# Patient Record
Sex: FEMALE | Race: WHITE | Employment: UNEMPLOYED | URBAN - METROPOLITAN AREA
[De-identification: names, ages, dates, MRNs, and addresses within clinical notes are randomized per-mention and may not be internally consistent; named-entity substitution may affect disease eponyms.]

---

## 2018-11-01 ENCOUNTER — HOSPITAL ENCOUNTER (EMERGENCY)
Age: 38
Discharge: HOME | End: 2018-11-01
Attending: EMERGENCY MEDICINE
Payer: COMMERCIAL

## 2018-11-01 ENCOUNTER — APPOINTMENT (OUTPATIENT)
Dept: CT IMAGING | Age: 38
End: 2018-11-01
Payer: COMMERCIAL

## 2018-11-01 VITALS
RESPIRATION RATE: 12 BRPM | OXYGEN SATURATION: 97 % | HEART RATE: 83 BPM | BODY MASS INDEX: 27.1 KG/M2 | WEIGHT: 143.52 LBS | TEMPERATURE: 98 F | HEIGHT: 61 IN | SYSTOLIC BLOOD PRESSURE: 105 MMHG | DIASTOLIC BLOOD PRESSURE: 65 MMHG

## 2018-11-01 DIAGNOSIS — R51.9 NONINTRACTABLE HEADACHE, UNSPECIFIED CHRONICITY PATTERN, UNSPECIFIED HEADACHE TYPE: ICD-10-CM

## 2018-11-01 DIAGNOSIS — M54.2 NECK PAIN ON LEFT SIDE: Primary | ICD-10-CM

## 2018-11-01 LAB — HCG(URINE) PREGNANCY TEST: NEGATIVE

## 2018-11-01 PROCEDURE — 96374 THER/PROPH/DIAG INJ IV PUSH: CPT

## 2018-11-01 PROCEDURE — 99284 EMERGENCY DEPT VISIT MOD MDM: CPT

## 2018-11-01 PROCEDURE — 70450 CT HEAD/BRAIN W/O DYE: CPT

## 2018-11-01 PROCEDURE — 2500000003 HC RX 250 WO HCPCS: Performed by: EMERGENCY MEDICINE

## 2018-11-01 PROCEDURE — 84703 CHORIONIC GONADOTROPIN ASSAY: CPT

## 2018-11-01 RX ORDER — KETAMINE HCL IN NACL, ISO-OSM 100MG/10ML
0.1 SYRINGE (ML) INJECTION ONCE
Status: COMPLETED | OUTPATIENT
Start: 2018-11-01 | End: 2018-11-01

## 2018-11-01 RX ORDER — ALPRAZOLAM 1 MG/1
1 TABLET ORAL 3 TIMES DAILY PRN
COMMUNITY

## 2018-11-01 RX ORDER — MIDODRINE HYDROCHLORIDE 5 MG/1
5 TABLET ORAL DAILY
COMMUNITY
End: 2020-03-02 | Stop reason: SDUPTHER

## 2018-11-01 RX ORDER — PREGABALIN 150 MG/1
200 CAPSULE ORAL 2 TIMES DAILY
COMMUNITY
End: 2020-10-05

## 2018-11-01 RX ORDER — RANITIDINE 150 MG/1
TABLET ORAL 2 TIMES DAILY
COMMUNITY
End: 2019-05-11 | Stop reason: ALTCHOICE

## 2018-11-01 RX ORDER — OXYCODONE HYDROCHLORIDE AND ACETAMINOPHEN 5; 325 MG/1; MG/1
1 TABLET ORAL EVERY 4 HOURS PRN
Status: ON HOLD | COMMUNITY
End: 2019-06-04 | Stop reason: HOSPADM

## 2018-11-01 RX ORDER — TEMAZEPAM 30 MG/1
30 CAPSULE ORAL NIGHTLY PRN
Status: ON HOLD | COMMUNITY
End: 2022-06-04

## 2018-11-01 RX ORDER — CETIRIZINE HYDROCHLORIDE 10 MG/1
10 TABLET ORAL DAILY
COMMUNITY

## 2018-11-01 RX ORDER — NAPROXEN 500 MG/1
500 TABLET ORAL 2 TIMES DAILY WITH MEALS
COMMUNITY
End: 2019-05-11 | Stop reason: ALTCHOICE

## 2018-11-01 RX ADMIN — Medication 6.5 MG: at 15:24

## 2018-11-01 ASSESSMENT — PAIN DESCRIPTION - LOCATION: LOCATION: HEAD;NECK

## 2018-11-01 ASSESSMENT — PAIN SCALES - GENERAL
PAINLEVEL_OUTOF10: 8
PAINLEVEL_OUTOF10: 5
PAINLEVEL_OUTOF10: 8
PAINLEVEL_OUTOF10: 8

## 2018-11-01 ASSESSMENT — ENCOUNTER SYMPTOMS
BACK PAIN: 0
NAUSEA: 0
COLOR CHANGE: 0
VOMITING: 0
COUGH: 0
SHORTNESS OF BREATH: 0

## 2018-11-01 ASSESSMENT — PAIN DESCRIPTION - PAIN TYPE: TYPE: ACUTE PAIN

## 2018-11-01 NOTE — ED NOTES
Discharge and education instructions reviewed. Patient verbalized understanding, teach-back successful. Patient denied questions at this time. No acute distress noted. Patient instructed to follow-up as noted - return to emergency department if symptoms worsen. Patient verbalized understanding. Discharged per EDMD with discharged instructions.        Maryan Varela RN  11/01/18 5527

## 2018-11-01 NOTE — ED NOTES
Pt states that Ketamine did help with migraine and neck pain . Reducing pain from 8/10 to a 5/10.       Maryan Varela RN  11/01/18 6741

## 2018-11-01 NOTE — ED TRIAGE NOTES
Pt to ED with c/o headache x 1-2 months and neck stiffness x 1 month. States the  Pain changes, sometimes it is sharp and stabbing and other times it causes nausea and light sensitivity. States the headache is constant while awake. States that the focal point of pain changes but today it is behind her left eye and the whole left side. Pt stats she believes that the migraines are stemming from her back pain or she has an enlarged pituitary. Pt has an appt with \"spine specialist\" next week and and has an upcoming appt with neurologist. States she needs an MRI of her brain. Detail Level: Generalized Include Location In Plan?: No

## 2018-11-01 NOTE — ED PROVIDER NOTES
[facility]  eMERGENCY dEPARTMENT eNCOUnter        Pt Name: Mariana Rowan  MRN: [id number]  [place] 1980  Date of evaluation: 11/1/2018  Provider: MARTÍN Burgess  PCP: Bruce Ch MD  ED Attending: Calos Cross MD    [address] [place] [address]       Chief Complaint   Patient presents with    Migraine     states over 1 month    Neck Pain     and stiff       HISTORY OF PRESENT ILLNESS   (Location/Symptom, Timing/Onset, Context/Setting, Quality, Duration, Modifying Factors, Severity)  Note limiting factors. Mariana Rowan is a 34 y.o. female who presents to the ED today with complaints of migraine, left neck pain. She states the migraine has been present for the last 2 months, and the left neck stiffness has been present for about a month. She denies any falls or trauma. She denies any fever or chills. She follows with pain management, and has appointment with neurology scheduled for this week or next week. She states that she has been given prescription for muscle relaxer, but has not started taking it yet. She states that she does not like taking a lot of medications because it causes her to be sleepy. She states nothing has gotten acutely worse, but she has been told she may have some issues with her pituitary gland and has not had imaging done and is worried she may have a tumor. She has no further complaints at this time. Nursing Notes were all reviewed and agreed with or any disagreements were addressed  in the HPI. REVIEW OF SYSTEMS    (2-9 systems for level 4, 10 or more for level 5)     Review of Systems   Constitutional: Positive for fatigue. Negative for chills and fever. Respiratory: Negative for cough and shortness of breath. Cardiovascular: Negative for chest pain and palpitations. Gastrointestinal: Negative for nausea and vomiting. Musculoskeletal: Positive for neck pain (left side, x 2 months) and neck stiffness (left side). of the mA/kV was utilized to reduce the radiation dose to as low as reasonably achievable. COMPARISON: October 14, 2012 HISTORY: ORDERING SYSTEM PROVIDED HISTORY: migraine x 2 months, pt states she has a pituitary abnormality but hasn't had imaging done TECHNOLOGIST PROVIDED HISTORY: If patient is on cardiac monitor and/or pulse ox, they may be taken off cardiac monitor and pulse ox, left on O2 if currently on. All monitors reattached when patient returns to room. Has a \"code stroke\" or \"stroke alert\" been called? ->No Ordering Physician Provided Reason for Exam: migraine x 2 months, pt states she has a pituitary abnormality but hasn't had imaging done Acuity: Acute Type of Exam: Initial FINDINGS: BRAIN/VENTRICLES: There is no acute intracranial hemorrhage, mass effect or midline shift. No abnormal extra-axial fluid collection. The gray-white differentiation is maintained without evidence of an acute infarct. There is no evidence of hydrocephalus. ORBITS: The visualized portion of the orbits demonstrate no acute abnormality. SINUSES: The visualized paranasal sinuses and mastoid air cells demonstrate no acute abnormality. SOFT TISSUES/SKULL:  No acute abnormality of the visualized skull or soft tissues. No acute intracranial abnormality. PROCEDURES   Unless otherwise noted below, none     Procedures    CRITICAL CARE TIME   N/A    CONSULTS:  None      EMERGENCY DEPARTMENT COURSE and DIFFERENTIALDIAGNOSIS/MDM:   Vitals:    Vitals:    11/01/18 1520 11/01/18 1530 11/01/18 1545 11/01/18 1603   BP: 105/65 101/67 97/65 (!) 98/57   Pulse: 77 76 82 84   Resp: 14 14 15 17   Temp:       TempSrc:       SpO2: 99% 99% 100% 100%   Weight:       Height:           Patient was given thefollowing medications:  Medications   ketamine (KETALAR) injection 6.5 mg (6.5 mg Intravenous Given 11/1/18 1524)       ED COURSE & MEDICAL DECISION MAKING    Pertinent Labs & Imaging studies reviewed.  (See chart for details)   -  Patient for a visit   for a re-check in  2-3  days    [facility] Emergency Department  [address] [facility]  [phone]    If symptoms worsen      DISCHARGE MEDICATIONS:  New Prescriptions    No medications on file       DISCONTINUED MEDICATIONS:  Discontinued Medications    BACLOFEN (ED BACLOFEN) 10 MG TABLET    Take 20 mg by mouth 3 times daily.     DOXEPIN HCL PO    Take by mouth    FLUTICASONE PROPIONATE (FLONASE NA)    by Nasal route 2 times daily    GABAPENTIN (NEURONTIN) 300 MG CAPSULE      800 mg 3 times daily     HYDROXYZINE (VISTARIL) 25 MG CAPS    Take 25-50 mg by mouth 3 times daily as needed    LAMOTRIGINE (LAMICTAL) 25 MG TABLET    Take 2 tablets by mouth 2 times daily for 20 days    MELOXICAM (MOBIC PO)    Take 10 mg by mouth 4 times daily    OXCARBAZEPINE (TRILEPTAL) 150 MG TABLET    Take 1 tablet by mouth every evening for 20 days              (Please note that portions ofthis note were completed with a voice recognition program.  Efforts were made to edit the dictations but occasionally words are mis-transcribed.)    MARTÍN Olivares (electronically signed)            Michael Olivares  11/01/18 7113

## 2018-11-06 ENCOUNTER — HOSPITAL ENCOUNTER (EMERGENCY)
Age: 38
Discharge: HOME | End: 2018-11-06
Payer: COMMERCIAL

## 2018-11-06 VITALS
OXYGEN SATURATION: 100 % | SYSTOLIC BLOOD PRESSURE: 101 MMHG | WEIGHT: 142.86 LBS | BODY MASS INDEX: 26.99 KG/M2 | HEART RATE: 91 BPM | TEMPERATURE: 98.4 F | RESPIRATION RATE: 16 BRPM | DIASTOLIC BLOOD PRESSURE: 61 MMHG

## 2018-11-06 DIAGNOSIS — K04.7 DENTAL ABSCESS: Primary | ICD-10-CM

## 2018-11-06 PROCEDURE — 4500000022 HC ED LEVEL 2 PROCEDURE

## 2018-11-06 PROCEDURE — 99282 EMERGENCY DEPT VISIT SF MDM: CPT

## 2018-11-06 RX ORDER — CLINDAMYCIN HYDROCHLORIDE 150 MG/1
450 CAPSULE ORAL 3 TIMES DAILY
Qty: 90 CAPSULE | Refills: 0 | Status: SHIPPED | OUTPATIENT
Start: 2018-11-06 | End: 2018-11-16

## 2018-11-06 RX ORDER — IBUPROFEN 800 MG/1
800 TABLET ORAL EVERY 6 HOURS PRN
Qty: 25 TABLET | Refills: 1 | Status: ON HOLD | OUTPATIENT
Start: 2018-11-06 | End: 2019-05-13

## 2018-11-06 ASSESSMENT — PAIN SCALES - GENERAL
PAINLEVEL_OUTOF10: 6
PAINLEVEL_OUTOF10: 4
PAINLEVEL_OUTOF10: 4

## 2018-11-06 ASSESSMENT — PAIN DESCRIPTION - FREQUENCY: FREQUENCY: CONTINUOUS

## 2018-11-06 ASSESSMENT — PAIN DESCRIPTION - LOCATION: LOCATION: JAW

## 2018-11-06 ASSESSMENT — PAIN DESCRIPTION - PAIN TYPE: TYPE: ACUTE PAIN

## 2018-11-06 ASSESSMENT — PAIN DESCRIPTION - DESCRIPTORS: DESCRIPTORS: ACHING

## 2018-11-06 ASSESSMENT — PAIN DESCRIPTION - ORIENTATION: ORIENTATION: RIGHT;LOWER

## 2018-11-06 NOTE — ED PROVIDER NOTES
is no brawny edema, uvular deviation, meningismus, stridor, trismus or drooling. I estimate there is LOW risk for a DEEP SPACE INFECTION (e.g., JENELLES ANGINA OR RETROPHARYNGEAL ABSCESS), MENINGITIS, or AIRWAY COMPROMISE. There is also NO HEART MURMUR NOTED ON EXAM thus I consider the discharge disposition reasonable. Patient will be given the dental clinic list and advised to start calling first thing tomorrow morning to schedule a follow-up visit. Patient is advised to return to the emergency department with any concerns. The patient tolerated their visit well. I saw the patient independently with physician available for consultation as needed. The patient and / or the family were informed of the results of any tests, a time was given to answer questions, a plan was proposed and they agreed with plan. FINAL IMPRESSION    1.  Dental abscess        PLAN  Oral analgesics, antibiotics, and follow up with a dentist as an outpatient    (Please note that this note was completed with a voice recognition program.  Every attempt was made to edit the dictations, but inevitably there remain words that are mis-transcribed.)             Carole Manzo, ANNA - CNP  11/06/18 5433

## 2018-11-14 ENCOUNTER — HOSPITAL ENCOUNTER (EMERGENCY)
Age: 38
Discharge: HOME | End: 2018-11-14
Attending: EMERGENCY MEDICINE
Payer: COMMERCIAL

## 2018-11-14 ENCOUNTER — APPOINTMENT (OUTPATIENT)
Dept: MRI IMAGING | Age: 38
End: 2018-11-14
Payer: COMMERCIAL

## 2018-11-14 VITALS
HEART RATE: 77 BPM | SYSTOLIC BLOOD PRESSURE: 98 MMHG | BODY MASS INDEX: 27.49 KG/M2 | OXYGEN SATURATION: 100 % | WEIGHT: 145.5 LBS | RESPIRATION RATE: 16 BRPM | DIASTOLIC BLOOD PRESSURE: 70 MMHG

## 2018-11-14 DIAGNOSIS — R51.9 NONINTRACTABLE HEADACHE, UNSPECIFIED CHRONICITY PATTERN, UNSPECIFIED HEADACHE TYPE: Primary | ICD-10-CM

## 2018-11-14 LAB
A/G RATIO: 2.1 (ref 1.1–2.2)
ALBUMIN SERPL-MCNC: 5.2 G/DL (ref 3.4–5)
ALP BLD-CCNC: 53 U/L (ref 40–129)
ALT SERPL-CCNC: 17 U/L (ref 10–40)
ANION GAP SERPL CALCULATED.3IONS-SCNC: 11 MMOL/L (ref 3–16)
AST SERPL-CCNC: 21 U/L (ref 15–37)
BASOPHILS ABSOLUTE: 0 K/UL (ref 0–0.2)
BASOPHILS RELATIVE PERCENT: 0.7 %
BILIRUB SERPL-MCNC: 0.4 MG/DL (ref 0–1)
BUN BLDV-MCNC: 11 MG/DL (ref 7–20)
CALCIUM SERPL-MCNC: 10.1 MG/DL (ref 8.3–10.6)
CHLORIDE BLD-SCNC: 104 MMOL/L (ref 99–110)
CO2: 24 MMOL/L (ref 21–32)
CREAT SERPL-MCNC: 0.7 MG/DL (ref 0.6–1.1)
EOSINOPHILS ABSOLUTE: 0.1 K/UL (ref 0–0.6)
EOSINOPHILS RELATIVE PERCENT: 2.1 %
GFR AFRICAN AMERICAN: >60
GFR NON-AFRICAN AMERICAN: >60
GLOBULIN: 2.5 G/DL
GLUCOSE BLD-MCNC: 97 MG/DL (ref 70–99)
HCT VFR BLD CALC: 44.5 % (ref 36–48)
HEMOGLOBIN: 15.1 G/DL (ref 12–16)
LYMPHOCYTES ABSOLUTE: 1.3 K/UL (ref 1–5.1)
LYMPHOCYTES RELATIVE PERCENT: 22.8 %
MCH RBC QN AUTO: 31.8 PG (ref 26–34)
MCHC RBC AUTO-ENTMCNC: 33.9 G/DL (ref 31–36)
MCV RBC AUTO: 93.8 FL (ref 80–100)
MONOCYTES ABSOLUTE: 0.5 K/UL (ref 0–1.3)
MONOCYTES RELATIVE PERCENT: 8.6 %
NEUTROPHILS ABSOLUTE: 3.9 K/UL (ref 1.7–7.7)
NEUTROPHILS RELATIVE PERCENT: 65.8 %
PDW BLD-RTO: 12.9 % (ref 12.4–15.4)
PLATELET # BLD: 258 K/UL (ref 135–450)
PMV BLD AUTO: 7.9 FL (ref 5–10.5)
POTASSIUM SERPL-SCNC: 3.8 MMOL/L (ref 3.5–5.1)
RBC # BLD: 4.74 M/UL (ref 4–5.2)
SODIUM BLD-SCNC: 139 MMOL/L (ref 136–145)
TOTAL PROTEIN: 7.7 G/DL (ref 6.4–8.2)
WBC # BLD: 5.9 K/UL (ref 4–11)

## 2018-11-14 PROCEDURE — 6370000000 HC RX 637 (ALT 250 FOR IP): Performed by: EMERGENCY MEDICINE

## 2018-11-14 PROCEDURE — 96374 THER/PROPH/DIAG INJ IV PUSH: CPT

## 2018-11-14 PROCEDURE — 99284 EMERGENCY DEPT VISIT MOD MDM: CPT

## 2018-11-14 PROCEDURE — 85025 COMPLETE CBC W/AUTO DIFF WBC: CPT

## 2018-11-14 PROCEDURE — 70544 MR ANGIOGRAPHY HEAD W/O DYE: CPT

## 2018-11-14 PROCEDURE — 96375 TX/PRO/DX INJ NEW DRUG ADDON: CPT

## 2018-11-14 PROCEDURE — 80053 COMPREHEN METABOLIC PANEL: CPT

## 2018-11-14 PROCEDURE — 6360000002 HC RX W HCPCS: Performed by: EMERGENCY MEDICINE

## 2018-11-14 RX ORDER — BUTALBITAL, ASPIRIN, AND CAFFEINE 50; 325; 40 MG/1; MG/1; MG/1
1 CAPSULE ORAL EVERY 6 HOURS PRN
Qty: 20 CAPSULE | Refills: 0 | Status: SHIPPED | OUTPATIENT
Start: 2018-11-14 | End: 2019-05-11 | Stop reason: ALTCHOICE

## 2018-11-14 RX ORDER — KETOROLAC TROMETHAMINE 30 MG/ML
30 INJECTION, SOLUTION INTRAMUSCULAR; INTRAVENOUS ONCE
Status: COMPLETED | OUTPATIENT
Start: 2018-11-14 | End: 2018-11-14

## 2018-11-14 RX ORDER — OXYCODONE HYDROCHLORIDE AND ACETAMINOPHEN 5; 325 MG/1; MG/1
1 TABLET ORAL ONCE
Status: COMPLETED | OUTPATIENT
Start: 2018-11-14 | End: 2018-11-14

## 2018-11-14 RX ORDER — KETOROLAC TROMETHAMINE 10 MG/1
10 TABLET, FILM COATED ORAL 3 TIMES DAILY
Qty: 12 TABLET | Refills: 0 | Status: SHIPPED | OUTPATIENT
Start: 2018-11-14 | End: 2019-05-11 | Stop reason: ALTCHOICE

## 2018-11-14 RX ORDER — ONDANSETRON 4 MG/1
4 TABLET, ORALLY DISINTEGRATING ORAL EVERY 8 HOURS PRN
Qty: 20 TABLET | Refills: 0 | Status: ON HOLD | OUTPATIENT
Start: 2018-11-14 | End: 2020-08-19

## 2018-11-14 RX ORDER — ONDANSETRON 2 MG/ML
4 INJECTION INTRAMUSCULAR; INTRAVENOUS ONCE
Status: COMPLETED | OUTPATIENT
Start: 2018-11-14 | End: 2018-11-14

## 2018-11-14 RX ADMIN — ONDANSETRON 4 MG: 2 INJECTION INTRAMUSCULAR; INTRAVENOUS at 15:17

## 2018-11-14 RX ADMIN — OXYCODONE AND ACETAMINOPHEN 1 TABLET: 5; 325 TABLET ORAL at 18:34

## 2018-11-14 RX ADMIN — KETOROLAC TROMETHAMINE 30 MG: 30 INJECTION, SOLUTION INTRAMUSCULAR at 15:18

## 2018-11-14 ASSESSMENT — PAIN - FUNCTIONAL ASSESSMENT: PAIN_FUNCTIONAL_ASSESSMENT: 0-10

## 2018-11-14 ASSESSMENT — PAIN SCALES - GENERAL
PAINLEVEL_OUTOF10: 8

## 2018-11-14 ASSESSMENT — PAIN DESCRIPTION - PROGRESSION
CLINICAL_PROGRESSION: NOT CHANGED
CLINICAL_PROGRESSION: NOT CHANGED

## 2018-11-14 NOTE — ED PROVIDER NOTES
Triage Chief Complaint:   Headache (pt states she thinks she has a brain tumor pt states \" ineed MRA of brain\" )    [place]:  Dennard Essex is a 34 y.o. female that presents to the emergency Department with complaints of having a headache. The patient states her headaches have begun over the course of the last 2 and half months. The patient states that she started developing these symptoms after being diagnosed with [place]'s disease. The patient states that he has never had a history of having migraines in the past.  The patient states that she was told that she needed to have an MRA of her brain done, and had just left the neurologist office today. The patient states that her symptoms have become worse today, and they were concerned about this and felt that she needed to have an MRA to rule out mass or aneurysm. Patient states that she has had an MRI in the past, but it was before having any symptoms such as this. The patient states that this is a typical presentation for her migraines, however, it is much more severe than usual.  Patient denies any radiation to her neck. Patient denies any visual disturbances. ROS:  At least  10 systems reviewed and otherwise acutely negative except as in the [address] Past Medical History:   Diagnosis Date    Compression fracture     Fibromyalgia     Herniated disc     Metabolic syndrome     Osteoarthritis     Pyelonephritis     Sciatica      History reviewed. No pertinent surgical history. History reviewed. No pertinent family history. Social History     Social History    Marital status: Single     Spouse name: N/A    Number of children: N/A    Years of education: N/A     Occupational History    Not on file.      Social History Main Topics    Smoking status: Former Smoker     Packs/day: 1.00     Types: Cigarettes     Quit date: 1/8/2018    Smokeless tobacco: Never Used    Alcohol use Yes      Comment: socially    Drug use: No      Comment: Denies    care doctor, Dr. Kahlil Belcher, we discussed her case. Since the patient is  having more severe pain, we decided that he would be beneficial for the patient to have the MRA done at the present time. The MRA was done, and it does not show any signs of abnormalities. This is consistent with the patient's previous MRI of 2012. At this point I feel the patient can be safely discharged home. The patient did have moderate improvement with the Toradol, and I will also give her a dose of Percocet here. I'm also provide the patient with medications for home. Patient will specifically get her set, Toradol, and Zofran to help with her symptoms. Patient was advised to follow with Dr. Kahlil Belcher, or her neurologist.    Clinical Impression:  1.  Nonintractable headache, unspecified chronicity pattern, unspecified headache type      (Please note that portions of this note Yaz Navarretebs been completed with a voice recognition program. Efforts were made to edit the dictations but occasionally words are mis-transcribed.)    MD Sandeep Vega MD  11/14/18 8709

## 2019-05-11 ENCOUNTER — APPOINTMENT (OUTPATIENT)
Dept: CT IMAGING | Age: 39
DRG: 052 | End: 2019-05-11
Payer: COMMERCIAL

## 2019-05-11 ENCOUNTER — APPOINTMENT (OUTPATIENT)
Dept: GENERAL RADIOLOGY | Age: 39
DRG: 052 | End: 2019-05-11
Payer: COMMERCIAL

## 2019-05-11 ENCOUNTER — HOSPITAL ENCOUNTER (INPATIENT)
Age: 39
LOS: 1 days | Discharge: HOME | DRG: 052 | End: 2019-05-15
Attending: EMERGENCY MEDICINE | Admitting: SPECIALIST
Payer: COMMERCIAL

## 2019-05-11 DIAGNOSIS — R41.0 DISORIENTATION: Primary | ICD-10-CM

## 2019-05-11 PROBLEM — R41.82 CHANGE IN MENTAL STATUS: Status: ACTIVE | Noted: 2019-05-11

## 2019-05-11 LAB
A/G RATIO: 1.4 (ref 1.1–2.2)
ACETAMINOPHEN LEVEL: <5 UG/ML (ref 10–30)
ALBUMIN SERPL-MCNC: 5.1 G/DL (ref 3.4–5)
ALP BLD-CCNC: 72 U/L (ref 40–129)
ALT SERPL-CCNC: 30 U/L (ref 10–40)
AMPHETAMINE SCREEN, URINE: NORMAL
ANION GAP SERPL CALCULATED.3IONS-SCNC: 15 MMOL/L (ref 3–16)
AST SERPL-CCNC: 27 U/L (ref 15–37)
BARBITURATE SCREEN URINE: NORMAL
BASE EXCESS VENOUS: -0.6 MMOL/L
BASOPHILS ABSOLUTE: 0.1 K/UL (ref 0–0.2)
BASOPHILS RELATIVE PERCENT: 0.9 %
BENZODIAZEPINE SCREEN, URINE: NORMAL
BILIRUB SERPL-MCNC: 0.5 MG/DL (ref 0–1)
BILIRUBIN URINE: NEGATIVE
BLOOD, URINE: NEGATIVE
BUN BLDV-MCNC: 18 MG/DL (ref 7–20)
CALCIUM SERPL-MCNC: 10.6 MG/DL (ref 8.3–10.6)
CANNABINOID SCREEN URINE: NORMAL
CARBOXYHEMOGLOBIN: 1.5 %
CHLORIDE BLD-SCNC: 106 MMOL/L (ref 99–110)
CHP ED QC CHECK: YES
CLARITY: CLEAR
CO2: 22 MMOL/L (ref 21–32)
COCAINE METABOLITE SCREEN URINE: NORMAL
COLOR: YELLOW
CREAT SERPL-MCNC: 0.8 MG/DL (ref 0.6–1.1)
EOSINOPHILS ABSOLUTE: 0.1 K/UL (ref 0–0.6)
EOSINOPHILS RELATIVE PERCENT: 1.2 %
EPITHELIAL CELLS, UA: 2 /HPF (ref 0–5)
ETHANOL: NORMAL MG/DL (ref 0–0.08)
GFR AFRICAN AMERICAN: >60
GFR NON-AFRICAN AMERICAN: >60
GLOBULIN: 3.7 G/DL
GLUCOSE BLD-MCNC: 102 MG/DL
GLUCOSE BLD-MCNC: 102 MG/DL (ref 70–99)
GLUCOSE BLD-MCNC: 116 MG/DL (ref 70–99)
GLUCOSE URINE: NEGATIVE MG/DL
HCG(URINE) PREGNANCY TEST: NEGATIVE
HCO3 VENOUS: 23 MMOL/L (ref 23–29)
HCT VFR BLD CALC: 46.9 % (ref 36–48)
HEMOGLOBIN: 16 G/DL (ref 12–16)
HYALINE CASTS: 2 /LPF (ref 0–8)
KETONES, URINE: 15 MG/DL
LACTIC ACID, SEPSIS: 1 MMOL/L (ref 0.4–1.9)
LACTIC ACID, SEPSIS: 1.2 MMOL/L (ref 0.4–1.9)
LEUKOCYTE ESTERASE, URINE: ABNORMAL
LYMPHOCYTES ABSOLUTE: 1.6 K/UL (ref 1–5.1)
LYMPHOCYTES RELATIVE PERCENT: 16.5 %
Lab: NORMAL
MCH RBC QN AUTO: 31.1 PG (ref 26–34)
MCHC RBC AUTO-ENTMCNC: 34.1 G/DL (ref 31–36)
MCV RBC AUTO: 91 FL (ref 80–100)
METHADONE SCREEN, URINE: NORMAL
METHEMOGLOBIN VENOUS: 1 %
MICROSCOPIC EXAMINATION: YES
MONOCYTES ABSOLUTE: 0.9 K/UL (ref 0–1.3)
MONOCYTES RELATIVE PERCENT: 9 %
NEUTROPHILS ABSOLUTE: 7.1 K/UL (ref 1.7–7.7)
NEUTROPHILS RELATIVE PERCENT: 72.4 %
NITRITE, URINE: NEGATIVE
O2 CONTENT, VEN: 18 ML/DL
O2 SAT, VEN: 82 %
O2 THERAPY: ABNORMAL
OPIATE SCREEN URINE: NORMAL
OXYCODONE URINE: NORMAL
PCO2, VEN: 36 MMHG (ref 40–50)
PDW BLD-RTO: 13.6 % (ref 12.4–15.4)
PERFORMED ON: ABNORMAL
PH UA: 7.5
PH UA: 7.5 (ref 5–8)
PH VENOUS: 7.42 (ref 7.35–7.45)
PHENCYCLIDINE SCREEN URINE: NORMAL
PLATELET # BLD: 460 K/UL (ref 135–450)
PMV BLD AUTO: 7.9 FL (ref 5–10.5)
PO2, VEN: 46 MMHG
POTASSIUM SERPL-SCNC: 4 MMOL/L (ref 3.5–5.1)
PROPOXYPHENE SCREEN: NORMAL
PROTEIN UA: NEGATIVE MG/DL
RBC # BLD: 5.15 M/UL (ref 4–5.2)
RBC UA: 1 /HPF (ref 0–4)
SALICYLATE, SERUM: <0.3 MG/DL (ref 15–30)
SODIUM BLD-SCNC: 143 MMOL/L (ref 136–145)
SPECIFIC GRAVITY UA: 1.02 (ref 1–1.03)
TCO2 CALC VENOUS: 24 MMOL/L
TOTAL PROTEIN: 8.8 G/DL (ref 6.4–8.2)
URINE REFLEX TO CULTURE: YES
URINE TYPE: ABNORMAL
UROBILINOGEN, URINE: 1 E.U./DL
WBC # BLD: 9.8 K/UL (ref 4–11)
WBC UA: 4 /HPF (ref 0–5)

## 2019-05-11 PROCEDURE — 83605 ASSAY OF LACTIC ACID: CPT

## 2019-05-11 PROCEDURE — 80053 COMPREHEN METABOLIC PANEL: CPT

## 2019-05-11 PROCEDURE — 96361 HYDRATE IV INFUSION ADD-ON: CPT

## 2019-05-11 PROCEDURE — 81001 URINALYSIS AUTO W/SCOPE: CPT

## 2019-05-11 PROCEDURE — G0480 DRUG TEST DEF 1-7 CLASSES: HCPCS

## 2019-05-11 PROCEDURE — 87086 URINE CULTURE/COLONY COUNT: CPT

## 2019-05-11 PROCEDURE — 82803 BLOOD GASES ANY COMBINATION: CPT

## 2019-05-11 PROCEDURE — 36415 COLL VENOUS BLD VENIPUNCTURE: CPT

## 2019-05-11 PROCEDURE — 2580000003 HC RX 258: Performed by: SPECIALIST

## 2019-05-11 PROCEDURE — G0378 HOSPITAL OBSERVATION PER HR: HCPCS

## 2019-05-11 PROCEDURE — 80307 DRUG TEST PRSMV CHEM ANLYZR: CPT

## 2019-05-11 PROCEDURE — 84703 CHORIONIC GONADOTROPIN ASSAY: CPT

## 2019-05-11 PROCEDURE — 84443 ASSAY THYROID STIM HORMONE: CPT

## 2019-05-11 PROCEDURE — 99285 EMERGENCY DEPT VISIT HI MDM: CPT

## 2019-05-11 PROCEDURE — 6370000000 HC RX 637 (ALT 250 FOR IP): Performed by: SPECIALIST

## 2019-05-11 PROCEDURE — 86780 TREPONEMA PALLIDUM: CPT

## 2019-05-11 PROCEDURE — 71045 X-RAY EXAM CHEST 1 VIEW: CPT

## 2019-05-11 PROCEDURE — 93005 ELECTROCARDIOGRAM TRACING: CPT | Performed by: EMERGENCY MEDICINE

## 2019-05-11 PROCEDURE — 70450 CT HEAD/BRAIN W/O DYE: CPT

## 2019-05-11 PROCEDURE — 85025 COMPLETE CBC W/AUTO DIFF WBC: CPT

## 2019-05-11 RX ORDER — LURASIDONE HYDROCHLORIDE 20 MG/1
20 TABLET, FILM COATED ORAL DAILY
Refills: 3 | Status: ON HOLD | COMMUNITY
Start: 2019-05-03 | End: 2022-06-04

## 2019-05-11 RX ORDER — KETOCONAZOLE 20 MG/ML
SHAMPOO TOPICAL
Refills: 4 | COMMUNITY
Start: 2019-04-13

## 2019-05-11 RX ORDER — QUETIAPINE FUMARATE 200 MG/1
200 TABLET, FILM COATED ORAL NIGHTLY
Refills: 2 | Status: ON HOLD | COMMUNITY
Start: 2019-04-20 | End: 2022-06-05 | Stop reason: SDUPTHER

## 2019-05-11 RX ORDER — DEXTROSE AND SODIUM CHLORIDE 5; .45 G/100ML; G/100ML
INJECTION, SOLUTION INTRAVENOUS CONTINUOUS
Status: DISCONTINUED | OUTPATIENT
Start: 2019-05-11 | End: 2019-05-15

## 2019-05-11 RX ORDER — HYDROCORTISONE 10 MG/1
10 TABLET ORAL EVERY MORNING
Refills: 2 | Status: ON HOLD | COMMUNITY
Start: 2019-04-25 | End: 2019-05-15 | Stop reason: HOSPADM

## 2019-05-11 RX ORDER — ACYCLOVIR 400 MG/1
400 TABLET ORAL PRN
Status: ON HOLD | COMMUNITY
End: 2022-06-05 | Stop reason: HOSPADM

## 2019-05-11 RX ORDER — DIVALPROEX SODIUM 250 MG/1
250 TABLET, DELAYED RELEASE ORAL 2 TIMES DAILY
Status: DISCONTINUED | OUTPATIENT
Start: 2019-05-11 | End: 2019-05-15 | Stop reason: HOSPADM

## 2019-05-11 RX ORDER — PLECANATIDE 3 MG/1
3 TABLET ORAL DAILY
Refills: 11 | COMMUNITY
Start: 2019-04-20

## 2019-05-11 RX ORDER — DIVALPROEX SODIUM 250 MG/1
250 TABLET, DELAYED RELEASE ORAL 2 TIMES DAILY
Status: ON HOLD | COMMUNITY
End: 2019-05-15 | Stop reason: HOSPADM

## 2019-05-11 RX ORDER — RANITIDINE 150 MG/1
150 TABLET ORAL 2 TIMES DAILY
Status: ON HOLD | COMMUNITY
End: 2020-08-25 | Stop reason: HOSPADM

## 2019-05-11 RX ORDER — ACETAMINOPHEN 325 MG/1
650 TABLET ORAL EVERY 4 HOURS PRN
Status: DISCONTINUED | OUTPATIENT
Start: 2019-05-11 | End: 2019-05-15 | Stop reason: HOSPADM

## 2019-05-11 RX ORDER — HYDROCORTISONE 10 MG/1
10 TABLET ORAL DAILY
Status: DISCONTINUED | OUTPATIENT
Start: 2019-05-11 | End: 2019-05-15 | Stop reason: HOSPADM

## 2019-05-11 RX ORDER — DOCUSATE SODIUM 100 MG
100 CAPSULE ORAL 2 TIMES DAILY
COMMUNITY
Start: 2019-04-16

## 2019-05-11 RX ADMIN — DIVALPROEX SODIUM 250 MG: 250 TABLET, DELAYED RELEASE ORAL at 14:56

## 2019-05-11 RX ADMIN — DIVALPROEX SODIUM 250 MG: 250 TABLET, DELAYED RELEASE ORAL at 21:00

## 2019-05-11 RX ADMIN — DEXTROSE AND SODIUM CHLORIDE 75 ML/HR: 5; 450 INJECTION, SOLUTION INTRAVENOUS at 13:07

## 2019-05-11 RX ADMIN — HYDROCORTISONE 10 MG: 10 TABLET ORAL at 18:46

## 2019-05-11 ASSESSMENT — PAIN SCALES - GENERAL
PAINLEVEL_OUTOF10: 0
PAINLEVEL_OUTOF10: 0

## 2019-05-11 NOTE — ED PROVIDER NOTES
HISTORY     No past surgical history on file. CURRENT MEDICATIONS       Previous Medications    ACYCLOVIR (ZOVIRAX) 400 MG TABLET    Take 400 mg by mouth 2 times daily    ALPRAZOLAM (XANAX) 1 MG TABLET    Take 1 mg by mouth 3 times daily as needed for Sleep or Anxiety. CETIRIZINE HCL (ZYRTEC ALLERGY) 10 MG CAPS    Take by mouth daily    CHOLECALCIFEROL (VITAMIN D3) 1000 UNITS CAPS    Take by mouth daily    DICLOFENAC (VOLTAREN) 50 MG EC TABLET    Take 50 mg by mouth 2 times daily    DIVALPROEX (DEPAKOTE) 250 MG DR TABLET    Take 250 mg by mouth 2 times daily     HYDROCORTISONE (CORTEF) 10 MG TABLET    Take 10 mg by mouth daily    IBUPROFEN (ADVIL;MOTRIN) 800 MG TABLET    Take 1 tablet by mouth every 6 hours as needed for Pain    KETOCONAZOLE (NIZORAL) 2 % SHAMPOO    Apply topically Twice a Week    LATUDA 20 MG TABS TABLET    Take 20 mg by mouth daily    MIDODRINE (PROAMATINE) 5 MG TABLET    Take 5 mg by mouth daily    MULTIPLE VITAMIN (MULTI-VITAMIN DAILY PO)    Take by mouth daily    ONDANSETRON (ZOFRAN ODT) 4 MG DISINTEGRATING TABLET    Take 1 tablet by mouth every 8 hours as needed for Nausea    OXYCODONE-ACETAMINOPHEN (PERCOCET) 5-325 MG PER TABLET    Take 1 tablet by mouth every 4 hours as needed. PREGABALIN (LYRICA) 200 MG CAPSULE    Take 200 mg by mouth three times daily. PROBIOTIC PRODUCT (PROBIOTIC-10 PO)    Take by mouth daily    PROGESTERONE (PROMETRIUM) 100 MG CAPSULE    Take 100 mg by mouth nightly    QUETIAPINE (SEROQUEL) 200 MG TABLET    Take 200 mg by mouth nightly    RANITIDINE (ZANTAC) 150 MG TABLET    Take 150 mg by mouth 2 times daily    STOOL SOFTENER 100 MG CAPSULE    Take 100 mg by mouth 2 times daily    SUPER B COMPLEX/C PO    Take by mouth daily    TEMAZEPAM (RESTORIL) 30 MG CAPSULE    Take 30 mg by mouth nightly as needed for Sleep. .    TRULANCE 3 MG TABS    Take 3 mg by mouth daily       ALLERGIES     Metoclopramide; Quetiapine; Risperidone; Trazodone; Buprenorphine-naloxone; Morphine; Asenapine; Buprenorphine hcl-naloxone hcl; Codeine; Guanfacine hcl; Morphine and related; Reglan [metoclopramide hcl]; Risperidone and related; Seroquel  [quetiapine fumarate]; Seroquel [quetiapine fumarate]; Trazodone and nefazodone; and Lurasidone    FAMILY HISTORY     No family history on file.        SOCIAL HISTORY       Social History     Socioeconomic History    Marital status: Single     Spouse name: Not on file    Number of children: Not on file    Years of education: Not on file    Highest education level: Not on file   Occupational History    Not on file   Social Needs    Financial resource strain: Not on file    Food insecurity:     Worry: Not on file     Inability: Not on file    Transportation needs:     Medical: Not on file     Non-medical: Not on file   Tobacco Use    Smoking status: Former Smoker     Packs/day: 1.00     Types: Cigarettes     Last attempt to quit: 2018     Years since quittin.3    Smokeless tobacco: Never Used   Substance and Sexual Activity    Alcohol use: Yes     Comment: socially    Drug use: No     Comment: Denies    Sexual activity: Not Currently   Lifestyle    Physical activity:     Days per week: Not on file     Minutes per session: Not on file    Stress: Not on file   Relationships    Social connections:     Talks on phone: Not on file     Gets together: Not on file     Attends Anabaptism service: Not on file     Active member of club or organization: Not on file     Attends meetings of clubs or organizations: Not on file     Relationship status: Not on file    Intimate partner violence:     Fear of current or ex partner: Not on file     Emotionally abused: Not on file     Physically abused: Not on file     Forced sexual activity: Not on file   Other Topics Concern    Not on file   Social History Narrative    Not on file         PHYSICAL EXAM    (up to 7 for level 4, 8 or more for level 5)     ED Triage Vitals [05/11/19 0742]   BP Temp Temp Source Pulse Resp SpO2 Height Weight   121/72 99 °F (37.2 °C) Oral 78 15 98 % -- 149 lb 11.1 oz (67.9 kg)       Gen. : An alert cooperative female in no obvious  Distress. Head: Atraumatic and normocephalic. Eyes: Pupils 5 mm, equal round and reactive. Extraocular movements are intact. No nystagmus. ENT: No facial trauma. TMs normal. Nose is clear. Oropharynx is moist without erythema. Intact gag reflex. Neck: Supple without adenopathy or JVD. No thyromegaly. Heart: Regular rate and rhythm. no murmurs gallops noted. Lungs: Breath sounds equal bilaterally and clear. Abdomen: Soft, non distended, non tender. Skin: Warm and dry, good turgor. No cyanosis or diaphoresis. Neuro: awake, alert, oriented to person, she noticed her first name, she is not oriented to place or time. Her speech is clear, she gives simple one word answers of yes and no. . She is diffusely weak but has symmetrical motor function without drift. Her pupils are symmetrical. Her extraocular movements are intact. There is no facial asymmetry. Her tongue is midline. Mental status: Blunted affect. DIFFERENTIAL DIAGNOSIS   Differential includes but is not limited to stroke, hypoglycemia, drug ingestion, intercerebral hemorrhage, traumatic subdural      DIAGNOSTIC RESULTS     EKG: All EKG's are interpreted by Keyon Christianson MD in the absence of a cardiologist.    Normal sinus rhythm, rate of 89, nonspecific ST-T changes. Rhythm strip shows sinus rhythm with rate of 89, ME interval 128 ms, QRS 76 ms with no other ectopy is interpreted by me. Compared to EKG dated 3/8/18, nonspecific T-wave flattening which is diffuse is more pronounced on today's tracing.     RADIOLOGY:   Non-plain film images such as CT, Ultrasound and MRI are read by the radiologist. Plain radiographic images are visualized and preliminarily interpreted Keyon Christianson MD with the below findings:      Interpretation per the other components within normal limits    Narrative:     Performed at:  [facility]  [address] [facility] [place] [address]   Phone [phone]   POCT GLUCOSE - Abnormal; Notable for the following components:    POC Glucose 102 (*)     All other components within normal limits    Narrative:     Performed at:  [facility]  [address] [facility] [place] [address]   Phone [phone]   POCT GLUCOSE - Normal   URINE CULTURE   ETHANOL    Narrative:     Performed at:  [facility]  [address] [facility] [place] [address]   Phone [phone]   URINE DRUG SCREEN    Narrative:     Performed at:  [facility]  [address] [place] [address] [place] [address]   Phone [phone]   LACTATE, SEPSIS    Narrative:     Performed at:  [facility]  [address] [place] [address] [place] [address]   Phone [phone]   PREGNANCY, URINE    Narrative:     Performed at:  [facility]  [address] [place] [address] [place] [address]   Phone [phone]   MICROSCOPIC URINALYSIS    Narrative:     Performed at:  [facility]  [address] [place] [address] [place] [address]   Phone [phone]   LACTATE, SEPSIS       All other labs were within normal range or not returned as of this dictation. EMERGENCY DEPARTMENT COURSE and DIFFERENTIAL DIAGNOSIS/MDM:   Vitals:    Vitals:    05/11/19 0914 05/11/19 0928 05/11/19 0943 05/11/19 0959   BP: 113/83 129/88 121/72 119/83   Pulse: 102 94 109 87   Resp: 18 16 27 19   Temp:       TempSrc:       SpO2: 100% 99% 99% 100%   Weight:           0800: I contacted poison control to discuss the current medications that we are aware of that the patient could be taking including temazepam  , alprazolam,  Skelaxin, and Lyrica.  Due to the fact that she is minimally sedated , if this is a drug injection they feel it would most likely be a mild to moderate Lyrica overdose. We will continue the workup to rule out other etiologies for her altered mental status. 1030: The patient is slightly more conversive, but is still oriented only to person, not to place or time. Her vital signs remained stable. Her medical workup shows no acute intracranial abnormality, no evidence of subdural, subarachnoid, intracerebral hemorrhage. No evidence of stroke. Her workup shows no evidence of an infectious etiology for her altered mental status. She is not hypo-or hyperglycemic. She is not dehydrated. At this point in time I suspect that this is likely related to excessive medication use/overdose. No family has arrived. No additional history can be obtained. The patient's primary care physician will be consulted for admission. 1037:  Discussed with Dr. Johnson Brochure. He will admit for monitoring, observation, serial neurologic checks. CONSULTS:  SIOBHAN CONSULT TO INTERNAL MEDICINE    PROCEDURES:  None    FINAL IMPRESSION      1. Disorientation          DISPOSITION/PLAN   DISPOSITION        PATIENT REFERRED TO:  No follow-up provider specified.     DISCHARGE MEDICATIONS:  New Prescriptions    No medications on file       (Please note that portions of this note were completed with a voice recognition program.  Efforts were made to edit the dictations but occasionally words are mis-transcribed.)    Vonda Ruiz MD  Attending Emergency Physician       Aminata Alba MD  05/11/19 1038

## 2019-05-11 NOTE — ED TRIAGE NOTES
Pt to room 17 via squad family found pt altered mental status pt awake although not answering questions appropriately. Pt stares straight ahead and will answer who she is but nothing else.

## 2019-05-11 NOTE — H&P
H & P dictated [id number]  Acute mental status changes, psychiatry vs neurological vs drug interaction  Drug side effects,   Drug screen, CXR, CT of head, labs w/u unremarkable,  Hx of Bipolar d/o, ALLEN, chronic insomnia,  Hx of chronic low back pain, herniated disc, f/u by pain mng, and [facility], plan to have back surgery,  Hx of Adr.insuff f/u by .    Hold home med, IVF, neuro, psychiatry consult,  Cont diet,  Dr Cheryl Currie

## 2019-05-11 NOTE — H&P
Medications   Medication Sig Dispense Refill    divalproex (DEPAKOTE) 250 MG DR tablet Take 1 tablet by mouth 2 times daily 90 tablet 3    hydrocortisone (CORTEF) 10 MG tablet Take 1 tablet by mouth daily 30 tablet 1    ranitidine (ZANTAC) 150 MG tablet Take 150 mg by mouth 2 times daily      acyclovir (ZOVIRAX) 400 MG tablet Take 400 mg by mouth 2 times daily      diclofenac (VOLTAREN) 50 MG EC tablet Take 50 mg by mouth 2 times daily      ketoconazole (NIZORAL) 2 % shampoo Apply topically Twice a Week  4    LATUDA 20 MG TABS tablet Take 20 mg by mouth daily  3    progesterone (PROMETRIUM) 100 MG capsule Take 100 mg by mouth nightly  1    QUEtiapine (SEROQUEL) 200 MG tablet Take 200 mg by mouth nightly  2    TRULANCE 3 MG TABS Take 3 mg by mouth daily  11    pregabalin (LYRICA) 200 MG capsule Take 200 mg by mouth three times daily.  ALPRAZolam (XANAX) 1 MG tablet Take 1 mg by mouth 3 times daily as needed for Sleep or Anxiety.  midodrine (PROAMATINE) 5 MG tablet Take 5 mg by mouth daily      oxyCODONE-acetaminophen (PERCOCET) 5-325 MG per tablet Take 1 tablet by mouth every 4 hours as needed.  temazepam (RESTORIL) 30 MG capsule Take 30 mg by mouth nightly as needed for Sleep. Jake Conner STOOL SOFTENER 100 MG capsule Take 100 mg by mouth 2 times daily      ondansetron (ZOFRAN ODT) 4 MG disintegrating tablet Take 1 tablet by mouth every 8 hours as needed for Nausea 20 tablet 0    SUPER B COMPLEX/C PO Take by mouth daily      Multiple Vitamin (MULTI-VITAMIN DAILY PO) Take by mouth daily      Cholecalciferol (VITAMIN D3) 1000 units CAPS Take by mouth daily      Cetirizine HCl (ZYRTEC ALLERGY) 10 MG CAPS Take by mouth daily      Probiotic Product (PROBIOTIC-10 PO) Take by mouth daily         FAMILY HISTORY:  Father has arthritis. Mother has blood pressure  problem. SOCIAL HISTORY:  The patient is single, lives with boyfriend. No  smoking. No drinking.     REVIEW OF SYSTEMS:  Unable to obtain due to the patient not talking. PHYSICAL EXAMINATION:  VITAL SIGNS:  The patient's blood pressure was 124/82, respiratory rate  was 18, pulse 76, temperature 98.2. The patient weighed 149 pounds. Saturation was 97% on room air. GENERAL:  The patient is awake but disoriented, not speaking. No  dysphagia. No facial asymmetry but some kind of stiffness on the hands  noted. SKIN:  Warm and dry. HEENT:  Head:  Normocephalic, atraumatic. Pupils are reactive to light  and accommodation. Ears, Nose, and Throat:  Mucous membranes are moist.  NECK:  Supple. No JVD. No lymphadenopathy. No thyromegaly. LUNGS:  Clear bilaterally. HEART:  S1, S2.  Regular rhythm. No murmur. ABDOMEN:  Soft. Bowel sounds present. No mass. No hepatosplenomegaly. Some low back pain. EXTREMITIES:  No edema. No cyanosis. No clubbing. NEUROLOGIC:  As I mentioned, the patient is not talking at this time. Has some stiffness on the face and on the hands. No sensory deficit. The patient moves the arms and legs well. LABORATORY STUDIES:  Urine drug screen was negative. Oxycodone even was  negative but she is taking oxycodone. WBC count was 9.8, hemoglobin 16,  hematocrit 46.9, platelet count 196. The pH was 7.41, pCO2 was 36, pO2  was 46, bicarb 23, oxygen saturation venous was 82. CT scan of the head was negative. Sinuses were negative. Urine was negative. HCG was negative. The pregnancy test was negative. Sodium was 143, potassium 4.0, chloride 106, CO2 of 22, BUN 18,  creatinine 0.8, glucose 116. Albumin 5.1, LFTs were normal.  Tylenol  level was less than 5, ethanol level was negative. Salicylate level was  0.3. Chest x-ray was negative. ASSESSMENT AND PLAN:  1. Acute mental status change, could most likely have drug interaction  or some allergic reaction to one of the medications. The patient  has  multiple drug allergies.   Urine drug screen was negative but she was  taking some pain medication at home, followed up by Pain Management. So  cause of mental status at this time not clear. Suspect some drug  interaction, so we will get Neuro consult and Psych consult. 2.  Chronic low back pain. The patient has some herniated disk,  foraminal stenosis, meant to have back surgery in the near future. 3.  Bipolar disorder, anxiety disorder, chronic insomnia. Taking  medication. Urine drug screen was negative. Really, I do not know  whether she is taking her medication or not. 4.  History of adrenal insufficiency, was diagnosed at [facility]. The patient  is taking Cortef _____ recommendation. 5.  GERD. She is taking medications at home. So we will watch the  patient carefully. May have a regular diet. Was in observation but I  think she is qualified for regular admission to see whether the mental  status is going to clear in 24 to 48 hours. I spent more than 30 minutes on face-to-face evaluation with the  patient. I discussed the management and findings with the patient and  nursing staff.         Diamond Connolly MD    D: 05/11/2019 13:50:55       T: 05/11/2019 17:37:50     [email or web address]  Job#: [id number]     Doc#: [id number]    CC:

## 2019-05-11 NOTE — ED NOTES
Fall risk screening completed. Fall risk bracelet applied to patient. Non-skid socks provided and placed on patient. The fall risk is indicated using  dome light . Based on score, a bed alarm was indicated and applied. The call light is within the patient's reach, and instructions for use were provided. The bed is in the lowest position with wheels locked. The patient has been advised to notify staff, using the call light, if there is a need to get up or use restroom. The patient verbalized understanding of safety precautions and how to contact staff for assistance.       Carolyne Abarca RN  05/11/19 1016

## 2019-05-11 NOTE — CONSULTS
Neurology Consult Note  Reason for Consult: altered mental status  Chief complaint: confusion  Dr Leanna Kamara MD asked me to see Telma Benitez in consultation for evaluation of altered mental status    History of Present Illness:  Telma Benitez is a 37 y.o. female who presents having been brought in by family with relatively acute mental status change noted this morning. She was reported to improve some while in the ED from an alertness standpoint. At the current time staff reports she does intermittently answer yes/no but not always correctly. Medical History:  Past Medical History:   Diagnosis Date    Compression fracture     Fibromyalgia     Herniated disc     Metabolic syndrome     Osteoarthritis     Pyelonephritis     Sciatica      History reviewed. No pertinent surgical history. Medications Prior to Admission: ranitidine (ZANTAC) 150 MG tablet, Take 150 mg by mouth 2 times daily  acyclovir (ZOVIRAX) 400 MG tablet, Take 400 mg by mouth 2 times daily  diclofenac (VOLTAREN) 50 MG EC tablet, Take 50 mg by mouth 2 times daily  divalproex (DEPAKOTE) 250 MG DR tablet, Take 250 mg by mouth 2 times daily   STOOL SOFTENER 100 MG capsule, Take 100 mg by mouth 2 times daily  hydrocortisone (CORTEF) 10 MG tablet, Take 10 mg by mouth daily  ketoconazole (NIZORAL) 2 % shampoo, Apply topically Twice a Week  LATUDA 20 MG TABS tablet, Take 20 mg by mouth daily  progesterone (PROMETRIUM) 100 MG capsule, Take 100 mg by mouth nightly  QUEtiapine (SEROQUEL) 200 MG tablet, Take 200 mg by mouth nightly  TRULANCE 3 MG TABS, Take 3 mg by mouth daily  ondansetron (ZOFRAN ODT) 4 MG disintegrating tablet, Take 1 tablet by mouth every 8 hours as needed for Nausea  ibuprofen (ADVIL;MOTRIN) 800 MG tablet, Take 1 tablet by mouth every 6 hours as needed for Pain  pregabalin (LYRICA) 200 MG capsule, Take 200 mg by mouth three times daily.    ALPRAZolam (XANAX) 1 MG tablet, Take 1 mg by mouth 3 times daily as needed for Sleep or Anxiety. midodrine (PROAMATINE) 5 MG tablet, Take 5 mg by mouth daily  oxyCODONE-acetaminophen (PERCOCET) 5-325 MG per tablet, Take 1 tablet by mouth every 4 hours as needed. SUPER B COMPLEX/C PO, Take by mouth daily  Multiple Vitamin (MULTI-VITAMIN DAILY PO), Take by mouth daily  Cholecalciferol (VITAMIN D3) 1000 units CAPS, Take by mouth daily  Cetirizine HCl (ZYRTEC ALLERGY) 10 MG CAPS, Take by mouth daily  Probiotic Product (PROBIOTIC-10 PO), Take by mouth daily  temazepam (RESTORIL) 30 MG capsule, Take 30 mg by mouth nightly as needed for Sleep. .  Allergies   Allergen Reactions    Metoclopramide Shortness Of Breath and Swelling    Quetiapine Shortness Of Breath and Swelling    Risperidone Swelling and Shortness Of Breath    Trazodone Swelling and Shortness Of Breath     Swelling throat    Buprenorphine-Naloxone Anxiety and Hives     Patient says she can take Percocet, Vicodin    Morphine Hives and Itching    Asenapine Swelling     tongue    Buprenorphine Hcl-Naloxone Hcl     Codeine Hives and Itching     Patient says she can take Percocet, Vicodin    Guanfacine Hcl Swelling     tongue    Morphine And Related Hives    Reglan [Metoclopramide Hcl] Swelling    Risperidone And Related Swelling    Seroquel  [Quetiapine Fumarate] Other (See Comments)     \"I get really dizzy and I pass out. \"    Seroquel [Quetiapine Fumarate] Swelling     Swelling throat    Trazodone And Nefazodone Swelling     Swelling throat    Lurasidone Anxiety     History reviewed. No pertinent family history.   Social History     Tobacco Use   Smoking Status Former Smoker    Packs/day: 1.00    Types: Cigarettes    Last attempt to quit: 2018    Years since quittin.3   Smokeless Tobacco Never Used     Social History     Substance and Sexual Activity   Drug Use No    Comment: Denies     Social History     Substance and Sexual Activity   Alcohol Use Yes    Comment: socially       ROS:  Unable to assess given mental status and/or cooperation      Exam:  Blood pressure 124/82, pulse 76, temperature 98.2 °F (36.8 °C), temperature source Oral, resp. rate 18, weight 149 lb 11.1 oz (67.9 kg), last menstrual period 05/01/2019, SpO2 97 %, not currently breastfeeding. Constitutional    Vital signs: BP, HR, and RR reviewed   General Alert, no distress, well-nourished  Eyes: fundoscopic exam revealed no hemorrhage   Cardiovascular: pulses symmetric in all 4 extremities. No peripheral edema. Psychiatric: odd affect. Decreased eye contact. Inappropriate giggling given situation. No agitation. cooperative  Neurologic  Mental status:   Orientation unable to assess given non-verbal   General fund of knowledge unable to assess given non-verbal   Memory unable to assess given non-verbal   Attention intact as able to attend well to the exam     Language non-verbal for me. On one occasion made a sound but not clearly a word   Comprehension she did not following even simple commands. Intermittently mimics. Cranial nerves:   CN2: Visual Fields blink well to moving stimuli   CN 3,4,6: extraocular muscles intact with tracking  CN5: facial sensation symmetric   CN7:face symmetric   CN8: hearing grossly intact  CN9: palate elevated symmetrically  CN11: trap full strength on shoulder shrug  CN12: tongue midline with protrusion  Strength: she is antigravity in all 4 llimbs and relatively Good strength at least in 4/5 range in all 4 extremities   Deep tendon reflexes: normal to brisk in all 4 extremities  Sensory: light touch intact in all 4 extremities. Cerebellar/coordination: unable to assess as not following commands  Tone: normal to increased in all 4 extremities as she resists most of my movement  Gait: deferred for safety      Labs  UPT negative  Utox negative  CMP and CBC relatively okay    Studies  Head CT okay    Impression:  Telma Benitez is a 37 y.o. female who presented with altered mental status.   On exam very alert without clear focal neurologic findings. Her inappropriate affect and frequent back and forth rocking suggestive of psych etiology underlying but toxic/metabolic more likely than infectious/inflammatory possibility at this time. Recommendations:  Agree with psych consult given history of bipolar  If possible brain MRI may be useful.   If not improving (and definitely if declining) CSF including infectious PCRs, bands and autoimmune panel would be indicated  Plan for EEG on Monday if still symptomatic    A copy of this note was provided for MD Eve Lew MD  [phone]  Evenings, weekends, and off weeks please discuss neurologist on-call

## 2019-05-12 LAB
TSH SERPL DL<=0.05 MIU/L-ACNC: 0.34 UIU/ML (ref 0.27–4.2)
URINE CULTURE, ROUTINE: NORMAL

## 2019-05-12 PROCEDURE — 51701 INSERT BLADDER CATHETER: CPT

## 2019-05-12 PROCEDURE — 80307 DRUG TEST PRSMV CHEM ANLYZR: CPT

## 2019-05-12 PROCEDURE — 93010 ELECTROCARDIOGRAM REPORT: CPT | Performed by: INTERNAL MEDICINE

## 2019-05-12 PROCEDURE — 6370000000 HC RX 637 (ALT 250 FOR IP): Performed by: SPECIALIST

## 2019-05-12 PROCEDURE — G0480 DRUG TEST DEF 1-7 CLASSES: HCPCS

## 2019-05-12 PROCEDURE — 51798 US URINE CAPACITY MEASURE: CPT

## 2019-05-12 PROCEDURE — 96374 THER/PROPH/DIAG INJ IV PUSH: CPT

## 2019-05-12 PROCEDURE — 2580000003 HC RX 258: Performed by: SPECIALIST

## 2019-05-12 PROCEDURE — 99221 1ST HOSP IP/OBS SF/LOW 40: CPT | Performed by: PSYCHIATRY & NEUROLOGY

## 2019-05-12 PROCEDURE — G0378 HOSPITAL OBSERVATION PER HR: HCPCS

## 2019-05-12 PROCEDURE — 96361 HYDRATE IV INFUSION ADD-ON: CPT

## 2019-05-12 RX ORDER — TAMSULOSIN HYDROCHLORIDE 0.4 MG/1
0.4 CAPSULE ORAL DAILY
Status: DISCONTINUED | OUTPATIENT
Start: 2019-05-12 | End: 2019-05-15 | Stop reason: HOSPADM

## 2019-05-12 RX ADMIN — DEXTROSE AND SODIUM CHLORIDE 75 ML/HR: 5; 450 INJECTION, SOLUTION INTRAVENOUS at 17:09

## 2019-05-12 RX ADMIN — DEXTROSE AND SODIUM CHLORIDE: 5; 450 INJECTION, SOLUTION INTRAVENOUS at 02:12

## 2019-05-12 RX ADMIN — DIVALPROEX SODIUM 250 MG: 250 TABLET, DELAYED RELEASE ORAL at 21:18

## 2019-05-12 ASSESSMENT — PAIN SCALES - GENERAL: PAINLEVEL_OUTOF10: 0

## 2019-05-12 NOTE — PROGRESS NOTES
Several attempts to administer patient's scheduled PO meds were unsuccessful. Patient continues to be unable and/or unwilling to take meds shaking head \"no\" and does not open mouth despite encouragement and education. MD made aware. Continues to be non-verbal. Nods head \"yes\" and \"no\"; however, responses do not always seem to be appropriate. Refuses to eat. Only took in a few small sips of PO fluid this morning. Patient ambulating in room without difficulty; continues with difficulty using hands with weak hand grasps. Chair alarm on, call light and frequently used items in reach.
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
## 2019-05-12 NOTE — PROGRESS NOTES
Patient arrived to the floor at 12:20pm  Patient alert, but not verbalizing. Patient given orientation to room and call light/remote usage. Unable to determined if patient understood orientation provided. Patient appeared to be nodding appropriately at times, but not every time. No improving changes noted by end of shift. However, patient did become tearful when her father called her room phone and nurse spoke with father. Bed alarm on, call light and frequently used items in reach.

## 2019-05-12 NOTE — PROGRESS NOTES
Neurology Consult Note    Updates:  No changes. Possibly a bit more interactive. Exam:  Blood pressure 134/88, pulse 70, temperature 98.4 °F (36.9 °C), temperature source Oral, resp. rate 16, weight 152 lb 1.9 oz (69 kg), last menstrual period 05/01/2019, SpO2 97 %, not currently breastfeeding. Constitutional                          Vital signs: BP, HR, and RR reviewed            General Alert, no distress, well-nourished  Cardiovascular: pulses symmetric in all 4 extremities. No peripheral edema. Psychiatric: odd affect. Decreased eye contact. Inappropriate giggling given situation. No agitation. cooperative  Neurologic  Mental status:   Orientation unable to assess given non-verbal              General fund of knowledge unable to assess given non-verbal              Attention intact as able to attend well to the exam                Language non-verbal for me. Comprehension; she appeared to point to the ceiling on command on a single occasion but didn't stick out tongue, close eyes, make fist, etc.  Cranial nerves:   CN2: Visual Fields blink well to moving stimuli   CN 3,4,6: extraocular muscles intact with tracking. Pupils dilatedc   CN7:face symmetric   CN8: hearing grossly intact  Strength: she is antigravity in all 4 limbs   Deep tendon reflexes: normal to brisk in all 4 extremities      Impression:  Leo Quiñones is a 37 y.o. female who presented with altered mental status. On exam very alert without clear focal neurologic findings. Her inappropriate affect and frequent back and forth rocking suggestive of psych etiology underlying but toxic/metabolic more likely than infectious/inflammatory possibility at this time. Exam stable today.   Recommendations:  Agree with psych consult given history of bipolar and exam findings. If possible brain MRI may be useful.   If not improving (and definitely if declining) CSF including infectious PCRs, bands and autoimmune panel would be indicated  Plan for EEG on Monday if still symptomatic      A copy of this note was provided for MD Baylee Reinoso MD  [phone]  Evenings, weekends, and off weeks please discuss with the covering neurologist.

## 2019-05-13 LAB — TOTAL SYPHILLIS IGG/IGM: NORMAL

## 2019-05-13 PROCEDURE — G0378 HOSPITAL OBSERVATION PER HR: HCPCS

## 2019-05-13 PROCEDURE — 96361 HYDRATE IV INFUSION ADD-ON: CPT

## 2019-05-13 PROCEDURE — 99231 SBSQ HOSP IP/OBS SF/LOW 25: CPT | Performed by: PSYCHIATRY & NEUROLOGY

## 2019-05-13 PROCEDURE — 6360000002 HC RX W HCPCS: Performed by: SPECIALIST

## 2019-05-13 PROCEDURE — 6370000000 HC RX 637 (ALT 250 FOR IP): Performed by: SPECIALIST

## 2019-05-13 PROCEDURE — 51798 US URINE CAPACITY MEASURE: CPT

## 2019-05-13 PROCEDURE — 2580000003 HC RX 258: Performed by: SPECIALIST

## 2019-05-13 PROCEDURE — 96374 THER/PROPH/DIAG INJ IV PUSH: CPT

## 2019-05-13 PROCEDURE — 95816 EEG AWAKE AND DROWSY: CPT

## 2019-05-13 RX ORDER — HYDROCORTISONE 5 MG/1
5 TABLET ORAL NIGHTLY
Status: ON HOLD | COMMUNITY
End: 2019-05-15 | Stop reason: HOSPADM

## 2019-05-13 RX ORDER — LORAZEPAM 2 MG/ML
0.5 INJECTION INTRAMUSCULAR EVERY 4 HOURS PRN
Status: DISCONTINUED | OUTPATIENT
Start: 2019-05-13 | End: 2019-05-15 | Stop reason: HOSPADM

## 2019-05-13 RX ORDER — LORAZEPAM 2 MG/ML
0.5 INJECTION INTRAMUSCULAR EVERY 4 HOURS
Status: DISCONTINUED | OUTPATIENT
Start: 2019-05-13 | End: 2019-05-13

## 2019-05-13 RX ADMIN — DEXTROSE AND SODIUM CHLORIDE: 5; 450 INJECTION, SOLUTION INTRAVENOUS at 05:54

## 2019-05-13 RX ADMIN — LORAZEPAM 0.5 MG: 2 INJECTION INTRAMUSCULAR; INTRAVENOUS at 21:50

## 2019-05-13 RX ADMIN — DEXTROSE AND SODIUM CHLORIDE: 5; 450 INJECTION, SOLUTION INTRAVENOUS at 18:39

## 2019-05-13 RX ADMIN — DIVALPROEX SODIUM 250 MG: 250 TABLET, DELAYED RELEASE ORAL at 21:51

## 2019-05-13 ASSESSMENT — PAIN SCALES - GENERAL
PAINLEVEL_OUTOF10: 0
PAINLEVEL_OUTOF10: 0

## 2019-05-13 NOTE — PROGRESS NOTES
OK? yes    EKG 12 Lead    Collection Time: 05/11/19  8:28 AM   Result Value Ref Range    Ventricular Rate 89 BPM    Atrial Rate 89 BPM    P-R Interval 128 ms    QRS Duration 76 ms    Q-T Interval 368 ms    QTc Calculation (Bazett) 447 ms    P Axis 54 degrees    R Axis 4 degrees    T Axis 79 degrees    Diagnosis       Normal sinus rhythmNonspecific T wave abnormalityConfirmed by JEANIE MCINTOSH, Avni Alanis ([id number]) on 5/12/2019 1:47:34 PM   Drug screen multi urine    Collection Time: 05/11/19  8:42 AM   Result Value Ref Range    Amphetamine Screen, Urine Neg Negative <1000ng/mL    Barbiturate Screen, Ur Neg Negative <200 ng/mL    Benzodiazepine Screen, Urine Neg Negative <200 ng/mL    Cannabinoid Scrn, Ur Neg Negative <50 ng/mL    Cocaine Metabolite Screen, Urine Neg Negative <300 ng/mL    Opiate Scrn, Ur Neg Negative <300 ng/mL    PCP Screen, Urine Neg Negative <25 ng/mL    Methadone Screen, Urine Neg Negative <300 ng/mL    Propoxyphene Scrn, Ur Neg Negative <300 ng/mL    pH, UA 7.5     Drug Screen Comment: see below     Oxycodone Urine Neg Negative <100 ng/ml   Urine, reflex to culture    Collection Time: 05/11/19  8:42 AM   Result Value Ref Range    Color, UA YELLOW Straw/Yellow    Clarity, UA Clear Clear    Glucose, Ur Negative Negative mg/dL    Bilirubin Urine Negative Negative    Ketones, Urine 15 (A) Negative mg/dL    Specific Gravity, UA 1.021 1.005 - 1.030    Blood, Urine Negative Negative    pH, UA 7.5 5.0 - 8.0    Protein, UA Negative Negative mg/dL    Urobilinogen, Urine 1.0 <2.0 E.U./dL    Nitrite, Urine Negative Negative    Leukocyte Esterase, Urine SMALL (A) Negative    Microscopic Examination YES     Urine Reflex to Culture Yes     Urine Type Not Specified    Pregnancy, Urine    Collection Time: 05/11/19  8:42 AM   Result Value Ref Range    HCG(Urine) Pregnancy Test Negative Detects HCG level >20 MIU/mL   Urine Culture    Collection Time: 05/11/19  8:42 AM   Result Value Ref Range    Urine Culture, Routine No growth at 18-36 hours    Microscopic Urinalysis    Collection Time: 05/11/19  8:42 AM   Result Value Ref Range    Hyaline Casts, UA 2 0 - 8 /LPF    WBC, UA 4 0 - 5 /HPF    RBC, UA 1 0 - 4 /HPF    Epi Cells 2 0 - 5 /HPF   Lactate, Sepsis    Collection Time: 05/11/19  8:52 PM   Result Value Ref Range    Lactic Acid, Sepsis 1.0 0.4 - 1.9 mmol/L       Current Facility-Administered Medications   Medication Dose Route Frequency Provider Last Rate Last Dose    tamsulosin (FLOMAX) capsule 0.4 mg  0.4 mg Oral Daily Delores Guerrero MD        dextrose 5 % and 0.45 % sodium chloride infusion   Intravenous Continuous Delores Guerrero MD 75 mL/hr at 05/13/19 0554      divalproex (DEPAKOTE) DR tablet 250 mg  250 mg Oral BID Delores Guerrero MD   250 mg at 05/12/19 2118    hydrocortisone (CORTEF) tablet 10 mg  10 mg Oral Daily Delores Guerrero MD   Stopped at 05/12/19 2650    acetaminophen (TYLENOL) tablet 650 mg  650 mg Oral Q4H PRN Delores Guerrero MD         ROS:  No tremor, nl station    MSE:  A-in gowns, fair EC, more sharply engageable  A-flat  M-didn't answer  S-grossly alert  I/J-poor/poor? T-hard to  TP when pt isn't speaking. No resp to int stim today. Recs:  1. Altered MS/psychosis/delirium, mutism-This is starting to look a bit more psychogenic today. There is just more of a sharpness in her responses and EC today. Tough to say. Especially as pt feels she needs some psych help, still not talking, not eating, we probably ought to admit her to psych when medically clear.   I'll keep following to continue to re-eval.

## 2019-05-13 NOTE — PROGRESS NOTES
I spoke with Dr. Bety Silvestre from urology, continue straight cath and they will see her tomorrow.   Electronically signed by Rajesh Maier RN on 5/13/2019 at 4:58 PM

## 2019-05-13 NOTE — PROGRESS NOTES
Patient is alert and disoriented, nods appropriately but will not verbally respond. Patient is refusing morning medication. IV fluids are infusing. Patient turns self in the bed. Tele cam is in the room. No further needs at this time. Bed is locked in lowest position, call light within reach. Will continue to monitor.    Electronically signed by Gayle Sutton RN on 5/13/2019 at 8:56 AM

## 2019-05-13 NOTE — PLAN OF CARE
Problem: Falls - Risk of:  Goal: Will remain free from falls  Description  Will remain free from falls  5/13/2019 0858 by Francia Babb RN  Note:   Patient educated on fall prevention. Call light is within reach, bed locked in lowest position, personal items within reach, and bed alarm is on. Will round on patient per unit guidelines. 5/13/2019 0513 by Becka Mathur RN  Outcome: Ongoing  Goal: Absence of physical injury  Description  Absence of physical injury  5/13/2019 0858 by Francia Babb RN  Note:   Pt assessed for fall risk and fall precautions put into place. Bed in lowest position and wheels locked, call light within reach. Nonskid footwear in place. Patient educated on appropriate method of transfer and to call for assistance. 5/13/2019 0513 by Becka Mathur RN  Outcome: Ongoing     Problem: Risk for Impaired Skin Integrity  Goal: Tissue integrity - skin and mucous membranes  Description  Structural intactness and normal physiological function of skin and  mucous membranes. 5/13/2019 0858 by Francia Babb RN  Note:   Will monitor skin and mucous members. Will turn patient every 2 hours, monitor for friction and sheering, and change dressings as needed. Will preform skin assessment every shift.      5/13/2019 0513 by Becka Mathur RN  Outcome: Ongoing

## 2019-05-13 NOTE — PROGRESS NOTES
Neurology Progress Note    Updates  Patient is alert, non verbal.    Will follow some commands, shake/nod her head sometimes to questioning. Past Medical History:   Diagnosis Date    Compression fracture     Fibromyalgia     Herniated disc     Metabolic syndrome     Osteoarthritis     Pyelonephritis     Sciatica      Current Facility-Administered Medications:     tamsulosin (FLOMAX) capsule 0.4 mg, 0.4 mg, Oral, Daily, Khanh Tang MD    dextrose 5 % and 0.45 % sodium chloride infusion, , Intravenous, Continuous, Khanh Tang MD, Last Rate: 75 mL/hr at 05/13/19 0554    divalproex (DEPAKOTE) DR tablet 250 mg, 250 mg, Oral, BID, Khanh Tang MD, 250 mg at 05/12/19 2118    hydrocortisone (CORTEF) tablet 10 mg, 10 mg, Oral, Daily, Khanh Tang MD, Stopped at 05/12/19 5188    acetaminophen (TYLENOL) tablet 650 mg, 650 mg, Oral, Q4H PRN, Khanh Tang MD    Exam  Blood pressure 113/78, pulse 69, temperature 97.5 °F (36.4 °C), resp. rate 18, weight 151 lb 0.2 oz (68.5 kg), last menstrual period 05/01/2019, SpO2 95 %, not currently breastfeeding. Constitutional    Vital signs: BP, HR, and RR reviewed   General alert, no distress  Eyes: unable to visualize the fundi  Cardiovascular: pulses symmetric in all 4 extremities. Psychiatric: not cooperative with examination. Neurologic  Mental status:   orientation val at this time. Patient not cooperative. Attention  poor   Language patient not conversing at this time. Comprehension following minimal commands. Cranial nerves:   CN2: blink to threat bilaterally. CN 3,4,6: extraocular muscles intact via observation. Pupils are equal, round, reactive bilaterally. CN7: face symmetric  CN8: hearing grossly intact  CN11: trap full strength on shoulder shrug  CN12: tongue protrusion val given patient cooperation. Strength: independently moving all four limbs. Sensory: unable to adequately assess sensation at this time. Cerebellar/coordination: finger nose finger val  Tone: normal in all 4 extremities  Gait: deferred at this time. ROS - unable to obtain from the patient at this time as she is non verbal.      Labs - most recent  Glucose 116  Na 143  K 4.0  BUN 18  Creatinine 0.8  Lactic acid 1.2  LFTs normal    WBC 9.8K  Hg 16.0  Platelets 452    TSH 0.34  Total Syphillis - non reactive    ETOH none detected  Urine drug screen negative    Urine culture NGTD    Studies  CT head 5/11/19, independently reviewed  No acute intracranial abnormality. Impression  1. Altered mental status. She is alert, though non-verbal without much participation when examined. She reportedly had a similar presentation in 2012 per Psychiatry. The etiology is not very clear. Recommendations  1. Will order EEG. 2.  MRI brain may be useful if able to obtain. 3.  If she is not improving and the etiology remains uncertain, will consider CSF studies tomorrow. 4.  Psychiatry involved. Appreciate input.       Aziza Dugan NP  [address] [place] [address] [phone] Neurology    A copy of this note was provided for Dr Charan Sandy MD

## 2019-05-13 NOTE — PROGRESS NOTES
Department of Internal Medicine  General Internal Medicine  Attending Progress Note      SUBJECTIVE:  Pt is watching [employer] channel, when ask if she comprehend it what is going on, she nodded her head. Not taking, smiling per orders nurse report, she had urine retention last night. Had straight cath. 1575 cc urine was drained, Refusing Flomax. This mormingt 246cc retention noted. Seems comfortable in bed. Appreciate psychiatry consult,.     OBJECTIVE      Medications    Current Facility-Administered Medications: tamsulosin (FLOMAX) capsule 0.4 mg, 0.4 mg, Oral, Daily  dextrose 5 % and 0.45 % sodium chloride infusion, , Intravenous, Continuous  divalproex (DEPAKOTE) DR tablet 250 mg, 250 mg, Oral, BID  hydrocortisone (CORTEF) tablet 10 mg, 10 mg, Oral, Daily  acetaminophen (TYLENOL) tablet 650 mg, 650 mg, Oral, Q4H PRN  Physical    VITALS:  /75   Pulse 66   Temp 97.6 °F (36.4 °C) (Oral)   Resp 20   Wt 152 lb 1.9 oz (69 kg)   LMP 05/01/2019 (Approximate)   SpO2 100%   BMI 28.74 kg/m²   CONSTITUTIONAL:  Pt is not taking, nodding, but alert, cant assess the rest.  LUNGS:  No increased work of breathing, good air exchange, clear to auscultation bilaterally, no crackles or wheezing  CARDIOVASCULAR:  Normal apical impulse, regular rate and rhythm, normal S1 and S2, no S3 or S4, and no murmur noted  ABDOMEN:  No scars, normal bowel sounds, soft, non-distended, non-tender, no masses palpated, no hepatosplenomegally  NEUROLOGIC:  Normal motor function, except speech  Data    CBC:   Lab Results   Component Value Date    WBC 9.8 05/11/2019    RBC 5.15 05/11/2019    HGB 16.0 05/11/2019    HCT 46.9 05/11/2019    MCV 91.0 05/11/2019    MCH 31.1 05/11/2019    MCHC 34.1 05/11/2019    RDW 13.6 05/11/2019     05/11/2019    MPV 7.9 05/11/2019     BMP:    Lab Results   Component Value Date     05/11/2019    K 4.0 05/11/2019     05/11/2019    CO2 22 05/11/2019    BUN 18 05/11/2019    LABALBU 5.1

## 2019-05-13 NOTE — PROGRESS NOTES
Patient did not void during my shift I bladder scanned patient per MD orders. Bladder scan showed >445mL. Straight cath was completed, 550mL urine returned. I spoke with Dr. Mia Acosta regarding patient not voiding and continuing to straight cath. I let him know patient refused to take prescribed flomax this morning. Dr. Mia Acosta wants to continue to straight cath patient and consult urology, he does not want carpenter catheter.    Electronically signed by Jerson Retana RN on 5/13/2019 at 4:32 PM

## 2019-05-13 NOTE — PLAN OF CARE
Problem: Falls - Risk of:  Goal: Will remain free from falls   Bed low, wheels locked, side rails x 2 and call light within reach. Will remain free from falls  Outcome: Ongoing     Problem: Risk for Impaired Skin Integrity  Goal: Tissue integrity - skin and mucous membranes  Description  Structural intactness and normal physiological function of skin and  mucous membranes. Outcome: Ongoing     Problem: Risk for Impaired Skin Integrity  Goal: Tissue integrity - skin and mucous membranes  Description  Structural intactness and normal physiological function of skin and  mucous membranes.   Outcome: Ongoing

## 2019-05-13 NOTE — CARE COORDINATION
Chart reviewed. Patient is not speaking and only nodding with responses. Psych eval today recommends admit to inpatient psych upon medical stability. Will need discharge order and documentation of medical clearance for transfer to inpatient psych if this remains recommended upon discharge.     LEÓN/SEBASTIÁN will follow and assist as needed    Electronically signed by ELMIRA Briones on 5/13/2019 at 3:59 PM

## 2019-05-14 LAB
EKG ATRIAL RATE: 89 BPM
EKG DIAGNOSIS: NORMAL
EKG P AXIS: 54 DEGREES
EKG P-R INTERVAL: 128 MS
EKG Q-T INTERVAL: 368 MS
EKG QRS DURATION: 76 MS
EKG QTC CALCULATION (BAZETT): 447 MS
EKG R AXIS: 4 DEGREES
EKG T AXIS: 79 DEGREES
EKG VENTRICULAR RATE: 89 BPM

## 2019-05-14 PROCEDURE — 99231 SBSQ HOSP IP/OBS SF/LOW 25: CPT | Performed by: PSYCHIATRY & NEUROLOGY

## 2019-05-14 PROCEDURE — 1200000000 HC SEMI PRIVATE

## 2019-05-14 PROCEDURE — 6370000000 HC RX 637 (ALT 250 FOR IP): Performed by: SPECIALIST

## 2019-05-14 PROCEDURE — 51798 US URINE CAPACITY MEASURE: CPT

## 2019-05-14 PROCEDURE — 2580000003 HC RX 258: Performed by: SPECIALIST

## 2019-05-14 PROCEDURE — G0378 HOSPITAL OBSERVATION PER HR: HCPCS

## 2019-05-14 RX ADMIN — DEXTROSE AND SODIUM CHLORIDE: 5; 450 INJECTION, SOLUTION INTRAVENOUS at 06:54

## 2019-05-14 RX ADMIN — DIVALPROEX SODIUM 250 MG: 250 TABLET, DELAYED RELEASE ORAL at 08:08

## 2019-05-14 RX ADMIN — DEXTROSE AND SODIUM CHLORIDE: 5; 450 INJECTION, SOLUTION INTRAVENOUS at 20:49

## 2019-05-14 RX ADMIN — TAMSULOSIN HYDROCHLORIDE 0.4 MG: 0.4 CAPSULE ORAL at 08:08

## 2019-05-14 RX ADMIN — HYDROCORTISONE 10 MG: 10 TABLET ORAL at 08:08

## 2019-05-14 RX ADMIN — DIVALPROEX SODIUM 250 MG: 250 TABLET, DELAYED RELEASE ORAL at 20:46

## 2019-05-14 ASSESSMENT — PAIN SCALES - GENERAL: PAINLEVEL_OUTOF10: 0

## 2019-05-14 NOTE — PROGRESS NOTES
D:  PT. Remains mostly non-verbal but is very anxious this evening and getting out of bed to get up to BR by herself without calling for assist. She is very unsteady  A: Dr. Pearce Older notified and Poseeddie vang restraint ordered and Ativan 0.5mg IV given @ 2150

## 2019-05-14 NOTE — PROGRESS NOTES
Neurology Progress Note    Updates  No significant events to note overnight. Patient resting in bed, without any complaints. More verbal/interactive today, following commands. Past Medical History:   Diagnosis Date    Compression fracture     Fibromyalgia     Herniated disc     Metabolic syndrome     Osteoarthritis     Pyelonephritis     Sciatica      Current Facility-Administered Medications:     LORazepam (ATIVAN) injection 0.5 mg, 0.5 mg, Intravenous, Q4H PRN, Mahin Mendoza MD, 0.5 mg at 05/13/19 2150    tamsulosin (FLOMAX) capsule 0.4 mg, 0.4 mg, Oral, Daily, Mahin Mendoza MD, 0.4 mg at 05/14/19 0808    dextrose 5 % and 0.45 % sodium chloride infusion, , Intravenous, Continuous, Mahin Mendoza MD, Last Rate: 75 mL/hr at 05/14/19 0654    divalproex (DEPAKOTE) DR tablet 250 mg, 250 mg, Oral, BID, Mahin Mendoza MD, 250 mg at 05/14/19 2144    hydrocortisone (CORTEF) tablet 10 mg, 10 mg, Oral, Daily, Mahin Mendoza MD, 10 mg at 05/14/19 0808    acetaminophen (TYLENOL) tablet 650 mg, 650 mg, Oral, Q4H PRN, Mahin Mendoza MD    Exam  Blood pressure 103/69, pulse 101, temperature 98.1 °F (36.7 °C), temperature source Oral, resp. rate 16, weight 156 lb 8.4 oz (71 kg), last menstrual period 05/01/2019, SpO2 100 %, not currently breastfeeding. Constitutional    Vital signs: BP, HR, and RR reviewed   General alert, no distress, well-nourished  Eyes: unable to visualize the fundi  Cardiovascular: pulses symmetric in all 4 extremities. Psychiatric: cooperative with examination  Neurologic  Mental status:   orientation to person. Says she is in the doctor's office. Correctly identifies the current month, though unsure of the current year. Does not know who the current [place] president is and is unable to tell the correct time on the wall clock. Attention intact as able to attend well to the exam     Language communicating better today.      Comprehension follows simple commands  Cranial nerves:   CN2: visual fields grossly full   CN 3,4,6: extraocular muscles intact. Pupils dilated, equal, round, reactive. CN5: facial sensation symmetric   CN7: face symmetric without dysarthria  CN8: hearing grossly intact  CN12: tongue midline with protrusion  Strength: No pronator drift. Good strength in all 4 extremities   Sensory: light touch intact in all 4 extremities. Cerebellar/coordination: finger nose finger no adequately assessed at this time. Tone: normal in all 4 extremities  Gait: deferred at this time. ROS  Constitutional- No weight loss or fevers  Eyes- No diplopia. No photophobia. Ears/nose/throat- No dysphagia. No Dysarthria  Cardiovascular- No palpitations. No chest pain  Respiratory- No dyspnea. No Cough  Gastrointestinal- No Abdominal pain. No Vomiting. Genitourinary- No incontinence. No urinary retention  Musculoskeletal- No myalgia. No arthralgia  Skin- No rash. No easy bruising. Psychiatric- No depression. No anxiety  Endocrine- No diabetes. No thyroid issues. Hematologic- No bleeding difficulty. No fatigue  Neurologic- No weakness. No Headache. Labs - no new labs this morning  TSH 0.34  Total Syphillis - non reactive     ETOH none detected  Urine drug screen negative     Urine culture NGTD  Urine HCG - negative    Studies  CT head 5/11/19  No acute intracranial abnormality. EEG 5/13/19  Mild to moderate background slowing and disorganization. No definite epileptiform activity identified. Impression  1. Altered mental status. Clinically she is improved from yesterday's exam.  She reportedly had a similar presentation in 2012 per Psychiatry. EEG reviewed. No areas of focal neuronal dysfunction or epileptiform discharges. Recommendations  1. Psychiatry beginning to feel that the patient's presentation may be more psychogenic than anything else. She apparently did endorse that she needed psychiatric help yesterday.     2.  Given her improvement today, will hold off on any additional Neurologic workup at this time. 3.  If she were to deteriorate, would recommend MRI brain and CSF studies to look for inflammatory/autoimmune disease.       Nathaniel Chin NP  [address] [place] [address] [id number] Neurology    A copy of this note was provided for Dr Lovely Nath MD

## 2019-05-14 NOTE — PROGRESS NOTES
Anion Gap 15 3 - 16    Glucose 116 (H) 70 - 99 mg/dL    BUN 18 7 - 20 mg/dL    CREATININE 0.8 0.6 - 1.1 mg/dL    GFR Non-African American >60 >60    GFR African American >60 >60    Calcium 10.6 8.3 - 10.6 mg/dL    Total Protein 8.8 (H) 6.4 - 8.2 g/dL    Alb 5.1 (H) 3.4 - 5.0 g/dL    Albumin/Globulin Ratio 1.4 1.1 - 2.2    Total Bilirubin 0.5 0.0 - 1.0 mg/dL    Alkaline Phosphatase 72 40 - 129 U/L    ALT 30 10 - 40 U/L    AST 27 15 - 37 U/L    Globulin 3.7 g/dL   Ethanol    Collection Time: 05/11/19  8:09 AM   Result Value Ref Range    Ethanol Lvl None Detected mg/dL   Acetaminophen level    Collection Time: 05/11/19  8:09 AM   Result Value Ref Range    Acetaminophen Level <5 (L) 10 - 30 ug/mL   Salicylate    Collection Time: 05/11/19  8:09 AM   Result Value Ref Range    Salicylate, Serum <8.0 (L) 15.0 - 30.0 mg/dL   Lactate, Sepsis    Collection Time: 05/11/19  8:09 AM   Result Value Ref Range    Lactic Acid, Sepsis 1.2 0.4 - 1.9 mmol/L   Blood Gas, Venous    Collection Time: 05/11/19  8:09 AM   Result Value Ref Range    pH, Tae 7.419 7.350 - 7.450    pCO2, Tae 36.0 (L) 40.0 - 50.0 mmHg    pO2, Tae 46 Not Established mmHg    HCO3, Venous 23 23 - 29 mmol/L    Base Excess, Tae -0.6 Not Established mmol/L    O2 Sat, Tae 82 Not Established %    Carboxyhemoglobin 1.5 %    MetHgb, Tae 1.0 <1.5 %    TC02 (Calc), Tae 24 Not Established mmol/L    O2 Content, Tae 18 Not Established mL/dL    O2 Therapy Unknown    TSH without Reflex    Collection Time: 05/11/19  8:09 AM   Result Value Ref Range    TSH 0.34 0.27 - 4.20 uIU/mL   Syphilis Antibody Cascading Reflex    Collection Time: 05/11/19  8:09 AM   Result Value Ref Range    Total Syphillis IgG/IgM Non-Reactive Non-reactive   POCT Glucose    Collection Time: 05/11/19  8:14 AM   Result Value Ref Range    Glucose 102 mg/dL    QC OK?  yes    EKG 12 Lead    Collection Time: 05/11/19  8:28 AM   Result Value Ref Range    Ventricular Rate 89 BPM    Atrial Rate 89 BPM    P-R Interval 128 ms    QRS Duration 76 ms    Q-T Interval 368 ms    QTc Calculation (Bazett) 447 ms    P Axis 54 degrees    R Axis 4 degrees    T Axis 79 degrees    Diagnosis       Normal sinus rhythmNonspecific T wave abnormalityConfirmed by JEANIE MCINTOSH, Milagro Mckeon ([id number]) on 5/12/2019 1:47:34 PM   Drug screen multi urine    Collection Time: 05/11/19  8:42 AM   Result Value Ref Range    Amphetamine Screen, Urine Neg Negative <1000ng/mL    Barbiturate Screen, Ur Neg Negative <200 ng/mL    Benzodiazepine Screen, Urine Neg Negative <200 ng/mL    Cannabinoid Scrn, Ur Neg Negative <50 ng/mL    Cocaine Metabolite Screen, Urine Neg Negative <300 ng/mL    Opiate Scrn, Ur Neg Negative <300 ng/mL    PCP Screen, Urine Neg Negative <25 ng/mL    Methadone Screen, Urine Neg Negative <300 ng/mL    Propoxyphene Scrn, Ur Neg Negative <300 ng/mL    pH, UA 7.5     Drug Screen Comment: see below     Oxycodone Urine Neg Negative <100 ng/ml   Urine, reflex to culture    Collection Time: 05/11/19  8:42 AM   Result Value Ref Range    Color, UA YELLOW Straw/Yellow    Clarity, UA Clear Clear    Glucose, Ur Negative Negative mg/dL    Bilirubin Urine Negative Negative    Ketones, Urine 15 (A) Negative mg/dL    Specific Gravity, UA 1.021 1.005 - 1.030    Blood, Urine Negative Negative    pH, UA 7.5 5.0 - 8.0    Protein, UA Negative Negative mg/dL    Urobilinogen, Urine 1.0 <2.0 E.U./dL    Nitrite, Urine Negative Negative    Leukocyte Esterase, Urine SMALL (A) Negative    Microscopic Examination YES     Urine Reflex to Culture Yes     Urine Type Not Specified    Pregnancy, Urine    Collection Time: 05/11/19  8:42 AM   Result Value Ref Range    HCG(Urine) Pregnancy Test Negative Detects HCG level >20 MIU/mL   Urine Culture    Collection Time: 05/11/19  8:42 AM   Result Value Ref Range    Urine Culture, Routine No growth at 18-36 hours    Microscopic Urinalysis    Collection Time: 05/11/19  8:42 AM   Result Value Ref Range    Hyaline Casts, UA 2 0 - 8 /LPF WBC, UA 4 0 - 5 /HPF    RBC, UA 1 0 - 4 /HPF    Epi Cells 2 0 - 5 /HPF   Lactate, Sepsis    Collection Time: 05/11/19  8:52 PM   Result Value Ref Range    Lactic Acid, Sepsis 1.0 0.4 - 1.9 mmol/L       Current Facility-Administered Medications   Medication Dose Route Frequency Provider Last Rate Last Dose    LORazepam (ATIVAN) injection 0.5 mg  0.5 mg Intravenous Q4H PRN Virgilio Melendez MD   0.5 mg at 05/13/19 2150    tamsulosin (FLOMAX) capsule 0.4 mg  0.4 mg Oral Daily Virgilio Melendez MD   0.4 mg at 05/14/19 5220    dextrose 5 % and 0.45 % sodium chloride infusion   Intravenous Continuous Virgilio Melendez MD 75 mL/hr at 05/14/19 0654      divalproex (DEPAKOTE) DR tablet 250 mg  250 mg Oral BID Virgilio Melendez MD   250 mg at 05/14/19 1791    hydrocortisone (CORTEF) tablet 10 mg  10 mg Oral Daily Virgilio Melendez MD   10 mg at 05/14/19 9079    acetaminophen (TYLENOL) tablet 650 mg  650 mg Oral Q4H PRN Virgilio Melendez MD         ROS:  No tremor, nl station    MSE:  A-in gowns, fair EC, more sharply engageable  A-had some sort of strange laugh/smile today. M-non-committal  S-grossly alert  I/J-poor/poor? T-Still seems sort of strange and maybe a bit inappropriate. No resp to int stim. Recs:  1. Altered MS/psychosis/delirium, mutism-I'm frankly still confused. This pt's progress and her strangeness of affect but the fact that she's talking a bit and eating make me think again this is mostly a toxodrome/withdrawal issues, like when she was in psych a few years ago. Given her progress, I won't be surprised if she's better tomorrow, perhaps even ready to d/c.  Let's give her another day, see if we can avoid an unnecessary psych admit.

## 2019-05-14 NOTE — PROGRESS NOTES
>60 05/11/2019    GLUCOSE 102 05/11/2019   EEG DIAGNOSIS:  This EEG is abnormal due to the presence of mild to  moderate background slowing with some mild disorganization.     CLINICAL INTERPRETATION:  The background slowing and disorganization is  nonspecific and consistent with a mild to moderate encephalopathy. This  may be seen in multiple settings including toxic, metabolic, or  degenerative conditions, as well as with medication effect or even in  postictal states. No definite epileptiform activity was present during  this recording. If seizures remain a clinical concern, then a repeat  EEG may be of further benefit. Clinical correlation is advised.           ASSESSMENT AND PLAN      Patient Active Hospital Problem List:    Acute mental status  , toxic  Vs metabolic, vs psychiatric, vs degenerative neuro disorder,smiling, today she said  OK,, no verbal communication, comprehend TV programs,  her UDS  Is neg, , Pt has complicated past psych. hx see dr Alicia Bosworth noted, she is familiar with her. . She unable to talk about the most recent relationship problems. Neuro consult appreciated, no apparent neuro deficit. Urinary retention, seem it is resolved, last night she urinated x 2, bladder scan is negative, we can cancel Urology consult. Hx of chronic on acute low back pain, herniated disc, planning to have surgery soon, unable to make up her mind. Hx of adrenal insuff, diagnosed in UC cont med,   Bipolar, anxiety d/o.   Chronic insomnia  gerd cont med,  Dr Maria Del Carmen Oleary

## 2019-05-14 NOTE — PROCEDURES
DIAGNOSIS:  This EEG is abnormal due to the presence of mild to  moderate background slowing with some mild disorganization. CLINICAL INTERPRETATION:  The background slowing and disorganization is  nonspecific and consistent with a mild to moderate encephalopathy. This  may be seen in multiple settings including toxic, metabolic, or  degenerative conditions, as well as with medication effect or even in  postictal states. No definite epileptiform activity was present during  this recording. If seizures remain a clinical concern, then a repeat  EEG may be of further benefit. Clinical correlation is advised.         Man Reyes DO    D: 05/13/2019 18:13:00       T: 05/13/2019 20:58:22     [id number]  Job#: [id number]     Doc#: [id number]    CC:  <>

## 2019-05-14 NOTE — FLOWSHEET NOTE
Priyank vest removed at this time. Encouraged pt to call for assistance prior to getting out of bed, she VU. Bed alarm engaged for safety. Will monitor.   Electronically signed by Mary Davis RN on 5/14/2019 at 3:33 PM

## 2019-05-14 NOTE — FLOWSHEET NOTE
Spoke with Dr. Maria G Elizalde regarding pt current status. OK to discontinue posey vest due to improvement in patient behaviors. Nursing to monitor.   Electronically signed by Thierry Escalona RN on 5/14/2019 at 3:19 PM

## 2019-05-15 VITALS
HEART RATE: 63 BPM | TEMPERATURE: 98.4 F | WEIGHT: 155.65 LBS | SYSTOLIC BLOOD PRESSURE: 100 MMHG | RESPIRATION RATE: 17 BRPM | OXYGEN SATURATION: 98 % | BODY MASS INDEX: 29.41 KG/M2 | DIASTOLIC BLOOD PRESSURE: 66 MMHG

## 2019-05-15 LAB
AMPHETAMINES SCREEN BLOOD: NEGATIVE NG/ML
BARBITURATES SCREEN BLOOD: NEGATIVE NG/ML
BENZODIAZEPINES SCREEN BLOOD: NEGATIVE NG/ML
BUPRENORPHINE: NEGATIVE NG/ML
CANNABINOID SCREEN BLOOD: NEGATIVE NG/ML
COCAINE SCREEN BLOOD: NEGATIVE NG/ML
Lab: NORMAL
METHADONE SCREEN BLOOD: POSITIVE NG/ML
METHAMPHETAMINES SERUM/ PLASMA: NEGATIVE NG/ML
OPIATES SCREEN BLOOD: NEGATIVE NG/ML
OXYCODONE: NEGATIVE NG/ML
PHENCYCLIDINE SCREEN BLOOD: NEGATIVE NG/ML

## 2019-05-15 PROCEDURE — 99231 SBSQ HOSP IP/OBS SF/LOW 25: CPT | Performed by: PSYCHIATRY & NEUROLOGY

## 2019-05-15 PROCEDURE — 94760 N-INVAS EAR/PLS OXIMETRY 1: CPT

## 2019-05-15 PROCEDURE — 6370000000 HC RX 637 (ALT 250 FOR IP): Performed by: SPECIALIST

## 2019-05-15 PROCEDURE — 51798 US URINE CAPACITY MEASURE: CPT

## 2019-05-15 RX ORDER — DIVALPROEX SODIUM 250 MG/1
250 TABLET, DELAYED RELEASE ORAL 2 TIMES DAILY
Qty: 90 TABLET | Refills: 3 | Status: SHIPPED | OUTPATIENT
Start: 2019-05-15

## 2019-05-15 RX ORDER — HYDROCORTISONE 10 MG/1
10 TABLET ORAL DAILY
Qty: 30 TABLET | Refills: 1 | Status: SHIPPED | OUTPATIENT
Start: 2019-05-16 | End: 2020-06-15

## 2019-05-15 RX ADMIN — HYDROCORTISONE 10 MG: 10 TABLET ORAL at 11:15

## 2019-05-15 RX ADMIN — DIVALPROEX SODIUM 250 MG: 250 TABLET, DELAYED RELEASE ORAL at 11:12

## 2019-05-15 RX ADMIN — TAMSULOSIN HYDROCHLORIDE 0.4 MG: 0.4 CAPSULE ORAL at 11:12

## 2019-05-15 ASSESSMENT — PAIN SCALES - GENERAL: PAINLEVEL_OUTOF10: 0

## 2019-05-15 NOTE — DISCHARGE INSTR - COC
Continuity of Care Form    Patient Name: Rachele Romero   :  1980  MRN:  [id number]    [address] [place] [address] date:  2019  Discharge date:  ***    Code Status Order: Full Code   Advance Directives:   Advance Care Flowsheet Documentation     Date/Time Healthcare Directive Type of Healthcare Directive Copy in [address] Agent's Name Healthcare Agent's Phone Number    19 1327  No, patient does not have an advance directive for healthcare treatment -- -- -- -- --          Admitting Physician:  Williams Hernandez MD  PCP: Williams Hernandez MD    Discharging Nurse: [facility] Unit/Room#: [id number]-6414/2200-39  Discharging Unit Phone Number: ***    Emergency Contact:   Extended Emergency Contact Information  Primary Emergency Contact: [address] [place] [address] Phone: [phone]  Mobile Phone: [phone]  Relation: Parent    Past Surgical History:  History reviewed. No pertinent surgical history.     Immunization History:   Immunization History   Administered Date(s) Administered    Influenza Virus Vaccine 10/16/2013    Pneumococcal Polysaccharide ([id number]) 10/16/2013       Active Problems:  Patient Active Problem List   Diagnosis Code    Substance-induced psychotic disorder with delusions ([facility] [address] [facility] F19.950    [facility] F19.20    Change in mental status R41.82       Isolation/Infection:   Isolation          No Isolation            Nurse Assessment:  Last Vital Signs: BP 99/66   Pulse 76   Temp 98.3 °F (36.8 °C) (Oral)   Resp 18   Wt 155 lb 10.3 oz (70.6 kg)   LMP 2019 (Approximate)   SpO2 99%   BMI 29.41 kg/m²     Last documented pain score (0-10 scale): Pain Level: 0  Last Weight:   Wt Readings from Last 1 Encounters:   05/15/19 155 lb 10.3 oz (70.6 kg)     Mental Status:  {IP PT MENTAL STATUS:}    IV Access:  [facility] IV ACCESS:960038106}    Nursing Mobility/ADLs:  Walking   {[facility] DME AOZC:243298058}  Transfer  {P DME DSVI:688993469}  Bathing  {CHP DME DSPZ:847926852}  Dressing  {CHP DME THHK:050218352}  Toileting  {CHP DME PZMM:242191188}  Feeding  {CHP DME YWWJ:787631745}  Med Admin  {CHP DME OPGE:628566613}  Med Delivery   { DANETTE MED Delivery:365967162}    Wound Care Documentation and Therapy:        Elimination:  Continence:   · Bowel: {YES / FV:81245}  · Bladder: {YES / TE:48602}  Urinary Catheter: {Urinary Catheter:197197698}   Colostomy/Ileostomy/Ileal Conduit: {YES / MU:30550}       Date of Last BM: ***    Intake/Output Summary (Last 24 hours) at 5/15/2019 1746  Last data filed at 5/15/2019 1429  Gross per 24 hour   Intake 2690.09 ml   Output 450 ml   Net 2240.09 ml     I/O last 3 completed shifts:   In: 2930.1 [P.O.:480; I.V.:2450.1]  Out: 450 [Urine:450]    Safety Concerns:     [address] [employer] Safety Concerns:603527239}    Impairments/Disabilities:      [address] [employer] Impairments/Disabilities:545610647}    Nutrition Therapy:  Current Nutrition Therapy:   [address] [employer] Diet List:630506532}    Routes of Feeding: {CHP DME Other Feedings:552612111}  Liquids: {Slp liquid thickness:45823}  Daily Fluid Restriction: {CHP DME Yes amt example:511555756}  Last Modified Barium Swallow with Video (Video Swallowing Test): {Done Not Done WJYK:983839956}    Treatments at the Time of Hospital Discharge:   Respiratory Treatments: ***  Oxygen Therapy:  {Therapy; copd oxygen:77015}  Ventilator:    { CC Vent JEHE:132611579}    Rehab Therapies: {THERAPEUTIC INTERVENTION:7220021090}  Weight Bearing Status/Restrictions: [employer] Weight Bearin}  Other Medical Equipment (for information only, NOT a DME order):  {EQUIPMENT:775756173}  Other Treatments: ***    Patient's personal belongings (please select all that are sent with patient):  {[facility] DME Belongings:151065209}    RN SIGNATURE:  {Esignature:513222109}    CASE MANAGEMENT/SOCIAL WORK SECTION    Inpatient Status Date: ***    Readmission Risk Assessment Score:  Readmission Risk              Risk of Unplanned Readmission:        13           Discharging to Facility/ Agency   · Name:   · Address:  · Phone:  · Fax:    Dialysis Facility (if applicable)   · Name:  · Address:  · Dialysis Schedule:  · Phone:  · Fax:    / signature: {Esignature:783104192}    PHYSICIAN SECTION    Prognosis: Good    Condition at Discharge: Stable    Rehab Potential (if transferring to Rehab): Good    Recommended Labs or Other Treatments After Discharge: f/u by PCP and psychiatry 2 wks post dc    Physician Certification: I certify the above information and transfer of [facility]  is necessary for the continuing treatment of the diagnosis listed and that she requires {Admit to Appropriate Level of Care:79353} for {GREATER/LESS:687404220} 30 days.      Update Admission H&P: No change in H&P    PHYSICIAN SIGNATURE:  Electronically signed by Virgilio Melendez MD on 5/15/19 at 5:46 PM

## 2019-05-15 NOTE — PROGRESS NOTES
Reviewed dc instructions with pt. Emphasized the importance of new medications, s/s to monitor for,and follow up appointments. Pt verbalized understanding. PIV removed by pateint. Dressing clean dry and intact. Pt dc to private residence with father. Ambulatory to main entrance.

## 2019-05-15 NOTE — PROGRESS NOTES
Department of Internal Medicine  General Internal Medicine  Attending Progress Note      SUBJECTIVE:  Pt is doing much better,coherent, comprehensive talk, but per nursing stillhas some impulsive features. Know my name, her name, time,place. Not hungry.  Afebrile, urinating OK,     OBJECTIVE      Medications    Current Facility-Administered Medications: LORazepam (ATIVAN) injection 0.5 mg, 0.5 mg, Intravenous, Q4H PRN  tamsulosin (FLOMAX) capsule 0.4 mg, 0.4 mg, Oral, Daily  dextrose 5 % and 0.45 % sodium chloride infusion, , Intravenous, Continuous  divalproex (DEPAKOTE) DR tablet 250 mg, 250 mg, Oral, BID  hydrocortisone (CORTEF) tablet 10 mg, 10 mg, Oral, Daily  acetaminophen (TYLENOL) tablet 650 mg, 650 mg, Oral, Q4H PRN  Physical    VITALS:  /67   Pulse 66   Temp 98.1 °F (36.7 °C) (Oral)   Resp 16   Wt 155 lb 10.3 oz (70.6 kg)   LMP 05/01/2019 (Approximate)   SpO2 97%   BMI 29.41 kg/m²   CONSTITUTIONAL:  awake, alert, cooperative, no apparent distress, and appears stated age  LUNGS:  No increased work of breathing, good air exchange, clear to auscultation bilaterally, no crackles or wheezing  CARDIOVASCULAR:  Normal apical impulse, regular rate and rhythm, normal S1 and S2, no S3 or S4, and no murmur noted  ABDOMEN:  No scars, normal bowel sounds, soft, non-distended, non-tender, no masses palpated, no hepatosplenomegally  Data    CBC:   Lab Results   Component Value Date    WBC 9.8 05/11/2019    RBC 5.15 05/11/2019    HGB 16.0 05/11/2019    HCT 46.9 05/11/2019    MCV 91.0 05/11/2019    MCH 31.1 05/11/2019    MCHC 34.1 05/11/2019    RDW 13.6 05/11/2019     05/11/2019    MPV 7.9 05/11/2019     BMP:    Lab Results   Component Value Date     05/11/2019    K 4.0 05/11/2019     05/11/2019    CO2 22 05/11/2019    BUN 18 05/11/2019    LABALBU 5.1 05/11/2019    CREATININE 0.8 05/11/2019    CALCIUM 10.6 05/11/2019    GFRAA >60 05/11/2019    GFRAA >60 05/29/2013    LABGLOM >60 05/11/2019

## 2019-05-15 NOTE — PLAN OF CARE
Problem: Falls - Risk of:  Goal: Will remain free from falls  Description  Will remain free from falls  Outcome: Ongoing  Note:   D: PT. Is now oriented to person, place and situation but disoriented to time.  She remains impulsive and inappropriate at intervals (pulling on IV, trying to push buttons on IV pump.) Telecam is in the room and bed alarm is on   A: reminded to use call light and notify nurse before getting up per self  R: without falls this shift

## 2019-05-15 NOTE — PROGRESS NOTES
Psych Consult Progress Note    05/15/19      Ben Gallo  [id number]  Chief Complaint   Patient presents with    Altered Mental Status     pt found by family altered unknown amount of time pt awake although cannot answer questions pt has had problems sleeping and on  xanax         I have reviewed recent documentation. Ben Gallo is a 37 y.o. female  With  has a past medical history of Compression fracture, Fibromyalgia, Herniated disc, Metabolic syndrome, Osteoarthritis, Pyelonephritis, and Sciatica. Subjective/Interval Hx:  Much better! Eating! Talking fluidly. Pt is not sure what caused it. Agrees she never can know if maybe somebody is giving her adulterated MJ. Would very much like to leave. Quality:  mutism  Severity:  resolved  Duration:  days  Context:  As above.   Location:  Brain    Objective:  Vitals:    05/15/19 0848   BP:    Pulse:    Resp: 16   Temp:    SpO2: 98%     Recent Results (from the past 168 hour(s))   POCT Glucose    Collection Time: 05/11/19  7:50 AM   Result Value Ref Range    POC Glucose 102 (H) 70 - 99 mg/dl    Performed on ACCU-CHEK    CBC Auto Differential    Collection Time: 05/11/19  8:09 AM   Result Value Ref Range    WBC 9.8 4.0 - 11.0 K/uL    RBC 5.15 4.00 - 5.20 M/uL    Hemoglobin 16.0 12.0 - 16.0 g/dL    Hematocrit 46.9 36.0 - 48.0 %    MCV 91.0 80.0 - 100.0 fL    MCH 31.1 26.0 - 34.0 pg    MCHC 34.1 31.0 - 36.0 g/dL    RDW 13.6 12.4 - 15.4 %    Platelets 577 (H) 193 - 450 K/uL    MPV 7.9 5.0 - 10.5 fL    Neutrophils % 72.4 %    Lymphocytes % 16.5 %    Monocytes % 9.0 %    Eosinophils % 1.2 %    Basophils % 0.9 %    Neutrophils # 7.1 1.7 - 7.7 K/uL    Lymphocytes # 1.6 1.0 - 5.1 K/uL    Monocytes # 0.9 0.0 - 1.3 K/uL    Eosinophils # 0.1 0.0 - 0.6 K/uL    Basophils # 0.1 0.0 - 0.2 K/uL   Comprehensive Metabolic Panel    Collection Time: 05/11/19  8:09 AM   Result Value Ref Range    Sodium 143 136 - 145 mmol/L    Potassium 4.0 3.5 - 5.1 mmol/L    Chloride 106 BPM    Atrial Rate 89 BPM    P-R Interval 128 ms    QRS Duration 76 ms    Q-T Interval 368 ms    QTc Calculation (Bazett) 447 ms    P Axis 54 degrees    R Axis 4 degrees    T Axis 79 degrees    Diagnosis       Normal sinus rhythmNonspecific T wave abnormalityConfirmed by JEANIE MCINTOSH, Kong Martínez ([id number]) on 5/12/2019 1:47:34 PM   Drug screen multi urine    Collection Time: 05/11/19  8:42 AM   Result Value Ref Range    Amphetamine Screen, Urine Neg Negative <1000ng/mL    Barbiturate Screen, Ur Neg Negative <200 ng/mL    Benzodiazepine Screen, Urine Neg Negative <200 ng/mL    Cannabinoid Scrn, Ur Neg Negative <50 ng/mL    Cocaine Metabolite Screen, Urine Neg Negative <300 ng/mL    Opiate Scrn, Ur Neg Negative <300 ng/mL    PCP Screen, Urine Neg Negative <25 ng/mL    Methadone Screen, Urine Neg Negative <300 ng/mL    Propoxyphene Scrn, Ur Neg Negative <300 ng/mL    pH, UA 7.5     Drug Screen Comment: see below     Oxycodone Urine Neg Negative <100 ng/ml   Urine, reflex to culture    Collection Time: 05/11/19  8:42 AM   Result Value Ref Range    Color, UA YELLOW Straw/Yellow    Clarity, UA Clear Clear    Glucose, Ur Negative Negative mg/dL    Bilirubin Urine Negative Negative    Ketones, Urine 15 (A) Negative mg/dL    Specific Gravity, UA 1.021 1.005 - 1.030    Blood, Urine Negative Negative    pH, UA 7.5 5.0 - 8.0    Protein, UA Negative Negative mg/dL    Urobilinogen, Urine 1.0 <2.0 E.U./dL    Nitrite, Urine Negative Negative    Leukocyte Esterase, Urine SMALL (A) Negative    Microscopic Examination YES     Urine Reflex to Culture Yes     Urine Type Not Specified    Pregnancy, Urine    Collection Time: 05/11/19  8:42 AM   Result Value Ref Range    HCG(Urine) Pregnancy Test Negative Detects HCG level >20 MIU/mL   Urine Culture    Collection Time: 05/11/19  8:42 AM   Result Value Ref Range    Urine Culture, Routine No growth at 18-36 hours    Microscopic Urinalysis    Collection Time: 05/11/19  8:42 AM   Result Value Ref Range Hyaline Casts, UA 2 0 - 8 /LPF    WBC, UA 4 0 - 5 /HPF    RBC, UA 1 0 - 4 /HPF    Epi Cells 2 0 - 5 /HPF   Lactate, Sepsis    Collection Time: 05/11/19  8:52 PM   Result Value Ref Range    Lactic Acid, Sepsis 1.0 0.4 - 1.9 mmol/L       Current Facility-Administered Medications   Medication Dose Route Frequency Provider Last Rate Last Dose    LORazepam (ATIVAN) injection 0.5 mg  0.5 mg Intravenous Q4H PRN Richard Henderson MD   0.5 mg at 05/13/19 2150    tamsulosin (FLOMAX) capsule 0.4 mg  0.4 mg Oral Daily Richard Henderson MD   0.4 mg at 05/14/19 4443    dextrose 5 % and 0.45 % sodium chloride infusion   Intravenous Continuous Richard Henderson MD 75 mL/hr at 05/14/19 2049      divalproex (DEPAKOTE) DR tablet 250 mg  250 mg Oral BID Richard Henderson MD   250 mg at 05/14/19 2046    hydrocortisone (CORTEF) tablet 10 mg  10 mg Oral Daily Richard Henderson MD   10 mg at 05/14/19 4966    acetaminophen (TYLENOL) tablet 650 mg  650 mg Oral Q4H PRN Richard Henderson MD         ROS:  No tremor, nl station    MSE:  A-in gowns, good EC, pleasant and engageable  A-full! M-\"OK\"  S-grossly A + O  I/J-fair/fair except for c respect to drugs. T-Linear, goal-directed. Speech c nl r/t/v/a. No S/H I, no resp to int stim. Recs:  1. Altered MS/psychosis/delirium, mutism-Resolved! Much better. I'm reasonably convinced this was toxodrome/withdrawal.  Pt does not want drug rehab referrals. She just wants to go home. At this point she seems back to a reasonable baseline, and does not require psych admit.

## 2019-05-15 NOTE — PLAN OF CARE
Problem: Falls - Risk of:  Goal: Will remain free from falls  Description  Will remain free from falls  Outcome: Ongoing   Pt free of falls this shift. Fall precautions in place. Bed in lowest position side rails x2. Fall precaution indication light on. Non slip socks on. Fall ID bracelet on. Needed items within reach. Call light within reach. Pt ambulating with steady gait. Problem: Risk for Impaired Skin Integrity  Goal: Tissue integrity - skin and mucous membranes  Description  Structural intactness and normal physiological function of skin and  mucous membranes. Outcome: Ongoing   Skin warm and dry. No new red or open areas noted. Skin intact at this time and free of breakdown.

## 2019-05-16 NOTE — DISCHARGE SUMMARY
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 DISCHARGE SUMMARY:  This 40-year-old female patient found by family  members with acute mental status change. The patient became muted with  no communication, unable to get any history from her. The patient had a  drug screen that was negative. The patient was seen by Neurology as  well as Psychiatry and we find that most likely, the patient has toxic  versus some metabolic encephalopathy. EEG was done and it showed also  confirming that most likely, she has some toxic encephalopathy. Over a  two to three-day period, the patient's mental status gradually improved. Today, the patient became very appropriate and normal.  Per  psychiatrist, the patient can be discharged. During the hospital stay,  also experienced some urinary retention, around 100 mL urine retention. The patient managed that with straight catheterization. Urine was  negative for any infection. The patient refusing to take Flomax. The  urinary retention resolved by itself. The patient medically stable, can  be discharged and will follow up with a psychiatrist as well as primary  care physician two weeks after discharge. DISCHARGE MEDICATIONS:  See the reconciliation sheet. No current facility-administered medications for this encounter.       Current Doc#: [id number]    CC:  <>

## 2019-05-18 LAB
EDDP: <10 NG/ML
METHADONE LC/TMS CONF: <10 NG/ML

## 2019-05-31 NOTE — PROGRESS NOTES
The [facility] [employer] / [facility] ([facility]) [address] [facility], [address]    Acknowledgment of Informed Consent for Surgical or Medical Procedure and Sedation  I agree to allow doctor(s) Celio Lu and his/her associates or assistants, including residents and/or other qualified medical practitioner to perform the following medical treatment or procedure and to administer or direct the administration of sedation as necessary:  Procedure(s): L5, S1 TRANSFORAMINAL LUMBAR INTERBODY FUSION  My doctor has explained the following regarding the proposed procedure:   the explanation of the procedure   the benefits of the procedure   the potential problems that might occur during recuperation   the risks and side effects of the procedure which could include but are not limited to severe blood loss, infection, stroke or death   the benefits, risks and side effect of alternative procedures including the consequences of declining this procedure or any alternative procedures   the likelihood of achieving satisfactory results. I acknowledge no guarantee or assurance has been made to me regarding the results. I understand that during the course of this treatment/procedure, unforeseen conditions can occur which require an additional or different procedure. I agree to allow my physician or assistants to perform such extension of the original procedure as they may find necessary. I understand that sedation will often result in temporary impairment of memory and fine motor skills and that sedation can occasionally progress to a state of deep sedation or general anesthesia. I understand the risks of anesthesia for surgery include, but are not limited to, sore throat, hoarseness, injury to face, mouth, or teeth; nausea; headache; injury to blood vessels or nerves; death, brain damage, or paralysis.     I understand that if I have a Limitation of Treatment order in effect during my hospitalization, the order may or may not be in effect during this procedure. I give my doctor permission to give me blood or blood products. I understand that there are risks with receiving blood such as hepatitis, AIDS, fever, or allergic reaction. I acknowledge that the risks, benefits, and alternatives of this treatment have been explained to me and that no express or implied warranty has been given by the hospital, any blood bank, or any person or entity as to the blood or blood components transfused. At the discretion of my doctor, I agree to allow observers, equipment/product representatives and allow photographing, and/or televising of the procedure, provided my name or identity is maintained confidentially. I agree the hospital may dispose of or use for scientific or educational purposes any tissue, fluid, or body parts which may be removed.     ________________________________Date________Time______ am/pm  ([place])  Patient or Signature of Closest Relative or Legal Guardian    ________________________________Date________Time______am/pm      Page 1 of  1  Witness

## 2019-05-31 NOTE — PROGRESS NOTES
[address] [facility]                          Date of Procedure 6-3 Time of Procedure 0730    PRIOR TO PROCEDURE DATE:  1. Please follow any guidelines/instructions prior to your procedure as advised by your surgeon. 2. Arrange for someone to drive you home and be with you for the first 24 hours after discharge for your safety after your procedure for which you received sedation. Ensure it is someone we can share information with regarding your discharge. 3. You must contact your surgeon for instructions IF:   You are taking any blood thinners, aspirin, anti-inflammatory or vitamin E.   There is a change in your physical condition such as a cold, fever, rash, cuts, sores or any other infection, especially near your surgical site. 4. Do not drink alcohol the day before or day of your procedure. 5. A Pre-op History and Physical for surgery MUST be completed by your Physician or Urgent Care within 30 days of your procedure date. Please bring a copy with you on the day of your procedure and along with any other testing performed. THE DAY OF YOUR PROCEDURE:  1. Follow instructions for ARRIVAL TIME as DIRECTED BY YOUR SURGEON. If your surgeon does not give you a specific arrival time, please arrive at 0530.    2. Enter the MAIN entrance from [address] [place] [address] and follow the signs to the free [employer] or [employer] parking (offered free of charge 6am-5pm). 3. Enter the Main Entrance of the hospital (do NOT enter from the lower level of the parking garage). Upon entrance, check in with the  at the main desk on your left. If no one is available at the desk, proceed into the [facility] Waiting Room and go through the door directly into the [facility]. There is a Check-in desk ACROSS from Room 5 (marked with a sign hanging from the ceiling).  The phone number for the surgery center is [phone].    4. DO NOT EAT ANYTHING eight hours prior to surgery. May have 8 ounces of water 4 hours prior to surgery (exception would be medication instructions below only)     5. MEDICATIONS    Take the following medications with a SMALL sip of water: Xanax, Depakote, cortef, Latuda, Misodrine, Lyrica, Zantac, Trulance   Use your usual dose of inhalers the morning of surgery. BRING your rescue inhaler with you to hospital.    Anesthesia does NOT want you to take insulin the morning of surgery. They will control your blood sugar while you are at the hospital. Please contact your ordering physician for instructions regarding your insulin the night before your procedure. If you have an insulin pump, please keep it set on basal rate. 6. Do not swallow water when brushing teeth. No gum, candy, mints or ice chips. Refrain from smoking or at least decrease the amount. 7. Dress in loose, comfortable clothing appropriate for redressing after your procedure. Do not wear jewelry (including body piercings), make-up (especially NO eye make-up), fingernail polish (NO toenail polish if foot/leg surgery), lotion, powders or metal hairclips. 8. Dentures, glasses, or contacts will need to be removed before your procedure. Bring cases for your glasses, contacts, dentures, or hearing aids to protect them while you are in surgery. 9. If you use a CPAP, please bring it with you on the day of your procedure. 10. We recommend that valuable personal  belongings, such as cash, cell phones, e-tablets or jewelry, be left at home during your stay. The hospital will not be responsible for valuables that are not secured in the hospital safe. However, if your insurance requires a co-pay, you may want to bring a method of payment, i.e. Check or credit card, if you wish to pay your co-pay the day of surgery. 11. If you are to stay overnight, you may bring a bag with personal items.  Please have any large items you may need brought in by your family after your arrival to your hospital room. 12. If you have a Living Will or Durable Power of , please bring a copy on the day of your procedure. 15.  With your permission, one family member may accompany you while you are being prepared for surgery. Once you are ready, additional family members may join you. HOW WE KEEP YOU SAFE and WORK TO PREVENT SURGICAL SITE INFECTIONS:  1. Health care workers should always check your ID bracelet to verify your name and birth date. You will be asked many times to state your name, date of birth, and allergies. 2. Health care workers should always clean their hands with soap or alcohol gel before providing care to you. It is okay to ask anyone if they cleaned their hands before they touch you. 3. You will be actively involved in verifying the type of procedure you are having and ensuring the correct surgical site. This will be confirmed multiple times prior to your procedure. Do NOT mary your surgery site UNLESS instructed to by your surgeon. 4. Do not shave or wax for 72 hours prior to procedure near your operative site. Shaving with a razor can irritate your skin and make it easier to develop an infection. On the day of your procedure, any hair that needs to be removed near the surgical site will be clipped by a healthcare worker using a special clippers designed to avoid skin irritation. 5. When you are in the operating room, your surgical site will be cleansed with a special soap, and in most cases, you will be given an antibiotic before the surgery begins. What to expect AFTER YOUR PROCEDURE:  1. Immediately following your procedure, your will be taken to the PACU for the first phase of your recovery. Your nurse will help you recover from any potential side effects of anesthesia, such as extreme drowsiness, changes in your vital signs or breathing patterns. Nausea, headache, muscle aches, or sore throat may also occur after anesthesia.   Your nurse will help you manage these potential side effects. 2. For comfort and safety, arrange to have someone at home with you for the first 24 hours after discharge. 3. You and your family will be given written instructions about your diet, activity, dressing care, medications, and return visits. 4. Once at home, should issues with nausea, pain, or bleeding occur, or should you notice any signs of infection, you should call your surgeon. 5. Always clean your hands before and after caring for your wound. Do not let your family touch your surgery site without cleaning their hands. 6. Narcotic pain medications can cause significant constipation. You may want to add a stool softener to your postoperative medication schedule or speak to your surgeon on how best to manage this SIDE EFFECT. SPECIAL INSTRUCTIONS     Thank you for allowing us to care for you. We strive to exceed your expectations in the overall delivery of care and service provided to you and your family. If you need to contact us for any reason, please call us at [phone]. Instructions reviewed and copy given to patient during preadmission testing visit. Jacklyn Hdez. 2019 .12:10 PM      ADDITIONAL EDUCATIONAL INFORMATION REVIEWED / PROVIDED TO YOU AND YOUR FAMILY:  Yes Taking Control of Your Pain   Yes FAQs about Surgical Site Infections    No Cardiac Surgery Instructions for AM admission to the hospital  No Bactroban® Nasal Ointment Instructions for Cardiac Surgery  No Learning About Preventing Pressure Sores  No Cardiac Surgery Preoperative Hibiclens® Bathing Instructions  YES / P} Your Care after Heart Surgery Binder    No Yobani® Wipes Bathing Instructions (Obtained from:  [email or web address] pdf )  No Hibiclens® Bathing Instructions  Yes Antibacterial Soap

## 2019-06-01 ENCOUNTER — ANESTHESIA EVENT (OUTPATIENT)
Dept: OPERATING ROOM | Age: 39
DRG: 304 | End: 2019-06-01
Payer: COMMERCIAL

## 2019-06-02 NOTE — ANESTHESIA PRE PROCEDURE
Department of Anesthesiology  Preprocedure Note       Name:  Clare Pedroza   Age:  37 y.o.  :  1980                                          MRN:  [id number]         Date:  2019      Surgeon: Yumiko Rodriguez):  Sanjay Baker MD    Procedure: L5, S1 TRANSFORAMINAL LUMBAR INTERBODY FUSION (N/A )    Medications prior to admission:   Prior to Admission medications    Medication Sig Start Date End Date Taking? Authorizing Provider   divalproex (DEPAKOTE) 250 MG DR tablet Take 1 tablet by mouth 2 times daily 5/15/19  Yes Norma Vasques MD   hydrocortisone (CORTEF) 10 MG tablet Take 1 tablet by mouth daily 19  Yes Norma Vasques MD   ranitidine (ZANTAC) 150 MG tablet Take 150 mg by mouth 2 times daily   Yes Historical Provider, MD   acyclovir (ZOVIRAX) 400 MG tablet Take 400 mg by mouth 2 times daily   Yes Historical Provider, MD   diclofenac (VOLTAREN) 50 MG EC tablet Take 50 mg by mouth 2 times daily   Yes Historical Provider, MD   STOOL SOFTENER 100 MG capsule Take 100 mg by mouth 2 times daily 19  Yes Historical Provider, MD   ketoconazole (NIZORAL) 2 % shampoo Apply topically Twice a Week 19  Yes Historical Provider, MD   LATUDA 20 MG TABS tablet Take 20 mg by mouth daily 5/3/19  Yes Historical Provider, MD   progesterone (PROMETRIUM) 100 MG capsule Take 100 mg by mouth nightly 19  Yes Historical Provider, MD   QUEtiapine (SEROQUEL) 200 MG tablet Take 200 mg by mouth nightly 19  Yes Historical Provider, MD   TRULANCE 3 MG TABS Take 3 mg by mouth daily 19  Yes Historical Provider, MD   ondansetron (ZOFRAN ODT) 4 MG disintegrating tablet Take 1 tablet by mouth every 8 hours as needed for Nausea 18  Yes Carmenza Jain MD   pregabalin (LYRICA) 200 MG capsule Take 200 mg by mouth three times daily. Yes Historical Provider, MD   ALPRAZolam Jose Manner) 1 MG tablet Take 1 mg by mouth 3 times daily as needed for Sleep or Anxiety.     Yes Historical Provider, MD   midodrine (PROAMATINE) 5 MG tablet Take 5 mg by mouth daily   Yes Historical Provider, MD   oxyCODONE-acetaminophen (PERCOCET) 5-325 MG per tablet Take 1 tablet by mouth every 4 hours as needed. Yes Historical Provider, MD   SUPER B COMPLEX/C PO Take by mouth daily   Yes Historical Provider, MD   Multiple Vitamin (MULTI-VITAMIN DAILY PO) Take by mouth daily   Yes Historical Provider, MD   Cholecalciferol (VITAMIN D3) 1000 units CAPS Take by mouth daily   Yes Historical Provider, MD   Cetirizine HCl (ZYRTEC ALLERGY) 10 MG CAPS Take by mouth daily   Yes Historical Provider, MD   Probiotic Product (PROBIOTIC-10 PO) Take by mouth daily   Yes Historical Provider, MD   temazepam (RESTORIL) 30 MG capsule Take 30 mg by mouth nightly as needed for Sleep. Du Abraham Historical Provider, MD       Current medications:    No current facility-administered medications for this encounter.       Current Outpatient Medications   Medication Sig Dispense Refill    divalproex (DEPAKOTE) 250 MG DR tablet Take 1 tablet by mouth 2 times daily 90 tablet 3    hydrocortisone (CORTEF) 10 MG tablet Take 1 tablet by mouth daily 30 tablet 1    ranitidine (ZANTAC) 150 MG tablet Take 150 mg by mouth 2 times daily      acyclovir (ZOVIRAX) 400 MG tablet Take 400 mg by mouth 2 times daily      diclofenac (VOLTAREN) 50 MG EC tablet Take 50 mg by mouth 2 times daily      STOOL SOFTENER 100 MG capsule Take 100 mg by mouth 2 times daily      ketoconazole (NIZORAL) 2 % shampoo Apply topically Twice a Week  4    LATUDA 20 MG TABS tablet Take 20 mg by mouth daily  3    progesterone (PROMETRIUM) 100 MG capsule Take 100 mg by mouth nightly  1    QUEtiapine (SEROQUEL) 200 MG tablet Take 200 mg by mouth nightly  2    TRULANCE 3 MG TABS Take 3 mg by mouth daily  11    ondansetron (ZOFRAN ODT) 4 MG disintegrating tablet Take 1 tablet by mouth every 8 hours as needed for Nausea 20 tablet 0    pregabalin (LYRICA) 200 MG capsule Take 200 mg by mouth three times fracture     COPD (chronic obstructive pulmonary disease) (HCC)     DDD (degenerative disc disease), cervical     Howard-Danlos syndrome     Fibromyalgia     H/O adrenal insufficiency     and pituitary insufficiency    Herniated disc     Metabolic syndrome     Osteoarthritis     Pyelonephritis     Sciatica        Past Surgical History:        Procedure Laterality Date    KNEE SURGERY         Social History:    Social History     Tobacco Use    Smoking status: Former Smoker     Packs/day: 1.00     Types: Cigarettes     Last attempt to quit: 2018     Years since quittin.3    Smokeless tobacco: Never Used   Substance Use Topics    Alcohol use: Not Currently     Comment: socially                                Counseling given: Not Answered      Vital Signs (Current):   Vitals:    19 1203   Weight: 155 lb (70.3 kg)   Height: 5' 4\" (1.626 m)                                              BP Readings from Last 3 Encounters:   05/15/19 100/66   18 98/70   18 101/61       NPO Status:                                                                                 BMI:   Wt Readings from Last 3 Encounters:   05/15/19 155 lb 10.3 oz (70.6 kg)   18 145 lb 8.1 oz (66 kg)   18 142 lb 13.7 oz (64.8 kg)     Body mass index is 26.61 kg/m².     CBC:   Lab Results   Component Value Date    WBC 9.8 2019    RBC 5.15 2019    HGB 16.0 2019    HCT 46.9 2019    MCV 91.0 2019    RDW 13.6 2019     2019       CMP:   Lab Results   Component Value Date     2019    K 4.0 2019     2019    CO2 22 2019    BUN 18 2019    CREATININE 0.8 2019    GFRAA >60 2019    GFRAA >60 2013    AGRATIO 1.4 2019    LABGLOM >60 2019    GLUCOSE 102 2019    PROT 8.8 2019    PROT 8.7 10/14/2012    CALCIUM 10.6 2019    BILITOT 0.5 2019    ALKPHOS 72 2019    AST 27 2019 ALT 30 05/11/2019       POC Tests: No results for input(s): POCGLU, POCNA, POCK, POCCL, POCBUN, POCHEMO, POCHCT in the last 72 hours. Coags: No results found for: PROTIME, INR, APTT    HCG (If Applicable):   Lab Results   Component Value Date    PREGTESTUR Negative 05/11/2019        ABGs: No results found for: PHART, PO2ART, MCH1JOK, JJN9YPC, BEART, F3XBRSGH     Type & Screen (If Applicable):  No results found for: [facility]    Anesthesia Evaluation  Patient summary reviewed and Nursing notes reviewed no history of anesthetic complications:   Airway: Mallampati: II  TM distance: >3 FB   Neck ROM: full  Mouth opening: > = 3 FB Dental: normal exam         Pulmonary:normal exam  breath sounds clear to auscultation  (+) COPD: mild,  asthma: current smoker          Patient did not smoke on day of surgery. ROS comment: Former Smoker   Cardiovascular:Negative CV ROS            Rhythm: regular  Rate: normal                    Neuro/Psych:   (+) depression/anxiety              ROS comment: Lumbar Stenosis  Substance-induced psychotic disorder with delusions  DDD  Fibromyalgia  Bipolar D/O GI/Hepatic/Renal:        (-) hiatal hernia and GERD      ROS comment: H/O adrenal insufficiency and pituitary insufficiency   IBD. Endo/Other:                      ROS comment: Howard-Danlos syndrome  Hx of pyelonephritis Abdominal:           Vascular: negative vascular ROS. Anesthesia Plan      general     ASA 3       Induction: intravenous. MIPS: Postoperative opioids intended and Prophylactic antiemetics administered. Anesthetic plan and risks discussed with patient. Plan discussed with CRNA.     Attending anesthesiologist reviewed and agrees with Marylou Li MD   6/2/2019

## 2019-06-03 ENCOUNTER — HOSPITAL ENCOUNTER (INPATIENT)
Age: 39
LOS: 1 days | Discharge: HOME | DRG: 304 | End: 2019-06-04
Attending: NEUROLOGICAL SURGERY | Admitting: NEUROLOGICAL SURGERY
Payer: COMMERCIAL

## 2019-06-03 ENCOUNTER — APPOINTMENT (OUTPATIENT)
Dept: CT IMAGING | Age: 39
DRG: 304 | End: 2019-06-03
Attending: NEUROLOGICAL SURGERY
Payer: COMMERCIAL

## 2019-06-03 ENCOUNTER — ANESTHESIA (OUTPATIENT)
Dept: OPERATING ROOM | Age: 39
DRG: 304 | End: 2019-06-03
Payer: COMMERCIAL

## 2019-06-03 ENCOUNTER — APPOINTMENT (OUTPATIENT)
Dept: GENERAL RADIOLOGY | Age: 39
DRG: 304 | End: 2019-06-03
Attending: NEUROLOGICAL SURGERY
Payer: COMMERCIAL

## 2019-06-03 VITALS — OXYGEN SATURATION: 99 % | SYSTOLIC BLOOD PRESSURE: 93 MMHG | DIASTOLIC BLOOD PRESSURE: 60 MMHG

## 2019-06-03 DIAGNOSIS — Z98.1 S/P LUMBAR SPINAL FUSION: Primary | ICD-10-CM

## 2019-06-03 PROBLEM — M48.062 LUMBAR STENOSIS WITH NEUROGENIC CLAUDICATION: Status: ACTIVE | Noted: 2019-06-03

## 2019-06-03 LAB
ABO/RH: NORMAL
ANTIBODY SCREEN: NORMAL
PREGNANCY, URINE: NEGATIVE

## 2019-06-03 PROCEDURE — 6360000002 HC RX W HCPCS: Performed by: NURSE PRACTITIONER

## 2019-06-03 PROCEDURE — 0SB40ZZ EXCISION OF LUMBOSACRAL DISC, OPEN APPROACH: ICD-10-PCS | Performed by: NEUROLOGICAL SURGERY

## 2019-06-03 PROCEDURE — 2580000003 HC RX 258: Performed by: ANESTHESIOLOGY

## 2019-06-03 PROCEDURE — 3700000001 HC ADD 15 MINUTES (ANESTHESIA): Performed by: NEUROLOGICAL SURGERY

## 2019-06-03 PROCEDURE — 7100000001 HC PACU RECOVERY - ADDTL 15 MIN: Performed by: NEUROLOGICAL SURGERY

## 2019-06-03 PROCEDURE — 7100000000 HC PACU RECOVERY - FIRST 15 MIN: Performed by: NEUROLOGICAL SURGERY

## 2019-06-03 PROCEDURE — 6360000002 HC RX W HCPCS: Performed by: NURSE ANESTHETIST, CERTIFIED REGISTERED

## 2019-06-03 PROCEDURE — 2580000003 HC RX 258: Performed by: NURSE ANESTHETIST, CERTIFIED REGISTERED

## 2019-06-03 PROCEDURE — 6360000002 HC RX W HCPCS: Performed by: ANESTHESIOLOGY

## 2019-06-03 PROCEDURE — 2500000003 HC RX 250 WO HCPCS: Performed by: NEUROLOGICAL SURGERY

## 2019-06-03 PROCEDURE — 2720000010 HC SURG SUPPLY STERILE: Performed by: NEUROLOGICAL SURGERY

## 2019-06-03 PROCEDURE — 0SG30K1 FUSION OF LUMBOSACRAL JOINT WITH NONAUTOLOGOUS TISSUE SUBSTITUTE, POSTERIOR APPROACH, POSTERIOR COLUMN, OPEN APPROACH: ICD-10-PCS | Performed by: NEUROLOGICAL SURGERY

## 2019-06-03 PROCEDURE — 86900 BLOOD TYPING SEROLOGIC ABO: CPT

## 2019-06-03 PROCEDURE — 6370000000 HC RX 637 (ALT 250 FOR IP): Performed by: FAMILY MEDICINE

## 2019-06-03 PROCEDURE — 2780000010 HC IMPLANT OTHER: Performed by: NEUROLOGICAL SURGERY

## 2019-06-03 PROCEDURE — 1200000000 HC SEMI PRIVATE

## 2019-06-03 PROCEDURE — 77011 CT SCAN FOR LOCALIZATION: CPT

## 2019-06-03 PROCEDURE — 3209999900 FLUORO FOR SURGICAL PROCEDURES

## 2019-06-03 PROCEDURE — 6370000000 HC RX 637 (ALT 250 FOR IP): Performed by: NURSE PRACTITIONER

## 2019-06-03 PROCEDURE — 2580000003 HC RX 258: Performed by: NURSE PRACTITIONER

## 2019-06-03 PROCEDURE — 2500000003 HC RX 250 WO HCPCS: Performed by: ANESTHESIOLOGY

## 2019-06-03 PROCEDURE — 86850 RBC ANTIBODY SCREEN: CPT

## 2019-06-03 PROCEDURE — 3700000000 HC ANESTHESIA ATTENDED CARE: Performed by: NEUROLOGICAL SURGERY

## 2019-06-03 PROCEDURE — 86901 BLOOD TYPING SEROLOGIC RH(D): CPT

## 2019-06-03 PROCEDURE — 0SG30AJ FUSION OF LUMBOSACRAL JOINT WITH INTERBODY FUSION DEVICE, POSTERIOR APPROACH, ANTERIOR COLUMN, OPEN APPROACH: ICD-10-PCS | Performed by: NEUROLOGICAL SURGERY

## 2019-06-03 PROCEDURE — 72020 X-RAY EXAM OF SPINE 1 VIEW: CPT

## 2019-06-03 PROCEDURE — 2709999900 HC NON-CHARGEABLE SUPPLY: Performed by: NEUROLOGICAL SURGERY

## 2019-06-03 PROCEDURE — 6370000000 HC RX 637 (ALT 250 FOR IP): Performed by: ANESTHESIOLOGY

## 2019-06-03 PROCEDURE — 6360000002 HC RX W HCPCS: Performed by: NEUROLOGICAL SURGERY

## 2019-06-03 PROCEDURE — 3600000014 HC SURGERY LEVEL 4 ADDTL 15MIN: Performed by: NEUROLOGICAL SURGERY

## 2019-06-03 PROCEDURE — 6370000000 HC RX 637 (ALT 250 FOR IP)

## 2019-06-03 PROCEDURE — 2580000003 HC RX 258: Performed by: NEUROLOGICAL SURGERY

## 2019-06-03 PROCEDURE — 84703 CHORIONIC GONADOTROPIN ASSAY: CPT

## 2019-06-03 PROCEDURE — 3600000004 HC SURGERY LEVEL 4 BASE: Performed by: NEUROLOGICAL SURGERY

## 2019-06-03 PROCEDURE — C1713 ANCHOR/SCREW BN/BN,TIS/BN: HCPCS | Performed by: NEUROLOGICAL SURGERY

## 2019-06-03 DEVICE — IMPLANTABLE DEVICE: Type: IMPLANTABLE DEVICE | Site: SPINE LUMBAR | Status: FUNCTIONAL

## 2019-06-03 DEVICE — SCREW BONE L130MM DIA4MM SCHNZ DISP: Type: IMPLANTABLE DEVICE | Site: SPINE LUMBAR | Status: FUNCTIONAL

## 2019-06-03 DEVICE — ROD SPNL L35MM POST PEDCL TI LORDOSED VIPER 2: Type: IMPLANTABLE DEVICE | Site: SPINE LUMBAR | Status: FUNCTIONAL

## 2019-06-03 DEVICE — GRAFT BNE SUB M 5ML CANC DBM FRMBL CELLULAR VIVIGEN: Type: IMPLANTABLE DEVICE | Site: SPINE LUMBAR | Status: FUNCTIONAL

## 2019-06-03 DEVICE — ROD SPNL L45MM POST PEDCL TI LORDOSED VIPER 2: Type: IMPLANTABLE DEVICE | Site: SPINE LUMBAR | Status: FUNCTIONAL

## 2019-06-03 DEVICE — SCREW SPNL L45MM DIA6MM TI POLYAX EXT TAB FOR MINIMALLY: Type: IMPLANTABLE DEVICE | Site: SPINE LUMBAR | Status: FUNCTIONAL

## 2019-06-03 DEVICE — SET SCR SPNL TI SGL INNR FOR VIPER 2 MINIMALLY INVASIVE: Type: IMPLANTABLE DEVICE | Site: SPINE LUMBAR | Status: FUNCTIONAL

## 2019-06-03 RX ORDER — DOCUSATE SODIUM 100 MG/1
100 CAPSULE, LIQUID FILLED ORAL 2 TIMES DAILY
Status: DISCONTINUED | OUTPATIENT
Start: 2019-06-03 | End: 2019-06-04 | Stop reason: HOSPADM

## 2019-06-03 RX ORDER — SODIUM CHLORIDE 0.9 % (FLUSH) 0.9 %
10 SYRINGE (ML) INJECTION EVERY 12 HOURS SCHEDULED
Status: DISCONTINUED | OUTPATIENT
Start: 2019-06-03 | End: 2019-06-04 | Stop reason: HOSPADM

## 2019-06-03 RX ORDER — OXYCODONE HYDROCHLORIDE AND ACETAMINOPHEN 5; 325 MG/1; MG/1
2 TABLET ORAL EVERY 4 HOURS PRN
Status: DISCONTINUED | OUTPATIENT
Start: 2019-06-03 | End: 2019-06-04

## 2019-06-03 RX ORDER — FAMOTIDINE 20 MG/1
20 TABLET, FILM COATED ORAL 2 TIMES DAILY
Status: DISCONTINUED | OUTPATIENT
Start: 2019-06-03 | End: 2019-06-04 | Stop reason: HOSPADM

## 2019-06-03 RX ORDER — ONDANSETRON 2 MG/ML
4 INJECTION INTRAMUSCULAR; INTRAVENOUS
Status: DISCONTINUED | OUTPATIENT
Start: 2019-06-03 | End: 2019-06-03 | Stop reason: HOSPADM

## 2019-06-03 RX ORDER — DEXAMETHASONE SODIUM PHOSPHATE 10 MG/ML
INJECTION, SOLUTION INTRAMUSCULAR; INTRAVENOUS PRN
Status: DISCONTINUED | OUTPATIENT
Start: 2019-06-03 | End: 2019-06-03 | Stop reason: SDUPTHER

## 2019-06-03 RX ORDER — SENNA AND DOCUSATE SODIUM 50; 8.6 MG/1; MG/1
1 TABLET, FILM COATED ORAL 2 TIMES DAILY
Status: DISCONTINUED | OUTPATIENT
Start: 2019-06-03 | End: 2019-06-04 | Stop reason: HOSPADM

## 2019-06-03 RX ORDER — MIDODRINE HYDROCHLORIDE 5 MG/1
5 TABLET ORAL DAILY
Status: DISCONTINUED | OUTPATIENT
Start: 2019-06-04 | End: 2019-06-04 | Stop reason: HOSPADM

## 2019-06-03 RX ORDER — ACYCLOVIR 400 MG/1
400 TABLET ORAL 2 TIMES DAILY
Status: DISCONTINUED | OUTPATIENT
Start: 2019-06-03 | End: 2019-06-04 | Stop reason: HOSPADM

## 2019-06-03 RX ORDER — PROMETHAZINE HYDROCHLORIDE 25 MG/ML
6.25 INJECTION, SOLUTION INTRAMUSCULAR; INTRAVENOUS
Status: DISCONTINUED | OUTPATIENT
Start: 2019-06-03 | End: 2019-06-03 | Stop reason: HOSPADM

## 2019-06-03 RX ORDER — OXYCODONE HYDROCHLORIDE AND ACETAMINOPHEN 5; 325 MG/1; MG/1
1 TABLET ORAL EVERY 4 HOURS PRN
Status: DISCONTINUED | OUTPATIENT
Start: 2019-06-03 | End: 2019-06-04

## 2019-06-03 RX ORDER — CETIRIZINE HYDROCHLORIDE 10 MG/1
10 TABLET ORAL DAILY
Status: DISCONTINUED | OUTPATIENT
Start: 2019-06-04 | End: 2019-06-04 | Stop reason: HOSPADM

## 2019-06-03 RX ORDER — DIVALPROEX SODIUM 250 MG/1
250 TABLET, DELAYED RELEASE ORAL 2 TIMES DAILY
Status: DISCONTINUED | OUTPATIENT
Start: 2019-06-03 | End: 2019-06-04 | Stop reason: HOSPADM

## 2019-06-03 RX ORDER — SODIUM CHLORIDE, SODIUM LACTATE, POTASSIUM CHLORIDE, CALCIUM CHLORIDE 600; 310; 30; 20 MG/100ML; MG/100ML; MG/100ML; MG/100ML
INJECTION, SOLUTION INTRAVENOUS CONTINUOUS
Status: DISCONTINUED | OUTPATIENT
Start: 2019-06-03 | End: 2019-06-03

## 2019-06-03 RX ORDER — OXYCODONE AND ACETAMINOPHEN 10; 325 MG/1; MG/1
1 TABLET ORAL ONCE
Status: COMPLETED | OUTPATIENT
Start: 2019-06-03 | End: 2019-06-03

## 2019-06-03 RX ORDER — DEXAMETHASONE SODIUM PHOSPHATE 10 MG/ML
INJECTION, SOLUTION INTRAMUSCULAR; INTRAVENOUS PRN
Status: DISCONTINUED | OUTPATIENT
Start: 2019-06-03 | End: 2019-06-03

## 2019-06-03 RX ORDER — NICOTINE 21 MG/24HR
1 PATCH, TRANSDERMAL 24 HOURS TRANSDERMAL DAILY
Status: DISCONTINUED | OUTPATIENT
Start: 2019-06-03 | End: 2019-06-04 | Stop reason: HOSPADM

## 2019-06-03 RX ORDER — HYDROMORPHONE HCL 110MG/55ML
PATIENT CONTROLLED ANALGESIA SYRINGE INTRAVENOUS PRN
Status: DISCONTINUED | OUTPATIENT
Start: 2019-06-03 | End: 2019-06-03 | Stop reason: SDUPTHER

## 2019-06-03 RX ORDER — LORAZEPAM 2 MG/ML
0.5 INJECTION INTRAMUSCULAR EVERY 10 MIN PRN
Status: COMPLETED | OUTPATIENT
Start: 2019-06-03 | End: 2019-06-03

## 2019-06-03 RX ORDER — MIDAZOLAM HYDROCHLORIDE 1 MG/ML
INJECTION INTRAMUSCULAR; INTRAVENOUS PRN
Status: DISCONTINUED | OUTPATIENT
Start: 2019-06-03 | End: 2019-06-03 | Stop reason: SDUPTHER

## 2019-06-03 RX ORDER — ONDANSETRON 2 MG/ML
4 INJECTION INTRAMUSCULAR; INTRAVENOUS EVERY 6 HOURS PRN
Status: DISCONTINUED | OUTPATIENT
Start: 2019-06-03 | End: 2019-06-04 | Stop reason: HOSPADM

## 2019-06-03 RX ORDER — SODIUM CHLORIDE 0.9 % (FLUSH) 0.9 %
10 SYRINGE (ML) INJECTION PRN
Status: DISCONTINUED | OUTPATIENT
Start: 2019-06-03 | End: 2019-06-04 | Stop reason: HOSPADM

## 2019-06-03 RX ORDER — CEFAZOLIN SODIUM 2 G/50ML
2 SOLUTION INTRAVENOUS ONCE
Status: COMPLETED | OUTPATIENT
Start: 2019-06-03 | End: 2019-06-03

## 2019-06-03 RX ORDER — QUETIAPINE FUMARATE 200 MG/1
200 TABLET, FILM COATED ORAL NIGHTLY
Status: DISCONTINUED | OUTPATIENT
Start: 2019-06-03 | End: 2019-06-04 | Stop reason: HOSPADM

## 2019-06-03 RX ORDER — PROPOFOL 10 MG/ML
INJECTION, EMULSION INTRAVENOUS CONTINUOUS PRN
Status: DISCONTINUED | OUTPATIENT
Start: 2019-06-03 | End: 2019-06-03 | Stop reason: SDUPTHER

## 2019-06-03 RX ORDER — FENTANYL CITRATE 50 UG/ML
50 INJECTION, SOLUTION INTRAMUSCULAR; INTRAVENOUS EVERY 5 MIN PRN
Status: DISCONTINUED | OUTPATIENT
Start: 2019-06-03 | End: 2019-06-03 | Stop reason: HOSPADM

## 2019-06-03 RX ORDER — FENTANYL CITRATE 50 UG/ML
INJECTION, SOLUTION INTRAMUSCULAR; INTRAVENOUS PRN
Status: DISCONTINUED | OUTPATIENT
Start: 2019-06-03 | End: 2019-06-03 | Stop reason: SDUPTHER

## 2019-06-03 RX ORDER — LORAZEPAM 2 MG/ML
INJECTION INTRAMUSCULAR
Status: DISPENSED
Start: 2019-06-03 | End: 2019-06-04

## 2019-06-03 RX ORDER — PROPOFOL 10 MG/ML
INJECTION, EMULSION INTRAVENOUS PRN
Status: DISCONTINUED | OUTPATIENT
Start: 2019-06-03 | End: 2019-06-03 | Stop reason: SDUPTHER

## 2019-06-03 RX ORDER — BUPIVACAINE HYDROCHLORIDE 5 MG/ML
INJECTION, SOLUTION EPIDURAL; INTRACAUDAL PRN
Status: DISCONTINUED | OUTPATIENT
Start: 2019-06-03 | End: 2019-06-03 | Stop reason: HOSPADM

## 2019-06-03 RX ORDER — ALPRAZOLAM 0.5 MG/1
1 TABLET ORAL 3 TIMES DAILY PRN
Status: DISCONTINUED | OUTPATIENT
Start: 2019-06-03 | End: 2019-06-04 | Stop reason: HOSPADM

## 2019-06-03 RX ORDER — SODIUM CHLORIDE, SODIUM LACTATE, POTASSIUM CHLORIDE, CALCIUM CHLORIDE 600; 310; 30; 20 MG/100ML; MG/100ML; MG/100ML; MG/100ML
INJECTION, SOLUTION INTRAVENOUS CONTINUOUS PRN
Status: DISCONTINUED | OUTPATIENT
Start: 2019-06-03 | End: 2019-06-03 | Stop reason: SDUPTHER

## 2019-06-03 RX ORDER — ONDANSETRON 2 MG/ML
INJECTION INTRAMUSCULAR; INTRAVENOUS PRN
Status: DISCONTINUED | OUTPATIENT
Start: 2019-06-03 | End: 2019-06-03 | Stop reason: SDUPTHER

## 2019-06-03 RX ORDER — HYDROCORTISONE 5 MG/1
10 TABLET ORAL DAILY
Status: DISCONTINUED | OUTPATIENT
Start: 2019-06-04 | End: 2019-06-04 | Stop reason: HOSPADM

## 2019-06-03 RX ORDER — FENTANYL CITRATE 50 UG/ML
25 INJECTION, SOLUTION INTRAMUSCULAR; INTRAVENOUS EVERY 5 MIN PRN
Status: DISCONTINUED | OUTPATIENT
Start: 2019-06-03 | End: 2019-06-03 | Stop reason: HOSPADM

## 2019-06-03 RX ORDER — LIDOCAINE HYDROCHLORIDE 20 MG/ML
INJECTION, SOLUTION INTRAVENOUS PRN
Status: DISCONTINUED | OUTPATIENT
Start: 2019-06-03 | End: 2019-06-03 | Stop reason: SDUPTHER

## 2019-06-03 RX ORDER — LORAZEPAM 0.5 MG/1
0.5 TABLET ORAL NIGHTLY PRN
Status: DISCONTINUED | OUTPATIENT
Start: 2019-06-03 | End: 2019-06-04 | Stop reason: HOSPADM

## 2019-06-03 RX ORDER — SODIUM CHLORIDE 9 MG/ML
INJECTION, SOLUTION INTRAVENOUS CONTINUOUS
Status: DISCONTINUED | OUTPATIENT
Start: 2019-06-03 | End: 2019-06-04

## 2019-06-03 RX ORDER — NALOXONE HYDROCHLORIDE 0.4 MG/ML
0.2 INJECTION, SOLUTION INTRAMUSCULAR; INTRAVENOUS; SUBCUTANEOUS PRN
Status: DISCONTINUED | OUTPATIENT
Start: 2019-06-03 | End: 2019-06-04 | Stop reason: HOSPADM

## 2019-06-03 RX ADMIN — METHOCARBAMOL 1000 MG: 100 INJECTION, SOLUTION INTRAMUSCULAR; INTRAVENOUS at 15:01

## 2019-06-03 RX ADMIN — ONDANSETRON 4 MG: 2 INJECTION INTRAMUSCULAR; INTRAVENOUS at 14:25

## 2019-06-03 RX ADMIN — PREGABALIN 200 MG: 150 CAPSULE ORAL at 17:49

## 2019-06-03 RX ADMIN — DOCUSATE SODIUM 100 MG: 100 CAPSULE, LIQUID FILLED ORAL at 21:29

## 2019-06-03 RX ADMIN — METHOCARBAMOL 1000 MG: 100 INJECTION, SOLUTION INTRAMUSCULAR; INTRAVENOUS at 22:19

## 2019-06-03 RX ADMIN — SODIUM CHLORIDE, SODIUM LACTATE, POTASSIUM CHLORIDE, AND CALCIUM CHLORIDE: 600; 310; 30; 20 INJECTION, SOLUTION INTRAVENOUS at 11:12

## 2019-06-03 RX ADMIN — LORAZEPAM 0.5 MG: 2 INJECTION INTRAMUSCULAR; INTRAVENOUS at 15:20

## 2019-06-03 RX ADMIN — PHENYLEPHRINE HYDROCHLORIDE 80 MCG: 10 INJECTION, SOLUTION INTRAMUSCULAR; INTRAVENOUS; SUBCUTANEOUS at 11:58

## 2019-06-03 RX ADMIN — FENTANYL CITRATE 100 MCG: 50 INJECTION INTRAMUSCULAR; INTRAVENOUS at 11:16

## 2019-06-03 RX ADMIN — PROPOFOL 100 MG: 10 INJECTION, EMULSION INTRAVENOUS at 12:36

## 2019-06-03 RX ADMIN — HYDROMORPHONE HYDROCHLORIDE 1 MG: 1 INJECTION, SOLUTION INTRAMUSCULAR; INTRAVENOUS; SUBCUTANEOUS at 23:45

## 2019-06-03 RX ADMIN — OXYCODONE HYDROCHLORIDE AND ACETAMINOPHEN 2 TABLET: 5; 325 TABLET ORAL at 21:28

## 2019-06-03 RX ADMIN — DIVALPROEX SODIUM 250 MG: 250 TABLET, DELAYED RELEASE ORAL at 21:28

## 2019-06-03 RX ADMIN — REMIFENTANIL HYDROCHLORIDE 0.05 MCG/KG/MIN: 1 INJECTION, POWDER, LYOPHILIZED, FOR SOLUTION INTRAVENOUS at 11:53

## 2019-06-03 RX ADMIN — PREGABALIN 200 MG: 150 CAPSULE ORAL at 21:29

## 2019-06-03 RX ADMIN — PHENYLEPHRINE HYDROCHLORIDE 160 MCG: 10 INJECTION, SOLUTION INTRAMUSCULAR; INTRAVENOUS; SUBCUTANEOUS at 11:33

## 2019-06-03 RX ADMIN — Medication 10 ML: at 21:29

## 2019-06-03 RX ADMIN — FENTANYL CITRATE 50 MCG: 50 INJECTION INTRAMUSCULAR; INTRAVENOUS at 16:06

## 2019-06-03 RX ADMIN — PHENYLEPHRINE HYDROCHLORIDE 5 MCG: 10 INJECTION, SOLUTION INTRAMUSCULAR; INTRAVENOUS; SUBCUTANEOUS at 12:17

## 2019-06-03 RX ADMIN — REMIFENTANIL HYDROCHLORIDE 0.1 MCG/KG/MIN: 1 INJECTION, POWDER, LYOPHILIZED, FOR SOLUTION INTRAVENOUS at 12:37

## 2019-06-03 RX ADMIN — DEXAMETHASONE SODIUM PHOSPHATE 10 MG: 10 INJECTION, SOLUTION INTRAMUSCULAR; INTRAVENOUS at 11:29

## 2019-06-03 RX ADMIN — SODIUM CHLORIDE, SODIUM LACTATE, POTASSIUM CHLORIDE, AND CALCIUM CHLORIDE: 600; 310; 30; 20 INJECTION, SOLUTION INTRAVENOUS at 07:31

## 2019-06-03 RX ADMIN — PROPOFOL 200 MG: 10 INJECTION, EMULSION INTRAVENOUS at 11:20

## 2019-06-03 RX ADMIN — LORAZEPAM 0.5 MG: 2 INJECTION INTRAMUSCULAR; INTRAVENOUS at 15:30

## 2019-06-03 RX ADMIN — PROPOFOL 50 MG: 10 INJECTION, EMULSION INTRAVENOUS at 12:53

## 2019-06-03 RX ADMIN — FAMOTIDINE 20 MG: 20 TABLET, FILM COATED ORAL at 21:29

## 2019-06-03 RX ADMIN — PROPOFOL 110 MCG/KG/MIN: 10 INJECTION, EMULSION INTRAVENOUS at 12:38

## 2019-06-03 RX ADMIN — HYDROCORTISONE SODIUM SUCCINATE 100 MG: 100 INJECTION, POWDER, FOR SOLUTION INTRAMUSCULAR; INTRAVENOUS at 07:32

## 2019-06-03 RX ADMIN — SENNOSIDES,DOCUSATE SODIUM 1 TABLET: 8.6; 5 TABLET, FILM COATED ORAL at 21:29

## 2019-06-03 RX ADMIN — PHENYLEPHRINE HYDROCHLORIDE 160 MCG: 10 INJECTION, SOLUTION INTRAMUSCULAR; INTRAVENOUS; SUBCUTANEOUS at 12:05

## 2019-06-03 RX ADMIN — PROPOFOL 50 MG: 10 INJECTION, EMULSION INTRAVENOUS at 12:42

## 2019-06-03 RX ADMIN — PROPOFOL 50 MG: 10 INJECTION, EMULSION INTRAVENOUS at 11:39

## 2019-06-03 RX ADMIN — QUETIAPINE FUMARATE 200 MG: 200 TABLET ORAL at 21:29

## 2019-06-03 RX ADMIN — CEFAZOLIN SODIUM 2 G: 2 SOLUTION INTRAVENOUS at 11:25

## 2019-06-03 RX ADMIN — OXYCODONE HYDROCHLORIDE AND ACETAMINOPHEN 1 TABLET: 10; 325 TABLET ORAL at 07:36

## 2019-06-03 RX ADMIN — PROPOFOL 50 MG: 10 INJECTION, EMULSION INTRAVENOUS at 11:29

## 2019-06-03 RX ADMIN — ALPRAZOLAM 1 MG: 0.5 TABLET ORAL at 17:54

## 2019-06-03 RX ADMIN — FENTANYL CITRATE 50 MCG: 50 INJECTION INTRAMUSCULAR; INTRAVENOUS at 17:00

## 2019-06-03 RX ADMIN — MIDAZOLAM HYDROCHLORIDE 2 MG: 2 INJECTION, SOLUTION INTRAMUSCULAR; INTRAVENOUS at 11:10

## 2019-06-03 RX ADMIN — DEXTROSE MONOHYDRATE 2 G: 50 INJECTION, SOLUTION INTRAVENOUS at 21:28

## 2019-06-03 RX ADMIN — PROGESTERONE 100 MG: 100 CAPSULE ORAL at 21:28

## 2019-06-03 RX ADMIN — SODIUM CHLORIDE, SODIUM LACTATE, POTASSIUM CHLORIDE, AND CALCIUM CHLORIDE: 600; 310; 30; 20 INJECTION, SOLUTION INTRAVENOUS at 11:14

## 2019-06-03 RX ADMIN — HYDROMORPHONE HYDROCHLORIDE 0.5 MG: 1 INJECTION, SOLUTION INTRAMUSCULAR; INTRAVENOUS; SUBCUTANEOUS at 17:16

## 2019-06-03 RX ADMIN — PROPOFOL 100 MCG/KG/MIN: 10 INJECTION, EMULSION INTRAVENOUS at 11:54

## 2019-06-03 RX ADMIN — LIDOCAINE HYDROCHLORIDE 100 MG: 20 INJECTION, SOLUTION INTRAVENOUS at 11:20

## 2019-06-03 RX ADMIN — PHENYLEPHRINE HYDROCHLORIDE 80 MCG: 10 INJECTION, SOLUTION INTRAMUSCULAR; INTRAVENOUS; SUBCUTANEOUS at 11:52

## 2019-06-03 RX ADMIN — PHENYLEPHRINE HYDROCHLORIDE 80 MCG: 10 INJECTION, SOLUTION INTRAMUSCULAR; INTRAVENOUS; SUBCUTANEOUS at 11:46

## 2019-06-03 RX ADMIN — SODIUM CHLORIDE: 9 INJECTION, SOLUTION INTRAVENOUS at 18:27

## 2019-06-03 RX ADMIN — HYDROMORPHONE HYDROCHLORIDE 2 MG: 2 INJECTION, SOLUTION INTRAMUSCULAR; INTRAVENOUS; SUBCUTANEOUS at 14:48

## 2019-06-03 ASSESSMENT — PULMONARY FUNCTION TESTS
PIF_VALUE: 21
PIF_VALUE: 24
PIF_VALUE: 23
PIF_VALUE: 24
PIF_VALUE: 23
PIF_VALUE: 24
PIF_VALUE: 23
PIF_VALUE: 23
PIF_VALUE: 24
PIF_VALUE: 23
PIF_VALUE: 23
PIF_VALUE: 20
PIF_VALUE: 23
PIF_VALUE: 21
PIF_VALUE: 23
PIF_VALUE: 24
PIF_VALUE: 2
PIF_VALUE: 23
PIF_VALUE: 23
PIF_VALUE: 24
PIF_VALUE: 24
PIF_VALUE: 6
PIF_VALUE: 23
PIF_VALUE: 24
PIF_VALUE: 23
PIF_VALUE: 23
PIF_VALUE: 1
PIF_VALUE: 20
PIF_VALUE: 23
PIF_VALUE: 23
PIF_VALUE: 21
PIF_VALUE: 23
PIF_VALUE: 23
PIF_VALUE: 24
PIF_VALUE: 1
PIF_VALUE: 23
PIF_VALUE: 20
PIF_VALUE: 24
PIF_VALUE: 23
PIF_VALUE: 24
PIF_VALUE: 23
PIF_VALUE: 22
PIF_VALUE: 23
PIF_VALUE: 24
PIF_VALUE: 24
PIF_VALUE: 23
PIF_VALUE: 23
PIF_VALUE: 24
PIF_VALUE: 23
PIF_VALUE: 24
PIF_VALUE: 23
PIF_VALUE: 23
PIF_VALUE: 22
PIF_VALUE: 0
PIF_VALUE: 23
PIF_VALUE: 1
PIF_VALUE: 23
PIF_VALUE: 23
PIF_VALUE: 24
PIF_VALUE: 23
PIF_VALUE: 20
PIF_VALUE: 23
PIF_VALUE: 21
PIF_VALUE: 23
PIF_VALUE: 22
PIF_VALUE: 22
PIF_VALUE: 24
PIF_VALUE: 23
PIF_VALUE: 21
PIF_VALUE: 23
PIF_VALUE: 24
PIF_VALUE: 24
PIF_VALUE: 2
PIF_VALUE: 21
PIF_VALUE: 23
PIF_VALUE: 24
PIF_VALUE: 20
PIF_VALUE: 23
PIF_VALUE: 23
PIF_VALUE: 24
PIF_VALUE: 20
PIF_VALUE: 24
PIF_VALUE: 24
PIF_VALUE: 23
PIF_VALUE: 23
PIF_VALUE: 24
PIF_VALUE: 24
PIF_VALUE: 11
PIF_VALUE: 23
PIF_VALUE: 24
PIF_VALUE: 20
PIF_VALUE: 23
PIF_VALUE: 23
PIF_VALUE: 25
PIF_VALUE: 0
PIF_VALUE: 20
PIF_VALUE: 24
PIF_VALUE: 23
PIF_VALUE: 21
PIF_VALUE: 22
PIF_VALUE: 24
PIF_VALUE: 23
PIF_VALUE: 24
PIF_VALUE: 23
PIF_VALUE: 25
PIF_VALUE: 24
PIF_VALUE: 23
PIF_VALUE: 0
PIF_VALUE: 0
PIF_VALUE: 23
PIF_VALUE: 24
PIF_VALUE: 24
PIF_VALUE: 23
PIF_VALUE: 24
PIF_VALUE: 0
PIF_VALUE: 23
PIF_VALUE: 20
PIF_VALUE: 23
PIF_VALUE: 24
PIF_VALUE: 22
PIF_VALUE: 24
PIF_VALUE: 0
PIF_VALUE: 21
PIF_VALUE: 23
PIF_VALUE: 24
PIF_VALUE: 24
PIF_VALUE: 22
PIF_VALUE: 23
PIF_VALUE: 23
PIF_VALUE: 24
PIF_VALUE: 4
PIF_VALUE: 23
PIF_VALUE: 23
PIF_VALUE: 24
PIF_VALUE: 24
PIF_VALUE: 23
PIF_VALUE: 24
PIF_VALUE: 24
PIF_VALUE: 23
PIF_VALUE: 20
PIF_VALUE: 24
PIF_VALUE: 25
PIF_VALUE: 23
PIF_VALUE: 22
PIF_VALUE: 23
PIF_VALUE: 21
PIF_VALUE: 24
PIF_VALUE: 23
PIF_VALUE: 23
PIF_VALUE: 25
PIF_VALUE: 2
PIF_VALUE: 25
PIF_VALUE: 22
PIF_VALUE: 23
PIF_VALUE: 20
PIF_VALUE: 24
PIF_VALUE: 23
PIF_VALUE: 23
PIF_VALUE: 1
PIF_VALUE: 20
PIF_VALUE: 25
PIF_VALUE: 22
PIF_VALUE: 22
PIF_VALUE: 23
PIF_VALUE: 23
PIF_VALUE: 24
PIF_VALUE: 22
PIF_VALUE: 23
PIF_VALUE: 23
PIF_VALUE: 0
PIF_VALUE: 3
PIF_VALUE: 23
PIF_VALUE: 0
PIF_VALUE: 23
PIF_VALUE: 22
PIF_VALUE: 24
PIF_VALUE: 23
PIF_VALUE: 24
PIF_VALUE: 24
PIF_VALUE: 20
PIF_VALUE: 24
PIF_VALUE: 23
PIF_VALUE: 23
PIF_VALUE: 20
PIF_VALUE: 21
PIF_VALUE: 20

## 2019-06-03 ASSESSMENT — PAIN DESCRIPTION - ORIENTATION
ORIENTATION: LOWER

## 2019-06-03 ASSESSMENT — PAIN DESCRIPTION - DESCRIPTORS
DESCRIPTORS: ACHING;BURNING;DISCOMFORT
DESCRIPTORS: ACHING;CONSTANT
DESCRIPTORS: ACHING;CONSTANT
DESCRIPTORS: CONSTANT;ACHING
DESCRIPTORS: ACHING;BURNING;DISCOMFORT

## 2019-06-03 ASSESSMENT — PAIN SCALES - GENERAL
PAINLEVEL_OUTOF10: 8
PAINLEVEL_OUTOF10: 7
PAINLEVEL_OUTOF10: 7
PAINLEVEL_OUTOF10: 8
PAINLEVEL_OUTOF10: 7
PAINLEVEL_OUTOF10: 8
PAINLEVEL_OUTOF10: 7
PAINLEVEL_OUTOF10: 9
PAINLEVEL_OUTOF10: 8

## 2019-06-03 ASSESSMENT — PAIN DESCRIPTION - LOCATION
LOCATION: BACK

## 2019-06-03 ASSESSMENT — PAIN - FUNCTIONAL ASSESSMENT
PAIN_FUNCTIONAL_ASSESSMENT: ACTIVITIES ARE NOT PREVENTED
PAIN_FUNCTIONAL_ASSESSMENT: 0-10
PAIN_FUNCTIONAL_ASSESSMENT: ACTIVITIES ARE NOT PREVENTED

## 2019-06-03 ASSESSMENT — PAIN DESCRIPTION - FREQUENCY
FREQUENCY: CONTINUOUS

## 2019-06-03 ASSESSMENT — PAIN DESCRIPTION - ONSET
ONSET: ON-GOING
ONSET: ON-GOING

## 2019-06-03 ASSESSMENT — PAIN DESCRIPTION - PAIN TYPE
TYPE: ACUTE PAIN;SURGICAL PAIN
TYPE: SURGICAL PAIN
TYPE: ACUTE PAIN;SURGICAL PAIN
TYPE: CHRONIC PAIN

## 2019-06-03 ASSESSMENT — PAIN DESCRIPTION - PROGRESSION
CLINICAL_PROGRESSION: GRADUALLY WORSENING
CLINICAL_PROGRESSION: GRADUALLY WORSENING

## 2019-06-03 ASSESSMENT — PAIN DESCRIPTION - DIRECTION
RADIATING_TOWARDS: LLE
RADIATING_TOWARDS: LLE
RADIATING_TOWARDS: LEFT LEG

## 2019-06-03 ASSESSMENT — COPD QUESTIONNAIRES: CAT_SEVERITY: MILD

## 2019-06-03 ASSESSMENT — LIFESTYLE VARIABLES: SMOKING_STATUS: 1

## 2019-06-03 NOTE — PROGRESS NOTES
NEUROSURGERY HANDOFF    Patient Information  Name:FERNANDO Bowman JTM:[id number]   :1980 Code Status:Prior   Allergies   Allergen Reactions    Metoclopramide Shortness Of Breath and Swelling    Quetiapine Shortness Of Breath and Swelling    Risperidone Swelling and Shortness Of Breath    Trazodone Swelling and Shortness Of Breath     Swelling throat    Buprenorphine-Naloxone Anxiety and Hives     Patient says she can take Percocet, Vicodin    Morphine Hives and Itching    Asenapine Swelling     tongue    Buprenorphine Hcl-Naloxone Hcl     Codeine Hives and Itching     Patient says she can take Percocet, Vicodin    Guanfacine Hcl Swelling     tongue    Morphine And Related Hives    Reglan [Metoclopramide Hcl] Swelling    Risperidone And Related Swelling    Seroquel  [Quetiapine Fumarate] Other (See Comments)     \"I get really dizzy and I pass out. \"    Seroquel [Quetiapine Fumarate] Swelling     Swelling throat    Trazodone And Nefazodone Swelling     Swelling throat    Lurasidone Anxiety    Extended Emergency Contact Information  Primary Emergency Contact: [address] [place] [address] Phone: [phone]  Mobile Phone: [phone]  Relation: Parent     Admission Information  Date of Admission:6/3/2019  5:43 AM Location:OR/NONE   Admission Diagnosis:LUMBAR STENOSIS Attending Lucero Pate MD   Procedure(s) (LRB):  L5, S1 TRANSFORAMINAL LUMBAR INTERBODY FUSION (N/A) Cristela Alvarez MD     Patient Summary  Pau Bolanos is a 37 y.o. female patient with LUMBAR STENOSIS who under went a Procedure(s) (LRB):  L5, S1 TRANSFORAMINAL LUMBAR INTERBODY FUSION (N/A) today by Radha Roman MD.    History of Present Illness:  Patient had pre-op complaints of chronic lumbar pain and left LE pain, numbness and tingling that was unresponsive to conservative treatments.      Vital Signs:  BP 98/61   Pulse 103   Temp 97.5 °F (36.4 °C) (Oral)   Resp 20 Ht 5' 4\" (1.626 m)   Wt 155 lb (70.3 kg)   LMP 05/01/2019 (Approximate)   SpO2 98%   BMI 26.61 kg/m²     Situation Awareness  Contingency Planning  If the patient has:  · LOC  · Sudden change in neuro exam that includes:   · Numbness or tingling in extremities  · Weakness in extremities  · Incision begins to separate  · Copious amount of blood or fluid draining from the incision  · Signs of infection, such as:   · Temperature exceeds 101° F  · Increased pain, swelling, warmth, or redness around incision site  · Red streaks leading from the site  · Pus draining from the site    You MUST contact Jono Giron MD at [phone]    You can also contact the [facility] Neurosurgery NP Monday-Friday 7am-5pm @ [phone]    Electronically signed by ANNA Del Cid CNP on 6/3/2019 at 2:14 PM

## 2019-06-03 NOTE — PROGRESS NOTES
Patient admitted to unit from PACU VSS, scant serosanginous drainage at sx. Site. Patient alert and oriented x 4, neurochecks WDL. Patient oriented to room, educated on use of call light and bed alarm. Will continue to monitor.

## 2019-06-03 NOTE — H&P
Relationships    Social connections:     Talks on phone: None     Gets together: None     Attends Nondenominational service: None     Active member of club or organization: None     Attends meetings of clubs or organizations: None     Relationship status: None    Intimate partner violence:     Fear of current or ex partner: None     Emotionally abused: None     Physically abused: None     Forced sexual activity: None   Other Topics Concern    None   Social History Narrative    None         Medications Prior to Admission:      Prior to Admission medications    Medication Sig Start Date End Date Taking?  Authorizing Provider   divalproex (DEPAKOTE) 250 MG DR tablet Take 1 tablet by mouth 2 times daily 5/15/19  Yes Charan Sandy MD   hydrocortisone (CORTEF) 10 MG tablet Take 1 tablet by mouth daily 5/16/19  Yes Charan Sandy MD   ranitidine (ZANTAC) 150 MG tablet Take 150 mg by mouth 2 times daily   Yes Historical Provider, MD   acyclovir (ZOVIRAX) 400 MG tablet Take 400 mg by mouth 2 times daily   Yes Historical Provider, MD   diclofenac (VOLTAREN) 50 MG EC tablet Take 50 mg by mouth 2 times daily   Yes Historical Provider, MD   STOOL SOFTENER 100 MG capsule Take 100 mg by mouth 2 times daily 4/16/19  Yes Historical Provider, MD   ketoconazole (NIZORAL) 2 % shampoo Apply topically Twice a Week 4/13/19  Yes Historical Provider, MD   LATUDA 20 MG TABS tablet Take 20 mg by mouth daily 5/3/19  Yes Historical Provider, MD   progesterone (PROMETRIUM) 100 MG capsule Take 100 mg by mouth nightly 4/8/19  Yes Historical Provider, MD   QUEtiapine (SEROQUEL) 200 MG tablet Take 200 mg by mouth nightly 4/20/19  Yes Historical Provider, MD   TRULANCE 3 MG TABS Take 3 mg by mouth daily 4/20/19  Yes Historical Provider, MD   ondansetron (ZOFRAN ODT) 4 MG disintegrating tablet Take 1 tablet by mouth every 8 hours as needed for Nausea 11/14/18  Yes Tito Melendez MD   pregabalin (LYRICA) 200 MG capsule Take 200 mg by mouth three the provider.     ANNA Smith - DIVYA     6/3/2019

## 2019-06-03 NOTE — OP NOTE
sterile fashion. A small stab incision was then made over the PSIS, two stay pins were affixed to the bone and the stereotactic array was secured. The wound was covered with sterile draps and the Aero intraoperative CT was brought into the field to acquire imaging. The images were loaded into the [employer] guidance system and used for pedicle screw placement. Using a #15 blade knife, the skin was incised 3.5cm off the midline and centered at the L5-S1 interspace on the right side. I used the navigable Jamshidi needle to cannulate the pedicle of L5 on the right, a K wire was then placed in the pedicle and over the K wires was placed a Viper pedicle screw. This was all performed under real image guidance. It was repeated at the S1 pedicle on the right. Using a #15 blade knife, the skin was incised 3.5cm off the midline and centered at the L5-S1 interspace on the left side. I used the navigable Jamshidi needle to cannulate the pedicle of L5 on the left, a K wire was then placed in the pedicle and over the K wires was placed a Viper pedicle screw. This was all performed under real image guidance. It was repeated at the S1 pedicle on the left. All the screws were stimulated to a threshold of 20 mA. Blunt dissection was then used to develop the plane between the multifidus and longissimus muscles. On the left side of the patient I placed a tubular retractor and docked on the L5-S1 interspace. I then brought the operating room microscope into the field and used it to assist with performing microsurgical decompression. Using the high-speed Midas Mian drill, I removed the inferior articular process of L5 and the superior articular process of S1 and performed a far lateral trans facet approach to the L5-S1 interspace. The hypertrophic ligamentum flavum was removed with a 2 mm punch microsurgically and released from the underlying dura and exiting L5-S1 nerve roots.  The interspace was then incised with a #15 blade knife, and all the disc material grossly removed with pituitary forceps, rasps, and rongeurs. The far lateral portion of the disc was also removed decompressing the exiting L5 root. An appropriately sized Peek interbody cage was then packed with a Vivigen sponge soaked in [facility] and allograft bone. This was carefully impacted into the interspace to establish an interbody arthrodesis at L5-S1. The rest of the interspace was then packed with allograft bone. A posterolateral facet arthrodesis was performed at L5-S1 by packing the facet articulation with allograft bone. Rods were then introduced and a reduction maneuver was performed to reduce the spondylolisthesis at L5-S1 bilaterally. A compression maneuver was then performed to lock the interbody graft in place. The CT was brought back into the field and used to confirm proper position of surgical implants.      The wound was then irrigated with antibiotic solution and was then closed in the usual fashion using interrupted 0 Vicryl sutures in the fascia, followed by running 4-0 Monocryl in the subcuticular layer. A Dermabond dressing was then applied. The patient was extubated in the operating room and transferred to the recovery room in stable condition. There were no complications.  All needle, instrument, and sponge counts were correct.     In accordance with CMS guidelines, I attest that I was present for the entire procedure from the creation of the skin incision to the closure.     ESTIMATED BLOOD LOSS: 510 mL     COMPLICATIONS: No complications apparent.     DISPOSITION: The patient was transferred to the PACU in stable condition, awake, alert, and moving all extremities on command.     Dictated by: Chana Camargo MD

## 2019-06-03 NOTE — PROGRESS NOTES
4 Eyes Admission Assessment     I agree as the admission nurse that 2 RN's have performed a thorough Head to Toe Skin Assessment on the patient. ALL assessment sites listed below have been assessed on admission. Areas assessed by both nurses:   [x]   Head, Face, and Ears   [x]   Shoulders, Back, and Chest  [x]   Arms, Elbows, and Hands   [x]   Coccyx, Sacrum, and Ischum  [x]   Legs, Feet, and Heels        Does the Patient have Skin Breakdown?   No         Serge Prevention initiated:  No   Wound Care Orders initiated:  No      [facility] nurse consulted for Pressure Injury (Stage 3,4, Unstageable, DTI, NWPT, and Complex wounds):  No      Nurse 1 eSignature: Electronically signed by Sean Aiken RN on 6/3/19 at 7:29 PM    **SHARE this note so that the co-signing nurse is able to place an eSignature**    Nurse 2 eSignature: Electronically signed by Nathalia Cannon RN on 6/3/19 at 7:45 PM

## 2019-06-03 NOTE — PROGRESS NOTES
Patient arrived from OR to PACU # 9 s/p L5, S1 TRANSFORAMINAL LUMBAR INTERBODY FUSION per . Attached to PACU monitoring device, report received from CRNA who reported patient hemodynamically stable intraoperatively, VSS. Patient crying, tearful on arrival. Continue to monitor.

## 2019-06-03 NOTE — ANESTHESIA POSTPROCEDURE EVALUATION
Department of Anesthesiology  Postprocedure Note    Patient: Seven Lantigua  MRN: [id number]  YOB: 1980  Date of evaluation: 6/3/2019  Time:  7:03 PM     Procedure Summary     Date:  06/03/19 Room / Location:  [facility] [address] [facility] 05 / Susy Diopley OR    Anesthesia Start:  1112 Anesthesia Stop:  1500    Procedure:  L5, S1 TRANSFORAMINAL LUMBAR INTERBODY FUSION (N/A ) Diagnosis:  (LUMBAR STENOSIS)    Surgeon:  Juliann Quintana MD Responsible Provider:  Ad Pichardo MD    Anesthesia Type:  general ASA Status:  3          Anesthesia Type: general    Brayan Phase I: Brayan Score: 10    Brayan Phase II:      Last vitals: Reviewed and per EMR flowsheets. Anesthesia Post Evaluation    Patient location during evaluation: PACU  Patient participation: complete - patient participated  Level of consciousness: awake and alert  Pain scale: please refer to nursing notes. Airway patency: patent  Nausea & Vomiting: no nausea and no vomiting  Complications: no  Cardiovascular status: hemodynamically stable  Respiratory status: spontaneous ventilation  Hydration status: stable  Comments: No phone calls received from PACU RN regarding patient.

## 2019-06-04 VITALS
BODY MASS INDEX: 26.46 KG/M2 | SYSTOLIC BLOOD PRESSURE: 105 MMHG | DIASTOLIC BLOOD PRESSURE: 71 MMHG | RESPIRATION RATE: 16 BRPM | HEIGHT: 64 IN | HEART RATE: 84 BPM | TEMPERATURE: 98 F | WEIGHT: 155 LBS | OXYGEN SATURATION: 96 %

## 2019-06-04 PROBLEM — Z98.1 S/P LUMBAR SPINAL FUSION: Status: ACTIVE | Noted: 2019-06-04

## 2019-06-04 LAB
ANION GAP SERPL CALCULATED.3IONS-SCNC: 9 MMOL/L (ref 3–16)
APTT: 23.8 SEC (ref 26–36)
BASOPHILS ABSOLUTE: 0.1 K/UL (ref 0–0.2)
BASOPHILS RELATIVE PERCENT: 0.5 %
BUN BLDV-MCNC: 10 MG/DL (ref 7–20)
CALCIUM SERPL-MCNC: 8.8 MG/DL (ref 8.3–10.6)
CHLORIDE BLD-SCNC: 103 MMOL/L (ref 99–110)
CO2: 29 MMOL/L (ref 21–32)
CREAT SERPL-MCNC: 0.6 MG/DL (ref 0.6–1.1)
EOSINOPHILS ABSOLUTE: 0.1 K/UL (ref 0–0.6)
EOSINOPHILS RELATIVE PERCENT: 0.5 %
GFR AFRICAN AMERICAN: >60
GFR NON-AFRICAN AMERICAN: >60
GLUCOSE BLD-MCNC: 126 MG/DL (ref 70–99)
HCT VFR BLD CALC: 30.6 % (ref 36–48)
HEMOGLOBIN: 10.3 G/DL (ref 12–16)
INR BLD: 0.89 (ref 0.86–1.14)
LYMPHOCYTES ABSOLUTE: 1.7 K/UL (ref 1–5.1)
LYMPHOCYTES RELATIVE PERCENT: 13.7 %
MCH RBC QN AUTO: 30.6 PG (ref 26–34)
MCHC RBC AUTO-ENTMCNC: 33.5 G/DL (ref 31–36)
MCV RBC AUTO: 91.4 FL (ref 80–100)
MONOCYTES ABSOLUTE: 0.8 K/UL (ref 0–1.3)
MONOCYTES RELATIVE PERCENT: 6.7 %
NEUTROPHILS ABSOLUTE: 9.7 K/UL (ref 1.7–7.7)
NEUTROPHILS RELATIVE PERCENT: 78.6 %
PDW BLD-RTO: 14.5 % (ref 12.4–15.4)
PLATELET # BLD: 264 K/UL (ref 135–450)
PMV BLD AUTO: 8.2 FL (ref 5–10.5)
POTASSIUM SERPL-SCNC: 4.4 MMOL/L (ref 3.5–5.1)
PROTHROMBIN TIME: 10.1 SEC (ref 9.8–13)
RBC # BLD: 3.35 M/UL (ref 4–5.2)
SODIUM BLD-SCNC: 141 MMOL/L (ref 136–145)
WBC # BLD: 12.3 K/UL (ref 4–11)

## 2019-06-04 PROCEDURE — 2580000003 HC RX 258: Performed by: NURSE PRACTITIONER

## 2019-06-04 PROCEDURE — 97116 GAIT TRAINING THERAPY: CPT

## 2019-06-04 PROCEDURE — 85610 PROTHROMBIN TIME: CPT

## 2019-06-04 PROCEDURE — 6370000000 HC RX 637 (ALT 250 FOR IP): Performed by: NURSE PRACTITIONER

## 2019-06-04 PROCEDURE — 6360000002 HC RX W HCPCS: Performed by: NURSE PRACTITIONER

## 2019-06-04 PROCEDURE — 85025 COMPLETE CBC W/AUTO DIFF WBC: CPT

## 2019-06-04 PROCEDURE — 97161 PT EVAL LOW COMPLEX 20 MIN: CPT

## 2019-06-04 PROCEDURE — 97165 OT EVAL LOW COMPLEX 30 MIN: CPT

## 2019-06-04 PROCEDURE — 85730 THROMBOPLASTIN TIME PARTIAL: CPT

## 2019-06-04 PROCEDURE — 80048 BASIC METABOLIC PNL TOTAL CA: CPT

## 2019-06-04 PROCEDURE — 97530 THERAPEUTIC ACTIVITIES: CPT

## 2019-06-04 PROCEDURE — 36415 COLL VENOUS BLD VENIPUNCTURE: CPT

## 2019-06-04 RX ORDER — OXYCODONE HYDROCHLORIDE 15 MG/1
15 TABLET ORAL EVERY 4 HOURS PRN
Qty: 28 TABLET | Refills: 0 | Status: SHIPPED | OUTPATIENT
Start: 2019-06-04 | End: 2019-06-11

## 2019-06-04 RX ORDER — OXYCODONE HYDROCHLORIDE 15 MG/1
15 TABLET ORAL
Status: DISCONTINUED | OUTPATIENT
Start: 2019-06-04 | End: 2019-06-04 | Stop reason: HOSPADM

## 2019-06-04 RX ORDER — METHOCARBAMOL 750 MG/1
750 TABLET, FILM COATED ORAL 4 TIMES DAILY
Qty: 40 TABLET | Refills: 0 | Status: SHIPPED | OUTPATIENT
Start: 2019-06-04 | End: 2019-06-14

## 2019-06-04 RX ADMIN — ACYCLOVIR 400 MG: 400 TABLET ORAL at 08:57

## 2019-06-04 RX ADMIN — DIVALPROEX SODIUM 250 MG: 250 TABLET, DELAYED RELEASE ORAL at 08:58

## 2019-06-04 RX ADMIN — ALPRAZOLAM 1 MG: 0.5 TABLET ORAL at 09:06

## 2019-06-04 RX ADMIN — MIDODRINE HYDROCHLORIDE 5 MG: 5 TABLET ORAL at 08:57

## 2019-06-04 RX ADMIN — HYDROCORTISONE 10 MG: 5 TABLET ORAL at 08:57

## 2019-06-04 RX ADMIN — DOCUSATE SODIUM 100 MG: 100 CAPSULE, LIQUID FILLED ORAL at 08:57

## 2019-06-04 RX ADMIN — PREGABALIN 200 MG: 150 CAPSULE ORAL at 15:30

## 2019-06-04 RX ADMIN — OXYCODONE HYDROCHLORIDE 15 MG: 15 TABLET ORAL at 15:30

## 2019-06-04 RX ADMIN — Medication 10 ML: at 08:58

## 2019-06-04 RX ADMIN — LURASIDONE HYDROCHLORIDE 20 MG: 40 TABLET, FILM COATED ORAL at 08:57

## 2019-06-04 RX ADMIN — OXYCODONE HYDROCHLORIDE 15 MG: 15 TABLET ORAL at 12:01

## 2019-06-04 RX ADMIN — METHOCARBAMOL 1000 MG: 100 INJECTION, SOLUTION INTRAMUSCULAR; INTRAVENOUS at 05:55

## 2019-06-04 RX ADMIN — FAMOTIDINE 20 MG: 20 TABLET, FILM COATED ORAL at 08:56

## 2019-06-04 RX ADMIN — SENNOSIDES,DOCUSATE SODIUM 1 TABLET: 8.6; 5 TABLET, FILM COATED ORAL at 08:57

## 2019-06-04 RX ADMIN — PREGABALIN 200 MG: 150 CAPSULE ORAL at 08:56

## 2019-06-04 RX ADMIN — ENOXAPARIN SODIUM 40 MG: 40 INJECTION SUBCUTANEOUS at 08:56

## 2019-06-04 RX ADMIN — OXYCODONE HYDROCHLORIDE AND ACETAMINOPHEN 2 TABLET: 5; 325 TABLET ORAL at 06:48

## 2019-06-04 RX ADMIN — HYDROMORPHONE HYDROCHLORIDE 1 MG: 1 INJECTION, SOLUTION INTRAMUSCULAR; INTRAVENOUS; SUBCUTANEOUS at 08:25

## 2019-06-04 RX ADMIN — CETIRIZINE HYDROCHLORIDE 10 MG: 10 TABLET, FILM COATED ORAL at 08:57

## 2019-06-04 RX ADMIN — DEXTROSE MONOHYDRATE 2 G: 50 INJECTION, SOLUTION INTRAVENOUS at 02:57

## 2019-06-04 ASSESSMENT — PAIN DESCRIPTION - DESCRIPTORS
DESCRIPTORS: ACHING

## 2019-06-04 ASSESSMENT — PAIN - FUNCTIONAL ASSESSMENT
PAIN_FUNCTIONAL_ASSESSMENT: ACTIVITIES ARE NOT PREVENTED

## 2019-06-04 ASSESSMENT — PAIN DESCRIPTION - ONSET
ONSET: ON-GOING

## 2019-06-04 ASSESSMENT — PAIN DESCRIPTION - FREQUENCY
FREQUENCY: CONTINUOUS

## 2019-06-04 ASSESSMENT — PAIN DESCRIPTION - PROGRESSION
CLINICAL_PROGRESSION: NOT CHANGED

## 2019-06-04 ASSESSMENT — PAIN DESCRIPTION - LOCATION
LOCATION: BACK

## 2019-06-04 ASSESSMENT — PAIN DESCRIPTION - ORIENTATION
ORIENTATION: LOWER

## 2019-06-04 ASSESSMENT — PAIN DESCRIPTION - DIRECTION
RADIATING_TOWARDS: LLE

## 2019-06-04 ASSESSMENT — PAIN SCALES - GENERAL
PAINLEVEL_OUTOF10: 7
PAINLEVEL_OUTOF10: 5
PAINLEVEL_OUTOF10: 6
PAINLEVEL_OUTOF10: 0
PAINLEVEL_OUTOF10: 5
PAINLEVEL_OUTOF10: 7
PAINLEVEL_OUTOF10: 7
PAINLEVEL_OUTOF10: 6

## 2019-06-04 ASSESSMENT — PAIN DESCRIPTION - PAIN TYPE
TYPE: SURGICAL PAIN
TYPE: ACUTE PAIN;SURGICAL PAIN

## 2019-06-04 NOTE — CONSULTS
Hospitalist Consultation Note    Patient's PCP: Dr. Gama Jones MD     Requesting Physician: Dr. Cary Hawk MD    Reason for Consultation: medical management likely to include ho adrenal insufficiency    HISTORY OF PRESENT ILLNESS:    This is a very pleasant 37 y.o. female with a history of multiple medical problems presenting for 1. Left-sided decompressive trans facet approach to the L5-S1 interspaces for foraminotomy and far lateral discectomy. 2. Microdissection using the operating room microscope. 3. Placement of Peek interbody cage packed with Vivigen into the L5-S1 interspace. 4. Transforaminal lumbar interbody fusion at L5-S1.  5. Placement of percutaneous Viper pedicle screws and rods into the pedicles of L5-S1 bilaterally. 6. Posterolateral facet arthrodesis at L5-S1.  7. Neuro-monitoring for SSEP and EMG, stimulation of pedicle screws      consulted for medical management likely to include hx of adrenal insufficiency. Patient states pain is adequately controlled denies chest pain or shortness of breath. She is eating and drinking without problems and passing gas. She has no other issues at [facility] time. Past Medical / Surgical History:    Past Medical History:   Diagnosis Date    Asthma     [facility] [address]     Compression fracture     COPD (chronic obstructive pulmonary disease) [facility] [address] [facility]     DDD (degenerative disc disease), cervical     Howard-Danlos syndrome     Fibromyalgia     H/O adrenal insufficiency     and pituitary insufficiency    Herniated disc     Metabolic syndrome     Osteoarthritis     Pyelonephritis     Sciatica        Past Surgical History:   Procedure Laterality Date    KNEE SURGERY      LUMBAR FUSION N/A 6/3/2019    L5, S1 TRANSFORAMINAL LUMBAR INTERBODY FUSION performed by Cary Hawk MD at [address]       Medications Prior to Admission:    No current facility-administered medications on file prior to encounter.       Current Outpatient Medications on File Prior to Encounter   Medication Sig Dispense Refill    divalproex (DEPAKOTE) 250 MG DR tablet Take 1 tablet by mouth 2 times daily 90 tablet 3    hydrocortisone (CORTEF) 10 MG tablet Take 1 tablet by mouth daily 30 tablet 1    ranitidine (ZANTAC) 150 MG tablet Take 150 mg by mouth 2 times daily      acyclovir (ZOVIRAX) 400 MG tablet Take 400 mg by mouth 2 times daily      diclofenac (VOLTAREN) 50 MG EC tablet Take 50 mg by mouth 2 times daily      STOOL SOFTENER 100 MG capsule Take 100 mg by mouth 2 times daily      ketoconazole (NIZORAL) 2 % shampoo Apply topically Twice a Week  4    LATUDA 20 MG TABS tablet Take 20 mg by mouth daily  3    progesterone (PROMETRIUM) 100 MG capsule Take 100 mg by mouth nightly  1    QUEtiapine (SEROQUEL) 200 MG tablet Take 200 mg by mouth nightly  2    TRULANCE 3 MG TABS Take 3 mg by mouth daily  11    ondansetron (ZOFRAN ODT) 4 MG disintegrating tablet Take 1 tablet by mouth every 8 hours as needed for Nausea 20 tablet 0    pregabalin (LYRICA) 200 MG capsule Take 200 mg by mouth three times daily.  ALPRAZolam (XANAX) 1 MG tablet Take 1 mg by mouth 3 times daily as needed for Sleep or Anxiety.  midodrine (PROAMATINE) 5 MG tablet Take 5 mg by mouth daily      oxyCODONE-acetaminophen (PERCOCET) 5-325 MG per tablet Take 1 tablet by mouth every 4 hours as needed.  SUPER B COMPLEX/C PO Take by mouth daily      Multiple Vitamin (MULTI-VITAMIN DAILY PO) Take by mouth daily      Cetirizine HCl (ZYRTEC ALLERGY) 10 MG CAPS Take by mouth daily      Probiotic Product (PROBIOTIC-10 PO) Take by mouth daily      temazepam (RESTORIL) 30 MG capsule Take 30 mg by mouth nightly as needed for Sleep. Toribio Younger Cholecalciferol (VITAMIN D3) 1000 units CAPS Take by mouth daily         Allergies:  Metoclopramide; Quetiapine; Risperidone; Trazodone; Buprenorphine-naloxone;  Morphine; Asenapine; Buprenorphine hcl-naloxone hcl; Codeine; Guanfacine hcl; Morphine and related; Reglan [metoclopramide hcl]; Risperidone and related; Seroquel  [quetiapine fumarate]; Seroquel [quetiapine fumarate]; Trazodone and nefazodone; and Lurasidone    Social History:   TOBACCO:   reports that she quit smoking about 16 months ago. Her smoking use included cigarettes. She smoked 1.00 pack per day. She has never used smokeless tobacco.     ETOH:   reports that she drank alcohol. Patient currently lives at home    Family History:   History reviewed. No pertinent family history. ROS: Review of Systems - Negative except pain related to surgery. All other systems reviewed and are negative. PHYSICAL EXAM:  BP 96/63   Pulse 89   Temp 98.5 °F (36.9 °C) (Oral)   Resp 16   Ht 5' 4\" (1.626 m)   Wt 155 lb (70.3 kg)   LMP 05/01/2019 (Approximate)   SpO2 97%   BMI 26.61 kg/m²     No results for input(s): POCGLU in the last 72 hours.     BP 96/63   Pulse 89   Temp 98.5 °F (36.9 °C) (Oral)   Resp 16   Ht 5' 4\" (1.626 m)   Wt 155 lb (70.3 kg)   LMP 05/01/2019 (Approximate)   SpO2 97%   BMI 26.61 kg/m²   General appearance: alert, appears stated age and cooperative  Head: Normocephalic, without obvious abnormality, atraumatic  Neck: no adenopathy, no carotid bruit, no JVD, supple, symmetrical, trachea midline and thyroid not enlarged, symmetric, no tenderness/mass/nodules  Lungs: clear to auscultation bilaterally  Heart: regular rate and rhythm, S1, S2 normal, no murmur, click, rub or gallop  Abdomen: soft, non-tender; bowel sounds normal; no masses,  no organomegaly  Extremities: extremities normal, atraumatic, no cyanosis or edema  Pulses: 2+ and symmetric  Skin: Skin color, texture, turgor normal. No rashes or lesions    LABS:  Recent Labs     06/04/19  0706   WBC 12.3*   HGB 10.3*   HCT 30.6*                                                                     Recent Labs     06/04/19  0706      K 4.4      CO2 29   BUN 10   CREATININE 0.6   GLUCOSE 126* No results for input(s): AST, ALT, ALB, BILITOT, ALKPHOS in the last 72 hours. No results for input(s): TROPONINI in the last 72 hours. No results for input(s): BNP in the last 72 hours. No results for input(s): CHOL, HDL in the last 72 hours. Invalid input(s): LDLCALCU  Recent Labs     06/04/19  0706   INR 0.89     No results for input(s): NITRITE, COLORU, PHUR, LABCAST, WBCUA, RBCUA, MUCUS, TRICHOMONAS, YEAST, BACTERIA, CLARITYU, SPECGRAV, LEUKOCYTESUR, UROBILINOGEN, BILIRUBINUR, BLOODU, GLUCOSEU, AMORPHOUS in the last 72 hours. Invalid input(s): Miranda Montelongo     Assessment & Plan: This is a very pleasant 37 y.o. female with a history of multiple medical problems presenting for   1. Left-sided decompressive trans facet approach to the L5-S1 interspaces for foraminotomy and far lateral discectomy. 2. Microdissection using the operating room microscope. 3. Placement of Peek interbody cage packed with Vivigen into the L5-S1 interspace. 4. Transforaminal lumbar interbody fusion at L5-S1.  5. Placement of percutaneous Viper pedicle screws and rods into the pedicles of L5-S1 bilaterally. 6. Posterolateral facet arthrodesis at L5-S1.  7. Neuro-monitoring for SSEP and EMG, stimulation of pedicle screws consulted for medical management likely to include hypotension and hx of adrenal insufficiency, dvt prophylaxis per neurosurgery. Patient states that her bp normally runs low, she has a hx of adrenal insufficiency can continue with midodrine and with hydrocortisone, she is however not on florinef, currently she states she is ok. If she has any issues with bp will use florinef for adrenal insufficiency. She denies having any other issues, discussed with neurosrugery she uses 80 mg of oxycontin 4 times a day at home. Thank you Dr. Sanna Romano MD for the opportunity to be involved in this patients care. If you have any questions or concerns please feel free to contact me at [phone].

## 2019-06-04 NOTE — DISCHARGE SUMMARY
pain cannot be managed within an average of 30 MED per day. Severe acute postoperative pain is the reason for exceeding the 30 MED average, and the prescription reflects the same dosage patient received while inpatient, which is the lowest dose consistent with the patients medical condition. Non-opioid treatment options have been considered prior to prescribing opioids, and the patient has been advised of the benefits and risks of the opioid (including the potential for addiction). Medication List      START taking these medications    methocarbamol 750 MG tablet  Commonly known as:  ROBAXIN-750  Take 1 tablet by mouth 4 times daily for 10 days     oxyCODONE 15 MG immediate release tablet  Commonly known as:  OXY-IR  Take 1 tablet by mouth every 4 hours as needed (pain) for up to 7 days.         CONTINUE taking these medications    acyclovir 400 MG tablet  Commonly known as:  ZOVIRAX     divalproex 250 MG DR tablet  Commonly known as:  DEPAKOTE  Take 1 tablet by mouth 2 times daily     hydrocortisone 10 MG tablet  Commonly known as:  CORTEF  Take 1 tablet by mouth daily     ketoconazole 2 % shampoo  Commonly known as:  NIZORAL     LATUDA 20 MG Tabs tablet  Generic drug:  lurasidone     LYRICA 200 MG capsule  Generic drug:  pregabalin     midodrine 5 MG tablet  Commonly known as:  PROAMATINE     ondansetron 4 MG disintegrating tablet  Commonly known as:  ZOFRAN ODT  Take 1 tablet by mouth every 8 hours as needed for Nausea     PROBIOTIC-10 PO     progesterone 100 MG capsule  Commonly known as:  PROMETRIUM     QUEtiapine 200 MG tablet  Commonly known as:  SEROQUEL     ranitidine 150 MG tablet  Commonly known as:  ZANTAC     STOOL SOFTENER 100 MG capsule  Generic drug:  docusate sodium     temazepam 30 MG capsule  Commonly known as:  RESTORIL     TRULANCE 3 MG Tabs  Generic drug:  Plecanatide     XANAX 1 MG tablet  Generic drug:  ALPRAZolam     ZYRTEC ALLERGY 10 MG Caps  Generic drug:  Cetirizine HCl        STOP taking these medications    diclofenac 50 MG EC tablet  Commonly known as:  VOLTAREN     MULTI-VITAMIN DAILY PO     oxyCODONE-acetaminophen 5-325 MG per tablet  Commonly known as:  PERCOCET     SUPER B COMPLEX/C PO     Vitamin D3 1000 units Caps           Where to Get Your Medications      You can get these medications from any pharmacy    Bring a paper prescription for each of these medications  · methocarbamol 750 MG tablet  · oxyCODONE 15 MG immediate release tablet         Discharge Destination:  The patient was discharged to Home. Follow-up:  The patient is to follow-up with Janice Phoenix MD in the office in 2 weeks without xrays. Discharge Instructions:   Verbal and written discharge instructions as well as dressing change instructions were given to the patient at the time of consent. No NSAIDS or anticoagulation for 1 week post-operatively. No driving or riding in a motor vehicle. No lifting or bending. Electronically signed by:  OMAR Barfield, 6/4/2019 3:45 PM  [phone]

## 2019-06-04 NOTE — PROGRESS NOTES
Patient a/o x4. Vitals stable. BP soft at baseline. Po and IV pain medication. Tolerating ambulation well. Incisions c/d/i. Will continue to monitor.

## 2019-06-04 NOTE — PROGRESS NOTES
Restriction  Other position/activity restrictions: up with A, progressive ambulation   Vision/Hearing  Vision: Within Functional Limits  Hearing: Within functional limits     Subjective  General  Chart Reviewed: Yes  Additional Pertinent Hx: Pt is a 36 yo female who is s/p L5, S1 TRANSFORAMINAL LUMBAR INTERBODY FUSION on 6/3. PMH: Asthma, bipolar, COPD, DDD, EDS, Fibromyalgia, OA, sciatica. PSH: knee surgery   Referring Practitioner: ANNA Morales  Follows Commands: Within Functional Limits  General Comment  Comments: Pt was supine in bed upon arrival. Pt agreeable to PT evaluation. Subjective  Subjective: Pt states that she has not been out of bed since surgery. Pain Screening  Patient Currently in Pain: Yes(5 at her back. Pt states that she is not due for her pain meds yet)  Vital Signs  Patient Currently in Pain: Yes(5 at her back. Pt states that she is not due for her pain meds yet)       Orientation  Orientation  Overall Orientation Status: Within Functional Limits  Social/Functional History  Social/Functional History  Lives With: (Lives with father who can provide limited physical assistance)  Type of Home: House  Home Layout: Two level(bed and bath in basement; 10 steps to the basement with 1 hand rail)  Home Access: Stairs to enter with rails  Entrance Stairs - Number of Steps: 6-7  Entrance Stairs - Rails: Both  Bathroom Shower/Tub: Walk-in shower, Shower chair with back  Bathroom Toilet: Standard  Bathroom Equipment: Grab bars in [address] [place]: Crutches, Cane  ADL Assistance: Independent  Homemaking Assistance: Independent  Ambulation Assistance: Independent(Intermittently used the [facility] when she was having a bad day)  Transfer Assistance: Independent  Active : No  [address]: Enjoys going swimming and going to [employer]. Additional Comments: Has had ~7 falls in the last 3 months.    Cognition        Objective          AROM RLE (degrees)  RLE AROM: WFL  AROM LLE (degrees)  LLE AROM : WFL  Strength RLE  Strength RLE: WFL  Strength LLE  Strength LLE: WFL     Sensation  Overall Sensation Status: WFL  Bed mobility  Rolling to Left: Supervision  Rolling to Right: Supervision  Supine to Sit: Supervision  Sit to Supine: Supervision  Comment: Multiple reps completed. Pt initially not utilizing log rolling technique, but completed with log rolling twice with cues. Explanation provided for use of log rolling vs prior method. Transfers  Sit to Stand: Supervision(From bed and elevated commode. )  Stand to sit: Supervision  Ambulation  Ambulation?: Yes  Ambulation 1  Surface: level tile  Device: No Device  Assistance: Supervision(Progressing towards independent)  Quality of Gait: Reciprocal pattern with normal stride length and B foot clearance. No LOB noted. Distance: 250'  Stairs/Curb  Stairs?: Yes  Stairs  # Steps : 6  Stairs Height: 6\"  Rails: Bilateral  Assistance: Supervision  Comment: Reciprocal pattern. Plan   Plan  Times per week: Eval only  Safety Devices  Type of devices: Bed alarm in place, Call light within reach, Nurse notified, Left in bed    G-Code       OutComes Score                                                  AM-PAC Score             Goals  Short term goals  Time Frame for Short term goals: Pt functioning at her baseline. No goals identified at this time. Patient Goals   Patient goals : Go home.        Therapy Time   Individual Concurrent Group Co-treatment   Time In [phone]         Time Out 0954         Minutes [address] [place], [place]

## 2019-06-04 NOTE — PROGRESS NOTES
Occupational Therapy   Occupational Therapy Initial Assessment/tx/discharge  Date: 2019   Patient Name: Rachele Romero  MRN: [id number]     : 1980    Date of Service: 2019    Discharge Recommendations:  Rachele Romero scored a 24/ on the AM-PAC ADL Inpatient form. At this time, no further OT is recommended upon discharge due to pt's level of indep        OT Equipment Recommendations  Equipment Needed: No    Assessment   Assessment: Pt is functioning close to baseline level:  indep with ADLs, functional transfers, and functional mobility. No further acute OT needs, d/c OT. Pt reports her father is able to A her at discharge. No ongoing OT needed at discharge. Decision Making: Low Complexity  Patient Education: role of OT, log rolling-verbalized understanding  REQUIRES OT FOLLOW UP: No  Activity Tolerance  Activity Tolerance: Patient Tolerated treatment well  Safety Devices  Safety Devices in place: Not Applicable           Patient Diagnosis(es): There were no encounter diagnoses. has a past medical history of Asthma, Bipolar 1 disorder ([address] Compression fracture, COPD (chronic obstructive pulmonary disease) ([address] DDD (degenerative disc disease), cervical, Howard-Danlos syndrome, Fibromyalgia, H/O adrenal insufficiency, Herniated disc, Metabolic syndrome, Osteoarthritis, Pyelonephritis, and Sciatica. has a past surgical history that includes knee surgery and lumbar fusion (N/A, 6/3/2019). Restrictions  Position Activity Restriction  Other position/activity restrictions: up with A, progressive ambulation     Subjective   General  Chart Reviewed:  Yes  Additional Pertinent Hx: 37 y.o. F admitted for L5, S1 TRANSFORAMINAL LUMBAR INTERBODY [address] course:    PMH: Asthma, compression fracture, bipolar, DDD, Howard-Danlos syndrome, COPD, OA, herniated disc, sciatica, Pyelonephritis  Family / Caregiver Present: No  Referring Practitioner: ANNA Asif CNP  Diagnosis: Lumbar stenosis with neurogenic claudication  Subjective  Subjective: Pt in bed, agreeable to work with OT. Social/Functional History  Social/Functional History  Lives With: (Lives with father who can provide limited physical assistance)  Type of Home: House  Home Layout: Two level(bed and bath in basement; 10 steps to the basement with 1 hand rail)  Home Access: Stairs to enter with rails  Entrance Stairs - Number of Steps: 6-7  Entrance Stairs - Rails: Both  Bathroom Shower/Tub: Walk-in shower, Shower chair with back  Bathroom Toilet: Standard  Bathroom Equipment: Grab bars in [employer] [place] [place]: Crutches, Cane  ADL Assistance: Independent  Homemaking Assistance: Independent  Ambulation Assistance: Independent(Intermittently used the [facility] when she was having a bad day)  Transfer Assistance: Independent  Active : [address]: Enjoys going swimming and going to [employer]. Additional Comments: Has had ~7 falls in the last 3 months. Objective        Orientation  Overall Orientation Status: Within Normal Limits     Functional Mobility  Functional Mobility Comments: without A device, S/I for safety in room, kitchen alcove, family waiting area, caputo  Toilet Transfers  Toilet - Technique: Ambulating  Equipment Used: Standard toilet  Toilet Transfer: Independent  ADL  Feeding: Beverage management  Grooming: Independent  Toileting: Independent   pt reports indep with LE dressing     Bed mobility  Supine to Sit: Independent  Comment: reviewed log rolling and she verbalized understanding  Transfers  Sit to stand: Independent  Stand to sit:  Independent  Transfer Comments: indep to sofa and arm chair in family waiting area     Cognition  Overall Cognitive Status: WNL        Sensation  Overall Sensation Status: WFL        LUE AROM (degrees)  LUE AROM : WNL  Left Hand AROM (degrees)  Left Hand AROM: WNL  RUE AROM (degrees)  RUE AROM : WNL        Hand Dominance  Hand Dominance: Right     Patient

## 2019-06-04 NOTE — PROGRESS NOTES
NEUROSURGERY     Alia Graham   [id number]   1980 6/4/2019    Interval History:  Hospital Day #1  Post-operative Day#1      Subjective: Patient alert and oriented x 4. Reports decreased numbness and increased movement. Pain poorly controlled, is a patient at pain management clinic    Objective:  BP 92/64   Pulse 82   Temp 97.9 °F (36.6 °C) (Oral)   Resp 16   Ht 5' 4\" (1.626 m)   Wt 155 lb (70.3 kg)   LMP 05/01/2019 (Approximate)   SpO2 97%   BMI 26.61 kg/m²     Labs:  Recent Labs     06/04/19  0706         CO2 29   BUN 10   CREATININE 0.6   GLUCOSE 126*     Recent Labs     06/04/19  0706   WBC 12.3*   RBC 3.35*   INR 0.89       Neurologic Exam:  Mental Status: Awake, alert, oriented x 4, speech clear and appropriate  Language: No aphasia or dysarthria noted  Sensation: Intact to all extremities to light touch  Coordination: Intact      Assessment     Patient is a 37year old female who is POD1 L5-S1 transforaminal lumbar interbody fusion, TLIF. Patient reports better movement with poor pain control. Plan:        1. PT/OT evaluation        2. Neurologic exams frequency: Q 4 hrs  3. For change in exam MUST contact neurosurgery team along with critical care or primary team  4. SCDs and lovenox for DVT prophylaxis  5. Pain control: PRN immediate release oxycodone PO and Dilaudid IV, scheduled robaxin IV  6. Incision care: do not scrub or soak, okay to shower  7. GI Prophylaxis: Pepcid  8. Will watch, may discharge home with better pain control      DISPO- Home, depending on pain control. Dispo timing to be determined by primary team once patient is medically stable for discharge.      ANNA Matos-CNP  Neurosurgery Nurse Practitioner  [phone]    Patient was seen and examined with Dr. iDlcia Barraza who agrees with above assessment and plan.      Electronically signed by: Lamar Owen, 6/4/2019 10:29 AM

## 2019-06-04 NOTE — PLAN OF CARE
Problem: Pain:  Goal: Control of acute pain  Description  Control of acute pain  Outcome: Ongoing     Problem: Falls - Risk of:  Goal: Will remain free from falls  Description  Will remain free from falls  Outcome: Ongoing   Calls out appropriately. Non-skid socks on. Bed locked in lowest position. Bed alarm on. Call light/belongings within reach. Will continue to monitor. Problem: Mobility - Impaired:  Goal: Mobility will improve to maximum level  Description  Mobility will improve to maximum level  Outcome: Ongoing   Tolerating ambulation well contact guard assist. Will continue to monitor. Problem: Sensory Perception - Impaired:  Goal: Sensory function intact, lower extremity  Description  Sensory function intact, lower extremity  Outcome: Ongoing   Decreased sensation to LLE. Patient stated that sensation is gradually improving. Will continue to monitor.

## 2019-06-04 NOTE — DISCHARGE SUMMARY
Patient discharged to home via father. IVs removed. All belongings collected and sent with patient. Patient educated on discharge paperwork with no further questions. Patient sent with paperwork and prescriptions. Walked to front by nursing staff.

## 2019-06-04 NOTE — PROGRESS NOTES
Patient A/Ox4. VSS this morning-soft blood pressures trending for patient (asymptomatic). Patient reports continuous aching pain to lower back requiring both oral and IV pain medication. Incisions to low back are clean, dry, and intact-BLANE. Patient up and ambulating this morning with stand by assist-she is steady on feet and does not require aide device at this time. Strength, numbness, and tingling all reported to be much improved in LLE. Patient tolerated 100% of breakfast and is now resting in bed with eyes closed. Necessary all precautions in place, and call light is within reach. Will continue to monitor.

## 2019-10-16 ENCOUNTER — HOSPITAL ENCOUNTER (EMERGENCY)
Age: 39
Discharge: HOME | End: 2019-10-16
Payer: COMMERCIAL

## 2019-10-16 VITALS
HEART RATE: 94 BPM | TEMPERATURE: 97.7 F | BODY MASS INDEX: 29.29 KG/M2 | SYSTOLIC BLOOD PRESSURE: 118 MMHG | DIASTOLIC BLOOD PRESSURE: 72 MMHG | OXYGEN SATURATION: 99 % | HEIGHT: 61 IN | RESPIRATION RATE: 16 BRPM

## 2019-10-16 DIAGNOSIS — M25.512 LEFT SHOULDER PAIN, UNSPECIFIED CHRONICITY: Primary | ICD-10-CM

## 2019-10-16 PROCEDURE — 99283 EMERGENCY DEPT VISIT LOW MDM: CPT

## 2019-10-16 PROCEDURE — 6360000002 HC RX W HCPCS: Performed by: PHYSICIAN ASSISTANT

## 2019-10-16 PROCEDURE — 96372 THER/PROPH/DIAG INJ SC/IM: CPT

## 2019-10-16 RX ADMIN — HYDROMORPHONE HYDROCHLORIDE 1 MG: 1 INJECTION, SOLUTION INTRAMUSCULAR; INTRAVENOUS; SUBCUTANEOUS at 18:56

## 2019-10-16 ASSESSMENT — PAIN - FUNCTIONAL ASSESSMENT: PAIN_FUNCTIONAL_ASSESSMENT: 0-10

## 2019-10-16 ASSESSMENT — PAIN DESCRIPTION - PAIN TYPE
TYPE: ACUTE PAIN
TYPE: ACUTE PAIN

## 2019-10-16 ASSESSMENT — PAIN DESCRIPTION - ORIENTATION
ORIENTATION: LEFT
ORIENTATION: LEFT

## 2019-10-16 ASSESSMENT — PAIN DESCRIPTION - LOCATION
LOCATION: SHOULDER
LOCATION: SHOULDER

## 2019-10-16 ASSESSMENT — PAIN DESCRIPTION - DESCRIPTORS
DESCRIPTORS: ACHING
DESCRIPTORS: SHARP;SHOOTING

## 2019-10-16 ASSESSMENT — PAIN DESCRIPTION - FREQUENCY
FREQUENCY: CONTINUOUS
FREQUENCY: CONTINUOUS

## 2019-10-16 ASSESSMENT — PAIN SCALES - GENERAL
PAINLEVEL_OUTOF10: 4
PAINLEVEL_OUTOF10: 7

## 2020-01-28 LAB
HIV AG/AB: NORMAL
HIV ANTIGEN: NORMAL
HIV-1 ANTIBODY: NORMAL
HIV-2 AB: NORMAL

## 2020-02-21 PROBLEM — M79.622 LEFT UPPER ARM PAIN: Status: ACTIVE | Noted: 2018-11-13

## 2020-02-21 PROBLEM — N30.90 CYSTITIS: Status: ACTIVE | Noted: 2020-02-21

## 2020-02-21 PROBLEM — M35.7 HYPERMOBILITY SYNDROME: Status: ACTIVE | Noted: 2019-04-25

## 2020-02-21 PROBLEM — B36.0 PITYRIASIS VERSICOLOR: Status: ACTIVE | Noted: 2020-02-21

## 2020-02-21 PROBLEM — M25.552 PAIN OF BOTH HIP JOINTS: Status: ACTIVE | Noted: 2019-01-27

## 2020-02-21 PROBLEM — M51.369 DDD (DEGENERATIVE DISC DISEASE), LUMBAR: Status: ACTIVE | Noted: 2018-05-24

## 2020-02-21 PROBLEM — J30.9 ALLERGIC RHINITIS: Status: ACTIVE | Noted: 2020-02-21

## 2020-02-21 PROBLEM — E27.40 ADRENAL INSUFFICIENCY (HCC): Status: ACTIVE | Noted: 2019-05-20

## 2020-02-21 PROBLEM — G89.29 CHRONIC BACK PAIN: Status: ACTIVE | Noted: 2018-11-13

## 2020-02-21 PROBLEM — M62.838 MUSCLE SPASM: Status: ACTIVE | Noted: 2018-04-24

## 2020-02-21 PROBLEM — M25.551 PAIN OF BOTH HIP JOINTS: Status: ACTIVE | Noted: 2019-01-27

## 2020-02-21 PROBLEM — S83.281A ACUTE LATERAL MENISCAL TEAR, RIGHT, INITIAL ENCOUNTER: Status: ACTIVE | Noted: 2018-05-18

## 2020-02-21 PROBLEM — R79.89 LOW SERUM CORTISOL LEVEL: Status: ACTIVE | Noted: 2018-10-30

## 2020-02-21 PROBLEM — S43.432A GLENOID LABRAL TEAR, LEFT, INITIAL ENCOUNTER: Status: ACTIVE | Noted: 2019-09-10

## 2020-02-21 PROBLEM — M51.36 DDD (DEGENERATIVE DISC DISEASE), LUMBAR: Status: ACTIVE | Noted: 2018-05-24

## 2020-02-21 PROBLEM — F41.9 ANXIETY: Status: ACTIVE | Noted: 2020-02-21

## 2020-02-21 PROBLEM — G47.01 INSOMNIA DUE TO MEDICAL CONDITION: Status: ACTIVE | Noted: 2017-01-18

## 2020-02-21 PROBLEM — M54.9 CHRONIC BACK PAIN: Status: ACTIVE | Noted: 2018-11-13

## 2020-02-21 PROBLEM — G43.911 INTRACTABLE MIGRAINE WITH STATUS MIGRAINOSUS: Status: ACTIVE | Noted: 2018-11-13

## 2020-02-21 PROBLEM — F51.03: Status: ACTIVE | Noted: 2017-01-18

## 2020-02-21 PROBLEM — M25.561 CHRONIC PAIN OF BOTH KNEES: Status: ACTIVE | Noted: 2018-04-24

## 2020-02-21 PROBLEM — M25.562 CHRONIC PAIN OF BOTH KNEES: Status: ACTIVE | Noted: 2018-04-24

## 2020-02-21 PROBLEM — G89.29 CHRONIC PAIN OF BOTH KNEES: Status: ACTIVE | Noted: 2018-04-24

## 2020-02-21 PROBLEM — K08.9 TOOTH DISORDER: Status: ACTIVE | Noted: 2020-02-21

## 2020-02-21 PROBLEM — N39.0 URINARY TRACT INFECTIOUS DISEASE: Status: ACTIVE | Noted: 2020-02-21

## 2020-02-21 PROBLEM — Z02.89 PAIN MANAGEMENT CONTRACT SIGNED: Status: ACTIVE | Noted: 2018-04-24

## 2020-02-21 PROBLEM — R89.9 ABNORMAL LABORATORY TEST RESULT: Status: ACTIVE | Noted: 2020-02-21

## 2020-02-21 PROBLEM — M47.816 LUMBAR SPONDYLOSIS: Status: ACTIVE | Noted: 2018-04-24

## 2020-02-21 PROBLEM — M25.552 LEFT HIP PAIN: Status: ACTIVE | Noted: 2018-10-01

## 2020-02-21 PROBLEM — F90.9 ATTENTION DEFICIT HYPERACTIVITY DISORDER: Status: ACTIVE | Noted: 2020-02-21

## 2020-02-21 PROBLEM — M54.12 CERVICAL RADICULOPATHY: Status: ACTIVE | Noted: 2018-11-13

## 2020-03-02 ENCOUNTER — OFFICE VISIT (OUTPATIENT)
Dept: ENDOCRINOLOGY | Age: 40
End: 2020-03-02
Payer: COMMERCIAL

## 2020-03-02 VITALS
HEART RATE: 79 BPM | RESPIRATION RATE: 16 BRPM | WEIGHT: 168 LBS | HEIGHT: 61 IN | OXYGEN SATURATION: 97 % | DIASTOLIC BLOOD PRESSURE: 60 MMHG | BODY MASS INDEX: 31.72 KG/M2 | SYSTOLIC BLOOD PRESSURE: 88 MMHG

## 2020-03-02 PROBLEM — R53.82 CHRONIC FATIGUE: Status: ACTIVE | Noted: 2020-03-02

## 2020-03-02 PROBLEM — F11.90 CHRONIC, CONTINUOUS USE OF OPIOIDS: Status: ACTIVE | Noted: 2020-03-02

## 2020-03-02 PROBLEM — R79.89 LOW T4: Status: ACTIVE | Noted: 2020-03-02

## 2020-03-02 PROCEDURE — G8484 FLU IMMUNIZE NO ADMIN: HCPCS | Performed by: INTERNAL MEDICINE

## 2020-03-02 PROCEDURE — G8427 DOCREV CUR MEDS BY ELIG CLIN: HCPCS | Performed by: INTERNAL MEDICINE

## 2020-03-02 PROCEDURE — G8417 CALC BMI ABV UP PARAM F/U: HCPCS | Performed by: INTERNAL MEDICINE

## 2020-03-02 PROCEDURE — 99244 OFF/OP CNSLTJ NEW/EST MOD 40: CPT | Performed by: INTERNAL MEDICINE

## 2020-03-02 RX ORDER — MIDODRINE HYDROCHLORIDE 2.5 MG/1
2.5 TABLET ORAL 3 TIMES DAILY
Qty: 90 TABLET | Refills: 0 | Status: SHIPPED | OUTPATIENT
Start: 2020-03-02 | End: 2020-03-31

## 2020-03-02 RX ORDER — OXYCODONE AND ACETAMINOPHEN 7.5; 325 MG/1; MG/1
1 TABLET ORAL EVERY 8 HOURS PRN
Status: ON HOLD | COMMUNITY
Start: 2019-03-26 | End: 2020-08-25 | Stop reason: HOSPADM

## 2020-03-02 NOTE — PROGRESS NOTES
Patient ID:   Brenna Knowles is a 37 y.o. female    Chief Complaint:   Brenna Knowles presents for an initial evaluation of Adrenal insufficiency. Referred by Dr. Norm Brunner, MD    Subjective:     She was started on hydrocortisone after low cortisol. Normal ACTH stim test in 2019. She had weight loss, episodes of hyponatremia and hypotension. IGF was high once and then normal in 2019   T4 was low once and then normal in 2019   Seen at [facility]     Currently on Hydrocortisone 10mg daily in am   On midodrine 5 mg daily      Patient has fatigue, normal appetite, weight gain since been on steroids, some nausea, salt craving, occasional dizziness, hyperpigmentation of the face. She has poor sleep. She says she has EDS. She has been on opioids for more than 25 years for back pain, injuries. Last use of steroid shots: 2020, right knee. She is not getting epidurals any more for back pain. Supplements:        The following portions of the patient's history were reviewed and updated as appropriate:       Family History   Problem Relation Age of Onset    Diabetes type 2  Mother     Diabetes type 2  Father     Cancer Sister     Diabetes type 2  Brother          Social History     Socioeconomic History    Marital status: Single     Spouse name: Not on file    Number of children: Not on file    Years of education: Not on file    Highest education level: Not on file   Occupational History    Not on file   Social Needs    Financial resource strain: Not on file    Food insecurity:     Worry: Not on file     Inability: Not on file    Transportation needs:     Medical: Not on file     Non-medical: Not on file   Tobacco Use    Smoking status: Former Smoker     Packs/day: 1.00     Types: Cigarettes     Last attempt to quit: 2018     Years since quittin.1    Smokeless tobacco: Never Used   Substance and Sexual Activity    Alcohol use: Not Currently     Comment: socially Sherren Kehr Vicodin    Guanfacine Hcl Swelling     tongue    Morphine And Related Hives    Reglan [Metoclopramide Hcl] Swelling    Risperidone And Related Swelling    Seroquel  [Quetiapine Fumarate] Other (See Comments)     \"I get really dizzy and I pass out. \"    Seroquel [Quetiapine Fumarate] Swelling     Swelling throat    Trazodone And Nefazodone Swelling     Swelling throat    Lurasidone Anxiety         Current Outpatient Medications:     oxyCODONE-acetaminophen (PERCOCET) 5-325 MG per tablet, Take 1 tablet by mouth., Disp: , Rfl:     midodrine (PROAMATINE) 2.5 MG tablet, Take 1 tablet by mouth 3 times daily, Disp: 90 tablet, Rfl: 0    divalproex (DEPAKOTE) 250 MG DR tablet, Take 1 tablet by mouth 2 times daily, Disp: 90 tablet, Rfl: 3    hydrocortisone (CORTEF) 10 MG tablet, Take 1 tablet by mouth daily, Disp: 30 tablet, Rfl: 1    ranitidine (ZANTAC) 150 MG tablet, Take 150 mg by mouth 2 times daily, Disp: , Rfl:     STOOL SOFTENER 100 MG capsule, Take 100 mg by mouth 2 times daily, Disp: , Rfl:     ketoconazole (NIZORAL) 2 % shampoo, Apply topically Twice a Week, Disp: , Rfl: 4    LATUDA 20 MG TABS tablet, Take 20 mg by mouth daily, Disp: , Rfl: 3    QUEtiapine (SEROQUEL) 200 MG tablet, Take 200 mg by mouth nightly, Disp: , Rfl: 2    TRULANCE 3 MG TABS, Take 3 mg by mouth daily, Disp: , Rfl: 11    ondansetron (ZOFRAN ODT) 4 MG disintegrating tablet, Take 1 tablet by mouth every 8 hours as needed for Nausea, Disp: 20 tablet, Rfl: 0    pregabalin (LYRICA) 200 MG capsule, Take 200 mg by mouth three times daily. , Disp: , Rfl:     ALPRAZolam (XANAX) 1 MG tablet, Take 1 mg by mouth 3 times daily as needed for Sleep or Anxiety. , Disp: , Rfl:     Cetirizine HCl (ZYRTEC ALLERGY) 10 MG CAPS, Take by mouth daily, Disp: , Rfl:     Probiotic Product (PROBIOTIC-10 PO), Take by mouth daily, Disp: , Rfl:     temazepam (RESTORIL) 30 MG capsule, Take 30 mg by mouth nightly as needed for Sleep. ., Disp: , Rfl:    acyclovir (ZOVIRAX) 400 MG tablet, Take 400 mg by mouth 2 times daily, Disp: , Rfl:     progesterone (PROMETRIUM) 100 MG capsule, Take 100 mg by mouth nightly, Disp: , Rfl: 1      Review of Systems:    Constitutional: Negative for fever, chills. HENT: Negative for congestion, ear pain, rhinorrhea,  sore throat and trouble swallowing. Eyes: Negative for photophobia, redness, itching. Respiratory: Negative for cough, shortness of breath and sputum. Cardiovascular: Negative for chest pain, palpitations and leg swelling. Gastrointestinal: Negative for nausea, vomiting, abdominal pain, diarrhea, constipation. Endocrine: Negative for cold intolerance, heat intolerance, polydipsia, polyphagia and polyuria. Genitourinary: Negative for dysuria, urgency, frequency, hematuria and flank pain. Musculoskeletal: Negative for neck pain. Skin/Nail: Negative for rash, itching. Normal nails. Neurological: Negative for seizures, weakness, light-headedness, numbness and headaches. Hematological/ Lymph nodes: Negative for adenopathy. Does not bruise/bleed easily. Psychiatric/Behavioral: Negative for suicidal ideas, depression, anxiety. Objective:   Physical Exam:  BP 88/60 (Site: Left Upper Arm, Position: Sitting, Cuff Size: Medium Adult)   Pulse 79   Resp 16   Ht 5' 1\" (1.549 m)   Wt 168 lb (76.2 kg)   SpO2 97%   BMI 31.74 kg/m²   Constitutional: Patient is oriented to person, place, and time. Patient appears well-developed and well-nourished. HENT:    Head: Normocephalic and atraumatic. Eyes: Conjunctivae and EOM are normal.     Neck: Normal range of motion. Thyroid normal.   Cardiovascular: Normal rate, regular rhythm and normal heart sounds. Pulmonary/Chest: Effort normal and breath sounds normal.   Abdominal: Soft. Bowel sounds are normal.   Musculoskeletal: Normal range of motion. Neurological: Patient is alert and oriented to person, place, and time. Patient has normal reflexes.    Skin: Skin is warm and dry. Psychiatric: Patient has a normal mood and affect.  Patient behavior is normal.     Lab Review:    Orders Only on 01/27/2020   Component Date Value Ref Range Status    HIV Ag/Ab 01/27/2020 Non-Reactive  Non-reactive Final    HIV-1 Antibody 01/27/2020 Non-Reactive  Non-reactive Final    HIV ANTIGEN 01/27/2020 Non-Reactive  Non-reactive Final    HIV-2 Ab 01/27/2020 Non-Reactive  Non-reactive Final   Admission on 06/03/2019, Discharged on 06/04/2019   Component Date Value Ref Range Status    ABO/Rh 06/03/2019 A POS   Final    Antibody Screen 06/03/2019 NEG   Final    Pregnancy, Urine 06/03/2019 Negative  Detects HCG level >25 MIU/mL Final    WBC 06/04/2019 12.3* 4.0 - 11.0 K/uL Final    RBC 06/04/2019 3.35* 4.00 - 5.20 M/uL Final    Hemoglobin 06/04/2019 10.3* 12.0 - 16.0 g/dL Final    Hematocrit 06/04/2019 30.6* 36.0 - 48.0 % Final    MCV 06/04/2019 91.4  80.0 - 100.0 fL Final    MCH 06/04/2019 30.6  26.0 - 34.0 pg Final    MCHC 06/04/2019 33.5  31.0 - 36.0 g/dL Final    RDW 06/04/2019 14.5  12.4 - 15.4 % Final    Platelets 15/05/5617 264  135 - 450 K/uL Final    MPV 06/04/2019 8.2  5.0 - 10.5 fL Final    Neutrophils % 06/04/2019 78.6  % Final    Lymphocytes % 06/04/2019 13.7  % Final    Monocytes % 06/04/2019 6.7  % Final    Eosinophils % 06/04/2019 0.5  % Final    Basophils % 06/04/2019 0.5  % Final    Neutrophils Absolute 06/04/2019 9.7* 1.7 - 7.7 K/uL Final    Lymphocytes Absolute 06/04/2019 1.7  1.0 - 5.1 K/uL Final    Monocytes Absolute 06/04/2019 0.8  0.0 - 1.3 K/uL Final    Eosinophils Absolute 06/04/2019 0.1  0.0 - 0.6 K/uL Final    Basophils Absolute 06/04/2019 0.1  0.0 - 0.2 K/uL Final    Protime 06/04/2019 10.1  9.8 - 13.0 sec Final    INR 06/04/2019 0.89  0.86 - 1.14 Final    aPTT 06/04/2019 23.8* 26.0 - 36.0 sec Final    Sodium 06/04/2019 141  136 - 145 mmol/L Final    Potassium 06/04/2019 4.4  3.5 - 5.1 mmol/L Final    Chloride 06/04/2019 103  99 - 110 mmol/L Final    CO2 06/04/2019 29  21 - 32 mmol/L Final    Anion Gap 06/04/2019 9  3 - 16 Final    Glucose 06/04/2019 126* 70 - 99 mg/dL Final    BUN 06/04/2019 10  7 - 20 mg/dL Final    CREATININE 06/04/2019 0.6  0.6 - 1.1 mg/dL Final    GFR Non- 06/04/2019 >60  >60 Final    GFR  06/04/2019 >60  >60 Final    Calcium 06/04/2019 8.8  8.3 - 10.6 mg/dL Final   Admission on 05/11/2019, Discharged on 05/15/2019   Component Date Value Ref Range Status    WBC 05/11/2019 9.8  4.0 - 11.0 K/uL Final    RBC 05/11/2019 5.15  4.00 - 5.20 M/uL Final    Hemoglobin 05/11/2019 16.0  12.0 - 16.0 g/dL Final    Hematocrit 05/11/2019 46.9  36.0 - 48.0 % Final    MCV 05/11/2019 91.0  80.0 - 100.0 fL Final    MCH 05/11/2019 31.1  26.0 - 34.0 pg Final    MCHC 05/11/2019 34.1  31.0 - 36.0 g/dL Final    RDW 05/11/2019 13.6  12.4 - 15.4 % Final    Platelets 51/92/6060 460* 135 - 450 K/uL Final    MPV 05/11/2019 7.9  5.0 - 10.5 fL Final    Neutrophils % 05/11/2019 72.4  % Final    Lymphocytes % 05/11/2019 16.5  % Final    Monocytes % 05/11/2019 9.0  % Final    Eosinophils % 05/11/2019 1.2  % Final    Basophils % 05/11/2019 0.9  % Final    Neutrophils Absolute 05/11/2019 7.1  1.7 - 7.7 K/uL Final    Lymphocytes Absolute 05/11/2019 1.6  1.0 - 5.1 K/uL Final    Monocytes Absolute 05/11/2019 0.9  0.0 - 1.3 K/uL Final    Eosinophils Absolute 05/11/2019 0.1  0.0 - 0.6 K/uL Final    Basophils Absolute 05/11/2019 0.1  0.0 - 0.2 K/uL Final    Sodium 05/11/2019 143  136 - 145 mmol/L Final    Potassium 05/11/2019 4.0  3.5 - 5.1 mmol/L Final    Chloride 05/11/2019 106  99 - 110 mmol/L Final    CO2 05/11/2019 22  21 - 32 mmol/L Final    Anion Gap 05/11/2019 15  3 - 16 Final    Glucose 05/11/2019 116* 70 - 99 mg/dL Final    BUN 05/11/2019 18  7 - 20 mg/dL Final    CREATININE 05/11/2019 0.8  0.6 - 1.1 mg/dL Final    GFR Non- 05/11/2019 >60  >60 Final    GFR  American 05/11/2019 >60  >60 Final    Calcium 05/11/2019 10.6  8.3 - 10.6 mg/dL Final    Total Protein 05/11/2019 8.8* 6.4 - 8.2 g/dL Final    Alb 05/11/2019 5.1* 3.4 - 5.0 g/dL Final    Albumin/Globulin Ratio 05/11/2019 1.4  1.1 - 2.2 Final    Total Bilirubin 05/11/2019 0.5  0.0 - 1.0 mg/dL Final    Alkaline Phosphatase 05/11/2019 72  40 - 129 U/L Final    ALT 05/11/2019 30  10 - 40 U/L Final    AST 05/11/2019 27  15 - 37 U/L Final    Globulin 05/11/2019 3.7  g/dL Final    Ethanol Lvl 05/11/2019 None Detected  mg/dL Final    Amphetamine Screen, Urine 05/11/2019 Neg  Negative <1000ng/mL Final    Barbiturate Screen, Ur 05/11/2019 Neg  Negative <200 ng/mL Final    Benzodiazepine Screen, Urine 05/11/2019 Neg  Negative <200 ng/mL Final    Cannabinoid Scrn, Ur 05/11/2019 Neg  Negative <50 ng/mL Final    Cocaine Metabolite Screen, Urine 05/11/2019 Neg  Negative <300 ng/mL Final    Opiate Scrn, Ur 05/11/2019 Neg  Negative <300 ng/mL Final    PCP Screen, Urine 05/11/2019 Neg  Negative <25 ng/mL Final    Methadone Screen, Urine 05/11/2019 Neg  Negative <300 ng/mL Final    Propoxyphene Scrn, Ur 05/11/2019 Neg  Negative <300 ng/mL Final    pH, UA 05/11/2019 7.5   Final    Drug Screen Comment: 05/11/2019 see below   Final    Oxycodone Urine 05/11/2019 Neg  Negative <100 ng/ml Final    Acetaminophen Level 05/11/2019 <5* 10 - 30 ug/mL Final    Salicylate, Serum 66/24/4804 <0.3* 15.0 - 30.0 mg/dL Final    Ventricular Rate 05/11/2019 89  BPM Final    Atrial Rate 05/11/2019 89  BPM Final    P-R Interval 05/11/2019 128  ms Final    QRS Duration 05/11/2019 76  ms Final    Q-T Interval 05/11/2019 368  ms Final    QTc Calculation (Bazett) 05/11/2019 447  ms Final    P Axis 05/11/2019 54  degrees Final    R Axis 05/11/2019 4  degrees Final    T Axis 05/11/2019 79  degrees Final    Diagnosis 05/11/2019 Normal sinus rhythmNonspecific T wave abnormalityConfirmed by JEANIE MCINTOSH, [place] ([id number]) on 5/12/2019 insufficiency education. Reviewed adrenal insufficiency precautions. If patient has a fever or significant illness they are to triple their dose of steroids for three days. If illness has resolved, they can resume their previous dose. If not better, they are to call EMS. They should also get a medic Alert bracelet that states 'Adrenal Insufficiency.       Electronically signed by Gaurav Martinez MD on 3/2/2020 at 2:11 PM

## 2020-03-02 NOTE — PATIENT INSTRUCTIONS
Where can you learn more? Go to [email or web address] and sign in to your Chute account. Enter F406 in the [place] box to learn more about \"Secondary Adrenal Insufficiency: Care Instructions. \"     If you do not have an account, please click on the \"Sign Up Now\" link. Current as of: July 28, 2019  Content Version: 12.3  © 5778-8136 [employer]. Care instructions adapted under license by [facility] If you have questions about a medical condition or this instruction, always ask your healthcare professional. [address] any warranty or liability for your use of this information. Patient Education        Secondary Adrenal Insufficiency: Care Instructions  Your Care Instructions  Your adrenal glands sit on top of your kidneys. They make hormones that affect almost every organ in your body. Secondary adrenal insufficiency means that your adrenal glands don't make enough of a hormone called cortisol. Cortisol helps maintain blood pressure. It helps break down sugar and fat for energy. It also helps manage stress. The problem starts with the pituitary gland. It's located at the base of your brain. Normally it sends a signal to the adrenal glands to make more cortisol. The signal is a hormone that the pituitary gland makes, called ACTH. When the pituitary gland doesn't make enough ACTH, the adrenal glands won't make enough cortisol. This can happen if the pituitary gland is damaged by things like a tumor or surgery. Treatment involves replacing the hormones that your body needs. You might get some of these hormones in the hospital. Some people will take hormones at home for the rest of their lives. Hormones may be pills or injections (shots). If possible, your doctor will treat the condition that damaged the pituitary gland. Some people may need urgent care because they have what is called an adrenal crisis.  It can be caused by need emergency care. For example, call if:    · You passed out (lost consciousness).     · You have symptoms of an adrenal crisis. These may include:  ? Severe vomiting and diarrhea. ? Feeling extremely weak or like you're going to faint. ? Sudden pain in your belly, lower back, and legs. ? Strange behavior, such as feeling confused or fearful. ? A high fever. ? A pale face, and blue lips and earlobes.    Call your doctor now or seek immediate medical care if:    · You have trouble taking medicines by mouth.     · You have a fever.    Watch closely for changes in your health, and be sure to contact your doctor if:    · You do not get better as expected. Where can you learn more? Go to [email or web address] and sign in to your Sonopia account. Enter F406 in the Fixya box to learn more about \"Secondary Adrenal Insufficiency: Care Instructions. \"     If you do not have an account, please click on the \"Sign Up Now\" link. Current as of: July 28, 2019  Content Version: 12.3  © 3171-6026 [employer]. Care instructions adapted under license by [facility] ([facility]). If you have questions about a medical condition or this instruction, always ask your healthcare professional. [address] any warranty or liability for your use of this information.

## 2020-03-05 ENCOUNTER — TELEPHONE (OUTPATIENT)
Dept: ENDOCRINOLOGY | Age: 40
End: 2020-03-05

## 2020-03-05 NOTE — TELEPHONE ENCOUNTER
She was told on the visit to hold hydrocortisone for a day before the labs. Also hold hydrocortisone on the day of labs, take it after the labs are drawn.

## 2020-03-05 NOTE — TELEPHONE ENCOUNTER
Norberto Gloriadeep Kumar informed if BP is dropping and ok to  hold the medicine   Explained - Do not stop Hydrocortisone day before   Take Hydrocortisone after doing blood work around [address] .    Mrs. Gloriadeep Kumar expressed understanding and had no further questions.

## 2020-03-13 ENCOUNTER — APPOINTMENT (OUTPATIENT)
Dept: CT IMAGING | Age: 40
End: 2020-03-13
Payer: COMMERCIAL

## 2020-03-13 ENCOUNTER — HOSPITAL ENCOUNTER (EMERGENCY)
Age: 40
Discharge: HOME | End: 2020-03-13
Attending: STUDENT IN AN ORGANIZED HEALTH CARE EDUCATION/TRAINING PROGRAM
Payer: COMMERCIAL

## 2020-03-13 VITALS
HEIGHT: 62 IN | RESPIRATION RATE: 16 BRPM | HEART RATE: 71 BPM | WEIGHT: 167.99 LBS | BODY MASS INDEX: 30.91 KG/M2 | DIASTOLIC BLOOD PRESSURE: 72 MMHG | OXYGEN SATURATION: 100 % | SYSTOLIC BLOOD PRESSURE: 114 MMHG | TEMPERATURE: 98.4 F

## 2020-03-13 DIAGNOSIS — F11.90 CHRONIC, CONTINUOUS USE OF OPIOIDS: ICD-10-CM

## 2020-03-13 DIAGNOSIS — R79.89 LOW T4: ICD-10-CM

## 2020-03-13 DIAGNOSIS — R53.82 CHRONIC FATIGUE: ICD-10-CM

## 2020-03-13 DIAGNOSIS — E27.40 ADRENAL INSUFFICIENCY (HCC): ICD-10-CM

## 2020-03-13 LAB
A/G RATIO: 1.6 (ref 1.1–2.2)
A/G RATIO: 2 (ref 1.1–2.2)
ALBUMIN SERPL-MCNC: 4.8 G/DL (ref 3.4–5)
ALBUMIN SERPL-MCNC: 4.9 G/DL (ref 3.4–5)
ALP BLD-CCNC: 57 U/L (ref 40–129)
ALP BLD-CCNC: 57 U/L (ref 40–129)
ALT SERPL-CCNC: 21 U/L (ref 10–40)
ALT SERPL-CCNC: 21 U/L (ref 10–40)
ANION GAP SERPL CALCULATED.3IONS-SCNC: 15 MMOL/L (ref 3–16)
ANION GAP SERPL CALCULATED.3IONS-SCNC: 15 MMOL/L (ref 3–16)
AST SERPL-CCNC: 17 U/L (ref 15–37)
AST SERPL-CCNC: 20 U/L (ref 15–37)
BASOPHILS ABSOLUTE: 0.1 K/UL (ref 0–0.2)
BASOPHILS RELATIVE PERCENT: 0.5 %
BILIRUB SERPL-MCNC: <0.2 MG/DL (ref 0–1)
BILIRUB SERPL-MCNC: <0.2 MG/DL (ref 0–1)
BILIRUBIN URINE: NEGATIVE
BLOOD, URINE: NEGATIVE
BUN BLDV-MCNC: 13 MG/DL (ref 7–20)
BUN BLDV-MCNC: 13 MG/DL (ref 7–20)
CALCIUM SERPL-MCNC: 10.1 MG/DL (ref 8.3–10.6)
CALCIUM SERPL-MCNC: 10.2 MG/DL (ref 8.3–10.6)
CHLORIDE BLD-SCNC: 100 MMOL/L (ref 99–110)
CHLORIDE BLD-SCNC: 102 MMOL/L (ref 99–110)
CLARITY: ABNORMAL
CO2: 23 MMOL/L (ref 21–32)
CO2: 23 MMOL/L (ref 21–32)
COLOR: YELLOW
CORTISOL - AM: 15.8 UG/DL (ref 4.3–22.4)
CREAT SERPL-MCNC: 0.8 MG/DL (ref 0.6–1.1)
CREAT SERPL-MCNC: 0.9 MG/DL (ref 0.6–1.1)
EOSINOPHILS ABSOLUTE: 0.2 K/UL (ref 0–0.6)
EOSINOPHILS RELATIVE PERCENT: 1.2 %
EPITHELIAL CELLS, UA: 6 /HPF (ref 0–5)
GFR AFRICAN AMERICAN: >60
GFR AFRICAN AMERICAN: >60
GFR NON-AFRICAN AMERICAN: >60
GFR NON-AFRICAN AMERICAN: >60
GLOBULIN: 2.5 G/DL
GLOBULIN: 3 G/DL
GLUCOSE BLD-MCNC: 76 MG/DL (ref 70–99)
GLUCOSE BLD-MCNC: 88 MG/DL (ref 70–99)
GLUCOSE URINE: NEGATIVE MG/DL
HCG QUALITATIVE: NEGATIVE
HCT VFR BLD CALC: 44 % (ref 36–48)
HEMOGLOBIN: 14.8 G/DL (ref 12–16)
HYALINE CASTS: 2 /LPF (ref 0–8)
KETONES, URINE: NEGATIVE MG/DL
LEUKOCYTE ESTERASE, URINE: ABNORMAL
LYMPHOCYTES ABSOLUTE: 2.4 K/UL (ref 1–5.1)
LYMPHOCYTES RELATIVE PERCENT: 16.3 %
MCH RBC QN AUTO: 31 PG (ref 26–34)
MCHC RBC AUTO-ENTMCNC: 33.7 G/DL (ref 31–36)
MCV RBC AUTO: 92 FL (ref 80–100)
MICROSCOPIC EXAMINATION: YES
MONOCYTES ABSOLUTE: 0.8 K/UL (ref 0–1.3)
MONOCYTES RELATIVE PERCENT: 5.3 %
NEUTROPHILS ABSOLUTE: 11.2 K/UL (ref 1.7–7.7)
NEUTROPHILS RELATIVE PERCENT: 76.7 %
NITRITE, URINE: NEGATIVE
PDW BLD-RTO: 14.5 % (ref 12.4–15.4)
PH UA: 6.5 (ref 5–8)
PLATELET # BLD: 262 K/UL (ref 135–450)
PMV BLD AUTO: 8.3 FL (ref 5–10.5)
POTASSIUM REFLEX MAGNESIUM: 4.3 MMOL/L (ref 3.5–5.1)
POTASSIUM SERPL-SCNC: 4 MMOL/L (ref 3.5–5.1)
PROLACTIN: 30.2 NG/ML
PROTEIN UA: NEGATIVE MG/DL
RAPID INFLUENZA  B AGN: NEGATIVE
RAPID INFLUENZA A AGN: NEGATIVE
RBC # BLD: 4.79 M/UL (ref 4–5.2)
RBC UA: 0 /HPF (ref 0–4)
S PYO AG THROAT QL: NEGATIVE
SODIUM BLD-SCNC: 138 MMOL/L (ref 136–145)
SODIUM BLD-SCNC: 140 MMOL/L (ref 136–145)
SPECIFIC GRAVITY UA: 1.01 (ref 1–1.03)
T3 FREE: 2.9 PG/ML (ref 2.3–4.2)
T4 FREE: 0.9 NG/DL (ref 0.9–1.8)
TOTAL PROTEIN: 7.4 G/DL (ref 6.4–8.2)
TOTAL PROTEIN: 7.8 G/DL (ref 6.4–8.2)
TSH SERPL DL<=0.05 MIU/L-ACNC: 4.49 UIU/ML (ref 0.27–4.2)
URINE REFLEX TO CULTURE: YES
URINE TYPE: ABNORMAL
UROBILINOGEN, URINE: 0.2 E.U./DL
WBC # BLD: 14.6 K/UL (ref 4–11)
WBC UA: 4 /HPF (ref 0–5)

## 2020-03-13 PROCEDURE — 84703 CHORIONIC GONADOTROPIN ASSAY: CPT

## 2020-03-13 PROCEDURE — 6360000002 HC RX W HCPCS: Performed by: STUDENT IN AN ORGANIZED HEALTH CARE EDUCATION/TRAINING PROGRAM

## 2020-03-13 PROCEDURE — 80053 COMPREHEN METABOLIC PANEL: CPT

## 2020-03-13 PROCEDURE — 87086 URINE CULTURE/COLONY COUNT: CPT

## 2020-03-13 PROCEDURE — 70450 CT HEAD/BRAIN W/O DYE: CPT

## 2020-03-13 PROCEDURE — 81001 URINALYSIS AUTO W/SCOPE: CPT

## 2020-03-13 PROCEDURE — 96365 THER/PROPH/DIAG IV INF INIT: CPT

## 2020-03-13 PROCEDURE — 2580000003 HC RX 258: Performed by: STUDENT IN AN ORGANIZED HEALTH CARE EDUCATION/TRAINING PROGRAM

## 2020-03-13 PROCEDURE — 99284 EMERGENCY DEPT VISIT MOD MDM: CPT

## 2020-03-13 PROCEDURE — 2500000003 HC RX 250 WO HCPCS: Performed by: STUDENT IN AN ORGANIZED HEALTH CARE EDUCATION/TRAINING PROGRAM

## 2020-03-13 PROCEDURE — 87804 INFLUENZA ASSAY W/OPTIC: CPT

## 2020-03-13 PROCEDURE — 85025 COMPLETE CBC W/AUTO DIFF WBC: CPT

## 2020-03-13 PROCEDURE — 36415 COLL VENOUS BLD VENIPUNCTURE: CPT

## 2020-03-13 PROCEDURE — 87880 STREP A ASSAY W/OPTIC: CPT

## 2020-03-13 PROCEDURE — 87081 CULTURE SCREEN ONLY: CPT

## 2020-03-13 PROCEDURE — 96375 TX/PRO/DX INJ NEW DRUG ADDON: CPT

## 2020-03-13 RX ORDER — 0.9 % SODIUM CHLORIDE 0.9 %
1000 INTRAVENOUS SOLUTION INTRAVENOUS ONCE
Status: COMPLETED | OUTPATIENT
Start: 2020-03-13 | End: 2020-03-13

## 2020-03-13 RX ORDER — DIPHENHYDRAMINE HYDROCHLORIDE 50 MG/ML
25 INJECTION INTRAMUSCULAR; INTRAVENOUS ONCE
Status: COMPLETED | OUTPATIENT
Start: 2020-03-13 | End: 2020-03-13

## 2020-03-13 RX ORDER — PROCHLORPERAZINE EDISYLATE 5 MG/ML
10 INJECTION INTRAMUSCULAR; INTRAVENOUS ONCE
Status: COMPLETED | OUTPATIENT
Start: 2020-03-13 | End: 2020-03-13

## 2020-03-13 RX ORDER — BUTALBITAL, ASPIRIN, AND CAFFEINE 325; 50; 40 MG/1; MG/1; MG/1
1 CAPSULE ORAL EVERY 6 HOURS PRN
Qty: 20 CAPSULE | Refills: 0 | Status: ON HOLD | OUTPATIENT
Start: 2020-03-13 | End: 2020-08-25 | Stop reason: HOSPADM

## 2020-03-13 RX ORDER — KETOROLAC TROMETHAMINE 30 MG/ML
15 INJECTION, SOLUTION INTRAMUSCULAR; INTRAVENOUS ONCE
Status: COMPLETED | OUTPATIENT
Start: 2020-03-13 | End: 2020-03-13

## 2020-03-13 RX ADMIN — KETOROLAC TROMETHAMINE 15 MG: 30 INJECTION, SOLUTION INTRAMUSCULAR at 09:52

## 2020-03-13 RX ADMIN — VALPROATE SODIUM 500 MG: 100 INJECTION, SOLUTION INTRAVENOUS at 12:27

## 2020-03-13 RX ADMIN — SODIUM CHLORIDE 1000 ML: 9 INJECTION, SOLUTION INTRAVENOUS at 13:11

## 2020-03-13 RX ADMIN — SODIUM CHLORIDE 1000 ML: 9 INJECTION, SOLUTION INTRAVENOUS at 09:55

## 2020-03-13 RX ADMIN — DIPHENHYDRAMINE HYDROCHLORIDE 25 MG: 50 INJECTION, SOLUTION INTRAMUSCULAR; INTRAVENOUS at 09:52

## 2020-03-13 RX ADMIN — PROCHLORPERAZINE EDISYLATE 10 MG: 5 INJECTION INTRAMUSCULAR; INTRAVENOUS at 09:52

## 2020-03-13 ASSESSMENT — PAIN SCALES - GENERAL
PAINLEVEL_OUTOF10: 9
PAINLEVEL_OUTOF10: 9
PAINLEVEL_OUTOF10: 0
PAINLEVEL_OUTOF10: 5
PAINLEVEL_OUTOF10: 8
PAINLEVEL_OUTOF10: 5

## 2020-03-13 ASSESSMENT — PAIN DESCRIPTION - FREQUENCY
FREQUENCY: CONTINUOUS

## 2020-03-13 ASSESSMENT — PAIN - FUNCTIONAL ASSESSMENT
PAIN_FUNCTIONAL_ASSESSMENT: ACTIVITIES ARE NOT PREVENTED
PAIN_FUNCTIONAL_ASSESSMENT: ACTIVITIES ARE NOT PREVENTED
PAIN_FUNCTIONAL_ASSESSMENT: 0-10
PAIN_FUNCTIONAL_ASSESSMENT: ACTIVITIES ARE NOT PREVENTED

## 2020-03-13 ASSESSMENT — PAIN DESCRIPTION - LOCATION
LOCATION: HEAD

## 2020-03-13 ASSESSMENT — PAIN DESCRIPTION - DESCRIPTORS
DESCRIPTORS: HEADACHE

## 2020-03-13 ASSESSMENT — PAIN DESCRIPTION - ONSET
ONSET: ON-GOING

## 2020-03-13 ASSESSMENT — PAIN DESCRIPTION - PAIN TYPE
TYPE: ACUTE PAIN

## 2020-03-13 ASSESSMENT — PAIN DESCRIPTION - PROGRESSION
CLINICAL_PROGRESSION: GRADUALLY IMPROVING
CLINICAL_PROGRESSION: GRADUALLY WORSENING
CLINICAL_PROGRESSION: GRADUALLY WORSENING
CLINICAL_PROGRESSION: GRADUALLY IMPROVING

## 2020-03-13 NOTE — ED PROVIDER NOTES
Primary Care Physician: Amarilys Wright MD   Attending Physician: No att. providers found     History   Chief Complaint   Patient presents with    Headache    Pharyngitis     three days of Upper respiratory symptoms. Patient with known migraine history. Afebrile.  Cough        HPI   Jared Meza is a 37 y.o. female w/ h/o bipolar, COPD, fibromyalgia, who presents this morning with c/o URI symptoms as well as headaches. She states that 2 weeks ago she had a Botox shot given by pain management for headaches or migraines. She thinks that the Botox showed made her migraines worse. Now she is having severe headaches associated with nausea or vomiting. She is also noticed a fever subjective 99 as well as sore throat and some dizziness. Complains of numbness lower extremities which is she states it is chronic given her history of herniated disc which she indicates has never had an MRI because of insurance issues. She denies any chest pain, shortness of breath abdominal pain and her last menstrual period was 3 weeks ago.      Past Medical History:   Diagnosis Date    Asthma     Bipolar 1 disorder ([facility] [address]     Compression fracture     COPD (chronic obstructive pulmonary disease) (HCC)     DDD (degenerative disc disease), cervical     Howard-Danlos syndrome     Fibromyalgia     H/O adrenal insufficiency     and pituitary insufficiency    Herniated disc     Metabolic syndrome     Osteoarthritis     Pyelonephritis     Sciatica         Past Surgical History:   Procedure Laterality Date    KNEE SURGERY      LUMBAR FUSION N/A 6/3/2019    L5, S1 TRANSFORAMINAL LUMBAR INTERBODY FUSION performed by Diann Washington MD at [address] Left     repair        Family History   Problem Relation Age of Onset    Diabetes type 2  Mother     Diabetes type 2  Father     Cancer Sister     Diabetes type 2  Brother         Social History     Socioeconomic History    Marital status: Single Spouse name: Not on file    Number of children: Not on file    Years of education: Not on file    Highest education level: Not on file   Occupational History    Not on file   Social Needs    Financial resource strain: Not on file    Food insecurity     Worry: Not on file     Inability: Not on file    Transportation needs     Medical: Not on file     Non-medical: Not on file   Tobacco Use    Smoking status: Former Smoker     Packs/day: 1.00     Types: Cigarettes     Last attempt to quit: 2018     Years since quittin.1    Smokeless tobacco: Never Used   Substance and Sexual Activity    Alcohol use: Not Currently     Comment: socially    Drug use: No     Comment: Denies    Sexual activity: Not Currently   Lifestyle    Physical activity     Days per week: Not on file     Minutes per session: Not on file    Stress: Not on file   Relationships    Social connections     Talks on phone: Not on file     Gets together: Not on file     Attends Jewish service: Not on file     Active member of club or organization: Not on file     Attends meetings of clubs or organizations: Not on file     Relationship status: Not on file    Intimate partner violence     Fear of current or ex partner: Not on file     Emotionally abused: Not on file     Physically abused: Not on file     Forced sexual activity: Not on file   Other Topics Concern    Not on file   Social History Narrative    Not on file        Review of Systems   10 total systems reviewed and found to be negative unless otherwise noted in HPI     Physical Exam   /72   Pulse 71   Temp 98.4 °F (36.9 °C)   Resp 16   Ht 5' 2\" (1.575 m)   Wt 167 lb 15.9 oz (76.2 kg)   SpO2 100%   BMI 30.73 kg/m²      CONSTITUTIONAL: In mild distress but nontoxic  HEAD: atraumatic, normocephalic   EYES: PERRL, No injection, discharge or scleral icterus. ENT: Moist mucous membranes. NECK: Normal ROM, NO LAD   CARDIOVASCULAR: Regular rate and rhythm.  No murmurs or gallop. PULMONARY/CHEST: Airway patent. No retractions. Breath sounds clear with good air entry bilaterally. ABDOMEN: Soft, Non-distended and non-tender, without guarding or rebound. SKIN: Acyanotic, warm, dry   MUSCULOSKELETAL: No swelling, tenderness or deformity   NEUROLOGICAL:  Awake and alert. GCS 15. Cranial nerves 2-12 grossly intact. Strength 5/5 throughout. Light touch sensation intact throughout. Finger to nose WNL. DTR's 2+ in all 4 extremities. Nursing note and vitals reviewed.      ED Course & Medical Decision Making   Medications   diphenhydrAMINE (BENADRYL) injection 25 mg (25 mg Intravenous Given 3/13/20 0952)   prochlorperazine (COMPAZINE) injection 10 mg (10 mg Intravenous Given 3/13/20 0952)   ketorolac (TORADOL) injection 15 mg (15 mg Intravenous Given 3/13/20 0952)   0.9 % sodium chloride bolus (0 mLs Intravenous Stopped 3/13/20 1036)   valproate (DEPACON) 500 mg in dextrose 5 % 100 mL IVPB (0 mg Intravenous Stopped 3/13/20 1256)   0.9 % sodium chloride bolus (0 mLs Intravenous Stopped 3/13/20 1419)      Labs Reviewed   CBC WITH AUTO DIFFERENTIAL - Abnormal; Notable for the following components:       Result Value    WBC 14.6 (*)     Neutrophils Absolute 11.2 (*)     All other components within normal limits    Narrative:     Performed at:  [facility]  [address] [place], [place] [address]   Phone [phone]   URINE RT REFLEX TO CULTURE - Abnormal; Notable for the following components:    Clarity, UA CLOUDY (*)     Leukocyte Esterase, Urine TRACE (*)     All other components within normal limits    Narrative:     Performed at:  [facility]  [address] [facility] [address]   Phone [phone]   MICROSCOPIC URINALYSIS - Abnormal; Notable for the following components:    Epithelial Cells, UA 6 (*)     All other components within normal limits    Narrative:     Performed at:  [facility] and recommendations explained to patient, and her. She expressed understanding and agreed with the plan. Clinical Impression:  1. Migraine without aura and with status migrainosus, not intractable         Jorge A Harvey MD   3/13/2020   _________________________________________________________________________________________  _________________________________________________________________________________________  This record is transcribed utilizing voice recognition technology. There are inherent limitations in this technology. In addition, there may be limitations in editing of this report. If there are any discrepancies, please contact me directly.         Jorge A Holly MD  03/13/20 2032

## 2020-03-14 LAB — URINE CULTURE, ROUTINE: NORMAL

## 2020-03-15 LAB
IGF-1 (INSULIN-LIKE GROWTH I): 310 NG/ML (ref 75–267)
INSULIN-LIKE GROWTH FACTOR-1 Z-SCORE: 2.3

## 2020-03-16 LAB — S PYO THROAT QL CULT: NORMAL

## 2020-03-18 LAB — MISCELLANEOUS LAB TEST ORDER: NORMAL

## 2020-03-31 RX ORDER — MIDODRINE HYDROCHLORIDE 2.5 MG/1
TABLET ORAL
Qty: 90 TABLET | Refills: 0 | Status: SHIPPED | OUTPATIENT
Start: 2020-03-31 | End: 2020-04-28

## 2020-04-08 ENCOUNTER — TELEPHONE (OUTPATIENT)
Dept: ENDOCRINOLOGY | Age: 40
End: 2020-04-08

## 2020-04-08 NOTE — TELEPHONE ENCOUNTER
PT called states that she has had Migraines everyday. Her BP has Increased and he stopped taking her BP med a month ago. She would like to know when she should take her BP med, during day or evening?  [phone]

## 2020-04-28 RX ORDER — MIDODRINE HYDROCHLORIDE 2.5 MG/1
TABLET ORAL
Qty: 90 TABLET | Refills: 0 | Status: SHIPPED | OUTPATIENT
Start: 2020-04-28 | End: 2020-05-29

## 2020-05-15 ENCOUNTER — HOSPITAL ENCOUNTER (EMERGENCY)
Age: 40
Discharge: HOME | End: 2020-05-15
Payer: COMMERCIAL

## 2020-05-15 VITALS
HEART RATE: 90 BPM | BODY MASS INDEX: 28.91 KG/M2 | SYSTOLIC BLOOD PRESSURE: 140 MMHG | OXYGEN SATURATION: 100 % | RESPIRATION RATE: 16 BRPM | TEMPERATURE: 98.5 F | WEIGHT: 169.31 LBS | HEIGHT: 64 IN | DIASTOLIC BLOOD PRESSURE: 90 MMHG

## 2020-05-27 ENCOUNTER — HOSPITAL ENCOUNTER (OUTPATIENT)
Dept: MRI IMAGING | Age: 40
Discharge: HOME | End: 2020-05-27
Payer: COMMERCIAL

## 2020-05-27 PROCEDURE — 6360000004 HC RX CONTRAST MEDICATION: Performed by: INTERNAL MEDICINE

## 2020-05-27 PROCEDURE — A9577 INJ MULTIHANCE: HCPCS | Performed by: INTERNAL MEDICINE

## 2020-05-27 PROCEDURE — 70553 MRI BRAIN STEM W/O & W/DYE: CPT

## 2020-05-27 RX ADMIN — GADOBENATE DIMEGLUMINE 15 ML: 529 INJECTION, SOLUTION INTRAVENOUS at 18:22

## 2020-06-15 ENCOUNTER — VIRTUAL VISIT (OUTPATIENT)
Dept: ENDOCRINOLOGY | Age: 40
End: 2020-06-15
Payer: COMMERCIAL

## 2020-06-15 PROBLEM — R79.89 HIGH INSULIN-LIKE GROWTH FACTOR 1 (IGF-1) LEVEL: Status: ACTIVE | Noted: 2020-06-15

## 2020-06-15 PROBLEM — E22.1 HYPERPROLACTINEMIA (HCC): Status: ACTIVE | Noted: 2020-06-15

## 2020-06-15 PROBLEM — R79.89 HIGH SERUM THYROID STIMULATING HORMONE (TSH): Status: ACTIVE | Noted: 2020-06-15

## 2020-06-15 PROCEDURE — G8427 DOCREV CUR MEDS BY ELIG CLIN: HCPCS | Performed by: INTERNAL MEDICINE

## 2020-06-15 PROCEDURE — 99214 OFFICE O/P EST MOD 30 MIN: CPT | Performed by: INTERNAL MEDICINE

## 2020-06-15 NOTE — PROGRESS NOTES
Patient ID:   Jessica Lauren is a 41 y.o. female    Chief Complaint:   Jessica Lauren presents for an evaluation of Adrenal insufficiency. PCP: Dr. Sabrina Chavis MD    Pursuant to the emergency declaration under the [address] [facility], [facility] authority and the Coronavirus Preparedness and Response Supplemental Appropriations Act this visit was conducted after obtaining verbal consent, with the use of D[employer]. me interactive audio and video telecommunications system that permits real time communication between the patient and provider to substitute for an in-person clinic visit to reduce the patient's risk of exposure to COVID-19 and provide necessary medical care. Because this was a Telehealth visit, evaluation of the following organ systems was limited: Vitals, Constitutional, EENT, Resp, CV, GI, , MS, Neuro, Skin. Heme. Lymph, Imm. Jessica Lauren was at home. Provider was at home. No one else was involved. The patient has also been advised to contact this office for worsening conditions or problems, and seek emergency medical treatment and/or call 911 if deemed necessary. Subjective:     She was started on hydrocortisone after low cortisol. Normal ACTH stim test in 2019. She had weight loss, episodes of hyponatremia and hypotension. IGF was high once and then normal in 2019   T4 was low once and then normal in 2019   Seen at [facility] endocrinology     Currently on Hydrocortisone 10mg daily in am   On midodrine 2.5mg once a day instead of tid       Patient has fatigue, normal appetite, weight gain since been on steroids. No some nausea and salt craving, occasional dizziness, hyperpigmentation of the face. She has poor sleep. She says she has EDS. She has been on opioids for more than 25 years for back pain, injuries. Last use of steroid shots: Jan 2020, right knee. She is not getting epidurals any more for back pain.      Supplements: Vit D, Mvt, B (degenerative disc disease), cervical     Howard-Danlos syndrome     Fibromyalgia     H/O adrenal insufficiency     and pituitary insufficiency    Herniated disc     Metabolic syndrome     Osteoarthritis     Pyelonephritis     Sciatica        Past Surgical History:   Procedure Laterality Date    KNEE SURGERY      LUMBAR FUSION N/A 6/3/2019    L5, S1 TRANSFORAMINAL LUMBAR INTERBODY FUSION performed by Arvind Alarcon MD at [address] Left     repair         Allergies   Allergen Reactions    Metoclopramide Shortness Of Breath and Swelling    Quetiapine Shortness Of Breath and Swelling    Risperidone Swelling and Shortness Of Breath    Trazodone Swelling and Shortness Of Breath     Swelling throat    Buprenorphine-Naloxone Anxiety and Hives     Patient says she can take Percocet, Vicodin    Morphine Hives and Itching    Asenapine Swelling     tongue    Buprenorphine Hcl-Naloxone Hcl     Codeine Hives and Itching     Patient says she can take Percocet, Vicodin    Guanfacine Hcl Swelling     tongue    Morphine And Related Hives    Reglan [Metoclopramide Hcl] Swelling    Risperidone And Related Swelling    Seroquel  [Quetiapine Fumarate] Other (See Comments)     \"I get really dizzy and I pass out. \"    Seroquel [Quetiapine Fumarate] Swelling     Swelling throat    Trazodone And Nefazodone Swelling     Swelling throat    Lurasidone Anxiety         Current Outpatient Medications:     midodrine (PROAMATINE) 2.5 MG tablet, TAKE 1 TABLET BY MOUTH THREE TIMES A DAY, Disp: 90 tablet, Rfl: 0    oxyCODONE-acetaminophen (PERCOCET) 5-325 MG per tablet, Take 1 tablet by mouth., Disp: , Rfl:     divalproex (DEPAKOTE) 250 MG DR tablet, Take 1 tablet by mouth 2 times daily, Disp: 90 tablet, Rfl: 3    ranitidine (ZANTAC) 150 MG tablet, Take 150 mg by mouth 2 times daily, Disp: , Rfl:     acyclovir (ZOVIRAX) 400 MG tablet, Take 400 mg by mouth as needed , Disp: , Rfl:     STOOL light-headedness, numbness and headaches. Hematological/ Lymph nodes: Negative for adenopathy. Does not bruise/bleed easily. Psychiatric/Behavioral: Negative for suicidal ideas, depression, anxiety. Objective:   Physical Exam:  There were no vitals taken for this visit. Constitutional: Patient is oriented to person, place, and time. Patient appears well-developed and well-nourished. Pulmonary/Chest: Effort normal.   Neurological: Patient is alert and oriented to person, place, and time. Psychiatric: Patient has a normal mood and affect.  Patient behavior is normal.     Lab Review:    Admission on 03/13/2020, Discharged on 03/13/2020   Component Date Value Ref Range Status    Rapid Influenza A Ag 03/13/2020 Negative  Negative Final    Rapid Influenza B Ag 03/13/2020 Negative  Negative Final    Rapid Strep A Screen 03/13/2020 Negative  Negative Final    WBC 03/13/2020 14.6* 4.0 - 11.0 K/uL Final    RBC 03/13/2020 4.79  4.00 - 5.20 M/uL Final    Hemoglobin 03/13/2020 14.8  12.0 - 16.0 g/dL Final    Hematocrit 03/13/2020 44.0  36.0 - 48.0 % Final    MCV 03/13/2020 92.0  80.0 - 100.0 fL Final    MCH 03/13/2020 31.0  26.0 - 34.0 pg Final    MCHC 03/13/2020 33.7  31.0 - 36.0 g/dL Final    RDW 03/13/2020 14.5  12.4 - 15.4 % Final    Platelets 12/79/1136 262  135 - 450 K/uL Final    MPV 03/13/2020 8.3  5.0 - 10.5 fL Final    Neutrophils % 03/13/2020 76.7  % Final    Lymphocytes % 03/13/2020 16.3  % Final    Monocytes % 03/13/2020 5.3  % Final    Eosinophils % 03/13/2020 1.2  % Final    Basophils % 03/13/2020 0.5  % Final    Neutrophils Absolute 03/13/2020 11.2* 1.7 - 7.7 K/uL Final    Lymphocytes Absolute 03/13/2020 2.4  1.0 - 5.1 K/uL Final    Monocytes Absolute 03/13/2020 0.8  0.0 - 1.3 K/uL Final    Eosinophils Absolute 03/13/2020 0.2  0.0 - 0.6 K/uL Final    Basophils Absolute 03/13/2020 0.1  0.0 - 0.2 K/uL Final    Sodium 03/13/2020 140  136 - 145 mmol/L Final    Potassium reflex cortisol and adrenal antibodies normal March 2020     Slightly high prolactin (30) , high  (normal )    Cortisol 15.8 in am, March 2020. Adrenal antibodies were negative. Stop Hydrocortisone     For low blood pressure, change Midodrine 2.5 mg tid    MRI pituitary gland normal May 2020     2: Fatigue   Work with pcp and work on depression, anxiety, sleep apnea or other sleep disturbances, and menopause. 3: High TSH     TSH 4.49, FT4 0.9, FT3 2.9 March 2020   Repeat levels with thyroid abs     Will do thyroid labs, Prolactin, macroprolactin, IGF-1     May do more testing after initial labs     Labs to be done after stopping supplements for at least 2 days     RTC in 2 months     EDUCATION:   I hope that your symptoms will resolve soon with healthy lifestyle measures. I am certainly willing to repeat some of your tests in 3-6 months if the symptoms persist.  Recommendations:  Good Sleep Habits  Regular Exercise  Good Nutrition  Stress Reduction  Depression Management  Treatment of Co-existing Illnesses    Adrenal insufficiency education. Reviewed adrenal insufficiency precautions. If patient has a fever or significant illness they are to triple their dose of steroids for three days. If illness has resolved, they can resume their previous dose. If not better, they are to call EMS. They should also get a medic Alert bracelet that states 'Adrenal Insufficiency.       Electronically signed by Amadou Martins MD on 6/15/2020 at 2:29 PM

## 2020-06-22 RX ORDER — MIDODRINE HYDROCHLORIDE 2.5 MG/1
TABLET ORAL
Qty: 90 TABLET | Refills: 0 | Status: SHIPPED | OUTPATIENT
Start: 2020-06-22 | End: 2020-12-11 | Stop reason: SDUPTHER

## 2020-08-15 ENCOUNTER — HOSPITAL ENCOUNTER (EMERGENCY)
Age: 40
Discharge: HOME | DRG: 254 | End: 2020-08-16
Payer: COMMERCIAL

## 2020-08-15 ENCOUNTER — APPOINTMENT (OUTPATIENT)
Dept: CT IMAGING | Age: 40
DRG: 254 | End: 2020-08-15
Payer: COMMERCIAL

## 2020-08-15 LAB
A/G RATIO: 1.6 (ref 1.1–2.2)
ALBUMIN SERPL-MCNC: 4.4 G/DL (ref 3.4–5)
ALP BLD-CCNC: 53 U/L (ref 40–129)
ALT SERPL-CCNC: 16 U/L (ref 10–40)
ANION GAP SERPL CALCULATED.3IONS-SCNC: 10 MMOL/L (ref 3–16)
AST SERPL-CCNC: 35 U/L (ref 15–37)
BASOPHILS ABSOLUTE: 0 K/UL (ref 0–0.2)
BASOPHILS RELATIVE PERCENT: 0.5 %
BILIRUB SERPL-MCNC: <0.2 MG/DL (ref 0–1)
BILIRUBIN URINE: NEGATIVE
BLOOD, URINE: NEGATIVE
BUN BLDV-MCNC: 18 MG/DL (ref 7–20)
CALCIUM SERPL-MCNC: 9 MG/DL (ref 8.3–10.6)
CHLORIDE BLD-SCNC: 102 MMOL/L (ref 99–110)
CLARITY: CLEAR
CO2: 26 MMOL/L (ref 21–32)
COLOR: YELLOW
CREAT SERPL-MCNC: 0.8 MG/DL (ref 0.6–1.1)
EOSINOPHILS ABSOLUTE: 0.2 K/UL (ref 0–0.6)
EOSINOPHILS RELATIVE PERCENT: 2.7 %
EPITHELIAL CELLS, UA: 3 /HPF (ref 0–5)
GFR AFRICAN AMERICAN: >60
GFR NON-AFRICAN AMERICAN: >60
GLOBULIN: 2.8 G/DL
GLUCOSE BLD-MCNC: 76 MG/DL (ref 70–99)
GLUCOSE URINE: NEGATIVE MG/DL
HCG(URINE) PREGNANCY TEST: NEGATIVE
HCT VFR BLD CALC: 40.6 % (ref 36–48)
HEMOGLOBIN: 13.8 G/DL (ref 12–16)
HYALINE CASTS: 1 /LPF (ref 0–8)
KETONES, URINE: NEGATIVE MG/DL
LEUKOCYTE ESTERASE, URINE: ABNORMAL
LYMPHOCYTES ABSOLUTE: 2.5 K/UL (ref 1–5.1)
LYMPHOCYTES RELATIVE PERCENT: 39.1 %
MCH RBC QN AUTO: 30.7 PG (ref 26–34)
MCHC RBC AUTO-ENTMCNC: 34 G/DL (ref 31–36)
MCV RBC AUTO: 90.4 FL (ref 80–100)
MICROSCOPIC EXAMINATION: YES
MONOCYTES ABSOLUTE: 0.4 K/UL (ref 0–1.3)
MONOCYTES RELATIVE PERCENT: 6.4 %
NEUTROPHILS ABSOLUTE: 3.2 K/UL (ref 1.7–7.7)
NEUTROPHILS RELATIVE PERCENT: 51.3 %
NITRITE, URINE: NEGATIVE
PDW BLD-RTO: 14.3 % (ref 12.4–15.4)
PH UA: 6.5 (ref 5–8)
PLATELET # BLD: 277 K/UL (ref 135–450)
PMV BLD AUTO: 8.7 FL (ref 5–10.5)
POTASSIUM REFLEX MAGNESIUM: 4.6 MMOL/L (ref 3.5–5.1)
PRO-BNP: 208 PG/ML (ref 0–124)
PROTEIN UA: NEGATIVE MG/DL
RBC # BLD: 4.49 M/UL (ref 4–5.2)
RBC UA: 0 /HPF (ref 0–4)
SODIUM BLD-SCNC: 138 MMOL/L (ref 136–145)
SPECIFIC GRAVITY UA: 1.01 (ref 1–1.03)
TOTAL PROTEIN: 7.2 G/DL (ref 6.4–8.2)
TROPONIN: <0.01 NG/ML
URINE REFLEX TO CULTURE: ABNORMAL
URINE TYPE: ABNORMAL
UROBILINOGEN, URINE: 0.2 E.U./DL
WBC # BLD: 6.3 K/UL (ref 4–11)
WBC UA: 7 /HPF (ref 0–5)

## 2020-08-15 PROCEDURE — 85025 COMPLETE CBC W/AUTO DIFF WBC: CPT

## 2020-08-15 PROCEDURE — 6360000004 HC RX CONTRAST MEDICATION: Performed by: PHYSICIAN ASSISTANT

## 2020-08-15 PROCEDURE — 2580000003 HC RX 258: Performed by: PHYSICIAN ASSISTANT

## 2020-08-15 PROCEDURE — 99284 EMERGENCY DEPT VISIT MOD MDM: CPT

## 2020-08-15 PROCEDURE — 6360000002 HC RX W HCPCS: Performed by: PHYSICIAN ASSISTANT

## 2020-08-15 PROCEDURE — 80053 COMPREHEN METABOLIC PANEL: CPT

## 2020-08-15 PROCEDURE — 84484 ASSAY OF TROPONIN QUANT: CPT

## 2020-08-15 PROCEDURE — 96374 THER/PROPH/DIAG INJ IV PUSH: CPT

## 2020-08-15 PROCEDURE — 81001 URINALYSIS AUTO W/SCOPE: CPT

## 2020-08-15 PROCEDURE — 84703 CHORIONIC GONADOTROPIN ASSAY: CPT

## 2020-08-15 PROCEDURE — 74177 CT ABD & PELVIS W/CONTRAST: CPT

## 2020-08-15 PROCEDURE — 83880 ASSAY OF NATRIURETIC PEPTIDE: CPT

## 2020-08-15 RX ORDER — 0.9 % SODIUM CHLORIDE 0.9 %
1000 INTRAVENOUS SOLUTION INTRAVENOUS ONCE
Status: COMPLETED | OUTPATIENT
Start: 2020-08-15 | End: 2020-08-16

## 2020-08-15 RX ORDER — ONDANSETRON 2 MG/ML
4 INJECTION INTRAMUSCULAR; INTRAVENOUS ONCE
Status: COMPLETED | OUTPATIENT
Start: 2020-08-15 | End: 2020-08-15

## 2020-08-15 RX ADMIN — SODIUM CHLORIDE 1000 ML: 9 INJECTION, SOLUTION INTRAVENOUS at 22:44

## 2020-08-15 RX ADMIN — IOPAMIDOL 75 ML: 755 INJECTION, SOLUTION INTRAVENOUS at 23:31

## 2020-08-15 RX ADMIN — ONDANSETRON 4 MG: 2 INJECTION INTRAMUSCULAR; INTRAVENOUS at 22:44

## 2020-08-16 VITALS
WEIGHT: 175.04 LBS | TEMPERATURE: 99.3 F | SYSTOLIC BLOOD PRESSURE: 97 MMHG | BODY MASS INDEX: 29.88 KG/M2 | RESPIRATION RATE: 15 BRPM | HEIGHT: 64 IN | DIASTOLIC BLOOD PRESSURE: 49 MMHG | OXYGEN SATURATION: 100 % | HEART RATE: 78 BPM

## 2020-08-16 RX ORDER — POLYETHYLENE GLYCOL 3350 17 G/17G
17 POWDER, FOR SOLUTION ORAL DAILY PRN
Qty: 527 G | Refills: 0 | Status: SHIPPED | OUTPATIENT
Start: 2020-08-16 | End: 2020-09-15

## 2020-08-16 RX ORDER — NITROFURANTOIN 25; 75 MG/1; MG/1
100 CAPSULE ORAL 2 TIMES DAILY
Qty: 10 CAPSULE | Refills: 0 | Status: ON HOLD | OUTPATIENT
Start: 2020-08-16 | End: 2020-08-25 | Stop reason: HOSPADM

## 2020-08-16 NOTE — ED NOTES
Pt called out to nurse for pain medication. When I went into pts room she stated, \"My pain pill from 7 hours ago is wearing off and I need another pill. I take 7.5 Percs. \" I informed pt I would speak with MARTÍN Moreira.       Woodrow Linton RN  08/15/20 [id number]

## 2020-08-16 NOTE — ED PROVIDER NOTES
[address]      Pt Name: Ara Salazar  MRN: [id number]  [place] 1980  Date of evaluation: 8/15/2020  Provider: MARTÍN Eugene    Shared Visit or Autonomous Visit: Evaluation by MERARY. My supervising physician was available for consultation. CHIEF COMPLAINT       Chief Complaint   Patient presents with    Other     states, \"I have alot of cotradicting things happeneing. In the mornings my hands and feet are swollen and painful. Im dizzy all the time. The last BM I had was 6 days ago and it was bloody and now I havent had one in 6 days now and Meggan taken everything I can think of.\" States she is always in pain so the only difference is in her hands and feet. HISTORY OF PRESENT ILLNESS  (Location/Symptom, Timing/Onset, Context/Setting, Quality, Duration, Modifying Factors, Severity.)   Anjelica Kirkland is a 41 y.o. female who presents to the emergency department with complaints of frequent urination with sense of urinary pressure, constipation, swelling in the hands and feet. Patient says she has not had a bowel movement in 6 days, and this is new for her. Denies any history of abdominal surgery. Says she believes she saw some blood in her stool prior to onset of this constipation. She says she occasionally has abdominal pain, coming and going unpredictably, mostly in the left lower quadrant. Denies vomiting but says she is frequently nauseous. She is eating. Denies chest pain, shortness of breath or fever. She also says she has been having a lot of swelling in her fans and feet during the night, less so during the day. Denies numbness. Denies any known heart problems. No other complaints. Nursing Notes were reviewed and I agree. REVIEW OF SYSTEMS    (2-9 systems for level 4, 10 or more for level 5)     Constitutional:  Negative for fever, chills, fatigue and unexpected weight change.    HENT:  Negative for congestion, ear pain, facial swelling, rhinorrhea, sneezing, sore throat and trouble swallowing. Eyes:  Negative for photophobia, pain and visual disturbance. Respiratory:  Negative for cough, shortness of breath, wheezing and stridor. Cardiovascular:  Negative for chest pain, palpitations and leg swelling. Gastrointestinal: Positive for constipation, nausea, blood in stool, left lower quadrant abdominal pain. Negative for vomiting, diarrhea. Genitourinary: Positive for urinary urgency and pressure. Negative for hematuria, flank pain, vaginal bleeding, vaginal discharge and pelvic pain. Musculoskeletal: Positive for intermittent swelling in the hands and feet. Negative for myalgias, arthralgias, neck pain and neck stiffness. Neurological:  Negative for dizziness, seizures, syncope, speech difficulty, focal weakness, numbness and headache. Psychiatric/Behavioral:  Negative for suicidal ideas, hallucinations, confusion. Except as noted above the remainder of the review of systems was reviewed and negative.        PAST MEDICAL HISTORY         Diagnosis Date    Asthma     Bipolar 1 disorder ([facility] [address]     Compression fracture     [facility]     DDD (degenerative disc disease), cervical     Howard-Danlos syndrome     Fibromyalgia     H/O adrenal insufficiency     and pituitary insufficiency    Herniated disc     Metabolic syndrome     Osteoarthritis     Pyelonephritis     Sciatica        SURGICAL HISTORY           Procedure Laterality Date    KNEE SURGERY      LUMBAR FUSION N/A 6/3/2019    L5, S1 TRANSFORAMINAL LUMBAR INTERBODY FUSION performed by Juan Hampton MD at [address] [facility] Left     repair       CURRENT MEDICATIONS       Discharge Medication List as of 8/16/2020 12:32 AM      CONTINUE these medications which have NOT CHANGED    Details   midodrine (PROAMATINE) 2.5 MG tablet TAKE 1 TABLET BY MOUTH THREE TIMES A DAY, Disp-90 tablet,R-0DX Code [quetiapine fumarate]; Trazodone and nefazodone; and Lurasidone    FAMILY HISTORY           Problem Relation Age of Onset    Diabetes type 2  Mother     Diabetes type 2  Father     Cancer Sister     Diabetes type 2  Brother      Family Status   Relation Name Status    Mother  Alive    Father  Alive    Sister  Alive    Brother  Alive        SOCIAL HISTORY      reports that she quit smoking about 2 years ago. Her smoking use included cigarettes. She smoked 1.00 pack per day. She has never used smokeless tobacco. She reports previous alcohol use. She reports that she does not use drugs. PHYSICAL EXAM    (up to 7 for level 4, 8 or more for level 5)     ED Triage Vitals   BP Temp Temp Source Pulse Resp SpO2 Height Weight   08/15/20 2027 08/15/20 2027 08/15/20 2027 08/15/20 2027 08/15/20 2027 08/15/20 2027 08/15/20 2023 08/15/20 2023   121/81 99.3 °F (37.4 °C) Oral 78 15 99 % 5' 4\" (1.626 m) 175 lb 0.7 oz (79.4 kg)       Constitutional:  Appearing well-developed and well-nourished. No distress. HENT:  Normocephalic and atraumatic. Conjunctivae and EOM are normal. Pupils are equal, round, and reactive to light. Neck:  Normal range of motion. Neck supple. No tracheal deviation present. No thyromegaly present. No cervical adenopathy. Cardiovascular:  Normal rate, regular rhythm, normal heart sounds and intact distal pulses. Pulmonary/Chest:  Effort normal and breath sounds normal. No respiratory distress. No wheezes or rales. Abdominal:  Soft. Bowel sounds are normal. No distension, mass, tenderness, rebound or guarding. Musculoskeletal:  Normal range of motion. No edema exhibited. Neurological:  Alert and oriented to person, place, and time. No cranial nerve deficit. Skin:  Skin is warm and dry. Not diaphoretic. Psychiatric:  Normal mood, affect, behavior, judgment and thought content.        DIAGNOSTIC RESULTS     RADIOLOGY:     Interpretation per the Radiologist below, if available at the time of this note:    CT ABDOMEN PELVIS W IV CONTRAST Additional Contrast? None   Final Result   1. Urinary bladder wall thickening likely represents cystitis. 2. Large amount of stool throughout the colon with no acute abnormality   identified.              LABS:  Labs Reviewed   URINE RT REFLEX TO CULTURE - Abnormal; Notable for the following components:       Result Value    Leukocyte Esterase, Urine SMALL (*)     All other components within normal limits    Narrative:     Performed at:  [facility]  [address] [place], [place] [address]   Phone [phone]   BRAIN NATRIURETIC PEPTIDE - Abnormal; Notable for the following components:    Pro- (*)     All other components within normal limits    Narrative:     Performed at:  [facility]  [address], [place] [address]   Phone [phone]   MICROSCOPIC URINALYSIS - Abnormal; Notable for the following components:    WBC, UA 7 (*)     All other components within normal limits    Narrative:     Performed at:  [facility]  [address] [facility] [place] [address]   Phone [phone]   PREGNANCY, URINE    Narrative:     Performed at:  [facility]  [address], [place] [address]   Phone [phone]   CBC WITH AUTO DIFFERENTIAL    Narrative:     Performed at:  [facility]  [address], [place] [address]   Phone [phone]   COMPREHENSIVE METABOLIC PANEL W/ REFLEX TO MG FOR LOW K    Narrative:     Performed at:  [facility]  [address] [place], [place] [address]   Phone [phone]   TROPONIN    Narrative:     Performed at:  [facility]  [address], [place] [address]   Phone [phone]   BLOOD OCCULT STOOL DIAGNOSTIC       All other labs were within normal range or not returned as of this dictation. EMERGENCY DEPARTMENT COURSE and DIFFERENTIAL DIAGNOSIS/MDM:   Vitals:    Vitals:    08/15/20 2232 08/15/20 2301 08/15/20 2341 08/16/20 0003   BP: 97/77 (!) 101/59 116/66 (!) 97/49   Pulse:       Resp:       Temp:       TempSrc:       SpO2:   99% 100%   Weight:       Height:           The patient's condition in the ED was good, the patient was afebrile and nontoxic in appearance, and the patient's physical exam was unremarkable. No abdominal tenderness on my exam.  No extremity edema found on exam.  Laboratory work-up showed no concerning abnormalities, with urinalysis showing only small amount of leukocytes and 7 white blood cells on microscopic. CT scan the abdomen pelvis with IV contrast showed bladder wall thickening, and a large amount of stool in the colon but no evidence of obstruction. There was no indication for hospitalization or further workup. Patient be discharged with prescription for a course of Macrobid and MiraLAX, and she will be advised to follow-up with family practice. The patient verbalized understanding and agreement with this plan of care. The patient was advised to return to the emergency department if symptoms should significantly worsen or if new and concerning symptoms should appear. I estimate there is LOW risk for ACUTE APPENDICITIS, BOWEL OBSTRUCTION, CHOLECYSTITIS, DIVERTICULITIS, INCARCERATED HERNIA, PANCREATITIS, PELVIC INFLAMMATORY DISEASE, OVARIAN TORSION, PERFORATED BOWEL,  BOWEL ISCHEMIA, CARDIAC ISCHEMIA, ECTOPIC PREGNANCY, or TUBO-OVARIAN ABSCESS, thus I consider the discharge disposition reasonable. Also, there is no evidence or peritonitis, sepsis, or toxicity. PROCEDURES:  None    FINAL IMPRESSION      1. Constipation, unspecified constipation type    2. Foot swelling    3.  Swelling of both hands          DISPOSITION/PLAN   DISPOSITION Decision To Discharge 08/16/2020 12:23:00 AM      PATIENT REFERRED TO:  Radha Bee MD [place] [address]  [phone]    Schedule an appointment as soon as possible for a visit   For follow-up care      DISCHARGE MEDICATIONS:  Discharge Medication List as of 8/16/2020 12:32 AM      START taking these medications    Details   nitrofurantoin, macrocrystal-monohydrate, (MACROBID) 100 MG capsule Take 1 capsule by mouth 2 times daily for 5 days, Disp-10 capsule,R-0Print      polyethylene glycol (MIRALAX) 17 g packet Take 17 g by mouth daily as needed for Constipation, Disp-527 g,R-0Print             (Please note that portions of this note were completed with a voice recognition program.  Efforts were made to edit the dictations but occasionally words are mis-transcribed.)    Greg Kirkland, [address] [facility], [address] [place] [address] Maria Teresa  08/16/20 7910

## 2020-08-18 ENCOUNTER — HOSPITAL ENCOUNTER (INPATIENT)
Age: 40
LOS: 6 days | Discharge: HOME | DRG: 254 | End: 2020-08-25
Attending: EMERGENCY MEDICINE | Admitting: SPECIALIST
Payer: COMMERCIAL

## 2020-08-18 PROCEDURE — 2500000003 HC RX 250 WO HCPCS: Performed by: NURSE PRACTITIONER

## 2020-08-18 PROCEDURE — 99284 EMERGENCY DEPT VISIT MOD MDM: CPT

## 2020-08-18 RX ADMIN — Medication 330 ML: at 21:58

## 2020-08-18 ASSESSMENT — PAIN DESCRIPTION - ORIENTATION: ORIENTATION: LOWER

## 2020-08-18 ASSESSMENT — PAIN DESCRIPTION - FREQUENCY: FREQUENCY: CONTINUOUS

## 2020-08-18 ASSESSMENT — PAIN DESCRIPTION - PAIN TYPE: TYPE: ACUTE PAIN

## 2020-08-18 ASSESSMENT — PAIN DESCRIPTION - DESCRIPTORS: DESCRIPTORS: CRAMPING

## 2020-08-18 ASSESSMENT — PAIN DESCRIPTION - LOCATION: LOCATION: ABDOMEN

## 2020-08-18 ASSESSMENT — PAIN SCALES - GENERAL: PAINLEVEL_OUTOF10: 8

## 2020-08-19 ENCOUNTER — APPOINTMENT (OUTPATIENT)
Dept: GENERAL RADIOLOGY | Age: 40
DRG: 254 | End: 2020-08-19
Payer: COMMERCIAL

## 2020-08-19 PROBLEM — K59.00 CONSTIPATION: Status: ACTIVE | Noted: 2020-08-19

## 2020-08-19 LAB
A/G RATIO: 1.5 (ref 1.1–2.2)
ALBUMIN SERPL-MCNC: 4.7 G/DL (ref 3.4–5)
ALP BLD-CCNC: 55 U/L (ref 40–129)
ALT SERPL-CCNC: 19 U/L (ref 10–40)
ANION GAP SERPL CALCULATED.3IONS-SCNC: 17 MMOL/L (ref 3–16)
AST SERPL-CCNC: 41 U/L (ref 15–37)
BACTERIA: ABNORMAL /HPF
BASOPHILS ABSOLUTE: 0.1 K/UL (ref 0–0.2)
BASOPHILS RELATIVE PERCENT: 0.6 %
BILIRUB SERPL-MCNC: 0.3 MG/DL (ref 0–1)
BILIRUBIN URINE: NEGATIVE
BLOOD, URINE: NEGATIVE
BUN BLDV-MCNC: 7 MG/DL (ref 7–20)
CALCIUM SERPL-MCNC: 9.8 MG/DL (ref 8.3–10.6)
CHLORIDE BLD-SCNC: 102 MMOL/L (ref 99–110)
CLARITY: ABNORMAL
CO2: 17 MMOL/L (ref 21–32)
COLOR: YELLOW
CREAT SERPL-MCNC: 0.8 MG/DL (ref 0.6–1.1)
EOSINOPHILS ABSOLUTE: 0.2 K/UL (ref 0–0.6)
EOSINOPHILS RELATIVE PERCENT: 1.4 %
EPITHELIAL CELLS, UA: 9 /HPF (ref 0–5)
GFR AFRICAN AMERICAN: >60
GFR NON-AFRICAN AMERICAN: >60
GLOBULIN: 3.1 G/DL
GLUCOSE BLD-MCNC: 88 MG/DL (ref 70–99)
GLUCOSE URINE: NEGATIVE MG/DL
HCG QUALITATIVE: NEGATIVE
HCT VFR BLD CALC: 45.7 % (ref 36–48)
HEMOGLOBIN: 15.2 G/DL (ref 12–16)
HYALINE CASTS: 3 /LPF (ref 0–8)
KETONES, URINE: ABNORMAL MG/DL
LEUKOCYTE ESTERASE, URINE: ABNORMAL
LIPASE: 16 U/L (ref 13–60)
LYMPHOCYTES ABSOLUTE: 2.2 K/UL (ref 1–5.1)
LYMPHOCYTES RELATIVE PERCENT: 17.7 %
MCH RBC QN AUTO: 30.1 PG (ref 26–34)
MCHC RBC AUTO-ENTMCNC: 33.2 G/DL (ref 31–36)
MCV RBC AUTO: 90.5 FL (ref 80–100)
MICROSCOPIC EXAMINATION: YES
MONOCYTES ABSOLUTE: 0.8 K/UL (ref 0–1.3)
MONOCYTES RELATIVE PERCENT: 6.2 %
NEUTROPHILS ABSOLUTE: 9.1 K/UL (ref 1.7–7.7)
NEUTROPHILS RELATIVE PERCENT: 74.1 %
NITRITE, URINE: NEGATIVE
PDW BLD-RTO: 14.3 % (ref 12.4–15.4)
PH UA: 5.5 (ref 5–8)
PLATELET # BLD: 280 K/UL (ref 135–450)
PMV BLD AUTO: 8.2 FL (ref 5–10.5)
POTASSIUM REFLEX MAGNESIUM: 4.5 MMOL/L (ref 3.5–5.1)
PROTEIN UA: NEGATIVE MG/DL
RBC # BLD: 5.04 M/UL (ref 4–5.2)
RBC UA: 1 /HPF (ref 0–4)
SODIUM BLD-SCNC: 136 MMOL/L (ref 136–145)
SPECIFIC GRAVITY UA: 1.02 (ref 1–1.03)
TOTAL PROTEIN: 7.8 G/DL (ref 6.4–8.2)
TSH SERPL DL<=0.05 MIU/L-ACNC: 3.74 UIU/ML (ref 0.27–4.2)
URINE REFLEX TO CULTURE: ABNORMAL
URINE TYPE: ABNORMAL
UROBILINOGEN, URINE: 0.2 E.U./DL
WBC # BLD: 12.2 K/UL (ref 4–11)
WBC UA: 5 /HPF (ref 0–5)

## 2020-08-19 PROCEDURE — 85025 COMPLETE CBC W/AUTO DIFF WBC: CPT

## 2020-08-19 PROCEDURE — 2580000003 HC RX 258: Performed by: NURSE PRACTITIONER

## 2020-08-19 PROCEDURE — 6370000000 HC RX 637 (ALT 250 FOR IP): Performed by: SPECIALIST

## 2020-08-19 PROCEDURE — 96372 THER/PROPH/DIAG INJ SC/IM: CPT

## 2020-08-19 PROCEDURE — 1200000000 HC SEMI PRIVATE

## 2020-08-19 PROCEDURE — 83690 ASSAY OF LIPASE: CPT

## 2020-08-19 PROCEDURE — APPNB180 APP NON BILLABLE TIME > 60 MINS: Performed by: PHYSICIAN ASSISTANT

## 2020-08-19 PROCEDURE — 6370000000 HC RX 637 (ALT 250 FOR IP): Performed by: PHYSICIAN ASSISTANT

## 2020-08-19 PROCEDURE — 84703 CHORIONIC GONADOTROPIN ASSAY: CPT

## 2020-08-19 PROCEDURE — 2500000003 HC RX 250 WO HCPCS: Performed by: SPECIALIST

## 2020-08-19 PROCEDURE — 81001 URINALYSIS AUTO W/SCOPE: CPT

## 2020-08-19 PROCEDURE — 99254 IP/OBS CNSLTJ NEW/EST MOD 60: CPT | Performed by: SURGERY

## 2020-08-19 PROCEDURE — 74018 RADEX ABDOMEN 1 VIEW: CPT

## 2020-08-19 PROCEDURE — 6370000000 HC RX 637 (ALT 250 FOR IP): Performed by: NURSE PRACTITIONER

## 2020-08-19 PROCEDURE — 80053 COMPREHEN METABOLIC PANEL: CPT

## 2020-08-19 PROCEDURE — 96374 THER/PROPH/DIAG INJ IV PUSH: CPT

## 2020-08-19 PROCEDURE — 6370000000 HC RX 637 (ALT 250 FOR IP): Performed by: SURGERY

## 2020-08-19 PROCEDURE — 6360000002 HC RX W HCPCS: Performed by: SPECIALIST

## 2020-08-19 PROCEDURE — 84443 ASSAY THYROID STIM HORMONE: CPT

## 2020-08-19 PROCEDURE — APPSS180 APP SPLIT SHARED TIME > 60 MINUTES: Performed by: PHYSICIAN ASSISTANT

## 2020-08-19 PROCEDURE — 6360000002 HC RX W HCPCS: Performed by: NURSE PRACTITIONER

## 2020-08-19 PROCEDURE — 74270 X-RAY XM COLON 1CNTRST STD: CPT

## 2020-08-19 RX ORDER — QUETIAPINE FUMARATE 100 MG/1
200 TABLET, FILM COATED ORAL NIGHTLY
Status: DISCONTINUED | OUTPATIENT
Start: 2020-08-19 | End: 2020-08-25 | Stop reason: HOSPADM

## 2020-08-19 RX ORDER — DOCUSATE SODIUM 100 MG/1
100 CAPSULE, LIQUID FILLED ORAL 2 TIMES DAILY
Status: DISCONTINUED | OUTPATIENT
Start: 2020-08-19 | End: 2020-08-25 | Stop reason: HOSPADM

## 2020-08-19 RX ORDER — LACTOBACILLUS RHAMNOSUS GG 10B CELL
1 CAPSULE ORAL
Status: DISCONTINUED | OUTPATIENT
Start: 2020-08-19 | End: 2020-08-25 | Stop reason: HOSPADM

## 2020-08-19 RX ORDER — ONDANSETRON 2 MG/ML
4 INJECTION INTRAMUSCULAR; INTRAVENOUS EVERY 4 HOURS PRN
Status: DISCONTINUED | OUTPATIENT
Start: 2020-08-19 | End: 2020-08-25 | Stop reason: HOSPADM

## 2020-08-19 RX ORDER — POLYETHYLENE GLYCOL 3350 17 G/17G
17 POWDER, FOR SOLUTION ORAL DAILY PRN
Status: DISCONTINUED | OUTPATIENT
Start: 2020-08-19 | End: 2020-08-19

## 2020-08-19 RX ORDER — BUTALBITAL, ASPIRIN, AND CAFFEINE 325; 50; 40 MG/1; MG/1; MG/1
1 CAPSULE ORAL EVERY 6 HOURS PRN
Status: DISCONTINUED | OUTPATIENT
Start: 2020-08-19 | End: 2020-08-19

## 2020-08-19 RX ORDER — DICYCLOMINE HYDROCHLORIDE 10 MG/1
20 CAPSULE ORAL 2 TIMES DAILY PRN
Status: DISCONTINUED | OUTPATIENT
Start: 2020-08-19 | End: 2020-08-25 | Stop reason: HOSPADM

## 2020-08-19 RX ORDER — ACETAMINOPHEN 325 MG/1
650 TABLET ORAL EVERY 6 HOURS PRN
Status: DISCONTINUED | OUTPATIENT
Start: 2020-08-19 | End: 2020-08-25 | Stop reason: HOSPADM

## 2020-08-19 RX ORDER — TEMAZEPAM 15 MG/1
30 CAPSULE ORAL NIGHTLY PRN
Status: DISCONTINUED | OUTPATIENT
Start: 2020-08-19 | End: 2020-08-19 | Stop reason: CLARIF

## 2020-08-19 RX ORDER — PREGABALIN 75 MG/1
150 CAPSULE ORAL 2 TIMES DAILY
Status: DISCONTINUED | OUTPATIENT
Start: 2020-08-19 | End: 2020-08-25 | Stop reason: HOSPADM

## 2020-08-19 RX ORDER — ACYCLOVIR 800 MG/1
400 TABLET ORAL 2 TIMES DAILY
Status: DISCONTINUED | OUTPATIENT
Start: 2020-08-19 | End: 2020-08-19

## 2020-08-19 RX ORDER — DIVALPROEX SODIUM 250 MG/1
250 TABLET, DELAYED RELEASE ORAL 2 TIMES DAILY
Status: DISCONTINUED | OUTPATIENT
Start: 2020-08-19 | End: 2020-08-25 | Stop reason: HOSPADM

## 2020-08-19 RX ORDER — MAGNESIUM CARB/ALUMINUM HYDROX 105-160MG
30 TABLET,CHEWABLE ORAL 2 TIMES DAILY
Status: DISCONTINUED | OUTPATIENT
Start: 2020-08-19 | End: 2020-08-25 | Stop reason: HOSPADM

## 2020-08-19 RX ORDER — ALPRAZOLAM 0.5 MG/1
1 TABLET ORAL 3 TIMES DAILY PRN
Status: DISCONTINUED | OUTPATIENT
Start: 2020-08-19 | End: 2020-08-25 | Stop reason: HOSPADM

## 2020-08-19 RX ORDER — 0.9 % SODIUM CHLORIDE 0.9 %
1000 INTRAVENOUS SOLUTION INTRAVENOUS ONCE
Status: COMPLETED | OUTPATIENT
Start: 2020-08-19 | End: 2020-08-19

## 2020-08-19 RX ORDER — ONDANSETRON 2 MG/ML
4 INJECTION INTRAMUSCULAR; INTRAVENOUS ONCE
Status: COMPLETED | OUTPATIENT
Start: 2020-08-19 | End: 2020-08-19

## 2020-08-19 RX ORDER — POLYETHYLENE GLYCOL 3350 17 G/17G
17 POWDER, FOR SOLUTION ORAL 3 TIMES DAILY
Status: DISCONTINUED | OUTPATIENT
Start: 2020-08-19 | End: 2020-08-19

## 2020-08-19 RX ORDER — PREGABALIN 100 MG/1
200 CAPSULE ORAL 3 TIMES DAILY
Status: DISCONTINUED | OUTPATIENT
Start: 2020-08-19 | End: 2020-08-19 | Stop reason: DRUGHIGH

## 2020-08-19 RX ORDER — LORAZEPAM 2 MG/ML
0.5 INJECTION INTRAMUSCULAR 3 TIMES DAILY PRN
Status: DISCONTINUED | OUTPATIENT
Start: 2020-08-19 | End: 2020-08-25 | Stop reason: HOSPADM

## 2020-08-19 RX ORDER — DEXTROSE, SODIUM CHLORIDE, AND POTASSIUM CHLORIDE 5; .45; .3 G/100ML; G/100ML; G/100ML
INJECTION INTRAVENOUS CONTINUOUS
Status: DISCONTINUED | OUTPATIENT
Start: 2020-08-19 | End: 2020-08-25 | Stop reason: HOSPADM

## 2020-08-19 RX ORDER — LORAZEPAM 0.5 MG/1
0.5 TABLET ORAL NIGHTLY PRN
Status: DISCONTINUED | OUTPATIENT
Start: 2020-08-19 | End: 2020-08-25 | Stop reason: HOSPADM

## 2020-08-19 RX ORDER — ONDANSETRON 4 MG/1
4 TABLET, ORALLY DISINTEGRATING ORAL EVERY 8 HOURS PRN
Status: DISCONTINUED | OUTPATIENT
Start: 2020-08-19 | End: 2020-08-25 | Stop reason: HOSPADM

## 2020-08-19 RX ADMIN — ALPRAZOLAM 1 MG: 0.5 TABLET ORAL at 17:28

## 2020-08-19 RX ADMIN — ONDANSETRON 4 MG: 2 INJECTION INTRAMUSCULAR; INTRAVENOUS at 13:14

## 2020-08-19 RX ADMIN — POLYETHYLENE GLYCOL-3350 AND ELECTROLYTES 4000 ML: 236; 6.74; 5.86; 2.97; 22.74 POWDER, FOR SOLUTION ORAL at 11:24

## 2020-08-19 RX ADMIN — PREGABALIN 150 MG: 75 CAPSULE ORAL at 09:37

## 2020-08-19 RX ADMIN — POTASSIUM CHLORIDE, DEXTROSE MONOHYDRATE AND SODIUM CHLORIDE: 300; 5; 450 INJECTION, SOLUTION INTRAVENOUS at 13:14

## 2020-08-19 RX ADMIN — PHENOL 1 SPRAY: 1.5 LIQUID ORAL at 13:14

## 2020-08-19 RX ADMIN — ONDANSETRON 4 MG: 4 TABLET, ORALLY DISINTEGRATING ORAL at 09:43

## 2020-08-19 RX ADMIN — METHYLNALTREXONE BROMIDE 12 MG: 12 INJECTION, SOLUTION SUBCUTANEOUS at 00:44

## 2020-08-19 RX ADMIN — LORAZEPAM 0.5 MG: 0.5 TABLET ORAL at 23:19

## 2020-08-19 RX ADMIN — ACETAMINOPHEN 650 MG: 325 TABLET ORAL at 11:23

## 2020-08-19 RX ADMIN — DOCUSATE SODIUM 100 MG: 100 CAPSULE, LIQUID FILLED ORAL at 09:37

## 2020-08-19 RX ADMIN — METHYLNALTREXONE BROMIDE 12 MG: 12 INJECTION, SOLUTION SUBCUTANEOUS at 21:34

## 2020-08-19 RX ADMIN — Medication 1 CAPSULE: at 11:23

## 2020-08-19 RX ADMIN — ONDANSETRON 4 MG: 2 INJECTION INTRAMUSCULAR; INTRAVENOUS at 03:18

## 2020-08-19 RX ADMIN — MAGNESIUM CITRATE 296 ML: 1.75 LIQUID ORAL at 00:44

## 2020-08-19 RX ADMIN — DIVALPROEX SODIUM 250 MG: 250 TABLET, DELAYED RELEASE ORAL at 20:53

## 2020-08-19 RX ADMIN — QUETIAPINE FUMARATE 200 MG: 100 TABLET ORAL at 23:19

## 2020-08-19 RX ADMIN — POTASSIUM CHLORIDE, DEXTROSE MONOHYDRATE AND SODIUM CHLORIDE: 300; 5; 450 INJECTION, SOLUTION INTRAVENOUS at 05:49

## 2020-08-19 RX ADMIN — PREGABALIN 150 MG: 75 CAPSULE ORAL at 21:35

## 2020-08-19 RX ADMIN — ONDANSETRON 4 MG: 2 INJECTION INTRAMUSCULAR; INTRAVENOUS at 17:31

## 2020-08-19 RX ADMIN — DIVALPROEX SODIUM 250 MG: 250 TABLET, DELAYED RELEASE ORAL at 09:37

## 2020-08-19 RX ADMIN — LORAZEPAM 0.5 MG: 2 INJECTION INTRAMUSCULAR; INTRAVENOUS at 19:04

## 2020-08-19 RX ADMIN — ALPRAZOLAM 1 MG: 0.5 TABLET ORAL at 09:43

## 2020-08-19 RX ADMIN — LURASIDONE HYDROCHLORIDE 20 MG: 20 TABLET, FILM COATED ORAL at 09:37

## 2020-08-19 RX ADMIN — SODIUM CHLORIDE 1000 ML: 9 INJECTION, SOLUTION INTRAVENOUS at 03:18

## 2020-08-19 ASSESSMENT — ENCOUNTER SYMPTOMS
WHEEZING: 0
VOMITING: 1
EYE DISCHARGE: 0
SHORTNESS OF BREATH: 0
BACK PAIN: 0
EYE REDNESS: 0
EYE PAIN: 0
BLOOD IN STOOL: 0
COLOR CHANGE: 0
ABDOMINAL PAIN: 1
NAUSEA: 1
COUGH: 0
EYE ITCHING: 0
PHOTOPHOBIA: 0
ABDOMINAL DISTENTION: 1
STRIDOR: 0
CHEST TIGHTNESS: 0
CONSTIPATION: 1
APNEA: 0
CHOKING: 0
NAUSEA: 0

## 2020-08-19 ASSESSMENT — PAIN - FUNCTIONAL ASSESSMENT
PAIN_FUNCTIONAL_ASSESSMENT: PREVENTS OR INTERFERES SOME ACTIVE ACTIVITIES AND ADLS
PAIN_FUNCTIONAL_ASSESSMENT: PREVENTS OR INTERFERES SOME ACTIVE ACTIVITIES AND ADLS

## 2020-08-19 ASSESSMENT — PAIN DESCRIPTION - ORIENTATION
ORIENTATION: OTHER (COMMENT)
ORIENTATION: OTHER (COMMENT)

## 2020-08-19 ASSESSMENT — PAIN DESCRIPTION - DESCRIPTORS
DESCRIPTORS: CRAMPING
DESCRIPTORS: CRAMPING

## 2020-08-19 ASSESSMENT — PAIN DESCRIPTION - FREQUENCY
FREQUENCY: CONTINUOUS
FREQUENCY: CONTINUOUS

## 2020-08-19 ASSESSMENT — PAIN DESCRIPTION - ONSET
ONSET: ON-GOING
ONSET: ON-GOING

## 2020-08-19 ASSESSMENT — PAIN DESCRIPTION - LOCATION
LOCATION: ABDOMEN
LOCATION: ABDOMEN

## 2020-08-19 ASSESSMENT — PAIN DESCRIPTION - PAIN TYPE
TYPE: ACUTE PAIN
TYPE: ACUTE PAIN

## 2020-08-19 ASSESSMENT — PAIN SCALES - GENERAL
PAINLEVEL_OUTOF10: 8
PAINLEVEL_OUTOF10: 8
PAINLEVEL_OUTOF10: 4

## 2020-08-19 ASSESSMENT — PAIN DESCRIPTION - PROGRESSION
CLINICAL_PROGRESSION: GRADUALLY WORSENING
CLINICAL_PROGRESSION: GRADUALLY IMPROVING

## 2020-08-19 NOTE — PROGRESS NOTES
NG tube reconnected to continuous low wall suction. Patient continues to experience nausea. This RN spoke on the phone with Dr. Jarod Dennis regarding ongoing nausea and intolerance of GoLytely administration. Verbal order obtained for PRN IV Phenergan for nausea management. See orders. Physician also requesting this RN to check with pharmacy regarding secondary dosing of Relistor tonight. This RN spoke with pharmacist Racheal who states patient is okay to have 12mg of Relistor tonight at 2100. Order placed. Will continue to monitor and assess.

## 2020-08-19 NOTE — PROGRESS NOTES
Comprehensive Nutrition Assessment    Type and Reason for Visit:  Initial, Positive Nutrition Screen    Nutrition Recommendations/Plan:   Will monitor for nutrition needs when po resumes     Nutrition Assessment:  + Screen for malnutrition score of 2. Pt admitted with constipation, did not have a BM for last 8-9 days. Does have some nausea. Currently NPO. Poor intake recently d/t constipation, did not note any wt loss per EMR. Malnutrition Assessment:  Malnutrition Status: At risk for malnutrition (Comment)    Context:  Acute Illness     Findings of the 6 clinical characteristics of malnutrition:  Energy Intake:  1 - 75% or less of estimated energy requirements for 7 or more days  Weight Loss:  No significant weight loss     Body Fat Loss:  No significant body fat loss     Muscle Mass Loss:  No significant muscle mass loss    Fluid Accumulation:  No significant fluid accumulation     Strength:  Not Performed    Estimated Daily Nutrient Needs:  Energy (kcal):  6757-6659 kcal (20-25 kcal/kg ABW); Weight Used for Energy Requirements:  Current     Protein (g):  79-95 gm (1-1.2 gm/kg ABW); Weight Used for Protein Requirements:  Current        Fluid (ml/day):  1 ml/kcal; Weight Used for Fluid Requirements:  Current      Nutrition Related Findings:  Hx on IBS, COPD; reviewed labs      Wounds:  None       Current Nutrition Therapies:    Diet NPO Effective Now    Anthropometric Measures:  · Height: 5' 4\" (162.6 cm)  · Current Body Weight: 173 lb (78.5 kg)   · Admission Body Weight: 175 lb (79.4 kg)    · Usual Body Weight: 175 lb (79.4 kg)     · Ideal Body Weight: 120 lbs; % Ideal Body Weight 144.2 %   · BMI: 29.7  · Adjusted Body Weight:  ; No Adjustment   · BMI Categories: Overweight (BMI 25.0-29. 9)       Nutrition Diagnosis:   · Inadequate oral intake related to altered GI function(constipation) as evidenced by poor intake prior to admission    Nutrition Interventions:   Food and/or Nutrient Delivery: Continue NPO(start nutrition when appropriate)  Nutrition Education/Counseling:  No recommendation at this time   Coordination of Nutrition Care:  Continued Inpatient Monitoring    Goals: Tolerate diet and consume greater than 50% of meals and supplements when po initiated       Nutrition Monitoring and Evaluation:   Behavioral-Environmental Outcomes:  (na)   Food/Nutrient Intake Outcomes:  Food and Nutrient Intake  Physical Signs/Symptoms Outcomes:  Constipation, Biochemical Data, Nutrition Focused Physical Findings, Skin, Weight     Discharge Planning:     Too soon to determine     Electronically signed by Amber Martin RD, LD on 8/19/20 at 11:06 AM EDT    Contact: [phone]

## 2020-08-19 NOTE — ED PROVIDER NOTES
Attending Supervising Physicians Attestation Statement  I was present with the Mid Level Provider during the history and exam. I discussed the findings and plans with the Mid Level Provider and agree as documented in his note     [address]      Pt Name: Nasra Godinez  MRN: [id number]  Rigogfantonio 1980  Date of evaluation: 8/18/2020  Provider: Viola Middleton MD    [address] [place] [address]       Chief Complaint   Patient presents with    Constipation     No bowel movement in 10 days. States she was here three days ago and DC'd with miralax with no bowel movement. HISTORY OF PRESENT ILLNESS    Anjelica Hinton is a 41 y.o. female who presents to the emergency department with constipation. No BM for 10 days. + for N/V. + for abdominal distension and pain. Pain is 2/10 crampy and constant in nature. Has been taking laxaitves with minimal relief. Nothing makes symptoms better but movement makes symptoms worse. This has happened before as Hx IBS-C. No other associated symptoms other than previously mentioned. Denies flatus. Nursing Notes were reviewed. Including nursing noted for FM, Surgical History, Past Medical History, Social History, vitals, and allergies; agree with all. REVIEW OF SYSTEMS       Review of Systems   Constitutional: Negative for activity change, appetite change, chills, diaphoresis, fatigue, fever and unexpected weight change. HENT: Negative for congestion, dental problem, drooling and ear discharge. Eyes: Negative for photophobia, pain, discharge, redness and itching. Respiratory: Negative for apnea, cough, choking, chest tightness, shortness of breath, wheezing and stridor. Cardiovascular: Negative for chest pain and leg swelling. Gastrointestinal: Positive for abdominal distention, abdominal pain, constipation, nausea and vomiting. Endocrine: Negative for polyphagia and polyuria.    Genitourinary: Negative for vaginal bleeding, vaginal discharge and vaginal pain. Musculoskeletal: Negative for arthralgias. Neurological: Negative for dizziness, facial asymmetry and headaches. Hematological: Negative for adenopathy. Does not bruise/bleed easily. Psychiatric/Behavioral: Negative for agitation, behavioral problems, confusion, decreased concentration, dysphoric mood, hallucinations, self-injury, sleep disturbance and suicidal ideas. The patient is not nervous/anxious and is not hyperactive. Except as noted above the remainder of the review of systems was reviewed and negative. PAST MEDICAL HISTORY     Past Medical History:   Diagnosis Date    Asthma     Bipolar 1 disorder ([facility] [address]     Compression fracture     COPD (chronic obstructive pulmonary disease) ([facility]     DDD (degenerative disc disease), cervical     Howard-Danlos syndrome     Fibromyalgia     H/O adrenal insufficiency     and pituitary insufficiency    Herniated disc     Metabolic syndrome     Osteoarthritis     Pyelonephritis     Sciatica        SURGICAL HISTORY       Past Surgical History:   Procedure Laterality Date    KNEE SURGERY      LUMBAR FUSION N/A 6/3/2019    L5, S1 TRANSFORAMINAL LUMBAR INTERBODY FUSION performed by Rosita Lao MD at [facility] Left     repair       CURRENT MEDICATIONS       Previous Medications    ACYCLOVIR (ZOVIRAX) 400 MG TABLET    Take 400 mg by mouth as needed     ALPRAZOLAM (XANAX) 1 MG TABLET    Take 1 mg by mouth 3 times daily as needed for Sleep or Anxiety. BUTALBITAL-ASPIRIN-CAFFEINE (FIORINAL) -40 MG CAPSULE    Take 1 capsule by mouth every 6 hours as needed for Headaches for up to 5 days.     CETIRIZINE HCL (ZYRTEC ALLERGY) 10 MG CAPS    Take by mouth daily    DIVALPROEX (DEPAKOTE) 250 MG DR TABLET    Take 1 tablet by mouth 2 times daily    KETOCONAZOLE (NIZORAL) 2 % SHAMPOO    Apply topically Twice a Week    LATUDA 20 MG TABS TABLET    Take 20 mg by mouth daily MIDODRINE (PROAMATINE) 2.5 MG TABLET    TAKE 1 TABLET BY MOUTH THREE TIMES A DAY    NITROFURANTOIN, MACROCRYSTAL-MONOHYDRATE, (MACROBID) 100 MG CAPSULE    Take 1 capsule by mouth 2 times daily for 5 days    ONDANSETRON (ZOFRAN ODT) 4 MG DISINTEGRATING TABLET    Take 1 tablet by mouth every 8 hours as needed for Nausea    OXYCODONE-ACETAMINOPHEN (PERCOCET) 5-325 MG PER TABLET    Take 1 tablet by mouth. POLYETHYLENE GLYCOL (MIRALAX) 17 G PACKET    Take 17 g by mouth daily as needed for Constipation    PREGABALIN (LYRICA) 200 MG CAPSULE    Take 200 mg by mouth three times daily. PROBIOTIC PRODUCT (PROBIOTIC-10 PO)    Take by mouth daily    QUETIAPINE (SEROQUEL) 200 MG TABLET    Take 200 mg by mouth nightly    RANITIDINE (ZANTAC) 150 MG TABLET    Take 150 mg by mouth 2 times daily    STOOL SOFTENER 100 MG CAPSULE    Take 100 mg by mouth 2 times daily    TEMAZEPAM (RESTORIL) 30 MG CAPSULE    Take 30 mg by mouth nightly as needed for Sleep. .    TRULANCE 3 MG TABS    Take 3 mg by mouth daily       ALLERGIES     Metoclopramide; Quetiapine; Risperidone; Trazodone; Buprenorphine-naloxone; Morphine; Asenapine; Buprenorphine hcl-naloxone hcl; Codeine; Guanfacine hcl; Morphine and related; Reglan [metoclopramide hcl]; Risperidone and related; Seroquel  [quetiapine fumarate]; Seroquel [quetiapine fumarate];  Trazodone and nefazodone; and Lurasidone    FAMILY HISTORY        Family History   Problem Relation Age of Onset    Diabetes type 2  Mother     Diabetes type 2  Father     Cancer Sister     Diabetes type 2  Brother        SOCIAL HISTORY       Social History     Socioeconomic History    Marital status: Single     Spouse name: None    Number of children: None    Years of education: None    Highest education level: None   Occupational History    None   Social Needs    Financial resource strain: None    Food insecurity     Worry: None     Inability: None    Transportation needs     Medical: None     Non-medical: None   Tobacco Use    Smoking status: Former Smoker     Packs/day: 1.00     Types: Cigarettes     Last attempt to quit: 2018     Years since quittin.6    Smokeless tobacco: Never Used   Substance and Sexual Activity    Alcohol use: Not Currently     Comment: socially    Drug use: No     Comment: Denies    Sexual activity: Not Currently   Lifestyle    Physical activity     Days per week: None     Minutes per session: None    Stress: None   Relationships    Social connections     Talks on phone: None     Gets together: None     Attends [employer] service: None     Active member of club or organization: None     Attends meetings of clubs or organizations: None     Relationship status: None    Intimate partner violence     Fear of current or ex partner: None     Emotionally abused: None     Physically abused: None     Forced sexual activity: None   Other Topics Concern    None   Social History Narrative    None       PHYSICAL EXAM       ED Triage Vitals   BP Temp Temp Source Pulse Resp SpO2 Height Weight   20 -- 20 2343   124/81 98.3 °F (36.8 °C) Oral 87 20 100 %  175 lb 0.7 oz (79.4 kg)       Physical Exam  Vitals signs and nursing note reviewed. Constitutional:       General: She is not in acute distress. Appearance: She is well-developed. She is not ill-appearing, toxic-appearing or diaphoretic. HENT:      Head: Normocephalic and atraumatic. Right Ear: External ear normal.      Left Ear: External ear normal.   Eyes:      General:         Right eye: No discharge. Left eye: No discharge. Conjunctiva/sclera: Conjunctivae normal.      Pupils: Pupils are equal, round, and reactive to light. Neck:      Musculoskeletal: Normal range of motion and neck supple. Cardiovascular:      Rate and Rhythm: Normal rate and regular rhythm. Heart sounds: No murmur.    Pulmonary:      Effort: Pulmonary effort is normal. No respiratory distress. Breath sounds: Normal breath sounds. No wheezing or rales. Abdominal:      General: Bowel sounds are normal. There is distension. Palpations: Abdomen is soft. There is no mass. Tenderness: There is no abdominal tenderness. There is no guarding or rebound. Genitourinary:     Comments: Deferred  Musculoskeletal: Normal range of motion. General: No deformity. Skin:     General: Skin is warm. Findings: No erythema or rash. Neurological:      Mental Status: She is alert and oriented to person, place, and time. She is not disoriented. Cranial Nerves: No cranial nerve deficit. Motor: No atrophy or abnormal muscle tone. Coordination: Coordination normal.   Psychiatric:         Behavior: Behavior normal.         Thought Content: Thought content normal.         DIAGNOSTIC RESULTS     EKG: All EKG's are interpreted by the Emergency Department Physician who either signs or Co-signs this chart in the absence of acardiologist.    None    RADIOLOGY:   Non-plain film images such as CT, Ultrasoundand MRI are read by the radiologist. Plain radiographic images are visualized and preliminarily interpreted by the emergency physician with the below findings:    Impression    1. Urinary bladder wall thickening likely represents cystitis. 2. Large amount of stool throughout the colon with no acute abnormality    identified.       Impression    Large colonic stool volume without obstruction.           ED BEDSIDE ULTRASOUND:   Performed by ED Physician - none    LABS:  Labs Reviewed   CBC WITH AUTO DIFFERENTIAL - Abnormal; Notable for the following components:       Result Value    WBC 12.2 (*)     Neutrophils Absolute 9.1 (*)     All other components within normal limits    Narrative:     Performed at:  [address] [address]   Phone [phone]   COMPREHENSIVE METABOLIC PANEL W/ REFLEX TO MG FOR LOW K - Abnormal; Notable for the following components:    CO2 17 (*)     Anion Gap 17 (*)     AST 41 (*)     All other components within normal limits    Narrative:     Performed at:  [address] [facility]   Phone [phone]   URINE RT REFLEX TO CULTURE - Abnormal; Notable for the following components:    Clarity, UA CLOUDY (*)     Ketones, Urine TRACE (*)     Leukocyte Esterase, Urine TRACE (*)     All other components within normal limits    Narrative:     Performed at:  [address] [facility]   Phone [phone]   MICROSCOPIC URINALYSIS - Abnormal; Notable for the following components:    Bacteria, UA RARE (*)     Epithelial Cells, UA 9 (*)     All other components within normal limits    Narrative:     Performed at:  [address] [employer]   Phone [phone]   LIPASE    Narrative:     Performed at:  [address] [place] [address] [facility]   Phone [phone]   HCG, SERUM, QUALITATIVE    Narrative:     Performed at:  [address] [facility]   Phone [phone]       All other labs were withinnormal range or not returned as of this dictation. EMERGENCY DEPARTMENT COURSE and DIFFERENTIAL DIAGNOSIS/MDM:     PMH, Surgical Hx, FH, Social Hx reviewed by myself (ETOH usage, Tobacco usage, Drug usage reviewed by myself, no pertinent Hx)- No Pertinent Hx     Old records were reviewed by me     41 yr old with ileus from IBS-C. Bowel regimen and admission to Dr Francy Fair. CRITICAL CARE TIME   Total Critical Caretime was 23 minutes, excluding separately reportable procedures.   There was a high probability of clinically significant/life threatening deterioration in the patient's condition which required my urgent intervention. PROCEDURES:  Unlessotherwise noted below, none    FINAL IMPRESSION      1. Constipation, unspecified constipation type    2. Non-intractable vomiting with nausea, unspecified vomiting type    3.  Generalized abdominal pain          DISPOSITION/PLAN   DISPOSITION Admitted 08/19/2020 03:15:22 AM    PATIENT REFERRED TO:  Katerin Dubois MD  [facility] [phone]            DISCHARGE MEDICATIONS:  New Prescriptions    No medications on file          (Please note that portions ofthis note were completed with a voice recognition program.  Efforts were made to edit the dictations but occasionally words are mis-transcribed.)    Jacinta Espana MD(electronically signed)  Attending Emergency Physician           Jacinta Espana MD  08/19/20 [id number]

## 2020-08-19 NOTE — PROGRESS NOTES
Patient with 2 episodes of emesis after administration of PO PRN Xanax and PRN IV Zofran. Patient requesting additional dose of PO Xanax as it is unlikely that the medication was able to be dissolved before the emesis occured. This RN sent a Perfect Serve to Dr. Sujata Low regarding this request. RN also requesting medication be changed from PO to IV r/t ongoing nausea/vomiting . Call back received by this RN from MD. Adrian Odor order obtained for 0.5 mg IV Ativan PRN TID. Orders placed. Patient aware. MD advised this RN to administer Ativan in approximately 30 minutes. See eMAR for documentation. Will continue to monitor and assess.

## 2020-08-19 NOTE — PROGRESS NOTES
Dr. Brown Erm into see Pt and notified in person about pt refusing NG tube. Dr. Stephanie Carmona stating just to leave order for now.  Electronically signed by Rashad Clement RN on 8/19/2020 at 6:48 AM

## 2020-08-19 NOTE — PROGRESS NOTES
Pharmacy Medication Reconciliation Note     List of medications patient is currently taking is complete. Source of information:   1. Phone interview with patient  2. EMR      Notes regarding home medications:   1. Patient states she recently got a prescription from her physician to increase her Lyrica to 200 mg twice daily, but she has not filled it yet.       Olya Jain PharmD, BCPS  8/19/2020  11:20 AM

## 2020-08-19 NOTE — PROGRESS NOTES
This RN attempted to administer more GoLytely to patient via NG. Patient refusing for c/o increasing nausea and dry heaves. PRN Zofran has been administered throughout shift for c/o nausea. See eMAR for documentation. Patient reports satisfaction with this intervention and declines further needs. Patient currently sitting on bed side commode and was able to produce a very small BM. Patient requesting NG tube be disconnected from low continuous wall suction temporarily r/t nausea. This RN provided education on the importance of NG tube being connected to LCW suction and the possibiliyt that it may help with nausea, but patient continued to insist that it be disconnected for the time being. This RN disconnected. Will continue to monitor and assess.

## 2020-08-19 NOTE — PROGRESS NOTES
Patient to [facility] with hospital transport. NG tube clamped. Patient only able to tolerate approximately 250 mL of GoLytely administration prior to departure to [facility]. Patient reports she feels that the \"liquid is trying to come back up\" with administration and is requesting this RN to wait longer between administrations. This RN will continue to attempt to administer GoLytely via NG tube once patient returns to unit.

## 2020-08-19 NOTE — H&P
[address] [address]                              HISTORY AND PHYSICAL    PATIENT NAME: Fredi Hernandez                    :        1980  MED REC NO:   [id number]                          ROOM:       3118  ACCOUNT NO:   [de-identified]                           ADMIT DATE: 2020  PROVIDER:     Vazquez Childers MD    ATTENDING PROVIDER:  Vazquez Childers MD    CHIEF COMPLAINT:  Constipation, abdominal pain. HISTORY OF PRESENT ILLNESS:  This 57-year-old female patient came to the  emergency room because of constipation. The patient's last bowel  movement was around eight to nine days ago. The patient received stool  softeners and other kind of medication as outpatient basis but still no  bowel movements. The patient has oxycodone for her chronic pain  problems, had knee surgery, low back pain, shoulder problems. The  patient also has a history of IBSD. The patient has mild nausea. No  vomiting. The patient had x-ray. It showed large stools in the colon  without any obstruction. The patient is refusing the NG tube placement. We asked the GI and Surgery to see the patient. The patient is  afebrile. PAST MEDICAL HISTORY:  Significant for history of chronic pain, history  of substance abuse, lumbar surgery, shoulder surgery, IBSD, borderline  personality disorder, anxiety, insomnia, fibromyalgia, history of UTI. PAST SURGICAL HISTORY:  Significant for lumbar fusion, knee surgery,  shoulder surgery. ALLERGIES:  Has multiple drug allergies including METOCLOPRAMIDE,  QUETIAPINE, RISPERIDONE, TRAZODONE, BUPRENORPHINE, NALOXONE, MORPHINE,  CODEINE, GUANFACINE, REGLAN. MEDICATIONS:  See the reconciliation sheet. No current facility-administered medications for this encounter.       Current Outpatient Medications   Medication Sig Dispense Refill    dicyclomine (BENTYL) 10 MG capsule Take 2 capsules by mouth 2 times daily as needed (cramping) 120 capsule 3    polyethylene glycol (MIRALAX) 17 g packet Take 17 g by mouth daily as needed for Constipation 527 g 0    midodrine (PROAMATINE) 2.5 MG tablet TAKE 1 TABLET BY MOUTH THREE TIMES A DAY 90 tablet 0    divalproex (DEPAKOTE) 250 MG DR tablet Take 1 tablet by mouth 2 times daily 90 tablet 3    STOOL SOFTENER 100 MG capsule Take 100 mg by mouth 2 times daily      LATUDA 20 MG TABS tablet Take 20 mg by mouth daily  3    QUEtiapine (SEROQUEL) 200 MG tablet Take 200 mg by mouth nightly  2    pregabalin (LYRICA) 150 MG capsule Take 150 mg by mouth 2 times daily.  ALPRAZolam (XANAX) 1 MG tablet Take 1 mg by mouth 3 times daily as needed for Sleep or Anxiety.  Cetirizine HCl (ZYRTEC ALLERGY) 10 MG CAPS Take by mouth daily      Probiotic Product (PROBIOTIC-10 PO) Take by mouth daily      temazepam (RESTORIL) 30 MG capsule Take 30 mg by mouth nightly as needed for Sleep. Genetta Dieter acyclovir (ZOVIRAX) 400 MG tablet Take 400 mg by mouth as needed       ketoconazole (NIZORAL) 2 % shampoo Apply topically Twice a Week  4    TRULANCE 3 MG TABS Take 3 mg by mouth daily  11       FAMILY HISTORY:  Has history of degenerative arthritis and hypertension  in father. SOCIAL HISTORY:  The patient lives with the boyfriend, has children. Nonsmoker. Nondrinker. REVIEW OF SYSTEMS:  No headache. No visual symptom. No swallowing  problem. No chest pain. No palpitation. No cough. Has some abdominal  pain, constipation. Chronic pain, back pain, fibromyalgia pain. The  patient has anxiety, sleeping problems. No dysuria. No fever, no  chills. PHYSICAL EXAMINATION:  VITAL SIGNS:  The patient's blood pressure was 124/81, respiratory rate  was 20, pulse 87, temperature 98.3. The patient weighed 173 pounds. Saturation was 100% on room air. GENERAL:  The patient is alert and oriented, well developed, well  nourished, in mild distress due to the pain.   SKIN: Warm and dry. HEENT:  Head:  Normocephalic, atraumatic. Pupils are equal, both sides,  reactive to light and accommodation. Ears, Nose, and Throat: Within  normal limits. Mucous membranes are moist.  NECK:  Supple. No JVD. No lymphadenopathy. No thyromegaly. LUNGS:  Clear bilaterally. HEART:  S1, S2.  Regular rhythm. ABDOMEN:  Mildly distended. Very sluggish bowel movements. Mildly  tender. EXTREMITIES:  No edema. No cyanosis. No clubbing. NEUROLOGIC:  The patient has some chronic low back pain. LABORATORY DATA:  WBC count was 6.3, hemoglobin 13.8, hematocrit 40.6,  platelet count 016. Sodium was 139, potassium 4.6, chloride 102, CO2 of  26, BUN 18, creatinine 0.8, glucose was 76. ProBNP 208. Troponin less  than 0.01. LFTs were normal.  HCG negative. Urine with small leukocyte  esterase, rest is negative. X-ray showed lots of constipation. The patient also had IUD in place. Some bladder thickening. The rest of the organs was negative. ASSESSMENT:  Abdominal pain, opioid-induced constipation, chronic pain  after back surgery, knee surgery, shoulder surgery, fibromyalgia,  bipolar disorder, chronic insomnia, anxiety disorder. PLAN:  The patient is going to be seen by GI as well as Surgery. No NG  tube necessary at this time. Will receive Relistor stool softener, IV  fluid, enema. Resume some home medication. Hold opioid and hopefully  the patient is going to have a bowel movement. I spent more than 30 minutes on face-to-face evaluation with the  patient. I discussed the management and findings with the patient and  nursing staff.         Sam Craig MD    D: 08/19/2020 7:10:10       T: 08/19/2020 8:40:15     [email or web address]  Job#: [id number]     Doc#: [id number]    CC:

## 2020-08-19 NOTE — ED PROVIDER NOTES
[address] [facility]        Pt Name: Maria Victoria Hedrick  MRN: [id number]  [place] 1980  Date of evaluation: 8/18/2020  Provider: ANNA Eller - CNP  PCP: Breezy Whitman MD     I have seen and evaluated this patient with my supervising physician Dread Blandon MD.    [address] [place] [address]       Chief Complaint   Patient presents with    Constipation     No bowel movement in 10 days. States she was here three days ago and DC'd with miralax with no bowel movement. HISTORY OF PRESENT ILLNESS   (Location, Timing/Onset, Context/Setting, Quality, Duration, Modifying Factors, Severity, Associated Signs and Symptoms)  Note limiting factors. Maria Victoria Hedrick is a 41 y.o. female who takes chronic narcotics who presents to the emergency department today complaining of constipation. She advised has not had a bowel movement in 10 days. She is not passing gas either. She was seen in the emergency department 3 days ago and had a CT scan which showed a large amount of colonic stool. She is tried multiple things including enemas, stool softeners, and MiraLAX without success. Her abdomen feels painful and bloated. She started having emesis while in ER. No fevers. Nursing Notes were all reviewed and agreed with or any disagreements were addressed in the HPI. REVIEW OF SYSTEMS    (2-9 systems for level 4, 10 or more for level 5)     Review of Systems   Constitutional: Negative for chills, diaphoresis and fever. Respiratory: Negative for cough and shortness of breath. Cardiovascular: Negative for chest pain. Gastrointestinal: Positive for abdominal distention, abdominal pain, constipation and vomiting. Negative for blood in stool and nausea. Genitourinary: Negative for dysuria, flank pain, frequency, hematuria, pelvic pain, vaginal bleeding and vaginal discharge. Musculoskeletal: Negative for back pain.    Skin: Negative for color 08/18/20 1954 -- 08/18/20 2343   124/81 98.3 °F (36.8 °C) Oral 87 20 100 %  175 lb 0.7 oz (79.4 kg)       Physical Exam  Vitals signs and nursing note reviewed. Constitutional:       General: She is not in acute distress. Appearance: Normal appearance. She is well-developed. She is not toxic-appearing. HENT:      Head: Normocephalic and atraumatic. Eyes:      General: No scleral icterus. Conjunctiva/sclera: Conjunctivae normal.   Neck:      Musculoskeletal: Normal range of motion. Vascular: No JVD. Cardiovascular:      Rate and Rhythm: Normal rate and regular rhythm. Heart sounds: Normal heart sounds. Pulmonary:      Effort: Pulmonary effort is normal. No respiratory distress. Breath sounds: Normal breath sounds. Abdominal:      General: Bowel sounds are normal.      Palpations: Abdomen is soft. Abdomen is not rigid. Tenderness: There is generalized abdominal tenderness. There is no guarding or rebound. Musculoskeletal: Normal range of motion. Skin:     General: Skin is warm and dry. Findings: No rash. Neurological:      General: No focal deficit present. Mental Status: She is alert and oriented to person, place, and time.    Psychiatric:         Mood and Affect: Mood normal.         DIAGNOSTIC RESULTS   LABS:    Labs Reviewed   CBC WITH AUTO DIFFERENTIAL - Abnormal; Notable for the following components:       Result Value    WBC 12.2 (*)     Neutrophils Absolute 9.1 (*)     All other components within normal limits    Narrative:     Performed at:  [facility]  [address] [facility] [address]   Phone [phone]   COMPREHENSIVE METABOLIC PANEL W/ REFLEX TO MG FOR LOW K - Abnormal; Notable for the following components:    CO2 17 (*)     Anion Gap 17 (*)     AST 41 (*)     All other components within normal limits    Narrative:     Performed at:  [address] [place] [address] [address] [facility] [address] [place] [address]   Phone [phone]   URINE RT REFLEX TO CULTURE - Abnormal; Notable for the following components:    Clarity, UA CLOUDY (*)     Ketones, Urine TRACE (*)     Leukocyte Esterase, Urine TRACE (*)     All other components within normal limits    Narrative:     Performed at:  [facility]  [address] [facility] [address] [place] [address]   Phone [phone]   LIPASE    Narrative:     Performed at:  [facility]  [address] [facility] [address] [facility] [address]   Phone [phone]   HCG, SERUM, QUALITATIVE    Narrative:     Performed at:  [facility]  [address] [facility] [address] [facility] [address]   Phone [phone]   MICROSCOPIC URINALYSIS       All other labs were within normal range or not returned as of this dictation. EKG: All EKG's are interpreted by the Emergency Department Physician in the absence of a cardiologist.  Please see their note for interpretation of EKG. RADIOLOGY:   Non-plain film images such as CT, Ultrasound and MRI are read by the radiologist. Plain radiographic images are visualized and preliminarily interpreted by the ED Provider with the below findings:        Interpretation per the Radiologist below, if available at the time of this note:    XR ABDOMEN (KUB) (SINGLE AP VIEW)   Final Result   Large colonic stool volume without obstruction. Xr Abdomen (kub) (single Ap View)    Result Date: 8/19/2020  EXAMINATION: ONE SUPINE XRAY VIEW(S) OF THE ABDOMEN 8/19/2020 1:34 am COMPARISON: CT abdomen pelvis 08/15/2020 HISTORY: ORDERING SYSTEM PROVIDED HISTORY: constipation? TECHNOLOGIST PROVIDED HISTORY: Reason for exam:->constipation? Reason for Exam: constipation? FINDINGS: Large colorectal stool volume. Nonobstructive bowel gas pattern. No free air or portal venous gas appreciated no suspicious calcifications. L5-S1 discectomy and posterior fusion.   IUD projects over the pelvis. Large colonic stool volume without obstruction. Ct Abdomen Pelvis W Iv Contrast Additional Contrast? None    Result Date: 8/16/2020  EXAMINATION: CT OF THE ABDOMEN AND PELVIS WITH CONTRAST 8/15/2020 11:26 pm TECHNIQUE: CT of the abdomen and pelvis was performed with the administration of intravenous contrast. Multiplanar reformatted images are provided for review. Dose modulation, iterative reconstruction, and/or weight based adjustment of the mA/kV was utilized to reduce the radiation dose to as low as reasonably achievable. COMPARISON: None. HISTORY: ORDERING SYSTEM PROVIDED HISTORY: no BM 6 days TECHNOLOGIST PROVIDED HISTORY: Reason for exam:->no BM 6 days Additional Contrast?->None Reason for Exam: no BM 6 days FINDINGS: Lower Chest: There is bibasilar atelectasis. Organs: The liver, spleen, pancreas, gallbladder, adrenal glands and kidneys are unremarkable. GI/Bowel: Small bowel caliber is normal.  The appendix is not identified. The colon is unremarkable though there is a large amount of stool throughout the colon. Pelvis: There is diffuse urinary bladder wall thickening. An IUD is seen within the uterus. Peritoneum/Retroperitoneum: No adenopathy or mesenteric stranding. There is trace fluid adjacent to the distal descending colon. Aortic caliber is normal. Bones/Soft Tissues: No acute findings. Lumbosacral fusion is noted. 1. Urinary bladder wall thickening likely represents cystitis. 2. Large amount of stool throughout the colon with no acute abnormality identified.            PROCEDURES   Unless otherwise noted below, none     Procedures    CRITICAL CARE TIME   N/A    CONSULTS:  IP CONSULT TO PRIMARY CARE PROVIDER  IP CONSULT TO GENERAL SURGERY  IP CONSULT TO GI  IP CONSULT TO GENERAL SURGERY  IP CONSULT TO GI      EMERGENCY DEPARTMENT COURSE and DIFFERENTIAL DIAGNOSIS/MDM:   Vitals:    Vitals:    08/18/20 1954 08/18/20 2343   BP: 124/81    Pulse: 87    Resp: 20    Temp: 98.3 °F (36.8 °C)    TempSrc: Oral    SpO2: 100%    Weight:  175 lb 0.7 oz (79.4 kg)       Patient was given the following medications:  Medications   0.9 % sodium chloride bolus (1,000 mLs Intravenous New Bag 8/19/20 0318)   dextrose 5 % and 0.45 % NaCl with KCl 40 mEq infusion (has no administration in time range)   SMOG Enema (330 mLs Rectal Given 8/18/20 2158)   methylnaltrexone (RELISTOR) injection 12 mg (12 mg Subcutaneous Given 8/19/20 0044)   magnesium citrate solution 296 mL (296 mLs Oral Given 8/19/20 0044)   ondansetron (ZOFRAN) injection 4 mg (4 mg Intravenous Given 8/19/20 0318)           Differential diagnosis: Bowel obstruction, constipation, obstipation, UTI, ileus, abdominal aortic aneurysm, other    Patient presents with abdominal pain and distention and constipation. See HPI for full presentation. Physical exam as above. 41year-old lying in bed in acute distress secondary to pain. Abdomen seems slightly distended but I do not know her baseline. It is soft without rigidity or peritoneal signs. Abdominal x-ray shows persistent large amount of colonic stool without obstruction. CBC and chemistry grossly unremarkable. Lipase normal and pregnancy negative. Urine shows no infection or blood. Patient was in the emergency department for 8 hours. She was given fluids, a smog enema, magnesium citrate, and Relistor. She still did not have a bowel movement, but did start vomiting. At this point I do not feel patient is appropriate for discharge home. She will be admitted for further work-up and GI consult. She was given all test results and given an opportunity to ask and have any questions answered. She was agreeable to admission. Her PCP, Dr. Ric Casper, was consulted and agreed to meet patient and write orders. The patient tolerated their visit well. They were seen and evaluated by the attending physician, Nikki Zelaya MD who agreed with the assessment and plan.   The patient and / or the family were informed of the results of any tests, a time was given to answer questions, a plan was proposed and they agreed with plan. FINAL IMPRESSION      1. Constipation, unspecified constipation type    2. Non-intractable vomiting with nausea, unspecified vomiting type    3.  Generalized abdominal pain          DISPOSITION/PLAN   DISPOSITION Admitted 08/19/2020 03:15:22 AM      PATIENT REFERREDTO:  Roger Marquez MD  [facility] [phone]            DISCHARGE MEDICATIONS:  New Prescriptions    No medications on file       DISCONTINUED MEDICATIONS:  Discontinued Medications    No medications on file              (Please note that portions of this note were completed with a voice recognition program.  Efforts were made to edit the dictations but occasionally words are mis-transcribed.)    Cornelious Goldberg, APRN - CNP (electronically signed)           Cornelious Goldberg, APRN - CNP  08/19/20 7884

## 2020-08-19 NOTE — PROGRESS NOTES
Pt arrived to floor from ER at [address] [place] [address] via stretcher. Pt oriented to room, call light, policies and procedures, the menu and ordering. Call light within reach. Bed in lowest position, bed alarm on, and wheels locked. Pt verbalized understanding. No complaints, questions or concerns at this time. Electronically signed by Veto Eckert RN on 8/19/2020 at 5:29 AM

## 2020-08-19 NOTE — PLAN OF CARE
Problem: Pain:  Goal: Pain level will decrease  Description: Pain level will decrease  8/19/2020 1006 by Anup Valentino RN  Outcome: Ongoing  Note: Pain managed with pharmacologic and non-pharmacologic interventions during this shift. Will continue to monitor and assess for needs and change in patient condition. 8/19/2020 0604 by Thanh Harmon RN  Outcome: Ongoing  Note: Pain /discomfort being managed with rest and emotional support. Dr. Eloina Small notified Patient able to express presence and absence of pain and rate pain appropriately using numerical scale. Goal: Control of acute pain  Description: Control of acute pain  8/19/2020 1006 by Anup Valentino RN  Outcome: Ongoing  Note: Pain managed with pharmacologic and non-pharmacologic interventions during this shift. Will continue to monitor and assess for needs and change in patient condition. 8/19/2020 0604 by Thanh Harmon RN  Outcome: Ongoing  Note: Pain /discomfort being managed with rest and emotional support. Dr. Eloina Small notified Patient able to express presence and absence of pain and rate pain appropriately using numerical scale. Goal: Control of chronic pain  Description: Control of chronic pain  8/19/2020 1006 by Anup Valentino RN  Outcome: Ongoing  Note: Pain managed with pharmacologic and non-pharmacologic interventions during this shift. Will continue to monitor and assess for needs and change in patient condition. 8/19/2020 0604 by Thanh Harmon RN  Outcome: Ongoing  Note: Pain /discomfort being managed with rest and emotional support. Dr. Eloina Small notified Patient able to express presence and absence of pain and rate pain appropriately using numerical scale. Problem: Falls - Risk of:  Goal: Will remain free from falls  Description: Will remain free from falls  Outcome: Ongoing  Note: Patient remains free from falls during this shift. Fall precautions remain in place.  Patient educated on the need to implement call light use prior to ambulation. Will continue to monitor and assess. Goal: Absence of physical injury  Description: Absence of physical injury  Outcome: Ongoing  Note: Patient remains free from physical harm during this shift. Will continue to monitor and assess patient.

## 2020-08-19 NOTE — PROGRESS NOTES
Pt AAo x4 per this shift. Pt refusing perED nurse to have NG tube placed. Pt asked again when admitted on floor to have NG tube place. Pt stating, \"not right now, I;ll think about it later if I feel bad enough. \" Charge nurse [facility] RN notified and  Dr. Maritza Shea notified. Pt also requesting her home medications and pain medicaine for lower ABD cramping pain. Dr. Maritza Shea notified. Waiting reply. Will continue to monitor.  Electronically signed by Cecil Parada RN on 8/19/2020 at 5:43 AM

## 2020-08-19 NOTE — CONSULTS
GASTROENTEROLOGY INPATIENT CONSULTATION      IDENTIFYING DATA/REASON FOR CONSULTATION   PATIENT:  Verenice Melissa  MRN:  [id number]  ADMIT DATE: 8/18/2020  TIME OF EVALUATION: 8/19/2020 9:23 AM  HOSPITAL STAY:   LOS: 0 days     REASON FOR CONSULTATION:  constipation    HISTORY OF PRESENT ILLNESS   Anjelica Dill is a 41 y.o. female with a PMH of chronic pain, fibromyalgia, history of substance abuse, IBS-C, borderline personality disorder, anxiety who presented on 8/18/2020 with complaints of constipation. Patient reports her last bowel movement was 10 days ago. She is passing minimal flatus. She has associated nausea and vomited few times the day prior to admission. At home she tried oral stool softeners, stimulant laxative, prune juice, suppository and an enema without relief. She takes Percocet couple times a day for history of chronic back pain. She has a history of IBS-C and previously was managed on Trulance. She also believes she has been tried on Linzess. Abdominal x-ray performed in the ER showed colorectal stool volume without signs of obstruction. She was given a smog enema and a dose of Relistor last night with no results. Repeat KUB with Hypaque enema ordered to rule out distal obstruction. Prior Endoscopic Evaluations: Patient reports she had a colonoscopy 1 to 2 years ago with normal results. No records in [facility].   Patient believes it was performed at [facility], SURGICAL, FAMILY, and SOCIAL HISTORY     Past Medical History:   Diagnosis Date    Asthma     Bipolar [facility] ([facility]     Compression fracture     COPD (chronic obstructive pulmonary disease) ([facility]     DDD (degenerative disc disease), cervical     Hwoard-Danlos syndrome     Fibromyalgia     H/O adrenal insufficiency     and pituitary insufficiency    Herniated disc     Metabolic syndrome     Osteoarthritis     Pyelonephritis     Sciatica      Past Surgical History:   Procedure Laterality Date  dextrose 5% and 0.45% NaCl with KCl 40 mEq 100 mL/hr at 08/19/20 0549     PRNs: ALPRAZolam, 1 mg, TID PRN  butalbital-aspirin-caffeine, 1 capsule, Q6H PRN  ondansetron, 4 mg, Q8H PRN  LORazepam, 0.5 mg, Nightly PRN      ALLERGIES:  She   Allergies   Allergen Reactions    Metoclopramide Shortness Of Breath and Swelling    Quetiapine Shortness Of Breath and Swelling    Risperidone Swelling and Shortness Of Breath    Trazodone Swelling and Shortness Of Breath     Swelling throat    Buprenorphine-Naloxone Anxiety and Hives     Patient says she can take Percocet, Vicodin    Morphine Hives and Itching    Asenapine Swelling     tongue    Buprenorphine Hcl-Naloxone Hcl     Codeine Hives and Itching     Patient says she can take Percocet, Vicodin    Guanfacine Hcl Swelling     tongue    Morphine And Related Hives    Reglan [Metoclopramide Hcl] Swelling    Risperidone And Related Swelling    Seroquel  [Quetiapine Fumarate] Other (See Comments)     \"I get really dizzy and I pass out. \"    Seroquel [Quetiapine Fumarate] Swelling     Swelling throat    Trazodone And Nefazodone Swelling     Swelling throat    Lurasidone Anxiety       REVIEW OF SYSTEMS   Pertinent ROS noted in HPI    PHYSICAL EXAM     Vitals:    08/19/20 0500 08/19/20 0505 08/19/20 0538 08/19/20 0724   BP: (!) 101/55 118/74  119/77   Pulse: 85 64  65   Resp:  12  18   Temp:  98.6 °F (37 °C)  97.5 °F (36.4 °C)   TempSrc:  Oral  Oral   SpO2: 99% 100%  95%   Weight:   173 lb 11.6 oz (78.8 kg)    Height:   5' 4\" (1.626 m)        I/O last 3 completed shifts:   In: 110 [I.V.:110]  Out: 0       Physical Exam:  General appearance: alert, cooperative, no distress, appears stated age  Eyes: Anicteric  Head: Normocephalic, without obvious abnormality  Lungs: clear to auscultation bilaterally, Normal Effort  Heart: regular rate and rhythm, normal S1 and S2, no murmurs or rubs  Abdomen: soft, distended  Extremities: atraumatic, no cyanosis or edema  Skin: and examined the patient, reviewed the patient's medical record and pertinent labs and clinical imaging. I have personally staffed the case with Alethea RESENDIZ. I agree with her consultation note, exam findings, assessment and plans  as written above. I have made appropriate modifications and edited her assessment and plan where needed to reflect my impression and plans for this patient. Ricki De La O is a 41 y.o. female with a PMH of chronic pain, fibromyalgia, history of substance abuse, IBS-C, borderline personality disorder, anxiety who presented on 8/18/2020 with constipation. Concern for slow transit constipation secondary to EDS and medication side effects. Patient has tried multiple medications at home prior to admission. Agree with barium enema. If no improvement recommend placing NG tube and giving bowel preparation. Rechecking TSH. Colonoscopy was performed 1-2 years ago and reported as normal.     Thank you for allowing me to participate in this patient's care. If there are any questions or concerns regarding this patient, or the plan we have set in place, please feel free to contact me at [phone].      Flex Nixon MD

## 2020-08-19 NOTE — CONSULTS
Surgery Consult Note     Emilee Squires PA-C  Pt Name: Cali Mathias  MRN: [id number]  [place]: 1980  Date of evaluation: 8/19/2020  Primary Care Physician: Robi Frias MD  Chief Complaint: abdominal pain  IMPRESSIONS:   1. Abdominal pain. Opoid induced onstipation without obstruction seen on CT scan Saturday (8/15/20) and Abdominal xray this AM (8/19/20)  2. 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 Hypaque enema  2. GI following. Patient getting bowel prep  3. Monitor and control pain  4. OOB and ambulate  5. Need to watch narcotic use  SUBJECTIVE:   History of Chief Complaint:    Cali Mathias is a 41 y.o. female who presents with abdominal pain. She stated that she has been suffering from abdominal pain for years. She has had some issues with constipation in the past and was on trulance in the past, but was taken off over a year ago. She has seen a GI doctor in the past and believes that her last colonoscopy was 2-3 years ago. Doesn't remember the physicians name. This episode of abdominal pain began to become severe 10 days ago. She came into the ER on Saturday 8/15/20. At that time a CT scan was performed and that showed her to have a large colorectal stool volume, with a nonobstructive bowel gas pattern. She was discharged home on miralax. This did not give her any relief and she presented back to the Hospital last night. She had an abdominal xray to further eval from Saturday and that showed her to have a large colonic stool volume without obstruction. Before this pain began, 10 days ago, she stated that she also had a large bloody BM. She admits to having occasional bloody BM's prior that she thought were from straining from the constipation. Her Hgb on Saturday was 13.8 and this AM it was 15.2. She admits to taking 7.5 mg of percocet daily. She denies any CP, SOB, cough, lightheadedness or dizziness.     Past Medical History  Reviewed  has a past medical history of Asthma, Bipolar 1 disorder ([facility] [address] Compression fracture, COPD (chronic obstructive pulmonary disease) ([facility] [address] DDD (degenerative disc disease), cervical, Howard-Danlos syndrome, Fibromyalgia, H/O adrenal insufficiency, Herniated disc, Metabolic syndrome, Osteoarthritis, Pyelonephritis, and Sciatica. Past Surgical History  Reviewed has a past surgical history that includes knee surgery; lumbar fusion (N/A, 6/3/2019); and shoulder surgery (Left). Medications  Prior to Admission medications    Medication Sig Start Date End Date Taking? Authorizing Provider   nitrofurantoin, macrocrystal-monohydrate, (MACROBID) 100 MG capsule Take 1 capsule by mouth 2 times daily for 5 days 8/16/20 8/21/20 Yes Michael Danielson   polyethylene glycol (MIRALAX) 17 g packet Take 17 g by mouth daily as needed for Constipation 8/16/20 9/15/20 Yes Michael Danielson   midodrine (PROAMATINE) 2.5 MG tablet TAKE 1 TABLET BY MOUTH THREE TIMES A DAY 6/22/20  Yes Ronnell Neff MD   oxyCODONE-acetaminophen (PERCOCET) 7.5-325 MG per tablet Take 1 tablet by mouth every 8 hours as needed. 3/26/19  Yes Historical Provider, MD   divalproex (DEPAKOTE) 250 MG DR tablet Take 1 tablet by mouth 2 times daily 5/15/19  Yes Rain Mcnamara MD   ranitidine (ZANTAC) 150 MG tablet Take 150 mg by mouth 2 times daily   Yes Historical Provider, MD   STOOL SOFTENER 100 MG capsule Take 100 mg by mouth 2 times daily 4/16/19  Yes Historical Provider, MD   LATUDA 20 MG TABS tablet Take 20 mg by mouth daily 5/3/19  Yes Historical Provider, MD   QUEtiapine (SEROQUEL) 200 MG tablet Take 200 mg by mouth nightly 4/20/19  Yes Historical Provider, MD   pregabalin (LYRICA) 150 MG capsule Take 150 mg by mouth 2 times daily. Yes Historical Provider, MD   ALPRAZolam Alric Shiver) 1 MG tablet Take 1 mg by mouth 3 times daily as needed for Sleep or Anxiety.     Yes Historical Provider, MD   Cetirizine HCl (ZYRTEC ALLERGY) 10 MG CAPS Take by mouth daily   Yes Historical Provider, MD   Probiotic Product (PROBIOTIC-10 PO) Take by mouth daily   Yes Historical Provider, MD   temazepam (RESTORIL) 30 MG capsule Take 30 mg by mouth nightly as needed for Sleep. .   Yes Historical Provider, MD   butalbital-aspirin-caffeine [facility]) -40 MG capsule Take 1 capsule by mouth every 6 hours as needed for Headaches for up to 5 days. 3/13/20 8/19/20  Jorge A Harvey MD   acyclovir (ZOVIRAX) 400 MG tablet Take 400 mg by mouth as needed     Historical Provider, MD   ketoconazole (NIZORAL) 2 % shampoo Apply topically Twice a Week 4/13/19   Historical Provider, MD   TRULANCE 3 MG TABS Take 3 mg by mouth daily 4/20/19   Historical Provider, MD    Scheduled Meds:   divalproex  250 mg Oral BID    lurasidone  20 mg Oral Daily    docusate sodium  100 mg Oral BID    pregabalin  150 mg Oral BID    lactobacillus  1 capsule Oral Daily with breakfast    mineral oil  30 mL Oral BID     Continuous Infusions:   dextrose 5% and 0.45% NaCl with KCl 40 mEq 100 mL/hr at 08/19/20 0549     PRN Meds:. ALPRAZolam, ondansetron, LORazepam, acetaminophen, ondansetron, phenol, dicyclomine  Allergies  is allergic to metoclopramide; quetiapine; risperidone; trazodone; buprenorphine-naloxone; morphine; asenapine; buprenorphine hcl-naloxone hcl; codeine; guanfacine hcl; morphine and related; reglan [metoclopramide hcl]; risperidone and related; seroquel  [quetiapine fumarate]; seroquel [quetiapine fumarate]; trazodone and nefazodone; and lurasidone. Family History  Reviewedfamily history includes Cancer in her sister; Diabetes type 2  in her brother, father, and mother. Social History   reports that she quit smoking about 2 years ago. Her smoking use included cigarettes. She smoked 1.00 pack per day. She has never used smokeless tobacco. She reports previous alcohol use. She reports that she does not use drugs.   EDUCATION  Patient educated about their illness/diagnosis, stated above, and all questions answered. We discussed the importance of nutrition, medications they are taking, and healthy lifestyle. Review of Systems:  General Denies any fever or chills  HEENT Denies any diplopia, tinnitus or vertigo  Resp Denies any shortness of breath, cough or wheezing  Cardiac Denies any chest pain, palpitations, claudication or edema  GI Denies any melena, hematochezia, hematemesis or pyrosis   Denies any frequency, urgency, hesitancy or incontinence  Heme Denies bruising or bleeding easily  Neuro Denies any focal motor or sensory deficits  OBJECTIVE:   VITALS:  height is 5' 4\" (1.626 m) and weight is 173 lb 11.6 oz (78.8 kg). Her oral temperature is 98.4 °F (36.9 °C). Her blood pressure is 111/79 and her pulse is 71. Her respiration is 18 and oxygen saturation is 99%. CONSTITUTIONAL: Alert and oriented times 3, no acute distress and cooperative to examination with proper mood and affect. SKIN: Skin color, texture, turgor normal. No rashes or lesions. LYMPH: no cervical nodes, no inguinal nodes  HEENT: Head is normocephalic, atraumatic. EOMI, PERRLA. NECK: Supple, symmetrical, trachea midline, no adenopathy, thyroid symmetric, not enlarged and no tenderness, skin normal.  CHEST/LUNGS: chest symmetric with normal A/P diameter, normal respiratory rate and rhythm  CARDIOVASCULAR: Heart sounds are normal.  Regular rate and rhythm   ABDOMEN: distended Tenderness: diffuse, no peritonitis   RECTAL: deferred, not clinically indicated  NEUROLOGIC: There are no focalizing motor or sensory deficits. CN II-XII are grossly intact. Alejandra Adak EXTREMITIES: no cyanosis, no clubbing and no edema.   LABS:     Recent Labs     08/19/20  0103   WBC 12.2*   HGB 15.2   HCT 45.7         K 4.5      CO2 17*   BUN 7   CREATININE 0.8   CALCIUM 9.8   AST 41*   ALT 19   BILITOT 0.3   NITRU Negative   COLORU YELLOW   BACTERIA RARE*     Recent Labs     08/19/20  0103   ALKPHOS 55   ALT 19   AST 41*   BILITOT 0. 3   LABALBU 4.7   LIPASE 16.0     CBC:   Lab Results   Component Value Date    WBC 12.2 08/19/2020    RBC 5.04 08/19/2020    HGB 15.2 08/19/2020    HCT 45.7 08/19/2020    MCV 90.5 08/19/2020    MCH 30.1 08/19/2020    MCHC 33.2 08/19/2020    RDW 14.3 08/19/2020     08/19/2020    MPV 8.2 08/19/2020     CMP:    Lab Results   Component Value Date     08/19/2020    K 4.5 08/19/2020     08/19/2020    CO2 17 08/19/2020    BUN 7 08/19/2020    CREATININE 0.8 08/19/2020    GFRAA >60 08/19/2020    GFRAA >60 05/29/2013    AGRATIO 1.5 08/19/2020    LABGLOM >60 08/19/2020    GLUCOSE 88 08/19/2020    PROT 7.8 08/19/2020    PROT 8.7 10/14/2012    LABALBU 4.7 08/19/2020    CALCIUM 9.8 08/19/2020    BILITOT 0.3 08/19/2020    ALKPHOS 55 08/19/2020    AST 41 08/19/2020    ALT 19 08/19/2020     Urine Culture:  No components found for: CURINE  Blood Culture:  No components found for: CBLOOD, CFUNGUSBL  RADIOLOGY:     CT scan abd/pelvis: (8/15/20)    Large colorectal stool volume.  Nonobstructive bowel gas pattern.  No free   air or portal venous gas appreciated no suspicious calcifications.  L5-S1   discectomy and posterior fusion.  IUD projects over the pelvis. Abdominal Xray: (8/19/20)    Large colonic stool volume without obstruction. Thank you for the interesting evaluation. Further recommendations to follow. Antonia Johnson [facility]  General and Vascular Surgery [phone]  Electronically signed by MARTÍN Trejo on 8/19/2020 at 1:01 PM    Agree with above note. The patient was personally seen and examined. Cm Paul is a 40 yo female on chronic pain medication for [employer] who presented with increased abdominal bloating and no BM x 8 days. Denies any significant nausea/emesis but having some now with NG in and bowel prep.  + flatus. No prior abdominal surgeries.       NAD, appears stated age  Normal respiratory effort, no accessory muscle use  Abd soft, minimally distended, minimally tender diffusely    WBC 12.2    CT abd/pelvis personally reviewed, showing colon with moderate stool burden; no distinct transition but sigmoid colon appears normal caliber    Hypaque enema personally reviewed, showing no evidence of sigmoid stricture    A/P: 42 yo female with chronic constipation secondary to chronic opioid use    No BM yet after SMOG enema last night, mag citrate, relistor, and hypaque enema. NG placed per GI for bowel prep. No evidence of colonic stricture on imaging studies. No surgical intervention planned.   Will sign off, please call with questions    Antonieta Zhang MD

## 2020-08-19 NOTE — PLAN OF CARE
Problem: Pain:  Goal: Pain level will decrease  Description: Pain level will decrease  Outcome: Ongoing  Note: Pain /discomfort being managed with rest and emotional support. Dr. Carola Pizarro notified Patient able to express presence and absence of pain and rate pain appropriately using numerical scale. Goal: Control of acute pain  Description: Control of acute pain  Outcome: Ongoing  Note: Pain /discomfort being managed with rest and emotional support. Dr. Carola Pizarro notified Patient able to express presence and absence of pain and rate pain appropriately using numerical scale. Goal: Control of chronic pain  Description: Control of chronic pain  Outcome: Ongoing  Note: Pain /discomfort being managed with rest and emotional support. Dr. Carola Pizarro notified Patient able to express presence and absence of pain and rate pain appropriately using numerical scale.

## 2020-08-19 NOTE — PLAN OF CARE
Problem: Nutrition  Goal: Optimal nutrition therapy  Outcome: Ongoing     Nutrition Problem #1: Inadequate oral intake  Intervention: Food and/or Nutrient Delivery: Continue NPO(start nutrition when appropriate)  Nutritional Goals:  Tolerate diet and consume greater than 50% of meals and supplements when po initiated

## 2020-08-19 NOTE — PROGRESS NOTES
Patient returned to unit from [facility] and was placed back into room 3118. This RN attempted to administer more GoLyteley per orders, but patient is tolerating very poorly. This RN able to administer an additional 60 mL before patient requested this RN to stop r/t increased nausea. PRN IV Zofran administered. NG tube reconnected to low continuous wall suction. Patient requesting to wait \"about an hour\" before trying to administer any more GoLytely via the NG tube. Will continue to monitor and assess.

## 2020-08-19 NOTE — H&P
H & P dictated  [phone]  Opioid induced constipation,  Abd pain,  Chronic pain syndr.  Fibromyalgia,  S/p multiple surgeries, including back, knee, shoulder,  ALLEN,   Bipolar d/o,  Insomnia,  Pt received stool softener,IVF, Relistor,enema,  Get GI and surgery consult, resume some home meds.   Dr Flynn Shirley,

## 2020-08-20 ENCOUNTER — APPOINTMENT (OUTPATIENT)
Dept: GENERAL RADIOLOGY | Age: 40
DRG: 254 | End: 2020-08-20
Payer: COMMERCIAL

## 2020-08-20 PROCEDURE — 6360000002 HC RX W HCPCS: Performed by: SPECIALIST

## 2020-08-20 PROCEDURE — 2500000003 HC RX 250 WO HCPCS: Performed by: SPECIALIST

## 2020-08-20 PROCEDURE — 74018 RADEX ABDOMEN 1 VIEW: CPT

## 2020-08-20 PROCEDURE — 2500000003 HC RX 250 WO HCPCS: Performed by: PHYSICIAN ASSISTANT

## 2020-08-20 PROCEDURE — 6370000000 HC RX 637 (ALT 250 FOR IP): Performed by: SPECIALIST

## 2020-08-20 PROCEDURE — 2580000003 HC RX 258: Performed by: SPECIALIST

## 2020-08-20 PROCEDURE — 6370000000 HC RX 637 (ALT 250 FOR IP): Performed by: SURGERY

## 2020-08-20 PROCEDURE — 1200000000 HC SEMI PRIVATE

## 2020-08-20 RX ORDER — KETOROLAC TROMETHAMINE 15 MG/ML
15 INJECTION, SOLUTION INTRAMUSCULAR; INTRAVENOUS ONCE
Status: COMPLETED | OUTPATIENT
Start: 2020-08-20 | End: 2020-08-20

## 2020-08-20 RX ADMIN — Medication 330 ML: at 15:07

## 2020-08-20 RX ADMIN — POTASSIUM CHLORIDE, DEXTROSE MONOHYDRATE AND SODIUM CHLORIDE: 300; 5; 450 INJECTION, SOLUTION INTRAVENOUS at 10:03

## 2020-08-20 RX ADMIN — ONDANSETRON 4 MG: 2 INJECTION INTRAMUSCULAR; INTRAVENOUS at 09:40

## 2020-08-20 RX ADMIN — DOCUSATE SODIUM 100 MG: 100 CAPSULE, LIQUID FILLED ORAL at 20:21

## 2020-08-20 RX ADMIN — DIVALPROEX SODIUM 250 MG: 250 TABLET, DELAYED RELEASE ORAL at 09:41

## 2020-08-20 RX ADMIN — MINERAL OIL 30 ML: 1000 SOLUTION ORAL at 09:40

## 2020-08-20 RX ADMIN — ALPRAZOLAM 1 MG: 0.5 TABLET ORAL at 15:07

## 2020-08-20 RX ADMIN — ONDANSETRON 4 MG: 2 INJECTION INTRAMUSCULAR; INTRAVENOUS at 15:07

## 2020-08-20 RX ADMIN — LORAZEPAM 0.5 MG: 2 INJECTION INTRAMUSCULAR; INTRAVENOUS at 09:41

## 2020-08-20 RX ADMIN — Medication 1 CAPSULE: at 10:03

## 2020-08-20 RX ADMIN — PREGABALIN 150 MG: 75 CAPSULE ORAL at 20:21

## 2020-08-20 RX ADMIN — Medication 12.5 MG: at 20:21

## 2020-08-20 RX ADMIN — ACETAMINOPHEN 650 MG: 325 TABLET ORAL at 15:07

## 2020-08-20 RX ADMIN — KETOROLAC TROMETHAMINE 15 MG: 15 INJECTION, SOLUTION INTRAMUSCULAR; INTRAVENOUS at 20:49

## 2020-08-20 RX ADMIN — MINERAL OIL 30 ML: 1000 SOLUTION ORAL at 20:20

## 2020-08-20 RX ADMIN — DIVALPROEX SODIUM 250 MG: 250 TABLET, DELAYED RELEASE ORAL at 22:44

## 2020-08-20 RX ADMIN — PREGABALIN 150 MG: 75 CAPSULE ORAL at 09:40

## 2020-08-20 RX ADMIN — QUETIAPINE FUMARATE 200 MG: 100 TABLET ORAL at 22:44

## 2020-08-20 RX ADMIN — POTASSIUM CHLORIDE, DEXTROSE MONOHYDRATE AND SODIUM CHLORIDE: 300; 5; 450 INJECTION, SOLUTION INTRAVENOUS at 20:21

## 2020-08-20 RX ADMIN — LURASIDONE HYDROCHLORIDE 20 MG: 20 TABLET, FILM COATED ORAL at 09:41

## 2020-08-20 RX ADMIN — LORAZEPAM 0.5 MG: 0.5 TABLET ORAL at 22:44

## 2020-08-20 RX ADMIN — DOCUSATE SODIUM 100 MG: 100 CAPSULE, LIQUID FILLED ORAL at 09:41

## 2020-08-20 RX ADMIN — LORAZEPAM 0.5 MG: 2 INJECTION INTRAMUSCULAR; INTRAVENOUS at 19:07

## 2020-08-20 ASSESSMENT — PAIN DESCRIPTION - LOCATION
LOCATION: ABDOMEN
LOCATION: ABDOMEN

## 2020-08-20 ASSESSMENT — PAIN DESCRIPTION - PROGRESSION
CLINICAL_PROGRESSION: RAPIDLY WORSENING
CLINICAL_PROGRESSION: GRADUALLY WORSENING

## 2020-08-20 ASSESSMENT — PAIN SCALES - GENERAL
PAINLEVEL_OUTOF10: 8
PAINLEVEL_OUTOF10: 0
PAINLEVEL_OUTOF10: 0
PAINLEVEL_OUTOF10: 8

## 2020-08-20 ASSESSMENT — PAIN DESCRIPTION - PAIN TYPE
TYPE: ACUTE PAIN
TYPE: ACUTE PAIN

## 2020-08-20 ASSESSMENT — PAIN DESCRIPTION - ONSET
ONSET: PROGRESSIVE
ONSET: PROGRESSIVE

## 2020-08-20 ASSESSMENT — PAIN DESCRIPTION - DESCRIPTORS
DESCRIPTORS: CRAMPING
DESCRIPTORS: CRAMPING

## 2020-08-20 ASSESSMENT — PAIN DESCRIPTION - FREQUENCY
FREQUENCY: INTERMITTENT
FREQUENCY: INTERMITTENT

## 2020-08-20 ASSESSMENT — PAIN DESCRIPTION - ORIENTATION: ORIENTATION: ANTERIOR

## 2020-08-20 NOTE — PLAN OF CARE
Problem: Pain:  Goal: Pain level will decrease  Description: Pain level will decrease  8/20/2020 1944 by Radha Russ RN  Outcome: Ongoing  Note: Pt assessed for pain. Pt in pain and assessed with 0-10 pain rating scale. Pt given prescribed analgesic for pain. (See eMar) Pt satisfied with pain relief thus far. Will reassess and continue to monitor. 8/20/2020 0735 by Ev Graham RN  Outcome: Ongoing  Note: Pain managed with pharmacologic and non-pharmacologic interventions during this shift. Will continue to monitor and assess for needs and change in patient condition. Goal: Control of acute pain  Description: Control of acute pain  8/20/2020 1944 by Radha Russ RN  Outcome: Ongoing  8/20/2020 0735 by Ev Graham RN  Outcome: Ongoing  Note: Pain managed with pharmacologic and non-pharmacologic interventions during this shift. Will continue to monitor and assess for needs and change in patient condition. Goal: Control of chronic pain  Description: Control of chronic pain  8/20/2020 1944 by Radha Russ RN  Outcome: Ongoing  8/20/2020 0735 by Ev Graham RN  Outcome: Ongoing  Note: Pain managed with pharmacologic and non-pharmacologic interventions during this shift. Will continue to monitor and assess for needs and change in patient condition. Problem: Falls - Risk of:  Goal: Will remain free from falls  Description: Will remain free from falls  8/20/2020 1944 by Radha Russ RN  Outcome: Ongoing  Note: Fall risk assessment completed. Fall precautions in place. Call light within reach. Pt educated on calling for assistance before getting up. Walkway free of clutter. Will continue to monitor. 8/20/2020 0735 by Ev Graham RN  Outcome: Ongoing  Note: Patient remains free from falls during this shift. Fall precautions remain in place. Patient educated on the need to implement call light use prior to ambulation. Will continue to monitor and assess.     Goal: Absence of physical injury  Description: Absence of physical injury  8/20/2020 1944 by Miguel Godfrey RN  Outcome: Ongoing  8/20/2020 0735 by Asuncion Lugo RN  Outcome: Ongoing  Note: Patient remains free from physical harm during this shift. Will continue to monitor and assess patient. Problem: Nutrition  Goal: Optimal nutrition therapy  8/20/2020 1944 by Miguel Godfrey RN  Outcome: Ongoing  Note: Will encourage nutritional intake.   8/20/2020 0735 by Asuncion Lugo RN  Outcome: Ongoing  Note: Patient encouraged to eat during this shift; cramping increased yesterday

## 2020-08-20 NOTE — PLAN OF CARE
Problem: Pain:  Goal: Pain level will decrease  Description: Pain level will decrease  Outcome: Ongoing  Note: Pt assessed for pain. Pt in pain and assessed with 0-10 pain rating scale. Pt given prescribed analgesic for pain. (See eMar) Pt satisfied with pain relief thus far. Will reassess and continue to monitor. Goal: Control of acute pain  Description: Control of acute pain  Outcome: Ongoing  Goal: Control of chronic pain  Description: Control of chronic pain  Outcome: Ongoing     Problem: Falls - Risk of:  Goal: Will remain free from falls  Description: Will remain free from falls  Outcome: Ongoing  Note: Fall risk assessment completed. Fall precautions in place. Call light within reach. Pt educated on calling for assistance before getting up. Walkway free of clutter. Will continue to monitor.      Goal: Absence of physical injury  Description: Absence of physical injury  Outcome: Ongoing     Problem: Nutrition  Goal: Optimal nutrition therapy  8/20/2020 0025 by Karen Cantor RN  Outcome: Ongoing  8/19/2020 1109 by Nuvia Calixto RD, LD  Outcome: Ongoing

## 2020-08-20 NOTE — PROGRESS NOTES
Patient A&O in the bed. Patient NPO. PM medications given without complications. Patient complains of N&V - PRN IV phenergan given. NG tube in place to continuous wall suction. Patient refusing Go-LYTELY at this time - states she wants to rest and will continue with the medication in the morning. Call light within reach, able to make needs known, fall precautions in place. Will monitor.     Electronically signed by Taiwo Ruggiero RN on 8/20/2020 at 12:25 AM

## 2020-08-20 NOTE — PROGRESS NOTES
Kangaroo pump started at 400 mL/hour (the fastest the pump is able to be programmed) with GoLytely per orders from [place]. Patient tolerating well at this time. Smog enema also administered during this shift. Patient had a small BM after administration of enema. Patient did not report relief with this intervention. Abdomen remains distended but soft. Will continue to monitor and assess.

## 2020-08-20 NOTE — PROGRESS NOTES
Patient resting in bed this morning c/o increased nausea and anxiety. Patient provided PRN Ativan and Zofran per request. Scheduled morning medications administered without complication. NG tube remains intact to the L nare at 60 cm. Continuous low wall suction paused at this time r/t administration of morning medications. Patient apprehensive regarding administration of more GoLytely today as dosing from 8/19 was not completed r/t poor tolerance. This RN will continue to try and administer today via NG tube as patient tolerates per orders from Dr. Sujata Low. Patient declines physical/emotional needs at this time. Family at the bedside. Will continue to monitor and assess.

## 2020-08-20 NOTE — PROGRESS NOTES
Trazodone And Nefazodone Swelling     Swelling throat    Lurasidone Anxiety         PHYSICAL EXAM   VITALS:  /72   Pulse 78   Temp 98.4 °F (36.9 °C)   Resp 18   Ht 5' 4\" (1.626 m)   Wt 174 lb 6.1 oz (79.1 kg)   SpO2 96%   BMI 29.93 kg/m²   TEMPERATURE:  Current - Temp: 98.4 °F (36.9 °C); Max - Temp  Av.4 °F (36.9 °C)  Min: 98.2 °F (36.8 °C)  Max: 98.6 °F (37 °C)    Physical Exam:  General appearance: alert, cooperative, no distress, +NGT  Eyes: Anicteric  Head: Normocephalic, without obvious abnormality  Lungs: clear to auscultation bilaterally, Normal Effort  Heart: regular rate and rhythm, normal S1 and S2, no murmurs or rubs  Abdomen: soft, distended  Extremities: atraumatic, no cyanosis or edema  Skin: warm and dry, no jaundice  Neuro: Grossly intact, A&OX3    LABS AND IMAGING   Laboratory   Recent Labs     20   WBC 12.2*   HGB 15.2   HCT 45.7   MCV 90.5        Recent Labs     20      K 4.5      CO2 17*   BUN 7   CREATININE 0.8     Recent Labs     20   AST 41*   ALT 19   BILITOT 0.3   ALKPHOS 55     Recent Labs     20   LIPASE 16.0     No results for input(s): PROTIME, INR in the last 72 hours. Imaging  XR ABDOMEN (KUB) (SINGLE AP VIEW)   Preliminary Result   The enteric tube has its tip in the gastric fundus. The side port is greater   than 7 cm below the GE junction. Contrast is noted throughout a distended colon. FL WATER SOLUBLE ENEMA W OR WO KUB   Final Result   Large stool load throughout the colon. Sigmoid colon is somewhat small in   caliber, which may simply relate to spasm. XR ABDOMEN (KUB) (SINGLE AP VIEW)   Final Result   Large colonic stool volume without obstruction.              Endoscopy      ASSESSMENT AND RECOMMENDATIONS   Anjelica Zafar is a 41 y.o. female with PMH of EDS, chronic pain, fibromyalgia, history of substance abuse, IBS-C, borderline personality disorder, anxiety who presented on 8/18/2020 with constipation. Abd Xray showed large colonic stool volume. She was given smog enema and Relistor with minimal relief. NG tube placed and she was ordered a GoLYTELY bowel prep.     1. Constipation   -Multifactorial likely related to slow transit secondary to EDS, opioid induced, underlying IBS-C.   -Tried stool softeners, stimulant laxatives, enema at home without relief   -Received a smog enema and a dose of Relistor 8/19 without relief   -Bowel prep ordered last night however patient only able to tolerate third. Still no BM   -Repeat abdominal x-ray today shows NG tube tip within the gastric lumen    RECOMMENDATIONS:    We will order another smog enema  Continue GoLYTELY bowel prep via NGT. Recommend infusing at a rate of 1.5L/h. Avoid bolus administraton due to intolerance  If no improvement could consider re-dose of Relistor tomorrow    If you have any questions or need any further information, please feel free to contact us [phone]. Thank you for allowing us to participate in the care of Yrn Al. The note was completed using Dragon voice recognition transcription. Every effort was made to ensure accuracy; however, inadvertent transcription errors may be present despite my best efforts to edit errors. Salvatore RESENDIZ    Attending physician addendum:    I have personally seen and examined the patient, reviewed the patient's medical record and pertinent labs and clinical imaging. I have personally staffed the case with Salvatore RESENDIZ. I agree with her consultation note, exam findings, assessment and plans  as written above. I have made appropriate modifications and edited her assessment and plan where needed to reflect my impression and plans for this patient.        Ibis Eubanks is a 41 y.o. female with a PMH of chronic pain, fibromyalgia, history of substance abuse, IBS-C, borderline personality disorder, anxiety who presented on 8/18/2020 with constipation. Concern for slow transit constipation secondary to EDS and medication side effects. Patient has tried multiple medications at home prior to admission. Barium enema with no distal obstruction. Sigmoid colon is narrow but no obstruction. TSH normal. NG tube placed and will give Golytely prep through NG. Consider Flex sig tomorrow pend response to Golytely. Thank you for allowing me to participate in this patient's care. If there are any questions or concerns regarding this patient, or the plan we have set in place, please feel free to contact me at [phone].      Leroy Castro MD

## 2020-08-20 NOTE — PROGRESS NOTES
Patient moved from room 3118 to [facility] [phone] r/t plumbing issues in 3118. All personal belongings transported with patient.

## 2020-08-20 NOTE — PLAN OF CARE
Problem: Pain:  Goal: Pain level will decrease  Description: Pain level will decrease  8/20/2020 0735 by Nikky Beckford RN  Outcome: Ongoing  Note: Pain managed with pharmacologic and non-pharmacologic interventions during this shift. Will continue to monitor and assess for needs and change in patient condition. 8/20/2020 0025 by Yulissa Danielson RN  Outcome: Ongoing  Note: Pt assessed for pain. Pt in pain and assessed with 0-10 pain rating scale. Pt given prescribed analgesic for pain. (See eMar) Pt satisfied with pain relief thus far. Will reassess and continue to monitor. Goal: Control of acute pain  Description: Control of acute pain  8/20/2020 0735 by Nikky Beckford RN  Outcome: Ongoing  Note: Pain managed with pharmacologic and non-pharmacologic interventions during this shift. Will continue to monitor and assess for needs and change in patient condition. 8/20/2020 0025 by Yulissa Danielson RN  Outcome: Ongoing  Goal: Control of chronic pain  Description: Control of chronic pain  8/20/2020 0735 by Nikky Beckford RN  Outcome: Ongoing  Note: Pain managed with pharmacologic and non-pharmacologic interventions during this shift. Will continue to monitor and assess for needs and change in patient condition. 8/20/2020 0025 by Yulissa Danielson RN  Outcome: Ongoing     Problem: Falls - Risk of:  Goal: Will remain free from falls  Description: Will remain free from falls  8/20/2020 0735 by Nikky Beckford RN  Outcome: Ongoing  Note: Patient remains free from falls during this shift. Fall precautions remain in place. Patient educated on the need to implement call light use prior to ambulation. Will continue to monitor and assess. 8/20/2020 0025 by Yulissa Danielson RN  Outcome: Ongoing  Note: Fall risk assessment completed. Fall precautions in place. Call light within reach. Pt educated on calling for assistance before getting up. Walkway free of clutter. Will continue to monitor.      Goal: Absence of physical injury  Description: Absence of physical injury  8/20/2020 1715 by Vanessa Paz RN  Outcome: Ongoing  Note: Patient remains free from physical harm during this shift. Will continue to monitor and assess patient. 8/20/2020 0025 by Halle Wong RN  Outcome: Ongoing     Problem: Nutrition  Goal: Optimal nutrition therapy  8/20/2020 0735 by Vanessa Paz RN  Outcome: Ongoing  Note: Patient NPO at this time r/t nausea and vomiting. NG tube in place.    8/20/2020 0025 by Halle Wong RN  Outcome: Ongoing

## 2020-08-20 NOTE — PROGRESS NOTES
Department of Internal Medicine  General Internal Medicine  Attending Progress Note      SUBJECTIVE:  Pt had a better sleep with med, has very little BM yesterday. Has problems with Golytely causing N/ discomfort, but would like to continue. NG was inserted.      OBJECTIVE      Medications    Current Facility-Administered Medications: dextrose 5 % and 0.45 % NaCl with KCl 40 mEq infusion, , Intravenous, Continuous  ALPRAZolam (XANAX) tablet 1 mg, 1 mg, Oral, TID PRN  divalproex (DEPAKOTE) DR tablet 250 mg, 250 mg, Oral, BID  lurasidone (LATUDA) tablet 20 mg, 20 mg, Oral, Daily  ondansetron (ZOFRAN-ODT) disintegrating tablet 4 mg, 4 mg, Oral, Q8H PRN  docusate sodium (COLACE) capsule 100 mg, 100 mg, Oral, BID  pregabalin (LYRICA) capsule 150 mg, 150 mg, Oral, BID  lactobacillus (CULTURELLE) capsule 1 capsule, 1 capsule, Oral, Daily with breakfast  LORazepam (ATIVAN) tablet 0.5 mg, 0.5 mg, Oral, Nightly PRN  mineral oil liquid 30 mL, 30 mL, Oral, BID  acetaminophen (TYLENOL) tablet 650 mg, 650 mg, Oral, Q6H PRN  ondansetron (ZOFRAN) injection 4 mg, 4 mg, Intravenous, Q4H PRN  phenol 1.4 % mouth spray 1 spray, 1 spray, Mouth/Throat, Q2H PRN  dicyclomine (BENTYL) capsule 20 mg, 20 mg, Oral, BID PRN  promethazine (PHENERGAN) 12.5mg in sodium chloride 0.9% 50 mL IVPB 12.5 mg, 12.5 mg, Intravenous, Q6H PRN  LORazepam (ATIVAN) injection 0.5 mg, 0.5 mg, Intravenous, TID PRN  QUEtiapine (SEROQUEL) tablet 200 mg, 200 mg, Oral, Nightly  Physical    VITALS:  /75   Pulse 89   Temp 98.5 °F (36.9 °C) (Oral)   Resp 14   Ht 5' 4\" (1.626 m)   Wt 174 lb 6.1 oz (79.1 kg)   SpO2 96%   BMI 29.93 kg/m²   CONSTITUTIONAL:  awake, alert, cooperative, no apparent distress, and appears stated age  LUNGS:  No increased work of breathing, good air exchange, clear to auscultation bilaterally, no crackles or wheezing  CARDIOVASCULAR:  Normal apical impulse, regular rate and rhythm, normal S1 and S2, no S3 or S4, and no murmur noted  ABDOMEN:  No scars, decreased BS mildly distended,   Data    CBC:   Lab Results   Component Value Date    WBC 12.2 08/19/2020    RBC 5.04 08/19/2020    HGB 15.2 08/19/2020    HCT 45.7 08/19/2020    MCV 90.5 08/19/2020    MCH 30.1 08/19/2020    MCHC 33.2 08/19/2020    RDW 14.3 08/19/2020     08/19/2020    MPV 8.2 08/19/2020     BMP:    Lab Results   Component Value Date     08/19/2020    K 4.5 08/19/2020     08/19/2020    CO2 17 08/19/2020    BUN 7 08/19/2020    LABALBU 4.7 08/19/2020    CREATININE 0.8 08/19/2020    CALCIUM 9.8 08/19/2020    GFRAA >60 08/19/2020    GFRAA >60 05/29/2013    LABGLOM >60 08/19/2020    GLUCOSE 88 08/19/2020       ASSESSMENT AND PLAN      Active Problems:     Opioid induced constipation, appreciate GI and surgery consult, no need any surgical intervention this time, NG was placed, cont Golytely and Relistor,  Abd pain, stable, not a surgical case, dt constipation  Chronic pain syndr.  Fibromyalgia, stable,  S/p multiple surgeries, including back, knee, shoulder,  ALLEN, cont med,   Bipolar d/o, cont med,   Insomnia, cont med,   Pt received stool softener,IVF, Relistor,enema,Blood work unremarkable,    Dr Fallon Benitez,

## 2020-08-21 ENCOUNTER — ANESTHESIA EVENT (OUTPATIENT)
Dept: ENDOSCOPY | Age: 40
DRG: 254 | End: 2020-08-21
Payer: COMMERCIAL

## 2020-08-21 ENCOUNTER — ANESTHESIA (OUTPATIENT)
Dept: ENDOSCOPY | Age: 40
DRG: 254 | End: 2020-08-21
Payer: COMMERCIAL

## 2020-08-21 VITALS — SYSTOLIC BLOOD PRESSURE: 126 MMHG | DIASTOLIC BLOOD PRESSURE: 86 MMHG | OXYGEN SATURATION: 99 %

## 2020-08-21 LAB — SARS-COV-2, NAAT: NOT DETECTED

## 2020-08-21 PROCEDURE — 7100000001 HC PACU RECOVERY - ADDTL 15 MIN: Performed by: INTERNAL MEDICINE

## 2020-08-21 PROCEDURE — 2709999900 HC NON-CHARGEABLE SUPPLY: Performed by: INTERNAL MEDICINE

## 2020-08-21 PROCEDURE — 6370000000 HC RX 637 (ALT 250 FOR IP): Performed by: INTERNAL MEDICINE

## 2020-08-21 PROCEDURE — 1200000000 HC SEMI PRIVATE

## 2020-08-21 PROCEDURE — 6360000002 HC RX W HCPCS: Performed by: NURSE ANESTHETIST, CERTIFIED REGISTERED

## 2020-08-21 PROCEDURE — 6370000000 HC RX 637 (ALT 250 FOR IP): Performed by: SPECIALIST

## 2020-08-21 PROCEDURE — 3700000001 HC ADD 15 MINUTES (ANESTHESIA): Performed by: INTERNAL MEDICINE

## 2020-08-21 PROCEDURE — 2500000003 HC RX 250 WO HCPCS: Performed by: NURSE ANESTHETIST, CERTIFIED REGISTERED

## 2020-08-21 PROCEDURE — 6360000002 HC RX W HCPCS: Performed by: SPECIALIST

## 2020-08-21 PROCEDURE — 6370000000 HC RX 637 (ALT 250 FOR IP): Performed by: SURGERY

## 2020-08-21 PROCEDURE — 7100000000 HC PACU RECOVERY - FIRST 15 MIN: Performed by: INTERNAL MEDICINE

## 2020-08-21 PROCEDURE — 0DJD8ZZ INSPECTION OF LOWER INTESTINAL TRACT, VIA NATURAL OR ARTIFICIAL OPENING ENDOSCOPIC: ICD-10-PCS | Performed by: INTERNAL MEDICINE

## 2020-08-21 PROCEDURE — 2500000003 HC RX 250 WO HCPCS: Performed by: INTERNAL MEDICINE

## 2020-08-21 PROCEDURE — 6360000002 HC RX W HCPCS: Performed by: INTERNAL MEDICINE

## 2020-08-21 PROCEDURE — 3700000000 HC ANESTHESIA ATTENDED CARE: Performed by: INTERNAL MEDICINE

## 2020-08-21 PROCEDURE — 2580000003 HC RX 258: Performed by: NURSE ANESTHETIST, CERTIFIED REGISTERED

## 2020-08-21 PROCEDURE — U0002 COVID-19 LAB TEST NON-CDC: HCPCS

## 2020-08-21 PROCEDURE — 3609008400 HC SIGMOIDOSCOPY DIAGNOSTIC: Performed by: INTERNAL MEDICINE

## 2020-08-21 RX ORDER — PROPOFOL 10 MG/ML
INJECTION, EMULSION INTRAVENOUS PRN
Status: DISCONTINUED | OUTPATIENT
Start: 2020-08-21 | End: 2020-08-21 | Stop reason: SDUPTHER

## 2020-08-21 RX ORDER — SODIUM CHLORIDE 9 MG/ML
INJECTION, SOLUTION INTRAVENOUS CONTINUOUS PRN
Status: DISCONTINUED | OUTPATIENT
Start: 2020-08-21 | End: 2020-08-21 | Stop reason: SDUPTHER

## 2020-08-21 RX ORDER — ONDANSETRON 2 MG/ML
4 INJECTION INTRAMUSCULAR; INTRAVENOUS
Status: DISCONTINUED | OUTPATIENT
Start: 2020-08-21 | End: 2020-08-21

## 2020-08-21 RX ORDER — KETOROLAC TROMETHAMINE 30 MG/ML
30 INJECTION, SOLUTION INTRAMUSCULAR; INTRAVENOUS EVERY 12 HOURS PRN
Status: DISCONTINUED | OUTPATIENT
Start: 2020-08-21 | End: 2020-08-25 | Stop reason: HOSPADM

## 2020-08-21 RX ORDER — LIDOCAINE HYDROCHLORIDE 20 MG/ML
INJECTION, SOLUTION INFILTRATION; PERINEURAL PRN
Status: DISCONTINUED | OUTPATIENT
Start: 2020-08-21 | End: 2020-08-21 | Stop reason: SDUPTHER

## 2020-08-21 RX ADMIN — ONDANSETRON 4 MG: 2 INJECTION INTRAMUSCULAR; INTRAVENOUS at 15:24

## 2020-08-21 RX ADMIN — DOCUSATE SODIUM 100 MG: 100 CAPSULE, LIQUID FILLED ORAL at 07:56

## 2020-08-21 RX ADMIN — DIVALPROEX SODIUM 250 MG: 250 TABLET, DELAYED RELEASE ORAL at 22:39

## 2020-08-21 RX ADMIN — ONDANSETRON 4 MG: 2 INJECTION INTRAMUSCULAR; INTRAVENOUS at 08:07

## 2020-08-21 RX ADMIN — METHYLNALTREXONE BROMIDE 12 MG: 12 INJECTION, SOLUTION SUBCUTANEOUS at 02:15

## 2020-08-21 RX ADMIN — PROPOFOL 50 MG: 10 INJECTION, EMULSION INTRAVENOUS at 13:42

## 2020-08-21 RX ADMIN — ACETAMINOPHEN 650 MG: 325 TABLET ORAL at 07:55

## 2020-08-21 RX ADMIN — ALPRAZOLAM 1 MG: 0.5 TABLET ORAL at 07:56

## 2020-08-21 RX ADMIN — DOCUSATE SODIUM 100 MG: 100 CAPSULE, LIQUID FILLED ORAL at 22:39

## 2020-08-21 RX ADMIN — LORAZEPAM 0.5 MG: 2 INJECTION INTRAMUSCULAR; INTRAVENOUS at 17:58

## 2020-08-21 RX ADMIN — LURASIDONE HYDROCHLORIDE 20 MG: 20 TABLET, FILM COATED ORAL at 07:55

## 2020-08-21 RX ADMIN — SODIUM CHLORIDE: 9 INJECTION, SOLUTION INTRAVENOUS at 13:29

## 2020-08-21 RX ADMIN — MINERAL OIL 30 ML: 1000 SOLUTION ORAL at 22:39

## 2020-08-21 RX ADMIN — PROPOFOL 50 MG: 10 INJECTION, EMULSION INTRAVENOUS at 13:46

## 2020-08-21 RX ADMIN — Medication 1 CAPSULE: at 07:55

## 2020-08-21 RX ADMIN — PREGABALIN 150 MG: 75 CAPSULE ORAL at 07:55

## 2020-08-21 RX ADMIN — POTASSIUM CHLORIDE, DEXTROSE MONOHYDRATE AND SODIUM CHLORIDE: 300; 5; 450 INJECTION, SOLUTION INTRAVENOUS at 17:58

## 2020-08-21 RX ADMIN — DIVALPROEX SODIUM 250 MG: 250 TABLET, DELAYED RELEASE ORAL at 07:56

## 2020-08-21 RX ADMIN — MINERAL OIL 30 ML: 1000 SOLUTION ORAL at 07:55

## 2020-08-21 RX ADMIN — ALPRAZOLAM 1 MG: 0.5 TABLET ORAL at 17:58

## 2020-08-21 RX ADMIN — PROPOFOL 100 MG: 10 INJECTION, EMULSION INTRAVENOUS at 13:40

## 2020-08-21 RX ADMIN — KETOROLAC TROMETHAMINE 30 MG: 30 INJECTION, SOLUTION INTRAMUSCULAR at 15:24

## 2020-08-21 RX ADMIN — LIDOCAINE HYDROCHLORIDE 100 MG: 20 INJECTION, SOLUTION INFILTRATION; PERINEURAL at 13:39

## 2020-08-21 RX ADMIN — ACETAMINOPHEN 650 MG: 325 TABLET ORAL at 22:39

## 2020-08-21 RX ADMIN — DICYCLOMINE HYDROCHLORIDE 20 MG: 10 CAPSULE ORAL at 17:58

## 2020-08-21 RX ADMIN — LORAZEPAM 0.5 MG: 2 INJECTION INTRAMUSCULAR; INTRAVENOUS at 07:57

## 2020-08-21 RX ADMIN — LORAZEPAM 0.5 MG: 2 INJECTION INTRAMUSCULAR; INTRAVENOUS at 22:40

## 2020-08-21 RX ADMIN — PREGABALIN 150 MG: 75 CAPSULE ORAL at 22:39

## 2020-08-21 RX ADMIN — ONDANSETRON 4 MG: 2 INJECTION INTRAMUSCULAR; INTRAVENOUS at 22:40

## 2020-08-21 ASSESSMENT — PAIN DESCRIPTION - FREQUENCY
FREQUENCY: CONTINUOUS

## 2020-08-21 ASSESSMENT — PAIN DESCRIPTION - ONSET
ONSET: ON-GOING
ONSET: PROGRESSIVE
ONSET: ON-GOING
ONSET: PROGRESSIVE
ONSET: ON-GOING

## 2020-08-21 ASSESSMENT — PAIN SCALES - GENERAL
PAINLEVEL_OUTOF10: 10
PAINLEVEL_OUTOF10: 7
PAINLEVEL_OUTOF10: 7
PAINLEVEL_OUTOF10: 10
PAINLEVEL_OUTOF10: 10
PAINLEVEL_OUTOF10: 9
PAINLEVEL_OUTOF10: 10
PAINLEVEL_OUTOF10: 5
PAINLEVEL_OUTOF10: 0

## 2020-08-21 ASSESSMENT — PULMONARY FUNCTION TESTS
PIF_VALUE: 0
PIF_VALUE: 1

## 2020-08-21 ASSESSMENT — PAIN DESCRIPTION - PAIN TYPE
TYPE: ACUTE PAIN

## 2020-08-21 ASSESSMENT — PAIN DESCRIPTION - DESCRIPTORS
DESCRIPTORS: CRAMPING
DESCRIPTORS: CRAMPING
DESCRIPTORS: ACHING;CONSTANT
DESCRIPTORS: CRAMPING;DISCOMFORT;PRESSURE
DESCRIPTORS: ACHING;CONSTANT
DESCRIPTORS: ACHING;CONSTANT
DESCRIPTORS: ACHING;CRAMPING;DISCOMFORT;SORE
DESCRIPTORS: ACHING;CONSTANT

## 2020-08-21 ASSESSMENT — PAIN DESCRIPTION - LOCATION
LOCATION: ABDOMEN

## 2020-08-21 ASSESSMENT — PAIN DESCRIPTION - PROGRESSION
CLINICAL_PROGRESSION: GRADUALLY WORSENING
CLINICAL_PROGRESSION: NOT CHANGED
CLINICAL_PROGRESSION: GRADUALLY WORSENING
CLINICAL_PROGRESSION: NOT CHANGED
CLINICAL_PROGRESSION: GRADUALLY IMPROVING
CLINICAL_PROGRESSION: NOT CHANGED
CLINICAL_PROGRESSION: GRADUALLY WORSENING

## 2020-08-21 ASSESSMENT — PAIN - FUNCTIONAL ASSESSMENT
PAIN_FUNCTIONAL_ASSESSMENT: PREVENTS OR INTERFERES SOME ACTIVE ACTIVITIES AND ADLS
PAIN_FUNCTIONAL_ASSESSMENT: PREVENTS OR INTERFERES SOME ACTIVE ACTIVITIES AND ADLS
PAIN_FUNCTIONAL_ASSESSMENT: 0-10
PAIN_FUNCTIONAL_ASSESSMENT: PREVENTS OR INTERFERES SOME ACTIVE ACTIVITIES AND ADLS

## 2020-08-21 ASSESSMENT — PAIN DESCRIPTION - ORIENTATION
ORIENTATION: ANTERIOR
ORIENTATION: UPPER
ORIENTATION: ANTERIOR

## 2020-08-21 ASSESSMENT — LIFESTYLE VARIABLES: SMOKING_STATUS: 1

## 2020-08-21 ASSESSMENT — ENCOUNTER SYMPTOMS: SHORTNESS OF BREATH: 0

## 2020-08-21 NOTE — PROGRESS NOTES
Patient A&O in the bed. Patient tolerating PO intake well. PM medications given without complications. Patient denies nausea or vomiting. Patient complains of pain, IVP Toradol given. Call light within reach, able to make needs known, fall precautions in place. Will monitor.     Electronically signed by Rae Riedel, RN on 8/21/2020 at 2:03 AM

## 2020-08-21 NOTE — PROGRESS NOTES
Pt rounded on this morning Q2h, whiteboard updated, and needs assessed. Pt NPO for flex/sig with Dr. May Duty at [phone]. Pt aware, consent signed and in chart, rapid COVID swab collected and sent to lab, pt has no further needs or concerns at this time. Call light within reach, pt verbalized understanding to call prior to ambulating. Will continue to monitor and reassess.    Electronically signed by China Reed RN on 8/21/2020 at 10:49 AM

## 2020-08-21 NOTE — PROGRESS NOTES
Patient's abdomen distended - pt requested to have Go-Lytely infusion turned off. Patient's NG tubed reconnected to continuous low wall suction. Will continue to monitor and reassess.

## 2020-08-21 NOTE — PROGRESS NOTES
Patient refuses GoLytely infusion - states her stomach still feels full. NG tube continues to run on low continuous suction.     Electronically signed by Noble Singer RN on 8/21/2020 at 6:14 AM

## 2020-08-21 NOTE — PLAN OF CARE
Problem: Pain:  Goal: Pain level will decrease  Description: Pain level will decrease  8/21/2020 0737 by Yumiko Walters RN  Outcome: Ongoing  8/20/2020 1944 by Maria Teresa Cortes RN  Outcome: Ongoing  Note: Pt assessed for pain. Pt in pain and assessed with 0-10 pain rating scale. Pt given prescribed analgesic for pain. (See eMar) Pt satisfied with pain relief thus far. Will reassess and continue to monitor. Goal: Control of acute pain  Description: Control of acute pain  8/21/2020 0737 by Yumiko Walters RN  Outcome: Ongoing  8/20/2020 1944 by Maria Teresa Cortes RN  Outcome: Ongoing  Goal: Control of chronic pain  Description: Control of chronic pain  8/21/2020 0737 by Yumiko Walters RN  Outcome: Ongoing  8/20/2020 1944 by Maria Teresa Cortes RN  Outcome: Ongoing     Problem: Falls - Risk of:  Goal: Will remain free from falls  Description: Will remain free from falls  8/21/2020 0737 by Yumiko Walters RN  Outcome: Ongoing  8/20/2020 1944 by Maria Teresa Cortes RN  Outcome: Ongoing  Note: Fall risk assessment completed. Fall precautions in place. Call light within reach. Pt educated on calling for assistance before getting up. Walkway free of clutter. Will continue to monitor. Goal: Absence of physical injury  Description: Absence of physical injury  8/21/2020 0737 by Yumiko Walters RN  Outcome: Ongoing  8/20/2020 1944 by Maria Teresa Cortes RN  Outcome: Ongoing     Problem: Nutrition  Goal: Optimal nutrition therapy  8/21/2020 0737 by Yumiko Walters RN  Outcome: Ongoing  8/20/2020 1944 by Maria Teresa Cortes RN  Outcome: Ongoing  Note: Will encourage nutritional intake.

## 2020-08-21 NOTE — PROGRESS NOTES
Alert and oriented. Agreeable to procedure. Urine preg test refused for today per pt Dr Nelly Arceo aware and agreeable. Neg serum preg on epic 8/19/20.   Electronically signed by Leni Carlin RN on 8/21/2020 at 1:36 PM

## 2020-08-21 NOTE — PROGRESS NOTES
Pt resting in bed this evening, after returning from her Flex Sig, pt having multiple watery bowel movements. Pt still c/o abdominal and back pain, MD aware, orders placed for Q12 toradol PRN, pt tolerating well. Pt also advanced to clear liquids, and able to tolerate 50ml boluses of GoLYTELY through NGT per verbal orders of Dr. Sandip Jimenez. Pt having no nausea or vomiting. Pt calls appropriately, will continue to monitor and reassess.  Electronically signed by Reji Britton RN on 8/21/2020 at 6:42 PM

## 2020-08-21 NOTE — ANESTHESIA PRE PROCEDURE
Department of Anesthesiology  Preprocedure Note       Name:  Hugo Segura   Age:  41 y.o.  :  1980                                          MRN:  [id number]         Date:  2020      Surgeon: Samm Luis):  Leroy Castro MD    Procedure: Procedure(s): FLEXIBLE SIGMOIDOSCOPY POSSIBLE COLONOSCOPY    Medications prior to admission:   Prior to Admission medications    Medication Sig Start Date End Date Taking? Authorizing Provider   nitrofurantoin, macrocrystal-monohydrate, (MACROBID) 100 MG capsule Take 1 capsule by mouth 2 times daily for 5 days 20 Yes Thedora Galeazzi, [place]   polyethylene glycol (MIRALAX) 17 g packet Take 17 g by mouth daily as needed for Constipation 8/16/20 9/15/20 Yes Thedora Galeazzi, [place]   midodrine (PROAMATINE) 2.5 MG tablet TAKE 1 TABLET BY MOUTH THREE TIMES A DAY 20  Yes Wendy Kay MD   oxyCODONE-acetaminophen (PERCOCET) 7.5-325 MG per tablet Take 1 tablet by mouth every 8 hours as needed. 3/26/19  Yes Historical Provider, MD   divalproex (DEPAKOTE) 250 MG DR tablet Take 1 tablet by mouth 2 times daily 5/15/19  Yes Jian Rashid MD   ranitidine (ZANTAC) 150 MG tablet Take 150 mg by mouth 2 times daily   Yes Historical Provider, MD   STOOL SOFTENER 100 MG capsule Take 100 mg by mouth 2 times daily 19  Yes Historical Provider, MD   LATUDA 20 MG TABS tablet Take 20 mg by mouth daily 5/3/19  Yes Historical Provider, MD   QUEtiapine (SEROQUEL) 200 MG tablet Take 200 mg by mouth nightly 19  Yes Historical Provider, MD   pregabalin (LYRICA) 150 MG capsule Take 150 mg by mouth 2 times daily. Yes Historical Provider, MD   ALPRAZolam Gaye January) 1 MG tablet Take 1 mg by mouth 3 times daily as needed for Sleep or Anxiety.     Yes Historical Provider, MD   Cetirizine HCl (ZYRTEC ALLERGY) 10 MG CAPS Take by mouth daily   Yes Historical Provider, MD   Probiotic Product (PROBIOTIC-10 PO) Take by mouth daily   Yes Historical Provider, MD   temazepam Intravenous Q4H PRN Tom Keenan MD   4 mg at 08/21/20 8315    phenol 1.4 % mouth spray 1 spray  1 spray Mouth/Throat Q2H PRN Tom Keenan MD   1 spray at 08/19/20 1314    dicyclomine (BENTYL) capsule 20 mg  20 mg Oral BID PRN Tom Keenan MD        promethazine [facility]) 12.5mg in sodium chloride 0.9% 50 mL IVPB 12.5 mg  12.5 mg Intravenous Q6H PRN Tom Keenan MD   Stopped at 08/21/20 2646    LORazepam (ATIVAN) injection 0.5 mg  0.5 mg Intravenous TID PRN Tom Keenan MD   0.5 mg at 08/21/20 0757    QUEtiapine (SEROQUEL) tablet 200 mg  200 mg Oral Nightly Tom Keenan MD   200 mg at 08/20/20 2244       Allergies: Allergies   Allergen Reactions    Metoclopramide Shortness Of Breath and Swelling    Quetiapine Shortness Of Breath and Swelling    Risperidone Swelling and Shortness Of Breath    Trazodone Swelling and Shortness Of Breath     Swelling throat    Buprenorphine-Naloxone Anxiety and Hives     Patient says she can take Percocet, Vicodin    Morphine Hives and Itching    Asenapine Swelling     tongue    Buprenorphine Hcl-Naloxone Hcl     Codeine Hives and Itching     Patient says she can take Percocet, Vicodin    Guanfacine Hcl Swelling     tongue    Morphine And Related Hives    Reglan [Metoclopramide Hcl] Swelling    Risperidone And Related Swelling    Seroquel  [Quetiapine Fumarate] Other (See Comments)     \"I get really dizzy and I pass out. \"    Seroquel [Quetiapine Fumarate] Swelling     Swelling throat    Trazodone And Nefazodone Swelling     Swelling throat    Lurasidone Anxiety       Problem List:    Patient Active Problem List   Diagnosis Code    Substance-induced psychotic disorder with delusions ([address] F19.950    Polysubstance dependence ([address] F19.20    Change in mental status R41.82    Lumbar stenosis with neurogenic claudication M48.062    S/P lumbar spinal fusion Z98.1    Abnormal laboratory test result R89.9    Acute lateral meniscal tear, right, initial encounter S83.281A    Adrenal insufficiency ([facility]) E27.40    Allergic rhinitis J30.9    Anxiety F41.9    Asymptomatic bacteriuria R82.71    Attention deficit hyperactivity disorder F90.9    Blurring of visual image H53.8    Borderline personality disorder ([facility]) F60.3    Cervical radiculopathy M54.12    Chronic diarrhea K52.9    Chronic back pain M54.9, G89.29    Chronic pain of both knees M25.561, M25.562, G89.29    Cocaine abuse in remission ([facility]) F14.11    Cystitis N30.90    DDD (degenerative disc disease), lumbar M51.36    Diabetes mellitus ([facility]) E11.9    Drug overdose T50.901A    Fibromyalgia M79.7    Generalized abdominal pain R10.84    Glenoid labral tear, left, initial encounter S43.432A    History of ulcerative colitis Z87.19    Hypermobility syndrome M35.7    Hyponatremia E87.1    Hyposmolality and/or hyponatremia E87.1    Hyposomnia, insomnia, or sleeplessness associated with conditioned arousal F51.03    Insomnia due to medical condition G47.01    Intractable migraine with status migrainosus G43.911    Left hip pain M25.552    Left upper arm pain M79.622    Low serum cortisol level ([facility]) E27.40    Lumbar spondylosis M47.816    Bipolar disorder ([facility]) E07.2    Metabolic syndrome P54.95    Mood and affect disturbance R45.86    Muscle spasm M62.838    Myalgia and myositis [id number]    Neuropathic pain M79.2    Opioid overdose ([facility]) T40.2X1A    Pain management contract signed Z02.89    Pain of both hip joints M25.551, M25.552    Pityriasis versicolor B36.0    Polysubstance dependence including opioid type drug, episodic abuse ([address] F11.20, F19.20    Postnasal drip R09.82    Pregnancy examination or test, positive result Z32.01    Suicidal ideations R45.851    Suicide and self-inflicted poisoning by barbiturates ([address] C95.9M6N    TCA (tricyclic antidepressant) overdose of undetermined intent T43.014A    Tobacco abuse Z72.0    Tooth disorder K08.9    Urinary tract infectious disease N39.0    Vertigo R42    Viral hepatitis C B19.20    Low T4 R79.89    Chronic, continuous use of opioids F11.90    Chronic fatigue R53.82    High serum thyroid stimulating hormone (TSH) R79.89    Hyperprolactinemia (HCC) E22.1    High insulin-like growth factor 1 (IGF-1) level R79.89    Constipation K59.00       Past Medical History:        Diagnosis Date    Asthma     Bipolar 1 disorder (HCC)     Compression fracture     COPD (chronic obstructive pulmonary disease) (HCC)     DDD (degenerative disc disease), cervical     Howard-Danlos syndrome     Fibromyalgia     H/O adrenal insufficiency     and pituitary insufficiency    Herniated disc     Metabolic syndrome     Osteoarthritis     Pyelonephritis     Sciatica        Past Surgical History:        Procedure Laterality Date    KNEE SURGERY      LUMBAR FUSION N/A 6/3/2019    L5, S1 TRANSFORAMINAL LUMBAR INTERBODY FUSION performed by Jenn Sanchez MD at [address] [facility] [place] Left     repair       Social History:    Social History     Tobacco Use    Smoking status: Former Smoker     Packs/day: 1.00     Types: Cigarettes     Last attempt to quit: 2018     Years since quittin.6    Smokeless tobacco: Never Used   Substance Use Topics    Alcohol use: Not Currently     Comment: socially                                Counseling given: Not Answered      Vital Signs (Current):   Vitals:    20 0739 20 0820 20 1211 20 1258   BP: 138/81  127/83 133/89   Pulse: 78 78 75 75   Resp: 17  15    Temp: 98 °F (36.7 °C)  97.6 °F (36.4 °C) 98.9 °F (37.2 °C)   TempSrc: Oral  Oral Oral   SpO2: 95%  95% 96%   Weight:       Height:                                                  BP Readings from Last 3 Encounters:   20 133/89   20 (!) 97/49   05/15/20 (!) 140/90       NPO Status:                            >8hrs                                                       BMI:   Wt Readings from Last 3 Encounters:   08/21/20 174 lb 13.2 oz (79.3 kg)   08/15/20 175 lb 0.7 oz (79.4 kg)   05/15/20 169 lb 5 oz (76.8 kg)     Body mass index is 30.01 kg/m². CBC:   Lab Results   Component Value Date    WBC 12.2 08/19/2020    RBC 5.04 08/19/2020    HGB 15.2 08/19/2020    HCT 45.7 08/19/2020    MCV 90.5 08/19/2020    RDW 14.3 08/19/2020     08/19/2020       CMP:   Lab Results   Component Value Date     08/19/2020    K 4.5 08/19/2020     08/19/2020    CO2 17 08/19/2020    BUN 7 08/19/2020    CREATININE 0.8 08/19/2020    GFRAA >60 08/19/2020    GFRAA >60 05/29/2013    AGRATIO 1.5 08/19/2020    LABGLOM >60 08/19/2020    GLUCOSE 88 08/19/2020    PROT 7.8 08/19/2020    PROT 8.7 10/14/2012    CALCIUM 9.8 08/19/2020    BILITOT 0.3 08/19/2020    ALKPHOS 55 08/19/2020    AST 41 08/19/2020    ALT 19 08/19/2020       POC Tests: No results for input(s): POCGLU, POCNA, POCK, POCCL, POCBUN, POCHEMO, POCHCT in the last 72 hours. Coags:   Lab Results   Component Value Date    PROTIME 10.1 06/04/2019    INR 0.89 06/04/2019    APTT 23.8 06/04/2019       HCG (If Applicable):   Lab Results   Component Value Date    PREGTESTUR Negative 08/15/2020        ABGs: No results found for: PHART, PO2ART, YVU8GEX, GFK1XGY, BEART, W5CRSUPQ     Type & Screen (If Applicable):  No results found for: LABABO, LABRH    Drug/Infectious Status (If Applicable):  No results found for: HIV, HEPCAB    COVID-19 Screening (If Applicable):   Lab Results   Component Value Date    COVID19 Not Detected 08/21/2020         Anesthesia Evaluation  Patient summary reviewed no history of anesthetic complications:   Airway: Mallampati: II  TM distance: >3 FB   Neck ROM: full  Mouth opening: > = 3 FB Dental:      Comment: No loose teeth    Pulmonary: breath sounds clear to auscultation  (+) COPD:  asthma: current smoker    (-) shortness of breath          Patient did not smoke on day of surgery.                  Cardiovascular:        (-) hypertension, valvular problems/murmurs, past MI, CAD, CABG/stent, dysrhythmias,  angina and  CHF      Rhythm: regular  Rate: normal                    Neuro/Psych:   (+) neuromuscular disease:, headaches:, psychiatric history:            GI/Hepatic/Renal:   (+) hepatitis: C, liver disease:, bowel prep,           Endo/Other:    (+) Diabetes, : arthritis:., .    (-) hypothyroidism, hyperthyroidism                ROS comment: EDS    Adrenal insufficiency, pituitary insufficiency  Abdominal:           Vascular:                                        Anesthesia Plan      MAC     ASA 3       Induction: intravenous. Anesthetic plan and risks discussed with patient. Plan discussed with CRNA. This pre-anesthesia assessment may be used as a history and physical.    DOS STAFF ADDENDUM:    Pt seen and examined, chart reviewed (including anesthesia, drug and allergy history). No interval changes to history and physical examination. Anesthetic plan, risks, benefits, alternatives, and personnel involved discussed with patient. Patient verbalized an understanding and agrees to proceed.       Charleen Contreras MD  August 21, 2020  1:28 PM        Charleen Contreras MD   8/21/2020

## 2020-08-21 NOTE — ANESTHESIA POSTPROCEDURE EVALUATION
Department of Anesthesiology  Postprocedure Note    Patient: Neptali Croft  MRN: [id number]  YOB: 1980  Date of evaluation: 8/21/2020  Time:  2:13 PM     Procedure Summary     Date:  08/21/20 Room / Location:  [address] [place] [address]    Anesthesia Start:  9457 Anesthesia Stop:  6511    Procedure:  FLEXIBLE SIGMOIDOSCOPY POSSIBLE COLONOSCOPY (N/A ) Diagnosis:  (Constipation)    Surgeon:  Krupa Forde MD Responsible Provider:  Cheyanne Goodman MD    Anesthesia Type:  MAC ASA Status:  3          Anesthesia Type: MAC    Brayan Phase I: Brayan Score: 8    Brayan Phase II:      Last vitals: Reviewed and per EMR flowsheets.        Anesthesia Post Evaluation    Patient location during evaluation: PACU  Patient participation: complete - patient participated  Level of consciousness: awake and alert  Airway patency: patent  Nausea & Vomiting: no nausea and no vomiting  Complications: no  Cardiovascular status: hemodynamically stable  Respiratory status: acceptable  Hydration status: stable

## 2020-08-21 NOTE — PROGRESS NOTES
Pre-operative History and Physical    Patient: Jasvir Dalal  : 1980  Acct#:     Intended Procedure:  Flexible Sigmoidoscopy     HISTORY OF PRESENT ILLNESS:  The patient is a 41 y.o. female  who presents for/due to Constipation/Abnormal Barium Enema    Past Medical History:        Diagnosis Date    Asthma     Bipolar 1 disorder ([facility]     Compression fracture     COPD (chronic obstructive pulmonary disease) ([facility]     DDD (degenerative disc disease), cervical     Howard-Danlos syndrome     Fibromyalgia     H/O adrenal insufficiency     and pituitary insufficiency    Herniated disc     Metabolic syndrome     Osteoarthritis     Pyelonephritis     Sciatica      Past Surgical History:        Procedure Laterality Date    KNEE SURGERY      LUMBAR FUSION N/A 6/3/2019    L5, S1 TRANSFORAMINAL LUMBAR INTERBODY FUSION performed by Yola Flores MD at [address] [facility] Left     repair     Medications Prior to Admission:   Prior to Admission medications    Medication Sig Start Date End Date Taking? Authorizing Provider   nitrofurantoin, macrocrystal-monohydrate, (MACROBID) 100 MG capsule Take 1 capsule by mouth 2 times daily for 5 days 20 Yes Michael Knutson   polyethylene glycol (MIRALAX) 17 g packet Take 17 g by mouth daily as needed for Constipation 8/16/20 9/15/20 Yes Michael Knutson   midodrine (PROAMATINE) 2.5 MG tablet TAKE 1 TABLET BY MOUTH THREE TIMES A DAY 20  Yes Baldo Armstrong MD   oxyCODONE-acetaminophen (PERCOCET) 7.5-325 MG per tablet Take 1 tablet by mouth every 8 hours as needed.   3/26/19  Yes Historical Provider, MD   divalproex (DEPAKOTE) 250 MG DR tablet Take 1 tablet by mouth 2 times daily 5/15/19  Yes Melani Keita MD   ranitidine (ZANTAC) 150 MG tablet Take 150 mg by mouth 2 times daily   Yes Historical Provider, MD   STOOL SOFTENER 100 MG capsule Take 100 mg by mouth 2 times daily 19  Yes Historical Provider, MD   LATUDA 20 MG TABS tablet Take 20 mg by mouth daily 5/3/19  Yes Historical Provider, MD   QUEtiapine (SEROQUEL) 200 MG tablet Take 200 mg by mouth nightly 4/20/19  Yes Historical Provider, MD   pregabalin (LYRICA) 150 MG capsule Take 150 mg by mouth 2 times daily. Yes Historical Provider, MD   ALPRAZolam Bailee Hymen) 1 MG tablet Take 1 mg by mouth 3 times daily as needed for Sleep or Anxiety. Yes Historical Provider, MD   Cetirizine HCl (ZYRTEC ALLERGY) 10 MG CAPS Take by mouth daily   Yes Historical Provider, MD   Probiotic Product (PROBIOTIC-10 PO) Take by mouth daily   Yes Historical Provider, MD   temazepam (RESTORIL) 30 MG capsule Take 30 mg by mouth nightly as needed for Sleep. .   Yes Historical Provider, MD   butalbital-aspirin-caffeine [facility]) -40 MG capsule Take 1 capsule by mouth every 6 hours as needed for Headaches for up to 5 days. 3/13/20 8/19/20  Jorge A Harvey MD   acyclovir (ZOVIRAX) 400 MG tablet Take 400 mg by mouth as needed     Historical Provider, MD   ketoconazole (NIZORAL) 2 % shampoo Apply topically Twice a Week 4/13/19   Historical Provider, MD   TRULANCE 3 MG TABS Take 3 mg by mouth daily 4/20/19   Historical Provider, MD       Allergies:  Metoclopramide; Quetiapine; Risperidone; Trazodone; Buprenorphine-naloxone; Morphine; Asenapine; Buprenorphine hcl-naloxone hcl; Codeine; Guanfacine hcl; Morphine and related; Reglan [metoclopramide hcl]; Risperidone and related; Seroquel  [quetiapine fumarate]; Seroquel [quetiapine fumarate]; Trazodone and nefazodone; and Lurasidone    Social History:   TOBACCO:   reports that she quit smoking about 2 years ago. Her smoking use included cigarettes. She smoked 1.00 pack per day. She has never used smokeless tobacco.  ETOH:   reports previous alcohol use. DRUGS:   reports no history of drug use.     PHYSICAL EXAM:      Vital Signs: /83   Pulse 75   Temp 97.6 °F (36.4 °C) (Oral)   Resp 15   Ht 5' 4\" (1.626 m)   Wt 174 lb 13.2 oz (79.3 kg) SpO2 95%   BMI 30.01 kg/m²    Airway: No stridor or wheezing noted. Good air movement  Pulmonary: without wheezes. Clear to auscultation  Cardiac:regular rate and rhythm without loud murmurs  Abdomen:soft, nontender,  Bowel sounds present    Pre-Procedure Assessment / Plan:  1) Flexible sigmoidoscopy     ASA Grade:  ASA 3 - Patient with moderate systemic disease with functional limitations  Mallampati Classification:  Class II    Level of Sedation Plan:Deep sedation    Post Procedure plan: Return to same level of care    I assessed the patient and find that the patient is in satisfactory condition to proceed with the planned procedure and sedation plan. I have explained the risk, benefits, and alternatives to the procedure; the patient understands and agrees to proceed.        Jany Kruger MD  8/21/2020

## 2020-08-21 NOTE — OP NOTE
Flexible Sigmoidoscopy Note      Patient: Shantal Frazier  : 1980  Acct#:     Procedure: Flexible Sigmoidoscopy to 60 cm    Date:  2020    Surgeon:  Samara Hamilton MD    Referring Physician: Cristal Magaña MD     Preoperative Diagnosis:  1. Constipation/Abnormal Barium enema     Postoperative Diagnosis:  1. Successful endoscopic decompression     Anesthesia: The patient was administered IV propofol per anesthesiology team.  Please see their operative records for full details. Consent:  The patient or their legal guardian has signed a consent, and is aware of the potential risks, benefits, alternatives, and potential complications of this procedure. These include, but are not limited to hemorrhage, bleeding, post procedural pain, perforation, phlebitis, aspiration, hypotension, hypoxia, cardiovascular events such as arryhthmia, and possibly death. Additionally, the possibility of missed colonic polyps and interval colon cancer was discussed in the consent. Procedure: An informed consent was obtained from the patient after explanation of indications, benefits, possible risks and complications of the procedure. The patient was then taken to the endoscopy suite, placed in the left lateral decubitus position, and the above IV anesthesia was administered. A digital rectal examination was performed and revealed negative without mass, lesions or tenderness. The Olympus video colonoscope was placed in the patient's rectum under digital direction and advanced 60 cm from the anal verge. The prep was poor. The scope was then withdrawn back through the descending and sigmoid colons. Carefull circumferential examination of the mucosa in these areas demonstrated:    1. The distal rectosigmoid colon up to 30 cm from the anal verge had no stool present.  A transition point concerning for possible sigmoid volvulus was present at approximately 30 cm with a significant amount of stool present above this area. There was was mild ulceration present suspected to be secondary to localized ischemia. No instrinsic stricture or mass present. The colonoscope was advanced to approximately 60 cm until solid stool was present. A large amount of air was suctioned to decompress the proximal colon and suction was preformed on withdrawal of the colonoscope. The scope was then withdrawn into the rectum and retroflexed. The retroflexed view of the anal verge and rectum demonstrates no abnormalities. The scope was straightened, the colon was decompressed and the scope was withdrawn from the patient. The patient tolerated the procedure well and was taken to the PACU in good condition. EBL: Non    Recommendations:  1. Recommend clamping NG tube for now and will start on a clear liquid diet. Monitor response to colonoscopic decompression.      Lizette Oliveros MD  [address]  8/21/2020

## 2020-08-21 NOTE — PROGRESS NOTES
INPATIENT CONSULTATION:    IDENTIFYING DATA/REASON FOR CONSULTATION   PATIENT:  Nasra Godinez  MRN:  [id number]  ADMIT DATE: 8/18/2020  TIME OF EVALUATION: 8/21/2020 8:59 AM  HOSPITAL STAY:   LOS: 2 days   CONSULTING PHYSICIAN: Salvatore Kaufman MD   REASON FOR CONSULTATION: Constipation    Subjective:    Patient did not tolerate bowel prep yesterday. Complains of increasing abdominal distention and nausea. NG tube hooked up to suction. She has not had bowel movements. MEDICATIONS   SCHEDULED:  divalproex, 250 mg, BID  lurasidone, 20 mg, Daily  docusate sodium, 100 mg, BID  pregabalin, 150 mg, BID  lactobacillus, 1 capsule, Daily with breakfast  mineral oil, 30 mL, BID  QUEtiapine, 200 mg, Nightly      FLUIDS/DRIPS:     dextrose 5% and 0.45% NaCl with KCl 40 mEq 100 mL/hr at 08/20/20 2021     PRNs: ALPRAZolam, 1 mg, TID PRN  ondansetron, 4 mg, Q8H PRN  LORazepam, 0.5 mg, Nightly PRN  acetaminophen, 650 mg, Q6H PRN  ondansetron, 4 mg, Q4H PRN  phenol, 1 spray, Q2H PRN  dicyclomine, 20 mg, BID PRN  promethazine, 12.5 mg, Q6H PRN  LORazepam, 0.5 mg, TID PRN      ALLERGIES:    Allergies   Allergen Reactions    Metoclopramide Shortness Of Breath and Swelling    Quetiapine Shortness Of Breath and Swelling    Risperidone Swelling and Shortness Of Breath    Trazodone Swelling and Shortness Of Breath     Swelling throat    Buprenorphine-Naloxone Anxiety and Hives     Patient says she can take Percocet, Vicodin    Morphine Hives and Itching    Asenapine Swelling     tongue    Buprenorphine Hcl-Naloxone Hcl     Codeine Hives and Itching     Patient says she can take Percocet, Vicodin    Guanfacine Hcl Swelling     tongue    Morphine And Related Hives    Reglan [Metoclopramide Hcl] Swelling    Risperidone And Related Swelling    Seroquel  [Quetiapine Fumarate] Other (See Comments)     \"I get really dizzy and I pass out. \"    Seroquel [Quetiapine Fumarate] Swelling     Swelling throat    Trazodone And Nefazodone Swelling     Swelling throat    Lurasidone Anxiety         PHYSICAL EXAM   VITALS:  /81   Pulse 78   Temp 98 °F (36.7 °C) (Oral)   Resp 17   Ht 5' 4\" (1.626 m)   Wt 174 lb 13.2 oz (79.3 kg)   SpO2 95%   BMI 30.01 kg/m²   TEMPERATURE:  Current - Temp: 98 °F (36.7 °C); Max - Temp  Av.6 °F (37 °C)  Min: 98 °F (36.7 °C)  Max: 99.9 °F (37.7 °C)    Physical Exam:  General appearance: alert, cooperative, no distress, +NGT  Eyes: Anicteric  Head: Normocephalic, without obvious abnormality  Lungs: clear to auscultation bilaterally, Normal Effort  Heart: regular rate and rhythm, normal S1 and S2, no murmurs or rubs  Abdomen: soft, distended  Extremities: atraumatic, no cyanosis or edema  Skin: warm and dry, no jaundice  Neuro: Grossly intact, A&OX3    LABS AND IMAGING   Laboratory   Recent Labs     20   WBC 12.2*   HGB 15.2   HCT 45.7   MCV 90.5        Recent Labs     20      K 4.5      CO2 17*   BUN 7   CREATININE 0.8     Recent Labs     20   AST 41*   ALT 19   BILITOT 0.3   ALKPHOS 55     Recent Labs     20   LIPASE 16.0     No results for input(s): PROTIME, INR in the last 72 hours. Imaging  XR ABDOMEN (KUB) (SINGLE AP VIEW)   Final Result   The enteric tube has its tip in the gastric fundus. The side port is greater   than 7 cm below the GE junction. Contrast is noted throughout a distended colon. FL WATER SOLUBLE ENEMA W OR WO KUB   Final Result   Large stool load throughout the colon. Sigmoid colon is somewhat small in   caliber, which may simply relate to spasm. XR ABDOMEN (KUB) (SINGLE AP VIEW)   Final Result   Large colonic stool volume without obstruction.              Endoscopy      ASSESSMENT AND RECOMMENDATIONS   Anjelica Rojas is a 41 y.o. female with PMH of EDS, chronic pain, fibromyalgia, history of substance abuse, IBS-C, borderline personality disorder, anxiety who presented on 8/18/2020 with constipation. Abd Xray showed large colonic stool volume. She was given smog enema and Relistor with minimal relief. NG tube placed and she was ordered a GoLYTELY bowel prep which she tolerated poorly. Repeat KUB with water-soluble enema showed again large stool load throughout the colon as well as somewhat small caliber of the sigmoid colon      1. Constipation   -Multifactorial likely related to slow transit secondary to EDS, opioid induced, underlying IBS-C.   -Tried stool softeners, stimulant laxatives, enema at home without relief   -Received a smog enema and a dose of Relistor 8/19 without relief   -NG tube placed with orders for bowel prep however patient did not tolerate. - KUB with water-soluble enema showed large stool load throughout the colon as well as somewhat small caliber of the sigmoid colon          RECOMMENDATIONS:    We will proceed with flexible sigmoidoscopy today with Dr. Devonte Walton. Keep n.p.o. If you have any questions or need any further information, please feel free to contact us [phone]. Thank you for allowing us to participate in the care of Yrn Al. The note was completed using Dragon voice recognition transcription. Every effort was made to ensure accuracy; however, inadvertent transcription errors may be present despite my best efforts to edit errors.     Inessa RESENDIZ

## 2020-08-22 ENCOUNTER — APPOINTMENT (OUTPATIENT)
Dept: GENERAL RADIOLOGY | Age: 40
DRG: 254 | End: 2020-08-22
Payer: COMMERCIAL

## 2020-08-22 PROCEDURE — 6370000000 HC RX 637 (ALT 250 FOR IP): Performed by: INTERNAL MEDICINE

## 2020-08-22 PROCEDURE — 2500000003 HC RX 250 WO HCPCS: Performed by: INTERNAL MEDICINE

## 2020-08-22 PROCEDURE — 6360000002 HC RX W HCPCS: Performed by: INTERNAL MEDICINE

## 2020-08-22 PROCEDURE — 2580000003 HC RX 258: Performed by: INTERNAL MEDICINE

## 2020-08-22 PROCEDURE — 6360000002 HC RX W HCPCS: Performed by: SPECIALIST

## 2020-08-22 PROCEDURE — 74018 RADEX ABDOMEN 1 VIEW: CPT

## 2020-08-22 PROCEDURE — 1200000000 HC SEMI PRIVATE

## 2020-08-22 RX ORDER — POLYETHYLENE GLYCOL 3350 17 G/17G
34 POWDER, FOR SOLUTION ORAL 3 TIMES DAILY
Status: DISCONTINUED | OUTPATIENT
Start: 2020-08-22 | End: 2020-08-25 | Stop reason: HOSPADM

## 2020-08-22 RX ADMIN — DICYCLOMINE HYDROCHLORIDE 20 MG: 10 CAPSULE ORAL at 22:29

## 2020-08-22 RX ADMIN — QUETIAPINE FUMARATE 200 MG: 100 TABLET ORAL at 23:10

## 2020-08-22 RX ADMIN — PREGABALIN 150 MG: 75 CAPSULE ORAL at 20:52

## 2020-08-22 RX ADMIN — ACETAMINOPHEN 650 MG: 325 TABLET ORAL at 14:25

## 2020-08-22 RX ADMIN — POTASSIUM CHLORIDE, DEXTROSE MONOHYDRATE AND SODIUM CHLORIDE: 300; 5; 450 INJECTION, SOLUTION INTRAVENOUS at 17:16

## 2020-08-22 RX ADMIN — ONDANSETRON 4 MG: 4 TABLET, ORALLY DISINTEGRATING ORAL at 09:43

## 2020-08-22 RX ADMIN — ALPRAZOLAM 1 MG: 0.5 TABLET ORAL at 04:03

## 2020-08-22 RX ADMIN — POTASSIUM CHLORIDE, DEXTROSE MONOHYDRATE AND SODIUM CHLORIDE: 300; 5; 450 INJECTION, SOLUTION INTRAVENOUS at 04:58

## 2020-08-22 RX ADMIN — DIVALPROEX SODIUM 250 MG: 250 TABLET, DELAYED RELEASE ORAL at 09:32

## 2020-08-22 RX ADMIN — POLYETHYLENE GLYCOL 3350 34 G: 17 POWDER, FOR SOLUTION ORAL at 14:20

## 2020-08-22 RX ADMIN — DOCUSATE SODIUM 100 MG: 100 CAPSULE, LIQUID FILLED ORAL at 09:32

## 2020-08-22 RX ADMIN — METHYLNALTREXONE BROMIDE 12 MG: 12 INJECTION, SOLUTION SUBCUTANEOUS at 20:52

## 2020-08-22 RX ADMIN — DICYCLOMINE HYDROCHLORIDE 20 MG: 10 CAPSULE ORAL at 14:27

## 2020-08-22 RX ADMIN — MINERAL OIL 30 ML: 1000 SOLUTION ORAL at 20:52

## 2020-08-22 RX ADMIN — ALPRAZOLAM 1 MG: 0.5 TABLET ORAL at 14:25

## 2020-08-22 RX ADMIN — LURASIDONE HYDROCHLORIDE 20 MG: 20 TABLET, FILM COATED ORAL at 09:32

## 2020-08-22 RX ADMIN — KETOROLAC TROMETHAMINE 30 MG: 30 INJECTION, SOLUTION INTRAMUSCULAR at 04:03

## 2020-08-22 RX ADMIN — LORAZEPAM 0.5 MG: 2 INJECTION INTRAMUSCULAR; INTRAVENOUS at 09:43

## 2020-08-22 RX ADMIN — KETOROLAC TROMETHAMINE 30 MG: 30 INJECTION, SOLUTION INTRAMUSCULAR at 16:22

## 2020-08-22 RX ADMIN — Medication 1 CAPSULE: at 09:32

## 2020-08-22 RX ADMIN — ACETAMINOPHEN 650 MG: 325 TABLET ORAL at 21:00

## 2020-08-22 RX ADMIN — PREGABALIN 150 MG: 75 CAPSULE ORAL at 09:32

## 2020-08-22 RX ADMIN — POLYETHYLENE GLYCOL 3350 34 G: 17 POWDER, FOR SOLUTION ORAL at 11:34

## 2020-08-22 RX ADMIN — DIVALPROEX SODIUM 250 MG: 250 TABLET, DELAYED RELEASE ORAL at 23:10

## 2020-08-22 RX ADMIN — ALPRAZOLAM 1 MG: 0.5 TABLET ORAL at 23:10

## 2020-08-22 RX ADMIN — LORAZEPAM 0.5 MG: 2 INJECTION INTRAMUSCULAR; INTRAVENOUS at 18:21

## 2020-08-22 RX ADMIN — MINERAL OIL 30 ML: 1000 SOLUTION ORAL at 09:32

## 2020-08-22 RX ADMIN — DOCUSATE SODIUM 100 MG: 100 CAPSULE, LIQUID FILLED ORAL at 20:52

## 2020-08-22 RX ADMIN — Medication 12.5 MG: at 22:21

## 2020-08-22 RX ADMIN — POLYETHYLENE GLYCOL 3350 34 G: 17 POWDER, FOR SOLUTION ORAL at 20:52

## 2020-08-22 ASSESSMENT — PAIN DESCRIPTION - ONSET
ONSET: ON-GOING

## 2020-08-22 ASSESSMENT — PAIN DESCRIPTION - FREQUENCY
FREQUENCY: CONTINUOUS

## 2020-08-22 ASSESSMENT — PAIN DESCRIPTION - ORIENTATION
ORIENTATION: UPPER;LOWER;RIGHT;LEFT
ORIENTATION: RIGHT;LEFT;UPPER;LOWER
ORIENTATION: UPPER;LOWER;RIGHT;LEFT
ORIENTATION: UPPER;LOWER;RIGHT;LEFT
ORIENTATION: UPPER
ORIENTATION: RIGHT;LEFT;UPPER;LOWER

## 2020-08-22 ASSESSMENT — PAIN DESCRIPTION - DESCRIPTORS
DESCRIPTORS: ACHING;CONSTANT
DESCRIPTORS: PRESSURE
DESCRIPTORS: PRESSURE
DESCRIPTORS: CRAMPING;PRESSURE
DESCRIPTORS: PRESSURE
DESCRIPTORS: PRESSURE

## 2020-08-22 ASSESSMENT — PAIN DESCRIPTION - LOCATION
LOCATION: ABDOMEN

## 2020-08-22 ASSESSMENT — PAIN DESCRIPTION - PAIN TYPE
TYPE: ACUTE PAIN

## 2020-08-22 ASSESSMENT — PAIN SCALES - GENERAL
PAINLEVEL_OUTOF10: 10
PAINLEVEL_OUTOF10: 7
PAINLEVEL_OUTOF10: 8
PAINLEVEL_OUTOF10: 5
PAINLEVEL_OUTOF10: 7
PAINLEVEL_OUTOF10: 8
PAINLEVEL_OUTOF10: 0
PAINLEVEL_OUTOF10: 5

## 2020-08-22 ASSESSMENT — PAIN DESCRIPTION - PROGRESSION
CLINICAL_PROGRESSION: NOT CHANGED
CLINICAL_PROGRESSION: GRADUALLY WORSENING
CLINICAL_PROGRESSION: GRADUALLY WORSENING

## 2020-08-22 NOTE — PLAN OF CARE
Problem: Pain:  Goal: Pain level will decrease  Description: Pain level will decrease  8/22/2020 1351 by Noble Rawls RN  Outcome: Ongoing  Note: Pt assessed for pain. Pt in pain and assessed with 0-10 pain rating scale. Pt given prescribed analgesic for pain. (See eMar) Pt satisfied with pain relief thus far. Will reassess and continue to monitor. 8/22/2020 0137 by Min Xavier RN  Outcome: Ongoing  Note: Pt assessed for pain. Pt in pain and assessed with 0-10 pain rating scale. Pt given prescribed analgesic for pain. (See eMar) Pt satisfied with pain relief thus far. Will reassess and continue to monitor. Goal: Control of acute pain  Description: Control of acute pain  8/22/2020 1351 by Noble Rawls RN  Outcome: Ongoing  8/22/2020 0137 by Min Xavier RN  Outcome: Ongoing  Note: Pt assessed for pain. Pt in pain and assessed with 0-10 pain rating scale. Pt given prescribed analgesic for pain. (See eMar) Pt satisfied with pain relief thus far. Will reassess and continue to monitor. Goal: Control of chronic pain  Description: Control of chronic pain  8/22/2020 1351 by Noble Rawls RN  Outcome: Ongoing  8/22/2020 0137 by Min Xavier RN  Outcome: Ongoing  Note: Pt assessed for pain. Pt in pain and assessed with 0-10 pain rating scale. Pt given prescribed analgesic for pain. (See eMar) Pt satisfied with pain relief thus far. Will reassess and continue to monitor. Problem: Falls - Risk of:  Goal: Will remain free from falls  Description: Will remain free from falls  8/22/2020 1351 by Noble Rawls RN  Outcome: Ongoing  Note: Fall risk assessment completed. Fall precautions in place. Bed in lowest position, wheels locked, bed/chair exit alarm in place, call light within reach, and non skid footwear on. Walkway free of clutter. Pt alert and oriented and able to make needs known. Pt educated to use call light when needing to get up, and pt utilizes call light to make needs known. Will continue to monitor. 8/22/2020 0137 by Naya Barraza RN  Outcome: Ongoing  Note: Fall risk assessment completed. Fall precautions in place. Call light within reach. Pt educated on calling for assistance before getting up. Walkway free of clutter. Will continue to monitor. Goal: Absence of physical injury  Description: Absence of physical injury  8/22/2020 1351 by Zheng Burgess RN  Outcome: Ongoing  8/22/2020 0137 by Naya Barraza RN  Outcome: Ongoing  Note: Pt is free of injury. No injury noted. Fall precautions in place. Call light within reach. Will monitor. Problem: Nutrition  Goal: Optimal nutrition therapy  8/22/2020 1351 by Zheng Burgess RN  Outcome: Ongoing  Note: Patient's nutrition is improving, patient had % of breakfast. Will cont to monitor and reassess. 8/22/2020 0137 by Naya Barraza RN  Outcome: Ongoing     Problem: Skin Integrity:  Goal: Will show no infection signs and symptoms  Description: Will show no infection signs and symptoms  Outcome: Ongoing  Note: Will monitor skin and mucous members. Will turn patient every 2 hours, monitor for friction and sheering, and change dressings as needed. Will preform skin assessment every shift.       Goal: Absence of new skin breakdown  Description: Absence of new skin breakdown  Outcome: Ongoing   Electronically signed by Zheng Burgess RN on 8/22/2020 at 1:52 PM

## 2020-08-22 NOTE — CARE COORDINATION
Chart reviewed; met with patient. She will return to home where she lives with her parents. She has a PCP,  insurance and access to medications. She denies any needs or concerns at this time.     Electronically signed by Zbigniew Dumont on 8/22/2020 at 5:18 PM

## 2020-08-22 NOTE — PROGRESS NOTES
INPATIENT CONSULTATION:    IDENTIFYING DATA/REASON FOR CONSULTATION   PATIENT:  Cm Paul  MRN:  [id number]  ADMIT DATE: 8/18/2020  TIME OF EVALUATION: 8/22/2020 8:49 AM  HOSPITAL STAY:   LOS: 3 days   CONSULTING PHYSICIAN:  REASON FOR CONSULTATION:    Subjective:    Patient reports some improvement in abdominal distension/discomfort. She tolerated liquids overnight. She had some stool output post procedure. MEDICATIONS   SCHEDULED:  divalproex, 250 mg, BID  lurasidone, 20 mg, Daily  docusate sodium, 100 mg, BID  pregabalin, 150 mg, BID  lactobacillus, 1 capsule, Daily with breakfast  mineral oil, 30 mL, BID  QUEtiapine, 200 mg, Nightly      FLUIDS/DRIPS:     dextrose 5% and 0.45% NaCl with KCl 40 mEq 100 mL/hr at 08/22/20 0458     PRNs: ketorolac, 30 mg, Q12H PRN  ALPRAZolam, 1 mg, TID PRN  ondansetron, 4 mg, Q8H PRN  LORazepam, 0.5 mg, Nightly PRN  acetaminophen, 650 mg, Q6H PRN  ondansetron, 4 mg, Q4H PRN  phenol, 1 spray, Q2H PRN  dicyclomine, 20 mg, BID PRN  promethazine, 12.5 mg, Q6H PRN  LORazepam, 0.5 mg, TID PRN      ALLERGIES:  She [unfilled]      PHYSICAL EXAM   [unfilled]   I/O last 3 completed shifts: In: 1400 [P.O.:1200;  I.V.:200]  Out: -   Oxygen Therapy:  @[id number])@  @[id number])@  @[id number]([id number])@    Physical Exam:  Gen: Resting in bed, NAD   HEENT: NG in place and clamped   CV: RRR no MRG   Pul: CTAB   Abd: Good bowel sounds throughout, no scars, improved abdominal distension but remains moderately distended, no masses, no HSM   Ext: No edema   Neuro: No asterixis   Skin: No jaundice, spider angiomas, barone erythema     LABS AND IMAGING     Recent Results (from the past 24 hour(s))   COVID-19    Collection Time: 08/21/20  9:30 AM   Result Value Ref Range    SARS-CoV-2, NAAT Not Detected Not Detected     Other Labs    Imaging    ASSESSMENT AND RECOMMENDATIONS   Anjelica R Brendia Angelucci is a 41 y.o. female with PMH of EDS, chronic pain, fibromyalgia, history of substance abuse, IBS-C, borderline personality disorder, anxiety who presented on 8/18/2020 with constipation.  Abd Xray showed large colonic stool volume.  She was given smog enema and Relistor with minimal relief. NG tube placed and she was ordered a GoLYTELY bowel prep.     1. Constipation              -Multifactorial likely related to slow transit secondary to EDS, opioid induced, underlying IBS-C.              -Tried stool softeners, stimulant laxatives, enema at home without relief              -Received a smog enema and a dose of Relistor 8/19 without relief              -Bowel prep ordered last night however patient only able to tolerate third. Still no BM              -Repeat abdominal x-ray today shows NG tube tip within the gastric lumen     RECOMMENDATIONS:   -Patient s/p colonoscopic decompression with concerning for possible sigmoid volvulus. She has had some stool output since this time. Keep NG tube clamped and continue clear liquid diet. If does well this morning I am ok with pulling NG tube and advancing to a full liquid diet this afternoon. S/P Relistor on 8/21 and can repeat Q48 hours. Will keep on Miralax bowel regimen. If you have any questions or need any further information, please feel free to contact anyone on our consult team.  Thank you for allowing us to participate in the care of Yrn Al.     Mitzi Vidal MD  [address] [facility]

## 2020-08-22 NOTE — PLAN OF CARE
Problem: Pain:  Goal: Pain level will decrease  Description: Pain level will decrease  Outcome: Ongoing  Note: Pt assessed for pain. Pt in pain and assessed with 0-10 pain rating scale. Pt given prescribed analgesic for pain. (See eMar) Pt satisfied with pain relief thus far. Will reassess and continue to monitor. Goal: Control of acute pain  Description: Control of acute pain  Outcome: Ongoing  Note: Pt assessed for pain. Pt in pain and assessed with 0-10 pain rating scale. Pt given prescribed analgesic for pain. (See eMar) Pt satisfied with pain relief thus far. Will reassess and continue to monitor. Goal: Control of chronic pain  Description: Control of chronic pain  Outcome: Ongoing  Note: Pt assessed for pain. Pt in pain and assessed with 0-10 pain rating scale. Pt given prescribed analgesic for pain. (See eMar) Pt satisfied with pain relief thus far. Will reassess and continue to monitor. Problem: Falls - Risk of:  Goal: Will remain free from falls  Description: Will remain free from falls  Outcome: Ongoing  Note: Fall risk assessment completed. Fall precautions in place. Call light within reach. Pt educated on calling for assistance before getting up. Walkway free of clutter. Will continue to monitor. Goal: Absence of physical injury  Description: Absence of physical injury  Outcome: Ongoing  Note: Pt is free of injury. No injury noted. Fall precautions in place. Call light within reach. Will monitor.        Problem: Nutrition  Goal: Optimal nutrition therapy  Outcome: Ongoing

## 2020-08-22 NOTE — PROGRESS NOTES
Pt resting in bed. A&Ox4. Pt c/o abdominal pain and nausea overnight, pain meds and zofran given per orders. No other complaints at this time. IVF running at 100 ml/hr. Call light and bedside table within reach.

## 2020-08-22 NOTE — PLAN OF CARE
Problem: Pain:  Goal: Pain level will decrease  Description: Pain level will decrease  8/22/2020 1939 by Maninder Phillips RN  Outcome: Ongoing  Note: Pt assessed for pain. Pt in pain and assessed with 0-10 pain rating scale. Pt given prescribed analgesic for pain. (See eMar) Pt satisfied with pain relief thus far. Will reassess and continue to monitor. 8/22/2020 1351 by Adrienne Pace RN  Outcome: Ongoing  Note: Pt assessed for pain. Pt in pain and assessed with 0-10 pain rating scale. Pt given prescribed analgesic for pain. (See eMar) Pt satisfied with pain relief thus far. Will reassess and continue to monitor. Goal: Control of acute pain  Description: Control of acute pain  8/22/2020 1939 by Maninder Phillips RN  Outcome: Ongoing  8/22/2020 1351 by Adrienne Pace RN  Outcome: Ongoing  Goal: Control of chronic pain  Description: Control of chronic pain  8/22/2020 1939 by Manidner Phillips RN  Outcome: Ongoing  8/22/2020 1351 by Adrienne Pace RN  Outcome: Ongoing     Problem: Falls - Risk of:  Goal: Will remain free from falls  Description: Will remain free from falls  8/22/2020 1939 by Maninder Phillips RN  Outcome: Ongoing  Note: Fall risk assessment completed. Fall precautions in place. Call light within reach. Pt educated on calling for assistance before getting up. Walkway free of clutter. Will continue to monitor. 8/22/2020 1351 by Adrienne Pace RN  Outcome: Ongoing  Note: Fall risk assessment completed. Fall precautions in place. Bed in lowest position, wheels locked, bed/chair exit alarm in place, call light within reach, and non skid footwear on. Walkway free of clutter. Pt alert and oriented and able to make needs known. Pt educated to use call light when needing to get up, and pt utilizes call light to make needs known. Will continue to monitor.      Goal: Absence of physical injury  Description: Absence of physical injury  8/22/2020 1939 by Maninder Phillips RN  Outcome: Ongoing  8/22/2020 1351 by Carlie Pelayo RN  Outcome: Ongoing     Problem: Nutrition  Goal: Optimal nutrition therapy  8/22/2020 1939 by Adriana Knight RN  Outcome: Ongoing  Note: Will encourage intake. 8/22/2020 1351 by Carlie Pelayo RN  Outcome: Ongoing  Note: Patient's nutrition is improving, patient had % of breakfast. Will cont to monitor and reassess. Problem: Skin Integrity:  Goal: Will show no infection signs and symptoms  Description: Will show no infection signs and symptoms  8/22/2020 1939 by Adriana Knight RN  Outcome: Ongoing  Note: Will monitor for infection. 8/22/2020 1351 by Carlie Pelayo RN  Outcome: Ongoing  Note: Will monitor skin and mucous members. Will turn patient every 2 hours, monitor for friction and sheering, and change dressings as needed. Will preform skin assessment every shift.       Goal: Absence of new skin breakdown  Description: Absence of new skin breakdown  8/22/2020 1939 by Adriana Kngiht RN  Outcome: Ongoing  8/22/2020 1351 by Carlie Pelayo RN  Outcome: Ongoing

## 2020-08-22 NOTE — PROGRESS NOTES
Pt ambulating around unit with stand by assist tolerating well. No increase in abdominal pain but no relief.   Electronically signed by Charles Michelle RN on 8/22/2020 at 5:23 PM

## 2020-08-22 NOTE — PROGRESS NOTES
In to assess pt. Pt sleeping, respirations easy and even. Wakes easily to verbal stimuli. Oriented x4. VSS. Rates pain all over abd 7/10 as pressure, educated on next available prn medication, voiced understanding. Abdomen taut, distended with active bowel sounds. Pt states she has not passed any flatus but did have a liquid b/m yesterday. C/O slight nausea, treated with prn Zofran ODT, see MAR. NG remains in place, clamped. Pt took all am meds without issue. IV site WDL. Voiced no other needs at this time. Call light within reach, will continue to monitor.   Electronically signed by Chadd Natarajan RN on 8/22/2020 at 12:50 PM

## 2020-08-22 NOTE — PROGRESS NOTES
Department of Internal Medicine  General Internal Medicine  Attending Progress Note      SUBJECTIVE:  Pt has flexsig yesterday, had some liquidy stool . Still has abd distension,pain. nG in place. She is walking daytime.     OBJECTIVE      Medications    Current Facility-Administered Medications: ketorolac (TORADOL) injection 30 mg, 30 mg, Intravenous, Q12H PRN  dextrose 5 % and 0.45 % NaCl with KCl 40 mEq infusion, , Intravenous, Continuous  ALPRAZolam (XANAX) tablet 1 mg, 1 mg, Oral, TID PRN  divalproex (DEPAKOTE) DR tablet 250 mg, 250 mg, Oral, BID  lurasidone (LATUDA) tablet 20 mg, 20 mg, Oral, Daily  ondansetron (ZOFRAN-ODT) disintegrating tablet 4 mg, 4 mg, Oral, Q8H PRN  docusate sodium (COLACE) capsule 100 mg, 100 mg, Oral, BID  pregabalin (LYRICA) capsule 150 mg, 150 mg, Oral, BID  lactobacillus (CULTURELLE) capsule 1 capsule, 1 capsule, Oral, Daily with breakfast  LORazepam (ATIVAN) tablet 0.5 mg, 0.5 mg, Oral, Nightly PRN  mineral oil liquid 30 mL, 30 mL, Oral, BID  acetaminophen (TYLENOL) tablet 650 mg, 650 mg, Oral, Q6H PRN  ondansetron (ZOFRAN) injection 4 mg, 4 mg, Intravenous, Q4H PRN  phenol 1.4 % mouth spray 1 spray, 1 spray, Mouth/Throat, Q2H PRN  dicyclomine (BENTYL) capsule 20 mg, 20 mg, Oral, BID PRN  promethazine (PHENERGAN) 12.5mg in sodium chloride 0.9% 50 mL IVPB 12.5 mg, 12.5 mg, Intravenous, Q6H PRN  LORazepam (ATIVAN) injection 0.5 mg, 0.5 mg, Intravenous, TID PRN  QUEtiapine (SEROQUEL) tablet 200 mg, 200 mg, Oral, Nightly  Physical    VITALS:  /83   Pulse 75   Temp 98 °F (36.7 °C) (Axillary)   Resp 16   Ht 5' 4\" (1.626 m)   Wt 177 lb 14.6 oz (80.7 kg)   SpO2 95%   BMI 30.54 kg/m²   CONSTITUTIONAL:  awake, alert, cooperative, no apparent distress, and appears stated age  LUNGS:  No increased work of breathing, good air exchange, clear to auscultation bilaterally, no crackles or wheezing  CARDIOVASCULAR:  Normal apical impulse, regular rate and rhythm, normal S1 and S2, no S3

## 2020-08-23 ENCOUNTER — APPOINTMENT (OUTPATIENT)
Dept: GENERAL RADIOLOGY | Age: 40
DRG: 254 | End: 2020-08-23
Payer: COMMERCIAL

## 2020-08-23 LAB
ANION GAP SERPL CALCULATED.3IONS-SCNC: 10 MMOL/L (ref 3–16)
BUN BLDV-MCNC: 3 MG/DL (ref 7–20)
CALCIUM SERPL-MCNC: 8.7 MG/DL (ref 8.3–10.6)
CHLORIDE BLD-SCNC: 106 MMOL/L (ref 99–110)
CO2: 24 MMOL/L (ref 21–32)
CREAT SERPL-MCNC: 0.7 MG/DL (ref 0.6–1.1)
GFR AFRICAN AMERICAN: >60
GFR NON-AFRICAN AMERICAN: >60
GLUCOSE BLD-MCNC: 103 MG/DL (ref 70–99)
POTASSIUM REFLEX MAGNESIUM: 3.9 MMOL/L (ref 3.5–5.1)
SODIUM BLD-SCNC: 140 MMOL/L (ref 136–145)

## 2020-08-23 PROCEDURE — 2500000003 HC RX 250 WO HCPCS: Performed by: INTERNAL MEDICINE

## 2020-08-23 PROCEDURE — 74018 RADEX ABDOMEN 1 VIEW: CPT

## 2020-08-23 PROCEDURE — 6370000000 HC RX 637 (ALT 250 FOR IP): Performed by: INTERNAL MEDICINE

## 2020-08-23 PROCEDURE — 6360000002 HC RX W HCPCS: Performed by: SPECIALIST

## 2020-08-23 PROCEDURE — 6360000002 HC RX W HCPCS: Performed by: INTERNAL MEDICINE

## 2020-08-23 PROCEDURE — 1200000000 HC SEMI PRIVATE

## 2020-08-23 PROCEDURE — 80048 BASIC METABOLIC PNL TOTAL CA: CPT

## 2020-08-23 RX ORDER — PANTOPRAZOLE SODIUM 40 MG/1
40 TABLET, DELAYED RELEASE ORAL
Status: DISCONTINUED | OUTPATIENT
Start: 2020-08-24 | End: 2020-08-25 | Stop reason: HOSPADM

## 2020-08-23 RX ADMIN — MINERAL OIL 30 ML: 1000 SOLUTION ORAL at 08:27

## 2020-08-23 RX ADMIN — MINERAL OIL 30 ML: 1000 SOLUTION ORAL at 21:05

## 2020-08-23 RX ADMIN — Medication 1 CAPSULE: at 08:26

## 2020-08-23 RX ADMIN — DOCUSATE SODIUM 100 MG: 100 CAPSULE, LIQUID FILLED ORAL at 21:03

## 2020-08-23 RX ADMIN — DOCUSATE SODIUM 100 MG: 100 CAPSULE, LIQUID FILLED ORAL at 08:27

## 2020-08-23 RX ADMIN — ALPRAZOLAM 1 MG: 0.5 TABLET ORAL at 08:27

## 2020-08-23 RX ADMIN — LORAZEPAM 0.5 MG: 2 INJECTION INTRAMUSCULAR; INTRAVENOUS at 23:36

## 2020-08-23 RX ADMIN — ONDANSETRON 4 MG: 4 TABLET, ORALLY DISINTEGRATING ORAL at 14:42

## 2020-08-23 RX ADMIN — PREGABALIN 150 MG: 75 CAPSULE ORAL at 21:03

## 2020-08-23 RX ADMIN — POLYETHYLENE GLYCOL 3350 34 G: 17 POWDER, FOR SOLUTION ORAL at 21:03

## 2020-08-23 RX ADMIN — POTASSIUM CHLORIDE, DEXTROSE MONOHYDRATE AND SODIUM CHLORIDE: 300; 5; 450 INJECTION, SOLUTION INTRAVENOUS at 04:28

## 2020-08-23 RX ADMIN — Medication 12.5 MG: at 17:41

## 2020-08-23 RX ADMIN — LORAZEPAM 0.5 MG: 0.5 TABLET ORAL at 00:36

## 2020-08-23 RX ADMIN — ACETAMINOPHEN 650 MG: 325 TABLET ORAL at 08:26

## 2020-08-23 RX ADMIN — ACETAMINOPHEN 650 MG: 325 TABLET ORAL at 14:42

## 2020-08-23 RX ADMIN — POTASSIUM CHLORIDE, DEXTROSE MONOHYDRATE AND SODIUM CHLORIDE: 300; 5; 450 INJECTION, SOLUTION INTRAVENOUS at 16:16

## 2020-08-23 RX ADMIN — ALPRAZOLAM 1 MG: 0.5 TABLET ORAL at 19:03

## 2020-08-23 RX ADMIN — LORAZEPAM 0.5 MG: 2 INJECTION INTRAMUSCULAR; INTRAVENOUS at 03:46

## 2020-08-23 RX ADMIN — LORAZEPAM 0.5 MG: 2 INJECTION INTRAMUSCULAR; INTRAVENOUS at 14:42

## 2020-08-23 RX ADMIN — QUETIAPINE FUMARATE 200 MG: 100 TABLET ORAL at 23:35

## 2020-08-23 RX ADMIN — KETOROLAC TROMETHAMINE 30 MG: 30 INJECTION, SOLUTION INTRAMUSCULAR at 16:46

## 2020-08-23 RX ADMIN — POLYETHYLENE GLYCOL 3350 34 G: 17 POWDER, FOR SOLUTION ORAL at 14:42

## 2020-08-23 RX ADMIN — PREGABALIN 150 MG: 75 CAPSULE ORAL at 08:26

## 2020-08-23 RX ADMIN — DIVALPROEX SODIUM 250 MG: 250 TABLET, DELAYED RELEASE ORAL at 23:36

## 2020-08-23 RX ADMIN — DICYCLOMINE HYDROCHLORIDE 20 MG: 10 CAPSULE ORAL at 16:16

## 2020-08-23 RX ADMIN — LURASIDONE HYDROCHLORIDE 20 MG: 20 TABLET, FILM COATED ORAL at 08:27

## 2020-08-23 RX ADMIN — POLYETHYLENE GLYCOL 3350 34 G: 17 POWDER, FOR SOLUTION ORAL at 08:26

## 2020-08-23 RX ADMIN — KETOROLAC TROMETHAMINE 30 MG: 30 INJECTION, SOLUTION INTRAMUSCULAR at 04:28

## 2020-08-23 RX ADMIN — DIVALPROEX SODIUM 250 MG: 250 TABLET, DELAYED RELEASE ORAL at 08:27

## 2020-08-23 ASSESSMENT — PAIN DESCRIPTION - ORIENTATION
ORIENTATION: RIGHT;LEFT;LOWER;UPPER

## 2020-08-23 ASSESSMENT — PAIN DESCRIPTION - PROGRESSION
CLINICAL_PROGRESSION: NOT CHANGED

## 2020-08-23 ASSESSMENT — PAIN DESCRIPTION - FREQUENCY
FREQUENCY: CONTINUOUS

## 2020-08-23 ASSESSMENT — PAIN - FUNCTIONAL ASSESSMENT
PAIN_FUNCTIONAL_ASSESSMENT: PREVENTS OR INTERFERES SOME ACTIVE ACTIVITIES AND ADLS
PAIN_FUNCTIONAL_ASSESSMENT: ACTIVITIES ARE NOT PREVENTED
PAIN_FUNCTIONAL_ASSESSMENT: PREVENTS OR INTERFERES SOME ACTIVE ACTIVITIES AND ADLS

## 2020-08-23 ASSESSMENT — PAIN SCALES - GENERAL
PAINLEVEL_OUTOF10: 0
PAINLEVEL_OUTOF10: 8
PAINLEVEL_OUTOF10: 5
PAINLEVEL_OUTOF10: 8
PAINLEVEL_OUTOF10: 9
PAINLEVEL_OUTOF10: 9
PAINLEVEL_OUTOF10: 7

## 2020-08-23 ASSESSMENT — PAIN SCALES - WONG BAKER: WONGBAKER_NUMERICALRESPONSE: 2

## 2020-08-23 ASSESSMENT — PAIN DESCRIPTION - PAIN TYPE
TYPE: ACUTE PAIN

## 2020-08-23 ASSESSMENT — PAIN DESCRIPTION - ONSET
ONSET: ON-GOING

## 2020-08-23 ASSESSMENT — PAIN DESCRIPTION - DESCRIPTORS
DESCRIPTORS: ACHING;CRAMPING;PRESSURE
DESCRIPTORS: CRAMPING;PRESSURE
DESCRIPTORS: CRAMPING;PRESSURE;SHARP
DESCRIPTORS: ACHING;PRESSURE
DESCRIPTORS: ACHING;CRAMPING;PRESSURE

## 2020-08-23 ASSESSMENT — PAIN DESCRIPTION - LOCATION
LOCATION: ABDOMEN

## 2020-08-23 NOTE — PROGRESS NOTES
INPATIENT CONSULTATION:    IDENTIFYING DATA/REASON FOR CONSULTATION   PATIENT:  Milla Howe  MRN:  [id number]  ADMIT DATE: 8/18/2020  TIME OF EVALUATION: 8/23/2020 10:39 AM  HOSPITAL STAY:   LOS: 4 days   CONSULTING PHYSICIAN:  REASON FOR CONSULTATION:    Subjective:    Patient reports she feels abdominal distension is getting worse again. No BM/Flatus. NG has been to suction. MEDICATIONS   SCHEDULED:  polyethylene glycol, 34 g, TID  divalproex, 250 mg, BID  lurasidone, 20 mg, Daily  docusate sodium, 100 mg, BID  pregabalin, 150 mg, BID  lactobacillus, 1 capsule, Daily with breakfast  mineral oil, 30 mL, BID  QUEtiapine, 200 mg, Nightly      FLUIDS/DRIPS:     dextrose 5% and 0.45% NaCl with KCl 40 mEq 100 mL/hr at 08/23/20 5353     PRNs: ketorolac, 30 mg, Q12H PRN  ALPRAZolam, 1 mg, TID PRN  ondansetron, 4 mg, Q8H PRN  LORazepam, 0.5 mg, Nightly PRN  acetaminophen, 650 mg, Q6H PRN  ondansetron, 4 mg, Q4H PRN  phenol, 1 spray, Q2H PRN  dicyclomine, 20 mg, BID PRN  promethazine, 12.5 mg, Q6H PRN  LORazepam, 0.5 mg, TID PRN      ALLERGIES:  She [unfilled]      PHYSICAL EXAM   [unfilled]   No intake/output data recorded.   Oxygen Therapy:  @[id number])@  @[id number])@  @[id number])@    Physical Exam:  Gen: Resting in bed, NAD   HEENT: NG in place/   CV: RRR no MRG   Pul: CTAB   Abd: Good bowel sounds throughout, no scars, abdominal distension with generalized tenderness, no masses, no HSM   Ext: No edema   Neuro: No asterixis   Skin: No jaundice, spider angiomas, barone erythema     LABS AND IMAGING     Recent Results (from the past 24 hour(s))   Basic Metabolic Panel w/ Reflex to MG    Collection Time: 08/23/20  5:26 AM   Result Value Ref Range    Sodium 140 136 - 145 mmol/L    Potassium reflex Magnesium 3.9 3.5 - 5.1 mmol/L    Chloride 106 99 - 110 mmol/L    CO2 24 21 - 32 mmol/L    Anion Gap 10 3 - 16    Glucose 103 (H) 70 - 99 mg/dL    BUN 3 (L) 7 - 20 mg/dL    CREATININE 0.7 0.6 - 1.1 mg/dL    GFR Non-African American >60 >60    GFR African American >60 >60    Calcium 8.7 8.3 - 10.6 mg/dL     Other Labs    Imaging    ASSESSMENT AND RECOMMENDATIONS   Anjelica Roy is a 41 y.o. female with PMH of EDS, chronic pain, fibromyalgia, history of substance abuse, IBS-C, borderline personality disorder, anxiety who presented on 8/18/2020 with constipation.  Abd Xray showed large colonic stool volume.  She was given smog enema and Relistor with minimal relief. NG tube placed and she was ordered a GoLYTELY bowel prep.     1. Constipation              -Multifactorial likely related to slow transit secondary to EDS, opioid induced, underlying IBS-C.              -Tried stool softeners, stimulant laxatives, enema at home without relief              -Received a smog enema and a dose of Relistor 8/19 without relief              -Bowel prep ordered last night however patient only able to tolerate third. Still no BM              -Repeat abdominal x-ray today shows NG tube tip within the gastric lumen     RECOMMENDATIONS:   -Patient reports worsening distension again and no stool output. Patient s/p colonoscopic decompression on 8/21 with concern for possible sigmoid volvulus. She has been on Miralax/Relistor. Will repeat KUB. Further recommendations pending results. If you have any questions or need any further information, please feel free to contact anyone on our consult team.  Thank you for allowing us to participate in the care of Yrn Al.     Anders Tucker MD  [address] [facility]

## 2020-08-23 NOTE — PLAN OF CARE
Problem: Pain:  Goal: Pain level will decrease  Description: Pain level will decrease  Outcome: Ongoing  Note: Pt assessed for pain. Pt in pain and assessed with 0-10 pain rating scale. Pt given prescribed analgesic for pain. (See eMar) Pt satisfied with pain relief thus far. Will reassess and continue to monitor. Goal: Control of acute pain  Description: Control of acute pain  Outcome: Ongoing  Goal: Control of chronic pain  Description: Control of chronic pain  Outcome: Ongoing     Problem: Falls - Risk of:  Goal: Will remain free from falls  Description: Will remain free from falls  Outcome: Ongoing  Note: Fall risk assessment completed. Fall precautions in place. Bed in lowest position, wheels locked, bed/chair exit alarm in place, call light within reach, and non skid footwear on. Walkway free of clutter. Pt alert and oriented and able to make needs known. Pt educated to use call light when needing to get up, and pt utilizes call light to make needs known. Will continue to monitor. Goal: Absence of physical injury  Description: Absence of physical injury  Outcome: Ongoing     Problem: Nutrition  Goal: Optimal nutrition therapy  Outcome: Ongoing  Note: Patient's nutrition is improving, patient had % of breakfast and lunch. Will cont to monitor and reassess. Problem: Skin Integrity:  Goal: Will show no infection signs and symptoms  Description: Will show no infection signs and symptoms  Outcome: Ongoing  Note: Will monitor skin and mucous members. Will turn patient every 2 hours, monitor for friction and sheering, and change dressings as needed. Will preform skin assessment every shift.       Goal: Absence of new skin breakdown  Description: Absence of new skin breakdown  Outcome: Ongoing   Electronically signed by Kalpana Moser RN on 8/23/2020 at 5:36 PM

## 2020-08-23 NOTE — PROGRESS NOTES
Department of Internal Medicine  General Internal Medicine  Attending Progress Note      SUBJECTIVE:  Pt is still no BM, NG causing her discomfort, abd still bloated. Afebrile. Abd pain is the  same.     OBJECTIVE      Medications    Current Facility-Administered Medications: polyethylene glycol (GLYCOLAX) packet 34 g, 34 g, Oral, TID  ketorolac (TORADOL) injection 30 mg, 30 mg, Intravenous, Q12H PRN  dextrose 5 % and 0.45 % NaCl with KCl 40 mEq infusion, , Intravenous, Continuous  ALPRAZolam (XANAX) tablet 1 mg, 1 mg, Oral, TID PRN  divalproex (DEPAKOTE) DR tablet 250 mg, 250 mg, Oral, BID  lurasidone (LATUDA) tablet 20 mg, 20 mg, Oral, Daily  ondansetron (ZOFRAN-ODT) disintegrating tablet 4 mg, 4 mg, Oral, Q8H PRN  docusate sodium (COLACE) capsule 100 mg, 100 mg, Oral, BID  pregabalin (LYRICA) capsule 150 mg, 150 mg, Oral, BID  lactobacillus (CULTURELLE) capsule 1 capsule, 1 capsule, Oral, Daily with breakfast  LORazepam (ATIVAN) tablet 0.5 mg, 0.5 mg, Oral, Nightly PRN  mineral oil liquid 30 mL, 30 mL, Oral, BID  acetaminophen (TYLENOL) tablet 650 mg, 650 mg, Oral, Q6H PRN  ondansetron (ZOFRAN) injection 4 mg, 4 mg, Intravenous, Q4H PRN  phenol 1.4 % mouth spray 1 spray, 1 spray, Mouth/Throat, Q2H PRN  dicyclomine (BENTYL) capsule 20 mg, 20 mg, Oral, BID PRN  promethazine (PHENERGAN) 12.5mg in sodium chloride 0.9% 50 mL IVPB 12.5 mg, 12.5 mg, Intravenous, Q6H PRN  LORazepam (ATIVAN) injection 0.5 mg, 0.5 mg, Intravenous, TID PRN  QUEtiapine (SEROQUEL) tablet 200 mg, 200 mg, Oral, Nightly  Physical    VITALS:  /83   Pulse 91   Temp 98.2 °F (36.8 °C) (Oral)   Resp 16   Ht 5' 4\" (1.626 m)   Wt 177 lb 14.6 oz (80.7 kg)   SpO2 92%   BMI 30.54 kg/m²   CONSTITUTIONAL:  awake, alert, cooperative, no apparent distress, and appears stated age  LUNGS:  No increased work of breathing, good air exchange, clear to auscultation bilaterally, no crackles or wheezing  CARDIOVASCULAR:  Normal apical impulse, regular rate and rhythm, normal S1 and S2, no S3 or S4, and no murmur noted  ABDOMEN:  Distended, tender, sluggish BS,   Data    CBC:   Lab Results   Component Value Date    WBC 12.2 08/19/2020    RBC 5.04 08/19/2020    HGB 15.2 08/19/2020    HCT 45.7 08/19/2020    MCV 90.5 08/19/2020    MCH 30.1 08/19/2020    MCHC 33.2 08/19/2020    RDW 14.3 08/19/2020     08/19/2020    MPV 8.2 08/19/2020     BMP:    Lab Results   Component Value Date     08/23/2020    K 3.9 08/23/2020     08/23/2020    CO2 24 08/23/2020    BUN 3 08/23/2020    LABALBU 4.7 08/19/2020    CREATININE 0.7 08/23/2020    CALCIUM 8.7 08/23/2020    GFRAA >60 08/23/2020    GFRAA >60 05/29/2013    LABGLOM >60 08/23/2020    GLUCOSE 103 08/23/2020       ASSESSMENT AND PLAN      Active Problems:  Opioid induced constipation, IBS-D, NG in  placed, cont Golytely and Relistor,  S/p Flex sig, had liquidy type stool, See GI report. Colon was decompressed, nO  BM still,  Cont same therapy, Pt is walking during day.   Chronic pain syndr.  Fibromyalgia, stable,  S/p multiple surgeries, including back, knee, shoulder,  ALLEN, cont med,   Bipolar d/o, cont med,   Insomnia, cont med,   Pt received stool softener,IVF, Relistor,enema,Blood work unremarkable,

## 2020-08-23 NOTE — PROGRESS NOTES
Patient A&O in the bed. Patient tolerating PO intake well. PM medications given without complications. Patient complains of pain and nausea - PRN meds given - see MAR. Call light within reach, able to make needs known, fall precautions in place. Will monitor.     Electronically signed by Ingrid Vang RN on 8/23/2020 at 2:29 AM

## 2020-08-23 NOTE — PLAN OF CARE
Problem: Pain:  Goal: Pain level will decrease  Description: Pain level will decrease  8/23/2020 1945 by Philip Carrera RN  Outcome: Ongoing  8/23/2020 1736 by Maria De Jesus Randall RN  Outcome: Ongoing  Note: Pt assessed for pain. Pt in pain and assessed with 0-10 pain rating scale. Pt given prescribed analgesic for pain. (See eMar) Pt satisfied with pain relief thus far. Will reassess and continue to monitor. Goal: Control of acute pain  Description: Control of acute pain  8/23/2020 1945 by Philip Carrera RN  Outcome: Ongoing  Note: Pt assessed for pain. Pt in pain and assessed with 0-10 pain rating scale. Pt given prescribed analgesic for pain. (See eMar) Pt satisfied with pain relief thus far. Will reassess and continue to monitor. 8/23/2020 1736 by Maria De Jesus Randall RN  Outcome: Ongoing  Goal: Control of chronic pain  Description: Control of chronic pain  8/23/2020 1945 by Philip Carrera RN  Outcome: Ongoing  Note: Pt assessed for pain. Pt in pain and assessed with 0-10 pain rating scale. Pt given prescribed analgesic for pain. (See eMar) Pt satisfied with pain relief thus far. Will reassess and continue to monitor. 8/23/2020 1736 by Maria De Jesus Randall RN  Outcome: Ongoing     Problem: Falls - Risk of:  Goal: Will remain free from falls  Description: Will remain free from falls  8/23/2020 1945 by Philip Carrera RN  Outcome: Ongoing  Note: Fall risk assessment completed. Fall precautions in place. Call light within reach. Pt educated on calling for assistance before getting up. Walkway free of clutter. Will continue to monitor. 8/23/2020 1736 by Maria De Jesus Randall RN  Outcome: Ongoing  Note: Fall risk assessment completed. Fall precautions in place. Bed in lowest position, wheels locked, bed/chair exit alarm in place, call light within reach, and non skid footwear on. Walkway free of clutter. Pt alert and oriented and able to make needs known.  Pt educated to use call light when needing to get up,

## 2020-08-23 NOTE — PROGRESS NOTES
In to assess pt. A&Ox4. VSS. Rates pain across abdomen 8/10, treated with prn meds, see mar. Denies nausea at this time. Took AM meds without issue. Abdomen taut, distended with active bowel sounds. NG remains in place, clamped at this time. IV site WDL. Voiced no other needs at this time. Call light within reach, will continue to monitor.   Electronically signed by Zheng Burgess RN on 8/23/2020 at 12:30 PM

## 2020-08-24 PROCEDURE — 6370000000 HC RX 637 (ALT 250 FOR IP): Performed by: INTERNAL MEDICINE

## 2020-08-24 PROCEDURE — 6370000000 HC RX 637 (ALT 250 FOR IP): Performed by: SPECIALIST

## 2020-08-24 PROCEDURE — 2500000003 HC RX 250 WO HCPCS: Performed by: INTERNAL MEDICINE

## 2020-08-24 PROCEDURE — 6360000002 HC RX W HCPCS: Performed by: INTERNAL MEDICINE

## 2020-08-24 PROCEDURE — 1200000000 HC SEMI PRIVATE

## 2020-08-24 PROCEDURE — 6360000002 HC RX W HCPCS: Performed by: SPECIALIST

## 2020-08-24 RX ADMIN — PANTOPRAZOLE SODIUM 40 MG: 40 TABLET, DELAYED RELEASE ORAL at 05:05

## 2020-08-24 RX ADMIN — LORAZEPAM 0.5 MG: 2 INJECTION INTRAMUSCULAR; INTRAVENOUS at 21:14

## 2020-08-24 RX ADMIN — POLYETHYLENE GLYCOL 3350 34 G: 17 POWDER, FOR SOLUTION ORAL at 20:05

## 2020-08-24 RX ADMIN — MINERAL OIL 30 ML: 1000 SOLUTION ORAL at 08:30

## 2020-08-24 RX ADMIN — PREGABALIN 150 MG: 75 CAPSULE ORAL at 20:05

## 2020-08-24 RX ADMIN — POLYETHYLENE GLYCOL 3350 34 G: 17 POWDER, FOR SOLUTION ORAL at 08:29

## 2020-08-24 RX ADMIN — PREGABALIN 150 MG: 75 CAPSULE ORAL at 08:30

## 2020-08-24 RX ADMIN — POTASSIUM CHLORIDE, DEXTROSE MONOHYDRATE AND SODIUM CHLORIDE: 300; 5; 450 INJECTION, SOLUTION INTRAVENOUS at 03:06

## 2020-08-24 RX ADMIN — LURASIDONE HYDROCHLORIDE 20 MG: 20 TABLET, FILM COATED ORAL at 08:30

## 2020-08-24 RX ADMIN — POTASSIUM CHLORIDE, DEXTROSE MONOHYDRATE AND SODIUM CHLORIDE: 300; 5; 450 INJECTION, SOLUTION INTRAVENOUS at 15:19

## 2020-08-24 RX ADMIN — MINERAL OIL 30 ML: 1000 SOLUTION ORAL at 20:05

## 2020-08-24 RX ADMIN — Medication 1 CAPSULE: at 08:30

## 2020-08-24 RX ADMIN — DIVALPROEX SODIUM 250 MG: 250 TABLET, DELAYED RELEASE ORAL at 08:30

## 2020-08-24 RX ADMIN — ALPRAZOLAM 1 MG: 0.5 TABLET ORAL at 17:20

## 2020-08-24 RX ADMIN — DOCUSATE SODIUM 100 MG: 100 CAPSULE, LIQUID FILLED ORAL at 20:05

## 2020-08-24 RX ADMIN — LORAZEPAM 0.5 MG: 2 INJECTION INTRAMUSCULAR; INTRAVENOUS at 08:38

## 2020-08-24 RX ADMIN — ALPRAZOLAM 1 MG: 0.5 TABLET ORAL at 03:06

## 2020-08-24 RX ADMIN — KETOROLAC TROMETHAMINE 30 MG: 30 INJECTION, SOLUTION INTRAMUSCULAR at 17:20

## 2020-08-24 RX ADMIN — DIVALPROEX SODIUM 250 MG: 250 TABLET, DELAYED RELEASE ORAL at 23:57

## 2020-08-24 RX ADMIN — QUETIAPINE FUMARATE 200 MG: 100 TABLET ORAL at 23:57

## 2020-08-24 RX ADMIN — POLYETHYLENE GLYCOL 3350 34 G: 17 POWDER, FOR SOLUTION ORAL at 15:19

## 2020-08-24 RX ADMIN — DOCUSATE SODIUM 100 MG: 100 CAPSULE, LIQUID FILLED ORAL at 08:30

## 2020-08-24 ASSESSMENT — PAIN SCALES - GENERAL
PAINLEVEL_OUTOF10: 0
PAINLEVEL_OUTOF10: 6

## 2020-08-24 NOTE — PROGRESS NOTES
Department of Internal Medicine  General Internal Medicine  Attending Progress Note      SUBJECTIVE:  Pt finally had BM, abd is soft, feels better. Afebrile, NG is still in place.     OBJECTIVE      Medications    Current Facility-Administered Medications: pantoprazole (PROTONIX) tablet 40 mg, 40 mg, Oral, QAM AC  polyethylene glycol (GLYCOLAX) packet 34 g, 34 g, Oral, TID  ketorolac (TORADOL) injection 30 mg, 30 mg, Intravenous, Q12H PRN  dextrose 5 % and 0.45 % NaCl with KCl 40 mEq infusion, , Intravenous, Continuous  ALPRAZolam (XANAX) tablet 1 mg, 1 mg, Oral, TID PRN  divalproex (DEPAKOTE) DR tablet 250 mg, 250 mg, Oral, BID  lurasidone (LATUDA) tablet 20 mg, 20 mg, Oral, Daily  ondansetron (ZOFRAN-ODT) disintegrating tablet 4 mg, 4 mg, Oral, Q8H PRN  docusate sodium (COLACE) capsule 100 mg, 100 mg, Oral, BID  pregabalin (LYRICA) capsule 150 mg, 150 mg, Oral, BID  lactobacillus (CULTURELLE) capsule 1 capsule, 1 capsule, Oral, Daily with breakfast  LORazepam (ATIVAN) tablet 0.5 mg, 0.5 mg, Oral, Nightly PRN  mineral oil liquid 30 mL, 30 mL, Oral, BID  acetaminophen (TYLENOL) tablet 650 mg, 650 mg, Oral, Q6H PRN  ondansetron (ZOFRAN) injection 4 mg, 4 mg, Intravenous, Q4H PRN  phenol 1.4 % mouth spray 1 spray, 1 spray, Mouth/Throat, Q2H PRN  dicyclomine (BENTYL) capsule 20 mg, 20 mg, Oral, BID PRN  promethazine (PHENERGAN) 12.5mg in sodium chloride 0.9% 50 mL IVPB 12.5 mg, 12.5 mg, Intravenous, Q6H PRN  LORazepam (ATIVAN) injection 0.5 mg, 0.5 mg, Intravenous, TID PRN  QUEtiapine (SEROQUEL) tablet 200 mg, 200 mg, Oral, Nightly  Physical    VITALS:  /72   Pulse 93   Temp 98.4 °F (36.9 °C) (Axillary)   Resp 18   Ht 5' 4\" (1.626 m)   Wt 177 lb 14.6 oz (80.7 kg)   SpO2 92%   BMI 30.54 kg/m²   CONSTITUTIONAL:  awake, alert, cooperative, no apparent distress, and appears stated age  LUNGS:  No increased work of breathing, good air exchange, clear to auscultation bilaterally, no crackles or wheezing  CARDIOVASCULAR:  Normal apical impulse, regular rate and rhythm, normal S1 and S2, no S3 or S4, and no murmur noted  ABDOMEN:  No scars, normal bowel sounds, soft, non-distended, non-tender, no masses palpated, no hepatosplenomegally  Data    CBC:   Lab Results   Component Value Date    WBC 12.2 08/19/2020    RBC 5.04 08/19/2020    HGB 15.2 08/19/2020    HCT 45.7 08/19/2020    MCV 90.5 08/19/2020    MCH 30.1 08/19/2020    MCHC 33.2 08/19/2020    RDW 14.3 08/19/2020     08/19/2020    MPV 8.2 08/19/2020     BMP:    Lab Results   Component Value Date     08/23/2020    K 3.9 08/23/2020     08/23/2020    CO2 24 08/23/2020    BUN 3 08/23/2020    LABALBU 4.7 08/19/2020    CREATININE 0.7 08/23/2020    CALCIUM 8.7 08/23/2020    GFRAA >60 08/23/2020    GFRAA >60 05/29/2013    LABGLOM >60 08/23/2020    GLUCOSE 103 08/23/2020       ASSESSMENT AND PLAN      Active Problems:    Opioid induced constipation, IBS-D, NG in  placed, cont Golytely and Relistor,Pt has x 3 BM last night, feeling better. S/p Flex sig, had liquidy type stool, See GI report. Colon was decompressed, had BM last night, consider remove NG>  Cont same therapy, Pt is walking during day.   Chronic pain syndr.  Fibromyalgia, stable,  S/p multiple surgeries, including back, knee, shoulder,  ALLEN, cont med,   Bipolar d/o, cont med,   Insomnia, cont med,   Pt received stool softener,IVF, Relistor,enema,Blood work unremarkable,

## 2020-08-24 NOTE — PLAN OF CARE
Problem: Pain:  Goal: Pain level will decrease  Description: Pain level will decrease  8/24/2020 0729 by Melva Aly RN  Outcome: Ongoing  Note: Pain level will decrease. Electronically signed by Melva Aly RN on 8/24/2020 at 7:30 AM    8/23/2020 1945 by Pili Thomas RN  Outcome: Ongoing  8/23/2020 1736 by Ernesto Guerrero RN  Outcome: Ongoing  Note: Pt assessed for pain. Pt in pain and assessed with 0-10 pain rating scale. Pt given prescribed analgesic for pain. (See eMar) Pt satisfied with pain relief thus far. Will reassess and continue to monitor. Goal: Control of acute pain  Description: Control of acute pain  8/24/2020 0729 by Melva Aly RN  Outcome: Ongoing  Note: Control of acute pain. Electronically signed by Melva Aly RN on 8/24/2020 at 7:30 AM    8/23/2020 1945 by Pili Thomas RN  Outcome: Ongoing  Note: Pt assessed for pain. Pt in pain and assessed with 0-10 pain rating scale. Pt given prescribed analgesic for pain. (See eMar) Pt satisfied with pain relief thus far. Will reassess and continue to monitor. 8/23/2020 1736 by Ernesto Guerrero RN  Outcome: Ongoing  Goal: Control of chronic pain  Description: Control of chronic pain  8/24/2020 0729 by Melva Aly RN  Outcome: Ongoing  Note: Control of chronic pain. Electronically signed by Melva Aly RN on 8/24/2020 at 7:30 AM    8/23/2020 1945 by Pili Thomas RN  Outcome: Ongoing  Note: Pt assessed for pain. Pt in pain and assessed with 0-10 pain rating scale. Pt given prescribed analgesic for pain. (See eMar) Pt satisfied with pain relief thus far. Will reassess and continue to monitor. 8/23/2020 1736 by Ernesto Guerrero RN  Outcome: Ongoing     Problem: Falls - Risk of:  Goal: Will remain free from falls  Description: Will remain free from falls  8/24/2020 0729 by Melva Aly RN  Outcome: Ongoing  Note: Will remain free from falls.   Electronically signed by Melva Aly RN on 8/24/2020 at 7:30 show no signs/symptoms infection. Electronically signed by Roe Cooper RN on 8/24/2020 at 7:31 AM    8/23/2020 1945 by Charles Christopher RN  Outcome: Ongoing  Note: Pt is free of signs and symptoms of infection. Vital signs stable. Will monitor. 8/23/2020 1736 by Gareth Gamble RN  Outcome: Ongoing  Note: Will monitor skin and mucous members. Will turn patient every 2 hours, monitor for friction and sheering, and change dressings as needed. Will preform skin assessment every shift. Goal: Absence of new skin breakdown  Description: Absence of new skin breakdown  8/24/2020 0729 by Roe Cooper RN  Outcome: Ongoing  Note: Absence of new skin breakdown.   Electronically signed by Roe Cooper RN on 8/24/2020 at 7:31 AM    8/23/2020 1945 by Charles Christopher RN  Outcome: Ongoing  Note: Patient will be absent of new skin breakdown    8/23/2020 1736 by Gareth Gamble RN  Outcome: Ongoing

## 2020-08-24 NOTE — PROGRESS NOTES
Patient is resting in bed. Alert and oriented X4. Complaining of overall discomfort, but denies a need for medication at this time. NG remains in place and clamped. IV remains infusing. Medications were given, assessment complete. All patient care needs are met at this time. Fall precautions are in place. Call light is in reach. Will continue to monitor.    Electronically signed by Johny Bermudez RN on 8/24/2020 at 12:45 AM

## 2020-08-24 NOTE — PROGRESS NOTES
INPATIENT CONSULTATION:    IDENTIFYING DATA/REASON FOR CONSULTATION   PATIENT:  Shantal Frazier  MRN:  [id number]  ADMIT DATE: 8/18/2020  TIME OF EVALUATION: 8/24/2020 9:09 AM  HOSPITAL STAY:   LOS: 5 days   CONSULTING PHYSICIAN: Katerin Dubois MD   REASON FOR CONSULTATION: constipation    Subjective:    Patient reports patient reports she is having less abdominal pain and distention this morning. She had a few liquid bowel movements last night. MEDICATIONS   SCHEDULED:  pantoprazole, 40 mg, QAM AC  polyethylene glycol, 34 g, TID  divalproex, 250 mg, BID  lurasidone, 20 mg, Daily  docusate sodium, 100 mg, BID  pregabalin, 150 mg, BID  lactobacillus, 1 capsule, Daily with breakfast  mineral oil, 30 mL, BID  QUEtiapine, 200 mg, Nightly      FLUIDS/DRIPS:     dextrose 5% and 0.45% NaCl with KCl 40 mEq 100 mL/hr at 08/24/20 0306     PRNs: ketorolac, 30 mg, Q12H PRN  ALPRAZolam, 1 mg, TID PRN  ondansetron, 4 mg, Q8H PRN  LORazepam, 0.5 mg, Nightly PRN  acetaminophen, 650 mg, Q6H PRN  ondansetron, 4 mg, Q4H PRN  phenol, 1 spray, Q2H PRN  dicyclomine, 20 mg, BID PRN  promethazine, 12.5 mg, Q6H PRN  LORazepam, 0.5 mg, TID PRN      ALLERGIES:    Allergies   Allergen Reactions    Metoclopramide Shortness Of Breath and Swelling    Quetiapine Shortness Of Breath and Swelling    Risperidone Swelling and Shortness Of Breath    Trazodone Swelling and Shortness Of Breath     Swelling throat    Buprenorphine-Naloxone Anxiety and Hives     Patient says she can take Percocet, Vicodin    Morphine Hives and Itching    Asenapine Swelling     tongue    Buprenorphine Hcl-Naloxone Hcl     Codeine Hives and Itching     Patient says she can take Percocet, Vicodin    Guanfacine Hcl Swelling     tongue    Morphine And Related Hives    Reglan [Metoclopramide Hcl] Swelling    Risperidone And Related Swelling    Seroquel  [Quetiapine Fumarate] Other (See Comments)     \"I get really dizzy and I pass out. \"    Seroquel [Quetiapine Fumarate] Swelling     Swelling throat    Trazodone And Nefazodone Swelling     Swelling throat    Lurasidone Anxiety         PHYSICAL EXAM   VITALS:  /68   Pulse 76   Temp 98.3 °F (36.8 °C) (Oral)   Resp 16   Ht 5' 4\" (1.626 m)   Wt 177 lb 14.6 oz (80.7 kg)   SpO2 94%   BMI 30.54 kg/m²   TEMPERATURE:  Current - Temp: 98.3 °F (36.8 °C); Max - Temp  Av.5 °F (36.9 °C)  Min: 98.1 °F (36.7 °C)  Max: 99.4 °F (37.4 °C)    Physical Exam:  General appearance: alert, cooperative, no distress  Eyes: Anicteric  Head: Normocephalic, without obvious abnormality  Lungs: clear to auscultation bilaterally, Normal Effort  Heart: regular rate and rhythm  Abdomen: soft, less distended, nontender  Extremities: atraumatic, no cyanosis or edema  Skin: warm and dry, no jaundice  Neuro: Grossly intact, A&OX3    LABS AND IMAGING   Laboratory   No results for input(s): WBC, HGB, HCT, MCV, PLT in the last 72 hours. Recent Labs     20  0526      K 3.9      CO2 24   BUN 3*   CREATININE 0.7     No results for input(s): AST, ALT, ALB, BILIDIR, BILITOT, ALKPHOS in the last 72 hours. No results for input(s): LIPASE, AMYLASE in the last 72 hours. No results for input(s): PROTIME, INR in the last 72 hours. Imaging  XR ABDOMEN (KUB) (SINGLE AP VIEW)   Final Result   Dilated bowel with retained oral contrast.         XR ABDOMEN (KUB) (SINGLE AP VIEW)   Final Result   Persisting contrast distention of the colon         XR ABDOMEN (KUB) (SINGLE AP VIEW)   Final Result   The enteric tube has its tip in the gastric fundus. The side port is greater   than 7 cm below the GE junction. Contrast is noted throughout a distended colon. FL WATER SOLUBLE ENEMA W OR WO KUB   Final Result   Large stool load throughout the colon. Sigmoid colon is somewhat small in   caliber, which may simply relate to spasm.          XR ABDOMEN (KUB) (SINGLE AP VIEW)   Final Result   Large colonic stool volume without obstruction. Endoscopy  Flexible sigmoidoscopy 8/21/2020 with Dr. Mary Galaviz   1. The distal rectosigmoid colon up to 30 cm from the anal verge had no stool present. A transition point concerning for possible sigmoid volvulus was present at approximately 30 cm with a significant amount of stool present above this area. There was was mild ulceration present suspected to be secondary to localized ischemia. No instrinsic stricture or mass present. The colonoscope was advanced to approximately 60 cm until solid stool was present. A large amount of air was suctioned to decompress the proximal colon and suction was preformed on withdrawal of the colonoscope. ASSESSMENT AND RECOMMENDATIONS   Anjelica Roy is a 41 y.o. female with PMH of EDS, chronic pain, fibromyalgia, history of substance abuse, IBS-C, borderline personality disorder, anxiety who presented on 8/18/2020 with constipation    1. Constipation              -Flexible sigmoidoscopy 8/22 concerning for sigmoid volvulus. Also concern for opioid induced constipation and poor gut motility/slow transit secondary to EDS              -Tried stool softeners, stimulant laxatives, enema at home without relief. Also managed in the past on [place] and [facility] admission she has received a smog enema, Relistor 8/19, and bowel prep without success              -Underwent Flex sig with decompression 8/22 showed a transition point at ~30 cm concerning for possible sigmoid volvulus. No instrinsic stricture or mass present. There was was mild ulceration present suspected to be secondary to localized ischemia. -Appears to be clinically improving. Having bowel movements. Less distended. RECOMMENDATIONS:   Will trial clear liquid diet this morning. Ok to advance as tolerated. Monitor stool output. Continue miralax TID      If you have any questions or need any further information, please feel free to contact us [phone]. Thank you for allowing us to participate in the care of Yrn Al. The note was completed using Dragon voice recognition transcription. Every effort was made to ensure accuracy; however, inadvertent transcription errors may be present despite my best efforts to edit errors.     Olimpia RESENDIZ

## 2020-08-25 VITALS
HEIGHT: 64 IN | HEART RATE: 79 BPM | WEIGHT: 175.3 LBS | RESPIRATION RATE: 16 BRPM | DIASTOLIC BLOOD PRESSURE: 59 MMHG | TEMPERATURE: 97.5 F | BODY MASS INDEX: 29.93 KG/M2 | OXYGEN SATURATION: 98 % | SYSTOLIC BLOOD PRESSURE: 90 MMHG

## 2020-08-25 PROCEDURE — 6370000000 HC RX 637 (ALT 250 FOR IP): Performed by: INTERNAL MEDICINE

## 2020-08-25 PROCEDURE — 6370000000 HC RX 637 (ALT 250 FOR IP): Performed by: SPECIALIST

## 2020-08-25 RX ORDER — DICYCLOMINE HYDROCHLORIDE 10 MG/1
20 CAPSULE ORAL 2 TIMES DAILY PRN
Qty: 120 CAPSULE | Refills: 3 | Status: SHIPPED | OUTPATIENT
Start: 2020-08-25

## 2020-08-25 RX ADMIN — DIVALPROEX SODIUM 250 MG: 250 TABLET, DELAYED RELEASE ORAL at 08:09

## 2020-08-25 RX ADMIN — Medication 1 CAPSULE: at 08:09

## 2020-08-25 RX ADMIN — PREGABALIN 150 MG: 75 CAPSULE ORAL at 08:09

## 2020-08-25 RX ADMIN — PANTOPRAZOLE SODIUM 40 MG: 40 TABLET, DELAYED RELEASE ORAL at 06:08

## 2020-08-25 RX ADMIN — DOCUSATE SODIUM 100 MG: 100 CAPSULE, LIQUID FILLED ORAL at 08:09

## 2020-08-25 RX ADMIN — LURASIDONE HYDROCHLORIDE 20 MG: 20 TABLET, FILM COATED ORAL at 08:09

## 2020-08-25 RX ADMIN — POLYETHYLENE GLYCOL 3350 34 G: 17 POWDER, FOR SOLUTION ORAL at 08:08

## 2020-08-25 RX ADMIN — MINERAL OIL 30 ML: 1000 SOLUTION ORAL at 08:08

## 2020-08-25 RX ADMIN — ALPRAZOLAM 1 MG: 0.5 TABLET ORAL at 01:21

## 2020-08-25 ASSESSMENT — PAIN DESCRIPTION - DESCRIPTORS
DESCRIPTORS: ACHING
DESCRIPTORS: ACHING

## 2020-08-25 ASSESSMENT — PAIN - FUNCTIONAL ASSESSMENT
PAIN_FUNCTIONAL_ASSESSMENT: ACTIVITIES ARE NOT PREVENTED
PAIN_FUNCTIONAL_ASSESSMENT: ACTIVITIES ARE NOT PREVENTED

## 2020-08-25 ASSESSMENT — PAIN SCALES - GENERAL
PAINLEVEL_OUTOF10: 2
PAINLEVEL_OUTOF10: 2

## 2020-08-25 ASSESSMENT — PAIN DESCRIPTION - PROGRESSION
CLINICAL_PROGRESSION: NOT CHANGED

## 2020-08-25 ASSESSMENT — PAIN DESCRIPTION - ONSET
ONSET: ON-GOING
ONSET: ON-GOING

## 2020-08-25 ASSESSMENT — PAIN DESCRIPTION - LOCATION
LOCATION: ABDOMEN
LOCATION: ABDOMEN

## 2020-08-25 ASSESSMENT — PAIN DESCRIPTION - FREQUENCY
FREQUENCY: CONTINUOUS
FREQUENCY: CONTINUOUS

## 2020-08-25 ASSESSMENT — PAIN DESCRIPTION - ORIENTATION
ORIENTATION: MID
ORIENTATION: MID

## 2020-08-25 ASSESSMENT — PAIN DESCRIPTION - PAIN TYPE
TYPE: ACUTE PAIN
TYPE: ACUTE PAIN

## 2020-08-25 ASSESSMENT — PAIN SCALES - WONG BAKER: WONGBAKER_NUMERICALRESPONSE: 2

## 2020-08-25 NOTE — PROGRESS NOTES
Patient is alert and oriented X4. Patient got back to bed after walking some laps in the hallway. Patient is not complaining of pain at this time, and denies a need for medication. Fall precautions are in place. Call light is in reach. Will continue to monitor.    Electronically signed by Benjamin Pitts RN on 8/25/2020 at 1:19 AM

## 2020-08-25 NOTE — PROGRESS NOTES
Pt being dc'd home, instructions read and understood, iv came out last night per night shift nurse.   Electronically signed by Santiago Martinez RN on 8/25/2020 at 10:53 AM

## 2020-08-25 NOTE — DISCHARGE INSTR - COC
J30.9    Anxiety F41.9    Asymptomatic bacteriuria R82.71    Attention deficit hyperactivity disorder F90.9    Blurring of visual image H53.8    Borderline personality disorder ([facility]) F60.3    Cervical radiculopathy M54.12    Chronic diarrhea K52.9    Chronic back pain M54.9, G89.29    Chronic pain of both knees M25.561, M25.562, G89.29    Cocaine abuse in remission ([facility]) F14.11    Cystitis N30.90    DDD (degenerative disc disease), lumbar M51.36    Diabetes mellitus ([facility]) E11.9    Drug overdose T50.901A    Fibromyalgia M79.7    Generalized abdominal pain R10.84    Glenoid labral tear, left, initial encounter S43.432A    History of ulcerative colitis Z87.19    Hypermobility syndrome M35.7    Hyponatremia E87.1    Hyposmolality and/or hyponatremia E87.1    Hyposomnia, insomnia, or sleeplessness associated with conditioned arousal F51.03    Insomnia due to medical condition G47.01    Intractable migraine with status migrainosus G43.911    Left hip pain M25.552    Left upper arm pain M79.622    Low serum cortisol level ([facility]) E27.40    Lumbar spondylosis M47.816    Bipolar disorder ([facility]) J38.2    Metabolic syndrome V52.80    Mood and affect disturbance R45.86    Muscle spasm M62.838    Myalgia and myositis IHU9146    Neuropathic pain M79.2    Opioid overdose ([facility]) T40.2X1A    Pain management contract signed Z02.89    Pain of both hip joints M25.551, M25.552    Pityriasis versicolor B36.0    Polysubstance dependence including opioid type drug, episodic abuse ([address] F11.20, F19.20    Postnasal drip R09.82    Pregnancy examination or test, positive result Z32.01    Suicidal ideations R45.851    Suicide and self-inflicted poisoning by barbiturates ([facility] [address] A15.1E8V    TCA (tricyclic antidepressant) overdose of undetermined intent T43.014A    Tobacco abuse Z72.0    Tooth disorder K08.9    Urinary tract infectious disease N39.0    Vertigo R42    Viral hepatitis C B19.20    Low T4 R79.89    Chronic, continuous use of opioids F11.90    Chronic fatigue R53.82    High serum thyroid stimulating hormone (TSH) R79.89    Hyperprolactinemia ([facility]) E22.1    High insulin-like growth factor 1 (IGF-1) level R79.89    Constipation K59.00       Isolation/Infection:   Isolation          No Isolation        Patient Infection Status     Infection Onset Added Last Indicated Last Indicated By Review Planned Expiration Resolved Resolved By    None active    Resolved    COVID-19 Rule Out 08/21/20 08/21/20 08/21/20 COVID-19 (Ordered)   08/21/20 Rule-Out Test Resulted          Nurse Assessment:  Last Vital Signs: /68   Pulse 78   Temp 98.2 °F (36.8 °C) (Oral)   Resp 14   Ht 5' 4\" (1.626 m)   Wt 175 lb 4.8 oz (79.5 kg)   SpO2 96%   BMI 30.09 kg/m²     Last documented pain score (0-10 scale): Pain Level: 2  Last Weight:   Wt Readings from Last 1 Encounters:   08/25/20 175 lb 4.8 oz (79.5 kg)     Mental Status:  {IP PT MENTAL STATUS:20030}    IV Access:  { DANETTE IV ACCESS:743092556}    Nursing Mobility/ADLs:  Walking   {CHP DME ZCXY:377619965}  Transfer  {CHP DME LONNY:306652914}  Bathing  {CHP DME JBFV:916123702}  Dressing  {CHP DME BQMP:483615830}  Toileting  {CHP DME PRLK:315327347}  Feeding  {P DME PGES:719055808}  Med Admin  {P DME LQCB:810634138}  Med Delivery   { DANETTE MED Delivery:856021279}    Wound Care Documentation and Therapy:        Elimination:  Continence:   · Bowel: {YES / TK:35030}  · Bladder: {YES / FC:44872}  Urinary Catheter: {Urinary Catheter:241600387}   Colostomy/Ileostomy/Ileal Conduit: {YES / TB:97661}       Date of Last BM: ***    Intake/Output Summary (Last 24 hours) at 8/25/2020 0659  Last data filed at 8/25/2020 0000  Gross per 24 hour   Intake 600 ml   Output --   Net 600 ml     I/O last 3 completed shifts:   In: 600 [P.O.:600]  Out: -     Safety Concerns:     [address] Concerns:910628435}    Impairments/Disabilities:      [address] Impairments/Disabilities:297955258}    Nutrition Therapy:  Current Nutrition Therapy:   [address] DANETTE Diet List:234465708}    Routes of Feeding: {CHP DME Other Feedings:814469338}  Liquids: {Slp liquid thickness:94983}  Daily Fluid Restriction: {CHP DME Yes amt example:059254719}  Last Modified Barium Swallow with Video (Video Swallowing Test): {Done Not Done RVRE:522676509}    Treatments at the Time of Hospital Discharge:   Respiratory Treatments: ***  Oxygen Therapy:  {Therapy; copd oxygen:10482}  Ventilator:    { CC Vent TSDJ:929663650}    Rehab Therapies: {THERAPEUTIC INTERVENTION:9200773572}  Weight Bearing Status/Restrictions: { CC Weight Bearin}  Other Medical Equipment (for information only, NOT a DME order):  {EQUIPMENT:176318796}  Other Treatments: ***    Patient's personal belongings (please select all that are sent with patient):  {[facility] DME Belongings:661817448}    RN SIGNATURE:  {Esignature:485917427}    CASE MANAGEMENT/SOCIAL WORK SECTION    Inpatient Status Date: ***    Readmission Risk Assessment Score:  Readmission Risk              Risk of Unplanned Readmission:        15           Discharging to Facility/ Agency   · Name:   · Address:  · Phone:  · Fax:    Dialysis Facility (if applicable)   · Name:  · Address:  · Dialysis Schedule:  · Phone:  · Fax:    / signature: {Esignature:166797492}    PHYSICIAN SECTION    Prognosis: Good    Condition at Discharge: Stable    Rehab Potential (if transferring to Rehab): Good    Recommended Labs or Other Treatments After Discharge: f/y by PCP and GI as needed    Physician Certification: I certify the above information and transfer of Verenice Melissa  is necessary for the continuing treatment of the diagnosis listed and that she requires {Admit to Appropriate Level of Care:06322} for {GREATER/LESS:035263049} 30 days.      Update Admission H&P: No change in H&P    PHYSICIAN SIGNATURE:  Electronically signed by Charbel Ha MD on 8/25/20 at 7:00 AM EDT

## 2020-08-25 NOTE — PLAN OF CARE
Problem: Pain:  Goal: Pain level will decrease  Description: Pain level will decrease  Outcome: Ongoing  Goal: Control of acute pain  Description: Control of acute pain  Outcome: Ongoing  Note: Pt assessed for pain. Pt in pain and assessed with 0-10 pain rating scale. Pt given prescribed analgesic for pain. (See eMar) Pt satisfied with pain relief thus far. Will reassess and continue to monitor. Goal: Control of chronic pain  Description: Control of chronic pain  Outcome: Ongoing     Problem: Falls - Risk of:  Goal: Will remain free from falls  Description: Will remain free from falls  Outcome: Ongoing  Note: Fall risk assessment completed. Fall precautions in place. Call light within reach. Pt educated on calling for assistance before getting up. Walkway free of clutter. Will continue to monitor. Goal: Absence of physical injury  Description: Absence of physical injury  Outcome: Ongoing  Note: Pt is free of injury. No injury noted. Fall precautions in place. Call light within reach. Will monitor. Problem: Nutrition  Goal: Optimal nutrition therapy  Outcome: Ongoing  Note: Patient receiving adequate nutrition therapy       Problem: Skin Integrity:  Goal: Will show no infection signs and symptoms  Description: Will show no infection signs and symptoms  Outcome: Ongoing  Note: Pt is free of signs and symptoms of infection. Incision and dressing are clean, dry and intact. Vital signs stable. Will monitor.      Goal: Absence of new skin breakdown  Description: Absence of new skin breakdown  Outcome: Ongoing  Note: Patient will be absent of new skin breakdown     Electronically signed by Trey Reyes RN on 8/25/2020 at 1:18 AM

## 2020-08-25 NOTE — PLAN OF CARE
of physical injury. Electronically signed by Isha George RN on 8/25/2020 at 7:06 AM    8/25/2020 0117 by Savanah Underwood RN  Outcome: Ongoing  Note: Pt is free of injury. No injury noted. Fall precautions in place. Call light within reach. Will monitor. Problem: Nutrition  Goal: Optimal nutrition therapy  8/25/2020 1051 by Isha George RN  Outcome: Completed  8/25/2020 0702 by Isha George RN  Note: Optimal nutrition therapy. Electronically signed by Isha George RN on 8/25/2020 at 7:07 AM    8/25/2020 0117 by Savanah Underwood RN  Outcome: Ongoing  Note: Patient receiving adequate nutrition therapy       Problem: Skin Integrity:  Goal: Will show no infection signs and symptoms  Description: Will show no infection signs and symptoms  8/25/2020 1051 by Isha George RN  Outcome: Completed  8/25/2020 0702 by Isha George RN  Note: Will show no signs/symptoms infection. Electronically signed by Isha George RN on 8/25/2020 at 7:07 AM    8/25/2020 0117 by Savanah Underwood RN  Outcome: Ongoing  Note: Pt is free of signs and symptoms of infection. Incision and dressing are clean, dry and intact. Vital signs stable. Will monitor. Goal: Absence of new skin breakdown  Description: Absence of new skin breakdown  8/25/2020 1051 by Isha George RN  Outcome: Completed  8/25/2020 0702 by Isha George RN  Note: Absence of new skin breakdown.   Electronically signed by Isha George RN on 8/25/2020 at 7:07 AM    8/25/2020 0117 by Savanah Underwood RN  Outcome: Ongoing  Note: Patient will be absent of new skin breakdown

## 2020-08-25 NOTE — PROGRESS NOTES
Patient lost IV access. Patient is a very hard stick, and does not think she needs an IV at this time for the medication. Patient is on continuous fluids, but is tolerating a general diet and PO fluids with no difficulty. NP, Shaylee Rawls was notified. OK to keep IV out.     Electronically signed by Sherri Mayer RN on 8/25/2020 at 12:38 AM

## 2020-08-25 NOTE — DISCHARGE SUMMARY
[address] [place] [address] Yaa Carey                                DISCHARGE SUMMARY    PATIENT NAME: Stef Main                    :        1980  MED REC NO:   [id number]                          ROOM:       [id number]  ACCOUNT NO:   [de-identified]                           ADMIT DATE: 2020  PROVIDER:     Roger Marquez MD                  DISCHARGE DATE:  2020    DISCHARGE DIAGNOSES:  Acute abdominal pain, opioid-induced constipation,  chronic pain syndrome, status post multiple surgery, fibromyalgia, IBSD. DISCHARGE SUMMARY:  This 78-year-old female patient came to emergency  room because of constipation. The patient did not have a bowel movement  for nine days prior to the admission. The patient's KUB showed large  amount of stool in the bowels. The patient was evaluated by the GI and  we tried to give more aggressive stool softener including Relistor. The  patient had no response. We put NG-tube placement and administered  GoLYTELY. The patient's abdominal pain just got worse, distended. The  GI did a flex sigmoidoscopy just in case to rule out any _____ and had a  decompression. After that, the following day, the patient started  having some liquidly stool and after five days of hospitalization, _____  the patient's bowel movement had large amount of stool emptied. The  patient felt much better. NG was removed. The patient is afebrile, no  nausea, vomiting, tolerating diet well and can be discharged and going  to follow up with the primary care physician and GI if necessary. We  educated the patient about the opioid-induced constipation. She is  going to change her mind regarding chronic pain management. PATIENT'S CONDITION ON DISCHARGE:  Stable. COMPLICATIONS:  None. DISCHARGE MEDICATIONS:  See the reconciliation sheet. PROCEDURE:  The patient had a flex sigmoidoscopy.     I spent more than 30 minutes with the discharge instruction and  paperwork.         Bernice Marcial MD    D: 08/25/2020 7:05:04       T: 08/25/2020 16:08:25     [email or web address]  Job#: [id number]     Doc#: [id number]    CC:

## 2020-08-25 NOTE — PLAN OF CARE
Problem: Pain:  Goal: Pain level will decrease  Description: Pain level will decrease  8/25/2020 0702 by Wayne Cesar RN  Note: Pain level will decrease. Electronically signed by Wayne Cesar RN on 8/25/2020 at 7:06 AM    8/25/2020 0117 by Gee Houston RN  Outcome: Ongoing  Goal: Control of acute pain  Description: Control of acute pain  8/25/2020 0702 by Wayne Cesar RN  Note: Control of acute pain. Electronically signed by Wayne Cesar RN on 8/25/2020 at 7:06 AM    8/25/2020 0117 by Gee Houston RN  Outcome: Ongoing  Note: Pt assessed for pain. Pt in pain and assessed with 0-10 pain rating scale. Pt given prescribed analgesic for pain. (See eMar) Pt satisfied with pain relief thus far. Will reassess and continue to monitor. Goal: Control of chronic pain  Description: Control of chronic pain  8/25/2020 0702 by Wayne Cesar RN  Note: Control of chronic pain. Electronically signed by Wayne Cesar RN on 8/25/2020 at 7:06 AM    8/25/2020 0117 by Gee Houston RN  Outcome: Ongoing     Problem: Falls - Risk of:  Goal: Will remain free from falls  Description: Will remain free from falls  8/25/2020 0702 by Wayne Cesar RN  Note: Will remain free from falls. Electronically signed by Wayne Cesar RN on 8/25/2020 at 7:06 AM    8/25/2020 0117 by Gee Houston RN  Outcome: Ongoing  Note: Fall risk assessment completed. Fall precautions in place. Call light within reach. Pt educated on calling for assistance before getting up. Walkway free of clutter. Will continue to monitor. Goal: Absence of physical injury  Description: Absence of physical injury  8/25/2020 0702 by Wayne Cesar RN  Note: Absence of physical injury. Electronically signed by Wayne Cesar RN on 8/25/2020 at 7:06 AM    8/25/2020 0117 by Gee Houston RN  Outcome: Ongoing  Note: Pt is free of injury. No injury noted. Fall precautions in place. Call light within reach. Will monitor.         Problem: Nutrition  Goal: Optimal nutrition therapy  8/25/2020 0702 by Antonietta Hodgson RN  Note: Optimal nutrition therapy. Electronically signed by Antonietta Hodgson RN on 8/25/2020 at 7:07 AM    8/25/2020 0117 by Regino Park RN  Outcome: Ongoing  Note: Patient receiving adequate nutrition therapy       Problem: Skin Integrity:  Goal: Will show no infection signs and symptoms  Description: Will show no infection signs and symptoms  8/25/2020 0702 by Antonietta Hodgson RN  Note: Will show no signs/symptoms infection. Electronically signed by Antonietta Hodgson RN on 8/25/2020 at 7:07 AM    8/25/2020 0117 by Regino Park RN  Outcome: Ongoing  Note: Pt is free of signs and symptoms of infection. Incision and dressing are clean, dry and intact. Vital signs stable. Will monitor. Goal: Absence of new skin breakdown  Description: Absence of new skin breakdown  8/25/2020 0702 by Antonietta Hodgson RN  Note: Absence of new skin breakdown.   Electronically signed by Antonietta Hodgson RN on 8/25/2020 at 7:07 AM    8/25/2020 0117 by Regino Park RN  Outcome: Ongoing  Note: Patient will be absent of new skin breakdown

## 2020-08-25 NOTE — PROGRESS NOTES
INPATIENT CONSULTATION:    IDENTIFYING DATA/REASON FOR CONSULTATION   PATIENT:  Clark Hightower  MRN:  [id number]  ADMIT DATE: 8/18/2020  TIME OF EVALUATION: 8/25/2020 7:59 AM  HOSPITAL STAY:   LOS: 6 days   CONSULTING PHYSICIAN: Gela Horton MD   REASON FOR CONSULTATION: constipation    Subjective:    Patient reports no acute events overnight. She denies any abdominal pain. She states she has been having multiple bowel movements overnight. Denies any complaints. MEDICATIONS   SCHEDULED:  pantoprazole, 40 mg, QAM AC  polyethylene glycol, 34 g, TID  divalproex, 250 mg, BID  lurasidone, 20 mg, Daily  docusate sodium, 100 mg, BID  pregabalin, 150 mg, BID  lactobacillus, 1 capsule, Daily with breakfast  mineral oil, 30 mL, BID  QUEtiapine, 200 mg, Nightly      FLUIDS/DRIPS:     dextrose 5% and 0.45% NaCl with KCl 40 mEq 100 mL/hr at 08/24/20 1519     PRNs: ketorolac, 30 mg, Q12H PRN  ALPRAZolam, 1 mg, TID PRN  ondansetron, 4 mg, Q8H PRN  LORazepam, 0.5 mg, Nightly PRN  acetaminophen, 650 mg, Q6H PRN  ondansetron, 4 mg, Q4H PRN  phenol, 1 spray, Q2H PRN  dicyclomine, 20 mg, BID PRN  promethazine, 12.5 mg, Q6H PRN  LORazepam, 0.5 mg, TID PRN      ALLERGIES:    Allergies   Allergen Reactions    Metoclopramide Shortness Of Breath and Swelling    Quetiapine Shortness Of Breath and Swelling    Risperidone Swelling and Shortness Of Breath    Trazodone Swelling and Shortness Of Breath     Swelling throat    Buprenorphine-Naloxone Anxiety and Hives     Patient says she can take Percocet, Vicodin    Morphine Hives and Itching    Asenapine Swelling     tongue    Buprenorphine Hcl-Naloxone Hcl     Codeine Hives and Itching     Patient says she can take Percocet, Vicodin    Guanfacine Hcl Swelling     tongue    Morphine And Related Hives    Reglan [Metoclopramide Hcl] Swelling    Risperidone And Related Swelling    Seroquel  [Quetiapine Fumarate] Other (See Comments)     \"I get really dizzy and I pass out. \"  Seroquel [Quetiapine Fumarate] Swelling     Swelling throat    Trazodone And Nefazodone Swelling     Swelling throat    Lurasidone Anxiety         PHYSICAL EXAM   VITALS:  /68   Pulse 78   Temp 98.2 °F (36.8 °C) (Oral)   Resp 14   Ht 5' 4\" (1.626 m)   Wt 175 lb 4.8 oz (79.5 kg)   SpO2 96%   BMI 30.09 kg/m²   TEMPERATURE:  Current - Temp: 98.2 °F (36.8 °C); Max - Temp  Av.1 °F (36.7 °C)  Min: 97.7 °F (36.5 °C)  Max: 98.3 °F (36.8 °C)    Physical Exam:  General appearance: alert, cooperative, no distress, appears stated age  Eyes: Anicteric  Head: Normocephalic, without obvious abnormality  Lungs: clear to auscultation bilaterally, Normal Effort  Heart: regular rate and rhythm  Abdomen: soft, non-distended, non-tender. Extremities: atraumatic, no cyanosis or edema  Skin: warm and dry, no jaundice  Neuro: Grossly intact, A&OX3    LABS AND IMAGING   Laboratory   No results for input(s): WBC, HGB, HCT, MCV, PLT in the last 72 hours. Recent Labs     20  0526      K 3.9      CO2 24   BUN 3*   CREATININE 0.7     No results for input(s): AST, ALT, ALB, BILIDIR, BILITOT, ALKPHOS in the last 72 hours. No results for input(s): LIPASE, AMYLASE in the last 72 hours. No results for input(s): PROTIME, INR in the last 72 hours. Imaging  XR ABDOMEN (KUB) (SINGLE AP VIEW)   Final Result   Dilated bowel with retained oral contrast.         XR ABDOMEN (KUB) (SINGLE AP VIEW)   Final Result   Persisting contrast distention of the colon         XR ABDOMEN (KUB) (SINGLE AP VIEW)   Final Result   The enteric tube has its tip in the gastric fundus. The side port is greater   than 7 cm below the GE junction. Contrast is noted throughout a distended colon. FL WATER SOLUBLE ENEMA W OR WO KUB   Final Result   Large stool load throughout the colon. Sigmoid colon is somewhat small in   caliber, which may simply relate to spasm.          XR ABDOMEN (KUB) (SINGLE AP VIEW)   Final Result   Large colonic stool volume without obstruction. Endoscopy  Flexible sigmoidoscopy 8/21/2020 with Dr. Chari Dancer   1. The distal rectosigmoid colon up to 30 cm from the anal verge had no stool present. A transition point concerning for possible sigmoid volvulus was present at approximately 30 cm with a significant amount of stool present above this area. There was was mild ulceration present suspected to be secondary to localized ischemia. No instrinsic stricture or mass present. The colonoscope was advanced to approximately 60 cm until solid stool was present. A large amount of air was suctioned to decompress the proximal colon and suction was preformed on withdrawal of the colonoscope.        ASSESSMENT AND RECOMMENDATIONS   Anjelica Soto is a 41 y.o. female with PMH of EDS, chronic pain, fibromyalgia, history of substance abuse, IBS-C, borderline personality disorder, anxiety who presented on 8/18/2020 with constipation    1. Constipation  -Flexible sigmoidoscopy 8/21 concerning for sigmoid  volvulus. Also concern for opioid induced constipation and poor gut motility/slow transit secondary to EDS  -Tried stool softeners, stimulant laxatives, enema at home without relief. Also managed in the past on Turlance and Linzess  -Since admission she has received a smog enema, Relistor 8/19, and bowel prep without success  -Underwent Flex sig with decompression 8/22 showed a transition point at ~30 cm concerning for possible sigmoid volvulus. No instrinsic stricture or mass present. There was was mild ulceration present suspected to be secondary to localized ischemia. -Appears to be clinically improving. Continuing to have bowel movements with a lot less distension. RECOMMENDATIONS:      1) Okay to discharge from a GI standpoint. Continue Miralax at home.  Encourage minimizing narcotics as tolerated    If you have any questions or need any further information, please feel free to contact us [phone]. Thank you for allowing us to participate in the care of Yrn Al. The note was completed using Dragon voice recognition transcription. Every effort was made to ensure accuracy; however, inadvertent transcription errors may be present despite my best efforts to edit errors.     Esme RESENDIZ

## 2020-08-25 NOTE — ADT AUTH CERT
with constipation         1.  Constipation                -Flexible sigmoidoscopy 8/22 concerning for sigmoid volvulus.  Also concern for opioid induced constipation and poor gut motility/slow transit secondary to EDS                -Tried stool softeners, stimulant laxatives, enema at home without relief.  Also managed in the past on Turlance and [address] admission she has received a smog enema, Relistor 8/19, and bowel prep without success                -Underwent Flex sig with decompression 8/22 showed a transition point at ~30 cm concerning for possible sigmoid volvulus.   No instrinsic stricture or mass present. [place] Plane was was mild ulceration present suspected to be secondary to localized ischemia.                -Appears to be clinically improving.  Having bowel movements.  Less distended.           G[facility]enter[facility] - Care Day 5 (8/23/2020) by Kain Elkins RN         Review Status  Review Entered    Completed  8/25/2020 10:31        Criteria Review       Care Day: 5 Care Date: 8/23/2020 Level of Care: Inpatient Floor    Guideline Day 3    Level Of Care    ( ) * Activity level acceptable    ( ) * Complete discharge planning    Clinical Status    (X) * Hemodynamic stability    8/25/2020 10:31 AM EDT by Dev Matthew      hr 76-93  bp: 123/84    ( ) * Abdominal status acceptable    ( ) * Pain and nausea absent or adequately managed    (X) * Temperature status acceptable    8/25/2020 10:31 AM EDT by Dev Matthew      36.8    ( ) * Intestinal status acceptable    ( ) * Hepatic and biliary abnormalities absent or acceptable    ( ) * General Discharge Criteria met    Interventions    ( ) * No inpatient interventions needed    ( ) * Intake acceptable    * Milestone    Additional Notes    8/23  Day 5       Pt remains on medsurg unit       Vitals: t: 37.4 p: 80 rr: 18 bp: 130/88 spo2: 93% ra       Orders:    IVF @ 100ml/hr    Ativan 0.5mg po tid       Per IM PN:    ASSESSMENT AND PLAN           Active Problems:    Opioid induced constipation, IBS-D, NG in  placed, cont Golytely and Relistor,    S/p Flex sig, had liquidy type stool, See GI report. Colon was decompressed, nO  BM still,    Cont same therapy, Pt is walking during day. Chronic pain syndr.    Fibromyalgia, stable,    S/p multiple surgeries, including back, knee, shoulder,    ALLEN, cont med,     Bipolar d/o, cont med,     Insomnia, cont med,     Pt received stool softener,IVF, Relistor,enema,Blood work unremarkable       Per GI PN:    ASSESSMENT AND RECOMMENDATIONS    Anjelica Blanchard is a 41 y. o. female with PMH of EDS, chronic pain, fibromyalgia, history of substance abuse, IBS-C, borderline personality disorder, anxiety who presented on 8/18/2020 with constipation.  Abd Xray showed large colonic stool volume.  She was given smog enema and Relistor with minimal relief.  NG tube placed and she was ordered a GoLYTELY bowel prep.         1. Constipation                -Multifactorial likely related to slow transit secondary to EDS, opioid induced, underlying IBS-C.                -Tried stool softeners, stimulant laxatives, enema at home without relief                -Received a smog enema and a dose of Relistor 8/19 without relief                -Bowel prep ordered last night however patient only able to tolerate third.  Still no BM                -Repeat abdominal x-ray today shows NG tube tip within the gastric lumen         RECOMMENDATIONS:     -Patient reports worsening distension again and no stool output. Patient s/p colonoscopic decompression on 8/21 with concern for possible sigmoid volvulus. She has been on Miralax/Relistor. Will repeat KUB. Further recommendations pending results.

## 2020-09-24 ENCOUNTER — TELEPHONE (OUTPATIENT)
Dept: ENDOCRINOLOGY | Age: 40
End: 2020-09-24

## 2020-09-25 DIAGNOSIS — R79.89 HIGH SERUM THYROID STIMULATING HORMONE (TSH): ICD-10-CM

## 2020-09-25 DIAGNOSIS — R79.89 LOW T4: ICD-10-CM

## 2020-09-25 DIAGNOSIS — R53.82 CHRONIC FATIGUE: ICD-10-CM

## 2020-09-25 DIAGNOSIS — E27.40 ADRENAL INSUFFICIENCY (HCC): ICD-10-CM

## 2020-09-25 DIAGNOSIS — E22.1 HYPERPROLACTINEMIA (HCC): ICD-10-CM

## 2020-09-25 DIAGNOSIS — R79.89 HIGH INSULIN-LIKE GROWTH FACTOR 1 (IGF-1) LEVEL: ICD-10-CM

## 2020-09-25 LAB
ANTI-THYROGLOB ABS: 13 IU/ML
PROLACTIN: 267.2 NG/ML
T3 TOTAL: 1.17 NG/ML (ref 0.8–2)
T4 FREE: 0.8 NG/DL (ref 0.9–1.8)
THYROID PEROXIDASE (TPO) ABS: 6 IU/ML
TSH SERPL DL<=0.05 MIU/L-ACNC: 6.17 UIU/ML (ref 0.27–4.2)

## 2020-09-25 NOTE — TELEPHONE ENCOUNTER
PT had labs completed and would like low sodium order added to check on why she is so dizzy, will this be done or not.  Please advise

## 2020-09-29 LAB
IGF-1 (INSULIN-LIKE GROWTH I): 301 NG/ML (ref 73–263)
INSULIN-LIKE GROWTH FACTOR-1 Z-SCORE: 2.3
MISCELLANEOUS LAB TEST ORDER: ABNORMAL

## 2020-09-29 NOTE — TELEPHONE ENCOUNTER
Left another message for Mrs. Tatiana Fernandez to contact the office. HIPAA not clear regarding leaving detailed messages on VM. Per Dr. Jimmie Washington on [facility] . She cancelled last visit and has no scheduled follow ups

## 2020-09-30 PROBLEM — M43.10 SPONDYLOLISTHESIS: Status: ACTIVE | Noted: 2019-06-03

## 2020-09-30 PROBLEM — Z98.1 ARTHRODESIS STATUS: Status: ACTIVE | Noted: 2019-06-21

## 2020-09-30 PROBLEM — Z48.810 POSTOPERATIVE CARE FOR CATARACT: Status: ACTIVE | Noted: 2019-06-21

## 2020-09-30 PROBLEM — E66.3 OVERWEIGHT: Status: ACTIVE | Noted: 2019-03-26

## 2020-09-30 PROBLEM — M12.561 TRAUMATIC ARTHRITIS OF RIGHT KNEE: Status: ACTIVE | Noted: 2020-06-22

## 2020-09-30 PROBLEM — Z98.49 POSTOPERATIVE CARE FOR CATARACT: Status: ACTIVE | Noted: 2019-06-21

## 2020-10-05 ENCOUNTER — VIRTUAL VISIT (OUTPATIENT)
Dept: ENDOCRINOLOGY | Age: 40
End: 2020-10-05
Payer: COMMERCIAL

## 2020-10-05 PROCEDURE — 99214 OFFICE O/P EST MOD 30 MIN: CPT | Performed by: INTERNAL MEDICINE

## 2020-10-05 PROCEDURE — G8427 DOCREV CUR MEDS BY ELIG CLIN: HCPCS | Performed by: INTERNAL MEDICINE

## 2020-10-05 RX ORDER — LEVOTHYROXINE SODIUM 0.03 MG/1
25 TABLET ORAL DAILY
Qty: 90 TABLET | Refills: 1 | Status: SHIPPED | OUTPATIENT
Start: 2020-10-05 | End: 2021-04-06

## 2020-10-05 RX ORDER — PREGABALIN 200 MG/1
200 CAPSULE ORAL 2 TIMES DAILY
COMMUNITY
End: 2020-11-24

## 2020-10-05 RX ORDER — OXYCODONE AND ACETAMINOPHEN 7.5; 325 MG/1; MG/1
1 TABLET ORAL EVERY 8 HOURS PRN
COMMUNITY
End: 2020-11-24

## 2020-10-05 NOTE — PROGRESS NOTES
Patient ID:   Climmie Kehr is a 41 y.o. female    Chief Complaint:   Climmie Kehr presents for an evaluation of Adrenal insufficiency. PCP: Dr. Megan Haddad MD    Pursuant to the emergency declaration under the [address] [facility] [place], [address] [place] [employer] authority and the Coronavirus Preparedness and Response Supplemental Appropriations Act this visit was conducted after obtaining verbal consent, with the use of [employer]. me interactive audio and video telecommunications system that permits real time communication between the patient and provider to substitute for an in-person clinic visit to reduce the patient's risk of exposure to COVID-19 and provide necessary medical care. Because this was a Telehealth visit, evaluation of the following organ systems was limited: Vitals, Constitutional, EENT, Resp, CV, GI, , MS, Neuro, Skin. Heme. Lymph, Imm. Climmie Kehr was at home. Provider was at [facility]. No one else was involved. The patient has also been advised to contact this office for worsening conditions or problems, and seek emergency medical treatment and/or call 911 if deemed necessary. Subjective:     She was started on hydrocortisone after low cortisol. Normal ACTH stim test in 2019. She had weight loss, episodes of hyponatremia and hypotension. IGF was high once and then normal in 2019   T4 was low once and then normal in 2019   Seen at [facility] endocrinology     Cortisol 15.8 in am, March 2020. Adrenal antibodies were negative. Stopped Hydrocortisone in June 2020. On midodrine 2.5mg tid. Checking BP at home. Runs /50-80. Patient has fatigue, normal appetite, lost 15 lbs- down to 165. No some nausea and salt craving, occasional dizziness, hyperpigmentation of the face. She is sleeping well. .   She says she has EDS. She has been on opioids for more than 25 years for back pain, injuries.      Last use of steroid shots: Jan 2020, right knee. She is not getting epidurals any more for back pain.      Supplements: Vit D, Mvt, B complex    No changes in shoe size   Swelling of fingers, waller snot wear rings     The following portions of the patient's history were reviewed and updated as appropriate:       Family History   Problem Relation Age of Onset    Diabetes type 2  Mother     Diabetes type 2  Father     Cancer Sister     Diabetes type 2  Brother          Social History     Socioeconomic History    Marital status: Single     Spouse name: Not on file    Number of children: Not on file    Years of education: Not on file    Highest education level: Not on file   Occupational History    Not on file   Social Needs    Financial resource strain: Not on file    Food insecurity     Worry: Not on file     Inability: Not on file    Transportation needs     Medical: Not on file     Non-medical: Not on file   Tobacco Use    Smoking status: Former Smoker     Packs/day: 1.00     Types: Cigarettes     Last attempt to quit: 2018     Years since quittin.7    Smokeless tobacco: Never Used   Substance and Sexual Activity    Alcohol use: Not Currently     Comment: socially    Drug use: No     Comment: Denies    Sexual activity: Not Currently   Lifestyle    Physical activity     Days per week: Not on file     Minutes per session: Not on file    Stress: Not on file   Relationships    Social connections     Talks on phone: Not on file     Gets together: Not on file     Attends Restorationism service: Not on file     Active member of club or organization: Not on file     Attends meetings of clubs or organizations: Not on file     Relationship status: Not on file    Intimate partner violence     Fear of current or ex partner: Not on file     Emotionally abused: Not on file     Physically abused: Not on file     Forced sexual activity: Not on file   Other Topics Concern    Not on file   Social History Narrative    Not on file       Past Medical History:   Diagnosis Date    Asthma     Bipolar 1 disorder ([facility] [address]     Compression fracture     [facility]ch[facility] obstructive [facility] disease[facility]     DDD (degenerative disc disease), cervical     Howard-Danlos syndrome     Fibromyalgia     H/O adrenal insufficiency     and pituitary insufficiency    Herniated disc     Metabolic syndrome     Osteoarthritis     Pyelonephritis     Sciatica        Past Surgical History:   Procedure Laterality Date    KNEE SURGERY      LUMBAR FUSION N/A 6/3/2019    L5, S1 TRANSFORAMINAL LUMBAR INTERBODY FUSION performed by Lupe Molina MD at [address] [facility] Left     repair    SIGMOIDOSCOPY N/A 8/21/2020    FLEXIBLE SIGMOIDOSCOPY POSSIBLE COLONOSCOPY performed by Diaz Olmos MD at [address] [facility]   Allergen Reactions    Metoclopramide Shortness Of Breath and Swelling    Quetiapine Shortness Of Breath and Swelling    Risperidone Swelling and Shortness Of Breath    Trazodone Swelling and Shortness Of Breath     Swelling throat    Buprenorphine-Naloxone Anxiety and Hives     Patient says she can take Percocet, Vicodin    Morphine Hives and Itching    Asenapine Swelling     tongue    Buprenorphine Hcl-Naloxone Hcl     Codeine Hives and Itching     Patient says she can take Percocet, Vicodin    Guanfacine Hcl Swelling     tongue    Morphine And Related Hives    Reglan [Metoclopramide Hcl] Swelling    Risperidone And Related Swelling    Seroquel  [Quetiapine Fumarate] Other (See Comments)     \"I get really dizzy and I pass out. \"    Seroquel [Quetiapine Fumarate] Swelling     Swelling throat    Trazodone And Nefazodone Swelling     Swelling throat    Lurasidone Anxiety         Current Outpatient Medications:     oxyCODONE-acetaminophen (PERCOCET) 7.5-325 MG per tablet, Take 1 tablet by mouth every 8 hours as needed for Pain., Disp: , Rfl:     pregabalin (LYRICA) 200 MG capsule, Take 200 mg by mouth 2 times daily. , Disp: , Rfl:     levothyroxine (SYNTHROID) 25 MCG tablet, Take 1 tablet by mouth daily, Disp: 90 tablet, Rfl: 1    dicyclomine (BENTYL) 10 MG capsule, Take 2 capsules by mouth 2 times daily as needed (cramping), Disp: 120 capsule, Rfl: 3    midodrine (PROAMATINE) 2.5 MG tablet, TAKE 1 TABLET BY MOUTH THREE TIMES A DAY, Disp: 90 tablet, Rfl: 0    divalproex (DEPAKOTE) 250 MG DR tablet, Take 1 tablet by mouth 2 times daily, Disp: 90 tablet, Rfl: 3    acyclovir (ZOVIRAX) 400 MG tablet, Take 400 mg by mouth as needed , Disp: , Rfl:     STOOL SOFTENER 100 MG capsule, Take 100 mg by mouth 2 times daily, Disp: , Rfl:     ketoconazole (NIZORAL) 2 % shampoo, Apply topically Twice a Week, Disp: , Rfl: 4    LATUDA 20 MG TABS tablet, Take 20 mg by mouth daily, Disp: , Rfl: 3    QUEtiapine (SEROQUEL) 200 MG tablet, Take 200 mg by mouth nightly, Disp: , Rfl: 2    TRULANCE 3 MG TABS, Take 3 mg by mouth daily, Disp: , Rfl: 11    ALPRAZolam (XANAX) 1 MG tablet, Take 1 mg by mouth 3 times daily as needed for Sleep or Anxiety. , Disp: , Rfl:     Cetirizine HCl (ZYRTEC ALLERGY) 10 MG CAPS, Take by mouth daily, Disp: , Rfl:     temazepam (RESTORIL) 30 MG capsule, Take 30 mg by mouth nightly as needed for Sleep. ., Disp: , Rfl:       Review of Systems:    Constitutional: Negative for fever, chills. HENT: Negative for congestion, ear pain, rhinorrhea,  sore throat and trouble swallowing. Eyes: Negative for photophobia, redness, itching. Respiratory: Negative for cough, shortness of breath and sputum. Cardiovascular: Negative for chest pain, palpitations and leg swelling. Gastrointestinal: Negative for nausea, vomiting, abdominal pain, diarrhea, constipation. Endocrine: Negative for cold intolerance, heat intolerance, polydipsia, polyphagia and polyuria. Genitourinary: Negative for dysuria, urgency, frequency, hematuria and flank pain. Musculoskeletal: Negative for neck pain.    Skin/Nail: Negative for rash, itching. Normal nails. Neurological: Negative for seizures, weakness, light-headedness, numbness and headaches. Hematological/ Lymph nodes: Negative for adenopathy. Does not bruise/bleed easily. Psychiatric/Behavioral: Negative for suicidal ideas, depression, anxiety. Objective:   Physical Exam:  There were no vitals taken for this visit. Constitutional: Patient is oriented to person, place, and time. Patient appears well-developed and well-nourished. Pulmonary/Chest: Effort normal.   Neurological: Patient is alert and oriented to person, place, and time. Psychiatric: Patient has a normal mood and affect.  Patient behavior is normal.     Lab Review:    Orders Only on 09/25/2020   Component Date Value Ref Range Status    IGF-1 (INSULIN-LIKE GROWTH I) 09/25/2020 301* 73 - 263 ng/mL Final    Insulin-Like GF-1 Z-Score 09/25/2020 2.3   Final    Prolactin 09/25/2020 267.2  ng/mL Final    Anti-Thyroglob Abs 09/25/2020 13  <115 IU/mL Final    THYROID PEROXIDASE (TPO) ABS 09/25/2020 6  <34 IU/mL Final    T3, Total 09/25/2020 1.17  0.80 - 2.00 ng/mL Final    T4 Free 09/25/2020 0.8* 0.9 - 1.8 ng/dL Final    TSH 09/25/2020 6.17* 0.27 - 4.20 uIU/mL Final    Misc Test Order 09/25/2020 SEE NOTE*  Final   Admission on 08/18/2020, Discharged on 08/25/2020   Component Date Value Ref Range Status    WBC 08/19/2020 12.2* 4.0 - 11.0 K/uL Final    RBC 08/19/2020 5.04  4.00 - 5.20 M/uL Final    Hemoglobin 08/19/2020 15.2  12.0 - 16.0 g/dL Final    Hematocrit 08/19/2020 45.7  36.0 - 48.0 % Final    MCV 08/19/2020 90.5  80.0 - 100.0 fL Final    MCH 08/19/2020 30.1  26.0 - 34.0 pg Final    MCHC 08/19/2020 33.2  31.0 - 36.0 g/dL Final    RDW 08/19/2020 14.3  12.4 - 15.4 % Final    Platelets 19/82/2012 280  135 - 450 K/uL Final    MPV 08/19/2020 8.2  5.0 - 10.5 fL Final    Neutrophils % 08/19/2020 74.1  % Final    Lymphocytes % 08/19/2020 17.7  % Final    Monocytes % 08/19/2020 6.2  % Final    Eosinophils % 08/19/2020 1.4  % Final    Basophils % 08/19/2020 0.6  % Final    Neutrophils Absolute 08/19/2020 9.1* 1.7 - 7.7 K/uL Final    Lymphocytes Absolute 08/19/2020 2.2  1.0 - 5.1 K/uL Final    Monocytes Absolute 08/19/2020 0.8  0.0 - 1.3 K/uL Final    Eosinophils Absolute 08/19/2020 0.2  0.0 - 0.6 K/uL Final    Basophils Absolute 08/19/2020 0.1  0.0 - 0.2 K/uL Final    Sodium 08/19/2020 136  136 - 145 mmol/L Final    Potassium reflex Magnesium 08/19/2020 4.5  3.5 - 5.1 mmol/L Final    Chloride 08/19/2020 102  99 - 110 mmol/L Final    CO2 08/19/2020 17* 21 - 32 mmol/L Final    Anion Gap 08/19/2020 17* 3 - 16 Final    Glucose 08/19/2020 88  70 - 99 mg/dL Final    BUN 08/19/2020 7  7 - 20 mg/dL Final    CREATININE 08/19/2020 0.8  0.6 - 1.1 mg/dL Final    GFR Non- 08/19/2020 >60  >60 Final    GFR  08/19/2020 >60  >60 Final    Calcium 08/19/2020 9.8  8.3 - 10.6 mg/dL Final    Total Protein 08/19/2020 7.8  6.4 - 8.2 g/dL Final    Alb 08/19/2020 4.7  3.4 - 5.0 g/dL Final    Albumin/Globulin Ratio 08/19/2020 1.5  1.1 - 2.2 Final    Total Bilirubin 08/19/2020 0.3  0.0 - 1.0 mg/dL Final    Alkaline Phosphatase 08/19/2020 55  40 - 129 U/L Final    ALT 08/19/2020 19  10 - 40 U/L Final    AST 08/19/2020 41* 15 - 37 U/L Final    Globulin 08/19/2020 3.1  g/dL Final    Lipase 08/19/2020 16.0  13.0 - 60.0 U/L Final    hCG Qual 08/19/2020 Negative  Detects HCG level >10 MIU/mL Final    Color, UA 08/19/2020 YELLOW  Straw/Yellow Final    Clarity, UA 08/19/2020 CLOUDY* Clear Final    Glucose, Ur 08/19/2020 Negative  Negative mg/dL Final    Bilirubin Urine 08/19/2020 Negative  Negative Final    Ketones, Urine 08/19/2020 TRACE* Negative mg/dL Final    Specific [place], UA 08/19/2020 1.018  1.005 - 1.030 Final    Blood, Urine 08/19/2020 Negative  Negative Final    pH, UA 08/19/2020 5.5  5.0 - 8.0 Final    Protein, UA 08/19/2020 Negative  Negative mg/dL Final    Urobilinogen, Urine 08/19/2020 0.2  <2.0 E.U./dL Final    Nitrite, Urine 08/19/2020 Negative  Negative Final    Leukocyte Esterase, Urine 08/19/2020 TRACE* Negative Final    Microscopic Examination 08/19/2020 YES   Final    Urine Type 08/19/2020 NotGiven   Final    Urine Reflex to Culture 08/19/2020 Not Indicated   Final    Bacteria, UA 08/19/2020 RARE* None Seen /HPF Final    Hyaline Casts, UA 08/19/2020 3  0 - 8 /LPF Final    WBC, UA 08/19/2020 5  0 - 5 /HPF Final    RBC, UA 08/19/2020 1  0 - 4 /HPF Final    Epithelial Cells, UA 08/19/2020 9* 0 - 5 /HPF Final    TSH 08/19/2020 3.74  0.27 - 4.20 uIU/mL Final    SARS-CoV-2, NAAT 08/21/2020 Not Detected  Not Detected Final    Sodium 08/23/2020 140  136 - 145 mmol/L Final    Potassium reflex Magnesium 08/23/2020 3.9  3.5 - 5.1 mmol/L Final    Chloride 08/23/2020 106  99 - 110 mmol/L Final    CO2 08/23/2020 24  21 - 32 mmol/L Final    Anion Gap 08/23/2020 10  3 - 16 Final    Glucose 08/23/2020 103* 70 - 99 mg/dL Final    BUN 08/23/2020 3* 7 - 20 mg/dL Final    CREATININE 08/23/2020 0.7  0.6 - 1.1 mg/dL Final    GFR Non- 08/23/2020 >60  >60 Final    GFR  08/23/2020 >60  >60 Final    Calcium 08/23/2020 8.7  8.3 - 10.6 mg/dL Final   Admission on 08/15/2020, Discharged on 08/16/2020   Component Date Value Ref Range Status    Color, UA 08/15/2020 YELLOW  Straw/Yellow Final    Clarity, UA 08/15/2020 Clear  Clear Final    Glucose, Ur 08/15/2020 Negative  Negative mg/dL Final    Bilirubin Urine 08/15/2020 Negative  Negative Final    Ketones, Urine 08/15/2020 Negative  Negative mg/dL Final    Specific Gravity, UA 08/15/2020 1.008  1.005 - 1.030 Final    Blood, Urine 08/15/2020 Negative  Negative Final    pH, UA 08/15/2020 6.5  5.0 - 8.0 Final    Protein, UA 08/15/2020 Negative  Negative mg/dL Final    Urobilinogen, Urine 08/15/2020 0.2  <2.0 E.U./dL Final    Nitrite, Urine 08/15/2020 Negative  Negative Final    Leukocyte Esterase, Urine 08/15/2020 SMALL* Negative Final    Microscopic Examination 08/15/2020 YES   Final    Urine Type 08/15/2020 NotGiven   Final    Urine Reflex to Culture 08/15/2020 Not Indicated   Final    HCG(Urine) Pregnancy Test 08/15/2020 Negative  Detects HCG level >20 MIU/mL Final    WBC 08/15/2020 6.3  4.0 - 11.0 K/uL Final    RBC 08/15/2020 4.49  4.00 - 5.20 M/uL Final    Hemoglobin 08/15/2020 13.8  12.0 - 16.0 g/dL Final    Hematocrit 08/15/2020 40.6  36.0 - 48.0 % Final    MCV 08/15/2020 90.4  80.0 - 100.0 fL Final    MCH 08/15/2020 30.7  26.0 - 34.0 pg Final    MCHC 08/15/2020 34.0  31.0 - 36.0 g/dL Final    RDW 08/15/2020 14.3  12.4 - 15.4 % Final    Platelets 22/50/9358 277  135 - 450 K/uL Final    MPV 08/15/2020 8.7  5.0 - 10.5 fL Final    Neutrophils % 08/15/2020 51.3  % Final    Lymphocytes % 08/15/2020 39.1  % Final    Monocytes % 08/15/2020 6.4  % Final    Eosinophils % 08/15/2020 2.7  % Final    Basophils % 08/15/2020 0.5  % Final    Neutrophils Absolute 08/15/2020 3.2  1.7 - 7.7 K/uL Final    Lymphocytes Absolute 08/15/2020 2.5  1.0 - 5.1 K/uL Final    Monocytes Absolute 08/15/2020 0.4  0.0 - 1.3 K/uL Final    Eosinophils Absolute 08/15/2020 0.2  0.0 - 0.6 K/uL Final    Basophils Absolute 08/15/2020 0.0  0.0 - 0.2 K/uL Final    Sodium 08/15/2020 138  136 - 145 mmol/L Final    Potassium reflex Magnesium 08/15/2020 4.6  3.5 - 5.1 mmol/L Final    Chloride 08/15/2020 102  99 - 110 mmol/L Final    CO2 08/15/2020 26  21 - 32 mmol/L Final    Anion Gap 08/15/2020 10  3 - 16 Final    Glucose 08/15/2020 76  70 - 99 mg/dL Final    BUN 08/15/2020 18  7 - 20 mg/dL Final    CREATININE 08/15/2020 0.8  0.6 - 1.1 mg/dL Final    GFR Non- 08/15/2020 >60  >60 Final    GFR  08/15/2020 >60  >60 Final    Calcium 08/15/2020 9.0  8.3 - 10.6 mg/dL Final    Total Protein 08/15/2020 7.2  6.4 - 8.2 g/dL Final    Alb 08/15/2020 4.4  3.4 - 5.0 g/dL Final    Albumin/Globulin Ratio 08/15/2020 1.6  1.1 - 2.2 Final    Total Bilirubin 08/15/2020 <0.2  0.0 - 1.0 mg/dL Final    Alkaline Phosphatase 08/15/2020 53  40 - 129 U/L Final    ALT 08/15/2020 16  10 - 40 U/L Final    AST 08/15/2020 35  15 - 37 U/L Final    Globulin 08/15/2020 2.8  g/dL Final    Pro-BNP 08/15/2020 208* 0 - 124 pg/mL Final    Troponin 08/15/2020 <0.01  <0.01 ng/mL Final    Hyaline Casts, UA 08/15/2020 1  0 - 8 /LPF Final    WBC, UA 08/15/2020 7* 0 - 5 /HPF Final    RBC, UA 08/15/2020 0  0 - 4 /HPF Final    Epithelial Cells, UA 08/15/2020 3  0 - 5 /HPF Final   Admission on 03/13/2020, Discharged on 03/13/2020   Component Date Value Ref Range Status    Rapid Influenza A Ag 03/13/2020 Negative  Negative Final    Rapid Influenza B Ag 03/13/2020 Negative  Negative Final    Rapid Strep A Screen 03/13/2020 Negative  Negative Final    WBC 03/13/2020 14.6* 4.0 - 11.0 K/uL Final    RBC 03/13/2020 4.79  4.00 - 5.20 M/uL Final    Hemoglobin 03/13/2020 14.8  12.0 - 16.0 g/dL Final    Hematocrit 03/13/2020 44.0  36.0 - 48.0 % Final    MCV 03/13/2020 92.0  80.0 - 100.0 fL Final    MCH 03/13/2020 31.0  26.0 - 34.0 pg Final    MCHC 03/13/2020 33.7  31.0 - 36.0 g/dL Final    RDW 03/13/2020 14.5  12.4 - 15.4 % Final    Platelets 24/24/2598 262  135 - 450 K/uL Final    MPV 03/13/2020 8.3  5.0 - 10.5 fL Final    Neutrophils % 03/13/2020 76.7  % Final    Lymphocytes % 03/13/2020 16.3  % Final    Monocytes % 03/13/2020 5.3  % Final    Eosinophils % 03/13/2020 1.2  % Final    Basophils % 03/13/2020 0.5  % Final    Neutrophils Absolute 03/13/2020 11.2* 1.7 - 7.7 K/uL Final    Lymphocytes Absolute 03/13/2020 2.4  1.0 - 5.1 K/uL Final    Monocytes Absolute 03/13/2020 0.8  0.0 - 1.3 K/uL Final    Eosinophils Absolute 03/13/2020 0.2  0.0 - 0.6 K/uL Final    Basophils Absolute 03/13/2020 0.1  0.0 - 0.2 K/uL Final    Sodium 03/13/2020 140  136 - 145 mmol/L Final    Potassium reflex Magnesium 03/13/2020 4.3  3.5 - 5.1 mmol/L Final    Chloride 03/13/2020 102  99 - 110 mmol/L Final    CO2 03/13/2020 23  21 - 32 mmol/L Final    Anion Gap 03/13/2020 15  3 - 16 Final    Glucose 03/13/2020 88  70 - 99 mg/dL Final    BUN 03/13/2020 13  7 - 20 mg/dL Final    CREATININE 03/13/2020 0.8  0.6 - 1.1 mg/dL Final    GFR Non- 03/13/2020 >60  >60 Final    GFR  03/13/2020 >60  >60 Final    Calcium 03/13/2020 10.1  8.3 - 10.6 mg/dL Final    Total Protein 03/13/2020 7.8  6.4 - 8.2 g/dL Final    Alb 03/13/2020 4.8  3.4 - 5.0 g/dL Final    Albumin/Globulin Ratio 03/13/2020 1.6  1.1 - 2.2 Final    Total Bilirubin 03/13/2020 <0.2  0.0 - 1.0 mg/dL Final    Alkaline Phosphatase 03/13/2020 57  40 - 129 U/L Final    ALT 03/13/2020 21  10 - 40 U/L Final    AST 03/13/2020 20  15 - 37 U/L Final    Globulin 03/13/2020 3.0  g/dL Final    Color, UA 03/13/2020 YELLOW  Straw/Yellow Final    Clarity, UA 03/13/2020 CLOUDY* Clear Final    Glucose, Ur 03/13/2020 Negative  Negative mg/dL Final    Bilirubin Urine 03/13/2020 Negative  Negative Final    Ketones, Urine 03/13/2020 Negative  Negative mg/dL Final    Specific [place], UA 03/13/2020 1.006  1.005 - 1.030 Final    Blood, Urine 03/13/2020 Negative  Negative Final    pH, UA 03/13/2020 6.5  5.0 - 8.0 Final    Protein, UA 03/13/2020 Negative  Negative mg/dL Final    Urobilinogen, Urine 03/13/2020 0.2  <2.0 E.U./dL Final    Nitrite, Urine 03/13/2020 Negative  Negative Final    Leukocyte Esterase, Urine 03/13/2020 TRACE* Negative Final    Microscopic Examination 03/13/2020 YES   Final    Urine Type 03/13/2020 NotGiven   Final    Urine Reflex to Culture 03/13/2020 Yes   Final    hCG Qual 03/13/2020 Negative  Detects HCG level >10 MIU/mL Final    Urine Culture, Routine 03/13/2020 <50,000 CFU/ml mixed skin/urogenital amanda.  No further workup   Final    Hyaline Casts, UA 03/13/2020 2  0 - 8 /LPF Final    WBC, UA 03/13/2020 4  0 - 5 /HPF Final    RBC, UA 03/13/2020 0  0 - 4 /HPF Final    Epithelial Cells, UA 03/13/2020 6* 0 - 5 /HPF Final    Strep A Culture 03/13/2020 Normal oral amanda, No Beta Strep isolated   Final   Orders Only on 03/13/2020   Component Date Value Ref Range Status    TSH 03/13/2020 4.49* 0.27 - 4.20 uIU/mL Final    T4 Free 03/13/2020 0.9  0.9 - 1.8 ng/dL Final    T3, Free 03/13/2020 2.9  2.3 - 4.2 pg/mL Final    Prolactin 03/13/2020 30.2  ng/mL Final    Cortisol - AM 03/13/2020 15.8  4.3 - 22.4 ug/dL Final    IGF-1 (INSULIN-LIKE GROWTH I) 03/13/2020 310* 75 - 267 ng/mL Final    Insulin-Like GF-1 Z-Score 03/13/2020 2.3   Final    Sodium 03/13/2020 138  136 - 145 mmol/L Final    Potassium 03/13/2020 4.0  3.5 - 5.1 mmol/L Final    Chloride 03/13/2020 100  99 - 110 mmol/L Final    CO2 03/13/2020 23  21 - 32 mmol/L Final    Anion Gap 03/13/2020 15  3 - 16 Final    Glucose 03/13/2020 76  70 - 99 mg/dL Final    BUN 03/13/2020 13  7 - 20 mg/dL Final    CREATININE 03/13/2020 0.9  0.6 - 1.1 mg/dL Final    GFR Non- 03/13/2020 >60  >60 Final    GFR  03/13/2020 >60  >60 Final    Calcium 03/13/2020 10.2  8.3 - 10.6 mg/dL Final    Total Protein 03/13/2020 7.4  6.4 - 8.2 g/dL Final    Alb 03/13/2020 4.9  3.4 - 5.0 g/dL Final    Albumin/Globulin Ratio 03/13/2020 2.0  1.1 - 2.2 Final    Total Bilirubin 03/13/2020 <0.2  0.0 - 1.0 mg/dL Final    Alkaline Phosphatase 03/13/2020 57  40 - 129 U/L Final    ALT 03/13/2020 21  10 - 40 U/L Final    AST 03/13/2020 17  15 - 37 U/L Final    Globulin 03/13/2020 2.5  g/dL Final    Misc Test Order 03/13/2020 SEE NOTE   Final   Orders Only on 01/27/2020   Component Date Value Ref Range Status    HIV Ag/Ab 01/27/2020 Non-Reactive  Non-reactive Final    HIV-1 Antibody 01/27/2020 Non-Reactive  Non-reactive Final    HIV ANTIGEN 01/27/2020 Non-Reactive  Non-reactive Final    HIV-2 Ab 01/27/2020 Non-Reactive  Non-reactive Final        Assessment and Tierra Dejesus was seen today for follow-up. Diagnoses and all orders for this visit:    Elevated insulin-like growth factor 1 (IGF-1) level  -     MRI PITUITARY W WO CONTRAST; Future  -     Comprehensive Metabolic Panel; Future  -     TSH without Reflex; Future  -     T4, Free; Future  -     T3, Free; Future  -     Prolactin; Future    High serum thyroid stimulating hormone (TSH)  -     levothyroxine (SYNTHROID) 25 MCG tablet; Take 1 tablet by mouth daily  -     MRI PITUITARY W WO CONTRAST; Future  -     Comprehensive Metabolic Panel; Future  -     TSH without Reflex; Future  -     T4, Free; Future  -     T3, Free; Future  -     Prolactin; Future    Pathologic high serum prolactin  -     Comprehensive Metabolic Panel; Future  -     TSH without Reflex; Future  -     T4, Free; Future  -     T3, Free; Future  -     Prolactin; Future    Low T4    Adrenal insufficiency (HCC)    Chronic fatigue    Hyperprolactinemia (HCC)    High insulin-like growth factor 1 (IGF-1) level          1: Adrenal insufficiency     AM cortisol and adrenal antibodies normal March 2020     Slightly high prolactin (30) , high  (normal )    Cortisol 15.8 in am, March 2020. Adrenal antibodies were negative. Off Hydrocortisone     She is on Midodrine 2.5mg tid     MRI pituitary gland normal May 2020     2: Fatigue   Work with pcp and work on depression, anxiety, sleep apnea or other sleep disturbances, and menopause. 3: High TSH     TSH 4.49, FT4 0.9, FT3 2.9 March 2020   TSH 6.17, Ft4 0.8 (L), TT3 1.17, thyroid antibodies normal - suggest primary vs central hypothyroidism     Start Levothyroxine 25 mcg daily in am and wait for 45 minutes before breakfast/coffe/meds       IGF-1 310, prolactin 30 March 2020     IGF-1 301 (N: ), Prolactin 267 (local) , 189 ([facility]) Sep 2020     She is on Seroquel. It can elevate Prolactin. I will not treat for high prolactin unless there is a pituitary tumor.      MRI now     Labs to be done after stopping supplements for at least 2 days         RTC in 2 months with TFts, prolactin, CMP       Education: Reviewed how to properly take thyroid replacement. Instructed to take daily with water on an empty stomach without concomitant vitamins or calcium or other medicine. Wait for 30-45 minutes before eating. If patient is confident they missed a day of pills, they can take the missed dose with their next tablet (only for levothyroxine). EDUCATION:   I hope that your symptoms will resolve soon with healthy lifestyle measures. I am certainly willing to repeat some of your tests in 3-6 months if the symptoms persist.  Recommendations:  Good Sleep Habits  Regular Exercise  Good Nutrition  Stress Reduction  Depression Management  Treatment of Co-existing Illnesses    Adrenal insufficiency education. Reviewed adrenal insufficiency precautions. If patient has a fever or significant illness they are to triple their dose of steroids for three days. If illness has resolved, they can resume their previous dose. If not better, they are to call EMS. They should also get a medic Alert bracelet that states 'Adrenal Insufficiency.       Electronically signed by Leyla Obrien MD on 10/5/2020 at 4:35 PM

## 2020-10-22 ENCOUNTER — TELEPHONE (OUTPATIENT)
Dept: ENDOCRINOLOGY | Age: 40
End: 2020-10-22

## 2020-10-22 NOTE — TELEPHONE ENCOUNTER
PT lvm stating that she misplaced the paperwork that Dr. Roddy Goldberg gave her and needs a new copy and a call back English

## 2020-11-20 ENCOUNTER — TELEPHONE (OUTPATIENT)
Dept: ENDOCRINOLOGY | Age: 40
End: 2020-11-20

## 2020-11-20 NOTE — TELEPHONE ENCOUNTER
Pt lvm stating she is having side effects from the thyroid medication and would like a call back to discuss them  # [phone]

## 2020-11-23 NOTE — TELEPHONE ENCOUNTER
Called Mrs. Vinod Weber to find out what symptoms she is experiencing with thyroid medication. Left a message to call me back.

## 2020-11-24 ENCOUNTER — APPOINTMENT (OUTPATIENT)
Dept: GENERAL RADIOLOGY | Age: 40
End: 2020-11-24
Payer: COMMERCIAL

## 2020-11-24 ENCOUNTER — HOSPITAL ENCOUNTER (EMERGENCY)
Age: 40
Discharge: HOME | End: 2020-11-24
Attending: STUDENT IN AN ORGANIZED HEALTH CARE EDUCATION/TRAINING PROGRAM
Payer: COMMERCIAL

## 2020-11-24 VITALS
BODY MASS INDEX: 29.81 KG/M2 | DIASTOLIC BLOOD PRESSURE: 82 MMHG | OXYGEN SATURATION: 100 % | HEART RATE: 87 BPM | WEIGHT: 174.6 LBS | RESPIRATION RATE: 12 BRPM | TEMPERATURE: 99.1 F | SYSTOLIC BLOOD PRESSURE: 134 MMHG | HEIGHT: 64 IN

## 2020-11-24 LAB
A/G RATIO: 1.5 (ref 1.1–2.2)
ALBUMIN SERPL-MCNC: 4.6 G/DL (ref 3.4–5)
ALP BLD-CCNC: 57 U/L (ref 40–129)
ALT SERPL-CCNC: 9 U/L (ref 10–40)
ANION GAP SERPL CALCULATED.3IONS-SCNC: 11 MMOL/L (ref 3–16)
AST SERPL-CCNC: 14 U/L (ref 15–37)
BASOPHILS ABSOLUTE: 0.1 K/UL (ref 0–0.2)
BASOPHILS RELATIVE PERCENT: 0.8 %
BILIRUB SERPL-MCNC: 0.4 MG/DL (ref 0–1)
BUN BLDV-MCNC: 12 MG/DL (ref 7–20)
CALCIUM SERPL-MCNC: 9.4 MG/DL (ref 8.3–10.6)
CHLORIDE BLD-SCNC: 102 MMOL/L (ref 99–110)
CO2: 24 MMOL/L (ref 21–32)
CREAT SERPL-MCNC: 0.8 MG/DL (ref 0.6–1.1)
EOSINOPHILS ABSOLUTE: 0.1 K/UL (ref 0–0.6)
EOSINOPHILS RELATIVE PERCENT: 0.9 %
GFR AFRICAN AMERICAN: >60
GFR NON-AFRICAN AMERICAN: >60
GLOBULIN: 3 G/DL
GLUCOSE BLD-MCNC: 86 MG/DL (ref 70–99)
HCG QUALITATIVE: NEGATIVE
HCT VFR BLD CALC: 41.5 % (ref 36–48)
HEMOGLOBIN: 14.2 G/DL (ref 12–16)
LYMPHOCYTES ABSOLUTE: 2 K/UL (ref 1–5.1)
LYMPHOCYTES RELATIVE PERCENT: 29.2 %
MCH RBC QN AUTO: 30.5 PG (ref 26–34)
MCHC RBC AUTO-ENTMCNC: 34.1 G/DL (ref 31–36)
MCV RBC AUTO: 89.6 FL (ref 80–100)
MONO TEST: NEGATIVE
MONOCYTES ABSOLUTE: 0.4 K/UL (ref 0–1.3)
MONOCYTES RELATIVE PERCENT: 5.9 %
NEUTROPHILS ABSOLUTE: 4.4 K/UL (ref 1.7–7.7)
NEUTROPHILS RELATIVE PERCENT: 63.2 %
PDW BLD-RTO: 13.6 % (ref 12.4–15.4)
PLATELET # BLD: 234 K/UL (ref 135–450)
PMV BLD AUTO: 8.3 FL (ref 5–10.5)
POTASSIUM REFLEX MAGNESIUM: 3.8 MMOL/L (ref 3.5–5.1)
RBC # BLD: 4.64 M/UL (ref 4–5.2)
S PYO AG THROAT QL: NEGATIVE
SODIUM BLD-SCNC: 137 MMOL/L (ref 136–145)
T3 TOTAL: 0.85 NG/ML (ref 0.8–2)
T4 FREE: 1.1 NG/DL (ref 0.9–1.8)
TOTAL PROTEIN: 7.6 G/DL (ref 6.4–8.2)
TSH REFLEX: 0.7 UIU/ML (ref 0.27–4.2)
WBC # BLD: 6.9 K/UL (ref 4–11)

## 2020-11-24 PROCEDURE — 86308 HETEROPHILE ANTIBODY SCREEN: CPT

## 2020-11-24 PROCEDURE — 85025 COMPLETE CBC W/AUTO DIFF WBC: CPT

## 2020-11-24 PROCEDURE — U0002 COVID-19 LAB TEST NON-CDC: HCPCS

## 2020-11-24 PROCEDURE — 70360 X-RAY EXAM OF NECK: CPT

## 2020-11-24 PROCEDURE — 87081 CULTURE SCREEN ONLY: CPT

## 2020-11-24 PROCEDURE — 36415 COLL VENOUS BLD VENIPUNCTURE: CPT

## 2020-11-24 PROCEDURE — 84480 ASSAY TRIIODOTHYRONINE (T3): CPT

## 2020-11-24 PROCEDURE — 99284 EMERGENCY DEPT VISIT MOD MDM: CPT

## 2020-11-24 PROCEDURE — 80053 COMPREHEN METABOLIC PANEL: CPT

## 2020-11-24 PROCEDURE — 87880 STREP A ASSAY W/OPTIC: CPT

## 2020-11-24 PROCEDURE — 84443 ASSAY THYROID STIM HORMONE: CPT

## 2020-11-24 PROCEDURE — 84703 CHORIONIC GONADOTROPIN ASSAY: CPT

## 2020-11-24 PROCEDURE — 84439 ASSAY OF FREE THYROXINE: CPT

## 2020-11-24 PROCEDURE — U0003 INFECTIOUS AGENT DETECTION BY NUCLEIC ACID (DNA OR RNA); SEVERE ACUTE RESPIRATORY SYNDROME CORONAVIRUS 2 (SARS-COV-2) (CORONAVIRUS DISEASE [COVID-19]), AMPLIFIED PROBE TECHNIQUE, MAKING USE OF HIGH THROUGHPUT TECHNOLOGIES AS DESCRIBED BY CMS-2020-01-R: HCPCS

## 2020-11-24 ASSESSMENT — PAIN DESCRIPTION - ORIENTATION: ORIENTATION: RIGHT;LEFT

## 2020-11-24 ASSESSMENT — PAIN SCALES - GENERAL
PAINLEVEL_OUTOF10: 6
PAINLEVEL_OUTOF10: 5
PAINLEVEL_OUTOF10: 6
PAINLEVEL_OUTOF10: 6

## 2020-11-24 ASSESSMENT — PAIN DESCRIPTION - LOCATION
LOCATION: KNEE
LOCATION: THROAT

## 2020-11-24 ASSESSMENT — PAIN DESCRIPTION - DESCRIPTORS
DESCRIPTORS: SORE;ACHING
DESCRIPTORS: ACHING;SORE

## 2020-11-24 ASSESSMENT — PAIN DESCRIPTION - PAIN TYPE: TYPE: CHRONIC PAIN

## 2020-11-24 ASSESSMENT — PAIN DESCRIPTION - PROGRESSION: CLINICAL_PROGRESSION: GRADUALLY WORSENING

## 2020-11-24 ASSESSMENT — PAIN DESCRIPTION - ONSET: ONSET: ON-GOING

## 2020-11-24 ASSESSMENT — PAIN DESCRIPTION - FREQUENCY: FREQUENCY: CONTINUOUS

## 2020-11-24 NOTE — TELEPHONE ENCOUNTER
PT called back, stated she has a cough that causes her to vomit, thyroid is swollen and has an itchy rash on chin and arms, would like a call back

## 2020-11-24 NOTE — ED PROVIDER NOTES
Primary Care Physician: Jodee Ramirez MD   Attending Physician: No att. providers found     History   Chief Complaint   Patient presents with    Other     swollen thyroid, rash on her face, vomiting, hard to swallow x1 month; hx adrenal/pituitary insuff         HPI   Lamin Madden is a 41 y.o. female w/ h/o COPD, hypothyroidism, adrenal and pituitary insuffcenicy, who presents this afternoon with c/o swollen thyroid and difficulty swallowing. Patient stated that her symptoms started about a month ago along with a rash on her chin as well as a cough, and vomiting. Patient called her endocrinologist and per patient her symptoms have nothing to do with her hypothyroidism. Patient stated that even though she is having difficulty swallowing she is able to eat and keep food down but states that she feels nauseous due to low nutrition intake. She describes her cough as dry and so forceful at time, it causes her to vomit. She also contacted her PCP and they told her to go to the hospital. Per patient she stated that she was recently tested for COVID-19, strep, and influenza all which she stated were negative. Patient stated that she felt febrile about a week ago.      Past Medical History:   Diagnosis Date    Asthma     Bipolar 1 disorder ([facility] [address]     Compression fracture     COPD (chronic obstructive pulmonary disease) (HCC)     DDD (degenerative disc disease), cervical     Howard-Danlos syndrome     Fibromyalgia     H/O adrenal insufficiency     and pituitary insufficiency    Herniated disc     Metabolic syndrome     Osteoarthritis     Pyelonephritis     Sciatica         Past Surgical History:   Procedure Laterality Date    KNEE SURGERY      LUMBAR FUSION N/A 6/3/2019    L5, S1 TRANSFORAMINAL LUMBAR INTERBODY FUSION performed by Nando Bañuelos MD at [address] Left     repair    SIGMOIDOSCOPY N/A 8/21/2020    FLEXIBLE SIGMOIDOSCOPY POSSIBLE COLONOSCOPY performed by Jaxon Marin Booker Lee MD at [address] [facility] History   Problem Relation Age of Onset    Diabetes type 2  Mother     Diabetes type 2  Father     Cancer Sister     Diabetes type 2  Brother         Social History     Socioeconomic History    Marital status: Single     Spouse name: None    Number of children: None    Years of education: None    Highest education level: None   Occupational History    None   Social Needs    Financial resource strain: None    Food insecurity     Worry: None     Inability: None    Transportation needs     Medical: None     Non-medical: None   Tobacco Use    Smoking status: Former Smoker     Packs/day: 1.00     Types: Cigarettes     Last attempt to quit: 2018     Years since quittin.8    Smokeless tobacco: Never Used   Substance and Sexual Activity    Alcohol use: Not Currently     Comment: socially    Drug use: No     Comment: Denies    Sexual activity: Not Currently   Lifestyle    Physical activity     Days per week: None     Minutes per session: None    Stress: None   Relationships    Social connections     Talks on phone: None     Gets together: None     Attends Hinduism service: None     Active member of club or organization: None     Attends meetings of clubs or organizations: None     Relationship status: None    Intimate partner violence     Fear of current or ex partner: None     Emotionally abused: None     Physically abused: None     Forced sexual activity: None   Other Topics Concern    None   Social History Narrative    None        Review of Systems   10 total systems reviewed and found to be negative unless otherwise noted in HPI     Physical Exam   /82   Pulse 87   Temp 99.1 °F (37.3 °C)   Resp 12   Ht 5' 4\" (1.626 m)   Wt 174 lb 9.7 oz (79.2 kg)   SpO2 100%   BMI 29.97 kg/m²      CONSTITUTIONAL: Well appearing, in no acute distress   HEAD: atraumatic, normocephalic   EYES: PERRL, No injection, discharge or scleral icterus.    ENT: Moist mucous membranes. Swelling in bilateral submandibular lymph nodes. Tenderness with palpation in supraclavicular nodes but no swelling. NECK: Normal ROM, NO LAD   CARDIOVASCULAR: Regular rate and rhythm. No murmurs or gallop. PULMONARY/CHEST: Airway patent. No retractions. Breath sounds clear with good air entry bilaterally. ABDOMEN: Soft, Non-distended and non-tender, without guarding or rebound. SKIN: Acyanotic, warm, dry   MUSCULOSKELETAL: No swelling, tenderness or deformity   NEUROLOGICAL: Awake and oriented x 4. Pulses intact. Grossly nonfocal   Nursing note and vitals reviewed.      ED Course & Medical Decision Making   Medications - No data to display   Labs Reviewed   COMPREHENSIVE METABOLIC PANEL W/ REFLEX TO MG FOR LOW K - Abnormal; Notable for the following components:       Result Value    ALT 9 (*)     AST 14 (*)     All other components within normal limits    Narrative:     Performed at:  [facility]  [address] [place] [address]   Phone [phone]   STREP SCREEN GROUP A THROAT    Narrative:     Performed at:  [facility]  [address] [place] [address]   Phone [phone]   RAPID INFLUENZA A/B ANTIGENS   CULTURE, BETA STREP CONFIRM PLATES   CBC WITH AUTO DIFFERENTIAL    Narrative:     Performed at:  [facility]  [address] [place] [address]   Phone [phone]   MONONUCLEOSIS SCREEN    Narrative:     Performed at:  [facility]  [address] [place] [address]   Phone [phone]   HCG, SERUM, QUALITATIVE    Narrative:     Performed at:  [facility]  [address] [place] [address]   Phone [phone]   TSH WITH REFLEX    Narrative:     Performed at:  [facility]  [address] [place] [address]   Phone [phone]   T4, FREE    Narrative:     Performed at:  [facility]  [address] [address] [facility] [address]   Phone [phone]   T3    Narrative:     Performed at:  [facility]  [address] [address]   Phone [phone]   COVID-19      XR NECK SOFT TISSUE   Final Result   Negative AP and lateral soft tissue neck. PROCEDURES:   Procedures    ASSESSMENT AND PLAN:  Climmie Kehr is a 41 y.o. female senting with complaint of neck pain concerning for her thyroid disease. Exam was unremarkable no signs of respiratory distress or shortness of breath. Lab work was obtained and showed no abnormal findings including normal TSH and T4 as well as T3. Labs with no leukocytosis. X-ray showed no abnormal findings. Strep, mononucleosis and flu were all negative. Covid was obtained and pending. At this time I do not believe that her symptoms are secondary to an abscess or infection. . Stable and no indication for admission. Recommend she follows up with her PCP for an ultrasound and possible scope. Patient discharged home in stable condition. ClINICAL IMPRESSION:  1.  Neck pain          PATIENT REFERRED TO:  Megan Haddad MD  [address] [address]  [phone]    Schedule an appointment as soon as possible for a visit in 2 days        DISCHARGE MEDICATIONS:  Discharge Medication List as of 11/24/2020  7:07 PM        DISCONTINUED MEDICATIONS:  Discharge Medication List as of 11/24/2020  7:07 PM      STOP taking these medications       oxyCODONE-acetaminophen (PERCOCET) 7.5-325 MG per tablet Comments:   Reason for Stopping:         pregabalin (LYRICA) 200 MG capsule Comments:   Reason for Stopping:             DISPOSITION Decision To Discharge 11/24/2020 06:57:01 PM  -We have instructed the patient, (Climmie Kehr) to return to the ED or call her PCP if her pain/symptoms worsen. -Findings and recommendations explained to patient. She expressed understanding and agreed with the plan. Mir Koehler MD (electronically signed)  11/24/2020  _________________________________________________________________________________________  _________________________________________________________________________________________  This record is transcribed utilizing voice recognition technology. There are inherent limitations in this technology. In addition, there may be limitations in editing of this report. If there are any discrepancies, please contact me directly.       Mir Koehler MD  11/24/20 [id number]

## 2020-11-25 ENCOUNTER — HOSPITAL ENCOUNTER (OUTPATIENT)
Dept: MRI IMAGING | Age: 40
Discharge: HOME | End: 2020-11-25
Payer: COMMERCIAL

## 2020-11-25 LAB — SARS-COV-2, PCR: NOT DETECTED

## 2020-11-25 NOTE — ED NOTES
Dc'd to home  Aware to continue to monitor fever  Skin warm and dry  Walks with ease  To call pmd in am for follow up     James Sandy RN  11/24/20 1939

## 2020-11-26 LAB — S PYO THROAT QL CULT: NORMAL

## 2020-12-01 ENCOUNTER — HOSPITAL ENCOUNTER (OUTPATIENT)
Dept: WOMENS IMAGING | Age: 40
Discharge: HOME | End: 2020-12-01
Payer: COMMERCIAL

## 2020-12-01 PROCEDURE — 77063 BREAST TOMOSYNTHESIS BI: CPT

## 2020-12-02 ENCOUNTER — HOSPITAL ENCOUNTER (OUTPATIENT)
Dept: MRI IMAGING | Age: 40
Discharge: HOME | End: 2020-12-02
Payer: COMMERCIAL

## 2020-12-02 DIAGNOSIS — R79.89 HIGH SERUM THYROID STIMULATING HORMONE (TSH): ICD-10-CM

## 2020-12-02 DIAGNOSIS — R79.89 PATHOLOGIC HIGH SERUM PROLACTIN: ICD-10-CM

## 2020-12-02 DIAGNOSIS — R79.89 ELEVATED INSULIN-LIKE GROWTH FACTOR 1 (IGF-1) LEVEL: ICD-10-CM

## 2020-12-02 LAB
A/G RATIO: 2 (ref 1.1–2.2)
ALBUMIN SERPL-MCNC: 4.9 G/DL (ref 3.4–5)
ALP BLD-CCNC: 70 U/L (ref 40–129)
ALT SERPL-CCNC: 11 U/L (ref 10–40)
ANION GAP SERPL CALCULATED.3IONS-SCNC: 12 MMOL/L (ref 3–16)
AST SERPL-CCNC: 17 U/L (ref 15–37)
BILIRUB SERPL-MCNC: <0.2 MG/DL (ref 0–1)
BUN BLDV-MCNC: 20 MG/DL (ref 7–20)
CALCIUM SERPL-MCNC: 9.7 MG/DL (ref 8.3–10.6)
CHLORIDE BLD-SCNC: 98 MMOL/L (ref 99–110)
CO2: 25 MMOL/L (ref 21–32)
CREAT SERPL-MCNC: 1 MG/DL (ref 0.6–1.1)
GFR AFRICAN AMERICAN: >60
GFR NON-AFRICAN AMERICAN: >60
GLOBULIN: 2.4 G/DL
GLUCOSE BLD-MCNC: 94 MG/DL (ref 70–99)
POTASSIUM SERPL-SCNC: 4 MMOL/L (ref 3.5–5.1)
PROLACTIN: 7.9 NG/ML
SODIUM BLD-SCNC: 135 MMOL/L (ref 136–145)
T3 FREE: 2.6 PG/ML (ref 2.3–4.2)
T4 FREE: 1 NG/DL (ref 0.9–1.8)
TOTAL PROTEIN: 7.3 G/DL (ref 6.4–8.2)
TSH SERPL DL<=0.05 MIU/L-ACNC: 2.16 UIU/ML (ref 0.27–4.2)

## 2020-12-02 PROCEDURE — 6360000004 HC RX CONTRAST MEDICATION: Performed by: INTERNAL MEDICINE

## 2020-12-02 PROCEDURE — A9577 INJ MULTIHANCE: HCPCS | Performed by: INTERNAL MEDICINE

## 2020-12-02 PROCEDURE — 70553 MRI BRAIN STEM W/O & W/DYE: CPT

## 2020-12-02 RX ADMIN — GADOBENATE DIMEGLUMINE 15 ML: 529 INJECTION, SOLUTION INTRAVENOUS at 08:29

## 2020-12-03 ENCOUNTER — CASE MANAGEMENT (OUTPATIENT)
Dept: WOMENS IMAGING | Age: 40
End: 2020-12-03

## 2020-12-09 PROBLEM — L24.9 IRRITANT CONTACT DERMATITIS: Status: ACTIVE | Noted: 2020-12-01

## 2020-12-11 ENCOUNTER — VIRTUAL VISIT (OUTPATIENT)
Dept: ENDOCRINOLOGY | Age: 40
End: 2020-12-11
Payer: COMMERCIAL

## 2020-12-11 PROCEDURE — 99214 OFFICE O/P EST MOD 30 MIN: CPT | Performed by: INTERNAL MEDICINE

## 2020-12-11 PROCEDURE — G8427 DOCREV CUR MEDS BY ELIG CLIN: HCPCS | Performed by: INTERNAL MEDICINE

## 2020-12-11 RX ORDER — POLYHEXAM BIGUAN/GAUZE BANDAGE 0.2%-2"X2"
1 BANDAGE TOPICAL DAILY
COMMUNITY
Start: 2020-12-01

## 2020-12-11 RX ORDER — MULTIVITAMIN WITH FOLIC ACID 400 MCG
TABLET ORAL
COMMUNITY
Start: 2020-11-06

## 2020-12-11 RX ORDER — VITAMIN B COMPLEX
1000 TABLET ORAL DAILY
COMMUNITY
Start: 2020-11-17

## 2020-12-11 RX ORDER — MIDODRINE HYDROCHLORIDE 2.5 MG/1
TABLET ORAL
Qty: 90 TABLET | Refills: 0 | Status: SHIPPED | OUTPATIENT
Start: 2020-12-11 | End: 2021-01-04

## 2020-12-11 NOTE — PROGRESS NOTES
Patient ID:   Em Means is a 41 y.o. female    Chief Complaint:   Em Means presents for an evaluation of Adrenal insufficiency. PCP: Dr. Herman Casiano MD    Pursuant to the emergency declaration under the [address] [facility] [place], [facility] authority and the Coronavirus Preparedness and Response Supplemental Appropriations Act this visit was conducted after obtaining verbal consent, with the use of D[employer]. me interactive audio and video telecommunications system that permits real time communication between the patient and provider to substitute for an in-person clinic visit to reduce the patient's risk of exposure to COVID-19 and provide necessary medical care. Because this was a Telehealth visit, evaluation of the following organ systems was limited: Vitals, Constitutional, EENT, Resp, CV, GI, , MS, Neuro, Skin. Heme. Lymph, Imm. Em Means was at home. Provider was at [facility] office. No one else was involved. The patient has also been advised to contact this office for worsening conditions or problems, and seek emergency medical treatment and/or call 911 if deemed necessary. Subjective:     She was started on hydrocortisone after low cortisol. Normal ACTH stim test in 2019. She had weight loss, episodes of hyponatremia and hypotension. IGF was high once and then normal in 2019   T4 was low once and then normal in 2019   Seen at [facility] endocrinology     Cortisol 15.8 in am, March 2020. Adrenal antibodies were negative. Stopped Hydrocortisone in June 2020. On midodrine 2.5mg tid. She takes it sometimes bid. Checking BP at home. Runs normal.        Levothyroxine 25 mcg daily around 6-8am. Usually waller snot eat for hours after taking levothyroxine. Patient has fatigue, but was sick last month. Will see PCP for Strep, flu, COVID. Normal appetite has some nausea. Weight is going up. occasional dizziness. hyperpigmentation of the face.  She is sleeping well. .   She says she has EDS. She has been on opioids for more than 25 years for back pain, injuries. Last use of steroid shots: 2020, right knee. She is not getting epidurals any more for back pain.      Supplements: Vit D, Mvt, B complex  No changes in shoe size   Swelling of fingers, waller snot wear rings     The following portions of the patient's history were reviewed and updated as appropriate:       Family History   Problem Relation Age of Onset    Diabetes type 2  Mother     Diabetes type 2  Father     Cancer Sister     Diabetes type 2  Brother          Social History     Socioeconomic History    Marital status: Single     Spouse name: Not on file    Number of children: Not on file    Years of education: Not on file    Highest education level: Not on file   Occupational History    Not on file   Social Needs    Financial resource strain: Not on file    Food insecurity     Worry: Not on file     Inability: Not on file    Transportation needs     Medical: Not on file     Non-medical: Not on file   Tobacco Use    Smoking status: Former Smoker     Packs/day: 1.00     Types: Cigarettes     Last attempt to quit: 2018     Years since quittin.9    Smokeless tobacco: Never Used   Substance and Sexual Activity    Alcohol use: Not Currently     Comment: socially    Drug use: No     Comment: Denies    Sexual activity: Not Currently   Lifestyle    Physical activity     Days per week: Not on file     Minutes per session: Not on file    Stress: Not on file   Relationships    Social connections     Talks on phone: Not on file     Gets together: Not on file     Attends Alevism service: Not on file     Active member of club or organization: Not on file     Attends meetings of clubs or organizations: Not on file     Relationship status: Not on file    Intimate partner violence     Fear of current or ex partner: Not on file     Emotionally abused: Not on file     Physically abused: Not on file     Forced sexual activity: Not on file   Other Topics Concern    Not on file   Social History Narrative    Not on file       Past Medical History:   Diagnosis Date    Asthma     Bipolar 1 disorder [facility] [address]     Compression fracture     COPD (chronic obstructive pulmonary disease) ([facility] [address]     DDD (degenerative disc disease), cervical     Howard-Danlos syndrome     Fibromyalgia     H/O adrenal insufficiency     and pituitary insufficiency    Herniated disc     Metabolic syndrome     Osteoarthritis     Pyelonephritis     Sciatica        Past Surgical History:   Procedure Laterality Date    KNEE SURGERY      LUMBAR FUSION N/A 6/3/2019    L5, S1 TRANSFORAMINAL LUMBAR INTERBODY FUSION performed by Ovi Beltran MD at [address] Left     repair    SIGMOIDOSCOPY N/A 8/21/2020    FLEXIBLE SIGMOIDOSCOPY POSSIBLE COLONOSCOPY performed by Lucio Carson MD at [facility]   Allergen Reactions    Metoclopramide Shortness Of Breath and Swelling    Quetiapine Shortness Of Breath and Swelling    Risperidone Swelling and Shortness Of Breath    Trazodone Swelling and Shortness Of Breath     Swelling throat    Buprenorphine-Naloxone Anxiety and Hives     Patient says she can take Percocet, Vicodin    Morphine Hives and Itching    Asenapine Swelling     tongue    Buprenorphine Hcl-Naloxone Hcl     Codeine Hives and Itching     Patient says she can take Percocet, Vicodin    Guanfacine Hcl Swelling     tongue    Morphine And Related Hives    Reglan [Metoclopramide Hcl] Swelling    Risperidone And Related Swelling    Seroquel  [Quetiapine Fumarate] Other (See Comments)     \"I get really dizzy and I pass out. \"    Seroquel [Quetiapine Fumarate] Swelling     Swelling throat    Trazodone And Nefazodone Swelling     Swelling throat    Lurasidone Anxiety         Current Outpatient Medications:     SUPER B COMPLEX & C TABS, TAKE 1 TABLET BY MOUTH EVERY DAY, Disp: , Rfl:     Vitamin D (CHOLECALCIFEROL) 25 MCG (1000 UT) TABS tablet, TAKE 1 TABLET BY MOUTH EVERY DAY, Disp: , Rfl:     Multiple Vitamin (DAILY-RUTHANN) TABS, TAKE 1 TABLET BY MOUTH EVERY DAY, Disp: , Rfl:     midodrine (PROAMATINE) 2.5 MG tablet, TAKE 1 TABLET BY MOUTH THREE TIMES A DAY, Disp: 90 tablet, Rfl: 0    levothyroxine (SYNTHROID) 25 MCG tablet, Take 1 tablet by mouth daily, Disp: 90 tablet, Rfl: 1    dicyclomine (BENTYL) 10 MG capsule, Take 2 capsules by mouth 2 times daily as needed (cramping), Disp: 120 capsule, Rfl: 3    divalproex (DEPAKOTE) 250 MG DR tablet, Take 1 tablet by mouth 2 times daily, Disp: 90 tablet, Rfl: 3    acyclovir (ZOVIRAX) 400 MG tablet, Take 400 mg by mouth as needed , Disp: , Rfl:     STOOL SOFTENER 100 MG capsule, Take 100 mg by mouth 2 times daily, Disp: , Rfl:     ketoconazole (NIZORAL) 2 % shampoo, Apply topically Twice a Week, Disp: , Rfl: 4    LATUDA 20 MG TABS tablet, Take 20 mg by mouth daily, Disp: , Rfl: 3    QUEtiapine (SEROQUEL) 200 MG tablet, Take 200 mg by mouth nightly, Disp: , Rfl: 2    TRULANCE 3 MG TABS, Take 3 mg by mouth daily, Disp: , Rfl: 11    ALPRAZolam (XANAX) 1 MG tablet, Take 1 mg by mouth 3 times daily as needed for Sleep or Anxiety. , Disp: , Rfl:     Cetirizine HCl (ZYRTEC ALLERGY) 10 MG CAPS, Take by mouth daily, Disp: , Rfl:     temazepam (RESTORIL) 30 MG capsule, Take 30 mg by mouth nightly as needed for Sleep. ., Disp: , Rfl:       Review of Systems:    Constitutional: Negative for fever, chills. HENT: Negative for congestion, ear pain, rhinorrhea,  sore throat and trouble swallowing. Eyes: Negative for photophobia, redness, itching. Respiratory: Negative for cough, shortness of breath and sputum. Cardiovascular: Negative for chest pain, palpitations and leg swelling. Gastrointestinal: Negative for nausea, vomiting, abdominal pain, diarrhea, constipation.    Endocrine: Negative for cold intolerance, heat intolerance, polydipsia, polyphagia and polyuria. Genitourinary: Negative for dysuria, urgency, frequency, hematuria and flank pain. Musculoskeletal: Negative for neck pain. Skin/Nail: Negative for rash, itching. Normal nails. Neurological: Negative for seizures, weakness, light-headedness, numbness and headaches. Hematological/ Lymph nodes: Negative for adenopathy. Does not bruise/bleed easily. Psychiatric/Behavioral: Negative for suicidal ideas, depression, anxiety. Objective:   Physical Exam:  There were no vitals taken for this visit. Constitutional: Patient is oriented to person, place, and time. Patient appears well-developed and well-nourished. Pulmonary/Chest: Effort normal.   Neurological: Patient is alert and oriented to person, place, and time. Psychiatric: Patient has a normal mood and affect.  Patient behavior is normal.     Lab Review:    Orders Only on 12/02/2020   Component Date Value Ref Range Status    Prolactin 12/02/2020 7.9  ng/mL Final    T3, Free 12/02/2020 2.6  2.3 - 4.2 pg/mL Final    T4 Free 12/02/2020 1.0  0.9 - 1.8 ng/dL Final    TSH 12/02/2020 2.16  0.27 - 4.20 uIU/mL Final    Sodium 12/02/2020 135* 136 - 145 mmol/L Final    Potassium 12/02/2020 4.0  3.5 - 5.1 mmol/L Final    Chloride 12/02/2020 98* 99 - 110 mmol/L Final    CO2 12/02/2020 25  21 - 32 mmol/L Final    Anion Gap 12/02/2020 12  3 - 16 Final    Glucose 12/02/2020 94  70 - 99 mg/dL Final    BUN 12/02/2020 20  7 - 20 mg/dL Final    CREATININE 12/02/2020 1.0  0.6 - 1.1 mg/dL Final    GFR Non- 12/02/2020 >60  >60 Final    GFR  12/02/2020 >60  >60 Final    Calcium 12/02/2020 9.7  8.3 - 10.6 mg/dL Final    Total Protein 12/02/2020 7.3  6.4 - 8.2 g/dL Final    Alb 12/02/2020 4.9  3.4 - 5.0 g/dL Final    Albumin/Globulin Ratio 12/02/2020 2.0  1.1 - 2.2 Final    Total Bilirubin 12/02/2020 <0.2  0.0 - 1.0 mg/dL Final    Alkaline Phosphatase 12/02/2020 70  40 - 129 U/L Final    ALT 12/02/2020 11  10 - 40 U/L Final    AST 12/02/2020 17  15 - 37 U/L Final    Globulin 12/02/2020 2.4  g/dL Final   Admission on 11/24/2020, Discharged on 11/24/2020   Component Date Value Ref Range Status    WBC 11/24/2020 6.9  4.0 - 11.0 K/uL Final    RBC 11/24/2020 4.64  4.00 - 5.20 M/uL Final    Hemoglobin 11/24/2020 14.2  12.0 - 16.0 g/dL Final    Hematocrit 11/24/2020 41.5  36.0 - 48.0 % Final    MCV 11/24/2020 89.6  80.0 - 100.0 fL Final    MCH 11/24/2020 30.5  26.0 - 34.0 pg Final    MCHC 11/24/2020 34.1  31.0 - 36.0 g/dL Final    RDW 11/24/2020 13.6  12.4 - 15.4 % Final    Platelets 18/23/7457 234  135 - 450 K/uL Final    MPV 11/24/2020 8.3  5.0 - 10.5 fL Final    Neutrophils % 11/24/2020 63.2  % Final    Lymphocytes % 11/24/2020 29.2  % Final    Monocytes % 11/24/2020 5.9  % Final    Eosinophils % 11/24/2020 0.9  % Final    Basophils % 11/24/2020 0.8  % Final    Neutrophils Absolute 11/24/2020 4.4  1.7 - 7.7 K/uL Final    Lymphocytes Absolute 11/24/2020 2.0  1.0 - 5.1 K/uL Final    Monocytes Absolute 11/24/2020 0.4  0.0 - 1.3 K/uL Final    Eosinophils Absolute 11/24/2020 0.1  0.0 - 0.6 K/uL Final    Basophils Absolute 11/24/2020 0.1  0.0 - 0.2 K/uL Final    Sodium 11/24/2020 137  136 - 145 mmol/L Final    Potassium reflex Magnesium 11/24/2020 3.8  3.5 - 5.1 mmol/L Final    Chloride 11/24/2020 102  99 - 110 mmol/L Final    CO2 11/24/2020 24  21 - 32 mmol/L Final    Anion Gap 11/24/2020 11  3 - 16 Final    Glucose 11/24/2020 86  70 - 99 mg/dL Final    BUN 11/24/2020 12  7 - 20 mg/dL Final    CREATININE 11/24/2020 0.8  0.6 - 1.1 mg/dL Final    GFR Non- 11/24/2020 >60  >60 Final    GFR  11/24/2020 >60  >60 Final    Calcium 11/24/2020 9.4  8.3 - 10.6 mg/dL Final    Total Protein 11/24/2020 7.6  6.4 - 8.2 g/dL Final    Alb 11/24/2020 4.6  3.4 - 5.0 g/dL Final    Albumin/Globulin Ratio 11/24/2020 1.5  1.1 - 2.2 Final    Total Bilirubin 11/24/2020 0.4  0.0 - 1.0 mg/dL Final    Alkaline Phosphatase 11/24/2020 57  40 - 129 U/L Final    ALT 11/24/2020 9* 10 - 40 U/L Final    AST 11/24/2020 14* 15 - 37 U/L Final    Globulin 11/24/2020 3.0  g/dL Final    Rapid Strep A Screen 11/24/2020 Negative  Negative Final    Mono Test 11/24/2020 Negative  Negative Final    hCG Qual 11/24/2020 Negative  Detects HCG level >10 MIU/mL Final    SARS-CoV-2, PCR 11/24/2020 Not Detected  Not Detected Final    TSH 11/24/2020 0.70  0.27 - 4.20 uIU/mL Final    T4 Free 11/24/2020 1.1  0.9 - 1.8 ng/dL Final    T3, Total 11/24/2020 0.85  0.80 - 2.00 ng/mL Final    Strep A Culture 11/24/2020 Normal oral amanda, No Beta Strep isolated   Final   Orders Only on 09/25/2020   Component Date Value Ref Range Status    IGF-1 (INSULIN-LIKE GROWTH I) 09/25/2020 301* 73 - 263 ng/mL Final    Insulin-Like GF-1 Z-Score 09/25/2020 2.3   Final    Prolactin 09/25/2020 267.2  ng/mL Final    Anti-Thyroglob Abs 09/25/2020 13  <115 IU/mL Final    THYROID PEROXIDASE (TPO) ABS 09/25/2020 6  <34 IU/mL Final    T3, Total 09/25/2020 1.17  0.80 - 2.00 ng/mL Final    T4 Free 09/25/2020 0.8* 0.9 - 1.8 ng/dL Final    TSH 09/25/2020 6.17* 0.27 - 4.20 uIU/mL Final    Misc Test Order 09/25/2020 SEE NOTE*  Final   Admission on 08/18/2020, Discharged on 08/25/2020   Component Date Value Ref Range Status    WBC 08/19/2020 12.2* 4.0 - 11.0 K/uL Final    RBC 08/19/2020 5.04  4.00 - 5.20 M/uL Final    Hemoglobin 08/19/2020 15.2  12.0 - 16.0 g/dL Final    Hematocrit 08/19/2020 45.7  36.0 - 48.0 % Final    MCV 08/19/2020 90.5  80.0 - 100.0 fL Final    MCH 08/19/2020 30.1  26.0 - 34.0 pg Final    MCHC 08/19/2020 33.2  31.0 - 36.0 g/dL Final    RDW 08/19/2020 14.3  12.4 - 15.4 % Final    Platelets 89/42/2517 280  135 - 450 K/uL Final    MPV 08/19/2020 8.2  5.0 - 10.5 fL Final    Neutrophils % 08/19/2020 74.1  % Final    Lymphocytes % 08/19/2020 17.7  % Final    Monocytes % 08/19/2020 6.2  % Final    Eosinophils % 08/19/2020 1.4  % Final    Basophils % 08/19/2020 0.6  % Final    Neutrophils Absolute 08/19/2020 9.1* 1.7 - 7.7 K/uL Final    Lymphocytes Absolute 08/19/2020 2.2  1.0 - 5.1 K/uL Final    Monocytes Absolute 08/19/2020 0.8  0.0 - 1.3 K/uL Final    Eosinophils Absolute 08/19/2020 0.2  0.0 - 0.6 K/uL Final    Basophils Absolute 08/19/2020 0.1  0.0 - 0.2 K/uL Final    Sodium 08/19/2020 136  136 - 145 mmol/L Final    Potassium reflex Magnesium 08/19/2020 4.5  3.5 - 5.1 mmol/L Final    Chloride 08/19/2020 102  99 - 110 mmol/L Final    CO2 08/19/2020 17* 21 - 32 mmol/L Final    Anion Gap 08/19/2020 17* 3 - 16 Final    Glucose 08/19/2020 88  70 - 99 mg/dL Final    BUN 08/19/2020 7  7 - 20 mg/dL Final    CREATININE 08/19/2020 0.8  0.6 - 1.1 mg/dL Final    GFR Non- 08/19/2020 >60  >60 Final    GFR  08/19/2020 >60  >60 Final    Calcium 08/19/2020 9.8  8.3 - 10.6 mg/dL Final    Total Protein 08/19/2020 7.8  6.4 - 8.2 g/dL Final    Alb 08/19/2020 4.7  3.4 - 5.0 g/dL Final    Albumin/Globulin Ratio 08/19/2020 1.5  1.1 - 2.2 Final    Total Bilirubin 08/19/2020 0.3  0.0 - 1.0 mg/dL Final    Alkaline Phosphatase 08/19/2020 55  40 - 129 U/L Final    ALT 08/19/2020 19  10 - 40 U/L Final    AST 08/19/2020 41* 15 - 37 U/L Final    Globulin 08/19/2020 3.1  g/dL Final    Lipase 08/19/2020 16.0  13.0 - 60.0 U/L Final    hCG Qual 08/19/2020 Negative  Detects HCG level >10 MIU/mL Final    Color, UA 08/19/2020 YELLOW  Straw/Yellow Final    Clarity, UA 08/19/2020 CLOUDY* Clear Final    Glucose, Ur 08/19/2020 Negative  Negative mg/dL Final    Bilirubin Urine 08/19/2020 Negative  Negative Final    Ketones, Urine 08/19/2020 TRACE* Negative mg/dL Final    Specific [place], UA 08/19/2020 1.018  1.005 - 1.030 Final    Blood, Urine 08/19/2020 Negative  Negative Final    pH, UA 08/19/2020 5.5  5.0 - 8.0 Final    Protein, UA 08/19/2020 Negative Negative mg/dL Final    Urobilinogen, Urine 08/19/2020 0.2  <2.0 E.U./dL Final    Nitrite, Urine 08/19/2020 Negative  Negative Final    Leukocyte Esterase, Urine 08/19/2020 TRACE* Negative Final    Microscopic Examination 08/19/2020 YES   Final    Urine Type 08/19/2020 NotGiven   Final    Urine Reflex to Culture 08/19/2020 Not Indicated   Final    Bacteria, UA 08/19/2020 RARE* None Seen /HPF Final    Hyaline Casts, UA 08/19/2020 3  0 - 8 /LPF Final    WBC, UA 08/19/2020 5  0 - 5 /HPF Final    RBC, UA 08/19/2020 1  0 - 4 /HPF Final    Epithelial Cells, UA 08/19/2020 9* 0 - 5 /HPF Final    TSH 08/19/2020 3.74  0.27 - 4.20 uIU/mL Final    SARS-CoV-2, NAAT 08/21/2020 Not Detected  Not Detected Final    Sodium 08/23/2020 140  136 - 145 mmol/L Final    Potassium reflex Magnesium 08/23/2020 3.9  3.5 - 5.1 mmol/L Final    Chloride 08/23/2020 106  99 - 110 mmol/L Final    CO2 08/23/2020 24  21 - 32 mmol/L Final    Anion Gap 08/23/2020 10  3 - 16 Final    Glucose 08/23/2020 103* 70 - 99 mg/dL Final    BUN 08/23/2020 3* 7 - 20 mg/dL Final    CREATININE 08/23/2020 0.7  0.6 - 1.1 mg/dL Final    GFR Non- 08/23/2020 >60  >60 Final    GFR  08/23/2020 >60  >60 Final    Calcium 08/23/2020 8.7  8.3 - 10.6 mg/dL Final   Admission on 08/15/2020, Discharged on 08/16/2020   Component Date Value Ref Range Status    Color, UA 08/15/2020 YELLOW  Straw/Yellow Final    Clarity, UA 08/15/2020 Clear  Clear Final    Glucose, Ur 08/15/2020 Negative  Negative mg/dL Final    Bilirubin Urine 08/15/2020 Negative  Negative Final    Ketones, Urine 08/15/2020 Negative  Negative mg/dL Final    Specific Gravity, UA 08/15/2020 1.008  1.005 - 1.030 Final    Blood, Urine 08/15/2020 Negative  Negative Final    pH, UA 08/15/2020 6.5  5.0 - 8.0 Final    Protein, UA 08/15/2020 Negative  Negative mg/dL Final    Urobilinogen, Urine 08/15/2020 0.2  <2.0 E.U./dL Final    Nitrite, Urine 08/15/2020 Negative  Negative Final    Leukocyte Esterase, Urine 08/15/2020 SMALL* Negative Final    Microscopic Examination 08/15/2020 YES   Final    Urine Type 08/15/2020 NotGiven   Final    Urine Reflex to Culture 08/15/2020 Not Indicated   Final    HCG(Urine) Pregnancy Test 08/15/2020 Negative  Detects HCG level >20 MIU/mL Final    WBC 08/15/2020 6.3  4.0 - 11.0 K/uL Final    RBC 08/15/2020 4.49  4.00 - 5.20 M/uL Final    Hemoglobin 08/15/2020 13.8  12.0 - 16.0 g/dL Final    Hematocrit 08/15/2020 40.6  36.0 - 48.0 % Final    MCV 08/15/2020 90.4  80.0 - 100.0 fL Final    MCH 08/15/2020 30.7  26.0 - 34.0 pg Final    MCHC 08/15/2020 34.0  31.0 - 36.0 g/dL Final    RDW 08/15/2020 14.3  12.4 - 15.4 % Final    Platelets 51/99/6923 277  135 - 450 K/uL Final    MPV 08/15/2020 8.7  5.0 - 10.5 fL Final    Neutrophils % 08/15/2020 51.3  % Final    Lymphocytes % 08/15/2020 39.1  % Final    Monocytes % 08/15/2020 6.4  % Final    Eosinophils % 08/15/2020 2.7  % Final    Basophils % 08/15/2020 0.5  % Final    Neutrophils Absolute 08/15/2020 3.2  1.7 - 7.7 K/uL Final    Lymphocytes Absolute 08/15/2020 2.5  1.0 - 5.1 K/uL Final    Monocytes Absolute 08/15/2020 0.4  0.0 - 1.3 K/uL Final    Eosinophils Absolute 08/15/2020 0.2  0.0 - 0.6 K/uL Final    Basophils Absolute 08/15/2020 0.0  0.0 - 0.2 K/uL Final    Sodium 08/15/2020 138  136 - 145 mmol/L Final    Potassium reflex Magnesium 08/15/2020 4.6  3.5 - 5.1 mmol/L Final    Chloride 08/15/2020 102  99 - 110 mmol/L Final    CO2 08/15/2020 26  21 - 32 mmol/L Final    Anion Gap 08/15/2020 10  3 - 16 Final    Glucose 08/15/2020 76  70 - 99 mg/dL Final    BUN 08/15/2020 18  7 - 20 mg/dL Final    CREATININE 08/15/2020 0.8  0.6 - 1.1 mg/dL Final    GFR Non- 08/15/2020 >60  >60 Final    GFR  08/15/2020 >60  >60 Final    Calcium 08/15/2020 9.0  8.3 - 10.6 mg/dL Final    Total Protein 08/15/2020 7.2  6.4 - 8.2 g/dL Final    Alb 08/15/2020 4.4  3.4 - 5.0 g/dL Final    Albumin/Globulin Ratio 08/15/2020 1.6  1.1 - 2.2 Final    Total Bilirubin 08/15/2020 <0.2  0.0 - 1.0 mg/dL Final    Alkaline Phosphatase 08/15/2020 53  40 - 129 U/L Final    ALT 08/15/2020 16  10 - 40 U/L Final    AST 08/15/2020 35  15 - 37 U/L Final    Globulin 08/15/2020 2.8  g/dL Final    Pro-BNP 08/15/2020 208* 0 - 124 pg/mL Final    Troponin 08/15/2020 <0.01  <0.01 ng/mL Final    Hyaline Casts, UA 08/15/2020 1  0 - 8 /LPF Final    WBC, UA 08/15/2020 7* 0 - 5 /HPF Final    RBC, UA 08/15/2020 0  0 - 4 /HPF Final    Epithelial Cells, UA 08/15/2020 3  0 - 5 /HPF Final   Admission on 03/13/2020, Discharged on 03/13/2020   Component Date Value Ref Range Status    Rapid Influenza A Ag 03/13/2020 Negative  Negative Final    Rapid Influenza B Ag 03/13/2020 Negative  Negative Final    Rapid Strep A Screen 03/13/2020 Negative  Negative Final    WBC 03/13/2020 14.6* 4.0 - 11.0 K/uL Final    RBC 03/13/2020 4.79  4.00 - 5.20 M/uL Final    Hemoglobin 03/13/2020 14.8  12.0 - 16.0 g/dL Final    Hematocrit 03/13/2020 44.0  36.0 - 48.0 % Final    MCV 03/13/2020 92.0  80.0 - 100.0 fL Final    MCH 03/13/2020 31.0  26.0 - 34.0 pg Final    MCHC 03/13/2020 33.7  31.0 - 36.0 g/dL Final    RDW 03/13/2020 14.5  12.4 - 15.4 % Final    Platelets 41/84/1631 262  135 - 450 K/uL Final    MPV 03/13/2020 8.3  5.0 - 10.5 fL Final    Neutrophils % 03/13/2020 76.7  % Final    Lymphocytes % 03/13/2020 16.3  % Final    Monocytes % 03/13/2020 5.3  % Final    Eosinophils % 03/13/2020 1.2  % Final    Basophils % 03/13/2020 0.5  % Final    Neutrophils Absolute 03/13/2020 11.2* 1.7 - 7.7 K/uL Final    Lymphocytes Absolute 03/13/2020 2.4  1.0 - 5.1 K/uL Final    Monocytes Absolute 03/13/2020 0.8  0.0 - 1.3 K/uL Final    Eosinophils Absolute 03/13/2020 0.2  0.0 - 0.6 K/uL Final    Basophils Absolute 03/13/2020 0.1  0.0 - 0.2 K/uL Final    Sodium 03/13/2020 140  136 - 145 mmol/L Final    WBC, UA 03/13/2020 4  0 - 5 /HPF Final    RBC, UA 03/13/2020 0  0 - 4 /HPF Final    Epithelial Cells, UA 03/13/2020 6* 0 - 5 /HPF Final    Strep A Culture 03/13/2020 Normal oral amanda, No Beta Strep isolated   Final   Orders Only on 03/13/2020   Component Date Value Ref Range Status    TSH 03/13/2020 4.49* 0.27 - 4.20 uIU/mL Final    T4 Free 03/13/2020 0.9  0.9 - 1.8 ng/dL Final    T3, Free 03/13/2020 2.9  2.3 - 4.2 pg/mL Final    Prolactin 03/13/2020 30.2  ng/mL Final    Cortisol - AM 03/13/2020 15.8  4.3 - 22.4 ug/dL Final    IGF-1 (INSULIN-LIKE GROWTH I) 03/13/2020 310* 75 - 267 ng/mL Final    Insulin-Like GF-1 Z-Score 03/13/2020 2.3   Final    Sodium 03/13/2020 138  136 - 145 mmol/L Final    Potassium 03/13/2020 4.0  3.5 - 5.1 mmol/L Final    Chloride 03/13/2020 100  99 - 110 mmol/L Final    CO2 03/13/2020 23  21 - 32 mmol/L Final    Anion Gap 03/13/2020 15  3 - 16 Final    Glucose 03/13/2020 76  70 - 99 mg/dL Final    BUN 03/13/2020 13  7 - 20 mg/dL Final    CREATININE 03/13/2020 0.9  0.6 - 1.1 mg/dL Final    GFR Non- 03/13/2020 >60  >60 Final    GFR  03/13/2020 >60  >60 Final    Calcium 03/13/2020 10.2  8.3 - 10.6 mg/dL Final    Total Protein 03/13/2020 7.4  6.4 - 8.2 g/dL Final    Alb 03/13/2020 4.9  3.4 - 5.0 g/dL Final    Albumin/Globulin Ratio 03/13/2020 2.0  1.1 - 2.2 Final    Total Bilirubin 03/13/2020 <0.2  0.0 - 1.0 mg/dL Final    Alkaline Phosphatase 03/13/2020 57  40 - 129 U/L Final    ALT 03/13/2020 21  10 - 40 U/L Final    AST 03/13/2020 17  15 - 37 U/L Final    Globulin 03/13/2020 2.5  g/dL Final    Misc Test Order 03/13/2020 SEE NOTE   Final   Orders Only on 01/27/2020   Component Date Value Ref Range Status    HIV Ag/Ab 01/27/2020 Non-Reactive  Non-reactive Final    HIV-1 Antibody 01/27/2020 Non-Reactive  Non-reactive Final    HIV ANTIGEN 01/27/2020 Non-Reactive  Non-reactive Final    HIV-2 Ab 01/27/2020 Non-Reactive Ft4 0.8 (L), TT3 1.17, thyroid antibodies normal - suggest primary vs central hypothyroidism. LT4 25 mcg started     TSH: 2.16, FT4 1.0, FT3 2.6 Dec 2020     Levothyroxine 25 mcg daily in am and wait for 45 minutes before breakfast/coffe/meds     Labs to be done after stopping supplements for at least 2 days     RTC in 6 months with TFts, prolactin, CMP       Education: Reviewed how to properly take thyroid replacement. Instructed to take daily with water on an empty stomach without concomitant vitamins or calcium or other medicine. Wait for 30-45 minutes before eating. If patient is confident they missed a day of pills, they can take the missed dose with their next tablet (only for levothyroxine). EDUCATION:   I hope that your symptoms will resolve soon with healthy lifestyle measures.   I am certainly willing to repeat some of your tests in 3-6 months if the symptoms persist.  Recommendations:  Good Sleep Habits  Regular Exercise  Good Nutrition  Stress Reduction  Depression Management  Treatment of Co-existing Illnesses           Electronically signed by Ame Roberts MD on 12/11/2020 at 12:56 PM

## 2020-12-15 ENCOUNTER — CASE MANAGEMENT (OUTPATIENT)
Dept: WOMENS IMAGING | Age: 40
End: 2020-12-15

## 2020-12-23 ENCOUNTER — HOSPITAL ENCOUNTER (EMERGENCY)
Age: 40
Discharge: HOME | End: 2020-12-23
Attending: EMERGENCY MEDICINE
Payer: COMMERCIAL

## 2020-12-23 ENCOUNTER — APPOINTMENT (OUTPATIENT)
Dept: GENERAL RADIOLOGY | Age: 40
End: 2020-12-23
Payer: COMMERCIAL

## 2020-12-23 VITALS
TEMPERATURE: 98.8 F | BODY MASS INDEX: 30.23 KG/M2 | HEART RATE: 71 BPM | RESPIRATION RATE: 14 BRPM | HEIGHT: 63 IN | WEIGHT: 170.64 LBS | DIASTOLIC BLOOD PRESSURE: 70 MMHG | OXYGEN SATURATION: 100 % | SYSTOLIC BLOOD PRESSURE: 103 MMHG

## 2020-12-23 LAB
A/G RATIO: 1.6 (ref 1.1–2.2)
ALBUMIN SERPL-MCNC: 4.3 G/DL (ref 3.4–5)
ALP BLD-CCNC: 62 U/L (ref 40–129)
ALT SERPL-CCNC: 10 U/L (ref 10–40)
ANION GAP SERPL CALCULATED.3IONS-SCNC: 12 MMOL/L (ref 3–16)
AST SERPL-CCNC: 17 U/L (ref 15–37)
BACTERIA: ABNORMAL /HPF
BASOPHILS ABSOLUTE: 0 K/UL (ref 0–0.2)
BASOPHILS RELATIVE PERCENT: 0.6 %
BILIRUB SERPL-MCNC: 0.3 MG/DL (ref 0–1)
BILIRUBIN URINE: NEGATIVE
BLOOD, URINE: ABNORMAL
BUN BLDV-MCNC: 15 MG/DL (ref 7–20)
CALCIUM SERPL-MCNC: 9.7 MG/DL (ref 8.3–10.6)
CHLORIDE BLD-SCNC: 101 MMOL/L (ref 99–110)
CLARITY: ABNORMAL
CO2: 21 MMOL/L (ref 21–32)
COLOR: YELLOW
CREAT SERPL-MCNC: 0.8 MG/DL (ref 0.6–1.1)
EKG ATRIAL RATE: 89 BPM
EKG DIAGNOSIS: NORMAL
EKG P AXIS: 56 DEGREES
EKG P-R INTERVAL: 130 MS
EKG Q-T INTERVAL: 376 MS
EKG QRS DURATION: 80 MS
EKG QTC CALCULATION (BAZETT): 457 MS
EKG R AXIS: 2 DEGREES
EKG T AXIS: 39 DEGREES
EKG VENTRICULAR RATE: 89 BPM
EOSINOPHILS ABSOLUTE: 0 K/UL (ref 0–0.6)
EOSINOPHILS RELATIVE PERCENT: 0.3 %
EPITHELIAL CELLS, UA: 5 /HPF (ref 0–5)
GFR AFRICAN AMERICAN: >60
GFR NON-AFRICAN AMERICAN: >60
GLOBULIN: 2.7 G/DL
GLUCOSE BLD-MCNC: 105 MG/DL (ref 70–99)
GLUCOSE URINE: NEGATIVE MG/DL
HCG QUALITATIVE: NEGATIVE
HCT VFR BLD CALC: 43.3 % (ref 36–48)
HEMOGLOBIN: 14.3 G/DL (ref 12–16)
HYALINE CASTS: 2 /LPF (ref 0–8)
KETONES, URINE: NEGATIVE MG/DL
LEUKOCYTE ESTERASE, URINE: ABNORMAL
LIPASE: 19 U/L (ref 13–60)
LYMPHOCYTES ABSOLUTE: 1.5 K/UL (ref 1–5.1)
LYMPHOCYTES RELATIVE PERCENT: 18 %
MCH RBC QN AUTO: 29.6 PG (ref 26–34)
MCHC RBC AUTO-ENTMCNC: 33 G/DL (ref 31–36)
MCV RBC AUTO: 89.8 FL (ref 80–100)
MICROSCOPIC EXAMINATION: YES
MONOCYTES ABSOLUTE: 0.5 K/UL (ref 0–1.3)
MONOCYTES RELATIVE PERCENT: 6.2 %
NEUTROPHILS ABSOLUTE: 6.1 K/UL (ref 1.7–7.7)
NEUTROPHILS RELATIVE PERCENT: 74.9 %
NITRITE, URINE: NEGATIVE
PDW BLD-RTO: 14.3 % (ref 12.4–15.4)
PH UA: 6.5 (ref 5–8)
PLATELET # BLD: 264 K/UL (ref 135–450)
PMV BLD AUTO: 7.8 FL (ref 5–10.5)
POTASSIUM REFLEX MAGNESIUM: 3.9 MMOL/L (ref 3.5–5.1)
PROTEIN UA: NEGATIVE MG/DL
RBC # BLD: 4.82 M/UL (ref 4–5.2)
RBC UA: 5 /HPF (ref 0–4)
SARS-COV-2, PCR: NOT DETECTED
SODIUM BLD-SCNC: 134 MMOL/L (ref 136–145)
SPECIFIC GRAVITY UA: 1 (ref 1–1.03)
TOTAL PROTEIN: 7 G/DL (ref 6.4–8.2)
TROPONIN: <0.01 NG/ML
URINE REFLEX TO CULTURE: YES
URINE TYPE: ABNORMAL
UROBILINOGEN, URINE: 0.2 E.U./DL
WBC # BLD: 8.2 K/UL (ref 4–11)
WBC UA: 11 /HPF (ref 0–5)

## 2020-12-23 PROCEDURE — 83690 ASSAY OF LIPASE: CPT

## 2020-12-23 PROCEDURE — 85025 COMPLETE CBC W/AUTO DIFF WBC: CPT

## 2020-12-23 PROCEDURE — 71045 X-RAY EXAM CHEST 1 VIEW: CPT

## 2020-12-23 PROCEDURE — 93005 ELECTROCARDIOGRAM TRACING: CPT | Performed by: EMERGENCY MEDICINE

## 2020-12-23 PROCEDURE — 87086 URINE CULTURE/COLONY COUNT: CPT

## 2020-12-23 PROCEDURE — 93010 ELECTROCARDIOGRAM REPORT: CPT | Performed by: INTERNAL MEDICINE

## 2020-12-23 PROCEDURE — 81001 URINALYSIS AUTO W/SCOPE: CPT

## 2020-12-23 PROCEDURE — 80053 COMPREHEN METABOLIC PANEL: CPT

## 2020-12-23 PROCEDURE — 6360000002 HC RX W HCPCS: Performed by: EMERGENCY MEDICINE

## 2020-12-23 PROCEDURE — U0003 INFECTIOUS AGENT DETECTION BY NUCLEIC ACID (DNA OR RNA); SEVERE ACUTE RESPIRATORY SYNDROME CORONAVIRUS 2 (SARS-COV-2) (CORONAVIRUS DISEASE [COVID-19]), AMPLIFIED PROBE TECHNIQUE, MAKING USE OF HIGH THROUGHPUT TECHNOLOGIES AS DESCRIBED BY CMS-2020-01-R: HCPCS

## 2020-12-23 PROCEDURE — 84484 ASSAY OF TROPONIN QUANT: CPT

## 2020-12-23 PROCEDURE — 6370000000 HC RX 637 (ALT 250 FOR IP): Performed by: EMERGENCY MEDICINE

## 2020-12-23 PROCEDURE — 99285 EMERGENCY DEPT VISIT HI MDM: CPT

## 2020-12-23 PROCEDURE — 84703 CHORIONIC GONADOTROPIN ASSAY: CPT

## 2020-12-23 RX ORDER — NAPROXEN 250 MG/1
500 TABLET ORAL ONCE
Status: COMPLETED | OUTPATIENT
Start: 2020-12-23 | End: 2020-12-23

## 2020-12-23 RX ORDER — CEFUROXIME AXETIL 250 MG/1
250 TABLET ORAL 2 TIMES DAILY
Qty: 14 TABLET | Refills: 0 | Status: SHIPPED | OUTPATIENT
Start: 2020-12-23 | End: 2020-12-30

## 2020-12-23 RX ORDER — ONDANSETRON 4 MG/1
4 TABLET, ORALLY DISINTEGRATING ORAL EVERY 8 HOURS PRN
Qty: 20 TABLET | Refills: 0 | Status: SHIPPED | OUTPATIENT
Start: 2020-12-23

## 2020-12-23 RX ORDER — DEXAMETHASONE 4 MG/1
8 TABLET ORAL ONCE
Status: COMPLETED | OUTPATIENT
Start: 2020-12-23 | End: 2020-12-23

## 2020-12-23 RX ORDER — NAPROXEN 375 MG/1
375 TABLET ORAL 2 TIMES DAILY WITH MEALS
Qty: 30 TABLET | Refills: 0 | Status: SHIPPED | OUTPATIENT
Start: 2020-12-23 | End: 2022-05-30

## 2020-12-23 RX ORDER — ONDANSETRON 4 MG/1
4 TABLET, ORALLY DISINTEGRATING ORAL ONCE
Status: COMPLETED | OUTPATIENT
Start: 2020-12-23 | End: 2020-12-23

## 2020-12-23 RX ADMIN — ONDANSETRON 4 MG: 4 TABLET, ORALLY DISINTEGRATING ORAL at 16:01

## 2020-12-23 RX ADMIN — NAPROXEN 500 MG: 250 TABLET ORAL at 16:00

## 2020-12-23 RX ADMIN — DEXAMETHASONE 8 MG: 4 TABLET ORAL at 17:28

## 2020-12-23 ASSESSMENT — PAIN DESCRIPTION - FREQUENCY: FREQUENCY: CONTINUOUS

## 2020-12-23 ASSESSMENT — PAIN DESCRIPTION - DESCRIPTORS: DESCRIPTORS: CONSTANT;PRESSURE

## 2020-12-23 ASSESSMENT — PAIN DESCRIPTION - ORIENTATION: ORIENTATION: MID

## 2020-12-23 ASSESSMENT — PAIN DESCRIPTION - PAIN TYPE
TYPE: ACUTE PAIN
TYPE: ACUTE PAIN

## 2020-12-23 ASSESSMENT — PAIN SCALES - GENERAL
PAINLEVEL_OUTOF10: 8

## 2020-12-23 ASSESSMENT — PAIN DESCRIPTION - LOCATION
LOCATION: CHEST
LOCATION: CHEST

## 2020-12-23 ASSESSMENT — PAIN SCALES - WONG BAKER: WONGBAKER_NUMERICALRESPONSE: 8

## 2020-12-23 NOTE — ED PROVIDER NOTES
CHIEF COMPLAINT  Cough, Fever, and Emesis      HISTORY OF PRESENT ILLNESS  Anjelica Worley is a 41 y.o. female who presents to the ED complaining of chest pain, dry cough, intermittent fevers and nausea with vomiting over the past 3-4 days. Patient states the chest pain is a pressure sensation. States she does have history of asthma and states she feels like she may need a steroid. Patient denies any previous cardiac history. States she occasionally has some clear sputum production with her cough. Denies any associated neck pain or stiffness. States she does have intermittent headaches. No thunderclap onset. Denies any headache currently. Denies any associate abdominal pain. States she occasionally has nausea with coughing fits. States he is having normal urinary and bowel habits. Denies any recent travel. Denies any known sick contacts. States she was tested for Covid 2 months ago which was negative. No recent Covid test.  No other complaints, modifying factors or associated symptoms. Nursing notes reviewed.    Past Medical History:   Diagnosis Date    Asthma     [facility] disorder ([facility]     Compression fracture     COPD [facility]ch[facility] obstructive pulmonary disease) [facility])     DDD (degenerative disc disease), cervical     Howard-Danlos syndrome     Fibromyalgia     H/O adrenal insufficiency     and pituitary insufficiency    Herniated disc     Metabolic syndrome     Osteoarthritis     Pyelonephritis     Sciatica      Past Surgical History:   Procedure Laterality Date    KNEE SURGERY      LUMBAR FUSION N/A 6/3/2019    L5, S1 TRANSFORAMINAL LUMBAR INTERBODY FUSION performed by Sea Reynoso MD at [address] [facility] [address] Left     repair    SIGMOIDOSCOPY N/A 8/21/2020    FLEXIBLE SIGMOIDOSCOPY POSSIBLE COLONOSCOPY performed by Yogesh Nieto MD at [address] History   Problem Relation Age of Onset    Diabetes type 2  Mother     Diabetes type 2  Father  Cancer Sister     Diabetes type 2  Brother      Social History     Socioeconomic History    Marital status: Single     Spouse name: Not on file    Number of children: Not on file    Years of education: Not on file    Highest education level: Not on file   Occupational History    Not on file   Social Needs    Financial resource strain: Not on file    Food insecurity     Worry: Not on file     Inability: Not on file    Transportation needs     Medical: Not on file     Non-medical: Not on file   Tobacco Use    Smoking status: Former Smoker     Packs/day: 1.00     Types: Cigarettes     Quit date: 2018     Years since quittin.9    Smokeless tobacco: Never Used   Substance and Sexual Activity    Alcohol use: Not Currently     Comment: socially    Drug use: No     Comment: Denies    Sexual activity: Not Currently   Lifestyle    Physical activity     Days per week: Not on file     Minutes per session: Not on file    Stress: Not on file   Relationships    Social connections     Talks on phone: Not on file     Gets together: Not on file     Attends Congregational service: Not on file     Active member of club or organization: Not on file     Attends meetings of clubs or organizations: Not on file     Relationship status: Not on file    Intimate partner violence     Fear of current or ex partner: Not on file     Emotionally abused: Not on file     Physically abused: Not on file     Forced sexual activity: Not on file   Other Topics Concern    Not on file   Social History Narrative    Not on file     Current Facility-Administered Medications   Medication Dose Route Frequency Provider Last Rate Last Admin    dexamethasone (DECADRON) tablet 8 mg  8 mg Oral Once Marlene Jones MD         Current Outpatient Medications   Medication Sig Dispense Refill    cefUROXime (CEFTIN) 250 MG tablet Take 1 tablet by mouth 2 times daily for 7 days 14 tablet 0    ondansetron (ZOFRAN ODT) 4 MG disintegrating tablet take Percocet, Vicodin    Guanfacine Hcl Swelling     tongue    Morphine And Related Hives    Reglan [Metoclopramide Hcl] Swelling    Risperidone And Related Swelling    Seroquel  [Quetiapine Fumarate] Other (See Comments)     \"I get really dizzy and I pass out. \"    Seroquel [Quetiapine Fumarate] Swelling     Swelling throat    Trazodone And Nefazodone Swelling     Swelling throat    Lurasidone Anxiety         REVIEW OF SYSTEMS  10 systems reviewed, pertinent positives per HPI otherwise noted to be negative    PHYSICAL EXAM  /70   Pulse 71   Temp 98.8 °F (37.1 °C) (Oral)   Resp 14   Ht 5' 3\" (1.6 m)   Wt 170 lb 10.2 oz (77.4 kg)   SpO2 100%   BMI 30.23 kg/m²      CONSTITUTIONAL: AOx4, cooperative with exam, afebrile   HEAD: normocephalic, atraumatic   EYES: PERRL, EOMI, anicteric sclera   ENT: Moist mucous membranes, uvula midline   NECK: Supple, symmetric, trachea midline, no meningismus   LUNGS: Bilateral breath sounds, CTAB, no rales/ronchi/wheezes, no increased work of breathing, speaking in full sentences   CARDIOVASCULAR: RRR, normal S1/S2, no m/r/g, 2+ pulses throughout   ABDOMEN: Soft, non-tender, non-distended, +BS   NEUROLOGIC:  MAEx4, GCS 15, cranial nerves II through XII intact;   MUSCULOSKELETAL: No clubbing, cyanosis or edema   SKIN: No rash, pallor or wounds on exposed surfaces         RADIOLOGY  X-RAYS:  I have reviewed radiologic plain film image(s). ALL OTHER NON-PLAIN FILM IMAGES SUCH AS CT, ULTRASOUND AND MRI HAVE BEEN READ BY THE RADIOLOGIST. XR CHEST PORTABLE   Final Result   No acute process. EKG INTERPRETATION  Normal sinus rhythm with rate 89, normal axis, , QRS 80, QTc 457, no ST elevations, no acute change compared EKG from 5/11/2019    PROCEDURES    ED COURSE/MDM  URI, pneumonia, ACS/MI, COVID-19, asthma, costochondritis, pleuritic chest pain  Patient seen and evaluated. History and physical as above. Nontoxic, afebrile.   Patient with complaints of chest pain, cough, nausea and fevers. Patient afebrile here. No increased work of breathing. O2 saturation 98% on room air. ED Course as of Dec 23 1725   Wed Dec 23, 2020   1724 Patient troponin negative. Urinalysis positive for infection with hematuria although patient is on her menstrual cycle. Lipase normal.  Metabolic panel unremarkable. CBC within normal limits. Chest x-ray negative. Covid test sent and pending. Patient updated on results. Discussed isolating till she finds out the results of her Covid testing. Patient agreeable. Plan for discharge with naproxen, Ceftin and Zofran. Patient was treated with a dose of dexamethasone in the ED prior to discharge secondary to history of asthma and concern for possible Covid infection. Return instructions provided. All questions answered prior to discharge. [DS]      ED Course User Index  [DS] Antonette Mar MD       I estimate there is LOW risk for PULMONARY EMBOLISM, ACUTE CORONARY SYNDROME, OR THORACIC AORTIC DISSECTION, thus I consider the discharge disposition reasonable. Anjelica Clinton and I have discussed the diagnosis and risks, and we agree with discharging home to follow-up with their primary doctor. We also discussed returning to the Emergency Department immediately if new or worsening symptoms occur. We have discussed the symptoms which are most concerning (e.g., bloody sputum, fever, worsening pain or shortness of breath, vomiting) that necessitate immediate return. Patient was given scripts for the following medications. I counseled patient how to take these medications.    New Prescriptions    CEFUROXIME (CEFTIN) 250 MG TABLET    Take 1 tablet by mouth 2 times daily for 7 days    NAPROXEN (NAPROSYN) 375 MG TABLET    Take 1 tablet by mouth 2 times daily (with meals)    ONDANSETRON (ZOFRAN ODT) 4 MG DISINTEGRATING TABLET    Take 1 tablet by mouth every 8 hours as needed for Nausea           CLINICAL IMPRESSION  1. Chest pain, unspecified type    2. Cough    3. Fever, unspecified fever cause    4. Acute cystitis with hematuria        Blood pressure 103/70, pulse 71, temperature 98.8 °F (37.1 °C), temperature source Oral, resp. rate 14, height 5' 3\" (1.6 m), weight 170 lb 10.2 oz (77.4 kg), SpO2 100 %, not currently breastfeeding. DISPOSITION  Patient was discharged to home in good condition. Lisa Clinton MD  [address]  [phone]    Call in 1 day  For a follow up appointment. Disclaimer: All medical record entries made by [employer] dictation.       (Please note that this note was completed with a voice recognition program. Every attempt was made to edit the dictations, but inevitably there remain words that are mis-transcribed.)            Zack Han MD  12/23/20 6660

## 2020-12-23 NOTE — ED NOTES
.Pt discharged at this time. Discharge instructions and medications reviewed,  Questions were answered. PT verbalized understanding. .  Follow up appointments were discussed.          [facility]  12/23/20 6178

## 2020-12-24 ENCOUNTER — CARE COORDINATION (OUTPATIENT)
Dept: CARE COORDINATION | Age: 40
End: 2020-12-24

## 2020-12-24 LAB — URINE CULTURE, ROUTINE: NORMAL

## 2020-12-27 ENCOUNTER — CARE COORDINATION (OUTPATIENT)
Dept: CARE COORDINATION | Age: 40
End: 2020-12-27

## 2021-01-12 ENCOUNTER — CARE COORDINATION (OUTPATIENT)
Dept: CARE COORDINATION | Age: 41
End: 2021-01-12

## 2021-01-12 NOTE — CARE COORDINATION
You Patient resolved from the Care Transitions episode on 12/23/2020  Discussed COVID-19 related testing which was available at this time. Test results were negative. Patient informed of results, if available? Yes    Patient/family has been provided the following resources and education related to COVID-19:                         Signs, symptoms and red flags related to COVID-19            CDC exposure and quarantine guidelines            [facility] exposure contact - [phone]            Contact for their local Department of Health                 Patient currently reports that the following symptoms have improved:  Chest pain, emesis, nausea, fever and cough     No further outreach scheduled with this CTN/ACM. Episode of Care resolved. Patient has this CTN/ACM contact information if future needs arise.

## 2021-01-19 ENCOUNTER — HOSPITAL ENCOUNTER (OUTPATIENT)
Dept: ULTRASOUND IMAGING | Age: 41
Discharge: HOME | End: 2021-01-19
Payer: COMMERCIAL

## 2021-01-19 DIAGNOSIS — E04.1 NONTOXIC SINGLE THYROID NODULE: ICD-10-CM

## 2021-01-19 PROCEDURE — 76536 US EXAM OF HEAD AND NECK: CPT

## 2021-02-16 ENCOUNTER — HOSPITAL ENCOUNTER (EMERGENCY)
Age: 41
Discharge: HOME | End: 2021-02-16
Payer: COMMERCIAL

## 2021-02-16 ENCOUNTER — APPOINTMENT (OUTPATIENT)
Dept: GENERAL RADIOLOGY | Age: 41
End: 2021-02-16
Payer: COMMERCIAL

## 2021-02-16 ENCOUNTER — APPOINTMENT (OUTPATIENT)
Dept: CT IMAGING | Age: 41
End: 2021-02-16
Payer: COMMERCIAL

## 2021-02-16 VITALS
BODY MASS INDEX: 31.68 KG/M2 | TEMPERATURE: 98 F | WEIGHT: 172.18 LBS | HEIGHT: 62 IN | OXYGEN SATURATION: 100 % | DIASTOLIC BLOOD PRESSURE: 62 MMHG | HEART RATE: 91 BPM | RESPIRATION RATE: 21 BRPM | SYSTOLIC BLOOD PRESSURE: 102 MMHG

## 2021-02-16 DIAGNOSIS — R07.9 INTERMITTENT CHEST PAIN: Primary | ICD-10-CM

## 2021-02-16 DIAGNOSIS — R42 EPISODE OF DIZZINESS: ICD-10-CM

## 2021-02-16 LAB
A/G RATIO: 1.4 (ref 1.1–2.2)
ALBUMIN SERPL-MCNC: 4.3 G/DL (ref 3.4–5)
ALP BLD-CCNC: 53 U/L (ref 40–129)
ALT SERPL-CCNC: 13 U/L (ref 10–40)
ANION GAP SERPL CALCULATED.3IONS-SCNC: 10 MMOL/L (ref 3–16)
AST SERPL-CCNC: 19 U/L (ref 15–37)
BASOPHILS ABSOLUTE: 0 K/UL (ref 0–0.2)
BASOPHILS RELATIVE PERCENT: 0.4 %
BILIRUB SERPL-MCNC: 0.4 MG/DL (ref 0–1)
BUN BLDV-MCNC: 13 MG/DL (ref 7–20)
CALCIUM SERPL-MCNC: 9.3 MG/DL (ref 8.3–10.6)
CHLORIDE BLD-SCNC: 102 MMOL/L (ref 99–110)
CO2: 24 MMOL/L (ref 21–32)
CREAT SERPL-MCNC: 0.8 MG/DL (ref 0.6–1.1)
D DIMER: <200 NG/ML DDU (ref 0–229)
EOSINOPHILS ABSOLUTE: 0 K/UL (ref 0–0.6)
EOSINOPHILS RELATIVE PERCENT: 0.4 %
GFR AFRICAN AMERICAN: >60
GFR NON-AFRICAN AMERICAN: >60
GLOBULIN: 3 G/DL
GLUCOSE BLD-MCNC: 96 MG/DL (ref 70–99)
HCG QUALITATIVE: NEGATIVE
HCT VFR BLD CALC: 41.4 % (ref 36–48)
HEMOGLOBIN: 14.1 G/DL (ref 12–16)
LYMPHOCYTES ABSOLUTE: 1.2 K/UL (ref 1–5.1)
LYMPHOCYTES RELATIVE PERCENT: 17.6 %
MCH RBC QN AUTO: 30.8 PG (ref 26–34)
MCHC RBC AUTO-ENTMCNC: 34 G/DL (ref 31–36)
MCV RBC AUTO: 90.8 FL (ref 80–100)
MONOCYTES ABSOLUTE: 0.4 K/UL (ref 0–1.3)
MONOCYTES RELATIVE PERCENT: 5.6 %
NEUTROPHILS ABSOLUTE: 5.3 K/UL (ref 1.7–7.7)
NEUTROPHILS RELATIVE PERCENT: 76 %
PDW BLD-RTO: 13.9 % (ref 12.4–15.4)
PLATELET # BLD: 234 K/UL (ref 135–450)
PMV BLD AUTO: 8 FL (ref 5–10.5)
POTASSIUM REFLEX MAGNESIUM: 3.8 MMOL/L (ref 3.5–5.1)
PRO-BNP: 67 PG/ML (ref 0–124)
RBC # BLD: 4.56 M/UL (ref 4–5.2)
SODIUM BLD-SCNC: 136 MMOL/L (ref 136–145)
TOTAL PROTEIN: 7.3 G/DL (ref 6.4–8.2)
TROPONIN: <0.01 NG/ML
WBC # BLD: 7 K/UL (ref 4–11)

## 2021-02-16 PROCEDURE — 85379 FIBRIN DEGRADATION QUANT: CPT

## 2021-02-16 PROCEDURE — 84484 ASSAY OF TROPONIN QUANT: CPT

## 2021-02-16 PROCEDURE — 71046 X-RAY EXAM CHEST 2 VIEWS: CPT

## 2021-02-16 PROCEDURE — 83880 ASSAY OF NATRIURETIC PEPTIDE: CPT

## 2021-02-16 PROCEDURE — 70450 CT HEAD/BRAIN W/O DYE: CPT

## 2021-02-16 PROCEDURE — 85025 COMPLETE CBC W/AUTO DIFF WBC: CPT

## 2021-02-16 PROCEDURE — 93005 ELECTROCARDIOGRAM TRACING: CPT | Performed by: EMERGENCY MEDICINE

## 2021-02-16 PROCEDURE — 99285 EMERGENCY DEPT VISIT HI MDM: CPT

## 2021-02-16 PROCEDURE — 84703 CHORIONIC GONADOTROPIN ASSAY: CPT

## 2021-02-16 PROCEDURE — 80053 COMPREHEN METABOLIC PANEL: CPT

## 2021-02-16 PROCEDURE — 6370000000 HC RX 637 (ALT 250 FOR IP): Performed by: NURSE PRACTITIONER

## 2021-02-16 RX ORDER — ACETAMINOPHEN 500 MG
1000 TABLET ORAL ONCE
Status: COMPLETED | OUTPATIENT
Start: 2021-02-16 | End: 2021-02-16

## 2021-02-16 RX ADMIN — ACETAMINOPHEN 1000 MG: 500 TABLET ORAL at 18:48

## 2021-02-16 ASSESSMENT — ENCOUNTER SYMPTOMS
COLOR CHANGE: 0
NAUSEA: 0
EYE DISCHARGE: 0
ABDOMINAL PAIN: 0
COUGH: 0
SHORTNESS OF BREATH: 0
VOMITING: 0
EYE REDNESS: 0
DIARRHEA: 0
BACK PAIN: 0
WHEEZING: 0

## 2021-02-16 ASSESSMENT — PAIN DESCRIPTION - PAIN TYPE: TYPE: ACUTE PAIN

## 2021-02-16 ASSESSMENT — PAIN SCALES - GENERAL: PAINLEVEL_OUTOF10: 5

## 2021-02-16 NOTE — ED PROVIDER NOTES
Per my interpretation:    Electrocardiogram (ECG) 2/16/2021 1642  RATE: 102 bpm  RHYTHM: normal sinus  AXIS: normal  INTERVALS: normal  ST-T WAVE CHANGES: No evidence of ST segment elevation or T-wave inversion  Prior for comparison 12/23/2020     Arleen Ybarra MD  02/16/21 173

## 2021-02-16 NOTE — ED PROVIDER NOTES
**ADVANCED PRACTICE PROVIDER, I HAVE EVALUATED THIS PATIENT**        [address]      Pt Name: Andrew Mccloud  VTZ:[id number]  Rigogfantonio 1980  Date of evaluation: 2/16/2021  Provider: ANNA Ortega CNP      Chief Complaint:    Chief Complaint   Patient presents with    Shortness of Breath     and CP, pt states that it has been going on for a few months    Blurred Vision     x3 weeks, states that it only happens when she exerts herself       Nursing Notes, Past Medical Hx, Past Surgical Hx, Social Hx, Allergies, and Family Hx were all reviewed and agreed with or any disagreements were addressed in the HPI.    HPI:  (Location, Duration, Timing, Severity, Quality, Assoc Sx, Context, Modifying factors)  This is a  41 y.o. female presents emergency department with several complaints, patient is complaining of chest pain or shortness of breath it has been going on for the past 3 months. She denies any cough, congestion, pleuritic chest pain. She is also reports in the past 3 to 4 weeks she is developed blurred vision when she exerts herself. She states that both of her symptoms developed today after shoveling the driveway. She does report having a slight headache in the top of her head, denies worst headache of life, rates the discomfort a 5 out of 10. She also states that her chest discomfort is intermittent, on exam it comes and goes and she rates that pain a 5 out of 10. She denies lightheadedness or dizziness on exam.  She denies seeing any medicine for symptoms. She denies any cough, congestion, fever or chills, no additional complaints, no additional aggravating relieving factors. Patient presents awake, alert and in no acute distress or toxic appearance.     PastMedical/Surgical History:      Diagnosis Date    Asthma     [facility] ([facility]     Compression fracture     [facility]     DDD (degenerative disc disease), cervical     Howard-Danlos syndrome     Fibromyalgia     H/O adrenal insufficiency     and pituitary insufficiency    Herniated disc     Metabolic syndrome     Osteoarthritis     Pyelonephritis     Sciatica          Procedure Laterality Date    KNEE SURGERY      LUMBAR FUSION N/A 6/3/2019    L5, S1 TRANSFORAMINAL LUMBAR INTERBODY FUSION performed by Maida Alfonso MD at [address] Left     repair    SIGMOIDOSCOPY N/A 8/21/2020    FLEXIBLE SIGMOIDOSCOPY POSSIBLE COLONOSCOPY performed by Honorio Chairez MD at [facility] ENDOSCOPY       Medications:  Previous Medications    ACYCLOVIR (ZOVIRAX) 400 MG TABLET    Take 400 mg by mouth as needed     ALPRAZOLAM (XANAX) 1 MG TABLET    Take 1 mg by mouth 3 times daily as needed for Sleep or Anxiety.      CETIRIZINE HCL (ZYRTEC ALLERGY) 10 MG CAPS    Take by mouth daily    DICYCLOMINE (BENTYL) 10 MG CAPSULE    Take 2 capsules by mouth 2 times daily as needed (cramping)    DIVALPROEX (DEPAKOTE) 250 MG DR TABLET    Take 1 tablet by mouth 2 times daily    KETOCONAZOLE (NIZORAL) 2 % SHAMPOO    Apply topically Twice a Week    LATUDA 20 MG TABS TABLET    Take 20 mg by mouth daily    LEVOTHYROXINE (SYNTHROID) 25 MCG TABLET    Take 1 tablet by mouth daily    MIDODRINE (PROAMATINE) 2.5 MG TABLET    TAKE 1 TABLET BY MOUTH THREE TIMES A DAY    MULTIPLE VITAMIN (DAILY-RUTHANN) TABS    TAKE 1 TABLET BY MOUTH EVERY DAY    NAPROXEN (NAPROSYN) 375 MG TABLET    Take 1 tablet by mouth 2 times daily (with meals)    ONDANSETRON (ZOFRAN ODT) 4 MG DISINTEGRATING TABLET    Take 1 tablet by mouth every 8 hours as needed for Nausea    QUETIAPINE (SEROQUEL) 200 MG TABLET    Take 200 mg by mouth nightly    STOOL SOFTENER 100 MG CAPSULE    Take 100 mg by mouth 2 times daily    SUPER B COMPLEX & C TABS    TAKE 1 TABLET BY MOUTH EVERY DAY    TEMAZEPAM (RESTORIL) 30 MG CAPSULE    Take 30 mg by mouth nightly as needed for Sleep. .    TRULANCE 3 MG TABS    Take 3 mg by mouth daily    VITAMIN D (CHOLECALCIFEROL) 25 MCG (1000 UT) TABS TABLET    TAKE 1 TABLET BY MOUTH EVERY DAY         Review of Systems:  Review of Systems   Constitutional: Negative for chills and fever. HENT: Negative for congestion. Eyes: Positive for visual disturbance. Negative for discharge and redness. She does report that when the dizziness episodes occur she has occasionally blurred vision. However, on exam she denies any blurred or lost vision. Respiratory: Negative for cough, shortness of breath and wheezing. Cardiovascular: Positive for chest pain. Patient complains of chest pain intermittently for the past 3 months. States that she developed it today along with blurred vision when she was several in her driveway, she states that she has had this before. Gastrointestinal: Negative for abdominal pain, diarrhea, nausea and vomiting. Genitourinary: Negative for dysuria, frequency, hematuria and urgency. Musculoskeletal: Negative for back pain. Skin: Negative for color change. Neurological: Positive for dizziness. Negative for weakness, numbness and headaches. Patient states that she gets dizzy when she exerts herself at times. However she complains of a slight headache today but denies worst headache of life. She denies any lightheadedness or dizziness on exam.  She denies any falls or injuries. No numbness tingling or paresthesias. Positives and Pertinent negatives as per HPI. Except as noted above in the ROS, problem specific ROS was completed and is negative. Physical Exam:  Physical Exam  Vitals signs and nursing note reviewed. Constitutional:       Appearance: She is well-developed. She is not diaphoretic. HENT:      Head: Normocephalic. Right Ear: External ear normal.      Left Ear: External ear normal.   Eyes:      General: No scleral icterus. Right eye: No discharge.          Left eye: No discharge. Extraocular Movements: Extraocular movements intact. Pupils: Pupils are equal, round, and reactive to light. Comments: Pupils are 3 mm round and reactive, normal extraocular neck, no visible nystagmus. Neck:      Musculoskeletal: Normal range of motion and neck supple. Cardiovascular:      Rate and Rhythm: Normal rate and regular rhythm. Comments: Normal S1 and 2, peripheral pulses are 2+, no edema observed. Pulmonary:      Effort: Pulmonary effort is normal. No respiratory distress. Breath sounds: Normal breath sounds. Comments: Lungs are clear anteriorly and posteriorly, patient is not tachypneic or dyspneic. Saturations are 9 9% on room air. Abdominal:      General: Bowel sounds are normal.      Palpations: Abdomen is soft. Tenderness: There is no abdominal tenderness. Musculoskeletal: Normal range of motion. Skin:     General: Skin is warm. Coloration: Skin is not pale. Neurological:      General: No focal deficit present. Mental Status: She is alert and oriented to person, place, and time. GCS: GCS eye subscore is 4. GCS verbal subscore is 5. GCS motor subscore is 6. Cranial Nerves: Cranial nerves are intact. Sensory: Sensation is intact. Motor: Motor function is intact. Comments: Patient is awake, alert following commands correctly, neurologically intact no focal deficits. No numbness tingling or paresthesias. No saddle anesthesia, no loss of bowel bladder control or urinary tension. Patient is able to ambulate to the bathroom without difficulty.   She is unremarkable neurological exam.   Psychiatric:         Mood and Affect: Mood normal.         Behavior: Behavior normal.         MEDICAL DECISION MAKING    Vitals:    Vitals:    02/16/21 1637 02/16/21 1745   BP: 128/80 109/82   Pulse: 101 91   Resp: 18 21   Temp: 98 °F (36.7 °C)    TempSrc: Temporal    SpO2: 99% 100%   Weight: 172 lb 2.9 oz (78.1 kg)    Height: 5' 2\" (1.575 m)        LABS:  Labs Reviewed   HCG, SERUM, QUALITATIVE    Narrative:     Performed at:  [facility]  [address] [place], [place] [address]   Phone [phone]   CBC WITH AUTO DIFFERENTIAL    Narrative:     Performed at:  [facility]  [address] [place], [place] [address]   Phone [phone]   BRAIN NATRIURETIC PEPTIDE    Narrative:     Performed at:  [facility]  [address] [place] [address]   Phone [phone]   TROPONIN    Narrative:     Performed at:  [address] [address]   Phone [phone]   COMPREHENSIVE METABOLIC PANEL W/ REFLEX TO MG FOR LOW K    Narrative:     Performed at:  [facility]  [address] [place], [place] [address]   Phone [phone]   D-DIMER, QUANTITATIVE    Narrative:     Performed at:  [address] [address]   Phone [phone] of labs reviewed and werenegative at this time or not returned at the time of this note. RADIOLOGY:   Non-plain film images such as CT, Ultrasound and MRI are read by the radiologist. Niki AVILA, APRN - CNP have directly visualized the radiologic plain film image(s) with the below findings:        Interpretation per the Radiologist below, if available at the time of this note:    CT HEAD WO CONTRAST   Final Result   No acute intracranial abnormality. Stable compared to prior study         XR CHEST (2 VW)   Final Result   No airspace disease by radiograph.               MEDICAL DECISION MAKING / ED COURSE:    Because of high probability of sudden clinical deterioration of the patient's condition and risk of further deterioration, critical care time required my full attention to the patient's condition; which included chart data review, documentation, medication ordering, reviewing the patient's old records, reevaluation patient's cardiac, pulmonary and neurological status. Reevaluation of vital signs. Consultations with ED attending and admitting physician. Ordering, interpreting reviewing diagnostic testing. Therefore a critical care time was 18 minutes of direct attention to the patient's condition did not include time spent on procedures. PROCEDURES:   Procedures    None    Patient was given:  Medications   acetaminophen (TYLENOL) tablet 1,000 mg (has no administration in time range)       Patient presents several complaints, patient is complaining of chest pain or shortness of breath it has been going on for the past 3 months. She denies any cough, congestion, pleuritic chest pain. She is also reports in the past 3 to 4 weeks she is developed blurred vision when she exerts herself. She states that both of her symptoms developed today after shoveling the driveway. She does report having a slight headache in the top of her head, denies worst headache of life, rates the discomfort a 5 out of 10. Evaluation and examination the patient she is unremarkable neurological exam with no acute focal deficits. Her family doctor sent her in to be evaluated. Her EKG shows sinus tachycardia rate of 102 bpm, no acute ST elevation however on bedside exam her heart rate is 90, please see Dr. Nakita Alanis EKG interpretation note. I did do blood work, urinalysis, chest x-ray and a CT scan however again she has unremarkable neurological exam, she is not orthostatic. Urine shows no acute infection. Pregnancy is negative. CBC shows no sepsis or anemia. Metabolic panel shows no electrolyte disturbances or renal insufficiency. Liver functions are normal.  Troponin is negative. D-dimer is negative. Chest x-ray shows no acute cardiopulmonary findings. CT the head shows no acute intracranial abnormality.   She was she was given Tylenol for headache. However, at this time I cannot find any acute emergent findings for her symptoms today. I do believe this needs to be further worked up by her PCP. At this time I am no concerns for acute intracranial hemorrhage, TIA, CVA, acute coronary syndrome, pneumonia, pneumothorax or pulmonary emboli. I believe she be managed on outpatient basis with outpatient follow-up. Therefore, shared medical decision was made between the patient and myself and we agreed that the patient could be discharged home with outpatient follow-up. Patient was discharged home with referral back to PCP. Educated to continue any home medicines as prescribed. Return to the ER for any worsening symptoms. The patient tolerated their visit well. I evaluated the patient. The physician was available for consultation as needed. The patient and / or the family were informed of the results of any tests, a time was given to answer questions, a plan was proposed and they agreed with plan. Patient verbalized understanding of discharge instructions and was discharged from the department in stable condition. CLINICAL IMPRESSION:  1.  Intermittent chest pain    2. Episode of dizziness        DISPOSITION Discharge - Pending Orders Complete 02/16/2021 06:24:59 PM      PATIENT REFERRED TO:  mEely Vasquez MD  [address] [place] [address]  [phone]    Schedule an appointment as soon as possible for a visit in 1 day  Please call your doctor first thing tomorrow morning and make an appointment to be seen in the next 2 to 3 days    [address] [facility] Emergency Department  [address] [address]  [phone]  Go to   If symptoms worsen      DISCHARGE MEDICATIONS:  New Prescriptions    No medications on file       DISCONTINUED MEDICATIONS:  Discontinued Medications    No medications on file              (Please note the MDM and HPI sections of this note were completed with a voice recognition program.  Efforts were made to edit the dictations but occasionally words are mis-transcribed.)    Electronically signed, ANNA Nixon CNP,          ANNA Nixon CNP  02/16/21 9427

## 2021-02-18 LAB
EKG ATRIAL RATE: 102 BPM
EKG DIAGNOSIS: NORMAL
EKG P AXIS: 64 DEGREES
EKG P-R INTERVAL: 134 MS
EKG Q-T INTERVAL: 344 MS
EKG QRS DURATION: 80 MS
EKG QTC CALCULATION (BAZETT): 448 MS
EKG R AXIS: 1 DEGREES
EKG T AXIS: 43 DEGREES
EKG VENTRICULAR RATE: 102 BPM

## 2021-02-18 PROCEDURE — 93010 ELECTROCARDIOGRAM REPORT: CPT | Performed by: INTERNAL MEDICINE

## 2021-03-15 ENCOUNTER — HOSPITAL ENCOUNTER (OUTPATIENT)
Dept: ULTRASOUND IMAGING | Age: 41
Discharge: HOME | End: 2021-03-15
Payer: COMMERCIAL

## 2021-03-15 ENCOUNTER — HOSPITAL ENCOUNTER (OUTPATIENT)
Dept: WOMENS IMAGING | Age: 41
Discharge: HOME | End: 2021-03-15
Payer: COMMERCIAL

## 2021-03-15 DIAGNOSIS — R92.8 ABNORMAL MAMMOGRAM: ICD-10-CM

## 2021-03-15 PROCEDURE — G0279 TOMOSYNTHESIS, MAMMO: HCPCS

## 2021-03-15 PROCEDURE — 76642 ULTRASOUND BREAST LIMITED: CPT

## 2021-03-23 DIAGNOSIS — R79.89 HIGH SERUM THYROID STIMULATING HORMONE (TSH): ICD-10-CM

## 2021-03-24 RX ORDER — LEVOTHYROXINE SODIUM 0.03 MG/1
TABLET ORAL
Qty: 90 TABLET | Refills: 1 | OUTPATIENT
Start: 2021-03-24

## 2021-05-05 PROBLEM — L70.9 ADULT ACNE: Status: ACTIVE | Noted: 2020-12-29

## 2021-05-06 PROBLEM — L21.9 SEBORRHEIC DERMATITIS: Status: ACTIVE | Noted: 2021-03-23

## 2021-05-06 PROBLEM — F60.9 PERSONALITY DISORDER (HCC): Status: ACTIVE | Noted: 2021-04-06

## 2021-05-06 PROBLEM — R07.89 CHEST DISCOMFORT: Status: ACTIVE | Noted: 2021-03-12

## 2021-07-09 DIAGNOSIS — R53.82 CHRONIC FATIGUE: ICD-10-CM

## 2021-07-09 DIAGNOSIS — E27.40 ADRENAL INSUFFICIENCY (HCC): ICD-10-CM

## 2021-07-12 ENCOUNTER — VIRTUAL VISIT (OUTPATIENT)
Dept: ENDOCRINOLOGY | Age: 41
End: 2021-07-12
Payer: COMMERCIAL

## 2021-07-12 DIAGNOSIS — E03.9 ACQUIRED HYPOTHYROIDISM: Primary | ICD-10-CM

## 2021-07-12 DIAGNOSIS — E27.40 ADRENAL INSUFFICIENCY (HCC): ICD-10-CM

## 2021-07-12 DIAGNOSIS — R53.82 CHRONIC FATIGUE: ICD-10-CM

## 2021-07-12 PROCEDURE — G8427 DOCREV CUR MEDS BY ELIG CLIN: HCPCS | Performed by: INTERNAL MEDICINE

## 2021-07-12 PROCEDURE — 99214 OFFICE O/P EST MOD 30 MIN: CPT | Performed by: INTERNAL MEDICINE

## 2021-07-12 RX ORDER — MONTELUKAST SODIUM 10 MG/1
10 TABLET ORAL NIGHTLY
COMMUNITY

## 2021-07-12 RX ORDER — LORATADINE 10 MG/1
TABLET ORAL
Status: ON HOLD | COMMUNITY
Start: 2021-06-18 | End: 2022-06-04

## 2021-07-12 RX ORDER — OXYCODONE HYDROCHLORIDE AND ACETAMINOPHEN 5; 325 MG/1; MG/1
TABLET ORAL
COMMUNITY
Start: 2021-04-22 | End: 2021-12-03

## 2021-07-12 RX ORDER — TRIAMCINOLONE ACETONIDE 1 MG/G
CREAM TOPICAL
COMMUNITY
Start: 2020-12-16

## 2021-07-12 RX ORDER — OMEPRAZOLE 20 MG/1
20 CAPSULE, DELAYED RELEASE ORAL DAILY
COMMUNITY
Start: 2021-06-24

## 2021-07-12 RX ORDER — ZOLPIDEM TARTRATE 10 MG/1
10 TABLET ORAL NIGHTLY PRN
Status: ON HOLD | COMMUNITY
Start: 2021-04-13 | End: 2022-06-04

## 2021-07-12 RX ORDER — SULFAMETHOXAZOLE AND TRIMETHOPRIM 800; 160 MG/1; MG/1
1 TABLET ORAL 2 TIMES DAILY
Status: ON HOLD | COMMUNITY
Start: 2021-05-26 | End: 2022-06-04

## 2021-07-12 RX ORDER — MIDODRINE HYDROCHLORIDE 2.5 MG/1
TABLET ORAL
Qty: 270 TABLET | Refills: 1 | Status: SHIPPED | OUTPATIENT
Start: 2021-07-12 | End: 2022-01-06

## 2021-07-12 RX ORDER — MIDODRINE HYDROCHLORIDE 2.5 MG/1
TABLET ORAL
Qty: 90 TABLET | Refills: 1 | OUTPATIENT
Start: 2021-07-12

## 2021-07-12 RX ORDER — FREMANEZUMAB-VFRM 225 MG/1.5ML
1.5 INJECTION SUBCUTANEOUS
COMMUNITY
Start: 2021-07-07

## 2021-07-12 RX ORDER — FLUOCINOLONE ACETONIDE 0.11 MG/ML
1 OIL TOPICAL 4 TIMES DAILY PRN
COMMUNITY

## 2021-07-12 RX ORDER — SPIRONOLACTONE 50 MG/1
50 TABLET, FILM COATED ORAL 2 TIMES DAILY
Status: ON HOLD | COMMUNITY
Start: 2021-06-21 | End: 2022-06-04

## 2021-07-12 NOTE — PROGRESS NOTES
Patient ID:   Cecilio Neri is a 35 y.o. female    Chief Complaint:   Cecilio Neri presents for an evaluation of Adrenal insufficiency. PCP: Dr. Meri Anand MD    Pursuant to the emergency declaration under the [address] [facility] authority and the Coronavirus Preparedness and Response Supplemental Appropriations Act this visit was conducted after obtaining verbal consent, with the use of D[employer]. me interactive audio and video telecommunications system that permits real time communication between the patient and provider to substitute for an in-person clinic visit to reduce the patient's risk of exposure to COVID-19 and provide necessary medical care. Because this was a Telehealth visit, evaluation of the following organ systems was limited: Vitals, Constitutional, EENT, Resp, CV, GI, , MS, Neuro, Skin. Heme. Lymph, Imm. Cceilio Neri was at home. Provider was at [facility] office. No one else was involved. The patient has also been advised to contact this office for worsening conditions or problems, and seek emergency medical treatment and/or call 911 if deemed necessary. Subjective:     She was started on hydrocortisone after low cortisol. Normal ACTH stim test in 2019. She had weight loss, episodes of hyponatremia and hypotension. IGF was high once and then normal in 2019   T4 was low once and then normal in 2019   Seen at [facility] endocrinology     Cortisol 15.8 in am, March 2020. Adrenal antibodies were negative. Stopped Hydrocortisone in June 2020. On midodrine 2.5mg tid. She takes it sometimes bid. Checking BP at home. Runs normal.        Levothyroxine 25 mcg daily around 10:30 am. Missed three in last two weeks. Usually does not eat for hours after taking levothyroxine. Patient has fatigue, No changes. Normal appetite has some nausea. Weight is down a little, she thinks. occasional dizziness. hyperpigmentation of the face. Poor sleep. She says she has EDS. She has been on opioids for more than 25 years for back pain, injuries. Last use of steroid shots: Jan 2020, right knee. She is not getting epidurals any more for back pain. Supplements: Vit D, Mvt, B complex  No changes in shoe size   Swelling of fingers, waller snot wear rings     The following portions of the patient's history were reviewed and updated as appropriate:       Family History   Problem Relation Age of Onset    Diabetes type 2  Mother     Diabetes type 2  Father     Cancer Sister     Diabetes type 2  Brother          Social History     Socioeconomic History    Marital status: Single     Spouse name: Not on file    Number of children: Not on file    Years of education: Not on file    Highest education level: Not on file   Occupational History    Not on file   Tobacco Use    Smoking status: Former Smoker     Packs/day: 1.00     Types: Cigarettes     Quit date: 1/8/2018     Years since quitting: 3.5    Smokeless tobacco: Never Used   Vaping Use    Vaping Use: Never used   Substance and Sexual Activity    Alcohol use: Not Currently     Comment: socially    Drug use: No     Comment: Denies    Sexual activity: Not Currently   Other Topics Concern    Not on file   Social History Narrative    Not on file     Social Determinants of Health     Financial Resource Strain:     Difficulty of Paying Living Expenses:    Food Insecurity:     Worried About Running Out of Food in the Last Year:     Ran Out of Food in the Last Year:    Transportation Needs:     Lack of Transportation (Medical):      Lack of Transportation (Non-Medical):    Physical Activity:     Days of Exercise per Week:     Minutes of Exercise per Session:    Stress:     Feeling of Stress :    Social Connections:     Frequency of Communication with Friends and Family:     Frequency of Social Gatherings with Friends and Family:     Attends Gnosticism Services:     Active Member of Clubs or Organizations:     Attends Club or Organization Meetings:     Marital Status:    Intimate Partner Violence:     Fear of Current or Ex-Partner:     Emotionally Abused:     Physically Abused:     Sexually Abused:        Past Medical History:   Diagnosis Date    Asthma     Bipolar 1 disorder ([facility] [address]     Compression fracture     COPD (chronic obstructive pulmonary disease) ([facility] [address]     DDD (degenerative disc disease), cervical     Howard-Danlos syndrome     Fibromyalgia     H/O adrenal insufficiency     and pituitary insufficiency    Herniated disc     Metabolic syndrome     Osteoarthritis     Pyelonephritis     Sciatica        Past Surgical History:   Procedure Laterality Date    KNEE SURGERY      LUMBAR FUSION N/A 6/3/2019    L5, S1 TRANSFORAMINAL LUMBAR INTERBODY FUSION performed by Lupe Molina MD at [address] Left     repair    SIGMOIDOSCOPY N/A 8/21/2020    FLEXIBLE SIGMOIDOSCOPY POSSIBLE COLONOSCOPY performed by Diaz Olmos MD at [facility]   Allergen Reactions    Metoclopramide Shortness Of Breath and Swelling    Quetiapine Shortness Of Breath and Swelling    Risperidone Swelling and Shortness Of Breath    Trazodone Swelling and Shortness Of Breath     Swelling throat    Buprenorphine-Naloxone Anxiety and Hives     Patient says she can take Percocet, Vicodin    Morphine Hives and Itching    Asenapine Swelling     tongue    Buprenorphine Hcl-Naloxone Hcl     Codeine Hives and Itching     Patient says she can take Percocet, Vicodin    Guanfacine Hcl Swelling     tongue    Morphine And Related Hives    Reglan [Metoclopramide Hcl] Swelling    Risperidone And Related Swelling    Seroquel  [Quetiapine Fumarate] Other (See Comments)     \"I get really dizzy and I pass out. \"    Seroquel [Quetiapine Fumarate] Swelling     Swelling throat    Trazodone And Nefazodone Swelling     Swelling throat    Lurasidone Anxiety Current Outpatient Medications:     omeprazole (PRILOSEC) 20 MG delayed release capsule, TAKE 1 CAPSULE BY MOUTH EVERY DAY, Disp: , Rfl:     spironolactone (ALDACTONE) 50 MG tablet, TAKE 1 TABLET BY MOUTH TWICE A DAY, Disp: , Rfl:     loratadine (CLARITIN) 10 MG tablet, TAKE 1 TABLET BY MOUTH EVERY MORNING, Disp: , Rfl:     montelukast (SINGULAIR) 10 MG tablet, Take 10 mg by mouth nightly, Disp: , Rfl:     sulfamethoxazole-trimethoprim (BACTRIM DS;SEPTRA DS) 800-160 MG per tablet, Take 1 tablet by mouth 2 times daily, Disp: , Rfl:     oxyCODONE-acetaminophen (PERCOCET) 5-325 MG per tablet, TAKE 1 TABLET TWICE A DAY, Disp: , Rfl:     AJOVY 225 MG/1.5ML SOSY, INJECT 1.5ML ONCE A MONTH, Disp: , Rfl:     zolpidem (AMBIEN) 10 MG tablet, Take 10 mg by mouth nightly as needed. , Disp: , Rfl:     triamcinolone (KENALOG) 0.1 % cream, Apply twice daily to affected skin on trunk.  Avoid face or skin folds, Disp: , Rfl:     fluocinolone (DERMA-SMOOTHE) 0.01 % OIL external oil, Apply 1 drop topically 4 times daily as needed, Disp: , Rfl:     midodrine (PROAMATINE) 2.5 MG tablet, TAKE 1 TABLET BY MOUTH THREE TIMES A DAY, Disp: 270 tablet, Rfl: 1    levothyroxine (SYNTHROID) 25 MCG tablet, TAKE 1 TABLET BY MOUTH EVERY DAY, Disp: 30 tablet, Rfl: 0    SUPER B COMPLEX & C TABS, TAKE 1 TABLET BY MOUTH EVERY DAY, Disp: , Rfl:     Vitamin D (CHOLECALCIFEROL) 25 MCG (1000 UT) TABS tablet, TAKE 1 TABLET BY MOUTH EVERY DAY, Disp: , Rfl:     Multiple Vitamin (DAILY-RUTHANN) TABS, TAKE 1 TABLET BY MOUTH EVERY DAY, Disp: , Rfl:     divalproex (DEPAKOTE) 250 MG DR tablet, Take 1 tablet by mouth 2 times daily, Disp: 90 tablet, Rfl: 3    acyclovir (ZOVIRAX) 400 MG tablet, Take 400 mg by mouth as needed , Disp: , Rfl:     STOOL SOFTENER 100 MG capsule, Take 100 mg by mouth 2 times daily, Disp: , Rfl:     ketoconazole (NIZORAL) 2 % shampoo, Apply topically Twice a Week, Disp: , Rfl: 4    QUEtiapine (SEROQUEL) 200 MG tablet, Take 200 mg by mouth nightly, Disp: , Rfl: 2    TRULANCE 3 MG TABS, Take 3 mg by mouth daily, Disp: , Rfl: 11    ALPRAZolam (XANAX) 1 MG tablet, Take 1 mg by mouth 3 times daily as needed for Sleep or Anxiety. , Disp: , Rfl:     ondansetron (ZOFRAN ODT) 4 MG disintegrating tablet, Take 1 tablet by mouth every 8 hours as needed for Nausea (Patient not taking: Reported on 7/12/2021), Disp: 20 tablet, Rfl: 0    naproxen (NAPROSYN) 375 MG tablet, Take 1 tablet by mouth 2 times daily (with meals) (Patient not taking: Reported on 7/12/2021), Disp: 30 tablet, Rfl: 0    dicyclomine (BENTYL) 10 MG capsule, Take 2 capsules by mouth 2 times daily as needed (cramping) (Patient not taking: Reported on 7/12/2021), Disp: 120 capsule, Rfl: 3    LATUDA 20 MG TABS tablet, Take 20 mg by mouth daily (Patient not taking: Reported on 7/12/2021), Disp: , Rfl: 3    Cetirizine HCl (ZYRTEC ALLERGY) 10 MG CAPS, Take by mouth daily (Patient not taking: Reported on 7/12/2021), Disp: , Rfl:     temazepam (RESTORIL) 30 MG capsule, Take 30 mg by mouth nightly as needed for Sleep. . (Patient not taking: Reported on 7/12/2021), Disp: , Rfl:       Review of Systems:    Constitutional: Negative for fever, chills. HENT: Negative for congestion, ear pain, rhinorrhea,  sore throat and trouble swallowing. Eyes: Negative for photophobia, redness, itching. Respiratory: Negative for cough, shortness of breath and sputum. Cardiovascular: Negative for chest pain, palpitations and leg swelling. Gastrointestinal: Negative for nausea, vomiting, abdominal pain, diarrhea, constipation. Endocrine: Negative for cold intolerance, heat intolerance, polydipsia, polyphagia and polyuria. Genitourinary: Negative for dysuria, urgency, frequency, hematuria and flank pain. Musculoskeletal: Negative for neck pain. Skin/Nail: Negative for rash, itching. Normal nails.    Neurological: Negative for seizures, weakness, light-headedness, numbness and headaches. Hematological/ Lymph nodes: Negative for adenopathy. Does not bruise/bleed easily. Psychiatric/Behavioral: Negative for suicidal ideas, depression, anxiety. Objective:   Physical Exam:  There were no vitals taken for this visit. Constitutional: Patient is oriented to person, place, and time. Patient appears well-developed and well-nourished. Pulmonary/Chest: Effort normal.   Neurological: Patient is alert and oriented to person, place, and time. Psychiatric: Patient has a normal mood and affect.  Patient behavior is normal.     Lab Review:    Admission on 02/16/2021, Discharged on 02/16/2021   Component Date Value Ref Range Status    Ventricular Rate 02/16/2021 102  BPM Final    Atrial Rate 02/16/2021 102  BPM Final    P-R Interval 02/16/2021 134  ms Final    QRS Duration 02/16/2021 80  ms Final    Q-T Interval 02/16/2021 344  ms Final    QTc Calculation (Bazett) 02/16/2021 448  ms Final    P Axis 02/16/2021 64  degrees Final    R Axis 02/16/2021 1  degrees Final    T Axis 02/16/2021 43  degrees Final    Diagnosis 02/16/2021 Sinus tachycardia with Fusion complexesConfirmed by Zakia WARE MD ([id number]) on 2/18/2021 10:42:51 AM   Final    hCG Qual 02/16/2021 Negative  Detects HCG level >10 MIU/mL Final    WBC 02/16/2021 7.0  4.0 - 11.0 K/uL Final    RBC 02/16/2021 4.56  4.00 - 5.20 M/uL Final    Hemoglobin 02/16/2021 14.1  12.0 - 16.0 g/dL Final    Hematocrit 02/16/2021 41.4  36.0 - 48.0 % Final    MCV 02/16/2021 90.8  80.0 - 100.0 fL Final    MCH 02/16/2021 30.8  26.0 - 34.0 pg Final    MCHC 02/16/2021 34.0  31.0 - 36.0 g/dL Final    RDW 02/16/2021 13.9  12.4 - 15.4 % Final    Platelets 60/87/3122 234  135 - 450 K/uL Final    MPV 02/16/2021 8.0  5.0 - 10.5 fL Final    Neutrophils % 02/16/2021 76.0  % Final    Lymphocytes % 02/16/2021 17.6  % Final    Monocytes % 02/16/2021 5.6  % Final    Eosinophils % 02/16/2021 0.4  % Final    Basophils % 02/16/2021 0.4  % Final    Neutrophils Absolute 02/16/2021 5.3  1.7 - 7.7 K/uL Final    Lymphocytes Absolute 02/16/2021 1.2  1.0 - 5.1 K/uL Final    Monocytes Absolute 02/16/2021 0.4  0.0 - 1.3 K/uL Final    Eosinophils Absolute 02/16/2021 0.0  0.0 - 0.6 K/uL Final    Basophils Absolute 02/16/2021 0.0  0.0 - 0.2 K/uL Final    Pro-BNP 02/16/2021 67  0 - 124 pg/mL Final    Troponin 02/16/2021 <0.01  <0.01 ng/mL Final    Sodium 02/16/2021 136  136 - 145 mmol/L Final    Potassium reflex Magnesium 02/16/2021 3.8  3.5 - 5.1 mmol/L Final    Chloride 02/16/2021 102  99 - 110 mmol/L Final    CO2 02/16/2021 24  21 - 32 mmol/L Final    Anion Gap 02/16/2021 10  3 - 16 Final    Glucose 02/16/2021 96  70 - 99 mg/dL Final    BUN 02/16/2021 13  7 - 20 mg/dL Final    CREATININE 02/16/2021 0.8  0.6 - 1.1 mg/dL Final    GFR Non- 02/16/2021 >60  >60 Final    GFR  02/16/2021 >60  >60 Final    Calcium 02/16/2021 9.3  8.3 - 10.6 mg/dL Final    Total Protein 02/16/2021 7.3  6.4 - 8.2 g/dL Final    Albumin 02/16/2021 4.3  3.4 - 5.0 g/dL Final    Albumin/Globulin Ratio 02/16/2021 1.4  1.1 - 2.2 Final    Total Bilirubin 02/16/2021 0.4  0.0 - 1.0 mg/dL Final    Alkaline Phosphatase 02/16/2021 53  40 - 129 U/L Final    ALT 02/16/2021 13  10 - 40 U/L Final    AST 02/16/2021 19  15 - 37 U/L Final    Globulin 02/16/2021 3.0  g/dL Final    D-Dimer, Quant 02/16/2021 <200  0 - 229 ng/mL DDU Final   Admission on 12/23/2020, Discharged on 12/23/2020   Component Date Value Ref Range Status    Ventricular Rate 12/23/2020 89  BPM Final    Atrial Rate 12/23/2020 89  BPM Final    P-R Interval 12/23/2020 130  ms Final    QRS Duration 12/23/2020 80  ms Final    Q-T Interval 12/23/2020 376  ms Final    QTc Calculation (Bazett) 12/23/2020 457  ms Final    P Axis 12/23/2020 56  degrees Final    R Axis 12/23/2020 2  degrees Final    T Axis 12/23/2020 39  degrees Final    Diagnosis 12/23/2020 Normal sinus rhythmPoor R wave progressionAbnormal ECGWhen compared with ECG of 11-MAY-2019 08:28,Nonspecific T wave abnormality no longer evident in Inferior leadsNonspecific T wave abnormality, improved in Lateral leadsConfirmed by Izzy Jewell MD, [facility] ([id number]) on 12/23/2020 5:09:43 PM   Final    hCG Qual 12/23/2020 Negative  Detects HCG level >10 MIU/mL Final    WBC 12/23/2020 8.2  4.0 - 11.0 K/uL Final    RBC 12/23/2020 4.82  4.00 - 5.20 M/uL Final    Hemoglobin 12/23/2020 14.3  12.0 - 16.0 g/dL Final    Hematocrit 12/23/2020 43.3  36.0 - 48.0 % Final    MCV 12/23/2020 89.8  80.0 - 100.0 fL Final    MCH 12/23/2020 29.6  26.0 - 34.0 pg Final    MCHC 12/23/2020 33.0  31.0 - 36.0 g/dL Final    RDW 12/23/2020 14.3  12.4 - 15.4 % Final    Platelets 31/73/6490 264  135 - 450 K/uL Final    MPV 12/23/2020 7.8  5.0 - 10.5 fL Final    Neutrophils % 12/23/2020 74.9  % Final    Lymphocytes % 12/23/2020 18.0  % Final    Monocytes % 12/23/2020 6.2  % Final    Eosinophils % 12/23/2020 0.3  % Final    Basophils % 12/23/2020 0.6  % Final    Neutrophils Absolute 12/23/2020 6.1  1.7 - 7.7 K/uL Final    Lymphocytes Absolute 12/23/2020 1.5  1.0 - 5.1 K/uL Final    Monocytes Absolute 12/23/2020 0.5  0.0 - 1.3 K/uL Final    Eosinophils Absolute 12/23/2020 0.0  0.0 - 0.6 K/uL Final    Basophils Absolute 12/23/2020 0.0  0.0 - 0.2 K/uL Final    Sodium 12/23/2020 134* 136 - 145 mmol/L Final    Potassium reflex Magnesium 12/23/2020 3.9  3.5 - 5.1 mmol/L Final    Chloride 12/23/2020 101  99 - 110 mmol/L Final    CO2 12/23/2020 21  21 - 32 mmol/L Final    Anion Gap 12/23/2020 12  3 - 16 Final    Glucose 12/23/2020 105* 70 - 99 mg/dL Final    BUN 12/23/2020 15  7 - 20 mg/dL Final    CREATININE 12/23/2020 0.8  0.6 - 1.1 mg/dL Final    GFR Non- 12/23/2020 >60  >60 Final    GFR  12/23/2020 >60  >60 Final    Calcium 12/23/2020 9.7  8.3 - 10.6 mg/dL Final    Total Protein 12/23/2020 7.0  6.4 - 8.2 g/dL Final    Final    Potassium 12/02/2020 4.0  3.5 - 5.1 mmol/L Final    Chloride 12/02/2020 98* 99 - 110 mmol/L Final    CO2 12/02/2020 25  21 - 32 mmol/L Final    Anion Gap 12/02/2020 12  3 - 16 Final    Glucose 12/02/2020 94  70 - 99 mg/dL Final    BUN 12/02/2020 20  7 - 20 mg/dL Final    CREATININE 12/02/2020 1.0  0.6 - 1.1 mg/dL Final    GFR Non- 12/02/2020 >60  >60 Final    GFR  12/02/2020 >60  >60 Final    Calcium 12/02/2020 9.7  8.3 - 10.6 mg/dL Final    Total Protein 12/02/2020 7.3  6.4 - 8.2 g/dL Final    Albumin 12/02/2020 4.9  3.4 - 5.0 g/dL Final    Albumin/Globulin Ratio 12/02/2020 2.0  1.1 - 2.2 Final    Total Bilirubin 12/02/2020 <0.2  0.0 - 1.0 mg/dL Final    Alkaline Phosphatase 12/02/2020 70  40 - 129 U/L Final    ALT 12/02/2020 11  10 - 40 U/L Final    AST 12/02/2020 17  15 - 37 U/L Final    Globulin 12/02/2020 2.4  g/dL Final   Admission on 11/24/2020, Discharged on 11/24/2020   Component Date Value Ref Range Status    WBC 11/24/2020 6.9  4.0 - 11.0 K/uL Final    RBC 11/24/2020 4.64  4.00 - 5.20 M/uL Final    Hemoglobin 11/24/2020 14.2  12.0 - 16.0 g/dL Final    Hematocrit 11/24/2020 41.5  36.0 - 48.0 % Final    MCV 11/24/2020 89.6  80.0 - 100.0 fL Final    MCH 11/24/2020 30.5  26.0 - 34.0 pg Final    MCHC 11/24/2020 34.1  31.0 - 36.0 g/dL Final    RDW 11/24/2020 13.6  12.4 - 15.4 % Final    Platelets 30/26/7447 234  135 - 450 K/uL Final    MPV 11/24/2020 8.3  5.0 - 10.5 fL Final    Neutrophils % 11/24/2020 63.2  % Final    Lymphocytes % 11/24/2020 29.2  % Final    Monocytes % 11/24/2020 5.9  % Final    Eosinophils % 11/24/2020 0.9  % Final    Basophils % 11/24/2020 0.8  % Final    Neutrophils Absolute 11/24/2020 4.4  1.7 - 7.7 K/uL Final    Lymphocytes Absolute 11/24/2020 2.0  1.0 - 5.1 K/uL Final    Monocytes Absolute 11/24/2020 0.4  0.0 - 1.3 K/uL Final    Eosinophils Absolute 11/24/2020 0.1  0.0 - 0.6 K/uL Final    Basophils Absolute 11/24/2020 0.1  0.0 - 0.2 K/uL Final    Sodium 11/24/2020 137  136 - 145 mmol/L Final    Potassium reflex Magnesium 11/24/2020 3.8  3.5 - 5.1 mmol/L Final    Chloride 11/24/2020 102  99 - 110 mmol/L Final    CO2 11/24/2020 24  21 - 32 mmol/L Final    Anion Gap 11/24/2020 11  3 - 16 Final    Glucose 11/24/2020 86  70 - 99 mg/dL Final    BUN 11/24/2020 12  7 - 20 mg/dL Final    CREATININE 11/24/2020 0.8  0.6 - 1.1 mg/dL Final    GFR Non- 11/24/2020 >60  >60 Final    GFR  11/24/2020 >60  >60 Final    Calcium 11/24/2020 9.4  8.3 - 10.6 mg/dL Final    Total Protein 11/24/2020 7.6  6.4 - 8.2 g/dL Final    Albumin 11/24/2020 4.6  3.4 - 5.0 g/dL Final    Albumin/Globulin Ratio 11/24/2020 1.5  1.1 - 2.2 Final    Total Bilirubin 11/24/2020 0.4  0.0 - 1.0 mg/dL Final    Alkaline Phosphatase 11/24/2020 57  40 - 129 U/L Final    ALT 11/24/2020 9* 10 - 40 U/L Final    AST 11/24/2020 14* 15 - 37 U/L Final    Globulin 11/24/2020 3.0  g/dL Final    Rapid Strep A Screen 11/24/2020 Negative  Negative Final    Mono Test 11/24/2020 Negative  Negative Final    hCG Qual 11/24/2020 Negative  Detects HCG level >10 MIU/mL Final    SARS-CoV-2, PCR 11/24/2020 Not Detected  Not Detected Final    TSH 11/24/2020 0.70  0.27 - 4.20 uIU/mL Final    T4 Free 11/24/2020 1.1  0.9 - 1.8 ng/dL Final    T3, Total 11/24/2020 0.85  0.80 - 2.00 ng/mL Final    Strep A Culture 11/24/2020 Normal oral amanda, No Beta Strep isolated   Final   Orders Only on 09/25/2020   Component Date Value Ref Range Status    IGF-1 (INSULIN-LIKE GROWTH I) 09/25/2020 301* 73 - 263 ng/mL Final    Insulin-Like GF-1 Z-Score 09/25/2020 2.3   Final    Prolactin 09/25/2020 267.2  ng/mL Final    Anti-Thyroglob Abs 09/25/2020 13  <115 IU/mL Final    THYROID PEROXIDASE (TPO) ABS 09/25/2020 6  <34 IU/mL Final    T3, Total 09/25/2020 1.17  0.80 - 2.00 ng/mL Final    T4 Free 09/25/2020 0.8* 0.9 - 1.8 ng/dL Final    TSH 09/25/2020 6.17* 0.27 - 4.20 uIU/mL Final    Misc Test Order 09/25/2020 SEE NOTE*  Final   Admission on 08/18/2020, Discharged on 08/25/2020   Component Date Value Ref Range Status    WBC 08/19/2020 12.2* 4.0 - 11.0 K/uL Final    RBC 08/19/2020 5.04  4.00 - 5.20 M/uL Final    Hemoglobin 08/19/2020 15.2  12.0 - 16.0 g/dL Final    Hematocrit 08/19/2020 45.7  36.0 - 48.0 % Final    MCV 08/19/2020 90.5  80.0 - 100.0 fL Final    MCH 08/19/2020 30.1  26.0 - 34.0 pg Final    MCHC 08/19/2020 33.2  31.0 - 36.0 g/dL Final    RDW 08/19/2020 14.3  12.4 - 15.4 % Final    Platelets 55/68/8123 280  135 - 450 K/uL Final    MPV 08/19/2020 8.2  5.0 - 10.5 fL Final    Neutrophils % 08/19/2020 74.1  % Final    Lymphocytes % 08/19/2020 17.7  % Final    Monocytes % 08/19/2020 6.2  % Final    Eosinophils % 08/19/2020 1.4  % Final    Basophils % 08/19/2020 0.6  % Final    Neutrophils Absolute 08/19/2020 9.1* 1.7 - 7.7 K/uL Final    Lymphocytes Absolute 08/19/2020 2.2  1.0 - 5.1 K/uL Final    Monocytes Absolute 08/19/2020 0.8  0.0 - 1.3 K/uL Final    Eosinophils Absolute 08/19/2020 0.2  0.0 - 0.6 K/uL Final    Basophils Absolute 08/19/2020 0.1  0.0 - 0.2 K/uL Final    Sodium 08/19/2020 136  136 - 145 mmol/L Final    Potassium reflex Magnesium 08/19/2020 4.5  3.5 - 5.1 mmol/L Final    Chloride 08/19/2020 102  99 - 110 mmol/L Final    CO2 08/19/2020 17* 21 - 32 mmol/L Final    Anion Gap 08/19/2020 17* 3 - 16 Final    Glucose 08/19/2020 88  70 - 99 mg/dL Final    BUN 08/19/2020 7  7 - 20 mg/dL Final    CREATININE 08/19/2020 0.8  0.6 - 1.1 mg/dL Final    GFR Non- 08/19/2020 >60  >60 Final    GFR  08/19/2020 >60  >60 Final    Calcium 08/19/2020 9.8  8.3 - 10.6 mg/dL Final    Total Protein 08/19/2020 7.8  6.4 - 8.2 g/dL Final    Albumin 08/19/2020 4.7  3.4 - 5.0 g/dL Final    Albumin/Globulin Ratio 08/19/2020 1.5  1.1 - 2.2 Final    Total Bilirubin 08/19/2020 0.3  0.0 - 1.0 mg/dL Final    Alkaline Phosphatase 08/19/2020 55  40 - 129 U/L Final    ALT 08/19/2020 19  10 - 40 U/L Final    AST 08/19/2020 41* 15 - 37 U/L Final    Globulin 08/19/2020 3.1  g/dL Final    Lipase 08/19/2020 16.0  13.0 - 60.0 U/L Final    hCG Qual 08/19/2020 Negative  Detects HCG level >10 MIU/mL Final    Color, UA 08/19/2020 YELLOW  Straw/Yellow Final    Clarity, UA 08/19/2020 CLOUDY* Clear Final    Glucose, Ur 08/19/2020 Negative  Negative mg/dL Final    Bilirubin Urine 08/19/2020 Negative  Negative Final    Ketones, Urine 08/19/2020 TRACE* Negative mg/dL Final    Specific [place], UA 08/19/2020 1.018  1.005 - 1.030 Final    Blood, Urine 08/19/2020 Negative  Negative Final    pH, UA 08/19/2020 5.5  5.0 - 8.0 Final    Protein, UA 08/19/2020 Negative  Negative mg/dL Final    Urobilinogen, Urine 08/19/2020 0.2  <2.0 E.U./dL Final    Nitrite, Urine 08/19/2020 Negative  Negative Final    Leukocyte Esterase, Urine 08/19/2020 TRACE* Negative Final    Microscopic Examination 08/19/2020 YES   Final    Urine Type 08/19/2020 NotGiven   Final    Urine Reflex to Culture 08/19/2020 Not Indicated   Final    Bacteria, UA 08/19/2020 RARE* None Seen /HPF Final    Hyaline Casts, UA 08/19/2020 3  0 - 8 /LPF Final    WBC, UA 08/19/2020 5  0 - 5 /HPF Final    RBC, UA 08/19/2020 1  0 - 4 /HPF Final    Epithelial Cells, UA 08/19/2020 9* 0 - 5 /HPF Final    TSH 08/19/2020 3.74  0.27 - 4.20 uIU/mL Final    SARS-CoV-2, NAAT 08/21/2020 Not Detected  Not Detected Final    Sodium 08/23/2020 140  136 - 145 mmol/L Final    Potassium reflex Magnesium 08/23/2020 3.9  3.5 - 5.1 mmol/L Final    Chloride 08/23/2020 106  99 - 110 mmol/L Final    CO2 08/23/2020 24  21 - 32 mmol/L Final    Anion Gap 08/23/2020 10  3 - 16 Final    Glucose 08/23/2020 103* 70 - 99 mg/dL Final    BUN 08/23/2020 3* 7 - 20 mg/dL Final    CREATININE 08/23/2020 0.7  0.6 - 1.1 mg/dL Final    GFR Non- 08/23/2020 >60  >60 Final    GFR  08/23/2020 >60  >60 Final    Calcium 08/23/2020 8.7  8.3 - 10.6 mg/dL Final   Admission on 08/15/2020, Discharged on 08/16/2020   Component Date Value Ref Range Status    Color, UA 08/15/2020 YELLOW  Straw/Yellow Final    Clarity, UA 08/15/2020 Clear  Clear Final    Glucose, Ur 08/15/2020 Negative  Negative mg/dL Final    Bilirubin Urine 08/15/2020 Negative  Negative Final    Ketones, Urine 08/15/2020 Negative  Negative mg/dL Final    Specific Gravity, UA 08/15/2020 1.008  1.005 - 1.030 Final    Blood, Urine 08/15/2020 Negative  Negative Final    pH, UA 08/15/2020 6.5  5.0 - 8.0 Final    Protein, UA 08/15/2020 Negative  Negative mg/dL Final    Urobilinogen, Urine 08/15/2020 0.2  <2.0 E.U./dL Final    Nitrite, Urine 08/15/2020 Negative  Negative Final    Leukocyte Esterase, Urine 08/15/2020 SMALL* Negative Final    Microscopic Examination 08/15/2020 YES   Final    Urine Type 08/15/2020 NotGiven   Final    Urine Reflex to Culture 08/15/2020 Not Indicated   Final    HCG(Urine) Pregnancy Test 08/15/2020 Negative  Detects HCG level >20 MIU/mL Final    WBC 08/15/2020 6.3  4.0 - 11.0 K/uL Final    RBC 08/15/2020 4.49  4.00 - 5.20 M/uL Final    Hemoglobin 08/15/2020 13.8  12.0 - 16.0 g/dL Final    Hematocrit 08/15/2020 40.6  36.0 - 48.0 % Final    MCV 08/15/2020 90.4  80.0 - 100.0 fL Final    MCH 08/15/2020 30.7  26.0 - 34.0 pg Final    MCHC 08/15/2020 34.0  31.0 - 36.0 g/dL Final    RDW 08/15/2020 14.3  12.4 - 15.4 % Final    Platelets 92/53/9694 277  135 - 450 K/uL Final    MPV 08/15/2020 8.7  5.0 - 10.5 fL Final    Neutrophils % 08/15/2020 51.3  % Final    Lymphocytes % 08/15/2020 39.1  % Final    Monocytes % 08/15/2020 6.4  % Final    Eosinophils % 08/15/2020 2.7  % Final    Basophils % 08/15/2020 0.5  % Final    Neutrophils Absolute 08/15/2020 3.2  1.7 - 7.7 K/uL Final    Lymphocytes Absolute 08/15/2020 2.5  1.0 - 5.1 K/uL Final    Monocytes Absolute 08/15/2020 0.4  0.0 - 1.3 K/uL Final    Eosinophils Absolute 08/15/2020 0.2  0.0 - 0.6 K/uL Final    Basophils Absolute 08/15/2020 0.0  0.0 - 0.2 K/uL Final    Sodium 08/15/2020 138  136 - 145 mmol/L Final    Potassium reflex Magnesium 08/15/2020 4.6  3.5 - 5.1 mmol/L Final    Chloride 08/15/2020 102  99 - 110 mmol/L Final    CO2 08/15/2020 26  21 - 32 mmol/L Final    Anion Gap 08/15/2020 10  3 - 16 Final    Glucose 08/15/2020 76  70 - 99 mg/dL Final    BUN 08/15/2020 18  7 - 20 mg/dL Final    CREATININE 08/15/2020 0.8  0.6 - 1.1 mg/dL Final    GFR Non- 08/15/2020 >60  >60 Final    GFR  08/15/2020 >60  >60 Final    Calcium 08/15/2020 9.0  8.3 - 10.6 mg/dL Final    Total Protein 08/15/2020 7.2  6.4 - 8.2 g/dL Final    Albumin 08/15/2020 4.4  3.4 - 5.0 g/dL Final    Albumin/Globulin Ratio 08/15/2020 1.6  1.1 - 2.2 Final    Total Bilirubin 08/15/2020 <0.2  0.0 - 1.0 mg/dL Final    Alkaline Phosphatase 08/15/2020 53  40 - 129 U/L Final    ALT 08/15/2020 16  10 - 40 U/L Final    AST 08/15/2020 35  15 - 37 U/L Final    Globulin 08/15/2020 2.8  g/dL Final    Pro-BNP 08/15/2020 208* 0 - 124 pg/mL Final    Troponin 08/15/2020 <0.01  <0.01 ng/mL Final    Hyaline Casts, UA 08/15/2020 1  0 - 8 /LPF Final    WBC, UA 08/15/2020 7* 0 - 5 /HPF Final    RBC, UA 08/15/2020 0  0 - 4 /HPF Final    Epithelial Cells, UA 08/15/2020 3  0 - 5 /HPF Final        Assessment and Plan     Anjelica was seen today for thyroid problem. Diagnoses and all orders for this visit:    Acquired hypothyroidism  -     TSH without Reflex;  Future  -     T4, Free; Future  -     T3, Free; Future    Adrenal insufficiency (HCC)  -     midodrine (PROAMATINE) 2.5 MG tablet; TAKE 1 TABLET BY MOUTH THREE TIMES A DAY    Chronic fatigue  -     midodrine (PROAMATINE) 2.5 MG tablet; TAKE 1 TABLET BY MOUTH THREE TIMES A DAY          1: Adrenal insufficiency     AM cortisol and adrenal antibodies normal March 2020

## 2021-07-14 ENCOUNTER — HOSPITAL ENCOUNTER (EMERGENCY)
Age: 41
Discharge: HOME | End: 2021-07-14
Payer: COMMERCIAL

## 2021-07-14 VITALS
WEIGHT: 161.16 LBS | DIASTOLIC BLOOD PRESSURE: 83 MMHG | SYSTOLIC BLOOD PRESSURE: 121 MMHG | HEIGHT: 63 IN | TEMPERATURE: 99.5 F | HEART RATE: 90 BPM | OXYGEN SATURATION: 99 % | RESPIRATION RATE: 17 BRPM | BODY MASS INDEX: 28.55 KG/M2

## 2021-07-14 DIAGNOSIS — R21 RASH AND OTHER NONSPECIFIC SKIN ERUPTION: Primary | ICD-10-CM

## 2021-07-14 PROCEDURE — 6370000000 HC RX 637 (ALT 250 FOR IP): Performed by: PHYSICIAN ASSISTANT

## 2021-07-14 PROCEDURE — 99284 EMERGENCY DEPT VISIT MOD MDM: CPT

## 2021-07-14 RX ORDER — DOXYCYCLINE HYCLATE 100 MG
100 TABLET ORAL ONCE
Status: COMPLETED | OUTPATIENT
Start: 2021-07-14 | End: 2021-07-14

## 2021-07-14 RX ORDER — DOXYCYCLINE HYCLATE 100 MG
100 TABLET ORAL 2 TIMES DAILY
Qty: 42 TABLET | Refills: 0 | Status: SHIPPED | OUTPATIENT
Start: 2021-07-14 | End: 2021-08-04

## 2021-07-14 RX ADMIN — DOXYCYCLINE HYCLATE 100 MG: 100 TABLET, COATED ORAL at 17:21

## 2021-07-14 ASSESSMENT — PAIN SCALES - GENERAL: PAINLEVEL_OUTOF10: 0

## 2021-07-14 ASSESSMENT — ENCOUNTER SYMPTOMS
ABDOMINAL PAIN: 0
VOMITING: 0
SHORTNESS OF BREATH: 0

## 2021-07-14 NOTE — LETTER
[facility] Emergency Department  [address] [facility] [address]  Phone: [phone]               July 14, 2021    Patient: Lamin Madden   YOB: 1980   Date of Visit: 7/14/2021       To Whom It May Concern:    Kateryna Telles was seen and treated in our emergency department on 7/14/2021. She may return to work on 07/19/21.       Sincerely,       MARTÍN Vega         Signature:__________________________________

## 2021-07-14 NOTE — ED PROVIDER NOTES
[address] [facility]      Pt Name: Em Means  MRN: [id number]  [place] 1980  Date of evaluation: 7/14/2021  Provider: MARTÍN Schuler    This patient was not seen and evaluated by the attending physician No att. providers found. CHIEF COMPLAINT       Chief Complaint   Patient presents with    Rash     found a tick and had a bullseye rash and then the next day a rash to the same leg, she had a low grade temp at home and is concerned for lyme disease        CRITICAL CARE TIME   I performed a total Critical Care time of 15 minutes, excluding separately reportable procedures. There was a high probability of clinically significant/life threatening deterioration in the patient's condition which required my urgent intervention. Not limited to multiple reexaminations, discussions with attending physician and consultants. HISTORY OF PRESENT ILLNESS  (Location/Symptom, Timing/Onset, Context/Setting, Quality, Duration, Modifying Factors, Severity.)   Anjelica Clinton is a 35 y.o. female who presents to the emergency department with concern for Lyme's disease. She noticed a tick on her shorts on Thursday and shortly after noticed a bull's-eye-like rash to her lateral left knee. That since improved. She is had some increasing fatigue since Friday as well as low-grade temperatures on her 100 °F.  She felt lightheaded today. No vomiting no diarrhea. Reports history of adrenal insufficiency, COPD. She has appointment next week with her primary care doctor. Nursing Notes were reviewed and I agree. REVIEW OF SYSTEMS    (2-9 systems for level 4, 10 or more for level 5)     Review of Systems   Constitutional: Positive for fatigue and fever. Respiratory: Negative for shortness of breath. Cardiovascular: Negative for chest pain. Gastrointestinal: Negative for abdominal pain and vomiting.    Musculoskeletal: Negative for arthralgias and neck stiffness. Skin: Positive for rash. Neurological: Positive for weakness. Negative for seizures, numbness and headaches. Psychiatric/Behavioral: Negative for agitation, behavioral problems and confusion. Except as noted above the remainder of the review of systems was reviewed and negative.        PAST MEDICAL HISTORY         Diagnosis Date    Asthma     Bipolar 1 disorder ([facility] [address]     Compression fracture     COPD (chronic obstructive pulmonary disease) ([facility] [address] [facility]     DDD (degenerative disc disease), cervical     Howard-Danlos syndrome     Fibromyalgia     H/O adrenal insufficiency     and pituitary insufficiency    Herniated disc     Metabolic syndrome     Osteoarthritis     Pyelonephritis     Sciatica        SURGICAL HISTORY           Procedure Laterality Date    KNEE SURGERY      LUMBAR FUSION N/A 6/3/2019    L5, S1 TRANSFORAMINAL LUMBAR INTERBODY FUSION performed by Tucker Sotelo MD at [address] Left     repair    SIGMOIDOSCOPY N/A 8/21/2020    FLEXIBLE SIGMOIDOSCOPY POSSIBLE COLONOSCOPY performed by Hien Lino MD at [address] [facility]       Discharge Medication List as of 7/14/2021  5:21 PM      CONTINUE these medications which have NOT CHANGED    Details   omeprazole (PRILOSEC) 20 MG delayed release capsule TAKE 1 CAPSULE BY MOUTH EVERY DAYHistorical Med      spironolactone (ALDACTONE) 50 MG tablet TAKE 1 TABLET BY MOUTH TWICE A DAYHistorical Med      loratadine (CLARITIN) 10 MG tablet TAKE 1 TABLET BY MOUTH EVERY MORNINGHistorical Med      montelukast (SINGULAIR) 10 MG tablet Take 10 mg by mouth nightlyHistorical Med      sulfamethoxazole-trimethoprim (BACTRIM DS;SEPTRA DS) 800-160 MG per tablet Take 1 tablet by mouth 2 times dailyHistorical Med      oxyCODONE-acetaminophen (PERCOCET) 5-325 MG per tablet TAKE 1 TABLET TWICE A DAYHistorical Med      AJOVY 225 MG/1.5ML SOSY INJECT 1.5ML ONCE A MONTH, DAWHistorical Med zolpidem (AMBIEN) 10 MG tablet Take 10 mg by mouth nightly as needed. Historical Med      triamcinolone (KENALOG) 0.1 % cream Apply twice daily to affected skin on trunk. Avoid face or skin folds, Historical Med      fluocinolone (DERMA-SMOOTHE) 0.01 % OIL external oil Apply 1 drop topically 4 times daily as neededHistorical Med      midodrine (PROAMATINE) 2.5 MG tablet TAKE 1 TABLET BY MOUTH THREE TIMES A DAY, Disp-270 tablet, R-1Normal      levothyroxine (SYNTHROID) 25 MCG tablet TAKE 1 TABLET BY MOUTH EVERY DAY, Disp-30 tablet, R-0Refills on the visitNormal      ondansetron (ZOFRAN ODT) 4 MG disintegrating tablet Take 1 tablet by mouth every 8 hours as needed for Nausea, Disp-20 tablet, R-0Print      naproxen (NAPROSYN) 375 MG tablet Take 1 tablet by mouth 2 times daily (with meals), Disp-30 tablet, R-0Print      SUPER B COMPLEX & C TABS TAKE 1 TABLET BY MOUTH EVERY DAYHistorical Med      Vitamin D (CHOLECALCIFEROL) 25 MCG (1000 UT) TABS tablet TAKE 1 TABLET BY MOUTH EVERY DAYLabeling may look different. 25 mcg=1000 Units. Please double check dosages. Historical Med      Multiple Vitamin (DAILY-RUTHANN) TABS TAKE 1 TABLET BY MOUTH EVERY DAYHistorical Med      dicyclomine (BENTYL) 10 MG capsule Take 2 capsules by mouth 2 times daily as needed (cramping), Disp-120 capsule,R-3Normal      divalproex (DEPAKOTE) 250 MG DR tablet Take 1 tablet by mouth 2 times daily, Disp-90 tablet, R-3Normal      acyclovir (ZOVIRAX) 400 MG tablet Take 400 mg by mouth as needed Historical Med      STOOL SOFTENER 100 MG capsule Take 100 mg by mouth 2 times daily, DAWHistorical Med      ketoconazole (NIZORAL) 2 % shampoo Apply topically Twice a Week, Topical, TWICE WEEKLY Starting Sat 4/13/2019, R-4, Historical Med      LATUDA 20 MG TABS tablet Take 20 mg by mouth daily, R-3, DAWHistorical Med      QUEtiapine (SEROQUEL) 200 MG tablet Take 200 mg by mouth nightly, R-2Historical Med      TRULANCE 3 MG TABS Take 3 mg by mouth daily, R-11, DAWHistorical Med      ALPRAZolam (XANAX) 1 MG tablet Take 1 mg by mouth 3 times daily as needed for Sleep or Anxiety. Historical Med      Cetirizine HCl (ZYRTEC ALLERGY) 10 MG CAPS Take by mouth dailyHistorical Med      temazepam (RESTORIL) 30 MG capsule Take 30 mg by mouth nightly as needed for Sleep. Abi Layton Historical Med             ALLERGIES     Metoclopramide, Quetiapine, Risperidone, Trazodone, Buprenorphine-naloxone, Morphine, Asenapine, Buprenorphine hcl-naloxone hcl, Codeine, Guanfacine hcl, Morphine and related, Reglan [metoclopramide hcl], Risperidone and related, Seroquel  [quetiapine fumarate], Seroquel [quetiapine fumarate], Trazodone and nefazodone, and Lurasidone    FAMILY HISTORY           Problem Relation Age of Onset    Diabetes type 2  Mother     Diabetes type 2  Father     Cancer Sister     Diabetes type 2  Brother      Family Status   Relation Name Status    Mother  Alive    Father  Alive    Sister  Alive    Brother  [address]      reports that she quit smoking about 3 years ago. Her smoking use included cigarettes. She smoked 1.00 pack per day. She has never used smokeless tobacco. She reports previous alcohol use. She reports that she does not use drugs. PHYSICAL EXAM    (up to 7 for level 4, 8 or more for level 5)     ED Triage Vitals [07/14/21 1541]   BP Temp Temp Source Pulse Resp SpO2 Height Weight   121/83 99.5 °F (37.5 °C) Oral 90 17 99 % 5' 3\" (1.6 m) 161 lb 2.5 oz (73.1 kg)       Physical Exam  Vitals and nursing note reviewed. Constitutional:       Appearance: Normal appearance. HENT:      Head: Normocephalic and atraumatic. Eyes:      Pupils: Pupils are equal, round, and reactive to light. Cardiovascular:      Rate and Rhythm: Normal rate. Pulses: Normal pulses. Pulmonary:      Effort: Pulmonary effort is normal. No respiratory distress. Musculoskeletal:         General: Normal range of motion. Cervical back: Normal range of motion. Skin:     General: Skin is warm. Neurological:      General: No focal deficit present. Mental Status: She is alert and oriented to person, place, and time. Psychiatric:         Mood and Affect: Mood normal.         Behavior: Behavior normal.         DIAGNOSTIC RESULTS     NONE    LABS:  Labs Reviewed - No data to display    All other labs were within normal range or not returned as of this dictation. EMERGENCY DEPARTMENT COURSE and DIFFERENTIAL DIAGNOSIS/MDM:   Vitals:    Vitals:    07/14/21 1541   BP: 121/83   Pulse: 90   Resp: 17   Temp: 99.5 °F (37.5 °C)   TempSrc: Oral   SpO2: 99%   Weight: 161 lb 2.5 oz (73.1 kg)   Height: 5' 3\" (1.6 m)     I discussed with Anjelica Strickland and/or family the exam results, diagnosis, care, prognosis, reasons to return and the importance of follow up. Patient and/or family is in full agreement with plan and all questions have been answered. Specific discharge instructions explained, including reasons to return to the emergency department. Willa Salinas is well appearing, non-toxic, and afebrile at the time of discharge. Patient is not hypoxic or tachycardic. Low-grade temp of 99.5. Normal blood pressure. She had some fatigue and lightheadedness she is alert and oriented x3. No vomiting no diarrhea. She has a picture of a bull's-eye appearing rash to the lateral knee after spotting a tick. In general the rash has improved there is a small area of redness above the knee. Will cover for possibility of Lyme exposure with doxycycline and she has an appointment next week with her primary care and instructed to call tomorrow. DDX: Lyme rash, Vasculitis, bacterial skin infection, viral rash, systemic infectious rash, Anaphylaxis, Urticaria. CONSULTS:  None    PROCEDURES:  Procedures      FINAL IMPRESSION      1.  Rash and other nonspecific skin eruption          DISPOSITION/PLAN   DISPOSITION Decision To Discharge 07/14/2021 05:15:23 PM      PATIENT REFERRED TO:  Robb Garcia MD  [address]  [phone]    Call in 1 day  For follow up      DISCHARGE MEDICATIONS:  Discharge Medication List as of 7/14/2021  5:21 PM      START taking these medications    Details   doxycycline hyclate (VIBRA-TABS) 100 MG tablet Take 1 tablet by mouth 2 times daily for 21 days, Disp-42 tablet, R-0Print             (Please note that portions of this note were completed with a voice recognition program.  Efforts were made to edit the dictations but occasionally words are mis-transcribed.)    Leigh Cuevas, [address] [place], [place]  07/14/21 1209

## 2021-07-14 NOTE — ED NOTES
Discharge and education instructions reviewed. Patient verbalized understanding, teach-back successful. Patient denied questions at this time. No acute distress noted. Patient instructed to follow-up as noted - return to emergency department if symptoms worsen. Patient verbalized understanding. Discharged per EDMD with discharge instructions.         Rancho Qiu RN  07/14/21 [id number]

## 2021-07-28 DIAGNOSIS — R79.89 HIGH SERUM THYROID STIMULATING HORMONE (TSH): ICD-10-CM

## 2021-07-28 RX ORDER — LEVOTHYROXINE SODIUM 0.03 MG/1
TABLET ORAL
Qty: 30 TABLET | Refills: 5 | Status: SHIPPED | OUTPATIENT
Start: 2021-07-28 | End: 2021-10-29

## 2021-07-28 NOTE — TELEPHONE ENCOUNTER
Pt lvm stating she needs refill on her Levothryoxine 25mcg sent to [facility] on [address]   7-12-21  FOV    none

## 2021-12-02 ENCOUNTER — APPOINTMENT (OUTPATIENT)
Dept: GENERAL RADIOLOGY | Age: 41
End: 2021-12-02
Payer: COMMERCIAL

## 2021-12-02 LAB
BILIRUBIN URINE: NEGATIVE
BLOOD, URINE: NEGATIVE
CLARITY: CLEAR
COLOR: YELLOW
EPITHELIAL CELLS, UA: 7 /HPF (ref 0–5)
GLUCOSE URINE: NEGATIVE MG/DL
HYALINE CASTS: 1 /LPF (ref 0–8)
KETONES, URINE: NEGATIVE MG/DL
LEUKOCYTE ESTERASE, URINE: ABNORMAL
MICROSCOPIC EXAMINATION: YES
NITRITE, URINE: NEGATIVE
PH UA: 6.5 (ref 5–8)
PROTEIN UA: NEGATIVE MG/DL
RBC UA: 1 /HPF (ref 0–4)
SPECIFIC GRAVITY UA: 1.01 (ref 1–1.03)
URINE REFLEX TO CULTURE: ABNORMAL
URINE TYPE: ABNORMAL
UROBILINOGEN, URINE: 0.2 E.U./DL
WBC UA: 3 /HPF (ref 0–5)

## 2021-12-02 PROCEDURE — 81001 URINALYSIS AUTO W/SCOPE: CPT

## 2021-12-02 PROCEDURE — 73030 X-RAY EXAM OF SHOULDER: CPT

## 2021-12-02 PROCEDURE — 99282 EMERGENCY DEPT VISIT SF MDM: CPT

## 2021-12-02 ASSESSMENT — PAIN SCALES - WONG BAKER: WONGBAKER_NUMERICALRESPONSE: 8

## 2021-12-02 ASSESSMENT — PAIN DESCRIPTION - FREQUENCY: FREQUENCY: CONTINUOUS

## 2021-12-02 ASSESSMENT — PAIN DESCRIPTION - LOCATION: LOCATION: ABDOMEN

## 2021-12-02 ASSESSMENT — PAIN SCALES - GENERAL: PAINLEVEL_OUTOF10: 7

## 2021-12-02 ASSESSMENT — PAIN DESCRIPTION - PAIN TYPE: TYPE: CHRONIC PAIN

## 2021-12-03 ENCOUNTER — APPOINTMENT (OUTPATIENT)
Dept: CT IMAGING | Age: 41
End: 2021-12-03
Payer: COMMERCIAL

## 2021-12-03 ENCOUNTER — HOSPITAL ENCOUNTER (EMERGENCY)
Age: 41
Discharge: HOME | End: 2021-12-03
Payer: COMMERCIAL

## 2021-12-03 VITALS
WEIGHT: 155.87 LBS | SYSTOLIC BLOOD PRESSURE: 113 MMHG | TEMPERATURE: 97.9 F | HEIGHT: 63 IN | RESPIRATION RATE: 18 BRPM | BODY MASS INDEX: 27.62 KG/M2 | HEART RATE: 88 BPM | DIASTOLIC BLOOD PRESSURE: 67 MMHG | OXYGEN SATURATION: 100 %

## 2021-12-03 DIAGNOSIS — R10.32 ABDOMINAL PAIN, LEFT LOWER QUADRANT: Primary | ICD-10-CM

## 2021-12-03 DIAGNOSIS — S49.91XA INJURY OF RIGHT SHOULDER, INITIAL ENCOUNTER: ICD-10-CM

## 2021-12-03 DIAGNOSIS — N83.201 RIGHT OVARIAN CYST: ICD-10-CM

## 2021-12-03 LAB
A/G RATIO: 1.7 (ref 1.1–2.2)
ALBUMIN SERPL-MCNC: 4.9 G/DL (ref 3.4–5)
ALP BLD-CCNC: 73 U/L (ref 40–129)
ALT SERPL-CCNC: 22 U/L (ref 10–40)
ANION GAP SERPL CALCULATED.3IONS-SCNC: 19 MMOL/L (ref 3–16)
AST SERPL-CCNC: 34 U/L (ref 15–37)
BASOPHILS ABSOLUTE: 0 K/UL (ref 0–0.2)
BASOPHILS RELATIVE PERCENT: 0.7 %
BILIRUB SERPL-MCNC: 0.3 MG/DL (ref 0–1)
BUN BLDV-MCNC: 11 MG/DL (ref 7–20)
CALCIUM SERPL-MCNC: 9.8 MG/DL (ref 8.3–10.6)
CHLORIDE BLD-SCNC: 99 MMOL/L (ref 99–110)
CO2: 23 MMOL/L (ref 21–32)
CREAT SERPL-MCNC: 0.8 MG/DL (ref 0.6–1.1)
EOSINOPHILS ABSOLUTE: 0.1 K/UL (ref 0–0.6)
EOSINOPHILS RELATIVE PERCENT: 2.2 %
GFR AFRICAN AMERICAN: >60
GFR NON-AFRICAN AMERICAN: >60
GLUCOSE BLD-MCNC: 77 MG/DL (ref 70–99)
HCG QUALITATIVE: NEGATIVE
HCT VFR BLD CALC: 43.8 % (ref 36–48)
HEMOGLOBIN: 14.4 G/DL (ref 12–16)
LIPASE: 45 U/L (ref 13–60)
LYMPHOCYTES ABSOLUTE: 2.4 K/UL (ref 1–5.1)
LYMPHOCYTES RELATIVE PERCENT: 38.6 %
MCH RBC QN AUTO: 30.6 PG (ref 26–34)
MCHC RBC AUTO-ENTMCNC: 32.7 G/DL (ref 31–36)
MCV RBC AUTO: 93.4 FL (ref 80–100)
MONOCYTES ABSOLUTE: 0.4 K/UL (ref 0–1.3)
MONOCYTES RELATIVE PERCENT: 6.1 %
NEUTROPHILS ABSOLUTE: 3.3 K/UL (ref 1.7–7.7)
NEUTROPHILS RELATIVE PERCENT: 52.4 %
PDW BLD-RTO: 13.7 % (ref 12.4–15.4)
PLATELET # BLD: 121 K/UL (ref 135–450)
PLATELET SLIDE REVIEW: ABNORMAL
PMV BLD AUTO: 8.5 FL (ref 5–10.5)
POTASSIUM REFLEX MAGNESIUM: 4 MMOL/L (ref 3.5–5.1)
RBC # BLD: 4.69 M/UL (ref 4–5.2)
SLIDE REVIEW: ABNORMAL
SODIUM BLD-SCNC: 141 MMOL/L (ref 136–145)
TOTAL PROTEIN: 7.8 G/DL (ref 6.4–8.2)
WBC # BLD: 6.2 K/UL (ref 4–11)

## 2021-12-03 PROCEDURE — 85025 COMPLETE CBC W/AUTO DIFF WBC: CPT

## 2021-12-03 PROCEDURE — 6370000000 HC RX 637 (ALT 250 FOR IP): Performed by: PHYSICIAN ASSISTANT

## 2021-12-03 PROCEDURE — 6360000004 HC RX CONTRAST MEDICATION: Performed by: PHYSICIAN ASSISTANT

## 2021-12-03 PROCEDURE — 74177 CT ABD & PELVIS W/CONTRAST: CPT

## 2021-12-03 PROCEDURE — 36415 COLL VENOUS BLD VENIPUNCTURE: CPT

## 2021-12-03 PROCEDURE — 83690 ASSAY OF LIPASE: CPT

## 2021-12-03 PROCEDURE — 80053 COMPREHEN METABOLIC PANEL: CPT

## 2021-12-03 PROCEDURE — 84703 CHORIONIC GONADOTROPIN ASSAY: CPT

## 2021-12-03 RX ORDER — OXYCODONE HYDROCHLORIDE AND ACETAMINOPHEN 5; 325 MG/1; MG/1
1 TABLET ORAL EVERY 6 HOURS PRN
Qty: 6 TABLET | Refills: 0 | Status: SHIPPED | OUTPATIENT
Start: 2021-12-03 | End: 2021-12-06

## 2021-12-03 RX ORDER — OXYCODONE HYDROCHLORIDE AND ACETAMINOPHEN 5; 325 MG/1; MG/1
1 TABLET ORAL ONCE
Status: COMPLETED | OUTPATIENT
Start: 2021-12-03 | End: 2021-12-03

## 2021-12-03 RX ORDER — DIPHENHYDRAMINE HYDROCHLORIDE 50 MG/ML
12.5 INJECTION INTRAMUSCULAR; INTRAVENOUS ONCE
Status: DISCONTINUED | OUTPATIENT
Start: 2021-12-03 | End: 2021-12-03

## 2021-12-03 RX ORDER — PROCHLORPERAZINE EDISYLATE 5 MG/ML
5 INJECTION INTRAMUSCULAR; INTRAVENOUS ONCE
Status: DISCONTINUED | OUTPATIENT
Start: 2021-12-03 | End: 2021-12-03

## 2021-12-03 RX ADMIN — OXYCODONE HYDROCHLORIDE AND ACETAMINOPHEN 1 TABLET: 5; 325 TABLET ORAL at 01:30

## 2021-12-03 RX ADMIN — IOPAMIDOL 75 ML: 755 INJECTION, SOLUTION INTRAVENOUS at 02:20

## 2021-12-03 ASSESSMENT — PAIN SCALES - GENERAL: PAINLEVEL_OUTOF10: 7

## 2021-12-03 ASSESSMENT — ENCOUNTER SYMPTOMS
COLOR CHANGE: 0
ABDOMINAL PAIN: 1
VOMITING: 0

## 2021-12-03 NOTE — ED PROVIDER NOTES
[facility]      Pt Name: Ricardo Bhatia  MRN: [id number]  [place] 1980  Date of evaluation: 12/2/2021  Provider: MARTÍN White    This patient was not seen and evaluated by the attending physician No att. providers found. CHIEF COMPLAINT       Chief Complaint   Patient presents with    Abdominal Pain     LLQ x \"a while\"    Shoulder Pain     right s/p injury       CRITICAL CARE TIME   I performed a total Critical Care time of 15 minutes, excluding separately reportable procedures. There was a high probability of clinically significant/life threatening deterioration in the patient's condition which required my urgent intervention. Not limited to multiple reexaminations, discussions with attending physician and consultants. HISTORY OF PRESENT ILLNESS  (Location/Symptom, Timing/Onset, Context/Setting, Quality, Duration, Modifying Factors, Severity.)   Anjelica Jones is a 35 y.o. female who presents to the emergency department with 2 separate complaints. Yesterday she was at work lifting and unloading boxes and she realized she had injured her shoulder. She states she has trouble lifting the shoulder. She is right-hand dominant. 7 out of 10 pain. For the past 6 months she has had left lower quadrant pain. She states she had ovarian cyst in the past.  Now she has nausea and the pain has progressed she has not followed up with her family doctor. She has past medical history of fibromyalgia, chronic pain, history of recurrent urinary tract infections. She denies vaginal discharge or concern for STDs. No prior abdominal surgeries. Nursing Notes were reviewed and I agree. REVIEW OF SYSTEMS    (2-9 systems for level 4, 10 or more for level 5)     Review of Systems   Constitutional: Negative for fever. Cardiovascular: Negative for chest pain. Gastrointestinal: Positive for abdominal pain. Negative for vomiting. Genitourinary: Positive for frequency. Negative for vaginal discharge. Musculoskeletal: Positive for arthralgias. Negative for joint swelling. Skin: Negative for color change, rash and wound. Psychiatric/Behavioral: Negative for agitation, behavioral problems and confusion. Except as noted above the remainder of the review of systems was reviewed and negative.        PAST MEDICAL HISTORY         Diagnosis Date    Asthma     Bipolar 1 disorder ([facility] [address]     Compression fracture     COPD (chronic obstructive pulmonary disease) (HCC)     DDD (degenerative disc disease), cervical     Howard-Danlos syndrome     Fibromyalgia     H/O adrenal insufficiency     and pituitary insufficiency    Herniated disc     Metabolic syndrome     Osteoarthritis     Pyelonephritis     Sciatica        SURGICAL HISTORY           Procedure Laterality Date    KNEE SURGERY      LUMBAR FUSION N/A 6/3/2019    L5, S1 TRANSFORAMINAL LUMBAR INTERBODY FUSION performed by Bartolome Johnson MD at [address] Left     repair    SIGMOIDOSCOPY N/A 8/21/2020    FLEXIBLE SIGMOIDOSCOPY POSSIBLE COLONOSCOPY performed by Jeannette Epperson MD at [address] [facility] [facility]       Discharge Medication List as of 12/3/2021  4:18 AM      CONTINUE these medications which have NOT CHANGED    Details   levothyroxine (SYNTHROID) 25 MCG tablet TAKE 1 TABLET BY MOUTH EVERY DAY, Disp-90 tablet, R-1Normal      omeprazole (PRILOSEC) 20 MG delayed release capsule TAKE 1 CAPSULE BY MOUTH EVERY DAYHistorical Med      spironolactone (ALDACTONE) 50 MG tablet TAKE 1 TABLET BY MOUTH TWICE A DAYHistorical Med      loratadine (CLARITIN) 10 MG tablet TAKE 1 TABLET BY MOUTH EVERY MORNINGHistorical Med      montelukast (SINGULAIR) 10 MG tablet Take 10 mg by mouth nightlyHistorical Med      sulfamethoxazole-trimethoprim (BACTRIM DS;SEPTRA DS) 800-160 MG per tablet Take 1 tablet by mouth 2 times dailyHistorical Med      AJOVY 225 MG/1.5ML SOSY INJECT 1.5ML ONCE A MONTH, DAWHistorical Med      zolpidem (AMBIEN) 10 MG tablet Take 10 mg by mouth nightly as needed. Historical Med      triamcinolone (KENALOG) 0.1 % cream Apply twice daily to affected skin on trunk. Avoid face or skin folds, Historical Med      fluocinolone (DERMA-SMOOTHE) 0.01 % OIL external oil Apply 1 drop topically 4 times daily as neededHistorical Med      midodrine (PROAMATINE) 2.5 MG tablet TAKE 1 TABLET BY MOUTH THREE TIMES A DAY, Disp-270 tablet, R-1Normal      ondansetron (ZOFRAN ODT) 4 MG disintegrating tablet Take 1 tablet by mouth every 8 hours as needed for Nausea, Disp-20 tablet, R-0Print      naproxen (NAPROSYN) 375 MG tablet Take 1 tablet by mouth 2 times daily (with meals), Disp-30 tablet, R-0Print      SUPER B COMPLEX & C TABS TAKE 1 TABLET BY MOUTH EVERY DAYHistorical Med      Vitamin D (CHOLECALCIFEROL) 25 MCG (1000 UT) TABS tablet TAKE 1 TABLET BY MOUTH EVERY DAYLabeling may look different. 25 mcg=1000 Units. Please double check dosages. Historical Med      Multiple Vitamin (DAILY-RUTHANN) TABS TAKE 1 TABLET BY MOUTH EVERY DAYHistorical Med      dicyclomine (BENTYL) 10 MG capsule Take 2 capsules by mouth 2 times daily as needed (cramping), Disp-120 capsule,R-3Normal      divalproex (DEPAKOTE) 250 MG DR tablet Take 1 tablet by mouth 2 times daily, Disp-90 tablet, R-3Normal      acyclovir (ZOVIRAX) 400 MG tablet Take 400 mg by mouth as needed Historical Med      STOOL SOFTENER 100 MG capsule Take 100 mg by mouth 2 times daily, DAWHistorical Med      ketoconazole (NIZORAL) 2 % shampoo Apply topically Twice a Week, Topical, TWICE WEEKLY Starting Sat 4/13/2019, R-4, Historical Med      LATUDA 20 MG TABS tablet Take 20 mg by mouth daily, R-3, DAWHistorical Med      QUEtiapine (SEROQUEL) 200 MG tablet Take 200 mg by mouth nightly, R-2Historical Med      TRULANCE 3 MG TABS Take 3 mg by mouth daily, R-11, DAWHistorical Med      ALPRAZolam (XANAX) 1 MG tablet Take 1 mg by mouth 3 times daily as needed for Sleep or Anxiety. Historical Med      Cetirizine HCl (ZYRTEC ALLERGY) 10 MG CAPS Take by mouth dailyHistorical Med      temazepam (RESTORIL) 30 MG capsule Take 30 mg by mouth nightly as needed for Sleep. Ashish Zuleta Historical Med             ALLERGIES     Metoclopramide, Quetiapine, Risperidone, Trazodone, Buprenorphine-naloxone, Morphine, Asenapine, Buprenorphine hcl-naloxone hcl, Codeine, Guanfacine hcl, Morphine and related, Reglan [metoclopramide hcl], Risperidone and related, Seroquel  [quetiapine fumarate], Seroquel [quetiapine fumarate], Trazodone and nefazodone, and Lurasidone    FAMILY HISTORY           Problem Relation Age of Onset    Diabetes type 2  Mother     Diabetes type 2  Father     Cancer Sister     Diabetes type 2  Brother      Family Status   Relation Name Status    Mother  Alive    Father  Alive    Sister  Alive    Brother  [address]      reports that she quit smoking about 3 years ago. Her smoking use included cigarettes. She smoked 1.00 pack per day. She has never used smokeless tobacco. She reports previous alcohol use. She reports that she does not use drugs. PHYSICAL EXAM    (up to 7 for level 4, 8 or more for level 5)     ED Triage Vitals [12/02/21 2220]   BP Temp Temp Source Pulse Resp SpO2 Height Weight   113/67 97.9 °F (36.6 °C) Temporal 88 18 100 % 5' 3\" (1.6 m) 155 lb 13.8 oz (70.7 kg)       Physical Exam  Vitals and nursing note reviewed. Constitutional:       Appearance: She is well-developed. HENT:      Head: Normocephalic. Cardiovascular:      Rate and Rhythm: Normal rate. Pulmonary:      Effort: Pulmonary effort is normal. No respiratory distress. Abdominal:      Tenderness: There is abdominal tenderness in the left lower quadrant. There is no guarding or rebound. Musculoskeletal:      Right shoulder: Tenderness present. No swelling or laceration. Decreased range of motion. Normal pulse.    Skin:     General: Skin is warm. Neurological:      General: No focal deficit present. Mental Status: She is alert and oriented to person, place, and time. Psychiatric:         Mood and Affect: Mood normal.         Behavior: Behavior normal.         DIAGNOSTIC RESULTS     RADIOLOGY:   Non-plain film images such as CT, Ultrasound and MRI are read by the radiologist. Plain radiographic images are visualized and preliminarily interpreted by MARTÍN Jeffers with the below findings:    Reviewed radiologist's interpretation. Interpretation per the Radiologist below, if available at the time of this note:    CT ABDOMEN PELVIS W IV CONTRAST Additional Contrast? None   Final Result   1. 3.7 cm right adnexal cystic lesion. This is likely benign and no   follow-up is indicated. 2. Trace fluid is seen within the pelvis, likely physiologic. RECOMMENDATIONS:   Managing Incidental Adnexal Cystic Mass by CT or MR      Benign cyst:      Premenopausal (< or equal to 50 years if LMP unknown)      < or equal to 5 cm: no follow up      >5 cm: US in 6-12 weeks      > or equal to 10 cm: US promptly      Reference:      Shyann Mead et al. Managing Incidental Findings on Abdominal CT: White Paper of   the ACR Incidental Findings Committee. J Am Ila Radiol 2010;7:754-773         XR SHOULDER RIGHT (MIN 2 VIEWS)   Final Result   Avulsion fracture versus calcification superior to the acromion.                LABS:  Labs Reviewed   CBC WITH AUTO DIFFERENTIAL - Abnormal; Notable for the following components:       Result Value    Platelets 713 (*)     All other components within normal limits    Narrative:     Performed at:  [address] [address]   Phone [phone]   COMPREHENSIVE METABOLIC PANEL W/ REFLEX TO MG FOR LOW K - Abnormal; Notable for the following components:    Anion Gap 19 (*)     All other components within normal limits    Narrative:     Performed at:  [facility]) [facility]  [address] [facility] [address]   Phone [phone]   URINE RT REFLEX TO CULTURE - Abnormal; Notable for the following components:    Leukocyte Esterase, Urine TRACE (*)     All other components within normal limits    Narrative:     Performed at:  [facility]  [address] [facility] [place] [address]   Phone [phone]   MICROSCOPIC URINALYSIS - Abnormal; Notable for the following components:    Epithelial Cells, UA 7 (*)     All other components within normal limits    Narrative:     Performed at:  [facility]  [address] [facility] [address]   Phone [phone]   HCG, SERUM, QUALITATIVE    Narrative:     Performed at:  [facility]  [address] [facility] [address]   Phone [phone]   LIPASE    Narrative:     Performed at:  [facility]  [address] [facility] [address]   Phone [phone]       All other labs were within normal range or not returned as of this dictation. EMERGENCY DEPARTMENT COURSE and DIFFERENTIAL DIAGNOSIS/MDM:   Vitals:    Vitals:    12/02/21 2220 12/03/21 0327   BP: 113/67 113/67   Pulse: 88 88   Resp: 18 18   Temp: 97.9 °F (36.6 °C) 97.9 °F (36.6 °C)   TempSrc: Temporal Oral   SpO2: 100% 100%   Weight: 155 lb 13.8 oz (70.7 kg)    Height: 5' 3\" (1.6 m)      I discussed with Anjelica Hernández and/or family the exam results, diagnosis, care, prognosis, reasons to return and the importance of follow up. Patient and/or family is in full agreement with plan and all questions have been answered. Specific discharge instructions explained, including reasons to return to the emergency department. Ana Lilia Mckenzie is well appearing, non-toxic, and afebrile at the time of discharge. Patient is afebrile not tachycardic not hypoxic. She has left lower quadrant pain.   No pain in the right lower quadrant. She has a cyst on the right lower quadrant follow-up with gynecology. Symptoms are abdominal pain and been going on for 6 months. She instructed to follow-up with GI doctor and return for new, worsening or other concerns. Lab work reassuring including a normal white count. She has a right shoulder injury and appears to have a fracture. Placed in a sling for protection and comfort instructed to follow-up with orthopedics. She states she has an orthopedic doctor to follow-up with. Her course pain medication otherwise take over-the-counter medications for pain as tolerated. I estimate there is LOW risk for ACUTE APPENDICITIS, BOWEL OBSTRUCTION, CHOLECYSTITIS, DIVERTICULITIS, INCARCERATED HERNIA, PANCREATITIS, PELVIC INFLAMMATORY DISEASE, OVARIAN TORSION, PERFORATED BOWEL,  BOWEL ISCHEMIA, CARDIAC ISCHEMIA, ECTOPIC PREGNANCY, or TUBO-OVARIAN ABSCESS, thus I consider the discharge disposition reasonable. Also, there is no evidence or peritonitis, sepsis, or toxicity. I estimate there is LOW risk for COMPARTMENT SYNDROME, DEEP VENOUS THROMBOSIS, SEPTIC ARTHRITIS, TENDON OR NEUROVASCULAR INJURY, thus I consider the discharge disposition reasonable. CONSULTS:  None    PROCEDURES:  Procedures      FINAL IMPRESSION      1. Abdominal pain, left lower quadrant    2. Right ovarian cyst    3.  Injury of right shoulder, initial encounter          DISPOSITION/PLAN   DISPOSITION Decision To Discharge 12/03/2021 03:17:19 AM      PATIENT REFERRED TO:  Thomas Molina MD  [address] [phone]    Call in 1 day  For follow up      DISCHARGE MEDICATIONS:  Discharge Medication List as of 12/3/2021  4:18 AM          (Please note that portions of this note were completed with a voice recognition program.  Efforts were made to edit the dictations but occasionally words are mis-transcribed.)    Elaine Slater [place]  12/03/21 1939

## 2022-01-06 DIAGNOSIS — E27.40 ADRENAL INSUFFICIENCY (HCC): ICD-10-CM

## 2022-01-06 DIAGNOSIS — R53.82 CHRONIC FATIGUE: ICD-10-CM

## 2022-01-06 RX ORDER — MIDODRINE HYDROCHLORIDE 2.5 MG/1
TABLET ORAL
Qty: 90 TABLET | Refills: 5 | Status: SHIPPED | OUTPATIENT
Start: 2022-01-06
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
## 2022-01-17 ENCOUNTER — OFFICE VISIT (OUTPATIENT)
Dept: ORTHOPEDIC SURGERY | Age: 42
End: 2022-01-17
Payer: COMMERCIAL

## 2022-01-17 VITALS — BODY MASS INDEX: 27.46 KG/M2 | WEIGHT: 155 LBS | HEIGHT: 63 IN

## 2022-01-17 DIAGNOSIS — G89.29 CHRONIC RIGHT SHOULDER PAIN: Primary | ICD-10-CM

## 2022-01-17 DIAGNOSIS — E03.9 ACQUIRED HYPOTHYROIDISM: ICD-10-CM

## 2022-01-17 DIAGNOSIS — M25.511 CHRONIC RIGHT SHOULDER PAIN: Primary | ICD-10-CM

## 2022-01-17 LAB
ESTRADIOL LEVEL: 440 PG/ML
FOLLICLE STIMULATING HORMONE: 7.6 MIU/ML
PROGESTERONE LEVEL: 0.11 NG/ML
T3 FREE: 3.1 PG/ML (ref 2.3–4.2)
T4 FREE: 0.7 NG/DL (ref 0.9–1.8)
TSH SERPL DL<=0.05 MIU/L-ACNC: 0.28 UIU/ML (ref 0.27–4.2)

## 2022-01-17 PROCEDURE — G8427 DOCREV CUR MEDS BY ELIG CLIN: HCPCS | Performed by: ORTHOPAEDIC SURGERY

## 2022-01-17 PROCEDURE — G8417 CALC BMI ABV UP PARAM F/U: HCPCS | Performed by: ORTHOPAEDIC SURGERY

## 2022-01-17 PROCEDURE — 99203 OFFICE O/P NEW LOW 30 MIN: CPT | Performed by: ORTHOPAEDIC SURGERY

## 2022-01-17 PROCEDURE — G8484 FLU IMMUNIZE NO ADMIN: HCPCS | Performed by: ORTHOPAEDIC SURGERY

## 2022-01-17 NOTE — PROGRESS NOTES
CHIEF COMPLAINT: Right shoulder pain    History:    Ginger R Merry Brunner is a 35 y.o. right handed female referred by Kam Bean MD for Sports Medicine consultation for evaluation and treatment of Right shoulder pain. She is here for second opinion about her shoulder. Her primary orthopedic surgeon is Dr. El Pelayo. This is evaluated as a personal injury. The pain began 6 weeks ago. Pain is rated as a 7/10. There was not an injury. 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 Pain is located globally about her shoulder. It is worse with all motion. She does state that she has had some numbness and tingling throughout her entire hand for the last month. She states this usually occurs when she is asleep and wakes her up. She has tried ice with some relief. Aleve helps a little bit. She has not tried heat. The patient has not had PT. The patient has not had an injection. Dr. Elise Chao did order a MRI of her right shoulder. This had not yet been performed. Outside reports reviewed: Dr. Melissa Cornell office note.       Past Medical History:   Diagnosis Date    Asthma     Bipolar 1 disorder ([facility] [address]     Compression fracture     COPD (chronic obstructive pulmonary disease) (HCC)     DDD (degenerative disc disease), cervical     Howard-Danlos syndrome     Fibromyalgia     H/O adrenal insufficiency     and pituitary insufficiency    Herniated disc     Metabolic syndrome     Osteoarthritis     Pyelonephritis     Sciatica        Past Surgical History:   Procedure Laterality Date    KNEE SURGERY      LUMBAR FUSION N/A 6/3/2019    L5, S1 TRANSFORAMINAL LUMBAR INTERBODY FUSION performed by Jason Patel MD at [facility] Left     repair    SIGMOIDOSCOPY N/A 8/21/2020    FLEXIBLE SIGMOIDOSCOPY POSSIBLE COLONOSCOPY performed by Cullen Weiss MD at [facility]       Current Outpatient Medications on File times daily      ketoconazole (NIZORAL) 2 % shampoo Apply topically Twice a Week  4    LATUDA 20 MG TABS tablet Take 20 mg by mouth daily (Patient not taking: Reported on 7/12/2021)  3    QUEtiapine (SEROQUEL) 200 MG tablet Take 200 mg by mouth nightly  2    TRULANCE 3 MG TABS Take 3 mg by mouth daily  11    ALPRAZolam (XANAX) 1 MG tablet Take 1 mg by mouth 3 times daily as needed for Sleep or Anxiety.  Cetirizine HCl (ZYRTEC ALLERGY) 10 MG CAPS Take by mouth daily (Patient not taking: Reported on 7/12/2021)      temazepam (RESTORIL) 30 MG capsule Take 30 mg by mouth nightly as needed for Sleep. . (Patient not taking: Reported on 7/12/2021)       No current facility-administered medications on file prior to visit. Allergies   Allergen Reactions    Metoclopramide Shortness Of Breath and Swelling    Quetiapine Shortness Of Breath and Swelling    Risperidone Swelling and Shortness Of Breath    Trazodone Swelling and Shortness Of Breath     Swelling throat    Buprenorphine-Naloxone Anxiety and Hives     Patient says she can take Percocet, Vicodin    Morphine Hives and Itching    Asenapine Swelling     tongue    Buprenorphine Hcl-Naloxone Hcl     Codeine Hives and Itching     Patient says she can take Percocet, Vicodin    Guanfacine Hcl Swelling     tongue    Morphine And Related Hives    Reglan [Metoclopramide Hcl] Swelling    Risperidone And Related Swelling    Seroquel  [Quetiapine Fumarate] Other (See Comments)     \"I get really dizzy and I pass out. \"    Seroquel [Quetiapine Fumarate] Swelling     Swelling throat    Trazodone And Nefazodone Swelling     Swelling throat    Lurasidone Anxiety       Social History     Socioeconomic History    Marital status: Single     Spouse name: Not on file    Number of children: Not on file    Years of education: Not on file    Highest education level: Not on file   Occupational History    Not on file   Tobacco Use    Smoking status: Former Smoker     Packs/day: 1.00     Types: Cigarettes     Quit date: 2018     Years since quittin.0    Smokeless tobacco: Never Used   Vaping Use    Vaping Use: Never used   Substance and Sexual Activity    Alcohol use: Not Currently     Comment: socially    Drug use: No     Comment: Denies    Sexual activity: Not Currently   Other Topics Concern    Not on file   Social History Narrative    Not on file     Social Determinants of Health     Financial Resource Strain:     Difficulty of Paying Living Expenses: Not on file   Food Insecurity:     Worried About Running Out of Food in the Last Year: Not on file    Sameera of Food in the Last Year: Not on file   Transportation Needs:     Lack of Transportation (Medical): Not on file    Lack of Transportation (Non-Medical):  Not on file   Physical Activity:     Days of Exercise per Week: Not on file    Minutes of Exercise per Session: Not on file   Stress:     Feeling of Stress : Not on file   Social Connections:     Frequency of Communication with Friends and Family: Not on file    Frequency of Social Gatherings with Friends and Family: Not on file    Attends Bahai Services: Not on file    Active Member of [address] [place] [address] or Organizations: Not on file    Attends Club or Organization Meetings: Not on file    Marital Status: Not on file   Intimate Partner Violence:     Fear of Current or Ex-Partner: Not on file    Emotionally Abused: Not on file    Physically Abused: Not on file    Sexually Abused: Not on file   Housing Stability:     Unable to Pay for Housing in the Last Year: Not on file    Number of [place] in the Last Year: Not on file    Unstable Housing in the Last Year: Not on file       Family History   Problem Relation Age of Onset    Diabetes type 2  Mother     Diabetes type 2  Father     Cancer Sister     Diabetes type 2  Brother        Review of Systems:   I have reviewed the clinically relevant past medical history, medications, allergies, family history, social history, and 13 point Review of Systems from the patient's recent history form & documented any details relevant to today's presenting complaints in the history above. The patient's self-reported past medical history, medications, allergies, family history, social history, and Review of Systems form from 1/17/22 have been scanned into the chart under the \"Media\" tab. Physical Examination:      Vital signs:  Ht 5' 3\" (1.6 m)   Wt 155 lb (70.3 kg)   BMI 27.46 kg/m²     General:   alert, appears stated age, cooperative and no distress   Right Shoulder   Active ROM:   forward flexion 90, external rotation 80, internal rotation back pocket. Left shoulder: forward flexion 180, external rotation 80, internal rotation T10. Passive ROM:  forward flexion 180   Joint Tenderness:   globally   Neer:   positive   Maurer:   positive   Strength:   5/5 Supraspinatus, External rotation    Bilateral shoulders   Drop-arm test:   negative   Belly-press test:   negative   Bear-hug test:   negative   Speed's test:   not tested   Bicipital groove tenderness:  positive   Carmona's test:   not tested   Cross-body adduction test:   positive    AC joint tenderness:   positive     There are no skin lesions, cellulitis, or extreme edema in the upper extremities. Sensation is grossly intact to light touch bilaterally upper extremity. The patient has warm and well-perfused Bilateral upper extremities with brisk capillary refill. Imaging   Right Shoulder X-Ray: True AP and axillary view obtained and reviewed. Scapular Y view from outside 12/2/21 independently reviewed. AC Joint: Mild narrowing of the AC joint.   There is slight calcification superior to the acromion, read by the radiologist as avulsion fracture versus calcification  Glenohumeral joint: no abnormalities noted  Elevation humeral head: absent    Chest x-ray 2/16/2021: The calcification superior to the acromion was seen at this time also. Right Shoulder MRI: ordered, but not yet obtained      Assessment:      Right shoulder pain  Howard-Danlos syndrome eat  Bipolar to  COPD  Fibromyalgia  History of adrenal and pituitary insufficiency        Plan:      Natural history and expected course discussed. Questions answered. I discussed with the patient that I do not think she has acute avulsion fracture of acromion. The calcification was seen on chest x-ray almost 11 months ago. I do agree with Dr. Chance Hobbs in obtaining the MRI of her right shoulder. She may have some rotator cuff tendinitis versus tear. Her neurologic symptoms may also be coming from her shoulder. There may be some degree of contribution from her hyperlaxity from Howard-Danlos. She can follow-up with me as needed after the MRI or with her primary surgeon Dr. Carolee Castillo. Manolo Sheridan MD  Orthopaedic Surgery and Sports Medicine     Disclaimer: This note was generated with use of a verbal recognition program and an attempt was made to check for errors. It is possible that there are still dictated errors within this office note. If so, please bring any significant errors to my attention for an addendum. All efforts were made to ensure that this office note is accurate.

## 2022-01-19 LAB
C. TRACHOMATIS DNA ,URINE: NEGATIVE
N. GONORRHOEAE DNA, URINE: NEGATIVE

## 2022-01-20 LAB
SEX HORMONE BINDING GLOBULIN: 97 NMOL/L (ref 30–135)
TESTOSTERONE FREE-NONMALE: <1 PG/ML (ref 1.1–5.8)
TESTOSTERONE TOTAL: 6 NG/DL (ref 20–70)

## 2022-01-28 ENCOUNTER — TELEPHONE (OUTPATIENT)
Dept: ENDOCRINOLOGY | Age: 42
End: 2022-01-28

## 2022-01-28 NOTE — TELEPHONE ENCOUNTER
Pt delfinam stating she missed her appt yesterday that she's been working 6 days a week and she's been sick  # [phone]

## 2022-01-31 NOTE — TELEPHONE ENCOUNTER
Norberto Guerita Mckeon will call back to schedule a follow up. She is not sure which location she plans to go to . She is aware she has two no show.

## 2022-02-10 ENCOUNTER — TELEPHONE (OUTPATIENT)
Dept: ENDOCRINOLOGY | Age: 42
End: 2022-02-10

## 2022-02-10 NOTE — TELEPHONE ENCOUNTER
Pt LVM with concerns of having low B/P readings for the last few days, which has left her feeling very tired.      Due to her pituitary gland issue she is worried that it might be the issue with her fatigue     The pt is requesting to have some labs ordered to make sure she is fine

## 2022-02-10 NOTE — TELEPHONE ENCOUNTER
The pt was called to be informed that she will need to have a follow up appt     The pt stated that the day and time was offered does not work her schedule due to her needing clearance     The pt might be able to make the 11:40 appt on 2/11/2022    The pt was scheduled for a appt in march

## 2022-02-18 ENCOUNTER — HOSPITAL ENCOUNTER (EMERGENCY)
Age: 42
Discharge: HOME | End: 2022-02-18
Payer: COMMERCIAL

## 2022-02-18 ENCOUNTER — APPOINTMENT (OUTPATIENT)
Dept: GENERAL RADIOLOGY | Age: 42
End: 2022-02-18
Payer: COMMERCIAL

## 2022-02-18 VITALS
SYSTOLIC BLOOD PRESSURE: 116 MMHG | RESPIRATION RATE: 15 BRPM | DIASTOLIC BLOOD PRESSURE: 77 MMHG | TEMPERATURE: 97.7 F | OXYGEN SATURATION: 97 % | HEART RATE: 76 BPM
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
## 2022-02-18 DIAGNOSIS — S16.1XXA REPETITIVE STRAIN INJURY OF CERVICAL SPINE, INITIAL ENCOUNTER: ICD-10-CM

## 2022-02-18 DIAGNOSIS — M25.561 RIGHT ANTERIOR KNEE PAIN: Primary | ICD-10-CM
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
## 2022-02-18 PROCEDURE — 99283 EMERGENCY DEPT VISIT LOW MDM: CPT

## 2022-02-18 PROCEDURE — 73562 X-RAY EXAM OF KNEE 3: CPT

## 2022-02-18 RX ORDER — NAPROXEN 500 MG/1
500 TABLET ORAL 2 TIMES DAILY WITH MEALS
Qty: 30 TABLET | Refills: 0 | Status: SHIPPED | OUTPATIENT
Start: 2022-02-18 | End: 2022-05-30

## 2022-02-18 RX ORDER — METHOCARBAMOL 500 MG/1
500 TABLET, FILM COATED ORAL 3 TIMES DAILY PRN
Qty: 40 TABLET | Refills: 0 | Status: SHIPPED | OUTPATIENT
Start: 2022-02-18 | End: 2022-02-28

## 2022-02-18 ASSESSMENT — ENCOUNTER SYMPTOMS
WHEEZING: 0
COLOR CHANGE: 0
VOMITING: 0
SHORTNESS OF BREATH: 0
BACK PAIN: 0
DIARRHEA: 0
COUGH: 0
SORE THROAT: 0
NAUSEA: 0
ABDOMINAL PAIN: 0

## 2022-02-18 ASSESSMENT — PAIN SCALES - GENERAL: PAINLEVEL_OUTOF10: 7

## 2022-02-18 ASSESSMENT — PAIN - FUNCTIONAL ASSESSMENT: PAIN_FUNCTIONAL_ASSESSMENT: 0-10

## 2022-02-18 ASSESSMENT — PAIN DESCRIPTION - ORIENTATION: ORIENTATION: RIGHT

## 2022-02-18 ASSESSMENT — PAIN DESCRIPTION - LOCATION: LOCATION: KNEE

## 2022-02-18 NOTE — ED PROVIDER NOTES
**ADVANCED PRACTICE PROVIDER, I HAVE EVALUATED THIS PATIENT**        [address]      Pt Name: Refugio Ray  [id number]  [place] 1980  Date of evaluation: 2/18/2022  Provider: ANNA Tavares - CNP      Chief Complaint:    Chief Complaint   Patient presents with    Fall     three falls in the past week, two nights ago was last fell, fell right onto her knee, states has had previous knee surgeries on that knee taking percocet ibuprofen alieve, not working         Nursing Notes, Past Medical Hx, Past Surgical Hx, Social Hx, Allergies, and Family Hx were all reviewed and agreed with or any disagreements were addressed in the HPI.    HPI: (Location, Duration, Timing, Severity, Quality, Assoc Sx, Context, Modifying factors)    Chief Complaint of right knee pain status post fall    This is a  35 y.o. female who presents  today with right knee pain, she states last night that she had fell while doing laundry landing directly on her right knee, she also reports that she fell 2 nights ago in the ice. Patient states that she has fallen 3 times in the past week, she does have chronic knee pain she had multiple surgeries on it. She denies hitting her head or having loss of consciousness. She rates the pain a 7 out of 10, any movement worsens the pain. She also complains of some chronic left lateral neck discomfort, states she has some chronic neck pain because she knows she has degenerative disc disease. She denies any additional complaints. No lightheadedness or dizziness. No numbness tingling or paresthesias. No saddle anesthesia. No loss of bowel bladder control urinary tension. No additional aggravating relieving factors.   The patient presents awake, alert in no acute respiratory distress or toxic appearance    PastMedical/Surgical History:      Diagnosis Date    Asthma     Bipolar 1 disorder (HCC)     Compression fracture     COPD (chronic obstructive pulmonary disease) (HCC)     DDD (degenerative disc disease), cervical     Howard-Danlos syndrome     Fibromyalgia     H/O adrenal insufficiency     and pituitary insufficiency    Herniated disc     Metabolic syndrome     Osteoarthritis     Pyelonephritis     Sciatica          Procedure Laterality Date    KNEE SURGERY      LUMBAR FUSION N/A 6/3/2019    L5, S1 TRANSFORAMINAL LUMBAR INTERBODY FUSION performed by Janel Parsons MD at [address] [facility] Left     repair    SIGMOIDOSCOPY N/A 8/21/2020    FLEXIBLE SIGMOIDOSCOPY POSSIBLE COLONOSCOPY performed by Orlando Lee MD at [facility] ENDOSCOPY       Medications:  Previous Medications    ACYCLOVIR (ZOVIRAX) 400 MG TABLET    Take 400 mg by mouth as needed     AJOVY 225 MG/1.5ML SOSY    INJECT 1.5ML ONCE A MONTH    ALPRAZOLAM (XANAX) 1 MG TABLET    Take 1 mg by mouth 3 times daily as needed for Sleep or Anxiety.      CETIRIZINE HCL (ZYRTEC ALLERGY) 10 MG CAPS    Take by mouth daily     DICYCLOMINE (BENTYL) 10 MG CAPSULE    Take 2 capsules by mouth 2 times daily as needed (cramping)    DIVALPROEX (DEPAKOTE) 250 MG DR TABLET    Take 1 tablet by mouth 2 times daily    FLUOCINOLONE (DERMA-SMOOTHE) 0.01 % OIL EXTERNAL OIL    Apply 1 drop topically 4 times daily as needed    KETOCONAZOLE (NIZORAL) 2 % SHAMPOO    Apply topically Twice a Week    LATUDA 20 MG TABS TABLET    Take 20 mg by mouth daily     LEVOTHYROXINE (SYNTHROID) 25 MCG TABLET    TAKE 1 TABLET BY MOUTH EVERY DAY    LORATADINE (CLARITIN) 10 MG TABLET    TAKE 1 TABLET BY MOUTH EVERY MORNING    MIDODRINE (PROAMATINE) 2.5 MG TABLET    TAKE 1 TABLET BY MOUTH THREE TIMES A DAY    MONTELUKAST (SINGULAIR) 10 MG TABLET    Take 10 mg by mouth nightly    MULTIPLE VITAMIN (DAILY-RUTHANN) TABS    TAKE 1 TABLET BY MOUTH EVERY DAY    NAPROXEN (NAPROSYN) 375 MG TABLET    Take 1 tablet by mouth 2 times daily (with meals)    OMEPRAZOLE (PRILOSEC) 20 MG DELAYED RELEASE CAPSULE    TAKE 1 CAPSULE BY MOUTH EVERY DAY    ONDANSETRON (ZOFRAN ODT) 4 MG DISINTEGRATING TABLET    Take 1 tablet by mouth every 8 hours as needed for Nausea    QUETIAPINE (SEROQUEL) 200 MG TABLET    Take 200 mg by mouth nightly    SPIRONOLACTONE (ALDACTONE) 50 MG TABLET    TAKE 1 TABLET BY MOUTH TWICE A DAY    STOOL SOFTENER 100 MG CAPSULE    Take 100 mg by mouth 2 times daily    SULFAMETHOXAZOLE-TRIMETHOPRIM (BACTRIM DS;SEPTRA DS) 800-160 MG PER TABLET    Take 1 tablet by mouth 2 times daily    SUPER B COMPLEX & C TABS    TAKE 1 TABLET BY MOUTH EVERY DAY    TEMAZEPAM (RESTORIL) 30 MG CAPSULE    Take 30 mg by mouth nightly as needed for Sleep. TRIAMCINOLONE (KENALOG) 0.1 % CREAM    Apply twice daily to affected skin on trunk. Avoid face or skin folds    TRULANCE 3 MG TABS    Take 3 mg by mouth daily    VITAMIN D (CHOLECALCIFEROL) 25 MCG (1000 UT) TABS TABLET    TAKE 1 TABLET BY MOUTH EVERY DAY    ZOLPIDEM (AMBIEN) 10 MG TABLET    Take 10 mg by mouth nightly as needed. Review of Systems:  (2-9 systems needed)  Review of Systems   Constitutional: Negative for chills and fever. HENT: Negative for congestion and sore throat. Respiratory: Negative for cough, shortness of breath and wheezing. Cardiovascular: Negative for chest pain. Gastrointestinal: Negative for abdominal pain, diarrhea, nausea and vomiting. Genitourinary: Negative for difficulty urinating, dysuria, frequency and hematuria. Musculoskeletal: Positive for arthralgias, myalgias and neck pain. Negative for back pain. Patient complains of right knee pain status post fall that occurred 3 times in the past couple weeks, states she fell again on her right knee last night she started had knee surgery several times on it, she also reports having some chronic left lateral neck discomfort. Skin: Negative for color change. Neurological: Negative for weakness, numbness and headaches.        \"Positives and Pertinent negatives as per HPI\"    Physical Exam:  Physical Exam  Vitals and nursing note reviewed. Constitutional:       Appearance: She is well-developed. She is not diaphoretic. HENT:      Head: Normocephalic. Right Ear: External ear normal.      Left Ear: External ear normal.   Eyes:      General: No scleral icterus. Right eye: No discharge. Left eye: No discharge. Cardiovascular:      Rate and Rhythm: Normal rate. Pulmonary:      Effort: Pulmonary effort is normal. No respiratory distress. Breath sounds: Normal breath sounds. Abdominal:      Palpations: Abdomen is soft. Musculoskeletal:         General: Tenderness present. Normal range of motion. Cervical back: Normal range of motion and neck supple. Comments: Patient has reproducible tenderness to subpatellar region of the right knee however there is no open wounds, lacerations or abrasion, she does have old scars that are intact from previous surgeries. Stable to extend flexion elicits worsening pain. However compartments are soft and peripheral pulses are 2+ with cap refill less than 3 seconds. She is neurovascular intact without numbness tingling or paresthesias    In addition, patient is complaining of reproducible tenderness to the left lateral side of the neck including the trapezius muscle however no central cervical thoracic or lumbar spine tenderness or step-off. She is unremarkable neurological exam with no acute focal deficits   Skin:     General: Skin is warm. Capillary Refill: Capillary refill takes less than 2 seconds. Coloration: Skin is not pale. Neurological:      General: No focal deficit present. Mental Status: She is alert and oriented to person, place, and time. GCS: GCS eye subscore is 4. GCS verbal subscore is 5. GCS motor subscore is 6. Cranial Nerves: Cranial nerves are intact. Sensory: Sensation is intact. Motor: Motor function is intact.       Comments: Patient is awake and alert following all commands correctly, neurologically and neurovascular intact with no deficit   Psychiatric:         Behavior: Behavior normal.         MEDICAL DECISION MAKING    Vitals:    Vitals:    02/18/22 1632   BP: 116/77   Pulse: 76   Resp: 15   Temp: 97.7 °F (36.5 °C)   SpO2: 97%       LABS:Labs Reviewed - No data to display     Remainder of labs reviewed and were negative at this time or not returned at the time of this note. RADIOLOGY:   Non-plain film images such as CT, Ultrasound and MRI are read by the radiologist. Niki AVILA, ANNA - CNP have directly visualized the radiologic plain film image(s) with the below findings:      Interpretation per the Radiologist below, if available at the time of this note:    XR KNEE RIGHT (3 VIEWS)   Final Result   Advanced degenerative change as well as postoperative change of the right   knee, without gross fracture. MEDICAL DECISION MAKING / ED COURSE:      PROCEDURES:   Procedures    None    Patient was given:  Medications - No data to display    Patient complains of right knee pain status post fall that occurred 3 times in the past couple weeks, states she fell again on her right knee last night she started had knee surgery several times on it, she also reports having some chronic left lateral neck discomfort. After evaluation and examination patient I do believe her neck pain is chronic as she does have degenerative disc disease, I stated to her about doing massage secondary to the reproducible trigger tenderness at the musculoskeletal point of the trapezius muscle on the left side.   I do not feel necessary to do diagnostic imaging as the patient has no central cervical thoracic or lumbar spine tenderness or step-off and unremarkable neurological exam.  However because of her right knee pain with recent surgeries in the past, I did do an x-ray which shows advanced degenerative changes as well as postoperative changes of the right knee without acute gross fracture. I do believe the patient would benefit from following with orthopedic surgery, she already has her own orthopedic specialist that she sees, she stating she does not need referral however, she wants something for pain. The history and physical exam suggests a soft tissue source for pain such as muscles, tendons, nerve irritation, etc. Anjelica Blanchard's cervical spine was clinically cleared using the NEXUS cervical spine criteria and [place] C-Spine rule. Patient is competent without evidence of intoxication or altered LOC. Patient has no focal neurologic deficits or distracting injury. No midline cervical spine tenderness on palpation. No pain with range of motion. No pain with axial loading of neck. Patient is less than 72years of age and I do not believe C-Spine imaging is clinically warranted at this time. I estimate there is LOW risk for CAUDA EQUINA or CENTRAL CORD SYNDROME, EPIDURAL MASS LESION OR ABSCESS, ARTERIAL DISSECTION, MENINGITIS, FRACTURE, CORD COMPRESSION, OR SEVERE SPINAL STENOSIS,  thus I consider the discharge disposition reasonable. Patient is advised to follow-up with their family doctor within the next 24-48 hours and return to the emergency department with any concerns. Therefore, shared medical decision was made between the patient and myself we agreed the patient could be discharged home with outpatient follow-up. Patient was instructed to follow-up with her orthopedic surgeon first thing Monday morning. Educated about RICE, was placed in Ace wrap to the right knee. Discharged home with Robaxin Naprosyn with education take medicine as prescribed. The patient tolerated their visit well. I evaluated the patient. The physician was available for consultation as needed. The patient and / or the family were informed of the results of any tests, a time was given to answer questions, a plan was proposed and they agreed with plan. Patient verbalized understanding of discharge instructions and was discharged in the department in stable condition    CLINICAL IMPRESSION:  1. Right anterior knee pain    2. Fall, initial encounter    3.  Repetitive strain injury of cervical spine, initial encounter        DISPOSITION Decision To Discharge 02/18/2022 06:24:01 PM      PATIENT REFERRED TO:  Pedro Reyes MD  [address]  [phone]    Schedule an appointment as soon as possible for a visit in 2 days  Please call your family doctor and orthopedic surgeon on Monday make an appointment to be seen    [facility] Emergency Department  [address] [address]  [phone]  Go to   If symptoms worsen      DISCHARGE MEDICATIONS:  New Prescriptions    METHOCARBAMOL (ROBAXIN) 500 MG TABLET    Take 1 tablet by mouth 3 times daily as needed (Musculoskeletal pain)    NAPROXEN (NAPROSYN) 500 MG TABLET    Take 1 tablet by mouth 2 times daily (with meals)       DISCONTINUED MEDICATIONS:  Discontinued Medications    No medications on file              (Please note the MDM and HPI sections of this note were completed with a voice recognition program.  Efforts were made to edit the dictations but occasionally words are mis-transcribed.)    Electronically signed, ANNA Rodriguez CNP,   '     ANNA Rodriguez CNP  02/18/22 7636

## 2022-02-19 DIAGNOSIS — R79.89 HIGH SERUM THYROID STIMULATING HORMONE (TSH): ICD-10-CM

## 2022-02-21 RX ORDER — LEVOTHYROXINE SODIUM 0.03 MG/1
TABLET ORAL
Qty: 90 TABLET | Refills: 1 | Status: SHIPPED | OUTPATIENT
Start: 2022-02-21

## 2022-03-05 ENCOUNTER — HOSPITAL ENCOUNTER (EMERGENCY)
Age: 42
Discharge: HOME | End: 2022-03-05
Payer: COMMERCIAL

## 2022-03-05 VITALS
SYSTOLIC BLOOD PRESSURE: 112 MMHG | DIASTOLIC BLOOD PRESSURE: 70 MMHG | OXYGEN SATURATION: 100 % | TEMPERATURE: 97.3 F | RESPIRATION RATE: 19 BRPM | BODY MASS INDEX: 27.58 KG/M2 | WEIGHT: 155.65 LBS | HEIGHT: 63 IN | HEART RATE: 69 BPM

## 2022-03-05 DIAGNOSIS — K59.00 CONSTIPATION, UNSPECIFIED CONSTIPATION TYPE: Primary | ICD-10-CM

## 2022-03-05 PROCEDURE — 99283 EMERGENCY DEPT VISIT LOW MDM: CPT

## 2022-03-05 ASSESSMENT — PAIN - FUNCTIONAL ASSESSMENT: PAIN_FUNCTIONAL_ASSESSMENT: 0-10

## 2022-03-05 ASSESSMENT — PAIN DESCRIPTION - PAIN TYPE: TYPE: ACUTE PAIN

## 2022-03-05 ASSESSMENT — PAIN SCALES - GENERAL: PAINLEVEL_OUTOF10: 8

## 2022-03-05 ASSESSMENT — PAIN DESCRIPTION - LOCATION: LOCATION: ABDOMEN

## 2022-03-05 NOTE — ED PROVIDER NOTES
[address] [facility]        Pt Name: Parisa Lipscomb  MRN: [id number]  [place] 1980  Date of evaluation: 3/5/2022  Provider: MARTÍN Lazar      ADVANCED PRACTICE PROVIDER, I HAVE EVALUATED THIS PATIENT    CHIEF COMPLAINT     Constipation      HISTORY OF PRESENT ILLNESS  (Location/Symptom, Timing/Onset, Context/Setting, Quality, Duration,Modifying Factors, Severity.)   Anjelica Garner is a 35 y.o. female who presents to the emergencydepartment for constipation. Patient reports is been 10 days since she had a bowel movement. She drank some bowel prep and took stool softeners today. About 5 hours passed and she had not had a bowel movement so became concerned and came to the emergency department. While here she has had multiple bowel movements. Reports she feels fine now and thinks she can go home. She has a history of constipation and reports this is typical for her. She has had some nausea vomiting which again is typical for these episodes of constipation. Also reports a little abdominal pain, which again is typical for these episodes. Denies fevers or chills. Denies history of abdominal surgery. She is having a colonoscopy on Monday to evaluate her chronic constipation. Nursing Notes were reviewed and I agree. REVIEW OF SYSTEMS    (2-9 systems for level 4, 10 or more for level 5)   Review of Systems     Pertinent positives and negatives as per HPI.    All other systems reviewed and are negative except as noted    PAST MEDICAL HISTORY         Diagnosis Date    Asthma     Bipolar 1 disorder ([facility] [address]     Compression fracture     COPD (chronic obstructive pulmonary disease) (HCC)     DDD (degenerative disc disease), cervical     Howard-Danlos syndrome     Fibromyalgia     H/O adrenal insufficiency     and pituitary insufficiency    Herniated disc     Metabolic syndrome     Osteoarthritis     Pyelonephritis     Sciatica SURGICAL HISTORY         Procedure Laterality Date    KNEE SURGERY      LUMBAR FUSION N/A 6/3/2019    L5, S1 TRANSFORAMINAL LUMBAR INTERBODY FUSION performed by Coni Gross MD at [address] [facility] Left     repair    SIGMOIDOSCOPY N/A 8/21/2020    FLEXIBLE SIGMOIDOSCOPY POSSIBLE COLONOSCOPY performed by Marielena Alcaraz MD at [place] [address]       Discharge Medication List as of 3/5/2022  6:34 PM      CONTINUE these medications which have NOT CHANGED    Details   levothyroxine (SYNTHROID) 25 MCG tablet TAKE 1 TABLET BY MOUTH EVERY DAY, Disp-90 tablet, R-1Normal      !! naproxen (NAPROSYN) 500 MG tablet Take 1 tablet by mouth 2 times daily (with meals), Disp-30 tablet, R-0Print      midodrine (PROAMATINE) 2.5 MG tablet TAKE 1 TABLET BY MOUTH THREE TIMES A DAY, Disp-90 tablet, R-5Normal      omeprazole (PRILOSEC) 20 MG delayed release capsule TAKE 1 CAPSULE BY MOUTH EVERY DAYHistorical Med      spironolactone (ALDACTONE) 50 MG tablet TAKE 1 TABLET BY MOUTH TWICE A DAYHistorical Med      loratadine (CLARITIN) 10 MG tablet TAKE 1 TABLET BY MOUTH EVERY MORNINGHistorical Med      montelukast (SINGULAIR) 10 MG tablet Take 10 mg by mouth nightlyHistorical Med      sulfamethoxazole-trimethoprim (BACTRIM DS;SEPTRA DS) 800-160 MG per tablet Take 1 tablet by mouth 2 times dailyHistorical Med      AJOVY 225 MG/1.5ML SOSY INJECT 1.5ML ONCE A MONTH, DAWHistorical Med      zolpidem (AMBIEN) 10 MG tablet Take 10 mg by mouth nightly as needed. Historical Med      triamcinolone (KENALOG) 0.1 % cream Apply twice daily to affected skin on trunk.  Avoid face or skin folds, Historical Med      fluocinolone (DERMA-SMOOTHE) 0.01 % OIL external oil Apply 1 drop topically 4 times daily as neededHistorical Med      ondansetron (ZOFRAN ODT) 4 MG disintegrating tablet Take 1 tablet by mouth every 8 hours as needed for Nausea, Disp-20 tablet, R-0Print      !! naproxen (NAPROSYN) 375 MG tablet Take 1 tablet by mouth 2 times daily (with meals), Disp-30 tablet, R-0Print      SUPER B COMPLEX & C TABS TAKE 1 TABLET BY MOUTH EVERY DAYHistorical Med      Vitamin D (CHOLECALCIFEROL) 25 MCG (1000 UT) TABS tablet TAKE 1 TABLET BY MOUTH EVERY DAYLabeling may look different. 25 mcg=1000 Units. Please double check dosages. Historical Med      Multiple Vitamin (DAILY-RUTHANN) TABS TAKE 1 TABLET BY MOUTH EVERY DAYHistorical Med      dicyclomine (BENTYL) 10 MG capsule Take 2 capsules by mouth 2 times daily as needed (cramping), Disp-120 capsule,R-3Normal      divalproex (DEPAKOTE) 250 MG DR tablet Take 1 tablet by mouth 2 times daily, Disp-90 tablet, R-3Normal      acyclovir (ZOVIRAX) 400 MG tablet Take 400 mg by mouth as needed Historical Med      STOOL SOFTENER 100 MG capsule Take 100 mg by mouth 2 times daily, DAWHistorical Med      ketoconazole (NIZORAL) 2 % shampoo Apply topically Twice a Week, Topical, TWICE WEEKLY Starting Sat 4/13/2019, R-4, Historical Med      LATUDA 20 MG TABS tablet Take 20 mg by mouth daily , R-3, DAWHistorical Med      QUEtiapine (SEROQUEL) 200 MG tablet Take 200 mg by mouth nightly, R-2Historical Med      TRULANCE 3 MG TABS Take 3 mg by mouth daily, R-11, DAWHistorical Med      ALPRAZolam (XANAX) 1 MG tablet Take 1 mg by mouth 3 times daily as needed for Sleep or Anxiety. Historical Med      Cetirizine HCl (ZYRTEC ALLERGY) 10 MG CAPS Take by mouth daily Historical Med      temazepam (RESTORIL) 30 MG capsule Take 30 mg by mouth nightly as needed for Sleep. Historical Med       !! - Potential duplicate medications found. Please discuss with provider.           ALLERGIES     Metoclopramide, Quetiapine, Risperidone, Trazodone, Buprenorphine-naloxone, Morphine, Asenapine, Buprenorphine hcl-naloxone hcl, Codeine, Guanfacine hcl, Morphine and related, Reglan [metoclopramide hcl], Risperidone and related, Seroquel  [quetiapine fumarate], Seroquel [quetiapine fumarate], Trazodone and nefazodone, and Lurasidone    FAMILY HISTORY           Problem Relation Age of Onset    Diabetes type 2  Mother     Diabetes type 2  Father     Cancer Sister     Diabetes type 2  Brother      Family Status   Relation Name Status    Mother  Alive    Father  Alive    Sister  Alive    Brother  Alive        SOCIAL HISTORY      reports that she quit smoking about 4 years ago. Her smoking use included cigarettes. She smoked 1.00 pack per day. She has never used smokeless tobacco. She reports previous alcohol use. She reports that she does not use drugs. PHYSICAL EXAM    (up to 7 for level 4, 8 or more for level 5)     ED Triage Vitals   BP Temp Temp src Pulse Resp SpO2 Height Weight   03/05/22 1709 03/05/22 1706 -- 03/05/22 1706 03/05/22 1706 03/05/22 1706 03/05/22 1706 03/05/22 1706   112/70 97.3 °F (36.3 °C)  69 19 100 % 5' 3\" (1.6 m) 155 lb 10.3 oz (70.6 kg)       Physical Exam  Constitutional:       General: She is not in acute distress. Appearance: Normal appearance. She is well-developed. She is not ill-appearing, toxic-appearing or diaphoretic. HENT:      Head: Normocephalic and atraumatic. Pulmonary:      Effort: Pulmonary effort is normal. No respiratory distress. Abdominal:      General: Bowel sounds are normal. There is no distension. Palpations: Abdomen is soft. There is no mass. Tenderness: There is no abdominal tenderness. There is no guarding or rebound. Hernia: No hernia is present. Musculoskeletal:         General: Normal range of motion. Cervical back: Normal range of motion and neck supple. Skin:     General: Skin is warm. Neurological:      Mental Status: She is alert. Psychiatric:         Mood and Affect: Mood normal.         Behavior: Behavior normal.         Thought Content:  Thought content normal.         Judgment: Judgment normal.         DIFFERENTIAL DIAGNOSIS   Appendicitis, Small Bowel Obstruction, Pancreatitis, Cholesystitis, Hepatitis, Aortic Dissection, DKA, Pylonephritis, Kidney Stone  Ectopic Pregnancy, PID, Ovarian Torsion, Ovarian cyst  Bowel obstruction, other    DIAGNOSTICRESULTS         RADIOLOGY:   Non-plain film images such as CT, Ultrasound and MRI are read by the radiologist. Plain radiographic images are visualized and preliminarily interpreted by MARTÍN Weiss with the below findings:      Interpretation per the Radiologist below, if available at the time of this note:    No orders to display         LABS:  Labs Reviewed - No data to display    All other labs were within normal range or not returned as of this dictation. EMERGENCY DEPARTMENT COURSE and DIFFERENTIALDIAGNOSIS/MDM:   Vitals:    Vitals:    03/05/22 1706 03/05/22 1709   BP:  112/70   Pulse: 69    Resp: 19    Temp: 97.3 °F (36.3 °C)    SpO2: 100%    Weight: 155 lb 10.3 oz (70.6 kg)    Height: 5' 3\" (1.6 m)        Patient wasnontoxic, well appearing, afebrile with normal vital signs. She has had multiple bowel movements here. Denies blood or black in her stool. She reports she is feeling fine and wants to go home. Abdomen is soft nontender. This is a chronic issue. I believe she is appropriate for outpatient management as I have a low suspicion for acute intra-abdominal abnormality. I do not believe any further work-up is indicated. She did ask magnesium citrate to have in case that is needed. Will give. Instructed to follow-up for her colonoscopy on Monday. Return for worsening. PROCEDURES:  None    FINAL IMPRESSION      1.  Constipation, unspecified constipation type        DISPOSITION/PLAN   DISPOSITION Decision To Discharge 03/05/2022 06:31:15 PM      PATIENT REFERRED TO:  Follow up for your colonoscopy on Monday          [facility]  [address] [facility]  [phone]    As needed, If symptoms worsen      DISCHARGE MEDICATIONS:  Discharge Medication List as of 3/5/2022  6:34 PM      START taking these medications    Details   magnesium citrate solution Take 296 mLs by mouth once for 1 dose, Disp-296 mL, R-0Normal             (Please note that portions ofthis note were completed with a voice recognition program.  Efforts were made to edit the dictations but occasionally words are mis-transcribed.)    Corrine Redd, [address] [place] [address]  03/05/22 1950

## 2022-03-05 NOTE — ED NOTES
Pt to and from latrell Henry stopped this RN and states that she is now finally moving her bowels and would like to be discharged will notify MARTÍN Jacobs RN  03/05/22 9495

## 2022-03-05 NOTE — ED TRIAGE NOTES
States she started methadone about the time frame of the constipation, for pain control per the [facility]

## 2022-04-12 NOTE — PROGRESS NOTES
Department of Internal Medicine  General Internal Medicine  Attending Progress Note      SUBJECTIVE:  Pt still has no BM. Uncomfortable,Received Toradol inj last night for her pain, NG inplace.  Afebrile,     OBJECTIVE      Medications    Current Facility-Administered Medications: dextrose 5 % and 0.45 % NaCl with KCl 40 mEq infusion, , Intravenous, Continuous  ALPRAZolam (XANAX) tablet 1 mg, 1 mg, Oral, TID PRN  divalproex (DEPAKOTE) DR tablet 250 mg, 250 mg, Oral, BID  lurasidone (LATUDA) tablet 20 mg, 20 mg, Oral, Daily  ondansetron (ZOFRAN-ODT) disintegrating tablet 4 mg, 4 mg, Oral, Q8H PRN  docusate sodium (COLACE) capsule 100 mg, 100 mg, Oral, BID  pregabalin (LYRICA) capsule 150 mg, 150 mg, Oral, BID  lactobacillus (CULTURELLE) capsule 1 capsule, 1 capsule, Oral, Daily with breakfast  LORazepam (ATIVAN) tablet 0.5 mg, 0.5 mg, Oral, Nightly PRN  mineral oil liquid 30 mL, 30 mL, Oral, BID  acetaminophen (TYLENOL) tablet 650 mg, 650 mg, Oral, Q6H PRN  ondansetron (ZOFRAN) injection 4 mg, 4 mg, Intravenous, Q4H PRN  phenol 1.4 % mouth spray 1 spray, 1 spray, Mouth/Throat, Q2H PRN  dicyclomine (BENTYL) capsule 20 mg, 20 mg, Oral, BID PRN  promethazine (PHENERGAN) 12.5mg in sodium chloride 0.9% 50 mL IVPB 12.5 mg, 12.5 mg, Intravenous, Q6H PRN  LORazepam (ATIVAN) injection 0.5 mg, 0.5 mg, Intravenous, TID PRN  QUEtiapine (SEROQUEL) tablet 200 mg, 200 mg, Oral, Nightly  Physical    VITALS:  /70   Pulse 77   Temp 98.8 °F (37.1 °C) (Oral)   Resp 14   Ht 5' 4\" (1.626 m)   Wt 174 lb 13.2 oz (79.3 kg)   SpO2 94%   BMI 30.01 kg/m²   CONSTITUTIONAL:  awake, alert, cooperative, no apparent distress, and appears stated age  LUNGS:  No increased work of breathing, good air exchange, clear to auscultation bilaterally, no crackles or wheezing  CARDIOVASCULAR:  Normal apical impulse, regular rate and rhythm, normal S1 and S2, no S3 or S4, and no murmur noted  ABDOMEN:  Mildly distended, sluggish BS, but had more Problem: MOBILITY - ADULT  Goal: Maintain or return to baseline ADL function  Description: INTERVENTIONS:  -  Assess patient's ability to carry out ADLs; assess patient's baseline for ADL function and identify physical deficits which impact ability to perform ADLs (bathing, care of mouth/teeth, toileting, grooming, dressing, etc )  - Assess/evaluate cause of self-care deficits   - Assess range of motion  - Assess patient's mobility; develop plan if impaired  - Assess patient's need for assistive devices and provide as appropriate  - Encourage maximum independence but intervene and supervise when necessary  - Involve family in performance of ADLs  - Assess for home care needs following discharge   - Consider OT consult to assist with ADL evaluation and planning for discharge  - Provide patient education as appropriate  Outcome: Progressing  Goal: Maintains/Returns to pre admission functional level  Description: INTERVENTIONS:  - Perform BMAT or MOVE assessment daily    - Set and communicate daily mobility goal to care team and patient/family/caregiver  - Collaborate with rehabilitation services on mobility goals if consulted  - Perform Range of Motion times a day  - Reposition patient every  hours    - Dangle patient  times a day  - Stand patient  times a day  - Ambulate patient  times a day  - Out of bed to chair  times a day   - Out of bed for meals  times a day  - Out of bed for toileting  - Record patient progress and toleration of activity level   Outcome: Progressing     Problem: PAIN - ADULT  Goal: Verbalizes/displays adequate comfort level or baseline comfort level  Description: Interventions:  - Encourage patient to monitor pain and request assistance  - Assess pain using appropriate pain scale  - Administer analgesics based on type and severity of pain and evaluate response  - Implement non-pharmacological measures as appropriate and evaluate response  - Consider cultural and social influences on pain and pain management  - Notify physician/advanced practitioner if interventions unsuccessful or patient reports new pain  Outcome: Progressing     Problem: SAFETY ADULT  Goal: Maintain or return to baseline ADL function  Description: INTERVENTIONS:  -  Assess patient's ability to carry out ADLs; assess patient's baseline for ADL function and identify physical deficits which impact ability to perform ADLs (bathing, care of mouth/teeth, toileting, grooming, dressing, etc )  - Assess/evaluate cause of self-care deficits   - Assess range of motion  - Assess patient's mobility; develop plan if impaired  - Assess patient's need for assistive devices and provide as appropriate  - Encourage maximum independence but intervene and supervise when necessary  - Involve family in performance of ADLs  - Assess for home care needs following discharge   - Consider OT consult to assist with ADL evaluation and planning for discharge  - Provide patient education as appropriate  Outcome: Progressing  Goal: Maintains/Returns to pre admission functional level  Description: INTERVENTIONS:  - Perform BMAT or MOVE assessment daily    - Set and communicate daily mobility goal to care team and patient/family/caregiver  - Collaborate with rehabilitation services on mobility goals if consulted  - Perform Range of Motion  times a day  - Reposition patient every  hours    - Dangle patient  times a day  - Stand patient  times a day  - Ambulate patient  times a day  - Out of bed to chair  times a day   - Out of bed for meals  times a day  - Out of bed for toileting  - Record patient progress and toleration of activity level   Outcome: Progressing  Goal: Patient will remain free of falls  Description: INTERVENTIONS:  - Educate patient/family on patient safety including physical limitations  - Instruct patient to call for assistance with activity   - Consult OT/PT to assist with strengthening/mobility   - Keep Call bell within reach  - Keep bed low distension of abdomen when we giving her Goletely. Data    CBC:   Lab Results   Component Value Date    WBC 12.2 08/19/2020    RBC 5.04 08/19/2020    HGB 15.2 08/19/2020    HCT 45.7 08/19/2020    MCV 90.5 08/19/2020    MCH 30.1 08/19/2020    MCHC 33.2 08/19/2020    RDW 14.3 08/19/2020     08/19/2020    MPV 8.2 08/19/2020     BMP:    Lab Results   Component Value Date     08/19/2020    K 4.5 08/19/2020     08/19/2020    CO2 17 08/19/2020    BUN 7 08/19/2020    LABALBU 4.7 08/19/2020    CREATININE 0.8 08/19/2020    CALCIUM 9.8 08/19/2020    GFRAA >60 08/19/2020    GFRAA >60 05/29/2013    LABGLOM >60 08/19/2020    GLUCOSE 88 08/19/2020       ASSESSMENT AND PLAN      Active Problems:     Opioid induced constipation, appreciate GI and surgery consult, no need any surgical intervention this time, NG was placed, cont Golytely and Relistor,  Abd pain, stable, not a surgical case, dt constipation, received Toradol inj.  GI may have other recommendention if no BM today.   Chronic pain syndr.  Fibromyalgia, stable,  S/p multiple surgeries, including back, knee, shoulder,  ALLEN, cont med,   Bipolar d/o, cont med,   Insomnia, cont med,   Pt received stool softener,IVF, Relistor,enema,Blood work unremarkable,     Dr Flynn Shirley,              and locked with side rails adjusted as appropriate  - Keep care items and personal belongings within reach  - Initiate and maintain comfort rounds  - Make Fall Risk Sign visible to staff  - Offer Toileting every  Hours, in advance of need  - Initiate/Maintain alarm  - Obtain necessary fall risk management equipment:   - Apply yellow socks and bracelet for high fall risk patients  - Consider moving patient to room near nurses station  Outcome: Progressing     Problem: DISCHARGE PLANNING  Goal: Discharge to home or other facility with appropriate resources  Description: INTERVENTIONS:  - Identify barriers to discharge w/patient and caregiver  - Arrange for needed discharge resources and transportation as appropriate  - Identify discharge learning needs (meds, wound care, etc )  - Arrange for interpretive services to assist at discharge as needed  - Refer to Case Management Department for coordinating discharge planning if the patient needs post-hospital services based on physician/advanced practitioner order or complex needs related to functional status, cognitive ability, or social support system  Outcome: Progressing     Problem: Potential for Falls  Goal: Patient will remain free of falls  Description: INTERVENTIONS:  - Educate patient/family on patient safety including physical limitations  - Instruct patient to call for assistance with activity   - Consult OT/PT to assist with strengthening/mobility   - Keep Call bell within reach  - Keep bed low and locked with side rails adjusted as appropriate  - Keep care items and personal belongings within reach  - Initiate and maintain comfort rounds  - Make Fall Risk Sign visible to staff  - Offer Toileting every  Hours, in advance of need  - Initiate/Maintain alarm  - Obtain necessary fall risk management equipment:   - Apply yellow socks and bracelet for high fall risk patients  - Consider moving patient to room near nurses station  Outcome: Progressing     Problem: Prexisting or High Potential for Compromised Skin Integrity  Goal: Skin integrity is maintained or improved  Description: INTERVENTIONS:  - Identify patients at risk for skin breakdown  - Assess and monitor skin integrity  - Assess and monitor nutrition and hydration status  - Monitor labs   - Assess for incontinence   - Turn and reposition patient  - Assist with mobility/ambulation  - Relieve pressure over bony prominences  - Avoid friction and shearing  - Provide appropriate hygiene as needed including keeping skin clean and dry  - Evaluate need for skin moisturizer/barrier cream  - Collaborate with interdisciplinary team   - Patient/family teaching  - Consider wound care consult   Outcome: Progressing

## 2022-05-19 LAB
ESTRADIOL LEVEL: 69 PG/ML
FOLLICLE STIMULATING HORMONE: 14.2 MIU/ML
PROGESTERONE LEVEL: 0.09 NG/ML

## 2022-05-21 LAB
SEX HORMONE BINDING GLOBULIN: 82 NMOL/L (ref 30–135)
TESTOSTERONE FREE-NONMALE: ABNORMAL PG/ML (ref 1.1–5.8)
TESTOSTERONE TOTAL: <3 NG/DL (ref 20–70)

## 2022-05-30 ENCOUNTER — APPOINTMENT (OUTPATIENT)
Dept: GENERAL RADIOLOGY | Age: 42
End: 2022-05-30
Payer: COMMERCIAL

## 2022-05-30 ENCOUNTER — HOSPITAL ENCOUNTER (EMERGENCY)
Age: 42
Discharge: HOME | End: 2022-05-30
Attending: EMERGENCY MEDICINE
Payer: COMMERCIAL

## 2022-05-30 VITALS
DIASTOLIC BLOOD PRESSURE: 66 MMHG | HEART RATE: 62 BPM | OXYGEN SATURATION: 100 % | RESPIRATION RATE: 18 BRPM | SYSTOLIC BLOOD PRESSURE: 99 MMHG | TEMPERATURE: 98.7 F

## 2022-05-30 DIAGNOSIS — M79.604 LEG PAIN, ANTERIOR, RIGHT: ICD-10-CM

## 2022-05-30 DIAGNOSIS — L03.115 CELLULITIS OF RIGHT ANTERIOR LOWER LEG: Primary | ICD-10-CM

## 2022-05-30 LAB
ANION GAP SERPL CALCULATED.3IONS-SCNC: 12 MMOL/L (ref 3–16)
BASOPHILS ABSOLUTE: 0 K/UL (ref 0–0.2)
BASOPHILS RELATIVE PERCENT: 0.5 %
BUN BLDV-MCNC: 16 MG/DL (ref 7–20)
CALCIUM SERPL-MCNC: 9.2 MG/DL (ref 8.3–10.6)
CHLORIDE BLD-SCNC: 101 MMOL/L (ref 99–110)
CO2: 25 MMOL/L (ref 21–32)
CREAT SERPL-MCNC: 0.9 MG/DL (ref 0.6–1.1)
D DIMER: <0.27 UG/ML FEU (ref 0–0.6)
EOSINOPHILS ABSOLUTE: 0.2 K/UL (ref 0–0.6)
EOSINOPHILS RELATIVE PERCENT: 3.8 %
GFR AFRICAN AMERICAN: >60
GFR NON-AFRICAN AMERICAN: >60
GLUCOSE BLD-MCNC: 69 MG/DL (ref 70–99)
HCG QUALITATIVE: NEGATIVE
HCT VFR BLD CALC: 34.8 % (ref 36–48)
HEMOGLOBIN: 11.8 G/DL (ref 12–16)
LYMPHOCYTES ABSOLUTE: 1.1 K/UL (ref 1–5.1)
LYMPHOCYTES RELATIVE PERCENT: 26.2 %
MAGNESIUM: 2.1 MG/DL (ref 1.8–2.4)
MCH RBC QN AUTO: 30.2 PG (ref 26–34)
MCHC RBC AUTO-ENTMCNC: 33.8 G/DL (ref 31–36)
MCV RBC AUTO: 89.2 FL (ref 80–100)
MONOCYTES ABSOLUTE: 0.3 K/UL (ref 0–1.3)
MONOCYTES RELATIVE PERCENT: 6.8 %
NEUTROPHILS ABSOLUTE: 2.5 K/UL (ref 1.7–7.7)
NEUTROPHILS RELATIVE PERCENT: 62.7 %
PDW BLD-RTO: 14.5 % (ref 12.4–15.4)
PLATELET # BLD: 201 K/UL (ref 135–450)
PMV BLD AUTO: 7.7 FL (ref 5–10.5)
POTASSIUM REFLEX MAGNESIUM: 3.5 MMOL/L (ref 3.5–5.1)
RBC # BLD: 3.91 M/UL (ref 4–5.2)
SODIUM BLD-SCNC: 138 MMOL/L (ref 136–145)
WBC # BLD: 4 K/UL (ref 4–11)

## 2022-05-30 PROCEDURE — 73590 X-RAY EXAM OF LOWER LEG: CPT

## 2022-05-30 PROCEDURE — 83735 ASSAY OF MAGNESIUM: CPT

## 2022-05-30 PROCEDURE — 84703 CHORIONIC GONADOTROPIN ASSAY: CPT

## 2022-05-30 PROCEDURE — 6370000000 HC RX 637 (ALT 250 FOR IP): Performed by: EMERGENCY MEDICINE

## 2022-05-30 PROCEDURE — 85025 COMPLETE CBC W/AUTO DIFF WBC: CPT

## 2022-05-30 PROCEDURE — 85379 FIBRIN DEGRADATION QUANT: CPT

## 2022-05-30 PROCEDURE — 36415 COLL VENOUS BLD VENIPUNCTURE: CPT

## 2022-05-30 PROCEDURE — 80048 BASIC METABOLIC PNL TOTAL CA: CPT

## 2022-05-30 PROCEDURE — 99284 EMERGENCY DEPT VISIT MOD MDM: CPT

## 2022-05-30 RX ORDER — HYDROCODONE BITARTRATE AND ACETAMINOPHEN 5; 325 MG/1; MG/1
1 TABLET ORAL ONCE
Status: COMPLETED | OUTPATIENT
Start: 2022-05-30 | End: 2022-05-30

## 2022-05-30 RX ORDER — CEPHALEXIN 500 MG/1
500 CAPSULE ORAL 4 TIMES DAILY
Qty: 40 CAPSULE | Refills: 0 | Status: ON HOLD | OUTPATIENT
Start: 2022-05-30 | End: 2022-06-05 | Stop reason: HOSPADM

## 2022-05-30 RX ORDER — ACETAMINOPHEN 500 MG
1000 TABLET ORAL ONCE
Status: COMPLETED | OUTPATIENT
Start: 2022-05-30 | End: 2022-05-30

## 2022-05-30 RX ORDER — CEPHALEXIN 500 MG/1
500 CAPSULE ORAL ONCE
Status: COMPLETED | OUTPATIENT
Start: 2022-05-30 | End: 2022-05-30

## 2022-05-30 RX ORDER — NAPROXEN 500 MG/1
500 TABLET ORAL 2 TIMES DAILY
Qty: 15 TABLET | Refills: 0 | Status: SHIPPED | OUTPATIENT
Start: 2022-05-30

## 2022-05-30 RX ORDER — CLINDAMYCIN HYDROCHLORIDE 150 MG/1
150 CAPSULE ORAL 4 TIMES DAILY
Qty: 40 CAPSULE | Refills: 0 | Status: ON HOLD | OUTPATIENT
Start: 2022-05-30 | End: 2022-06-05 | Stop reason: HOSPADM

## 2022-05-30 RX ORDER — CLINDAMYCIN HYDROCHLORIDE 150 MG/1
150 CAPSULE ORAL ONCE
Status: COMPLETED | OUTPATIENT
Start: 2022-05-30 | End: 2022-05-30

## 2022-05-30 RX ORDER — IBUPROFEN 400 MG/1
400 TABLET ORAL ONCE
Status: COMPLETED | OUTPATIENT
Start: 2022-05-30 | End: 2022-05-30

## 2022-05-30 RX ADMIN — HYDROCODONE BITARTRATE AND ACETAMINOPHEN 1 TABLET: 5; 325 TABLET ORAL at 17:55

## 2022-05-30 RX ADMIN — IBUPROFEN 400 MG: 400 TABLET, FILM COATED ORAL at 16:42

## 2022-05-30 RX ADMIN — CEPHALEXIN 500 MG: 500 CAPSULE ORAL at 17:55

## 2022-05-30 RX ADMIN — ACETAMINOPHEN 1000 MG: 500 TABLET ORAL at 16:41

## 2022-05-30 RX ADMIN — CLINDAMYCIN HYDROCHLORIDE 150 MG: 150 CAPSULE ORAL at 17:55

## 2022-05-30 ASSESSMENT — PAIN SCALES - GENERAL
PAINLEVEL_OUTOF10: 6
PAINLEVEL_OUTOF10: 9
PAINLEVEL_OUTOF10: 9
PAINLEVEL_OUTOF10: 8

## 2022-05-30 ASSESSMENT — PAIN DESCRIPTION - LOCATION
LOCATION: LEG
LOCATION: LEG

## 2022-05-30 ASSESSMENT — PAIN DESCRIPTION - ORIENTATION
ORIENTATION: RIGHT
ORIENTATION: RIGHT;LOWER

## 2022-05-30 ASSESSMENT — PAIN DESCRIPTION - PAIN TYPE
TYPE: ACUTE PAIN
TYPE: ACUTE PAIN

## 2022-05-30 ASSESSMENT — PAIN DESCRIPTION - DESCRIPTORS
DESCRIPTORS: THROBBING
DESCRIPTORS: PATIENT UNABLE TO DESCRIBE

## 2022-05-30 ASSESSMENT — PAIN - FUNCTIONAL ASSESSMENT
PAIN_FUNCTIONAL_ASSESSMENT: PREVENTS OR INTERFERES SOME ACTIVE ACTIVITIES AND ADLS
PAIN_FUNCTIONAL_ASSESSMENT: 0-10

## 2022-05-30 NOTE — Clinical Note
Faye Terrazas was seen and treated in our emergency department on 5/30/2022. She may return to work on 06/02/2022. If you have any questions or concerns, please don't hesitate to call.       Kristy Moser, DO

## 2022-05-30 NOTE — ED TRIAGE NOTES
pt with right lower leg swelling that started 3 days ago.  Pt MD sent her in to be evaluated for DVT

## 2022-05-30 NOTE — ED NOTES
Discharge and education instructions reviewed. Patient verbalized understanding, teach-back successful. Patient denied questions at this time. No acute distress noted. Patient instructed to follow-up as noted - return to emergency department if symptoms worsen. Patient verbalized understanding. Discharged per EDMD with discharge instructions.           Reilly Ordaz RN  05/30/22 5225

## 2022-05-30 NOTE — LETTER
[facility] Emergency Department  [address] [facility] [address]  Phone: [phone]               June 1, 2022    Patient: Em Comer   YOB: 1980   Date of Visit: 5/30/2022       To Whom It May Concern:    Vickie Fam was seen and treated in our emergency department on 5/30/2022. She may return to work on 6/3/2022.       Sincerely,       Devyn Rock RN         Signature:__________________________________

## 2022-05-30 NOTE — ED PROVIDER NOTES
[facility]      Pt Name: Isaías Hightower  MRN: [id number]  [place] 1980  Date of evaluation: 5/30/2022  Provider: Adalid Mckenzie, [address] [place] [address]       Chief Complaint   Patient presents with    Leg Swelling     pt with right lower leg swelling that started 3 days ago. Pt MD sent her in to be evaluated for DVT. pt also complaining that she feels like she can't breath, oxygen saturation is 98%          HISTORY OF PRESENT ILLNESS   (Location/Symptom, Timing/Onset, Context/Setting, Quality, Duration, Modifying Factors, Severity)  Note limiting factors. Isaías Hightower is a 38 y.o. female who presents to the emergency department with a complaint of pain and swelling and redness in the lower right leg. She first noticed the symptoms on Saturday, 3 days ago and it has gradually worsened. She denies any trauma or injury. She contacted her primary care physician and was advised to come here to be checked for DVT. She denies any chest pain or shortness of breath. She denies any diaphoresis. She denies any prior history of DVT, PE, or SVT. She denies any fever, chills nausea vomiting or diarrhea. She does not take any anticoagulants. She is not diabetic. She denies any history of MRSA. She denies any calf pain thigh pain or groin pain. She denies any recent travel. She does have an IUD. She denies any use of hormone medications. She quit smoking 1 year ago. Denies any family history of thromboembolic disease. She does report that approximately 10 days ago she developed a small lesion just above the medial aspect of her right eyelid. She went to an urgent care center and was given a prescription for Bactrim which she has been taking twice daily for the past 9 days. She has 1 day remaining. She states that it has almost resolved and is just now some dry skin. No abscess. No pain or discomfort in that area.   No fever or chills. She was advised to follow-up with a dermatologist and has an upcoming appointment. She denies a prior history of cellulitis. Nursing Notes were reviewed. HPI        REVIEW OF SYSTEMS    (2-9 systems for level 4, 10 or more for level 5)       Constitutional: Negative for fever or chills. Respiratory: Negative for shortness of breath or dyspnea on exertion. Cardiovascular: Negative for chest pain. Gastrointestinal: Negative for abdominal pain. Negative for vomiting or diarrhea. Genitourinary: Negative for flank pain. Negative for dysuria. Negative for hematuria. Neurological: Negative for headache. Musculoskeletal:  Negative edema. Hematological: Negative for adenopathy. All systems are reviewed and are negative except for those listed above in the history of present illness and ROS.         PAST MEDICAL HISTORY     Past Medical History:   Diagnosis Date    Asthma     Bipolar 1 disorder ([facility] [address]     Compression fracture     COPD (chronic obstructive pulmonary disease) ([facility] [address]     DDD (degenerative disc disease), cervical     Howard-Danlos syndrome     Fibromyalgia     H/O adrenal insufficiency     and pituitary insufficiency    Herniated disc     Metabolic syndrome     Osteoarthritis     Pyelonephritis     Sciatica          SURGICAL HISTORY       Past Surgical History:   Procedure Laterality Date    KNEE SURGERY      LUMBAR FUSION N/A 6/3/2019    L5, S1 TRANSFORAMINAL LUMBAR INTERBODY FUSION performed by Linda Mcfarland MD at [facility] Left     repair    SIGMOIDOSCOPY N/A 8/21/2020    FLEXIBLE SIGMOIDOSCOPY POSSIBLE COLONOSCOPY performed by Myles Hastings MD at [facility]       Previous Medications    ACYCLOVIR (ZOVIRAX) 400 MG TABLET    Take 400 mg by mouth as needed     AJOVY 225 MG/1.5ML SOSY    INJECT 1.5ML ONCE A MONTH    ALPRAZOLAM (XANAX) 1 MG TABLET    Take 1 mg by mouth 3 times daily as needed for Sleep or Anxiety. CETIRIZINE HCL (ZYRTEC ALLERGY) 10 MG CAPS    Take by mouth daily     DICYCLOMINE (BENTYL) 10 MG CAPSULE    Take 2 capsules by mouth 2 times daily as needed (cramping)    DIVALPROEX (DEPAKOTE) 250 MG DR TABLET    Take 1 tablet by mouth 2 times daily    FLUOCINOLONE (DERMA-SMOOTHE) 0.01 % OIL EXTERNAL OIL    Apply 1 drop topically 4 times daily as needed    KETOCONAZOLE (NIZORAL) 2 % SHAMPOO    Apply topically Twice a Week    LATUDA 20 MG TABS TABLET    Take 20 mg by mouth daily     LEVOTHYROXINE (SYNTHROID) 25 MCG TABLET    TAKE 1 TABLET BY MOUTH EVERY DAY    LORATADINE (CLARITIN) 10 MG TABLET    TAKE 1 TABLET BY MOUTH EVERY MORNING    MAGNESIUM CITRATE SOLUTION    Take 296 mLs by mouth once for 1 dose    MIDODRINE (PROAMATINE) 2.5 MG TABLET    TAKE 1 TABLET BY MOUTH THREE TIMES A DAY    MONTELUKAST (SINGULAIR) 10 MG TABLET    Take 10 mg by mouth nightly    MULTIPLE VITAMIN (DAILY-RUTHANN) TABS    TAKE 1 TABLET BY MOUTH EVERY DAY    OMEPRAZOLE (PRILOSEC) 20 MG DELAYED RELEASE CAPSULE    TAKE 1 CAPSULE BY MOUTH EVERY DAY    ONDANSETRON (ZOFRAN ODT) 4 MG DISINTEGRATING TABLET    Take 1 tablet by mouth every 8 hours as needed for Nausea    QUETIAPINE (SEROQUEL) 200 MG TABLET    Take 200 mg by mouth nightly    SPIRONOLACTONE (ALDACTONE) 50 MG TABLET    TAKE 1 TABLET BY MOUTH TWICE A DAY    STOOL SOFTENER 100 MG CAPSULE    Take 100 mg by mouth 2 times daily    SULFAMETHOXAZOLE-TRIMETHOPRIM (BACTRIM DS;SEPTRA DS) 800-160 MG PER TABLET    Take 1 tablet by mouth 2 times daily    SUPER B COMPLEX & C TABS    TAKE 1 TABLET BY MOUTH EVERY DAY    TEMAZEPAM (RESTORIL) 30 MG CAPSULE    Take 30 mg by mouth nightly as needed for Sleep. TRIAMCINOLONE (KENALOG) 0.1 % CREAM    Apply twice daily to affected skin on trunk.  Avoid face or skin folds    TRULANCE 3 MG TABS    Take 3 mg by mouth daily    VITAMIN D (CHOLECALCIFEROL) 25 MCG (1000 UT) TABS TABLET    TAKE 1 TABLET BY MOUTH EVERY DAY    ZOLPIDEM (AMBIEN) 10 MG TABLET    Take 10 mg by mouth nightly as needed. ALLERGIES     Metoclopramide, Quetiapine, Risperidone, Trazodone, Buprenorphine-naloxone, Morphine, Asenapine, Buprenorphine hcl-naloxone hcl, Codeine, Guanfacine hcl, Morphine and related, Reglan [metoclopramide hcl], Risperidone and related, Seroquel  [quetiapine fumarate], Seroquel [quetiapine fumarate], Trazodone and nefazodone, and Lurasidone    FAMILY HISTORY       Family History   Problem Relation Age of Onset    Diabetes type 2  Mother     Diabetes type 2  Father     Cancer Sister     Diabetes type 2  Brother           SOCIAL HISTORY       Social History     Socioeconomic History    Marital status: Single     Spouse name: None    Number of children: None    Years of education: None    Highest education level: None   Occupational History    None   Tobacco Use    Smoking status: Former Smoker     Packs/day: 1.00     Types: Cigarettes     Quit date: 2018     Years since quittin.3    Smokeless tobacco: Never Used   Vaping Use    Vaping Use: Never used   Substance and Sexual Activity    Alcohol use: Not Currently     Comment: socially    Drug use: No     Comment: Denies    Sexual activity: Not Currently   Other Topics Concern    None   Social History Narrative    None     Social Determinants of Health     Financial Resource Strain:     Difficulty of Paying Living Expenses: Not on file   Food Insecurity:     Worried About Running Out of Food in the Last Year: Not on file    Sameera of Food in the Last Year: Not on file   Transportation Needs:     Lack of Transportation (Medical): Not on file    Lack of Transportation (Non-Medical):  Not on file   Physical Activity:     Days of Exercise per Week: Not on file    Minutes of Exercise per Session: Not on file   Stress:     Feeling of Stress : Not on file   Social Connections:     Frequency of Communication with Friends and Family: Not on file    Frequency of Social Gatherings with Friends and Family: Not on file    Attends Hindu Services: Not on file    Active Member of Clubs or Organizations: Not on file    Attends Club or Organization Meetings: Not on file    Marital Status: Not on file   Intimate Partner Violence:     Fear of Current or Ex-Partner: Not on file    Emotionally Abused: Not on file    Physically Abused: Not on file    Sexually Abused: Not on file   Housing Stability:     Unable to Pay for Housing in the Last Year: Not on file    Number of [place] in the Last Year: Not on file    Unstable Housing in the Last Year: Not on file       SCREENINGS    [place] Coma Scale  Eye Opening: Spontaneous  Best Verbal Response: Oriented  Best Motor Response: Obeys commands  [place] Coma Scale Score: 15        PHYSICAL EXAM    (up to 7 for level 4, 8 or more for level 5)     ED Triage Vitals [05/30/22 1617]   BP Temp Temp Source Heart Rate Resp SpO2 Height Weight   (!) 91/56 98.7 °F (37.1 °C) Oral 75 18 99 % -- --         Physical Exam   Constitutional: Awake and alert. Very pleasant. Appears comfortable. Head: No visible evidence of trauma. Normocephalic. Eyes: Pupils equal and reactive. No photophobia. Conjunctiva normal.    HENT: Oral mucosa moist.  Airway patent. Pharynx without erythema. Nares were clear. Neck:  Soft and supple. Nontender. Heart:  Regular rate and rhythm. No murmur. Lungs:  Clear to auscultation. No wheezes, rales, or ronchi. No conversational dyspnea or accessory muscle use. Chest: Chest wall non-tender. No evidence of trauma. Abdomen:  Soft, nondistended, bowel sounds present. Nontender. No guarding rigidity or rebound. No masses. Musculoskeletal: On the distal pretibial aspect of the right lower leg there was some faint ill-defined pink erythema, warmth, and superficial tenderness to palpation. No bony tenderness. No open wound. No abscess. No palpable or visible venous cords. No calf, thigh or groin tenderness. Patient is able to plantarflex and dorsiflex without difficulty. Capillary refill less than 2 seconds in all digits. Otherwise, all extremities non-tender with full range of motion. Radial and dorsalis pedis pulses were intact. No calf tenderness erythema or edema. Neurological: Alert and oriented x 3. Speech clear. Cranial nerves II-XII intact. No facial droop. No acute focal motor or sensory deficits. Skin: Skin is warm and dry. No rash. Lymphatic:  No lympadenopathy. Psychiatric: Normal mood and affect. Behavior is normal.         DIAGNOSTIC RESULTS     EKG: All EKG's are interpreted by the Emergency Department Physician who either signs or Co-signs this chart in the absence of a cardiologist.        RADIOLOGY:   Non-plain film images such as CT, Ultrasound and MRI are read by the radiologist. Plain radiographic images are visualized and preliminarily interpreted by the emergency physician with the below findings:        Interpretation per the Radiologist below, if available at the time of this note:    XR TIBIA FIBULA RIGHT (2 VIEWS)   Final Result   No radiographic evidence of cellulitis or osteomyelitis. VL Extremity Venous Right    (Results Pending)         ED BEDSIDE ULTRASOUND:   Performed by ED Physician - none    LABS:  Labs Reviewed   CBC WITH AUTO DIFFERENTIAL - Abnormal; Notable for the following components:       Result Value    RBC 3.91 (*)     Hemoglobin 11.8 (*)     Hematocrit 34.8 (*)     All other components within normal limits   BASIC METABOLIC PANEL W/ REFLEX TO MG FOR LOW K - Abnormal; Notable for the following components:    Glucose 69 (*)     All other components within normal limits   D-DIMER, QUANTITATIVE   HCG, SERUM, QUALITATIVE   MAGNESIUM       All other labs were within normal range or not returned as of this dictation.     EMERGENCY DEPARTMENT COURSE and DIFFERENTIAL DIAGNOSIS/MDM:   Vitals:    Vitals:    05/30/22 1630 05/30/22 1715 05/30/22 1739 05/30/22 1740 BP: 129/64 100/63 (!) 91/59    Pulse:  67 66    Resp:       Temp:       TempSrc:       SpO2: 100% 100%  100%         MDM      The patient presents to the emergency department with a complaint of pain redness and swelling in the right lower leg. Clinical appearance is most consistent with mild cellulitis. No palpable or visible venous cords. D-dimer was obtained to help rule out DVT. Venous Doppler is not available today which is a holiday. X-ray of the right lower leg was obtained. The patient is hemodynamically stable. There is no evidence of hypoxia. She is not tachycardic. She is afebrile. X-ray reveals no evidence of any bony abnormality. No foreign body. REASSESSMENT          5:45 PM: The patient has a history of low blood pressure. There was a transient blood pressure of 91/59 but no associated tachycardia here in the emergency department. Repeat blood pressure is now normal in the 364T systolic. She is hemodynamically stable and afebrile. White blood cell count is normal at 4.0. Neutrophil count is normal.  D-dimer is less than 0.27 within normal limits. Creatinine is 0.9. Glucose is 69 but she has not eaten in several hours. She is not diabetic. The patient is stable for discharge and outpatient management. She was given a dose of Keflex and clindamycin in the emergency department. I advised her to follow-up with her primary care physician in 1 to 2 days for reexamination. She was advised to watch for any increased redness pain swelling or fever. Although her D-dimer is normal we will go ahead and obtain outpatient venous Doppler of the right lower leg. She was advised to call tomorrow morning to schedule this to be done as soon as possible preferably tomorrow. Results should be sent to her primary care physician. If her condition worsens or new symptoms develop, she was advised to return immediately to the emergency department.     CRITICAL CARE TIME   Total Critical Care time was 0 minutes, excluding separately reportable procedures. There was a high probability of clinically significant/life threatening deterioration in the patient's condition which required my urgent intervention. CONSULTS:  None    PROCEDURES:  Unless otherwise noted below, none     Procedures        FINAL IMPRESSION      1. Cellulitis of right anterior lower leg    2. Leg pain, anterior, right          DISPOSITION/PLAN   DISPOSITION        PATIENT REFERRED TO:  MD Cory [address]  [phone]    Call today        DISCHARGE MEDICATIONS:  New Prescriptions    CEPHALEXIN (KEFLEX) 500 MG CAPSULE    Take 1 capsule by mouth 4 times daily for 10 days    CLINDAMYCIN (CLEOCIN) 150 MG CAPSULE    Take 1 capsule by mouth 4 times daily for 10 days    NAPROXEN (NAPROSYN) 500 MG TABLET    Take 1 tablet by mouth 2 times daily     Controlled Substances Monitoring:     No flowsheet data found. (Please note that portions of this note were completed with a voice recognition program.  Efforts were made to edit the dictations but occasionally words are mis-transcribed. )    Gail Hamm DO (electronically signed)  Attending Emergency Physician          Erika Denis DO  05/30/22 0790

## 2022-06-03 PROCEDURE — 99285 EMERGENCY DEPT VISIT HI MDM: CPT

## 2022-06-04 ENCOUNTER — HOSPITAL ENCOUNTER (OUTPATIENT)
Age: 42
Setting detail: OBSERVATION
Discharge: HOME | End: 2022-06-05
Attending: SPECIALIST | Admitting: SPECIALIST
Payer: COMMERCIAL

## 2022-06-04 DIAGNOSIS — L03.115 CELLULITIS OF RIGHT LOWER EXTREMITY: Primary | ICD-10-CM

## 2022-06-04 DIAGNOSIS — L03.119 CELLULITIS OF LOWER EXTREMITY, UNSPECIFIED LATERALITY: ICD-10-CM

## 2022-06-04 PROBLEM — L03.90 CELLULITIS: Status: ACTIVE | Noted: 2022-06-04

## 2022-06-04 LAB
ANION GAP SERPL CALCULATED.3IONS-SCNC: 10 MMOL/L (ref 3–16)
BASOPHILS ABSOLUTE: 0 K/UL (ref 0–0.2)
BASOPHILS RELATIVE PERCENT: 0.6 %
BUN BLDV-MCNC: 18 MG/DL (ref 7–20)
CALCIUM SERPL-MCNC: 9.5 MG/DL (ref 8.3–10.6)
CHLORIDE BLD-SCNC: 99 MMOL/L (ref 99–110)
CO2: 28 MMOL/L (ref 21–32)
CREAT SERPL-MCNC: 0.8 MG/DL (ref 0.6–1.1)
EOSINOPHILS ABSOLUTE: 0.3 K/UL (ref 0–0.6)
EOSINOPHILS RELATIVE PERCENT: 3.5 %
GFR AFRICAN AMERICAN: >60
GFR NON-AFRICAN AMERICAN: >60
GLUCOSE BLD-MCNC: 83 MG/DL (ref 70–99)
HCT VFR BLD CALC: 37 % (ref 36–48)
HEMOGLOBIN: 12.7 G/DL (ref 12–16)
LACTIC ACID: 0.9 MMOL/L (ref 0.4–2)
LYMPHOCYTES ABSOLUTE: 1.9 K/UL (ref 1–5.1)
LYMPHOCYTES RELATIVE PERCENT: 25.1 %
MCH RBC QN AUTO: 30.3 PG (ref 26–34)
MCHC RBC AUTO-ENTMCNC: 34.4 G/DL (ref 31–36)
MCV RBC AUTO: 88 FL (ref 80–100)
MONOCYTES ABSOLUTE: 0.6 K/UL (ref 0–1.3)
MONOCYTES RELATIVE PERCENT: 7.3 %
NEUTROPHILS ABSOLUTE: 4.9 K/UL (ref 1.7–7.7)
NEUTROPHILS RELATIVE PERCENT: 63.5 %
PDW BLD-RTO: 14.9 % (ref 12.4–15.4)
PLATELET # BLD: 229 K/UL (ref 135–450)
PMV BLD AUTO: 7.5 FL (ref 5–10.5)
POTASSIUM REFLEX MAGNESIUM: 3.9 MMOL/L (ref 3.5–5.1)
RBC # BLD: 4.2 M/UL (ref 4–5.2)
SODIUM BLD-SCNC: 137 MMOL/L (ref 136–145)
WBC # BLD: 7.7 K/UL (ref 4–11)

## 2022-06-04 PROCEDURE — 96368 THER/DIAG CONCURRENT INF: CPT

## 2022-06-04 PROCEDURE — 6360000002 HC RX W HCPCS: Performed by: SPECIALIST

## 2022-06-04 PROCEDURE — 87040 BLOOD CULTURE FOR BACTERIA: CPT

## 2022-06-04 PROCEDURE — 2580000003 HC RX 258: Performed by: PHYSICIAN ASSISTANT

## 2022-06-04 PROCEDURE — 6370000000 HC RX 637 (ALT 250 FOR IP): Performed by: SPECIALIST

## 2022-06-04 PROCEDURE — 80048 BASIC METABOLIC PNL TOTAL CA: CPT

## 2022-06-04 PROCEDURE — 96375 TX/PRO/DX INJ NEW DRUG ADDON: CPT

## 2022-06-04 PROCEDURE — 96365 THER/PROPH/DIAG IV INF INIT: CPT

## 2022-06-04 PROCEDURE — 6360000002 HC RX W HCPCS: Performed by: PHYSICIAN ASSISTANT

## 2022-06-04 PROCEDURE — G0378 HOSPITAL OBSERVATION PER HR: HCPCS

## 2022-06-04 PROCEDURE — 85025 COMPLETE CBC W/AUTO DIFF WBC: CPT

## 2022-06-04 PROCEDURE — 83605 ASSAY OF LACTIC ACID: CPT

## 2022-06-04 PROCEDURE — 36415 COLL VENOUS BLD VENIPUNCTURE: CPT

## 2022-06-04 PROCEDURE — 2580000003 HC RX 258: Performed by: SPECIALIST

## 2022-06-04 RX ORDER — VITAMIN B COMPLEX
1000 TABLET ORAL DAILY
Status: DISCONTINUED | OUTPATIENT
Start: 2022-06-04 | End: 2022-06-05 | Stop reason: HOSPADM

## 2022-06-04 RX ORDER — DICYCLOMINE HYDROCHLORIDE 10 MG/1
20 CAPSULE ORAL 2 TIMES DAILY PRN
Status: DISCONTINUED | OUTPATIENT
Start: 2022-06-04 | End: 2022-06-05 | Stop reason: HOSPADM

## 2022-06-04 RX ORDER — DIVALPROEX SODIUM 250 MG/1
250 TABLET, DELAYED RELEASE ORAL 2 TIMES DAILY
Status: DISCONTINUED | OUTPATIENT
Start: 2022-06-04 | End: 2022-06-05 | Stop reason: HOSPADM

## 2022-06-04 RX ORDER — SODIUM CHLORIDE 0.9 % (FLUSH) 0.9 %
5-40 SYRINGE (ML) INJECTION EVERY 12 HOURS SCHEDULED
Status: DISCONTINUED | OUTPATIENT
Start: 2022-06-04 | End: 2022-06-05 | Stop reason: HOSPADM

## 2022-06-04 RX ORDER — CETIRIZINE HYDROCHLORIDE 10 MG/1
10 TABLET ORAL DAILY
Status: DISCONTINUED | OUTPATIENT
Start: 2022-06-04 | End: 2022-06-05 | Stop reason: HOSPADM

## 2022-06-04 RX ORDER — 0.9 % SODIUM CHLORIDE 0.9 %
1000 INTRAVENOUS SOLUTION INTRAVENOUS ONCE
Status: COMPLETED | OUTPATIENT
Start: 2022-06-04 | End: 2022-06-04

## 2022-06-04 RX ORDER — ONDANSETRON 4 MG/1
4 TABLET, ORALLY DISINTEGRATING ORAL EVERY 8 HOURS PRN
Status: DISCONTINUED | OUTPATIENT
Start: 2022-06-04 | End: 2022-06-04 | Stop reason: SDUPTHER

## 2022-06-04 RX ORDER — LEVOTHYROXINE SODIUM 0.03 MG/1
25 TABLET ORAL DAILY
Status: DISCONTINUED | OUTPATIENT
Start: 2022-06-04 | End: 2022-06-05 | Stop reason: HOSPADM

## 2022-06-04 RX ORDER — METHADONE HYDROCHLORIDE 10 MG/1
60 TABLET ORAL DAILY
Status: DISCONTINUED | OUTPATIENT
Start: 2022-06-04 | End: 2022-06-05 | Stop reason: HOSPADM

## 2022-06-04 RX ORDER — POLYETHYLENE GLYCOL 3350 17 G/17G
17 POWDER, FOR SOLUTION ORAL DAILY
Status: DISCONTINUED | OUTPATIENT
Start: 2022-06-04 | End: 2022-06-05 | Stop reason: HOSPADM

## 2022-06-04 RX ORDER — QUETIAPINE FUMARATE 100 MG/1
100 TABLET, FILM COATED ORAL NIGHTLY
Status: DISCONTINUED | OUTPATIENT
Start: 2022-06-04 | End: 2022-06-05 | Stop reason: HOSPADM

## 2022-06-04 RX ORDER — DEXTROSE AND SODIUM CHLORIDE 5; .45 G/100ML; G/100ML
INJECTION, SOLUTION INTRAVENOUS CONTINUOUS
Status: DISCONTINUED | OUTPATIENT
Start: 2022-06-04 | End: 2022-06-05 | Stop reason: HOSPADM

## 2022-06-04 RX ORDER — DIPHENHYDRAMINE HCL 25 MG
25 TABLET ORAL EVERY 6 HOURS PRN
Status: DISCONTINUED | OUTPATIENT
Start: 2022-06-04 | End: 2022-06-05 | Stop reason: HOSPADM

## 2022-06-04 RX ORDER — POLYETHYLENE GLYCOL 3350 17 G
2 POWDER IN PACKET (EA) ORAL
Status: DISCONTINUED | OUTPATIENT
Start: 2022-06-04 | End: 2022-06-05 | Stop reason: HOSPADM

## 2022-06-04 RX ORDER — KETOROLAC TROMETHAMINE 30 MG/ML
15 INJECTION, SOLUTION INTRAMUSCULAR; INTRAVENOUS ONCE
Status: COMPLETED | OUTPATIENT
Start: 2022-06-04 | End: 2022-06-04

## 2022-06-04 RX ORDER — ZOLPIDEM TARTRATE 12.5 MG/1
12.5 TABLET, FILM COATED, EXTENDED RELEASE ORAL NIGHTLY PRN
COMMUNITY

## 2022-06-04 RX ORDER — SODIUM CHLORIDE 0.9 % (FLUSH) 0.9 %
5-40 SYRINGE (ML) INJECTION PRN
Status: DISCONTINUED | OUTPATIENT
Start: 2022-06-04 | End: 2022-06-05 | Stop reason: HOSPADM

## 2022-06-04 RX ORDER — MIDODRINE HYDROCHLORIDE 5 MG/1
5 TABLET ORAL NIGHTLY
Status: DISCONTINUED | OUTPATIENT
Start: 2022-06-04 | End: 2022-06-05 | Stop reason: HOSPADM

## 2022-06-04 RX ORDER — ZOLPIDEM TARTRATE 5 MG/1
5 TABLET ORAL NIGHTLY PRN
Status: DISCONTINUED | OUTPATIENT
Start: 2022-06-04 | End: 2022-06-05 | Stop reason: HOSPADM

## 2022-06-04 RX ORDER — SODIUM CHLORIDE 9 MG/ML
INJECTION, SOLUTION INTRAVENOUS PRN
Status: DISCONTINUED | OUTPATIENT
Start: 2022-06-04 | End: 2022-06-05 | Stop reason: HOSPADM

## 2022-06-04 RX ORDER — ACETAMINOPHEN 325 MG/1
650 TABLET ORAL EVERY 4 HOURS PRN
Status: DISCONTINUED | OUTPATIENT
Start: 2022-06-04 | End: 2022-06-05 | Stop reason: HOSPADM

## 2022-06-04 RX ORDER — MULTIVITAMIN WITH IRON
1 TABLET ORAL DAILY
Status: DISCONTINUED | OUTPATIENT
Start: 2022-06-04 | End: 2022-06-05 | Stop reason: HOSPADM

## 2022-06-04 RX ORDER — PREGABALIN 100 MG/1
100 CAPSULE ORAL 2 TIMES DAILY
COMMUNITY

## 2022-06-04 RX ORDER — AZELASTINE HCL 205.5 UG/1
1 SPRAY NASAL 2 TIMES DAILY
COMMUNITY
Start: 2022-05-30

## 2022-06-04 RX ORDER — HYDROCODONE BITARTRATE AND ACETAMINOPHEN 5; 325 MG/1; MG/1
1 TABLET ORAL EVERY 6 HOURS PRN
Status: DISCONTINUED | OUTPATIENT
Start: 2022-06-04 | End: 2022-06-05 | Stop reason: HOSPADM

## 2022-06-04 RX ORDER — ONDANSETRON 4 MG/1
4 TABLET, ORALLY DISINTEGRATING ORAL EVERY 8 HOURS PRN
Status: DISCONTINUED | OUTPATIENT
Start: 2022-06-04 | End: 2022-06-05 | Stop reason: HOSPADM

## 2022-06-04 RX ORDER — MONTELUKAST SODIUM 10 MG/1
10 TABLET ORAL NIGHTLY
Status: DISCONTINUED | OUTPATIENT
Start: 2022-06-04 | End: 2022-06-05 | Stop reason: HOSPADM

## 2022-06-04 RX ORDER — ONDANSETRON 2 MG/ML
4 INJECTION INTRAMUSCULAR; INTRAVENOUS EVERY 6 HOURS PRN
Status: DISCONTINUED | OUTPATIENT
Start: 2022-06-04 | End: 2022-06-05 | Stop reason: HOSPADM

## 2022-06-04 RX ORDER — METHADONE HYDROCHLORIDE 10 MG/1
60 TABLET ORAL DAILY
COMMUNITY

## 2022-06-04 RX ORDER — ALPRAZOLAM 0.5 MG/1
1 TABLET ORAL 3 TIMES DAILY PRN
Status: DISCONTINUED | OUTPATIENT
Start: 2022-06-04 | End: 2022-06-05 | Stop reason: HOSPADM

## 2022-06-04 RX ORDER — HYDROCODONE BITARTRATE AND ACETAMINOPHEN 5; 325 MG/1; MG/1
1 TABLET ORAL EVERY 6 HOURS PRN
Qty: 10 TABLET | Refills: 0 | Status: SHIPPED | OUTPATIENT
Start: 2022-06-04 | End: 2022-06-05 | Stop reason: HOSPADM

## 2022-06-04 RX ORDER — PANTOPRAZOLE SODIUM 40 MG/1
40 TABLET, DELAYED RELEASE ORAL
Status: DISCONTINUED | OUTPATIENT
Start: 2022-06-04 | End: 2022-06-05 | Stop reason: HOSPADM

## 2022-06-04 RX ORDER — PREGABALIN 100 MG/1
100 CAPSULE ORAL 2 TIMES DAILY
Status: DISCONTINUED | OUTPATIENT
Start: 2022-06-04 | End: 2022-06-05 | Stop reason: HOSPADM

## 2022-06-04 RX ORDER — ENOXAPARIN SODIUM 100 MG/ML
30 INJECTION SUBCUTANEOUS DAILY
Status: DISCONTINUED | OUTPATIENT
Start: 2022-06-04 | End: 2022-06-05 | Stop reason: HOSPADM

## 2022-06-04 RX ORDER — SPIRONOLACTONE 50 MG/1
1 TABLET, FILM COATED ORAL 2 TIMES DAILY
Status: DISCONTINUED | OUTPATIENT
Start: 2022-06-04 | End: 2022-06-04

## 2022-06-04 RX ORDER — DOCUSATE SODIUM 100 MG/1
100 CAPSULE, LIQUID FILLED ORAL 2 TIMES DAILY
Status: DISCONTINUED | OUTPATIENT
Start: 2022-06-04 | End: 2022-06-05 | Stop reason: HOSPADM

## 2022-06-04 RX ADMIN — ALPRAZOLAM 1 MG: 0.5 TABLET ORAL at 14:11

## 2022-06-04 RX ADMIN — SODIUM CHLORIDE, PRESERVATIVE FREE 10 ML: 5 INJECTION INTRAVENOUS at 23:55

## 2022-06-04 RX ADMIN — HYDROMORPHONE HYDROCHLORIDE 0.5 MG: 1 INJECTION, SOLUTION INTRAMUSCULAR; INTRAVENOUS; SUBCUTANEOUS at 05:20

## 2022-06-04 RX ADMIN — POLYETHYLENE GLYCOL 3350 17 G: 17 POWDER, FOR SOLUTION ORAL at 16:45

## 2022-06-04 RX ADMIN — DOCUSATE SODIUM 100 MG: 100 CAPSULE, LIQUID FILLED ORAL at 23:54

## 2022-06-04 RX ADMIN — PREGABALIN 100 MG: 100 CAPSULE ORAL at 13:12

## 2022-06-04 RX ADMIN — THERA TABS 1 TABLET: TAB at 10:06

## 2022-06-04 RX ADMIN — CETIRIZINE HYDROCHLORIDE 10 MG: 10 TABLET, FILM COATED ORAL at 10:06

## 2022-06-04 RX ADMIN — PANTOPRAZOLE SODIUM 40 MG: 40 TABLET, DELAYED RELEASE ORAL at 13:12

## 2022-06-04 RX ADMIN — PREGABALIN 100 MG: 100 CAPSULE ORAL at 23:54

## 2022-06-04 RX ADMIN — KETOROLAC TROMETHAMINE 15 MG: 30 INJECTION, SOLUTION INTRAMUSCULAR at 04:04

## 2022-06-04 RX ADMIN — VANCOMYCIN HYDROCHLORIDE 1000 MG: 1 INJECTION, POWDER, LYOPHILIZED, FOR SOLUTION INTRAVENOUS at 05:19

## 2022-06-04 RX ADMIN — Medication 1000 UNITS: at 10:06

## 2022-06-04 RX ADMIN — ACETAMINOPHEN 650 MG: 325 TABLET ORAL at 04:49

## 2022-06-04 RX ADMIN — MIDODRINE HYDROCHLORIDE 5 MG: 5 TABLET ORAL at 23:54

## 2022-06-04 RX ADMIN — HYDROCODONE BITARTRATE AND ACETAMINOPHEN 1 TABLET: 5; 325 TABLET ORAL at 18:30

## 2022-06-04 RX ADMIN — LEVOTHYROXINE SODIUM 25 MCG: 0.03 TABLET ORAL at 13:12

## 2022-06-04 RX ADMIN — CEFEPIME HYDROCHLORIDE 2000 MG: 2 INJECTION, POWDER, FOR SOLUTION INTRAVENOUS at 04:03

## 2022-06-04 RX ADMIN — DIVALPROEX SODIUM 250 MG: 250 TABLET, DELAYED RELEASE ORAL at 23:55

## 2022-06-04 RX ADMIN — DOCUSATE SODIUM 100 MG: 100 CAPSULE, LIQUID FILLED ORAL at 10:06

## 2022-06-04 RX ADMIN — SODIUM CHLORIDE 1000 ML: 9 INJECTION, SOLUTION INTRAVENOUS at 04:01

## 2022-06-04 RX ADMIN — HYDROCODONE BITARTRATE AND ACETAMINOPHEN 1 TABLET: 5; 325 TABLET ORAL at 10:06

## 2022-06-04 RX ADMIN — ZOLPIDEM TARTRATE 5 MG: 5 TABLET ORAL at 23:55

## 2022-06-04 RX ADMIN — DIVALPROEX SODIUM 250 MG: 250 TABLET, DELAYED RELEASE ORAL at 10:06

## 2022-06-04 RX ADMIN — METHADONE HYDROCHLORIDE 60 MG: 10 TABLET ORAL at 13:12

## 2022-06-04 RX ADMIN — QUETIAPINE FUMARATE 100 MG: 100 TABLET ORAL at 23:55

## 2022-06-04 RX ADMIN — MONTELUKAST 10 MG: 10 TABLET, FILM COATED ORAL at 23:54

## 2022-06-04 RX ADMIN — DEXTROSE AND SODIUM CHLORIDE: 5; 450 INJECTION, SOLUTION INTRAVENOUS at 06:38

## 2022-06-04 RX ADMIN — ALPRAZOLAM 1 MG: 0.5 TABLET ORAL at 18:30

## 2022-06-04 RX ADMIN — DEXTROSE AND SODIUM CHLORIDE: 5; 450 INJECTION, SOLUTION INTRAVENOUS at 18:30

## 2022-06-04 ASSESSMENT — PAIN DESCRIPTION - LOCATION
LOCATION: LEG

## 2022-06-04 ASSESSMENT — PAIN SCALES - WONG BAKER
WONGBAKER_NUMERICALRESPONSE: 2
WONGBAKER_NUMERICALRESPONSE: 0
WONGBAKER_NUMERICALRESPONSE: 0
WONGBAKER_NUMERICALRESPONSE: 2
WONGBAKER_NUMERICALRESPONSE: 0
WONGBAKER_NUMERICALRESPONSE: 0
WONGBAKER_NUMERICALRESPONSE: 2
WONGBAKER_NUMERICALRESPONSE: 0

## 2022-06-04 ASSESSMENT — PAIN SCALES - GENERAL
PAINLEVEL_OUTOF10: 4
PAINLEVEL_OUTOF10: 8
PAINLEVEL_OUTOF10: 8
PAINLEVEL_OUTOF10: 6
PAINLEVEL_OUTOF10: 2
PAINLEVEL_OUTOF10: 9
PAINLEVEL_OUTOF10: 4
PAINLEVEL_OUTOF10: 9
PAINLEVEL_OUTOF10: 3
PAINLEVEL_OUTOF10: 5
PAINLEVEL_OUTOF10: 9
PAINLEVEL_OUTOF10: 5
PAINLEVEL_OUTOF10: 4

## 2022-06-04 ASSESSMENT — PAIN DESCRIPTION - FREQUENCY
FREQUENCY: CONTINUOUS

## 2022-06-04 ASSESSMENT — PAIN DESCRIPTION - ORIENTATION
ORIENTATION: RIGHT

## 2022-06-04 ASSESSMENT — PAIN DESCRIPTION - PAIN TYPE
TYPE: ACUTE PAIN

## 2022-06-04 ASSESSMENT — PAIN DESCRIPTION - DESCRIPTORS
DESCRIPTORS: BURNING

## 2022-06-04 ASSESSMENT — PAIN - FUNCTIONAL ASSESSMENT

## 2022-06-04 ASSESSMENT — ENCOUNTER SYMPTOMS
SHORTNESS OF BREATH: 0
COLOR CHANGE: 1

## 2022-06-04 ASSESSMENT — PAIN DESCRIPTION - ONSET
ONSET: ON-GOING

## 2022-06-04 NOTE — PLAN OF CARE
Problem: Discharge Planning  Goal: Discharge to home or other facility with appropriate resources  Outcome: Progressing  Flowsheets (Taken 6/4/2022 3556)  Discharge to home or other facility with appropriate resources: Identify barriers to discharge with patient and caregiver     Problem: Pain  Goal: Verbalizes/displays adequate comfort level or baseline comfort level  Outcome: Progressing

## 2022-06-04 NOTE — H&P
[address] [place]                               HISTORY AND PHYSICAL    PATIENT NAME: Lamin Nichols                    :        1980  MED REC NO:   [id number]                          ROOM:       4116  ACCOUNT NO:   [de-identified]                           ADMIT DATE: 2022  PROVIDER:     Chance Joyce MD    ATTENDING PROVIDER:  Chance Joyce MD    CHIEF COMPLAINT:  Pain in the right leg and increased swelling and  cellulitis. HISTORY OF PRESENT ILLNESS:  This 38-year-old female patient came to the  emergency room after calling me that she is having more pain and more  swelling on the right lower extremity, was treated with cephalexin and  clindamycin. The patient said that swelling got worse and pain got  worse and she decided to come to the emergency room for further  evaluation. The ER physician noticed that the patient's swelling was  getting worse. We admitted for observation and get IV antibiotic  therapy. The patient was very sleepy after the pain medication and  denied any fever or chills. No nausea or vomiting. No abdominal pain. PAST MEDICAL HISTORY:  Significant for anxiety, Howard-Danlos syndrome,  status post multiple surgeries of the knee, back, shoulder; history of  substance abuse, status post lumbar spinal fusion, chronic constipation,  allergic rhinitis, bipolar disorder, hypothyroidism, chronic insomnia,  some asthma.     ALLERGIES:  The patient has multiple drug allergies, see the list.    Medications: see medical reconciliation sheet:  Current Facility-Administered Medications   Medication Dose Route Frequency Provider Last Rate Last Admin    cefepime (MAXIPIME) 1000 mg IVPB minibag  1,000 mg IntraVENous Q24H Chance Joyce MD        sodium chloride flush 0.9 % injection 5-40 mL  5-40 mL IntraVENous 2 times per day Chance Joyce MD   10 mL at 22 6563    sodium tablet at 06/04/22 1830    diphenhydrAMINE (BENADRYL) tablet 25 mg  25 mg Oral Q6H PRN Sergio Head MD        QUEtiapine (SEROQUEL) tablet 100 mg  100 mg Oral Nightly Sergio Head MD   100 mg at 06/04/22 2355    pregabalin (LYRICA) capsule 100 mg  100 mg Oral BID Sergio Head MD   100 mg at 06/05/22 9963    methadone (DOLOPHINE) tablet 60 mg  60 mg Oral Daily Sergio Head MD   60 mg at 06/05/22 7400    nicotine polacrilex (COMMIT) lozenge 2 mg  2 mg Oral Q2H PRN Sergio Head MD        polyethylene glycol [facility]) packet 17 g  17 g Oral Daily Sergio Head MD   17 g at 06/05/22 7802    midodrine (PROAMATINE) tablet 5 mg  5 mg Oral Nightly Sergio Head MD   5 mg at 06/04/22 9937       PAST SURGICAL HISTORY:  The patient had surgery on the shoulder and the  back and had flexible endoscopy. FAMILY HISTORY:  Significant for hypertension and osteoarthritis. Father has it. SOCIAL HISTORY:  The patient is , has children. History of  smoking. No drinking. REVIEW OF SYSTEMS:  The patient has no headache. Has some sinus  allergies. No swallowing problem. No chest pain. No pleuritic pain. No cough. Has some heartburn, constipation. No vomiting. No flank  pain. No urinary symptoms. Has the right leg pain and swelling. PHYSICAL EXAMINATION:  VITAL SIGNS:  The patient's blood pressure was 90/60, respiratory rate  was 16, pulse 72, temperature 97.5, the patient weighed 152 pounds,  saturation was 99% on room air. GENERAL:  The patient is sleepy but easily arousable, not in apparent  distress. SKIN:  On the right leg, some mild redness noted and some scratching  sign. Otherwise, skin warm and dry. HEENT:  Head:  Normocephalic, atraumatic. Pupils are equal, both sides,  reactive to light and accommodation. Ears, Nose, and Throat: Within  normal limits. Mucous membranes are moist.  NECK:  Supple. No JVD. No lymphadenopathy. No thyromegaly.   LUNGS:  Clear bilaterally. No wheezing. HEART:  S1, S2.  Regular rhythm. ABDOMEN:  Soft. Bowel sounds present. No mass. No hepatosplenomegaly. EXTREMITIES:  The right lower extremity was edematous, is much better  now. Some tenderness noted in the ankles and some cellulitis noted. The left within normal limits. LABORATORY STUDIES:  WBC count 7.7, hemoglobin 12.7, hematocrit 37,  platelet count 491. Sodium 137, potassium 3.9, chloride 99, CO2 of 28,  BUN 18, creatinine 0.8, glucose 83. Lactic acid 0.9. ASSESSMENT:  Right lower extremity cellulitis, right leg pain, status  post back surgery, shoulder surgery, hypotension, chronic insomnia,  bipolar disorder, GERD. PLAN:  The patient will be on observation. They are planning to  discharge tomorrow. Continue the antibiotic therapy. Resume some home  medication. The patient has multiple drug allergies which is very  difficult to confirm. Able to change the pain medication to the oral  one. Watch closely any kind of drug reaction. I spent more than 30 minutes on face-to-face evaluation with the  patient. I discussed the management and findings with the patient and  nursing staff.         Jodee Mayer MD    D: 06/04/2022 6:50:13       T: 06/04/2022 10:30:32     [email or web address]  Job#: [id number]     Doc#: [id number]    CC:

## 2022-06-04 NOTE — PROGRESS NOTES
Order placed for Norco per Dr. Aguilar Monday request. I notified him of possible allergy and was instructed to order benadryl if needed. Will continue to monitor.

## 2022-06-04 NOTE — ED PROVIDER NOTES
[facility]      Pt Name: Em Means  MRN: [id number]  Armstrongfantonio 1980  Date of evaluation: 6/3/2022  Provider: MARTÍN Schuler    This patient was not seen and evaluated by the attending physician No att. providers found. CHIEF COMPLAINT       Chief Complaint   Patient presents with    Cellulitis     patient was seen 3 days ago and given antibotics for cellulitis and states her leg is worse 9/10       CRITICAL CARE TIME   I performed a total Critical Care time of 15 minutes, excluding separately reportable procedures. There was a high probability of clinically significant/life threatening deterioration in the patient's condition which required my urgent intervention. Not limited to multiple reexaminations, discussions with attending physician and consultants. HISTORY OF PRESENT ILLNESS  (Location/Symptom, Timing/Onset, Context/Setting, Quality, Duration, Modifying Factors, Severity.)   Anjelica Clintno is a 38 y.o. female who presents to the emergency department complaining of right lower extremity cellulitis. She states that it started about 4-5 days ago. She seen in the emergency department at the end of May and started on Bactrim and Keflex which she states she been taking with worsening. She feels like today the symptoms are spread to the left leg. She feels that the entire right lower leg is red with some swelling and there is a rash that a brighter red around the ankle that is wrapping and worse. No known prior history of blood clot. Denies chest pain shortness of breath or fevers. No vomiting. Reports remote history of IV drug use but states she is been clean for about 8 years. Unsure of tetanus status. Nursing Notes were reviewed and I agree. REVIEW OF SYSTEMS    (2-9 systems for level 4, 10 or more for level 5)     Review of Systems   Constitutional: Negative for chills and fever.    Respiratory: Negative for shortness of breath. Cardiovascular: Positive for leg swelling. Negative for chest pain. Musculoskeletal: Positive for arthralgias. Skin: Positive for color change and rash. Negative for wound. Neurological: Negative for weakness. Psychiatric/Behavioral: Negative for agitation and behavioral problems. Except as noted above the remainder of the review of systems was reviewed and negative. PAST MEDICAL HISTORY         Diagnosis Date    Asthma     Bipolar 1 disorder ([address]     Compression fracture     COPD (chronic obstructive pulmonary disease) (HCC)     DDD (degenerative disc disease), cervical     Howard-Danlos syndrome     Fibromyalgia     H/O adrenal insufficiency     and pituitary insufficiency    Herniated disc     Metabolic syndrome     Osteoarthritis     Pyelonephritis     Sciatica        SURGICAL HISTORY           Procedure Laterality Date    KNEE SURGERY      LUMBAR FUSION N/A 6/3/2019    L5, S1 TRANSFORAMINAL LUMBAR INTERBODY FUSION performed by Terrie Dillon MD at [facility] Left     repair    SIGMOIDOSCOPY N/A 8/21/2020    FLEXIBLE SIGMOIDOSCOPY POSSIBLE COLONOSCOPY performed by Marianna Becker MD at [address]       Previous Medications    ACYCLOVIR (ZOVIRAX) 400 MG TABLET    Take 400 mg by mouth as needed     AJOVY 225 MG/1.5ML SOSY    INJECT 1.5ML ONCE A MONTH    ALPRAZOLAM (XANAX) 1 MG TABLET    Take 1 mg by mouth 3 times daily as needed for Sleep or Anxiety.      CEPHALEXIN (KEFLEX) 500 MG CAPSULE    Take 1 capsule by mouth 4 times daily for 10 days    CETIRIZINE HCL (ZYRTEC ALLERGY) 10 MG CAPS    Take by mouth daily     CLINDAMYCIN (CLEOCIN) 150 MG CAPSULE    Take 1 capsule by mouth 4 times daily for 10 days    DICYCLOMINE (BENTYL) 10 MG CAPSULE    Take 2 capsules by mouth 2 times daily as needed (cramping)    DIVALPROEX (DEPAKOTE) 250 MG DR TABLET    Take 1 tablet by mouth 2 times daily FLUOCINOLONE (DERMA-SMOOTHE) 0.01 % OIL EXTERNAL OIL    Apply 1 drop topically 4 times daily as needed    KETOCONAZOLE (NIZORAL) 2 % SHAMPOO    Apply topically Twice a Week    LATUDA 20 MG TABS TABLET    Take 20 mg by mouth daily     LEVOTHYROXINE (SYNTHROID) 25 MCG TABLET    TAKE 1 TABLET BY MOUTH EVERY DAY    LORATADINE (CLARITIN) 10 MG TABLET    TAKE 1 TABLET BY MOUTH EVERY MORNING    MAGNESIUM CITRATE SOLUTION    Take 296 mLs by mouth once for 1 dose    MIDODRINE (PROAMATINE) 2.5 MG TABLET    TAKE 1 TABLET BY MOUTH THREE TIMES A DAY    MONTELUKAST (SINGULAIR) 10 MG TABLET    Take 10 mg by mouth nightly    MULTIPLE VITAMIN (DAILY-RUTHANN) TABS    TAKE 1 TABLET BY MOUTH EVERY DAY    NAPROXEN (NAPROSYN) 500 MG TABLET    Take 1 tablet by mouth 2 times daily    OMEPRAZOLE (PRILOSEC) 20 MG DELAYED RELEASE CAPSULE    TAKE 1 CAPSULE BY MOUTH EVERY DAY    ONDANSETRON (ZOFRAN ODT) 4 MG DISINTEGRATING TABLET    Take 1 tablet by mouth every 8 hours as needed for Nausea    QUETIAPINE (SEROQUEL) 200 MG TABLET    Take 200 mg by mouth nightly    SPIRONOLACTONE (ALDACTONE) 50 MG TABLET    TAKE 1 TABLET BY MOUTH TWICE A DAY    STOOL SOFTENER 100 MG CAPSULE    Take 100 mg by mouth 2 times daily    SULFAMETHOXAZOLE-TRIMETHOPRIM (BACTRIM DS;SEPTRA DS) 800-160 MG PER TABLET    Take 1 tablet by mouth 2 times daily    SUPER B COMPLEX & C TABS    TAKE 1 TABLET BY MOUTH EVERY DAY    TEMAZEPAM (RESTORIL) 30 MG CAPSULE    Take 30 mg by mouth nightly as needed for Sleep. TRIAMCINOLONE (KENALOG) 0.1 % CREAM    Apply twice daily to affected skin on trunk. Avoid face or skin folds    TRULANCE 3 MG TABS    Take 3 mg by mouth daily    VITAMIN D (CHOLECALCIFEROL) 25 MCG (1000 UT) TABS TABLET    TAKE 1 TABLET BY MOUTH EVERY DAY    ZOLPIDEM (AMBIEN) 10 MG TABLET    Take 10 mg by mouth nightly as needed.        ALLERGIES     Metoclopramide, Quetiapine, Risperidone, Trazodone, Buprenorphine-naloxone, Morphine, Asenapine, Buprenorphine hcl-naloxone hcl, Codeine, Guanfacine hcl, Morphine and related, Reglan [metoclopramide hcl], Risperidone and related, Seroquel  [quetiapine fumarate], Seroquel [quetiapine fumarate], Trazodone and nefazodone, and Lurasidone    FAMILY HISTORY           Problem Relation Age of Onset    Diabetes type 2  Mother     Diabetes type 2  Father     Cancer Sister     Diabetes type 2  Brother      Family Status   Relation Name Status    Mother  Alive    Father  Alive    Sister  Alive    Brother  [address]      reports that she quit smoking about 4 years ago. Her smoking use included cigarettes. She smoked 1.00 pack per day. She has never used smokeless tobacco. She reports previous alcohol use. She reports that she does not use drugs. PHYSICAL EXAM    (up to 7 for level 4, 8 or more for level 5)     ED Triage Vitals [06/04/22 0016]   BP Temp Temp Source Heart Rate Resp SpO2 Height Weight   98/61 97.4 °F (36.3 °C) Oral 73 17 100 % -- 152 lb 5.4 oz (69.1 kg)       Physical Exam  Vitals and nursing note reviewed. Constitutional:       Appearance: Normal appearance. HENT:      Head: Normocephalic and atraumatic. Mouth/Throat:      Mouth: Mucous membranes are moist.   Eyes:      Pupils: Pupils are equal, round, and reactive to light. Cardiovascular:      Rate and Rhythm: Normal rate. Pulses: Normal pulses. Pulmonary:      Effort: Pulmonary effort is normal. No respiratory distress. Musculoskeletal:         General: Swelling present. Cervical back: Normal range of motion. Legs:    Skin:     General: Skin is warm. Findings: Erythema and rash present. Neurological:      General: No focal deficit present. Mental Status: She is alert and oriented to person, place, and time.    Psychiatric:         Mood and Affect: Mood normal.         Behavior: Behavior normal.         DIAGNOSTIC RESULTS     NONE    LABS:  Labs Reviewed   CULTURE, BLOOD 2   CULTURE, BLOOD 1   CBC WITH AUTO DIFFERENTIAL   BASIC METABOLIC PANEL W/ REFLEX TO MG FOR LOW K   LACTIC ACID       All other labs were within normal range or not returned as of this dictation. EMERGENCY DEPARTMENT COURSE and DIFFERENTIAL DIAGNOSIS/MDM:   Vitals:    Vitals:    06/04/22 0016   BP: 98/61   Pulse: 73   Resp: 17   Temp: 97.4 °F (36.3 °C)   TempSrc: Oral   SpO2: 100%   Weight: 152 lb 5.4 oz (69.1 kg)     Afebrile nontachycardic blood pressure around 98 systolic. She has pulses intact she has a brighter red rash along the right ankle however there are some swelling and redness throughout the calf and shin. She is been on Bactrim and Keflex with worsening. Will consult hospitalist regarding admission IV antibiotics ordered. She did have a D-dimer here in the emergency department for the same symptoms 4 days ago which was not elevated. CONSULTS:  IP CONSULT TO PRIMARY CARE PROVIDER    PROCEDURES:  Procedures      FINAL IMPRESSION      1. Cellulitis of lower extremity, unspecified laterality          DISPOSITION/PLAN   DISPOSITION Decision To Admit 06/04/2022 02:20:30 AM      PATIENT REFERRED TO:  No follow-up provider specified.     DISCHARGE MEDICATIONS:  New Prescriptions    No medications on file       (Please note that portions of this note were completed with a voice recognition program.  Efforts were made to edit the dictations but occasionally words are mis-transcribed.)    Elaine Baeza [place]  06/04/22 0551

## 2022-06-04 NOTE — PLAN OF CARE
Problem: Discharge Planning  Goal: Discharge to home or other facility with appropriate resources  6/4/2022 0729 by Paul Han RN  Outcome: Progressing  6/4/2022 0546 by Paul Han RN  Outcome: Progressing  Flowsheets (Taken 6/4/2022 0544)  Discharge to home or other facility with appropriate resources: Identify barriers to discharge with patient and caregiver     Problem: Pain  Goal: Verbalizes/displays adequate comfort level or baseline comfort level  6/4/2022 0729 by Paul Han RN  Outcome: Progressing  6/4/2022 0546 by Paul Han RN  Outcome: Progressing     Problem: Safety - Adult  Goal: Free from fall injury  Outcome: Progressing

## 2022-06-04 NOTE — PLAN OF CARE
Problem: Discharge Planning  Goal: Discharge to home or other facility with appropriate resources  6/4/2022 1033 by Lisa Parikh RN  Outcome: Progressing  6/4/2022 0729 by Yogesh Villareal RN  Outcome: Progressing  6/4/2022 0546 by Yogesh Villareal, RN  Outcome: Progressing  Flowsheets (Taken 6/4/2022 0544)  Discharge to home or other facility with appropriate resources: Identify barriers to discharge with patient and caregiver     Problem: Pain  Goal: Verbalizes/displays adequate comfort level or baseline comfort level  6/4/2022 1033 by Lisa Parikh RN  Outcome: Progressing  6/4/2022 0729 by Yogesh Villareal, RN  Outcome: Progressing  6/4/2022 0546 by Yogesh Villareal RN  Outcome: Progressing     Problem: Safety - Adult  Goal: Free from fall injury  6/4/2022 1033 by Lisa Parikh RN  Outcome: Progressing  6/4/2022 0729 by Yogesh Villareal RN  Outcome: Progressing

## 2022-06-04 NOTE — PROGRESS NOTES
Pharmacy Medication Reconciliation Note     List of medications patient is currently taking is complete. Source of information:   1. Conversation with patient   2. EMR    Notes regarding home medications:   1. Patient states she is getting methadone 60 mg daily from the [facility]. Clinic is already closed for the weekend - unable to confirm dose at this time  2. Removed Latuda, spironolactone, Bactrim, and loratadine as patient is no longer taking at home.   3. Added methadone, pregabalin, nicotine gum, and azelastine to the patient's list and adjusted dose of zolpidem      Shraddha Senior PharmD  6/4/2022 10:45 AM

## 2022-06-04 NOTE — FLOWSHEET NOTE
Patient arrived via stretcher to room 4116 from ED. Patient drove self to ER for Right leg cellulitis that has been ongoing for five days. Sent home with po antibiotics but ineffective and has \"gotten worse\". Patient is now admitted for observation under Dr. China Zamarripa who is seeing her right now. Patient alert and oriented. Up ad vida. Pain under control with prn dilaudid. It was 9/10 and ineffective after one time Toradol and prn tylenol. But only got one dose and will be d/c'd. Replacing it with norco instead. Oriented to room including call light.  Ate snacks and reminded to ask for asst.

## 2022-06-04 NOTE — LETTER
[address]  Phone: [phone]    No name on file. June 5, 2022     Patient: Osbaldo Haas   YOB: 1980   Date of Visit: 6/3/2022       To Whom It May Concern: It is my medical opinion that Carli Orozco may return to full duty immediately with no restrictions. Patient was hospitalized from [address] [place] [address], 2022 to June 5, 2022. If you have any questions or concerns, please don't hesitate to call.     Sincerely,        [facility]

## 2022-06-04 NOTE — ED TRIAGE NOTES
.Hudson Whitehead is a 38 y.o. female brought herself to the ER for eval of right lower leg cellulitis. The patient was seen 3-4 days ago and states that it has gotten worse and she was told to return to be admitted. The patient is alert and oriented with an open and patent airway.

## 2022-06-04 NOTE — H&P
H & P dictated  231 [id number]  R leg cellulitis, pain,  Bipolar d/o,  Chronic pain,  S/p back and shoulder surgery  Chronic anxiety,and insomnia,  Multiple drug allergies  Pt will be on observation status, cont IV  Antibiotic therapy,  Plan to d/c tomorrow.   Dr Natan Chahal

## 2022-06-05 VITALS
TEMPERATURE: 98.3 F | SYSTOLIC BLOOD PRESSURE: 90 MMHG | HEIGHT: 63 IN | DIASTOLIC BLOOD PRESSURE: 56 MMHG | HEART RATE: 65 BPM | BODY MASS INDEX: 27.3 KG/M2 | WEIGHT: 154.1 LBS | RESPIRATION RATE: 18 BRPM | OXYGEN SATURATION: 99 %

## 2022-06-05 LAB
BILIRUBIN URINE: NEGATIVE
BLOOD, URINE: NEGATIVE
CLARITY: CLEAR
COLOR: YELLOW
GLUCOSE URINE: NEGATIVE MG/DL
KETONES, URINE: NEGATIVE MG/DL
LEUKOCYTE ESTERASE, URINE: NEGATIVE
MICROSCOPIC EXAMINATION: NORMAL
NITRITE, URINE: NEGATIVE
PH UA: 7 (ref 5–8)
PROTEIN UA: NEGATIVE MG/DL
SPECIFIC GRAVITY UA: 1.01 (ref 1–1.03)
URINE TYPE: NORMAL
UROBILINOGEN, URINE: 0.2 E.U./DL

## 2022-06-05 PROCEDURE — 96366 THER/PROPH/DIAG IV INF ADDON: CPT

## 2022-06-05 PROCEDURE — 6360000002 HC RX W HCPCS: Performed by: SPECIALIST

## 2022-06-05 PROCEDURE — 6370000000 HC RX 637 (ALT 250 FOR IP): Performed by: SPECIALIST

## 2022-06-05 PROCEDURE — 96367 TX/PROPH/DG ADDL SEQ IV INF: CPT

## 2022-06-05 PROCEDURE — 2580000003 HC RX 258: Performed by: SPECIALIST

## 2022-06-05 PROCEDURE — 81003 URINALYSIS AUTO W/O SCOPE: CPT

## 2022-06-05 PROCEDURE — G0378 HOSPITAL OBSERVATION PER HR: HCPCS

## 2022-06-05 RX ORDER — QUETIAPINE FUMARATE 200 MG/1
100 TABLET, FILM COATED ORAL NIGHTLY
Qty: 60 TABLET | Refills: 2 | Status: SHIPPED | OUTPATIENT
Start: 2022-06-05

## 2022-06-05 RX ORDER — POLYETHYLENE GLYCOL 3350 17 G/17G
17 POWDER, FOR SOLUTION ORAL DAILY
Qty: 527 G | Refills: 1 | Status: SHIPPED | OUTPATIENT
Start: 2022-06-06 | End: 2022-07-06

## 2022-06-05 RX ORDER — QUETIAPINE FUMARATE 100 MG/1
100 TABLET, FILM COATED ORAL NIGHTLY
Qty: 60 TABLET | Refills: 3 | Status: SHIPPED | OUTPATIENT
Start: 2022-06-05

## 2022-06-05 RX ADMIN — ALPRAZOLAM 1 MG: 0.5 TABLET ORAL at 08:44

## 2022-06-05 RX ADMIN — HYDROCODONE BITARTRATE AND ACETAMINOPHEN 1 TABLET: 5; 325 TABLET ORAL at 08:44

## 2022-06-05 RX ADMIN — Medication 1000 UNITS: at 08:08

## 2022-06-05 RX ADMIN — VANCOMYCIN HYDROCHLORIDE 1000 MG: 1 INJECTION, POWDER, LYOPHILIZED, FOR SOLUTION INTRAVENOUS at 00:03

## 2022-06-05 RX ADMIN — CETIRIZINE HYDROCHLORIDE 10 MG: 10 TABLET, FILM COATED ORAL at 08:08

## 2022-06-05 RX ADMIN — THERA TABS 1 TABLET: TAB at 08:08

## 2022-06-05 RX ADMIN — PANTOPRAZOLE SODIUM 40 MG: 40 TABLET, DELAYED RELEASE ORAL at 06:39

## 2022-06-05 RX ADMIN — CEFEPIME HYDROCHLORIDE 1000 MG: 1 INJECTION, POWDER, FOR SOLUTION INTRAMUSCULAR; INTRAVENOUS at 02:22

## 2022-06-05 RX ADMIN — DIVALPROEX SODIUM 250 MG: 250 TABLET, DELAYED RELEASE ORAL at 08:09

## 2022-06-05 RX ADMIN — PREGABALIN 100 MG: 100 CAPSULE ORAL at 08:08

## 2022-06-05 RX ADMIN — DOCUSATE SODIUM 100 MG: 100 CAPSULE, LIQUID FILLED ORAL at 08:08

## 2022-06-05 RX ADMIN — LEVOTHYROXINE SODIUM 25 MCG: 0.03 TABLET ORAL at 06:39

## 2022-06-05 RX ADMIN — CEFEPIME HYDROCHLORIDE 1000 MG: 1 INJECTION, POWDER, FOR SOLUTION INTRAMUSCULAR; INTRAVENOUS at 08:44

## 2022-06-05 RX ADMIN — METHADONE HYDROCHLORIDE 60 MG: 10 TABLET ORAL at 08:08

## 2022-06-05 RX ADMIN — SODIUM CHLORIDE, PRESERVATIVE FREE 10 ML: 5 INJECTION INTRAVENOUS at 10:40

## 2022-06-05 RX ADMIN — POLYETHYLENE GLYCOL 3350 17 G: 17 POWDER, FOR SOLUTION ORAL at 08:08

## 2022-06-05 ASSESSMENT — PAIN SCALES - GENERAL: PAINLEVEL_OUTOF10: 7

## 2022-06-05 NOTE — DISCHARGE INSTR - COC
Continuity of Care Form    Patient Name: Shelly Wade   :  1980  MRN:  [id number]    [address] [place] [address] date:  2022  Discharge date:  ***    Code Status Order: Full Code   Advance Directives:      Admitting Physician:  Gómez Simon MD  PCP: Gómez Simon MD    Discharging Nurse: [facility] Unit/Room#: [id number]-7207/9184-23  Discharging Unit Phone Number: ***    Emergency Contact:   Extended Emergency Contact Information  Primary Emergency Contact: Sandip Hager [address] Phone: [phone]  Mobile Phone: [phone]  Relation: Parent    Past Surgical History:  Past Surgical History:   Procedure Laterality Date    KNEE SURGERY      LUMBAR FUSION N/A 6/3/2019    L5, S1 TRANSFORAMINAL LUMBAR INTERBODY FUSION performed by Sea Reynoso MD at [address] [place] [address] Left     repair    SIGMOIDOSCOPY N/A 2020    FLEXIBLE SIGMOIDOSCOPY POSSIBLE COLONOSCOPY performed by Yogesh Nieto MD at [facility] ENDOSCOPY       Immunization History:   Immunization History   Administered Date(s) Administered    Influenza Virus Vaccine 10/16/2013    Pneumococcal Polysaccharide ([id number]) 10/16/2013       Active Problems:  Patient Active Problem List   Diagnosis Code    Substance-induced psychotic disorder with delusions ([facility] F19.950    Polysubstance dependence ([facility] F19.20    Change in mental status R41.82    Lumbar stenosis with neurogenic claudication M48.062    S/P lumbar spinal fusion Z98.1    Abnormal laboratory test result R89.9    Acute lateral meniscal tear, right, initial encounter D52.569P    Adrenal insufficiency (HCC) E27.40    Allergic rhinitis J30.9    Anxiety F41.9    Asymptomatic bacteriuria R82.71    Attention deficit hyperactivity disorder F90.9    Blurring of visual image H53.8    Borderline personality disorder (HCC) F60.3    Cervical radiculopathy M54.12    Chronic diarrhea K52.9    Chronic back pain M54.9, G89.29    Chronic pain of both knees M25.561, M25.562, G89.29    Cocaine abuse in remission ([facility]) F14.11    Cystitis N30.90    DDD (degenerative disc disease), lumbar M51.36    Diabetes mellitus ([facility] E11.9    Drug overdose T50.901A    Fibromyalgia M79.7    Generalized abdominal pain R10.84    Glenoid labral tear, left, initial encounter S43.432A    History of ulcerative colitis Z87.19    Hypermobility syndrome M35.7    Hyponatremia E87.1    Hyposmolality and/or hyponatremia E87.1    Hyposomnia, insomnia, or sleeplessness associated with conditioned arousal F51.03    Insomnia due to medical condition G47.01    Intractable migraine with status migrainosus G43.911    Left hip pain M25.552    Left upper arm pain M79.622    Low serum cortisol level ([facility]) E27.40    Lumbar spondylosis M47.816    Bipolar disorder ([facility]) C89.0    Metabolic syndrome N86.49    Mood and affect disturbance R45.86    Muscle spasm M62.838    Myalgia and myositis [id number]    Neuropathic pain M79.2    Opioid overdose ([facility]) T40.2X1A    Pain management contract signed Z02.89    Pain of both hip joints M25.551, M25.552    Pityriasis versicolor B36.0    Polysubstance dependence including opioid type drug, episodic abuse ([facility]) F11.20, F19.20    Postnasal drip R09.82    Pregnancy examination or test, positive result Z32.01    Suicidal ideations R45.851    Suicide and self-inflicted poisoning by barbiturates ([facility] T55.5K6D    TCA (tricyclic antidepressant) overdose of undetermined intent T43.014A    Tobacco abuse Z72.0    Tooth disorder K08.9    Urinary tract infectious disease N39.0    Vertigo R42    Viral hepatitis C B19.20    Low T4 R79.89    Chronic, continuous use of opioids F11.90    Chronic fatigue R53.82    High serum thyroid stimulating hormone (TSH) R79.89    Hyperprolactinemia (HCC) E22.1    High insulin-like growth factor 1 (IGF-1) level R79.89    Constipation K59.00    Arthrodesis status Z98.1    Overweight E66.3    Postoperative care for cataract Z48.810, Z98.49    Spondylolisthesis M43.10    Traumatic arthritis of right knee M12.561    Irritant contact dermatitis L24.9    Acute maxillary sinusitis J01.00    Adult acne L70.9    Suicidal behavior R45.89    Chest discomfort R07.89    Personality disorder (HCC) F60.9    Seborrheic dermatitis L21.9    Asthma J45.909    Fracture of coccyx ([address] S32. 2XXA    Generalized osteoarthritis M15.9    Herniation of intervertebral disc [id number]    Herpes simplex B00.9    Sciatica M54.30    Cellulitis L03.90       Isolation/Infection:   Isolation            No Isolation          Patient Infection Status       Infection Onset Added Last Indicated Last Indicated By Review Planned Expiration Resolved Resolved By    None active    Resolved    COVID-19 (Rule Out) 20 COVID-19 (Ordered)   20 Rule-Out Test Resulted    COVID-19 (Rule Out) 20 COVID-19 (Ordered)   20 Rule-Out Test Resulted    COVID-19 (Rule Out) 20 COVID-19 (Ordered)   20 Rule-Out Test Resulted            Nurse Assessment:  Last Vital Signs: BP (!) 90/56   Pulse 65   Temp 98.3 °F (36.8 °C) (Oral)   Resp 18   Ht 5' 3\" (1.6 m)   Wt 154 lb 1.6 oz (69.9 kg)   SpO2 99%   BMI 27.30 kg/m²     Last documented pain score (0-10 scale): Pain Level: 2  Last Weight:   Wt Readings from Last 1 Encounters:   22 154 lb 1.6 oz (69.9 kg)     Mental Status:  {IP PT MENTAL STATUS:}    IV Access:  { DANETTE IV ACCESS:360022969}    Nursing Mobility/ADLs:  Walking   {CHP DME PKGZ:009698348}  Transfer  {CHP DME NVZQ:151180187}  Bathing  {CHP DME KBK}  Dressing  {CHP DME AEIW:887538524}  Toileting  {CHP DME IRXM:921936125}  Feeding  {CHP DME APFK:232669581}  Med Admin  {CHP DME TJUE:463175343}  Med Delivery   { DANETTE MED Delivery:148645655}    Wound Care Documentation and Therapy:  Incision 19 Back (Active)   Number of days: 1097        Elimination:  Continence:    Bowel: {YES / TQ:87265}  Bladder: {YES / F}  Urinary Catheter: {Urinary Catheter:729467890}   Colostomy/Ileostomy/Ileal Conduit: {YES / DU:78765}       Date of Last BM: ***    Intake/Output Summary (Last 24 hours) at 2022 0814  Last data filed at 2022 0641  Gross per 24 hour   Intake 2571.13 ml   Output --   Net 2571.13 ml     I/O last 3 completed shifts:   In: 2811.1 [P.O.:640; I.V.:2171.1]  Out: -     Safety Concerns:     [address] [email or web address] Safety Concerns:353972614}    Impairments/Disabilities:      [address] [email or web address] Impairments/Disabilities:735966395}    Nutrition Therapy:  Current Nutrition Therapy:   [address] [email or web address] Diet List:759137664}    Routes of Feeding: {CHP DME Other Feedings:463314706}  Liquids: {Slp liquid thickness:40625}  Daily Fluid Restriction: {CHP DME Yes amt example:407733605}  Last Modified Barium Swallow with Video (Video Swallowing Test): {Done Not Done LVJB:079968694}    Treatments at the Time of Hospital Discharge:   Respiratory Treatments: ***  Oxygen Therapy:  {Therapy; copd oxygen:67182}  Ventilator:    { CC Vent MMHI:659913933}    Rehab Therapies: {THERAPEUTIC INTERVENTION:9688980958}  Weight Bearing Status/Restrictions: [address] Weight Bearin}  Other Medical Equipment (for information only, NOT a DME order):  {EQUIPMENT:129545577}  Other Treatments: ***    Patient's personal belongings (please select all that are sent with patient):  {CHP DME Belongings:101981265}    RN SIGNATURE:  {Esignature:997229245}    CASE MANAGEMENT/SOCIAL WORK SECTION    Inpatient Status Date: ***    Readmission Risk Assessment Score:  Readmission Risk              Risk of Unplanned Readmission:  0           Discharging to Facility/ Agency   Name:   Address:  Phone:  Fax:    Dialysis Facility (if applicable)   Name:  Address:  Dialysis Schedule:  Phone:  Fax:    / signature: {Esignature:148012470}    PHYSICIAN SECTION    Prognosis: Good    Condition at Discharge: Stable    Rehab Potential (if transferring to Rehab): Good    Recommended Labs or Other Treatments After Discharge: f/u by PCP and rehab center for pain    Physician Certification: I certify the above information and transfer of Fadi Gallo  is necessary for the continuing treatment of the diagnosis listed and that she requires {Admit to Appropriate Level of Care:58376} for {GREATER/LESS:647173583} 30 days.      Update Admission H&P: No change in H&P    PHYSICIAN SIGNATURE:  Electronically signed by Zia Wheat MD on 6/5/22 at 8:14 AM EDT

## 2022-06-05 NOTE — DISCHARGE SUMMARY
[address] [place] [address]                               DISCHARGE SUMMARY    PATIENT NAME: Simone Carrera                    :        1980  MED REC NO:   [id number]                          ROOM:       [id number]  ACCOUNT NO:   [de-identified]                           ADMIT DATE: 2022  PROVIDER:     Tone Acuna MD                  DISCHARGE DATE:  2022        DISCHARGE DIAGNOSES:  Right lower extremity cellulitis, edema, pain has  improved, chronic pain, the patient is on methadone; chronic  constipation, history of substance abuse, chronic back pain and shoulder  pain, bipolar disorder, allergic rhinitis, hypothyroidism, chronic  _____. DISCHARGE SUMMARY:  This 26-year-old female patient came to the  emergency room because of the right leg swelling, has been treated with  antibiotic therapy, clindamycin and cephalexin. The patient called that  she had more swelling and more pain. The patient was admitted for  48-hour observation. The patient received IV antibiotic, responded to  the therapy fine. There was no major cellulitis, had some kind of  capillary dilatation on the right leg but no major calf pain and edema. So, the patient can be discharged. Afebrile, complaining of some  urinary problem. We checked the urine before they go home and the  patient will be followed up with the primary care physician. The  patient received enough antibiotic therapy. This time, she can stop  taking any oral agent. CONDITION ON DISCHARGE:  Stable. COMPLICATION:  None. DISCHARGE MEDICATIONS:  See the reconciliation sheet. No current facility-administered medications for this encounter.      Current Outpatient Medications   Medication Sig Dispense Refill    QUEtiapine (SEROQUEL) 200 MG tablet Take 0.5 tablets by mouth nightly 60 tablet 2    QUEtiapine (SEROQUEL) 100 MG tablet Take 1 tablet by mouth nightly 60 tablet 3    polyethylene glycol (GLYCOLAX) 17 g packet Take 17 g by mouth daily 527 g 1    methadone (DOLOPHINE) 10 MG tablet Take 60 mg by mouth daily.  zolpidem (AMBIEN CR) 12.5 MG extended release tablet Take 12.5 mg by mouth nightly as needed for Sleep.  pregabalin (LYRICA) 100 MG capsule Take 100 mg by mouth 2 times daily.  nicotine polacrilex (NICORETTE) 4 MG gum Take 4 mg by mouth as needed for Smoking cessation      azelastine HCl 0.15 % SOLN 1 spray by Nasal route 2 times daily       naproxen (NAPROSYN) 500 MG tablet Take 1 tablet by mouth 2 times daily (Patient taking differently: Take 500 mg by mouth 2 times daily as needed ) 15 tablet 0    levothyroxine (SYNTHROID) 25 MCG tablet TAKE 1 TABLET BY MOUTH EVERY DAY 90 tablet 1    midodrine (PROAMATINE) 2.5 MG tablet TAKE 1 TABLET BY MOUTH THREE TIMES A DAY 90 tablet 5    omeprazole (PRILOSEC) 20 MG delayed release capsule Take 20 mg by mouth Daily       montelukast (SINGULAIR) 10 MG tablet Take 10 mg by mouth nightly      AJOVY 225 MG/1.5ML SOSY Inject 1.5 mLs into the skin every 30 days       triamcinolone (KENALOG) 0.1 % cream Apply twice daily to affected skin on trunk.  Avoid face or skin folds      fluocinolone (DERMA-SMOOTHE) 0.01 % OIL external oil Apply 1 drop topically 4 times daily as needed      ondansetron (ZOFRAN ODT) 4 MG disintegrating tablet Take 1 tablet by mouth every 8 hours as needed for Nausea 20 tablet 0    SUPER B COMPLEX & C TABS Take 1 tablet by mouth daily       Vitamin D (CHOLECALCIFEROL) 25 MCG (1000 UT) TABS tablet Take 1,000 Units by mouth daily       Multiple Vitamin (DAILY-RUTHANN) TABS TAKE 1 TABLET BY MOUTH EVERY DAY      dicyclomine (BENTYL) 10 MG capsule Take 2 capsules by mouth 2 times daily as needed (cramping) 120 capsule 3    divalproex (DEPAKOTE) 250 MG DR tablet Take 1 tablet by mouth 2 times daily 90 tablet 3    STOOL SOFTENER 100 MG capsule Take 100 mg by mouth 2 times daily

## 2022-06-05 NOTE — PROGRESS NOTES
Voicemail left for Caesar Session MD for call back due to patients blood pressure. Will wait for call back.  Electronically signed by Rosy Mendosa RN on 6/4/2022 at 11:02 PM

## 2022-06-08 LAB
BLOOD CULTURE, ROUTINE: NORMAL
CULTURE, BLOOD 2: NORMAL

## 2022-07-01 DIAGNOSIS — E27.40 ADRENAL INSUFFICIENCY (HCC): ICD-10-CM

## 2022-07-01 DIAGNOSIS — R53.82 CHRONIC FATIGUE: ICD-10-CM

## 2022-07-01 RX ORDER — MIDODRINE HYDROCHLORIDE 2.5 MG/1
TABLET ORAL
Qty: 270 TABLET | Refills: 1 | OUTPATIENT
Start: 2022-07-01

## 2022-07-09 DIAGNOSIS — R53.82 CHRONIC FATIGUE: ICD-10-CM

## 2022-07-09 DIAGNOSIS — E27.40 ADRENAL INSUFFICIENCY (HCC): ICD-10-CM

## 2022-07-11 RX ORDER — MIDODRINE HYDROCHLORIDE 2.5 MG/1
TABLET ORAL
Qty: 270 TABLET | Refills: 1 | OUTPATIENT
Start: 2022-07-11

## 2022-08-02 DIAGNOSIS — R79.89 HIGH SERUM THYROID STIMULATING HORMONE (TSH): ICD-10-CM

## 2022-08-02 NOTE — TELEPHONE ENCOUNTER
Requested Refill:   Requested Prescriptions     Pending Prescriptions Disp Refills    levothyroxine (SYNTHROID) 25 MCG tablet [Pharmacy Med Name: LEVOTHYROXINE 25 MCG TABLET] 90 tablet 1     Sig: TAKE 1 TABLET BY MOUTH EVERY DAY       LAST REFILLED: 2/21/2022    Last Appt: VV 7/12/2021-Levothyroxine 25 mcg daily in am and wait for 45 minutes before breakfast/coffe/meds     Next Appt: To be scheduled- over due !

## 2022-08-03 RX ORDER — LEVOTHYROXINE SODIUM 0.03 MG/1
TABLET ORAL
Qty: 90 TABLET | Refills: 1 | OUTPATIENT
Start: 2022-08-03

## 2022-08-05 DIAGNOSIS — R79.89 HIGH SERUM THYROID STIMULATING HORMONE (TSH): ICD-10-CM

## 2022-08-05 RX ORDER — LEVOTHYROXINE SODIUM 0.03 MG/1
TABLET ORAL
Qty: 90 TABLET | Refills: 1 | OUTPATIENT
Start: 2022-08-05

## 2022-08-05 NOTE — TELEPHONE ENCOUNTER
Requested Refill:   Requested Prescriptions     Pending Prescriptions Disp Refills    levothyroxine (SYNTHROID) 25 MCG tablet [Pharmacy Med Name: LEVOTHYROXINE 25 MCG TABLET] 90 tablet 1     Sig: TAKE 1 TABLET BY MOUTH EVERY DAY     Pt has dismissed from practice

## 2022-08-12 DIAGNOSIS — E27.40 ADRENAL INSUFFICIENCY (HCC): ICD-10-CM

## 2022-08-12 DIAGNOSIS — R53.82 CHRONIC FATIGUE: ICD-10-CM

## 2022-08-12 RX ORDER — MIDODRINE HYDROCHLORIDE 2.5 MG/1
TABLET ORAL
Qty: 270 TABLET | Refills: 1 | OUTPATIENT
Start: 2022-08-12

## 2022-09-11 DIAGNOSIS — R53.82 CHRONIC FATIGUE: ICD-10-CM

## 2022-09-11 DIAGNOSIS — E27.40 ADRENAL INSUFFICIENCY (HCC): ICD-10-CM

## 2022-09-12 RX ORDER — MIDODRINE HYDROCHLORIDE 2.5 MG/1
TABLET ORAL
Qty: 270 TABLET | Refills: 1 | OUTPATIENT
Start: 2022-09-12

## 2022-09-12 NOTE — TELEPHONE ENCOUNTER
Requested Refill:   Requested Prescriptions     Pending Prescriptions Disp Refills    midodrine (PROAMATINE) 2.5 MG tablet [Pharmacy Med Name: MIDODRINE HCL 2.5 MG TABLET] 270 tablet 1     Sig: TAKE 1 TABLET BY MOUTH THREE TIMES A DAY       Last refilled : 1/6/2022 # 90 with 5 refills     Refill requested too soon!     Last Appt: VV 7/12/2021  Next Appt: NEEDED

## 2022-10-05 DIAGNOSIS — R79.89 HIGH SERUM THYROID STIMULATING HORMONE (TSH): ICD-10-CM

## 2022-10-05 RX ORDER — LEVOTHYROXINE SODIUM 0.03 MG/1
TABLET ORAL
Qty: 90 TABLET | Refills: 1 | OUTPATIENT
Start: 2022-10-05

## 2022-10-24 DIAGNOSIS — R79.89 HIGH SERUM THYROID STIMULATING HORMONE (TSH): ICD-10-CM

## 2022-10-24 RX ORDER — LEVOTHYROXINE SODIUM 0.03 MG/1
TABLET ORAL
Qty: 90 TABLET | Refills: 1 | OUTPATIENT
Start: 2022-10-24

## 2022-10-24 NOTE — TELEPHONE ENCOUNTER
Requested Refill:   Requested Prescriptions     Pending Prescriptions Disp Refills    levothyroxine (SYNTHROID) 25 MCG tablet [Pharmacy Med Name: LEVOTHYROXINE 25 MCG TABLET] 90 tablet 1     Sig: TAKE 1 TABLET BY MOUTH EVERY DAY       Last refilled: 2/21/2022 # 90 with 1 refill     Last Appt: VV 7/12//2021  Next Appt: FOLLOW UP NEEDED FOR REFILL

## 2022-10-29 ENCOUNTER — APPOINTMENT (OUTPATIENT)
Dept: MRI IMAGING | Age: 42
End: 2022-10-29
Payer: COMMERCIAL

## 2022-10-29 ENCOUNTER — HOSPITAL ENCOUNTER (EMERGENCY)
Age: 42
Discharge: HOME | End: 2022-10-29
Attending: EMERGENCY MEDICINE
Payer: COMMERCIAL

## 2022-10-29 ENCOUNTER — APPOINTMENT (OUTPATIENT)
Dept: GENERAL RADIOLOGY | Age: 42
End: 2022-10-29
Payer: COMMERCIAL

## 2022-10-29 ENCOUNTER — APPOINTMENT (OUTPATIENT)
Dept: CT IMAGING | Age: 42
End: 2022-10-29
Payer: COMMERCIAL

## 2022-10-29 VITALS
WEIGHT: 149.47 LBS | DIASTOLIC BLOOD PRESSURE: 62 MMHG | HEART RATE: 77 BPM | OXYGEN SATURATION: 99 % | TEMPERATURE: 98.2 F | HEIGHT: 63 IN | SYSTOLIC BLOOD PRESSURE: 100 MMHG | BODY MASS INDEX: 26.48 KG/M2 | RESPIRATION RATE: 16 BRPM

## 2022-10-29 DIAGNOSIS — R42 LIGHTHEADEDNESS: ICD-10-CM

## 2022-10-29 DIAGNOSIS — R42 DIZZINESS: Primary | ICD-10-CM

## 2022-10-29 LAB
A/G RATIO: 2 (ref 1.1–2.2)
ALBUMIN SERPL-MCNC: 4.5 G/DL (ref 3.4–5)
ALP BLD-CCNC: 72 U/L (ref 40–129)
ALT SERPL-CCNC: 11 U/L (ref 10–40)
ANION GAP SERPL CALCULATED.3IONS-SCNC: 12 MMOL/L (ref 3–16)
AST SERPL-CCNC: 18 U/L (ref 15–37)
BASOPHILS ABSOLUTE: 0 K/UL (ref 0–0.2)
BASOPHILS RELATIVE PERCENT: 0.6 %
BILIRUB SERPL-MCNC: <0.2 MG/DL (ref 0–1)
BILIRUBIN URINE: NEGATIVE
BLOOD, URINE: NEGATIVE
BUN BLDV-MCNC: 16 MG/DL (ref 7–20)
CALCIUM SERPL-MCNC: 9.2 MG/DL (ref 8.3–10.6)
CHLORIDE BLD-SCNC: 100 MMOL/L (ref 99–110)
CLARITY: CLEAR
CO2: 22 MMOL/L (ref 21–32)
COLOR: YELLOW
CREAT SERPL-MCNC: 0.8 MG/DL (ref 0.6–1.1)
EOSINOPHILS ABSOLUTE: 0.2 K/UL (ref 0–0.6)
EOSINOPHILS RELATIVE PERCENT: 3.4 %
GFR SERPL CREATININE-BSD FRML MDRD: >60 ML/MIN/{1.73_M2}
GLUCOSE BLD-MCNC: 99 MG/DL (ref 70–99)
GLUCOSE URINE: NEGATIVE MG/DL
HCG QUALITATIVE: NEGATIVE
HCT VFR BLD CALC: 40 % (ref 36–48)
HEMOGLOBIN: 13.2 G/DL (ref 12–16)
KETONES, URINE: NEGATIVE MG/DL
LEUKOCYTE ESTERASE, URINE: NEGATIVE
LIPASE: 21 U/L (ref 13–60)
LYMPHOCYTES ABSOLUTE: 2.3 K/UL (ref 1–5.1)
LYMPHOCYTES RELATIVE PERCENT: 34.3 %
MCH RBC QN AUTO: 29.9 PG (ref 26–34)
MCHC RBC AUTO-ENTMCNC: 33.1 G/DL (ref 31–36)
MCV RBC AUTO: 90.2 FL (ref 80–100)
MICROSCOPIC EXAMINATION: NORMAL
MONOCYTES ABSOLUTE: 0.5 K/UL (ref 0–1.3)
MONOCYTES RELATIVE PERCENT: 8.2 %
NEUTROPHILS ABSOLUTE: 3.5 K/UL (ref 1.7–7.7)
NEUTROPHILS RELATIVE PERCENT: 53.5 %
NITRITE, URINE: NEGATIVE
PDW BLD-RTO: 13.9 % (ref 12.4–15.4)
PH UA: 6 (ref 5–8)
PLATELET # BLD: 256 K/UL (ref 135–450)
PMV BLD AUTO: 7.4 FL (ref 5–10.5)
POTASSIUM SERPL-SCNC: 3.9 MMOL/L (ref 3.5–5.1)
PROTEIN UA: NEGATIVE MG/DL
RBC # BLD: 4.43 M/UL (ref 4–5.2)
SODIUM BLD-SCNC: 134 MMOL/L (ref 136–145)
SPECIFIC GRAVITY UA: 1.01 (ref 1–1.03)
TOTAL PROTEIN: 6.8 G/DL (ref 6.4–8.2)
TROPONIN: <0.01 NG/ML
URINE REFLEX TO CULTURE: NORMAL
URINE TYPE: NORMAL
UROBILINOGEN, URINE: 0.2 E.U./DL
WBC # BLD: 6.6 K/UL (ref 4–11)

## 2022-10-29 PROCEDURE — 93005 ELECTROCARDIOGRAM TRACING: CPT | Performed by: PHYSICIAN ASSISTANT

## 2022-10-29 PROCEDURE — 70551 MRI BRAIN STEM W/O DYE: CPT

## 2022-10-29 PROCEDURE — 2580000003 HC RX 258: Performed by: PHYSICIAN ASSISTANT

## 2022-10-29 PROCEDURE — 6370000000 HC RX 637 (ALT 250 FOR IP): Performed by: PHYSICIAN ASSISTANT

## 2022-10-29 PROCEDURE — 84484 ASSAY OF TROPONIN QUANT: CPT

## 2022-10-29 PROCEDURE — 85025 COMPLETE CBC W/AUTO DIFF WBC: CPT

## 2022-10-29 PROCEDURE — 36415 COLL VENOUS BLD VENIPUNCTURE: CPT

## 2022-10-29 PROCEDURE — 71046 X-RAY EXAM CHEST 2 VIEWS: CPT

## 2022-10-29 PROCEDURE — 83690 ASSAY OF LIPASE: CPT

## 2022-10-29 PROCEDURE — 6360000002 HC RX W HCPCS: Performed by: PHYSICIAN ASSISTANT

## 2022-10-29 PROCEDURE — 80053 COMPREHEN METABOLIC PANEL: CPT

## 2022-10-29 PROCEDURE — 70450 CT HEAD/BRAIN W/O DYE: CPT

## 2022-10-29 PROCEDURE — 81003 URINALYSIS AUTO W/O SCOPE: CPT

## 2022-10-29 PROCEDURE — 84703 CHORIONIC GONADOTROPIN ASSAY: CPT

## 2022-10-29 PROCEDURE — 96374 THER/PROPH/DIAG INJ IV PUSH: CPT

## 2022-10-29 PROCEDURE — 99285 EMERGENCY DEPT VISIT HI MDM: CPT

## 2022-10-29 RX ORDER — ONDANSETRON 2 MG/ML
4 INJECTION INTRAMUSCULAR; INTRAVENOUS ONCE
Status: COMPLETED | OUTPATIENT
Start: 2022-10-29 | End: 2022-10-29

## 2022-10-29 RX ORDER — 0.9 % SODIUM CHLORIDE 0.9 %
1000 INTRAVENOUS SOLUTION INTRAVENOUS ONCE
Status: COMPLETED | OUTPATIENT
Start: 2022-10-29 | End: 2022-10-29

## 2022-10-29 RX ORDER — LORAZEPAM 1 MG/1
1 TABLET ORAL ONCE
Status: COMPLETED | OUTPATIENT
Start: 2022-10-29 | End: 2022-10-29

## 2022-10-29 RX ORDER — MIDODRINE HYDROCHLORIDE 5 MG/1
5 TABLET ORAL ONCE
Status: COMPLETED | OUTPATIENT
Start: 2022-10-29 | End: 2022-10-29

## 2022-10-29 RX ADMIN — ONDANSETRON 4 MG: 2 INJECTION INTRAMUSCULAR; INTRAVENOUS at 12:25

## 2022-10-29 RX ADMIN — SODIUM CHLORIDE 1000 ML: 9 INJECTION, SOLUTION INTRAVENOUS at 12:25

## 2022-10-29 RX ADMIN — MIDODRINE HYDROCHLORIDE 5 MG: 5 TABLET ORAL at 17:16

## 2022-10-29 RX ADMIN — LORAZEPAM 1 MG: 1 TABLET ORAL at 16:08

## 2022-10-29 ASSESSMENT — PAIN SCALES - GENERAL
PAINLEVEL_OUTOF10: 7

## 2022-10-29 ASSESSMENT — PAIN DESCRIPTION - LOCATION
LOCATION: BACK

## 2022-10-29 ASSESSMENT — PAIN - FUNCTIONAL ASSESSMENT
PAIN_FUNCTIONAL_ASSESSMENT: 0-10
PAIN_FUNCTIONAL_ASSESSMENT: ACTIVITIES ARE NOT PREVENTED
PAIN_FUNCTIONAL_ASSESSMENT: 0-10
PAIN_FUNCTIONAL_ASSESSMENT: 0-10
PAIN_FUNCTIONAL_ASSESSMENT: ACTIVITIES ARE NOT PREVENTED
PAIN_FUNCTIONAL_ASSESSMENT: ACTIVITIES ARE NOT PREVENTED

## 2022-10-29 ASSESSMENT — PAIN DESCRIPTION - FREQUENCY
FREQUENCY: CONTINUOUS
FREQUENCY: CONTINUOUS

## 2022-10-29 ASSESSMENT — PAIN DESCRIPTION - PAIN TYPE
TYPE: CHRONIC PAIN

## 2022-10-29 ASSESSMENT — PAIN DESCRIPTION - DESCRIPTORS
DESCRIPTORS: ACHING

## 2022-10-29 NOTE — ED NOTES
Discharge and education instructions reviewed. Patient verbalized understanding, teach-back successful. Patient denied questions at this time. No acute distress noted. Patient instructed to follow-up as noted - return to emergency department if symptoms worsen. Patient verbalized understanding. Discharged per EDMD with discharged instructions.        Raynell Crigler, RN  10/29/22 7013

## 2022-10-29 NOTE — ED PROVIDER NOTES
I independently examined and evaluated Anjelica Quiñones. In brief, patient is a 41yoF who presents to the ED for evaluation of dizziness. Focused exam revealed no leg drift, no pronator drift, no facial symmetry. She reports having left arm altered sensation that is new of possibly few days. PCP consulted for admission. Per their request, MRI obtained in the ED. MRI negative for acute stroke. Patient ambulated in the ER with no difficulty. She remained stable during her stay in the ED. Patient agreeable with plan to follow-up with PCP as outpatient and given strict return precautions. Discharge. All diagnostic, treatment, and disposition decisions were made by myself in conjunction with the advanced practice provider. I personally saw the patient and performed a substantive portion of the visit including aspects of the medical decision making. Comment: Please note this report has been produced using speech recognition software and may contain errors related to that system including errors in grammar, punctuation, and spelling, as well as words and phrases that may be inappropriate. If there are any questions or concerns please feel free to contact the dictating provider for clarification. For all further details of the patient's emergency department visit, please see the advanced practice provider's documentation.        Paco Mathias MD  10/31/22 7241

## 2022-10-29 NOTE — ED PROVIDER NOTES
[address] [facility]      Pt Name: Lisa Lee  MRN: [id number]  Rigogfantonio 1980  Date of evaluation: 10/29/2022  Provider: MARTÍN Byers       I have seen and evaluated this patient with my supervising physician Dr. Arin Carlin     Dizziness  vomiting      Fartun Ellington  (Location/Symptom, Timing/Onset, Context/Setting, Quality, Duration, Modifying Factors, Severity.)   Anjelica Kaur is a 38 y.o. female who presents to the emergency department for dizziness and vomiting. She reports symptoms have been present for the past 2 days. Dizziness is more of a lightheadedness and feels like she is going to  pass out. Occasionally she will have some vertigo but reports more of a lightheadedness/presyncope dizziness. She reports this is similar to prior episodes from hyponatremia. She has a history of adrenal and pituitary insufficiency. Dizziness has no alleviating or aggravating factors. No related to position. She denies chest pain. Does have some shortness of breath. She has nausea with vomiting. Denies hematemesis or coffee-ground emesis. Denies abdominal pain. Also reports urinary frequency. Went to urgent care yesterday and was told her urine looked okay but they sent for culture and started on Cipro anyway. Has dysuria but no hematuria. Denies fevers or chills. Denies numbness, tingling, vision changes. She does have chronic hypotension. She is on midodrine 2.5 mg which she took at [address] [place] [address] today. Reports BP is usually 120/70 after she takes it. Nursing Notes were reviewed and I agree.     REVIEW OF SYSTEMS    (2-9 systems for level 4, 10 or more for level 5)     Review of Systems    All other systems reviewed and are negative except as noted    PAST MEDICAL HISTORY         Diagnosis Date    Asthma     Bipolar 1 disorder (HCC)     Compression fracture     COPD (chronic obstructive pulmonary disease) ([facility]     DDD (degenerative disc disease), cervical     Howard-Danlos syndrome     Fibromyalgia     H/O adrenal insufficiency     and pituitary insufficiency    Herniated disc     Metabolic syndrome     Osteoarthritis     Pyelonephritis     Sciatica        SURGICAL HISTORY           Procedure Laterality Date    KNEE SURGERY      LUMBAR FUSION N/A 6/3/2019    L5, S1 TRANSFORAMINAL LUMBAR INTERBODY FUSION performed by Ciarra Garrido MD [address]     repair    SIGMOIDOSCOPY N/A 8/21/2020    FLEXIBLE SIGMOIDOSCOPY POSSIBLE COLONOSCOPY performed by Lamar Tellez MD at [address]       Discharge Medication List as of 10/29/2022  7:20 PM        CONTINUE these medications which have NOT CHANGED    Details   !! QUEtiapine (SEROQUEL) 200 MG tablet Take 0.5 tablets by mouth nightly, Disp-60 tablet, R-2Normal      !! QUEtiapine (SEROQUEL) 100 MG tablet Take 1 tablet by mouth nightly, Disp-60 tablet, R-3Normal      methadone (DOLOPHINE) 10 MG tablet Take 60 mg by mouth daily. Historical Med      zolpidem (AMBIEN CR) 12.5 MG extended release tablet Take 12.5 mg by mouth nightly as needed for Sleep. Historical Med      pregabalin (LYRICA) 100 MG capsule Take 100 mg by mouth 2 times daily. Historical Med      nicotine polacrilex (NICORETTE) 4 MG gum Take 4 mg by mouth as needed for Smoking cessationHistorical Med      azelastine HCl 0.15 % SOLN 1 spray by Nasal route 2 times daily Historical Med      naproxen (NAPROSYN) 500 MG tablet Take 1 tablet by mouth 2 times daily, Disp-15 tablet, R-0Normal      levothyroxine (SYNTHROID) 25 MCG tablet TAKE 1 TABLET BY MOUTH EVERY DAY, Disp-90 tablet, R-1Normal      midodrine (PROAMATINE) 2.5 MG tablet TAKE 1 TABLET BY MOUTH THREE TIMES A DAY, Disp-90 tablet, R-5Normal      omeprazole (PRILOSEC) 20 MG delayed release capsule Take 20 mg by mouth Daily Historical Med      montelukast (SINGULAIR) 10 MG tablet Take 10 mg by mouth nightlyHistorical Med      AJOVY 225 MG/1.5ML SOSY Inject 1.5 mLs into the skin every 30 days , DAWHistorical Med      triamcinolone (KENALOG) 0.1 % cream Apply twice daily to affected skin on trunk. Avoid face or skin folds, Historical Med      fluocinolone (DERMA-SMOOTHE) 0.01 % OIL external oil Apply 1 drop topically 4 times daily as neededHistorical Med      ondansetron (ZOFRAN ODT) 4 MG disintegrating tablet Take 1 tablet by mouth every 8 hours as needed for Nausea, Disp-20 tablet, R-0Print      SUPER B COMPLEX & C TABS Take 1 tablet by mouth daily Historical Med      Vitamin D (CHOLECALCIFEROL) 25 MCG (1000 UT) TABS tablet Take 1,000 Units by mouth daily Labeling may look different. 25 mcg=1000 Units. Please double check dosages. Historical Med      Multiple Vitamin (DAILY-RUTHANN) TABS TAKE 1 TABLET BY MOUTH EVERY DAYHistorical Med      dicyclomine (BENTYL) 10 MG capsule Take 2 capsules by mouth 2 times daily as needed (cramping), Disp-120 capsule,R-3Normal      divalproex (DEPAKOTE) 250 MG DR tablet Take 1 tablet by mouth 2 times daily, Disp-90 tablet, R-3Normal      STOOL SOFTENER 100 MG capsule Take 100 mg by mouth 2 times daily, DAWHistorical Med      ketoconazole (NIZORAL) 2 % shampoo Apply topically Twice a Week, Topical, TWICE WEEKLY Starting Sat 4/13/2019, R-4, Historical Med      TRULANCE 3 MG TABS Take 3 mg by mouth daily, R-11, DAWHistorical Med      ALPRAZolam (XANAX) 1 MG tablet Take 1 mg by mouth 3 times daily as needed for Sleep or Anxiety. Historical Med      cetirizine (ZYRTEC ALLERGY) 10 MG tablet Take 10 mg by mouth daily Historical Med       !! - Potential duplicate medications found. Please discuss with provider.           ALLERGIES     Metoclopramide, Quetiapine, Risperidone, Trazodone, Buprenorphine-naloxone, Morphine, Asenapine, Buprenorphine hcl-naloxone hcl, Codeine, Guanfacine hcl, Morphine and related, Reglan [metoclopramide hcl], Risperidone and related, Seroquel  [quetiapine fumarate], Seroquel [quetiapine fumarate], Trazodone and nefazodone, and Lurasidone    FAMILY HISTORY           Problem Relation Age of Onset    Diabetes type 2  Mother     Diabetes type 2  Father     Cancer Sister     Diabetes type 2  Brother      Family Status   Relation Name Status    Mother  Alive    Father  Alive    Sister  Alive    Brother  Alive        SOCIAL HISTORY      reports that she quit smoking about 4 years ago. Her smoking use included cigarettes. She smoked an average of .5 packs per day. She has never used smokeless tobacco. She reports that she does not currently use alcohol. She reports that she does not use drugs. PHYSICAL EXAM    (up to 7 for level 4, 8 or more for level 5)     ED Triage Vitals [10/29/22 1020]   BP Temp Temp Source Heart Rate Resp SpO2 Height Weight   102/63 98.6 °F (37 °C) Temporal 88 16 98 % 5' 3\" (1.6 m) 149 lb 7.6 oz (67.8 kg)       Physical Exam  Constitutional:       General: She is not in acute distress. Appearance: Normal appearance. She is well-developed. She is not ill-appearing, toxic-appearing or diaphoretic. HENT:      Head: Normocephalic and atraumatic. Eyes:      Extraocular Movements: Extraocular movements intact. Conjunctiva/sclera: Conjunctivae normal.      Pupils: Pupils are equal, round, and reactive to light. Cardiovascular:      Rate and Rhythm: Normal rate and regular rhythm. Heart sounds: Normal heart sounds. Pulmonary:      Effort: Pulmonary effort is normal. No respiratory distress. Breath sounds: Normal breath sounds. Abdominal:      General: There is no distension. Palpations: Abdomen is soft. There is no mass. Tenderness: There is no abdominal tenderness. There is no guarding or rebound. Hernia: No hernia is present. Musculoskeletal:         General: Normal range of motion. Cervical back: Normal range of motion and neck supple. Skin:     General: Skin is warm.    Neurological:      Mental Status: She is alert. Sensory: No sensory deficit. Motor: No weakness. Coordination: Coordination normal.      Comments: Normal finger to nose, rapid alternating hands, and heel to shin bilaterally  Normal and symmetric  strength bilaterally  No pronator drift  5/5 strength of all 4 extremitites  No drift of the extremitites against gravity  Normal and symmetric sensation throughout  Normal EOM bilaterally  No nystagmus  Negative test of skew  No facial drooping  No dysarthria  No aphasia  Ambulates with a steady gait     Psychiatric:         Mood and Affect: Mood normal.         Behavior: Behavior normal.         Thought Content: Thought content normal.         Judgment: Judgment normal.       DIFFERENTIAL DIAGNOSIS   Electrolyte abnormality, stroke, TIA, dehydration, orthostatic hypotension, other    DIAGNOSTICRESULTS       RADIOLOGY:   Non-plain film images such as CT, Ultrasound and MRI are read by the radiologist. Plain radiographic images are visualized and preliminarily interpreted by MARTÍN Rob with the below findings:      Interpretation per the Radiologist below, if available at the time of this note:    MRI BRAIN WO CONTRAST   Final Result   Negative acute stroke, midline shift or mass effect. CT HEAD WO CONTRAST   Final Result   No acute intracranial findings.          XR CHEST (2 VW)   Final Result   Radiographically nonacute chest.               LABS:  Results for orders placed or performed during the hospital encounter of 10/29/22   CBC with Auto Differential   Result Value Ref Range    WBC 6.6 4.0 - 11.0 K/uL    RBC 4.43 4.00 - 5.20 M/uL    Hemoglobin 13.2 12.0 - 16.0 g/dL    Hematocrit 40.0 36.0 - 48.0 %    MCV 90.2 80.0 - 100.0 fL    MCH 29.9 26.0 - 34.0 pg    MCHC 33.1 31.0 - 36.0 g/dL    RDW 13.9 12.4 - 15.4 %    Platelets 263 782 - 764 K/uL    MPV 7.4 5.0 - 10.5 fL    Neutrophils % 53.5 %    Lymphocytes % 34.3 %    Monocytes % 8.2 %    Eosinophils % 3.4 % Basophils % 0.6 %    Neutrophils Absolute 3.5 1.7 - 7.7 K/uL    Lymphocytes Absolute 2.3 1.0 - 5.1 K/uL    Monocytes Absolute 0.5 0.0 - 1.3 K/uL    Eosinophils Absolute 0.2 0.0 - 0.6 K/uL    Basophils Absolute 0.0 0.0 - 0.2 K/uL   Comprehensive Metabolic Panel   Result Value Ref Range    Sodium 134 (L) 136 - 145 mmol/L    Potassium 3.9 3.5 - 5.1 mmol/L    Chloride 100 99 - 110 mmol/L    CO2 22 21 - 32 mmol/L    Anion Gap 12 3 - 16    Glucose 99 70 - 99 mg/dL    BUN 16 7 - 20 mg/dL    Creatinine 0.8 0.6 - 1.1 mg/dL    Est, Glom Filt Rate >60 >60    Calcium 9.2 8.3 - 10.6 mg/dL    Total Protein 6.8 6.4 - 8.2 g/dL    Albumin 4.5 3.4 - 5.0 g/dL    Albumin/Globulin Ratio 2.0 1.1 - 2.2    Total Bilirubin <0.2 0.0 - 1.0 mg/dL    Alkaline Phosphatase 72 40 - 129 U/L    ALT 11 10 - 40 U/L    AST 18 15 - 37 U/L   Troponin   Result Value Ref Range    Troponin <0.01 <0.01 ng/mL   HCG Qualitative, Serum   Result Value Ref Range    hCG Qual Negative Detects HCG level >10 MIU/mL   Lipase   Result Value Ref Range    Lipase 21.0 13.0 - 60.0 U/L   Urinalysis with Reflex to Culture    Specimen: Urine, clean catch   Result Value Ref Range    Color, UA Yellow Straw/Yellow    Clarity, UA Clear Clear    Glucose, Ur Negative Negative mg/dL    Bilirubin Urine Negative Negative    Ketones, Urine Negative Negative mg/dL    Specific Gravity, UA 1.006 1.005 - 1.030    Blood, Urine Negative Negative    pH, UA 6.0 5.0 - 8.0    Protein, UA Negative Negative mg/dL    Urobilinogen, Urine 0.2 <2.0 E.U./dL    Nitrite, Urine Negative Negative    Leukocyte Esterase, Urine Negative Negative    Microscopic Examination Not Indicated     Urine Type NotGiven     Urine Reflex to Culture Not Indicated    EKG 12 Lead   Result Value Ref Range    Ventricular Rate 75 BPM    Atrial Rate 75 BPM    P-R Interval 114 ms    QRS Duration 86 ms    Q-T Interval 400 ms    QTc Calculation (Bazett) 446 ms    P Axis 27 degrees    R Axis 6 degrees    T Axis 17 degrees Diagnosis       Normal sinus rhythmNormal ECGWhen compared with ECG of 16-FEB-2021 16:42,Fusion complexes are no longer PresentConfirmed by REID MCINTOSH, Liliane Lyn ([id number]) on 10/30/2022 1:16:19 PM       All other labs were withinnormal range or not returned as of this dictation. EMERGENCY DEPARTMENT COURSE and DIFFERENTIAL DIAGNOSIS/MDM:   Vitals:    Vitals:    10/29/22 1717 10/29/22 1720 10/29/22 1858 10/29/22 1900   BP: (!) 93/53 (!) 93/53 100/62 100/62   Pulse: 78   77   Resp: 18   16   Temp:    98.2 °F (36.8 °C)   TempSrc:    Oral   SpO2: 99% 99% 97% 99%   Weight:       Height:           Patient was nontoxic, well appearing, afebrile with normal vital signs with exception of hypotension 102/63. Saturating well on room air. Will give 1 L normal saline bolus. Her NIHSS is 0 with negative test of skew and she ambulates with a steady gait. Labs unremarkable. Urine negative. Reviewed BPs from prior admission and BP is normal systolic 15J    CXR and CT head negative. Given her dizziness, did consult PCP to discuss admission for MRI. I spoke with Dr. Gage Fontana who was covering who requested we try to get MRI done today as could potentially avoid admission. I spoke with MRI tech and we are able to get this done today. Patient requested something for anxiety while in MRI. Was given ativan PO. MRI was negative. Upon multiple reevaluations, she is lying in stretcher in no distress. Believe she is appropriate for outpatient management. Instructed to FU with PCP for reeval and return for worsening. She agreed and understood. I completed a structured, evidence-based clinical evaluation to screen for acute stroke and neurologic deficits in this patient. The patient has a normal detailed neurologic exam, which is highly sensitive for dangerous causes of dizziness, vertigo, or  loss of balance.   The evidence indicates that the patient is very low risk for an acute neurologic emergency, and this is consistent with my  clinical intuition. The risk of further workup or hospitalization is likely higher than the risk of the patient having a stroke or other  dangerous neurologic condition. It is, therefore, in the patients best interest not to do additional emergent testing or to be  hospitalized at this time. Is this patient to be included in the SEP-1 Core Measure due to severe sepsis or septic shock? No   Exclusion criteria - the patient is NOT to be included for SEP-1 Core Measure due to: Infection is not suspected           PROCEDURES:  None    FINAL IMPRESSION      1. Dizziness    2.  Lightheadedness          DISPOSITION/PLAN   DISPOSITION Decision To Discharge 10/29/2022 07:02:03 PM      PATIENT REFERRED TO:  Mike Ovalle MD  [address]  [phone]    Schedule an appointment as soon as possible for a visit   for reevaluation    [facility] Emergency Department  [address]  [phone]    As needed, If symptoms worsen    DISCHARGE MEDICATIONS:  Discharge Medication List as of 10/29/2022  7:20 PM          (Please note that portions of this note werecompleted with a voice recognition program.  Efforts were made to edit the dictations but occasionally words are mis-transcribed.)    Tu Jones, [address] [address]  10/30/22 [address]

## 2022-10-30 LAB
EKG ATRIAL RATE: 75 BPM
EKG DIAGNOSIS: NORMAL
EKG P AXIS: 27 DEGREES
EKG P-R INTERVAL: 114 MS
EKG Q-T INTERVAL: 400 MS
EKG QRS DURATION: 86 MS
EKG QTC CALCULATION (BAZETT): 446 MS
EKG R AXIS: 6 DEGREES
EKG T AXIS: 17 DEGREES
EKG VENTRICULAR RATE: 75 BPM

## 2022-10-30 PROCEDURE — 93010 ELECTROCARDIOGRAM REPORT: CPT | Performed by: INTERNAL MEDICINE

## 2022-11-08 DIAGNOSIS — R79.89 HIGH SERUM THYROID STIMULATING HORMONE (TSH): ICD-10-CM

## 2022-11-08 NOTE — TELEPHONE ENCOUNTER
Requested Refill:   Requested Prescriptions     Pending Prescriptions Disp Refills    levothyroxine (SYNTHROID) 25 MCG tablet [Pharmacy Med Name: LEVOTHYROXINE 25 MCG TABLET] 90 tablet 1     Sig: TAKE 1 TABLET BY MOUTH EVERY DAY       Last refilled: 2/21/2022 # 90 with 1 refill     Last Appt: VV 7/12/2021  Next Appt: Needed for refill

## 2022-11-09 ENCOUNTER — APPOINTMENT (OUTPATIENT)
Dept: GENERAL RADIOLOGY | Age: 42
End: 2022-11-09
Payer: COMMERCIAL

## 2022-11-09 ENCOUNTER — HOSPITAL ENCOUNTER (EMERGENCY)
Age: 42
Discharge: HOME | End: 2022-11-09
Attending: EMERGENCY MEDICINE
Payer: COMMERCIAL

## 2022-11-09 ENCOUNTER — APPOINTMENT (OUTPATIENT)
Dept: CT IMAGING | Age: 42
End: 2022-11-09
Payer: COMMERCIAL

## 2022-11-09 VITALS
OXYGEN SATURATION: 100 % | HEART RATE: 63 BPM | TEMPERATURE: 97.6 F | HEIGHT: 63 IN | WEIGHT: 151.24 LBS | DIASTOLIC BLOOD PRESSURE: 60 MMHG | RESPIRATION RATE: 14 BRPM | SYSTOLIC BLOOD PRESSURE: 94 MMHG | BODY MASS INDEX: 26.8 KG/M2

## 2022-11-09 DIAGNOSIS — S59.901A ELBOW INJURY, RIGHT, INITIAL ENCOUNTER: ICD-10-CM

## 2022-11-09 DIAGNOSIS — M54.2 NECK PAIN: ICD-10-CM

## 2022-11-09 DIAGNOSIS — R51.9 ACUTE NONINTRACTABLE HEADACHE, UNSPECIFIED HEADACHE TYPE: Primary | ICD-10-CM

## 2022-11-09 DIAGNOSIS — Z79.899 POLYPHARMACY: ICD-10-CM

## 2022-11-09 DIAGNOSIS — R06.02 SHORTNESS OF BREATH: ICD-10-CM

## 2022-11-09 DIAGNOSIS — R42 DIZZINESS: ICD-10-CM

## 2022-11-09 LAB
ANION GAP SERPL CALCULATED.3IONS-SCNC: 9 MMOL/L (ref 3–16)
BACTERIA: ABNORMAL /HPF
BASOPHILS ABSOLUTE: 0 K/UL (ref 0–0.2)
BASOPHILS RELATIVE PERCENT: 1 %
BILIRUBIN URINE: NEGATIVE
BLOOD, URINE: ABNORMAL
BUN BLDV-MCNC: 7 MG/DL (ref 7–20)
CALCIUM SERPL-MCNC: 9.1 MG/DL (ref 8.3–10.6)
CHLORIDE BLD-SCNC: 102 MMOL/L (ref 99–110)
CHP ED QC CHECK: YES
CLARITY: CLEAR
CO2: 28 MMOL/L (ref 21–32)
COLOR: YELLOW
CREAT SERPL-MCNC: 0.8 MG/DL (ref 0.6–1.1)
EKG ATRIAL RATE: 56 BPM
EKG DIAGNOSIS: NORMAL
EKG P AXIS: 39 DEGREES
EKG P-R INTERVAL: 144 MS
EKG Q-T INTERVAL: 430 MS
EKG QRS DURATION: 80 MS
EKG QTC CALCULATION (BAZETT): 414 MS
EKG R AXIS: 1 DEGREES
EKG T AXIS: 24 DEGREES
EKG VENTRICULAR RATE: 56 BPM
EOSINOPHILS ABSOLUTE: 0.2 K/UL (ref 0–0.6)
EOSINOPHILS RELATIVE PERCENT: 3.9 %
EPITHELIAL CELLS, UA: 13 /HPF (ref 0–5)
GFR SERPL CREATININE-BSD FRML MDRD: >60 ML/MIN/{1.73_M2}
GLUCOSE BLD-MCNC: 63 MG/DL (ref 70–99)
GLUCOSE BLD-MCNC: 79 MG/DL
GLUCOSE BLD-MCNC: 79 MG/DL (ref 70–99)
GLUCOSE URINE: NEGATIVE MG/DL
HCT VFR BLD CALC: 37.5 % (ref 36–48)
HEMOGLOBIN: 12.8 G/DL (ref 12–16)
HYALINE CASTS: 0 /LPF (ref 0–8)
KETONES, URINE: NEGATIVE MG/DL
LEUKOCYTE ESTERASE, URINE: NEGATIVE
LYMPHOCYTES ABSOLUTE: 1.7 K/UL (ref 1–5.1)
LYMPHOCYTES RELATIVE PERCENT: 33.9 %
MCH RBC QN AUTO: 29.9 PG (ref 26–34)
MCHC RBC AUTO-ENTMCNC: 34 G/DL (ref 31–36)
MCV RBC AUTO: 87.7 FL (ref 80–100)
MICROSCOPIC EXAMINATION: YES
MONOCYTES ABSOLUTE: 0.4 K/UL (ref 0–1.3)
MONOCYTES RELATIVE PERCENT: 8.7 %
NEUTROPHILS ABSOLUTE: 2.6 K/UL (ref 1.7–7.7)
NEUTROPHILS RELATIVE PERCENT: 52.5 %
NITRITE, URINE: NEGATIVE
PDW BLD-RTO: 13.8 % (ref 12.4–15.4)
PERFORMED ON: NORMAL
PH UA: 7.5 (ref 5–8)
PLATELET # BLD: 253 K/UL (ref 135–450)
PMV BLD AUTO: 7.8 FL (ref 5–10.5)
POTASSIUM REFLEX MAGNESIUM: 4 MMOL/L (ref 3.5–5.1)
PROTEIN UA: NEGATIVE MG/DL
RBC # BLD: 4.28 M/UL (ref 4–5.2)
RBC UA: 129 /HPF (ref 0–4)
SODIUM BLD-SCNC: 139 MMOL/L (ref 136–145)
SPECIFIC GRAVITY UA: 1.01 (ref 1–1.03)
TROPONIN: <0.01 NG/ML
URINE REFLEX TO CULTURE: ABNORMAL
URINE TYPE: ABNORMAL
UROBILINOGEN, URINE: 0.2 E.U./DL
WBC # BLD: 4.9 K/UL (ref 4–11)
WBC UA: 1 /HPF (ref 0–5)

## 2022-11-09 PROCEDURE — 84484 ASSAY OF TROPONIN QUANT: CPT

## 2022-11-09 PROCEDURE — 93005 ELECTROCARDIOGRAM TRACING: CPT | Performed by: EMERGENCY MEDICINE

## 2022-11-09 PROCEDURE — 96375 TX/PRO/DX INJ NEW DRUG ADDON: CPT

## 2022-11-09 PROCEDURE — 80048 BASIC METABOLIC PNL TOTAL CA: CPT

## 2022-11-09 PROCEDURE — 2580000003 HC RX 258: Performed by: EMERGENCY MEDICINE

## 2022-11-09 PROCEDURE — 70450 CT HEAD/BRAIN W/O DYE: CPT

## 2022-11-09 PROCEDURE — 6370000000 HC RX 637 (ALT 250 FOR IP): Performed by: EMERGENCY MEDICINE

## 2022-11-09 PROCEDURE — 93010 ELECTROCARDIOGRAM REPORT: CPT | Performed by: INTERNAL MEDICINE

## 2022-11-09 PROCEDURE — 85025 COMPLETE CBC W/AUTO DIFF WBC: CPT

## 2022-11-09 PROCEDURE — 73080 X-RAY EXAM OF ELBOW: CPT

## 2022-11-09 PROCEDURE — 96374 THER/PROPH/DIAG INJ IV PUSH: CPT

## 2022-11-09 PROCEDURE — 72125 CT NECK SPINE W/O DYE: CPT

## 2022-11-09 PROCEDURE — 81001 URINALYSIS AUTO W/SCOPE: CPT

## 2022-11-09 PROCEDURE — 71046 X-RAY EXAM CHEST 2 VIEWS: CPT

## 2022-11-09 PROCEDURE — 99285 EMERGENCY DEPT VISIT HI MDM: CPT

## 2022-11-09 PROCEDURE — 6360000002 HC RX W HCPCS: Performed by: EMERGENCY MEDICINE

## 2022-11-09 RX ORDER — ACETAMINOPHEN 500 MG
1000 TABLET ORAL 3 TIMES DAILY
Qty: 42 TABLET | Refills: 0 | Status: SHIPPED | OUTPATIENT
Start: 2022-11-09 | End: 2022-11-16

## 2022-11-09 RX ORDER — NAPROXEN 250 MG/1
250 TABLET ORAL ONCE
Status: COMPLETED | OUTPATIENT
Start: 2022-11-09 | End: 2022-11-09

## 2022-11-09 RX ORDER — LORAZEPAM 2 MG/ML
1 INJECTION INTRAMUSCULAR ONCE
Status: COMPLETED | OUTPATIENT
Start: 2022-11-09 | End: 2022-11-09

## 2022-11-09 RX ORDER — LIDOCAINE 4 G/G
2 PATCH TOPICAL ONCE
Status: DISCONTINUED | OUTPATIENT
Start: 2022-11-09 | End: 2022-11-09 | Stop reason: HOSPADM

## 2022-11-09 RX ORDER — NAPROXEN 250 MG/1
250 TABLET ORAL 2 TIMES DAILY WITH MEALS
Qty: 14 TABLET | Refills: 0 | Status: SHIPPED | OUTPATIENT
Start: 2022-11-09 | End: 2022-11-16

## 2022-11-09 RX ORDER — LEVOTHYROXINE SODIUM 0.03 MG/1
TABLET ORAL
Qty: 90 TABLET | Refills: 1 | OUTPATIENT
Start: 2022-11-09

## 2022-11-09 RX ORDER — ACETAMINOPHEN 500 MG
1000 TABLET ORAL ONCE
Status: COMPLETED | OUTPATIENT
Start: 2022-11-09 | End: 2022-11-09

## 2022-11-09 RX ORDER — LIDOCAINE 4 G/G
2 PATCH TOPICAL DAILY
Qty: 10 EACH | Refills: 0 | Status: SHIPPED | OUTPATIENT
Start: 2022-11-09 | End: 2022-11-14

## 2022-11-09 RX ORDER — PROCHLORPERAZINE EDISYLATE 5 MG/ML
10 INJECTION INTRAMUSCULAR; INTRAVENOUS ONCE
Status: COMPLETED | OUTPATIENT
Start: 2022-11-09 | End: 2022-11-09

## 2022-11-09 RX ORDER — 0.9 % SODIUM CHLORIDE 0.9 %
1000 INTRAVENOUS SOLUTION INTRAVENOUS ONCE
Status: COMPLETED | OUTPATIENT
Start: 2022-11-09 | End: 2022-11-09

## 2022-11-09 RX ADMIN — SODIUM CHLORIDE 1000 ML: 9 INJECTION, SOLUTION INTRAVENOUS at 11:30

## 2022-11-09 RX ADMIN — LORAZEPAM 1 MG: 2 INJECTION INTRAMUSCULAR; INTRAVENOUS at 11:28

## 2022-11-09 RX ADMIN — NAPROXEN 250 MG: 250 TABLET ORAL at 13:20

## 2022-11-09 RX ADMIN — PROCHLORPERAZINE EDISYLATE 10 MG: 5 INJECTION INTRAMUSCULAR; INTRAVENOUS at 11:28

## 2022-11-09 RX ADMIN — ACETAMINOPHEN 1000 MG: 500 TABLET ORAL at 13:19

## 2022-11-09 ASSESSMENT — PAIN DESCRIPTION - ORIENTATION: ORIENTATION: RIGHT

## 2022-11-09 ASSESSMENT — PAIN - FUNCTIONAL ASSESSMENT: PAIN_FUNCTIONAL_ASSESSMENT: 0-10

## 2022-11-09 ASSESSMENT — PAIN DESCRIPTION - PAIN TYPE: TYPE: ACUTE PAIN

## 2022-11-09 ASSESSMENT — PAIN DESCRIPTION - FREQUENCY: FREQUENCY: CONTINUOUS

## 2022-11-09 ASSESSMENT — PAIN SCALES - GENERAL
PAINLEVEL_OUTOF10: 8
PAINLEVEL_OUTOF10: 8

## 2022-11-09 NOTE — ED NOTES
Discharge and education instructions reviewed. Patient verbalized understanding, teach-back successful. Patient denied questions at this time. No acute distress noted. Patient instructed to follow-up as noted - return to emergency department if symptoms worsen. Patient verbalized understanding. Discharged per EDMD with discharge instructions.         Mary Beth Davis RN  11/09/22 0353

## 2022-11-09 NOTE — Clinical Note
Yas Casillas was seen and treated in our emergency department on 11/9/2022. She may return to work on 11/11/2022. Should be on light duty / restricted activity through      If you have any questions or concerns, please don't hesitate to call.       Hany Calixto MD

## 2022-11-09 NOTE — DISCHARGE INSTRUCTIONS
You have been provided with printed prescriptions    Be sure to contact your primary care provider's office to arrange for a follow up visit. If you have any new or worsening issues after going home don't hesitate to return here for reevaluation at any time 24/7!!     The following of your home medications can potentially cause dizziness:  Alprazolam  Cetirizine  Dicyclomine  Divalproex  Methadone  Pregabalin  Quetiapine  Zolpidem

## 2022-11-09 NOTE — ED NOTES
EKG My Reading:  Rate: 56  Rhythm: sinus laura  ST Segments: no acute ischemic changed  STEMI: no  QTc: 414 / normal  Comparison to prior: rate has decreased compared to 10/2022     Eduardo Jorge MD  11/09/22 8228

## 2022-11-09 NOTE — ED TRIAGE NOTES
Pt arrives ambulatory for eval of lightheadedness, nausea, weakness and neck and head pain with a fall onset several weeks ago. Pt sts right elbow pain after one fall. Pt sts has been seen once for dizziness but still occurring. Pt  is a/ox4, rsp nonlabored and pwd.

## 2022-11-09 NOTE — Clinical Note
Bryan Jones was seen and treated in our emergency department on 11/9/2022. She may return to work on 11/11/2022. Should be on light duty / restricted activity through 11/16/2022. If you have any questions or concerns, please don't hesitate to call.       Sage Alegre MD

## 2022-11-09 NOTE — ED NOTES
Pt was provided with 480ml of apple juice and 2 packets of saltine crackers d/t hypoglycemia. Will recheck sugar in 15 minutes.      Esau Byrne, RN  11/09/22 7650

## 2022-11-10 ENCOUNTER — HOSPITAL ENCOUNTER (EMERGENCY)
Age: 42
Discharge: HOME | End: 2022-11-10
Attending: EMERGENCY MEDICINE
Payer: COMMERCIAL

## 2022-11-10 VITALS
RESPIRATION RATE: 20 BRPM | HEART RATE: 70 BPM | BODY MASS INDEX: 26.72 KG/M2 | WEIGHT: 156.53 LBS | TEMPERATURE: 98.5 F | HEIGHT: 64 IN | DIASTOLIC BLOOD PRESSURE: 75 MMHG | OXYGEN SATURATION: 96 % | SYSTOLIC BLOOD PRESSURE: 124 MMHG

## 2022-11-10 DIAGNOSIS — T78.40XA ALLERGIC REACTION, INITIAL ENCOUNTER: Primary | ICD-10-CM

## 2022-11-10 PROCEDURE — 6370000000 HC RX 637 (ALT 250 FOR IP): Performed by: EMERGENCY MEDICINE

## 2022-11-10 PROCEDURE — 99283 EMERGENCY DEPT VISIT LOW MDM: CPT

## 2022-11-10 RX ORDER — PREDNISONE 50 MG/1
50 TABLET ORAL DAILY
Qty: 5 TABLET | Refills: 0 | Status: SHIPPED | OUTPATIENT
Start: 2022-11-10 | End: 2022-11-15

## 2022-11-10 RX ADMIN — PREDNISONE 50 MG: 5 TABLET ORAL at 05:23

## 2022-11-10 ASSESSMENT — ENCOUNTER SYMPTOMS
CONSTIPATION: 0
ABDOMINAL PAIN: 0
EYE ITCHING: 0
EYE DISCHARGE: 0
COLOR CHANGE: 0
VOMITING: 0
SHORTNESS OF BREATH: 0
COUGH: 0
FACIAL SWELLING: 1

## 2022-11-10 ASSESSMENT — PAIN - FUNCTIONAL ASSESSMENT
PAIN_FUNCTIONAL_ASSESSMENT: NONE - DENIES PAIN
PAIN_FUNCTIONAL_ASSESSMENT: NONE - DENIES PAIN

## 2022-11-10 NOTE — ED PROVIDER NOTES
I PERSONALLY SAW THE PATIENT AND PERFORMED A SUBSTANTIVE PORTION OF THE VISIT INCLUDING ALL ASPECTS OF THE MEDICAL DECISION MAKING PROCESS. [address]      Pt Name: Janet Sanchez  MRN: [id number]  Angie 1980  Date of evaluation: 11/10/2022  Provider: Genie Michelle MD    CHIEF COMPLAINT       Chief Complaint   Patient presents with    Allergic Reaction     States she was in ER last night and received IV nausea medication and after returning home her lips began to swell. Pt took benadryl last night and again this morning and no response. HISTORY OF PRESENT ILLNESS    Anjelica Tafoya is a 38 y.o. female who presents to the emergency department with allergic reaction. Patient presents with what she describes as allergic reaction. Was in the emergency room last night. Prescribed nausea medicine. States that nausea medicine made her tongue swell. Was concerned that her air was going to close so came to the emergency room. No shortness of breath. No difficulty breathing. No chest pain. No rash. No other associated symptoms. No fevers or chills. Nursing Notes were reviewed. Including nursing noted for FM, Surgical History, Past Medical History, Social History, vitals, and allergies; agree with all. REVIEW OF SYSTEMS       Review of Systems   Constitutional:  Negative for diaphoresis and unexpected weight change. HENT:  Positive for facial swelling. Negative for congestion and dental problem. Eyes:  Negative for discharge and itching. Respiratory:  Negative for cough and shortness of breath. Cardiovascular:  Negative for chest pain and leg swelling. Gastrointestinal:  Negative for abdominal pain, constipation and vomiting. Endocrine: Negative for cold intolerance and heat intolerance. Genitourinary:  Negative for vaginal bleeding, vaginal discharge and vaginal pain.    Musculoskeletal:  Negative for neck pain and neck stiffness. Skin:  Negative for color change and pallor. Neurological:  Negative for tremors and weakness. Psychiatric/Behavioral:  Negative for agitation and behavioral problems. Except as noted above the remainder of the review of systems was reviewed and negative. PAST MEDICAL HISTORY     Past Medical History:   Diagnosis Date    Asthma     Bipolar 1 disorder ([address]     Compression fracture     COPD (chronic obstructive pulmonary disease) (HCC)     DDD (degenerative disc disease), cervical     Howard-Danlos syndrome     Fibromyalgia     H/O adrenal insufficiency     and pituitary insufficiency    Herniated disc     Metabolic syndrome     Osteoarthritis     Pyelonephritis     Sciatica        SURGICAL HISTORY       Past Surgical History:   Procedure Laterality Date    KNEE SURGERY      LUMBAR FUSION N/A 6/3/2019    L5, S1 TRANSFORAMINAL LUMBAR INTERBODY FUSION performed by Casi Quiñones MD at [facility] Left     repair    SIGMOIDOSCOPY N/A 8/21/2020    FLEXIBLE SIGMOIDOSCOPY POSSIBLE COLONOSCOPY performed by Onofre Petersen MD at [address] [facility]       Previous Medications    ACETAMINOPHEN (TYLENOL) 500 MG TABLET    Take 2 tablets by mouth in the morning, at noon, and at bedtime for 7 days    AJOVY 225 MG/1.5ML SOSY    Inject 1.5 mLs into the skin every 30 days     ALPRAZOLAM (XANAX) 1 MG TABLET    Take 1 mg by mouth 3 times daily as needed for Sleep or Anxiety.      AZELASTINE HCL 0.15 % SOLN    1 spray by Nasal route 2 times daily     CETIRIZINE (ZYRTEC ALLERGY) 10 MG TABLET    Take 10 mg by mouth daily     DICYCLOMINE (BENTYL) 10 MG CAPSULE    Take 2 capsules by mouth 2 times daily as needed (cramping)    DIVALPROEX (DEPAKOTE) 250 MG DR TABLET    Take 1 tablet by mouth 2 times daily    FLUOCINOLONE (DERMA-SMOOTHE) 0.01 % OIL EXTERNAL OIL    Apply 1 drop topically 4 times daily as needed    KETOCONAZOLE (NIZORAL) 2 % SHAMPOO    Apply topically Twice a Week    LEVOTHYROXINE (SYNTHROID) 25 MCG TABLET    TAKE 1 TABLET BY MOUTH EVERY DAY    LIDOCAINE 4 % EXTERNAL PATCH    Place 2 patches onto the skin daily for 5 days    METHADONE (DOLOPHINE) 10 MG TABLET    Take 60 mg by mouth daily. MIDODRINE (PROAMATINE) 2.5 MG TABLET    TAKE 1 TABLET BY MOUTH THREE TIMES A DAY    MONTELUKAST (SINGULAIR) 10 MG TABLET    Take 10 mg by mouth nightly    MULTIPLE VITAMIN (DAILY-RUTHANN) TABS    TAKE 1 TABLET BY MOUTH EVERY DAY    NAPROXEN (NAPROSYN) 250 MG TABLET    Take 1 tablet by mouth 2 times daily (with meals) for 7 days    NICOTINE POLACRILEX (NICORETTE) 4 MG GUM    Take 4 mg by mouth as needed for Smoking cessation    OMEPRAZOLE (PRILOSEC) 20 MG DELAYED RELEASE CAPSULE    Take 20 mg by mouth Daily     ONDANSETRON (ZOFRAN ODT) 4 MG DISINTEGRATING TABLET    Take 1 tablet by mouth every 8 hours as needed for Nausea    PREGABALIN (LYRICA) 100 MG CAPSULE    Take 100 mg by mouth 2 times daily. QUETIAPINE (SEROQUEL) 100 MG TABLET    Take 1 tablet by mouth nightly    QUETIAPINE (SEROQUEL) 200 MG TABLET    Take 0.5 tablets by mouth nightly    STOOL SOFTENER 100 MG CAPSULE    Take 100 mg by mouth 2 times daily    SUPER B COMPLEX & C TABS    Take 1 tablet by mouth daily     TRIAMCINOLONE (KENALOG) 0.1 % CREAM    Apply twice daily to affected skin on trunk. Avoid face or skin folds    TRULANCE 3 MG TABS    Take 3 mg by mouth daily    VITAMIN D (CHOLECALCIFEROL) 25 MCG (1000 UT) TABS TABLET    Take 1,000 Units by mouth daily     ZOLPIDEM (AMBIEN CR) 12.5 MG EXTENDED RELEASE TABLET    Take 12.5 mg by mouth nightly as needed for Sleep.        ALLERGIES     Metoclopramide, Quetiapine, Risperidone, Trazodone, Buprenorphine-naloxone, Morphine, Asenapine, Buprenorphine hcl-naloxone hcl, Codeine, Guanfacine hcl, Morphine and related, Reglan [metoclopramide hcl], Risperidone and related, Seroquel  [quetiapine fumarate], Seroquel [quetiapine fumarate], Trazodone and nefazodone, and Lurasidone    FAMILY HISTORY        Family History   Problem Relation Age of Onset    Diabetes type 2  Mother     Diabetes type 2  Father     Cancer Sister     Diabetes type 2  Brother        SOCIAL HISTORY       Social History     Socioeconomic History    Marital status: Single   Tobacco Use    Smoking status: Former     Packs/day: 0.50     Types: Cigarettes     Quit date: 2018     Years since quittin.8    Smokeless tobacco: Never   Vaping Use    Vaping Use: Never used   Substance and Sexual Activity    Alcohol use: Not Currently     Comment: socially    Drug use: No     Comment: Denies    Sexual activity: Not Currently       PHYSICAL EXAM       ED Triage Vitals [11/10/22 0513]   BP Temp Temp Source Heart Rate Resp SpO2 Height Weight   124/75 98.5 °F (36.9 °C) Oral 70 20 96 % 5' 4\" (1.626 m) 156 lb 8.4 oz (71 kg)       Physical Exam  Vitals and nursing note reviewed. Constitutional:       General: She is not in acute distress. Appearance: She is well-developed. She is not ill-appearing, toxic-appearing or diaphoretic. HENT:      Head: Normocephalic and atraumatic. Right Ear: External ear normal.      Left Ear: External ear normal.      Mouth/Throat:      Mouth: Mucous membranes are moist.      Pharynx: Oropharynx is clear. No oropharyngeal exudate or posterior oropharyngeal erythema. Comments: No swelling noted  Eyes:      General:         Right eye: No discharge. Left eye: No discharge. Conjunctiva/sclera: Conjunctivae normal.      Pupils: Pupils are equal, round, and reactive to light. Cardiovascular:      Rate and Rhythm: Normal rate and regular rhythm. Heart sounds: No murmur heard. Pulmonary:      Effort: Pulmonary effort is normal. No respiratory distress. Breath sounds: Normal breath sounds. No wheezing or rales. Abdominal:      General: Bowel sounds are normal. There is no distension. Palpations: Abdomen is soft. There is no mass. Tenderness: There is no abdominal tenderness. There is no guarding or rebound. Genitourinary:     Comments: Deferred  Musculoskeletal:         General: No deformity. Normal range of motion. Cervical back: Normal range of motion and neck supple. Skin:     General: Skin is warm. Findings: No erythema or rash. Neurological:      Mental Status: She is alert and oriented to person, place, and time. She is not disoriented. Cranial Nerves: No cranial nerve deficit. Motor: No atrophy or abnormal muscle tone. Coordination: Coordination normal.   Psychiatric:         Behavior: Behavior normal.         Thought Content: Thought content normal.       DIAGNOSTIC RESULTS     ED BEDSIDE ULTRASOUND:   Performed by ED Physician - none    LABS:  Labs Reviewed - No data to display    All other labs were withinnormal range or not returned as of this dictation. EMERGENCY DEPARTMENT COURSE and DIFFERENTIAL DIAGNOSIS/MDM:     PMH, Surgical Hx, FH, Social Hx reviewed by myself (ETOH usage, Tobacco usage, Drug usage reviewed by myself, no pertinent Hx)- No Pertinent Hx     Old records were reviewed by me     MDM 29-year-old with allergic reaction. Steroids given. Significantly better. Lungs clear to auscultation. No tongue swelling on discharge. Outpatient follow-up. Labs-   Diagnostic findings  Medications  Consults  Disposition  I estimate there is LOW risk for Sepsis, MI, Stroke, Tamponade, PTX, Toxicity or other life threatening etiology thus I consider the discharge disposition reasonable. The patient is at low risk for mortality based on demographic, history and clinical factors. Given the best available information and clinical assessment, I estimate the risk of hospitalization to be greater than risk of treatment at home. I have explained to the patient that the risk could rapidly change, given precautions for return and instructions.  Explained to patient that the risk for mortality is low based on demographic, history and clinical factors. I discussed with patient the results of evaluation in the ED, diagnosis, care, and prognosis. The plan is to discharge to home. Patient is in agreement with plan and questions have been answered. I also discussed with patient the reasons which may require a return visit and the importance of follow-up care. The patient is well-appearing, nontoxic, and improved at the time of discharge. Patient agrees to call to arrange follow-up care as directed. Patient understands to return immediately for worsening/change in symptoms. I PERSONALLY SAW THE PATIENT AND PERFORMED A SUBSTANTIVE PORTION OF THE VISIT INCLUDING ALL ASPECTS OF THE MEDICAL DECISION MAKING PROCESS. The primary clinician of record [id number] TIME   Total Critical Caretime was 0 minutes, excluding separately reportable procedures. There was a high probability of clinically significant/life threatening deterioration in the patient's condition which required my urgent intervention. CRITICAL CARE  I personally saw the patient and independently provided 0 minutes of non-concurrent critical care out of the total shared critical care time provided. This excludes seperately billable procedures. Critical care time was provided for patient as above that required close evaluation and/or intervention with concern for potential patient decompensation. PROCEDURES:  Unlessotherwise noted below, none    FINAL IMPRESSION      1.  Allergic reaction, initial encounter          DISPOSITION/PLAN   DISPOSITION      PCP    (Please note that portions ofthis note were completed with a voice recognition program.  Efforts were made to edit the dictations but occasionally words are mis-transcribed.)    Silvestre Dakin, MD(electronically signed)  Attending Emergency Physician        Silvestre Dakin, MD  11/10/22 3822

## 2022-11-10 NOTE — ED PROVIDER NOTES
[address] [place] [address] EMERGENCY DEPARTMENT    Name: Rodriguez Quesada : 1980 MRN: [id number] Date of Service: 2022    BP 94/60   Pulse 63   Temp 97.6 °F (36.4 °C) (Oral)   Resp 14   Ht 5' 3\" (1.6 m)   Wt 151 lb 3.8 oz (68.6 kg)   SpO2 100%   BMI 26.79 kg/m²     CC: lightheadedness    HPI: this patient is a 38 y.o. female presenting to the ED from home.  _____________________________________________________________________    Ms. Delores Reid tells me that for weeks - if not months - she has been struggling with lightheadedness. She notes that this lightheadedness is present most hours of most days. Nothing she can identify seems to bring it on or make it go away. It does not seem to be affected by activity or by different body positions. During this same time period she has been feeling persistently fatigued. She has also been experiencing headaches as described below. Earlier this morning she was also feeling short of breath while at rest; fortunately he left shortness of breath seems to have subsided spontaneously. She was primarily concerned about her ongoing lightheadedness, prompting her to come here this morning to be evaluated. Separately, Ms. Delores Reid notes that 2-3 weeks ago she had a fall from standing, which caused her to land on her right elbow. Since then she has had persistent soreness and mild-to-moderate, sometimes dull, sometimes throbbing pains to the back of her right elbow. She does not think any other areas of her body were injured during this specific incident. However, she does note that she has had numerous falls in recent months and she has had slight soreness and stiffness throughout the back of her neck recently. Ms. Delores Reid hasnt appreciated other changes from her usual state of health and she denies other current complaints.  Notably, she is currently taking numerous medications that can induce fatigue, dizziness, and/or lightheadedness, including: Alprazolam, Cetirizine, Dicyclomine, Divalproex, Methadone, Pregabalin. Quetiapine, and Zolpidem. _____________________________________________________________________    Headaches    Onset: gradual  Location: temporal  Duration: weeks  Character: throbbing  Aggravating: lights & sounds  Alleviating: none  Radiation: none  Timing: intermittent, occurring most days  Severity: moderate    History of headaches: yes  Similar location to previous HA: yes  Similar character to previous HA: yes  Maximal intensity at onset: yes  Syncope with HA: no  Recent head or neck trauma: no  Associated symptoms: as above  Treatments tried at home: usual daily home medications without relief  Family or personal history of intracranial aneurysm: denies    Ms. Nilda Anderson tells me that she has been struggling with chronic headaches for much of the recent past. She notes that for weeks she has been having headaches like described above about every 1-3 days. Sometimes her headaches occur at her left temporal area and sometimes they occur at her right temporal area. These headaches feel very consistent with her usual, chronic headaches.  She has had multiple headaches more severe than these in the past.  _____________________________________________________________________    Past Medical History:   Diagnosis Date    Adrenal insufficiency     Asthma     Bipolar 1 disorder (HCC)     Compression fracture     COPD (chronic obstructive pulmonary disease) ([facility])     DDD (degenerative disc disease), cervical     Howard-Danlos syndrome     Fibromyalgia     Herniated disc     Metabolic syndrome     Osteoarthritis     Pyelonephritis     Sciatica        Past Surgical History:   Procedure Laterality Date    KNEE SURGERY      LUMBAR FUSION N/A 6/3/2019    L5, S1 TRANSFORAMINAL LUMBAR INTERBODY FUSION performed by Daniel De Santiago MD at [address] [place] [address] Left     repair    SIGMOIDOSCOPY N/A 8/21/2020    FLEXIBLE SIGMOIDOSCOPY POSSIBLE COLONOSCOPY performed by Alysia Aj MD at [facility] History     Tobacco Use    Smoking status: Former     Packs/day: 0.50     Types: Cigarettes     Quit date: 2018     Years since quittin.8    Smokeless tobacco: Never   Vaping Use    Vaping Use: Never used   Substance Use Topics    Alcohol use: Not Currently     Comment: socially    Drug use: No     Comment: Denies       Family History   Problem Relation Age of Onset    Diabetes type 2  Mother     Diabetes type 2  Father     Cancer Sister     Diabetes type 2  Brother      _____________________________________________________________________    Review of Systems   Constitutional:  Positive for fatigue. Negative for chills and fever. HENT:  Negative for hearing loss and tinnitus. Eyes:  Positive for photophobia. Respiratory:  Positive for shortness of breath (resolved). Negative for cough. Cardiovascular:  Negative for chest pain. Gastrointestinal:  Positive for nausea. Negative for abdominal pain and vomiting. Genitourinary:  Negative for decreased urine volume. Musculoskeletal:  Positive for arthralgias (R elbow), neck pain and neck stiffness. Negative for back pain, gait problem, joint swelling and myalgias. Skin:  Negative for rash. Neurological:  Positive for light-headedness and headaches. Negative for weakness and numbness. Psychiatric/Behavioral:  Negative for confusion. Physical Exam  Constitutional:       General: She is awake. She is not in acute distress. Appearance: She is not ill-appearing, toxic-appearing or diaphoretic. HENT:      Head: Normocephalic and atraumatic. Eyes:      Conjunctiva/sclera: Conjunctivae normal.      Visual Fields:      Right eye: DM in the upper temporal quadrant. DM in the upper nasal quadrant. DM in the lower temporal quadrant. DM in the lower nasal quadrant. Left eye: DM in the upper nasal quadrant. DM in the upper temporal quadrant. DM in the lower nasal quadrant.  DM in the lower temporal quadrant. Neck:      Vascular: No JVD. Trachea: Phonation normal.      Meningeal: Brudzinski's sign and Kernig's sign absent. Cardiovascular:      Rate and Rhythm: Normal rate and regular rhythm. Pulses:           Radial pulses are 2+ on the right side and 2+ on the left side. Heart sounds: Normal heart sounds. No murmur heard. Pulmonary:      Effort: Pulmonary effort is normal. No accessory muscle usage. Breath sounds: Normal breath sounds. Abdominal:      General: Bowel sounds are normal. There is no distension. Palpations: Abdomen is soft. Tenderness: There is no abdominal tenderness. There is no guarding or rebound. Musculoskeletal:      Right shoulder: Normal. No swelling, deformity or tenderness. Normal range of motion. Left shoulder: No swelling, deformity or tenderness. Right upper arm: No swelling, deformity or tenderness. Left upper arm: No swelling, deformity or tenderness. Right elbow: No swelling or deformity. Normal range of motion. Tenderness present in olecranon process. Left elbow: No swelling or deformity. No tenderness. Right forearm: No swelling, deformity or tenderness. Left forearm: No swelling, deformity or tenderness. Right wrist: Normal. No swelling, deformity, tenderness or crepitus. Normal range of motion. Left wrist: No swelling, deformity or tenderness. Right hand: No swelling, deformity or tenderness. Normal range of motion. Normal strength. Normal strength of finger abduction, thumb/finger opposition and wrist extension. Normal sensation. Normal sensation of the ulnar distribution, median distribution and radial distribution. Normal capillary refill. Left hand: No swelling, deformity or tenderness. Cervical back: No swelling, edema, deformity, rigidity or crepitus. Muscular tenderness present. No pain with movement or spinous process tenderness. Normal range of motion. Thoracic back: No swelling, deformity or tenderness. Lumbar back: No swelling, deformity or tenderness. Right hip: No deformity, tenderness or crepitus. Left hip: No deformity, tenderness or crepitus. Right upper leg: No swelling, deformity or tenderness. Left upper leg: No swelling, deformity or tenderness. Right knee: No swelling or deformity. No tenderness. Normal alignment. Left knee: No swelling or deformity. No tenderness. Normal alignment. Right lower leg: No swelling, deformity or tenderness. Left lower leg: No swelling, deformity or tenderness. Right ankle: No swelling or deformity. No tenderness. Left ankle: No swelling or deformity. No tenderness. Right foot: No swelling, deformity or tenderness. Left foot: No swelling, deformity or tenderness. Comments: Full active and passive ROM of the R elbow in flexion, extension, supination, and pronation without difficulty or increased pain. Skin:     General: Skin is warm and dry. Coloration: Skin is not cyanotic, mottled or pale. Findings: No abrasion, bruising, ecchymosis, laceration or wound. Comments: No overt, external signs of acute trauma appreciated. Neurological:      Mental Status: She is alert and oriented to person, place, and time. GCS: GCS eye subscore is 4. GCS verbal subscore is 5. GCS motor subscore is 6. Cranial Nerves: Cranial nerves 2-12 are intact. No cranial nerve deficit. Sensory: Sensation is intact. No sensory deficit. Motor: Motor function is intact. No weakness. Gait: Gait is intact. Gait normal.      Comments:   Pupils equal, round, reactive to light and accommodation  Extraocular movements intact  Sensation to light touch intact and equal in V1, V2, and V3 distributions bilaterally. Face is symmetric with normal eye closure and smile. Hearing is normal to rubbing fingers  Palate elevates symmetrically.  Phonation is normal.  Head turning and shoulder shrug are intact  Tongue is midline with normal movements and no atrophy. RUE - 5/5 strength, normal bulk and tone, no tremor  RLE - 5/5 strength, normal bulk and tone, no tremor  LUE - 5/5 strength, normal bulk and tone, no tremor  LLE - 5/5 strength, normal bulk and tone, no tremor  Sensation to light touch intact and equal all extremities  Finger-to-nose movements intact without ataxia  Alert and oriented to self, persons, place, month, situation  Recent and remote memory intact   Psychiatric:         Speech: Speech normal. Speech is not delayed or slurred. Behavior: Behavior normal.         Cognition and Memory: Cognition is not impaired.  Memory is not impaired.     _____________________________________________________________________    RESULTS:    Results for orders placed or performed during the hospital encounter of 11/09/22   CBC with Auto Differential   Result Value Ref Range    WBC 4.9 4.0 - 11.0 K/uL    RBC 4.28 4.00 - 5.20 M/uL    Hemoglobin 12.8 12.0 - 16.0 g/dL    Hematocrit 37.5 36.0 - 48.0 %    MCV 87.7 80.0 - 100.0 fL    MCH 29.9 26.0 - 34.0 pg    MCHC 34.0 31.0 - 36.0 g/dL    RDW 13.8 12.4 - 15.4 %    Platelets 876 664 - 697 K/uL    MPV 7.8 5.0 - 10.5 fL    Neutrophils % 52.5 %    Lymphocytes % 33.9 %    Monocytes % 8.7 %    Eosinophils % 3.9 %    Basophils % 1.0 %    Neutrophils Absolute 2.6 1.7 - 7.7 K/uL    Lymphocytes Absolute 1.7 1.0 - 5.1 K/uL    Monocytes Absolute 0.4 0.0 - 1.3 K/uL    Eosinophils Absolute 0.2 0.0 - 0.6 K/uL    Basophils Absolute 0.0 0.0 - 0.2 K/uL   Basic Metabolic Panel w/ Reflex to MG   Result Value Ref Range    Sodium 139 136 - 145 mmol/L    Potassium reflex Magnesium 4.0 3.5 - 5.1 mmol/L    Chloride 102 99 - 110 mmol/L    CO2 28 21 - 32 mmol/L    Anion Gap 9 3 - 16    Glucose 63 (L) 70 - 99 mg/dL    BUN 7 7 - 20 mg/dL    Creatinine 0.8 0.6 - 1.1 mg/dL    Est, Glom Filt Rate >60 >60    Calcium 9.1 8.3 - 10.6 mg/dL   Troponin Result Value Ref Range    Troponin <0.01 <0.01 ng/mL   Urinalysis with Reflex to Culture    Specimen: Urine   Result Value Ref Range    Color, UA Yellow Straw/Yellow    Clarity, UA Clear Clear    Glucose, Ur Negative Negative mg/dL    Bilirubin Urine Negative Negative    Ketones, Urine Negative Negative mg/dL    Specific Gravity, UA 1.007 1.005 - 1.030    Blood, Urine LARGE (A) Negative    pH, UA 7.5 5.0 - 8.0    Protein, UA Negative Negative mg/dL    Urobilinogen, Urine 0.2 <2.0 E.U./dL    Nitrite, Urine Negative Negative    Leukocyte Esterase, Urine Negative Negative    Microscopic Examination YES     Urine Type NotGiven     Urine Reflex to Culture Not Indicated    Microscopic Urinalysis   Result Value Ref Range    Bacteria, UA None Seen None Seen /HPF    Hyaline Casts, UA 0 0 - 8 /LPF    WBC, UA 1 0 - 5 /HPF    RBC,  (H) 0 - 4 /HPF    Epithelial Cells, UA 13 (H) 0 - 5 /HPF   POCT Glucose   Result Value Ref Range    Glucose 79 mg/dL    QC OK? yes    POCT Glucose   Result Value Ref Range    POC Glucose 79 70 - 99 mg/dl    Performed on ACCU-CHEK    EKG 12 Lead   Result Value Ref Range    Ventricular Rate 56 BPM    Atrial Rate 56 BPM    P-R Interval 144 ms    QRS Duration 80 ms    Q-T Interval 430 ms    QTc Calculation (Bazett) 414 ms    P Axis 39 degrees    R Axis 1 degrees    T Axis 24 degrees    Diagnosis       Sinus bradycardiaLow voltage QRSBorderline ECGWhen compared with ECG of 29-OCT-2022 11:35,No significant change was foundConfirmed by [facility] MARGOTH MCINTOSH ([id number]) on 11/9/2022 1:27:45 PM       XR ELBOW RIGHT (MIN 3 VIEWS)   Final Result   CHEST:      No acute intrathoracic abnormality. RIGHT ELBOW:      No acute abnormality of the right elbow. A small osseous structure noted   adjacent to the lateral humeral condyle could reflect an osseous loose body. Clinical correlation is advised. XR CHEST (2 VW)   Final Result   CHEST:      No acute intrathoracic abnormality.       RIGHT ELBOW: No acute abnormality of the right elbow. A small osseous structure noted   adjacent to the lateral humeral condyle could reflect an osseous loose body. Clinical correlation is advised. CT HEAD WO CONTRAST   Final Result   No acute intracranial abnormality. CT CERVICAL SPINE WO CONTRAST   Final Result   No acute abnormality of the cervical spine.           _____________________________________________________________________    Is this patient to be included in the SEP-1 Core Measure? No (no infection identified)  _____________________________________________________________________    MEDICAL DECISION MAKING & PLANS:    I reviewed the patient's recent and/or pertinent records, current medications, nurses notes, allergies, and vital signs. Differential Diagnosis:  Polypharmacy  Adverse medication effect, medication interactions  Anemia, hypovolemia  Peripheral vertigo  Primary headaches  Medication overuse headaches  TBI, mTBI  Cervical spine fracture  Elbow fracture or contusion  Less likely CVA or primary CNS insult    Labs & Studies:  Labs  EKG  CXR  XR elbow  CT head  CT cervical spine    Treatments:  Medications   prochlorperazine (COMPAZINE) injection 10 mg (10 mg IntraVENous Given 11/9/22 1128)   LORazepam (ATIVAN) injection 1 mg (1 mg IntraVENous Given 11/9/22 1128)   0.9 % sodium chloride bolus (0 mLs IntraVENous Stopped 11/9/22 1335)   acetaminophen (TYLENOL) tablet 1,000 mg (1,000 mg Oral Given 11/9/22 1319)   naproxen (NAPROSYN) tablet 250 mg (250 mg Oral Given 11/9/22 1320)     Disposition:  Ms. Pascual Espino is not acutely ill-appearing on my evaluation in the ED this morning. She reports that her presenting symptoms have improved and are well-controlled now. Her exam findings are largely reassuring. On lab results she was initially slightly hypoglycemic. On further questioning she adds that she hasn't really eaten since lunch yesterday.  After providing a small snack her repeat glucose level is WNL. She also had hematuria on urinalysis but she now reports that her menstrual cycle is currently ongoing. Elbow XR shows a nonspecific, small, osseous loose body adjacent to the lateral humeral condyle. This is not where she is experiencing pain or tenderness. It could represent a tiny avulsion fracture; however, it has already been weeks since her initial injury and she can move her elbow through full active and passive ROM without difficulty and increased pain. The remainder of her ED evaluation results are reassuring and do not show acute or emergent findings. Suspect that polypharmacy is playing a substantial role in her lightheadedness and fatigue. Discussed results, Dxs, and expected clinical course with her at length. She would like to be DC home now. Advised her to contact her PCP expeditiously to discuss polypharmacy and medication management options going forward. Return precautions reviewed in detail and outpatient follow up advised. _____________________________________________________________________    Clinical Impression(s)  1. Acute nonintractable headache, unspecified headache type    2. Dizziness    3. Shortness of breath    4. Elbow injury, right, initial encounter    5. Neck pain    6. Polypharmacy        Provider Signature: MD Georgia Pepe MD  11/13/22 5232

## 2022-11-13 ASSESSMENT — ENCOUNTER SYMPTOMS
ABDOMINAL PAIN: 0
PHOTOPHOBIA: 1
COUGH: 0
SHORTNESS OF BREATH: 1
NAUSEA: 1
VOMITING: 0
BACK PAIN: 0

## 2022-12-15 ENCOUNTER — HOSPITAL ENCOUNTER (EMERGENCY)
Age: 42
Discharge: HOME | End: 2022-12-15
Attending: EMERGENCY MEDICINE
Payer: COMMERCIAL

## 2022-12-15 ENCOUNTER — APPOINTMENT (OUTPATIENT)
Dept: CT IMAGING | Age: 42
End: 2022-12-15
Payer: COMMERCIAL

## 2022-12-15 VITALS
TEMPERATURE: 97.8 F | OXYGEN SATURATION: 100 % | HEIGHT: 64 IN | HEART RATE: 60 BPM | RESPIRATION RATE: 15 BRPM | SYSTOLIC BLOOD PRESSURE: 105 MMHG | DIASTOLIC BLOOD PRESSURE: 75 MMHG | BODY MASS INDEX: 25.63 KG/M2 | WEIGHT: 150.13 LBS

## 2022-12-15 DIAGNOSIS — R31.9 HEMATURIA, UNSPECIFIED TYPE: ICD-10-CM

## 2022-12-15 DIAGNOSIS — R10.9 LEFT FLANK PAIN: Primary | ICD-10-CM

## 2022-12-15 LAB
A/G RATIO: 1.5 (ref 1.1–2.2)
ALBUMIN SERPL-MCNC: 4.1 G/DL (ref 3.4–5)
ALP BLD-CCNC: 57 U/L (ref 40–129)
ALT SERPL-CCNC: 23 U/L (ref 10–40)
ANION GAP SERPL CALCULATED.3IONS-SCNC: 10 MMOL/L (ref 3–16)
AST SERPL-CCNC: 31 U/L (ref 15–37)
BACTERIA: NORMAL /HPF
BASOPHILS ABSOLUTE: 0 K/UL (ref 0–0.2)
BASOPHILS RELATIVE PERCENT: 0.7 %
BILIRUB SERPL-MCNC: <0.2 MG/DL (ref 0–1)
BILIRUBIN URINE: NEGATIVE
BLOOD, URINE: ABNORMAL
BUN BLDV-MCNC: 15 MG/DL (ref 7–20)
CALCIUM SERPL-MCNC: 9.5 MG/DL (ref 8.3–10.6)
CHLORIDE BLD-SCNC: 101 MMOL/L (ref 99–110)
CLARITY: CLEAR
CO2: 25 MMOL/L (ref 21–32)
COLOR: YELLOW
CREAT SERPL-MCNC: 0.8 MG/DL (ref 0.6–1.1)
EOSINOPHILS ABSOLUTE: 0.1 K/UL (ref 0–0.6)
EOSINOPHILS RELATIVE PERCENT: 1.5 %
EPITHELIAL CELLS, UA: 1 /HPF (ref 0–5)
GFR SERPL CREATININE-BSD FRML MDRD: >60 ML/MIN/{1.73_M2}
GLUCOSE BLD-MCNC: 103 MG/DL (ref 70–99)
GLUCOSE URINE: NEGATIVE MG/DL
HCG(URINE) PREGNANCY TEST: NEGATIVE
HCT VFR BLD CALC: 39.3 % (ref 36–48)
HEMOGLOBIN: 12.8 G/DL (ref 12–16)
HYALINE CASTS: 0 /LPF (ref 0–8)
KETONES, URINE: NEGATIVE MG/DL
LEUKOCYTE ESTERASE, URINE: NEGATIVE
LIPASE: 19 U/L (ref 13–60)
LYMPHOCYTES ABSOLUTE: 1.5 K/UL (ref 1–5.1)
LYMPHOCYTES RELATIVE PERCENT: 29.7 %
MCH RBC QN AUTO: 29.5 PG (ref 26–34)
MCHC RBC AUTO-ENTMCNC: 32.5 G/DL (ref 31–36)
MCV RBC AUTO: 90.8 FL (ref 80–100)
MICROSCOPIC EXAMINATION: YES
MONOCYTES ABSOLUTE: 0.5 K/UL (ref 0–1.3)
MONOCYTES RELATIVE PERCENT: 9.3 %
NEUTROPHILS ABSOLUTE: 3 K/UL (ref 1.7–7.7)
NEUTROPHILS RELATIVE PERCENT: 58.8 %
NITRITE, URINE: NEGATIVE
PDW BLD-RTO: 14.2 % (ref 12.4–15.4)
PH UA: 7 (ref 5–8)
PLATELET # BLD: 235 K/UL (ref 135–450)
PMV BLD AUTO: 8.4 FL (ref 5–10.5)
POTASSIUM SERPL-SCNC: 4.3 MMOL/L (ref 3.5–5.1)
PROTEIN UA: NEGATIVE MG/DL
RBC # BLD: 4.33 M/UL (ref 4–5.2)
RBC UA: 2 /HPF (ref 0–4)
SODIUM BLD-SCNC: 136 MMOL/L (ref 136–145)
SPECIFIC GRAVITY UA: 1.01 (ref 1–1.03)
TOTAL PROTEIN: 6.9 G/DL (ref 6.4–8.2)
URINE REFLEX TO CULTURE: ABNORMAL
URINE TYPE: ABNORMAL
UROBILINOGEN, URINE: 0.2 E.U./DL
WBC # BLD: 5 K/UL (ref 4–11)
WBC UA: 0 /HPF (ref 0–5)

## 2022-12-15 PROCEDURE — 6370000000 HC RX 637 (ALT 250 FOR IP): Performed by: EMERGENCY MEDICINE

## 2022-12-15 PROCEDURE — 6360000002 HC RX W HCPCS: Performed by: EMERGENCY MEDICINE

## 2022-12-15 PROCEDURE — 84703 CHORIONIC GONADOTROPIN ASSAY: CPT

## 2022-12-15 PROCEDURE — 99284 EMERGENCY DEPT VISIT MOD MDM: CPT

## 2022-12-15 PROCEDURE — 80053 COMPREHEN METABOLIC PANEL: CPT

## 2022-12-15 PROCEDURE — 85025 COMPLETE CBC W/AUTO DIFF WBC: CPT

## 2022-12-15 PROCEDURE — 74176 CT ABD & PELVIS W/O CONTRAST: CPT

## 2022-12-15 PROCEDURE — 36415 COLL VENOUS BLD VENIPUNCTURE: CPT

## 2022-12-15 PROCEDURE — 83690 ASSAY OF LIPASE: CPT

## 2022-12-15 PROCEDURE — 81001 URINALYSIS AUTO W/SCOPE: CPT

## 2022-12-15 PROCEDURE — 96375 TX/PRO/DX INJ NEW DRUG ADDON: CPT

## 2022-12-15 PROCEDURE — 96374 THER/PROPH/DIAG INJ IV PUSH: CPT

## 2022-12-15 RX ORDER — ONDANSETRON 2 MG/ML
4 INJECTION INTRAMUSCULAR; INTRAVENOUS ONCE
Status: COMPLETED | OUTPATIENT
Start: 2022-12-15 | End: 2022-12-15

## 2022-12-15 RX ORDER — NAPROXEN 250 MG/1
250 TABLET ORAL EVERY 12 HOURS PRN
Qty: 10 TABLET | Refills: 0 | Status: SHIPPED | OUTPATIENT
Start: 2022-12-15

## 2022-12-15 RX ORDER — ACETAMINOPHEN 500 MG
1000 TABLET ORAL ONCE
Status: COMPLETED | OUTPATIENT
Start: 2022-12-15 | End: 2022-12-15

## 2022-12-15 RX ORDER — KETOROLAC TROMETHAMINE 30 MG/ML
15 INJECTION, SOLUTION INTRAMUSCULAR; INTRAVENOUS ONCE
Status: COMPLETED | OUTPATIENT
Start: 2022-12-15 | End: 2022-12-15

## 2022-12-15 RX ORDER — OXYCODONE HYDROCHLORIDE 10 MG/1
10 TABLET ORAL ONCE
Status: COMPLETED | OUTPATIENT
Start: 2022-12-15 | End: 2022-12-15

## 2022-12-15 RX ORDER — OXYCODONE HYDROCHLORIDE 5 MG/1
2.5 TABLET ORAL EVERY 8 HOURS PRN
Qty: 5 TABLET | Refills: 0 | Status: SHIPPED | OUTPATIENT
Start: 2022-12-15 | End: 2022-12-15 | Stop reason: SDUPTHER

## 2022-12-15 RX ORDER — 0.9 % SODIUM CHLORIDE 0.9 %
1000 INTRAVENOUS SOLUTION INTRAVENOUS ONCE
Status: DISCONTINUED | OUTPATIENT
Start: 2022-12-15 | End: 2022-12-15 | Stop reason: HOSPADM

## 2022-12-15 RX ORDER — OXYCODONE HYDROCHLORIDE 5 MG/1
2.5 TABLET ORAL EVERY 8 HOURS PRN
Qty: 5 TABLET | Refills: 0 | Status: SHIPPED | OUTPATIENT
Start: 2022-12-15 | End: 2022-12-22

## 2022-12-15 RX ADMIN — KETOROLAC TROMETHAMINE 15 MG: 30 INJECTION, SOLUTION INTRAMUSCULAR; INTRAVENOUS at 10:41

## 2022-12-15 RX ADMIN — OXYCODONE HYDROCHLORIDE 10 MG: 10 TABLET ORAL at 11:46

## 2022-12-15 RX ADMIN — Medication 1000 ML: at 10:52

## 2022-12-15 RX ADMIN — ONDANSETRON 4 MG: 2 INJECTION INTRAMUSCULAR; INTRAVENOUS at 10:41

## 2022-12-15 RX ADMIN — ACETAMINOPHEN 1000 MG: 500 TABLET ORAL at 10:41

## 2022-12-15 ASSESSMENT — PAIN - FUNCTIONAL ASSESSMENT
PAIN_FUNCTIONAL_ASSESSMENT: 0-10
PAIN_FUNCTIONAL_ASSESSMENT: NONE - DENIES PAIN

## 2022-12-15 ASSESSMENT — PAIN DESCRIPTION - DESCRIPTORS
DESCRIPTORS: STABBING
DESCRIPTORS: ACHING;SHARP
DESCRIPTORS: STABBING

## 2022-12-15 ASSESSMENT — PAIN DESCRIPTION - ORIENTATION
ORIENTATION: LEFT

## 2022-12-15 ASSESSMENT — PAIN DESCRIPTION - LOCATION
LOCATION: FLANK

## 2022-12-15 ASSESSMENT — PAIN SCALES - GENERAL
PAINLEVEL_OUTOF10: 9
PAINLEVEL_OUTOF10: 5

## 2022-12-19 ENCOUNTER — APPOINTMENT (OUTPATIENT)
Dept: GENERAL RADIOLOGY | Age: 42
End: 2022-12-19
Payer: COMMERCIAL

## 2022-12-19 ENCOUNTER — HOSPITAL ENCOUNTER (EMERGENCY)
Age: 42
Discharge: HOME | End: 2022-12-19
Attending: EMERGENCY MEDICINE
Payer: COMMERCIAL

## 2022-12-19 ENCOUNTER — APPOINTMENT (OUTPATIENT)
Dept: CT IMAGING | Age: 42
End: 2022-12-19
Payer: COMMERCIAL

## 2022-12-19 VITALS
DIASTOLIC BLOOD PRESSURE: 72 MMHG | RESPIRATION RATE: 15 BRPM | TEMPERATURE: 97.1 F | HEART RATE: 65 BPM | WEIGHT: 143.08 LBS | HEIGHT: 64 IN | SYSTOLIC BLOOD PRESSURE: 98 MMHG | BODY MASS INDEX: 24.43 KG/M2 | OXYGEN SATURATION: 95 %

## 2022-12-19 DIAGNOSIS — M54.50 ACUTE BILATERAL LOW BACK PAIN WITHOUT SCIATICA: Primary | ICD-10-CM

## 2022-12-19 DIAGNOSIS — R31.29 OTHER MICROSCOPIC HEMATURIA: ICD-10-CM

## 2022-12-19 DIAGNOSIS — R46.89 VERBALLY ABUSIVE BEHAVIOR: ICD-10-CM

## 2022-12-19 DIAGNOSIS — B37.31 YEAST VAGINITIS: ICD-10-CM

## 2022-12-19 LAB
ALBUMIN SERPL-MCNC: 4.1 G/DL (ref 3.4–5)
ALP BLD-CCNC: 52 U/L (ref 40–129)
ALT SERPL-CCNC: 22 U/L (ref 10–40)
ANION GAP SERPL CALCULATED.3IONS-SCNC: 9 MMOL/L (ref 3–16)
AST SERPL-CCNC: 24 U/L (ref 15–37)
BACTERIA: ABNORMAL /HPF
BASOPHILS ABSOLUTE: 0 K/UL (ref 0–0.2)
BASOPHILS RELATIVE PERCENT: 0.7 %
BILIRUB SERPL-MCNC: 0.3 MG/DL (ref 0–1)
BILIRUBIN DIRECT: <0.2 MG/DL (ref 0–0.3)
BILIRUBIN URINE: NEGATIVE
BILIRUBIN, INDIRECT: ABNORMAL MG/DL (ref 0–1)
BLOOD, URINE: ABNORMAL
BUDDING YEAST: PRESENT
BUN BLDV-MCNC: 16 MG/DL (ref 7–20)
CALCIUM SERPL-MCNC: 9.4 MG/DL (ref 8.3–10.6)
CHLORIDE BLD-SCNC: 103 MMOL/L (ref 99–110)
CLARITY: ABNORMAL
CO2: 24 MMOL/L (ref 21–32)
COLOR: YELLOW
CREAT SERPL-MCNC: 0.8 MG/DL (ref 0.6–1.1)
EKG ATRIAL RATE: 68 BPM
EKG DIAGNOSIS: NORMAL
EKG P AXIS: 45 DEGREES
EKG P-R INTERVAL: 138 MS
EKG Q-T INTERVAL: 416 MS
EKG QRS DURATION: 82 MS
EKG QTC CALCULATION (BAZETT): 442 MS
EKG R AXIS: 17 DEGREES
EKG T AXIS: 22 DEGREES
EKG VENTRICULAR RATE: 68 BPM
EOSINOPHILS ABSOLUTE: 0.1 K/UL (ref 0–0.6)
EOSINOPHILS RELATIVE PERCENT: 2.3 %
EPITHELIAL CELLS, UA: 11 /HPF (ref 0–5)
GFR SERPL CREATININE-BSD FRML MDRD: >60 ML/MIN/{1.73_M2}
GLUCOSE BLD-MCNC: 89 MG/DL (ref 70–99)
GLUCOSE URINE: NEGATIVE MG/DL
HCG(URINE) PREGNANCY TEST: NEGATIVE
HCT VFR BLD CALC: 36.4 % (ref 36–48)
HEMOGLOBIN: 12.3 G/DL (ref 12–16)
HYALINE CASTS: 0 /LPF (ref 0–8)
KETONES, URINE: ABNORMAL MG/DL
LEUKOCYTE ESTERASE, URINE: ABNORMAL
LIPASE: 35 U/L (ref 13–60)
LYMPHOCYTES ABSOLUTE: 1.2 K/UL (ref 1–5.1)
LYMPHOCYTES RELATIVE PERCENT: 32.6 %
MCH RBC QN AUTO: 30.4 PG (ref 26–34)
MCHC RBC AUTO-ENTMCNC: 33.7 G/DL (ref 31–36)
MCV RBC AUTO: 90.2 FL (ref 80–100)
MICROSCOPIC EXAMINATION: YES
MONOCYTES ABSOLUTE: 0.4 K/UL (ref 0–1.3)
MONOCYTES RELATIVE PERCENT: 9.5 %
NEUTROPHILS ABSOLUTE: 2 K/UL (ref 1.7–7.7)
NEUTROPHILS RELATIVE PERCENT: 54.9 %
NITRITE, URINE: NEGATIVE
PDW BLD-RTO: 15.1 % (ref 12.4–15.4)
PH UA: 7 (ref 5–8)
PLATELET # BLD: 207 K/UL (ref 135–450)
PMV BLD AUTO: 8.4 FL (ref 5–10.5)
POTASSIUM SERPL-SCNC: 4.2 MMOL/L (ref 3.5–5.1)
PRO-BNP: 165 PG/ML (ref 0–124)
PROTEIN UA: NEGATIVE MG/DL
RAPID INFLUENZA  B AGN: NEGATIVE
RAPID INFLUENZA A AGN: NEGATIVE
RBC # BLD: 4.03 M/UL (ref 4–5.2)
RBC UA: 3 /HPF (ref 0–4)
SARS-COV-2, NAAT: NOT DETECTED
SODIUM BLD-SCNC: 136 MMOL/L (ref 136–145)
SPECIFIC GRAVITY UA: 1.01 (ref 1–1.03)
TOTAL PROTEIN: 6.2 G/DL (ref 6.4–8.2)
TROPONIN: <0.01 NG/ML
URINE REFLEX TO CULTURE: ABNORMAL
URINE TYPE: ABNORMAL
UROBILINOGEN, URINE: 0.2 E.U./DL
WBC # BLD: 3.7 K/UL (ref 4–11)
WBC UA: 5 /HPF (ref 0–5)

## 2022-12-19 PROCEDURE — 71046 X-RAY EXAM CHEST 2 VIEWS: CPT

## 2022-12-19 PROCEDURE — 80048 BASIC METABOLIC PNL TOTAL CA: CPT

## 2022-12-19 PROCEDURE — 6360000002 HC RX W HCPCS: Performed by: NURSE PRACTITIONER

## 2022-12-19 PROCEDURE — 93005 ELECTROCARDIOGRAM TRACING: CPT | Performed by: NURSE PRACTITIONER

## 2022-12-19 PROCEDURE — 36415 COLL VENOUS BLD VENIPUNCTURE: CPT

## 2022-12-19 PROCEDURE — 74174 CTA ABD&PLVS W/CONTRAST: CPT

## 2022-12-19 PROCEDURE — 81001 URINALYSIS AUTO W/SCOPE: CPT

## 2022-12-19 PROCEDURE — 84484 ASSAY OF TROPONIN QUANT: CPT

## 2022-12-19 PROCEDURE — 87635 SARS-COV-2 COVID-19 AMP PRB: CPT

## 2022-12-19 PROCEDURE — 83690 ASSAY OF LIPASE: CPT

## 2022-12-19 PROCEDURE — 6360000004 HC RX CONTRAST MEDICATION: Performed by: NURSE PRACTITIONER

## 2022-12-19 PROCEDURE — 99285 EMERGENCY DEPT VISIT HI MDM: CPT

## 2022-12-19 PROCEDURE — 80076 HEPATIC FUNCTION PANEL: CPT

## 2022-12-19 PROCEDURE — 93010 ELECTROCARDIOGRAM REPORT: CPT | Performed by: INTERNAL MEDICINE

## 2022-12-19 PROCEDURE — 96361 HYDRATE IV INFUSION ADD-ON: CPT

## 2022-12-19 PROCEDURE — 84703 CHORIONIC GONADOTROPIN ASSAY: CPT

## 2022-12-19 PROCEDURE — 6370000000 HC RX 637 (ALT 250 FOR IP): Performed by: NURSE PRACTITIONER

## 2022-12-19 PROCEDURE — 96374 THER/PROPH/DIAG INJ IV PUSH: CPT

## 2022-12-19 PROCEDURE — 85025 COMPLETE CBC W/AUTO DIFF WBC: CPT

## 2022-12-19 PROCEDURE — 2580000003 HC RX 258: Performed by: NURSE PRACTITIONER

## 2022-12-19 PROCEDURE — 83880 ASSAY OF NATRIURETIC PEPTIDE: CPT

## 2022-12-19 PROCEDURE — 87804 INFLUENZA ASSAY W/OPTIC: CPT

## 2022-12-19 RX ORDER — KETOROLAC TROMETHAMINE 30 MG/ML
30 INJECTION, SOLUTION INTRAMUSCULAR; INTRAVENOUS ONCE
Status: COMPLETED | OUTPATIENT
Start: 2022-12-19 | End: 2022-12-19

## 2022-12-19 RX ORDER — FLUCONAZOLE 100 MG/1
150 TABLET ORAL ONCE
Status: COMPLETED | OUTPATIENT
Start: 2022-12-19 | End: 2022-12-19

## 2022-12-19 RX ORDER — 0.9 % SODIUM CHLORIDE 0.9 %
1000 INTRAVENOUS SOLUTION INTRAVENOUS ONCE
Status: COMPLETED | OUTPATIENT
Start: 2022-12-19 | End: 2022-12-19

## 2022-12-19 RX ORDER — MIDODRINE HYDROCHLORIDE 5 MG/1
2.5 TABLET ORAL ONCE
Status: COMPLETED | OUTPATIENT
Start: 2022-12-19 | End: 2022-12-19

## 2022-12-19 RX ADMIN — IOPAMIDOL 75 ML: 755 INJECTION, SOLUTION INTRAVENOUS at 10:38

## 2022-12-19 RX ADMIN — KETOROLAC TROMETHAMINE 30 MG: 30 INJECTION, SOLUTION INTRAMUSCULAR; INTRAVENOUS at 11:01

## 2022-12-19 RX ADMIN — MIDODRINE HYDROCHLORIDE 2.5 MG: 5 TABLET ORAL at 11:01

## 2022-12-19 RX ADMIN — FLUCONAZOLE 150 MG: 100 TABLET ORAL at 11:01

## 2022-12-19 RX ADMIN — SODIUM CHLORIDE 1000 ML: 9 INJECTION, SOLUTION INTRAVENOUS at 09:44

## 2022-12-19 ASSESSMENT — PAIN DESCRIPTION - LOCATION: LOCATION: BACK

## 2022-12-19 ASSESSMENT — PAIN DESCRIPTION - PAIN TYPE: TYPE: ACUTE PAIN

## 2022-12-19 ASSESSMENT — PAIN - FUNCTIONAL ASSESSMENT: PAIN_FUNCTIONAL_ASSESSMENT: 0-10

## 2022-12-19 ASSESSMENT — PAIN DESCRIPTION - ORIENTATION: ORIENTATION: LEFT

## 2022-12-19 ASSESSMENT — PAIN DESCRIPTION - FREQUENCY: FREQUENCY: CONTINUOUS

## 2022-12-19 ASSESSMENT — PAIN SCALES - GENERAL
PAINLEVEL_OUTOF10: 9
PAINLEVEL_OUTOF10: 7

## 2022-12-19 NOTE — ED PROVIDER NOTES
[facility]      Pt Name: Sabrina Mcmanus  MRN: [id number]  Rigogfantonio 1980  Date of evaluation: 12/19/2022  Provider: Stuart Garcia, [address] [place] [address]  Chief Complaint   Patient presents with    Back Pain     Pt stating that she was in last week and stated kidneys were \"swollen\"  and that felt had kidney stone. C/o nausea today and swelling into abd. Stating hurts to breath with pain. I have fully participated in the care of Sabrina Mcmanus and have had a face-to-face evaluation. I have reviewed and agree with all pertinent clinical information, and midlevel provider's history, and physical exam. I have also reviewed the labs and imaging studies and treatment plan. I have also reviewed and agree with the medications, allergies and past medical history section for this Anjelica Paredes. I agree with the diagnosis, and I concur. This patient is at risk for a communicable infection. Therefore, personal protection equipment consisting of a mask was worn for the exam.    Past Medical History:   Diagnosis Date    Adrenal insufficiency     Asthma     Bipolar 1 disorder (HCC)     Compression fracture     COPD (chronic obstructive pulmonary disease) ([facility])     DDD (degenerative disc disease), cervical     Howard-Danlos syndrome     Fibromyalgia     Herniated disc     Metabolic syndrome     Osteoarthritis     Pyelonephritis     Sciatica        MDM:  Sabrina Mcmanus is a 38 y.o. female who presents with lightheadedness and dizziness and bilateral back pain. She states she was here few days ago (Thursday) and was told her kidneys were swollen. They cannot rule out kidney stone. She has had burning with urination and states that her urine is \"thick. \"  She has had nausea and dizziness. She has had vomiting and diarrhea. She denies any fevers or chills. Nothing makes it better or worse. She describes it as severe.   Patient states she has a history of adrenal insufficiency and she did not take her midodrine today. She normally takes 2.5 mg every day. Physical exam shows moderate tenderness of bilateral low back and no tenderness of her abdomen. There are no masses or hepatosplenomegaly. Patient does not appear ill or toxic at this time. Heart is regular rate and rhythm without murmurs clicks or rubs. Lungs are clear auscultation and equal bilaterally. Laboratory work revealed a yeast vaginitis. She had a microscopic hematuria. Her blood pressure was obviously low because she did not take her midodrine prior to coming to the emergency department. Remainder of her work-up was negative. Patient did not meet admission criteria. At discharge patient came angry because she would not being admitted. She then said that she was going to go to another hospital to be admitted to have complete evaluation. Patient needing no further evaluation the emergency department. She was discharged to to follow-up with her physician and return to the emergency department for any problems. Vitals:    12/19/22 0930   BP: 98/72   Pulse: 65   Resp: 15   Temp:    SpO2: 95%       Lab results  Labs Reviewed   URINALYSIS WITH REFLEX TO CULTURE - Abnormal; Notable for the following components:       Result Value    Clarity, UA CLOUDY (*)     Ketones, Urine TRACE (*)     Blood, Urine TRACE (*)     Leukocyte Esterase, Urine TRACE (*)     All other components within normal limits   CBC WITH AUTO DIFFERENTIAL - Abnormal; Notable for the following components:    WBC 3.7 (*)     All other components within normal limits   HEPATIC FUNCTION PANEL - Abnormal; Notable for the following components:     Total Protein 6.2 (*)     All other components within normal limits   BRAIN NATRIURETIC PEPTIDE - Abnormal; Notable for the following components:    Pro- (*)     All other components within normal limits   MICROSCOPIC URINALYSIS - Abnormal; Notable for the following components:    Epithelial Cells, UA 11 (*)     Budding Yeast Present (*)     All other components within normal limits   RAPID INFLUENZA A/B ANTIGENS   COVID-19, RAPID   PREGNANCY, URINE   BASIC METABOLIC PANEL   LIPASE   TROPONIN       EKG Results  EKG Interpretation    Interpreted by emergency department physician  Time performed: 6388  Time read: 0926    Rhythm: Sinus  Ventricular Rate: 68  QRS Axis: 17  Ectopy: None  Conduction: Normal sinus rhythm  ST Segments: normal  T Waves: normal  Q Waves: None    Other findings: None    Compared to EKG on: 11/9/2022 and appears unchanged    Clinical Impression: Normal sinus rhythm, normal EKG. This is unchanged from previous EKG on 11/9/2022. Amanda Mix DO    Radiology results  CTA CHEST ABDOMEN PELVIS W CONTRAST   Final Result   No acute finding in the chest abdomen pelvis. Normal thoracic aorta and abdominal aorta. XR CHEST (2 VW)   Final Result   No acute process. See discharge instructions for specific medications, discharge information, and treatments. They were verbally instructed to return to emergency if any problems. Medications   0.9 % sodium chloride bolus (0 mLs IntraVENous Stopped 12/19/22 1134)   midodrine (PROAMATINE) tablet 2.5 mg (2.5 mg Oral Given 12/19/22 1101)   ketorolac (TORADOL) injection 30 mg (30 mg IntraVENous Given 12/19/22 1101)   fluconazole (DIFLUCAN) tablet 150 mg (150 mg Oral Given 12/19/22 1101)   iopamidol (ISOVUE-370) 76 % injection 75 mL (75 mLs IntraVENous Given 12/19/22 1038)       Discharge Medication List as of 12/19/2022 11:45 AM          The patient's blood pressure was not found to be elevated according to CMS/Medicare and the Affordable Care Act/ObamaCare criteria. IMPRESSION(S):  1. Acute bilateral low back pain without sciatica    2. Yeast vaginitis    3. Verbally abusive behavior    4.  Other microscopic hematuria              Calista Franklin DO  12/20/22 0861

## 2022-12-19 NOTE — ED NOTES
Pt ambulated to the bathroom with a steady gait, with non slip socks on, Yennifer HICKS aware      Deborah Castano RN  12/19/22 3665

## 2022-12-19 NOTE — ED PROVIDER NOTES
[address]  [address] [address]  Dept: [phone]  Loc: [address] [place] [address] COMPLAINT    Chief Complaint   Patient presents with    Back Pain     Pt stating that she was in last week and stated kidneys were \"swollen\"  and that felt had kidney stone. C/o nausea today and swelling into abd. Stating hurts to breath with pain. HPI    Maximo Champion is a 38 y.o. female who presents to the emergency department stating that she was having lower back pain and feels like her kidneys have been swollen. She states that she was actually evaluated for the symptoms here in this emergency department 4 days ago. She states the pain has gotten much worse and now is in her back. She states it hurts to breathe she is having shortness of breath and some intermittent chest pain. She is reporting nausea. No vomiting. She has urinary frequency with dysuria, no urgency, frequency, or hematuria. She feels lightheaded. She denies dizziness, fevers or chills. She has a history of adrenal insufficiency and takes midodrine for hypotension. She did not take this today yet. She has chronic back pain.     REVIEW OF SYSTEMS    : See HPI, no hematuria  GI: No vomiting or diarrhea  General: No measured fevers    PAST MEDICAL & SURGICAL HISTORY    Past Medical History:   Diagnosis Date    Adrenal insufficiency     Asthma     Bipolar 1 disorder (HCC)     Compression fracture     COPD (chronic obstructive pulmonary disease) ([facility])     DDD (degenerative disc disease), cervical     Howard-Danlos syndrome     Fibromyalgia     Herniated disc     Metabolic syndrome     Osteoarthritis     Pyelonephritis     Sciatica      Past Surgical History:   Procedure Laterality Date    KNEE SURGERY      LUMBAR FUSION N/A 6/3/2019    L5, S1 TRANSFORAMINAL LUMBAR INTERBODY FUSION performed by Eleni Shea MD at [address] Left     repair    SIGMOIDOSCOPY N/A 8/21/2020    FLEXIBLE SIGMOIDOSCOPY POSSIBLE COLONOSCOPY performed by Onofre Petersen MD at [address]  (may include discharge medications prescribed in the ED)  Current Outpatient Rx   Medication Sig Dispense Refill    naproxen (NAPROSYN) 250 MG tablet Take 1 tablet by mouth every 12 hours as needed for Pain 10 tablet 0    oxyCODONE (ROXICODONE) 5 MG immediate release tablet Take 0.5 tablets by mouth every 8 hours as needed (Pain not relieved by Naproxen (Aleve)) for up to 10 doses. 5 tablet 0    acetaminophen (TYLENOL) 500 MG tablet Take 2 tablets by mouth in the morning, at noon, and at bedtime for 7 days 42 tablet 0    QUEtiapine (SEROQUEL) 200 MG tablet Take 0.5 tablets by mouth nightly 60 tablet 2    QUEtiapine (SEROQUEL) 100 MG tablet Take 1 tablet by mouth nightly 60 tablet 3    methadone (DOLOPHINE) 10 MG tablet Take 60 mg by mouth daily. zolpidem (AMBIEN CR) 12.5 MG extended release tablet Take 12.5 mg by mouth nightly as needed for Sleep.      pregabalin (LYRICA) 100 MG capsule Take 100 mg by mouth 2 times daily. nicotine polacrilex (NICORETTE) 4 MG gum Take 4 mg by mouth as needed for Smoking cessation      azelastine HCl 0.15 % SOLN 1 spray by Nasal route 2 times daily       levothyroxine (SYNTHROID) 25 MCG tablet TAKE 1 TABLET BY MOUTH EVERY DAY 90 tablet 1    midodrine (PROAMATINE) 2.5 MG tablet TAKE 1 TABLET BY MOUTH THREE TIMES A DAY 90 tablet 5    omeprazole (PRILOSEC) 20 MG delayed release capsule Take 20 mg by mouth Daily       montelukast (SINGULAIR) 10 MG tablet Take 10 mg by mouth nightly      AJOVY 225 MG/1.5ML SOSY Inject 1.5 mLs into the skin every 30 days       triamcinolone (KENALOG) 0.1 % cream Apply twice daily to affected skin on trunk.  Avoid face or skin folds      fluocinolone (DERMA-SMOOTHE) 0.01 % OIL external oil Apply 1 drop topically 4 times daily as needed ondansetron (ZOFRAN ODT) 4 MG disintegrating tablet Take 1 tablet by mouth every 8 hours as needed for Nausea 20 tablet 0    SUPER B COMPLEX & C TABS Take 1 tablet by mouth daily       Vitamin D (CHOLECALCIFEROL) 25 MCG (1000 UT) TABS tablet Take 1,000 Units by mouth daily       Multiple Vitamin (DAILY-RUTHANN) TABS TAKE 1 TABLET BY MOUTH EVERY DAY      dicyclomine (BENTYL) 10 MG capsule Take 2 capsules by mouth 2 times daily as needed (cramping) 120 capsule 3    divalproex (DEPAKOTE) 250 MG DR tablet Take 1 tablet by mouth 2 times daily 90 tablet 3    STOOL SOFTENER 100 MG capsule Take 100 mg by mouth 2 times daily      ketoconazole (NIZORAL) 2 % shampoo Apply topically Twice a Week  4    TRULANCE 3 MG TABS Take 3 mg by mouth daily  11    ALPRAZolam (XANAX) 1 MG tablet Take 1 mg by mouth 3 times daily as needed for Sleep or Anxiety. cetirizine (ZYRTEC ALLERGY) 10 MG tablet Take 10 mg by mouth daily          ALLERGIES    Allergies   Allergen Reactions    Metoclopramide Shortness Of Breath and Swelling    Quetiapine Shortness Of Breath and Swelling    Risperidone Swelling and Shortness Of Breath    Trazodone Swelling and Shortness Of Breath     Swelling throat    Buprenorphine-Naloxone Anxiety and Hives     Patient says she can take Percocet, Vicodin    Morphine Hives and Itching    Asenapine Swelling     tongue    Buprenorphine Hcl-Naloxone Hcl     Codeine Hives and Itching     Patient says she can take Percocet, Vicodin    Guanfacine Hcl Swelling     tongue    Morphine And Related Hives    Reglan [Metoclopramide Hcl] Swelling    Risperidone And Related Swelling    Seroquel  [Quetiapine Fumarate] Other (See Comments)     \"I get really dizzy and I pass out. \"    Seroquel [Quetiapine Fumarate] Swelling     Swelling throat    Trazodone And Nefazodone Swelling     Swelling throat    Lurasidone Anxiety       SOCIAL HISTORY    Social History     Socioeconomic History    Marital status: Single     Spouse name: None Number of children: None    Years of education: None    Highest education level: None   Tobacco Use    Smoking status: Former     Packs/day: 0.50     Types: Cigarettes     Quit date: 2018     Years since quittin.9    Smokeless tobacco: Never   Vaping Use    Vaping Use: Never used   Substance and Sexual Activity    Alcohol use: Not Currently     Comment: socially    Drug use: No     Comment: Denies    Sexual activity: Not Currently       PHYSICAL EXAM    VITAL SIGNS: BP 98/72   Pulse 65   Temp 97.1 °F (36.2 °C) (Oral)   Resp 15   Ht 5' 4\" (1.626 m)   Wt 143 lb 1.3 oz (64.9 kg)   LMP 2022 (Exact Date)   SpO2 95%   BMI 24.56 kg/m²    Constitutional:  Well developed, well nourished  HENT:  Atraumatic, moist mucus membranes  EYES: Conjunctiva moist, nonicteric  NECK: No JVD, supple   Respiratory:  breath sounds clear throughout auscultation. Respirations are even and unlabored. No cough noted. On room air. No distress. Cardiovascular: no JVD regular rhythm and rate, S1-S2. Abdomen:  Soft, nontender nondistended abdomen without rebound or guarding. Normal active bowel sounds throughout auscultation. No CVA flank tenderness  Back: No midline C-spine, T-spine or L-spine pain with palpation. No bony abnormalities, step-offs or crepitance. She has tenderness with bilateral paralumbar palpation. She has well-healed surgical incisions that are vertical to the bilateral lumbar area. No erythema or obvious rash  Integument:  Skin is warm and dry   Neurologic: Awake, alert, normal flow speech, no tremors    EKG  Ventricular rate 68 bpm, DE normal 138 ms, QRS duration 82 ms,  ms  No ST elevation or depression. Interpretation is normal sinus rhythm    Today's tracing compared to 2022 with no significant changes noted    RADIOLOGY/PROCEDURES    CTA CHEST ABDOMEN PELVIS W CONTRAST   Final Result   No acute finding in the chest abdomen pelvis.       Normal thoracic aorta and abdominal aorta.         XR CHEST (2 VW)   Final Result   No acute process. Labs Reviewed   URINALYSIS WITH REFLEX TO CULTURE - Abnormal; Notable for the following components:       Result Value    Clarity, UA CLOUDY (*)     Ketones, Urine TRACE (*)     Blood, Urine TRACE (*)     Leukocyte Esterase, Urine TRACE (*)     All other components within normal limits   CBC WITH AUTO DIFFERENTIAL - Abnormal; Notable for the following components:    WBC 3.7 (*)     All other components within normal limits   HEPATIC FUNCTION PANEL - Abnormal; Notable for the following components: Total Protein 6.2 (*)     All other components within normal limits   BRAIN NATRIURETIC PEPTIDE - Abnormal; Notable for the following components:    Pro- (*)     All other components within normal limits   MICROSCOPIC URINALYSIS - Abnormal; Notable for the following components:    Epithelial Cells, UA 11 (*)     Budding Yeast Present (*)     All other components within normal limits   RAPID INFLUENZA A/B ANTIGENS   COVID-19, RAPID   PREGNANCY, URINE   BASIC METABOLIC PANEL   LIPASE   TROPONIN       ED COURSE & MEDICAL DECISION MAKING    Pertinent Labs studies interpreted and reviewed. (See chart for details)  See chart for details of medications given during the ED stay.     Medications   0.9 % sodium chloride bolus (0 mLs IntraVENous Stopped 12/19/22 1134)   midodrine (PROAMATINE) tablet 2.5 mg (2.5 mg Oral Given 12/19/22 1101)   ketorolac (TORADOL) injection 30 mg (30 mg IntraVENous Given 12/19/22 1101)   fluconazole (DIFLUCAN) tablet 150 mg (150 mg Oral Given 12/19/22 1101)   iopamidol (ISOVUE-370) 76 % injection 75 mL (75 mLs IntraVENous Given 12/19/22 1038)     This patient was seen and evaluated by myself and my attending physician Dr. Justin Washington    Differential diagnosis includes was not limited to pyelonephritis, ureterolithiasis, nephrolithiasis, urinary tract infection, dissection, AAA, ACS, GI emergency, other    She is nontoxic in appearance. She is afebrile. On arrival her blood pressure is 88/59. History of adrenal insufficiency. She did not take her midodrine at home. She was evaluated for the same complaint 4 days ago. She states that she is continuing with low back pain. She has not called her primary care physician for this    She was observed ambulating in the department at least 4 different times down the hallways and around the nurses station to the bathroom without difficulty and talking on her cell phone each time. She is neurologically intact. White count 3.7. No anemia. BMP is unremarkable. LFTs are unremarkable    Troponin less than 0.01. . No acute changes on EKG. Influenza is negative    Covid is negative    Urinalysis has trace ketones, specific gravity 1.014, trace blood, negative nitrites, trace leukocytes, 5 whites with 11 epithelials we will occurred culture before treating with an antibiotic. She does have budding yeast present in her urine. She was given a Diflucan tablet. Urine pregnancy test is negative. Chest x-ray interpreted by radiology and reviewed by myself showed no acute cardiopulmonary process    CTA of the chest abdomen and pelvis dissection study interpreted by radiology and reviewed by myself showed  Impression   No acute finding in the chest abdomen pelvis. Normal thoracic aorta and abdominal aorta. Patient was given IV fluids and Toradol for pain. The patient became argumentative after the Toradol stating she needed something stronger. I informed her that I am not able to give her anything stronger than nonnarcotics as her work-up is unremarkable and very reassuring. The patient demanded to be admitted for swelling kidneys and that it is not normal for her to be burning with urination. I tried to tell her that she was having a yeast infection that she was treated with Diflucan. Again her work-up is very reassuring. The patient would not leave.   She called her primary care provider's office and was screaming at whoever was answering the phone stating she was not leaving. He wanted to know why she had trace urine and her blood. I told her that if she schedule an appointment with Dr. Regina Pelayo for follow-up he can evaluate this more in depth but this was not a reason to admit someone to the hospital and that her back pain is most likely an exacerbation of her chronic back pain. She is neurologically intact. The patient was yelling on the phone as she ambulated out of the emergency department. Controlled Substance Monitoring:    Acute and Chronic Pain Monitoring:   RX Monitoring 12/19/2022   Acute Pain Prescriptions -   Periodic Controlled Substance Monitoring Possible medication side effects, risk of tolerance/dependence & alternative treatments discussed. I will route this chart to Dr. Regina Pelayo so that he can make sure there is a follow-up appointment scheduled with the patient for any further concerns. FINAL IMPRESSION    1. Acute bilateral low back pain without sciatica    2. Yeast vaginitis    3. Verbally abusive behavior    4.  Other microscopic hematuria        PLAN  Discharge with outpatient followup, instructions and medication (see EMR)     (Please note that this note was completed with a voice recognition program.  Every attempt was made to edit the dictations, but inevitably there remain words that are mis-transcribed.)                  Lisette Bruce, ANNA - CNP  12/19/22 3251

## 2022-12-19 NOTE — ED TRIAGE NOTES
Pt stating that she was in last week and stated kidneys were \"swollen\"  and that felt had kidney stone. C/o nausea today and swelling into abd. Stating hurts to breath with pain.

## 2022-12-29 ASSESSMENT — ENCOUNTER SYMPTOMS
ABDOMINAL PAIN: 1
NAUSEA: 1
VOMITING: 0
COUGH: 0
BACK PAIN: 1
SHORTNESS OF BREATH: 0

## 2022-12-30 NOTE — ED PROVIDER NOTES
[facility] EMERGENCY DEPARTMENT    Name: Frandy Alvarez : 1980 MRN: [id number] Date of Service: 12/15/2022    /75   Pulse 60   Temp 97.8 °F (36.6 °C) (Oral)   Resp 15   Ht 5' 4\" (1.626 m)   Wt 150 lb 2.1 oz (68.1 kg)   LMP 2022 (Exact Date)   SpO2 100%   BMI 25.77 kg/m²     CC: dysuria, flank pain    HPI: this patient is a 38 y.o. female presenting to the ED from home. Ms. Meghana Ye tells me that for about 1 week she has noticed burning pain upon urinating and she has bene urinating a little bit more frequently than what is typical for her. Early this morning her condition seemed to worsen as she developed intense, sometimes aching, sometimes squeezing pains throughout her left side. Since onset these pains have been coming and going largely at random without appreciable inciting or alleviating factors. Occasionally this pain will radiate into her lower left abdomen and/or her middle back on the left side. Along with the pain she has been increasingly nauseous for the last couple of hours. As she did not seem to be improving with time she came here to be evaluated. She has not appreciated other changes from her usual state of health and she denies other current complaints. She notes that she has had \"kidney stones\" and \"kidney infections\" previously, each of which have felt somewhat similar to this current illness.   _____________________________________________________________________    Past Medical History:   Diagnosis Date    Adrenal insufficiency     Asthma     Bipolar 1 disorder ([facility] [address]     Borderline personality disorder ([facility] [address]     Chronic prescription benzodiazepine use     Alprazolam    Chronic prescription opiate use     Methadone    COPD (chronic obstructive pulmonary disease) (HCC)     Degenerative spinal arthritis     Howard-Danlos syndrome     Fibromyalgia     Hepatitis C     Herniated disc     Intervertebral disc disease     Osteoarthritis     Vertebral compression fracture [facility])        Past Surgical History:   Procedure Laterality Date    KNEE SURGERY      LUMBAR FUSION N/A 2019    L5, S1 TRANSFORAMINAL LUMBAR INTERBODY FUSION performed by Marina Vasquez MD at [address] [facility] Left     SIGMOIDOSCOPY N/A 2020    FLEXIBLE SIGMOIDOSCOPY POSSIBLE COLONOSCOPY performed by Eleazar Jimenez MD at [facility] History     Tobacco Use    Smoking status: Former     Packs/day: 0.50     Types: Cigarettes     Quit date: 2018     Years since quittin.9    Smokeless tobacco: Never   Vaping Use    Vaping Use: Never used   Substance Use Topics    Alcohol use: Yes    Drug use: Not Currently       Family History   Problem Relation Age of Onset    Diabetes type 2  Mother     Diabetes type 2  Father     Cancer Sister     Diabetes type 2  Brother      _____________________________________________________________________    Review of Systems   Constitutional:  Negative for chills, fatigue and fever. Respiratory:  Negative for cough and shortness of breath. Cardiovascular:  Negative for chest pain. Gastrointestinal:  Positive for abdominal pain and nausea. Negative for vomiting. Endocrine: Negative for polydipsia. Genitourinary:  Positive for dysuria, flank pain and frequency. Negative for decreased urine volume, pelvic pain, vaginal bleeding, vaginal discharge and vaginal pain. Musculoskeletal:  Positive for back pain. Negative for gait problem and neck pain. Skin:  Negative for rash. Neurological:  Negative for weakness, numbness and headaches. Physical Exam  Constitutional:       General: She is awake. She is not in acute distress. Appearance: She is not ill-appearing, toxic-appearing or diaphoretic. HENT:      Head: Normocephalic and atraumatic. Eyes:      Conjunctiva/sclera: Conjunctivae normal.   Neck:      Vascular: No JVD. Cardiovascular:      Rate and Rhythm: Normal rate and regular rhythm.       Pulses: Radial pulses are 2+ on the right side and 2+ on the left side. Heart sounds: Normal heart sounds. No murmur heard. Pulmonary:      Effort: Pulmonary effort is normal. No accessory muscle usage. Breath sounds: Normal breath sounds. Abdominal:      General: Bowel sounds are normal. There is no distension. Palpations: Abdomen is soft. Tenderness: There is abdominal tenderness (minimal) in the left upper quadrant and left lower quadrant. There is left CVA tenderness. There is no right CVA tenderness, guarding or rebound. Musculoskeletal:      Cervical back: Normal.      Thoracic back: No swelling, deformity or bony tenderness. Lumbar back: No swelling, deformity or bony tenderness. Skin:     General: Skin is warm and dry. Coloration: Skin is not cyanotic, mottled or pale. Neurological:      Mental Status: She is alert and oriented to person, place, and time. Sensory: Sensation is intact. No sensory deficit. Motor: Motor function is intact. No weakness. Gait: Gait is intact.  Gait normal.   Psychiatric:         Speech: Speech normal.         Behavior: Behavior normal.     _____________________________________________________________________    RESULTS:    Results for orders placed or performed during the hospital encounter of 12/15/22   Urinalysis with Reflex to Culture    Specimen: Urine   Result Value Ref Range    Color, UA Yellow Straw/Yellow    Clarity, UA Clear Clear    Glucose, Ur Negative Negative mg/dL    Bilirubin Urine Negative Negative    Ketones, Urine Negative Negative mg/dL    Specific Gravity, UA 1.007 1.005 - 1.030    Blood, Urine TRACE (A) Negative    pH, UA 7.0 5.0 - 8.0    Protein, UA Negative Negative mg/dL    Urobilinogen, Urine 0.2 <2.0 E.U./dL    Nitrite, Urine Negative Negative    Leukocyte Esterase, Urine Negative Negative    Microscopic Examination YES     Urine Type Cleancatch     Urine Reflex to Culture Not Indicated    Microscopic Urinalysis   Result Value Ref Range    Bacteria, UA None Seen None Seen /HPF    Hyaline Casts, UA 0 0 - 8 /LPF    WBC, UA 0 0 - 5 /HPF    RBC, UA 2 0 - 4 /HPF    Epithelial Cells, UA 1 0 - 5 /HPF   CBC with Auto Differential   Result Value Ref Range    WBC 5.0 4.0 - 11.0 K/uL    RBC 4.33 4.00 - 5.20 M/uL    Hemoglobin 12.8 12.0 - 16.0 g/dL    Hematocrit 39.3 36.0 - 48.0 %    MCV 90.8 80.0 - 100.0 fL    MCH 29.5 26.0 - 34.0 pg    MCHC 32.5 31.0 - 36.0 g/dL    RDW 14.2 12.4 - 15.4 %    Platelets 651 499 - 646 K/uL    MPV 8.4 5.0 - 10.5 fL    Neutrophils % 58.8 %    Lymphocytes % 29.7 %    Monocytes % 9.3 %    Eosinophils % 1.5 %    Basophils % 0.7 %    Neutrophils Absolute 3.0 1.7 - 7.7 K/uL    Lymphocytes Absolute 1.5 1.0 - 5.1 K/uL    Monocytes Absolute 0.5 0.0 - 1.3 K/uL    Eosinophils Absolute 0.1 0.0 - 0.6 K/uL    Basophils Absolute 0.0 0.0 - 0.2 K/uL   Comprehensive Metabolic Panel   Result Value Ref Range    Sodium 136 136 - 145 mmol/L    Potassium 4.3 3.5 - 5.1 mmol/L    Chloride 101 99 - 110 mmol/L    CO2 25 21 - 32 mmol/L    Anion Gap 10 3 - 16    Glucose 103 (H) 70 - 99 mg/dL    BUN 15 7 - 20 mg/dL    Creatinine 0.8 0.6 - 1.1 mg/dL    Est, Glom Filt Rate >60 >60    Calcium 9.5 8.3 - 10.6 mg/dL    Total Protein 6.9 6.4 - 8.2 g/dL    Albumin 4.1 3.4 - 5.0 g/dL    Albumin/Globulin Ratio 1.5 1.1 - 2.2    Total Bilirubin <0.2 0.0 - 1.0 mg/dL    Alkaline Phosphatase 57 40 - 129 U/L    ALT 23 10 - 40 U/L    AST 31 15 - 37 U/L   Lipase   Result Value Ref Range    Lipase 19.0 13.0 - 60.0 U/L   Pregnancy, Urine   Result Value Ref Range    HCG(Urine) Pregnancy Test Negative Detects HCG level >20 MIU/mL       CT ABDOMEN PELVIS WO CONTRAST Additional Contrast? None   Final Result   1. No acute abnormality. _____________________________________________________________________    Is this patient to be included in the SEP-1 Core Measure?  No (no infection)  _____________________________________________________________________    MEDICAL DECISION MAKING & PLANS:    I reviewed the patient's recent and/or pertinent records, current medications, nurses notes, allergies, and vital signs. Differential Diagnosis:  Nephrolithiasis, pyelonephritis  Pregnancy, ectopic  Colitis, diverticulitis  Constipation  Chronic pain syndrome  Opiate-induced hyperalgesia  Low suspicion for other acute intraabdominal infection, bleeding, ischemia    Labs & Studies:  Labs  CT abdomen and pelvis    Treatments:  Medications   acetaminophen (TYLENOL) tablet 1,000 mg (1,000 mg Oral Given 12/15/22 1041)   ketorolac (TORADOL) injection 15 mg (15 mg IntraVENous Given 12/15/22 1041)   ondansetron (ZOFRAN) injection 4 mg (4 mg IntraVENous Given 12/15/22 1041)   oxyCODONE HCl (OXY-IR) immediate release tablet 10 mg (10 mg Oral Given 12/15/22 1146)     Disposition:  Ms. Nilda Anderson is well-appearing on reevaluation. She reports that her presenting symptoms have improved and are well-controlled now. Repeat abdominal exam is benign and she is tolerating PO medications and fluids without difficulty. Labs show microscopic hematuria and she reports that she noticed some debris in her urine when she went to the restroom to provide a urine sample. The remainder of her ED evaluation results are largely reassuring and do not show other acute or emergent findings. She may have spontaneously passed a nephrolith prior to CT imaging or her recent pain may just be related to her difficulties with chronic pain. Discussed results and expected clinical course with her at length. She is comfortable with DC home now. Return precautions reviewed in detail and outpatient follow up advised. _____________________________________________________________________    Clinical Impression(s)  1. Left flank pain    2. Hematuria, unspecified type        Provider Signature: MD Arleen Aviles, MD  12/29/22 4540

## 2023-03-31 ENCOUNTER — TRANSCRIBE ORDERS (OUTPATIENT)
Dept: ADMINISTRATIVE | Age: 43
End: 2023-03-31

## 2023-03-31 DIAGNOSIS — N20.0 NEPHROLITHIASIS, URIC ACID: Primary | ICD-10-CM

## 2023-05-20 ENCOUNTER — HOSPITAL ENCOUNTER (INPATIENT)
Age: 43
LOS: 6 days | Discharge: HOME | DRG: 753 | End: 2023-05-26
Attending: PSYCHIATRY & NEUROLOGY | Admitting: PSYCHIATRY & NEUROLOGY
Payer: COMMERCIAL

## 2023-05-20 ENCOUNTER — APPOINTMENT (OUTPATIENT)
Dept: GENERAL RADIOLOGY | Age: 43
End: 2023-05-20
Payer: COMMERCIAL

## 2023-05-20 ENCOUNTER — HOSPITAL ENCOUNTER (EMERGENCY)
Age: 43
Discharge: ANOTHER HOSPITAL | End: 2023-05-20
Attending: EMERGENCY MEDICINE
Payer: COMMERCIAL

## 2023-05-20 VITALS
TEMPERATURE: 97.4 F | DIASTOLIC BLOOD PRESSURE: 74 MMHG | HEIGHT: 64 IN | HEART RATE: 80 BPM | RESPIRATION RATE: 16 BRPM | BODY MASS INDEX: 26.69 KG/M2 | WEIGHT: 156.31 LBS | SYSTOLIC BLOOD PRESSURE: 103 MMHG | OXYGEN SATURATION: 98 %

## 2023-05-20 DIAGNOSIS — R53.82 CHRONIC FATIGUE: ICD-10-CM

## 2023-05-20 DIAGNOSIS — R79.89 HIGH SERUM THYROID STIMULATING HORMONE (TSH): ICD-10-CM

## 2023-05-20 DIAGNOSIS — R45.851 SUICIDAL IDEATION: Primary | ICD-10-CM

## 2023-05-20 DIAGNOSIS — E27.40 ADRENAL INSUFFICIENCY (HCC): ICD-10-CM

## 2023-05-20 PROBLEM — F32.A DEPRESSION, UNSPECIFIED DEPRESSION TYPE: Status: ACTIVE | Noted: 2023-05-20

## 2023-05-20 LAB
AMPHETAMINES UR QL SCN>1000 NG/ML: POSITIVE
ANION GAP SERPL CALCULATED.3IONS-SCNC: 8 MMOL/L (ref 3–16)
APAP SERPL-MCNC: 11 UG/ML (ref 10–30)
BACTERIA URNS QL MICRO: ABNORMAL /HPF
BARBITURATES UR QL SCN>200 NG/ML: ABNORMAL
BASOPHILS # BLD: 0 K/UL (ref 0–0.2)
BASOPHILS NFR BLD: 0.8 %
BENZODIAZ UR QL SCN>200 NG/ML: POSITIVE
BILIRUB UR QL STRIP.AUTO: NEGATIVE
BUN SERPL-MCNC: 15 MG/DL (ref 7–20)
CALCIUM SERPL-MCNC: 8.8 MG/DL (ref 8.3–10.6)
CANNABINOIDS UR QL SCN>50 NG/ML: ABNORMAL
CHLORIDE SERPL-SCNC: 97 MMOL/L (ref 99–110)
CLARITY UR: CLEAR
CO2 SERPL-SCNC: 30 MMOL/L (ref 21–32)
COCAINE UR QL SCN: POSITIVE
COLOR UR: YELLOW
CREAT SERPL-MCNC: 0.9 MG/DL (ref 0.6–1.1)
DEPRECATED RDW RBC AUTO: 14 % (ref 12.4–15.4)
DRUG SCREEN COMMENT UR-IMP: ABNORMAL
EOSINOPHIL # BLD: 0.2 K/UL (ref 0–0.6)
EOSINOPHIL NFR BLD: 3.4 %
EPI CELLS #/AREA URNS AUTO: 6 /HPF (ref 0–5)
ETHANOLAMINE SERPL-MCNC: NORMAL MG/DL (ref 0–0.08)
FENTANYL SCREEN, URINE: ABNORMAL
GFR SERPLBLD CREATININE-BSD FMLA CKD-EPI: >60 ML/MIN/{1.73_M2}
GLUCOSE SERPL-MCNC: 98 MG/DL (ref 70–99)
GLUCOSE UR STRIP.AUTO-MCNC: NEGATIVE MG/DL
HCG UR QL: NEGATIVE
HCT VFR BLD AUTO: 37.7 % (ref 36–48)
HGB BLD-MCNC: 13 G/DL (ref 12–16)
HGB UR QL STRIP.AUTO: NEGATIVE
HYALINE CASTS #/AREA URNS AUTO: 0 /LPF (ref 0–8)
KETONES UR STRIP.AUTO-MCNC: NEGATIVE MG/DL
LEUKOCYTE ESTERASE UR QL STRIP.AUTO: ABNORMAL
LYMPHOCYTES # BLD: 2 K/UL (ref 1–5.1)
LYMPHOCYTES NFR BLD: 40.2 %
MCH RBC QN AUTO: 31.6 PG (ref 26–34)
MCHC RBC AUTO-ENTMCNC: 34.5 G/DL (ref 31–36)
MCV RBC AUTO: 91.6 FL (ref 80–100)
METHADONE UR QL SCN>300 NG/ML: POSITIVE
MONOCYTES # BLD: 0.4 K/UL (ref 0–1.3)
MONOCYTES NFR BLD: 8.1 %
NEUTROPHILS # BLD: 2.3 K/UL (ref 1.7–7.7)
NEUTROPHILS NFR BLD: 47.5 %
NITRITE UR QL STRIP.AUTO: NEGATIVE
OPIATES UR QL SCN>300 NG/ML: ABNORMAL
OXYCODONE UR QL SCN: POSITIVE
PCP UR QL SCN>25 NG/ML: ABNORMAL
PH UR STRIP.AUTO: 7 [PH] (ref 5–8)
PH UR STRIP: 7 [PH]
PLATELET # BLD AUTO: 218 K/UL (ref 135–450)
PMV BLD AUTO: 7.9 FL (ref 5–10.5)
POTASSIUM SERPL-SCNC: 3.9 MMOL/L (ref 3.5–5.1)
PROT UR STRIP.AUTO-MCNC: NEGATIVE MG/DL
RBC # BLD AUTO: 4.12 M/UL (ref 4–5.2)
RBC CLUMPS #/AREA URNS AUTO: 1 /HPF (ref 0–4)
SARS-COV-2 RDRP RESP QL NAA+PROBE: NOT DETECTED
SODIUM SERPL-SCNC: 135 MMOL/L (ref 136–145)
SP GR UR STRIP.AUTO: 1.02 (ref 1–1.03)
UA COMPLETE W REFLEX CULTURE PNL UR: ABNORMAL
UA DIPSTICK W REFLEX MICRO PNL UR: YES
URN SPEC COLLECT METH UR: ABNORMAL
UROBILINOGEN UR STRIP-ACNC: 1 E.U./DL
WBC # BLD AUTO: 4.9 K/UL (ref 4–11)
WBC #/AREA URNS AUTO: 3 /HPF (ref 0–5)

## 2023-05-20 PROCEDURE — 80307 DRUG TEST PRSMV CHEM ANLYZR: CPT

## 2023-05-20 PROCEDURE — 1240000000 HC EMOTIONAL WELLNESS R&B

## 2023-05-20 PROCEDURE — 99285 EMERGENCY DEPT VISIT HI MDM: CPT

## 2023-05-20 PROCEDURE — 81001 URINALYSIS AUTO W/SCOPE: CPT

## 2023-05-20 PROCEDURE — 82077 ASSAY SPEC XCP UR&BREATH IA: CPT

## 2023-05-20 PROCEDURE — 36415 COLL VENOUS BLD VENIPUNCTURE: CPT

## 2023-05-20 PROCEDURE — 87635 SARS-COV-2 COVID-19 AMP PRB: CPT

## 2023-05-20 PROCEDURE — 84703 CHORIONIC GONADOTROPIN ASSAY: CPT

## 2023-05-20 PROCEDURE — 6370000000 HC RX 637 (ALT 250 FOR IP): Performed by: PSYCHIATRY & NEUROLOGY

## 2023-05-20 PROCEDURE — 73610 X-RAY EXAM OF ANKLE: CPT

## 2023-05-20 PROCEDURE — 85025 COMPLETE CBC W/AUTO DIFF WBC: CPT

## 2023-05-20 PROCEDURE — 6370000000 HC RX 637 (ALT 250 FOR IP): Performed by: PHYSICIAN ASSISTANT

## 2023-05-20 PROCEDURE — 80048 BASIC METABOLIC PNL TOTAL CA: CPT

## 2023-05-20 PROCEDURE — 80143 DRUG ASSAY ACETAMINOPHEN: CPT

## 2023-05-20 RX ORDER — METHADONE HYDROCHLORIDE 10 MG/1
60 TABLET ORAL ONCE
Status: COMPLETED | OUTPATIENT
Start: 2023-05-20 | End: 2023-05-20

## 2023-05-20 RX ORDER — ALPRAZOLAM 1 MG/1
1 TABLET ORAL 3 TIMES DAILY PRN
Status: DISCONTINUED | OUTPATIENT
Start: 2023-05-20 | End: 2023-05-21

## 2023-05-20 RX ORDER — ONDANSETRON 2 MG/ML
4 INJECTION INTRAMUSCULAR; INTRAVENOUS ONCE
Status: DISCONTINUED | OUTPATIENT
Start: 2023-05-20 | End: 2023-05-20

## 2023-05-20 RX ORDER — KETOROLAC TROMETHAMINE 30 MG/ML
30 INJECTION, SOLUTION INTRAMUSCULAR; INTRAVENOUS ONCE
Status: DISCONTINUED | OUTPATIENT
Start: 2023-05-20 | End: 2023-05-20

## 2023-05-20 RX ORDER — ALPRAZOLAM 0.5 MG/1
1 TABLET ORAL ONCE
Status: COMPLETED | OUTPATIENT
Start: 2023-05-20 | End: 2023-05-20

## 2023-05-20 RX ORDER — BUTALBITAL, ACETAMINOPHEN AND CAFFEINE 50; 325; 40 MG/1; MG/1; MG/1
2 TABLET ORAL ONCE
Status: COMPLETED | OUTPATIENT
Start: 2023-05-20 | End: 2023-05-20

## 2023-05-20 RX ORDER — QUETIAPINE FUMARATE 200 MG/1
200 TABLET, FILM COATED ORAL NIGHTLY
Status: DISCONTINUED | OUTPATIENT
Start: 2023-05-20 | End: 2023-05-26 | Stop reason: HOSPADM

## 2023-05-20 RX ORDER — UBROGEPANT 100 MG/1
100 TABLET ORAL PRN
Status: ON HOLD | COMMUNITY
End: 2023-05-26 | Stop reason: HOSPADM

## 2023-05-20 RX ORDER — ONDANSETRON 4 MG/1
4 TABLET, ORALLY DISINTEGRATING ORAL ONCE
Status: COMPLETED | OUTPATIENT
Start: 2023-05-20 | End: 2023-05-20

## 2023-05-20 RX ORDER — IBUPROFEN 400 MG/1
400 TABLET ORAL EVERY 6 HOURS PRN
Status: DISCONTINUED | OUTPATIENT
Start: 2023-05-20 | End: 2023-05-21

## 2023-05-20 RX ADMIN — QUETIAPINE FUMARATE 200 MG: 200 TABLET ORAL at 22:36

## 2023-05-20 RX ADMIN — ALPRAZOLAM 1 MG: 0.5 TABLET ORAL at 20:14

## 2023-05-20 RX ADMIN — ALPRAZOLAM 1 MG: 1 TABLET ORAL at 22:36

## 2023-05-20 RX ADMIN — METHADONE HYDROCHLORIDE 60 MG: 10 TABLET ORAL at 18:02

## 2023-05-20 RX ADMIN — ONDANSETRON 4 MG: 4 TABLET, ORALLY DISINTEGRATING ORAL at 18:32

## 2023-05-20 RX ADMIN — IBUPROFEN 400 MG: 400 TABLET, FILM COATED ORAL at 22:36

## 2023-05-20 RX ADMIN — BUTALBITAL, ACETAMINOPHEN AND CAFFEINE 2 TABLET: 325; 50; 40 TABLET ORAL at 20:07

## 2023-05-20 SDOH — SOCIAL STABILITY: SOCIAL NETWORK: ARE YOU MARRIED, WIDOWED, DIVORCED, SEPARATED, NEVER MARRIED, OR LIVING WITH A PARTNER?: SEPARATED

## 2023-05-20 SDOH — SOCIAL STABILITY: SOCIAL INSECURITY
WITHIN THE LAST YEAR, HAVE TO BEEN RAPED OR FORCED TO HAVE ANY KIND OF SEXUAL ACTIVITY BY YOUR PARTNER OR EX-PARTNER?: NO

## 2023-05-20 SDOH — SOCIAL STABILITY: SOCIAL INSECURITY
WITHIN THE LAST YEAR, HAVE YOU BEEN KICKED, HIT, SLAPPED, OR OTHERWISE PHYSICALLY HURT BY YOUR PARTNER OR EX-PARTNER?: NO

## 2023-05-20 SDOH — HEALTH STABILITY: MENTAL HEALTH: HOW MANY STANDARD DRINKS CONTAINING ALCOHOL DO YOU HAVE ON A TYPICAL DAY?: PATIENT DOES NOT DRINK

## 2023-05-20 SDOH — ECONOMIC STABILITY: HOUSING INSECURITY: IN THE LAST 12 MONTHS, HOW MANY PLACES HAVE YOU LIVED?: 2

## 2023-05-20 SDOH — ECONOMIC STABILITY: TRANSPORTATION INSECURITY
IN THE PAST 12 MONTHS, HAS THE LACK OF TRANSPORTATION KEPT YOU FROM MEDICAL APPOINTMENTS OR FROM GETTING MEDICATIONS?: YES

## 2023-05-20 SDOH — HEALTH STABILITY: MENTAL HEALTH
STRESS IS WHEN SOMEONE FEELS TENSE, NERVOUS, ANXIOUS, OR CAN'T SLEEP AT NIGHT BECAUSE THEIR MIND IS TROUBLED. HOW STRESSED ARE YOU?: VERY MUCH

## 2023-05-20 SDOH — ECONOMIC STABILITY: FOOD INSECURITY: WITHIN THE PAST 12 MONTHS, YOU WORRIED THAT YOUR FOOD WOULD RUN OUT BEFORE YOU GOT MONEY TO BUY MORE.: SOMETIMES TRUE

## 2023-05-20 SDOH — ECONOMIC STABILITY: FOOD INSECURITY: WITHIN THE PAST 12 MONTHS, THE FOOD YOU BOUGHT JUST DIDN'T LAST AND YOU DIDN'T HAVE MONEY TO GET MORE.: SOMETIMES TRUE

## 2023-05-20 SDOH — SOCIAL STABILITY: SOCIAL INSECURITY: WITHIN THE LAST YEAR, HAVE YOU BEEN AFRAID OF YOUR PARTNER OR EX-PARTNER?: NO

## 2023-05-20 SDOH — ECONOMIC STABILITY: INCOME INSECURITY: IN THE LAST 12 MONTHS, WAS THERE A TIME WHEN YOU WERE NOT ABLE TO PAY THE MORTGAGE OR RENT ON TIME?: YES

## 2023-05-20 SDOH — SOCIAL STABILITY: SOCIAL INSECURITY: WITHIN THE LAST YEAR, HAVE YOU BEEN HUMILIATED OR EMOTIONALLY ABUSED IN OTHER WAYS BY YOUR PARTNER OR EX-PARTNER?: NO

## 2023-05-20 SDOH — HEALTH STABILITY: MENTAL HEALTH: HOW OFTEN DO YOU HAVE A DRINK CONTAINING ALCOHOL?: NEVER

## 2023-05-20 SDOH — ECONOMIC STABILITY: HOUSING INSECURITY
IN THE LAST 12 MONTHS, WAS THERE A TIME WHEN YOU DID NOT HAVE A STEADY PLACE TO SLEEP OR SLEPT IN A SHELTER (INCLUDING NOW)?: YES

## 2023-05-20 SDOH — ECONOMIC STABILITY: INCOME INSECURITY: HOW HARD IS IT FOR YOU TO PAY FOR THE VERY BASICS LIKE FOOD, HOUSING, MEDICAL CARE, AND HEATING?: HARD

## 2023-05-20 SDOH — ECONOMIC STABILITY: TRANSPORTATION INSECURITY
IN THE PAST 12 MONTHS, HAS LACK OF TRANSPORTATION KEPT YOU FROM MEETINGS, WORK, OR FROM GETTING THINGS NEEDED FOR DAILY LIVING?: YES

## 2023-05-20 ASSESSMENT — PAIN SCALES - GENERAL
PAINLEVEL_OUTOF10: 8
PAINLEVEL_OUTOF10: 6
PAINLEVEL_OUTOF10: 7

## 2023-05-20 ASSESSMENT — LIFESTYLE VARIABLES
HOW OFTEN DO YOU HAVE A DRINK CONTAINING ALCOHOL: NEVER
HOW MANY STANDARD DRINKS CONTAINING ALCOHOL DO YOU HAVE ON A TYPICAL DAY: PATIENT DOES NOT DRINK

## 2023-05-20 ASSESSMENT — PAIN DESCRIPTION - PAIN TYPE: TYPE: CHRONIC PAIN

## 2023-05-20 ASSESSMENT — SLEEP AND FATIGUE QUESTIONNAIRES
DO YOU USE A SLEEP AID: YES
DO YOU HAVE DIFFICULTY SLEEPING: YES
AVERAGE NUMBER OF SLEEP HOURS: 3
SLEEP PATTERN: DIFFICULTY FALLING ASLEEP

## 2023-05-20 ASSESSMENT — PAIN DESCRIPTION - DESCRIPTORS: DESCRIPTORS: ACHING;DISCOMFORT

## 2023-05-20 ASSESSMENT — PAIN - FUNCTIONAL ASSESSMENT
PAIN_FUNCTIONAL_ASSESSMENT: ACTIVITIES ARE NOT PREVENTED
PAIN_FUNCTIONAL_ASSESSMENT: 0-10

## 2023-05-20 ASSESSMENT — PAIN DESCRIPTION - LOCATION
LOCATION: HEAD
LOCATION: HEAD
LOCATION: BACK

## 2023-05-20 NOTE — ED PROVIDER NOTES
[facility]      Pt Name: Wilbur Orellana  MRN: [id number]  [place] 1980  Date of evaluation: 5/20/2023  Provider: MARTÍN Asher  PCP: Jeremi Dockery MD  Note Started: 3:11 PM EDT     This patient was also seen and evaluated by the attending physician Dougie Miranda MD.      [address] [place] [address]       Chief Complaint   Patient presents with    Other     Pt missed her weekend appointment for her methadone and is requesting methadone. HISTORY OF PRESENT ILLNESS   (Location, Timing/Onset, Context/Setting, Quality, Duration, Modifying Factors, Severity, Associated Signs and Symptoms)  Note limiting factors. Wilbur Orellana is a 37 y.o. female who presents seeking methadone treatment and also saying she is considering suicide. Patient says she gets methadone treatments daily, but she missed her appointment this morning and the place is closed for the day. She denies any recent drug use otherwise. She says she feels she needs to go into the hospital because she is considering suicide. Does not have a specific plan but has attempted suicide in the past, she says. She is taking psychiatric medications, has been taking them as prescribed. Denies being any physical danger presently. She is presently staying at the [facility], has been in touch with [facility], and they are arranging to make sure that she is still has a bed at the . She denies any actual attempt to harm herself within the last 24 hours. Denies any other relevant medical problems. Nursing Notes were all reviewed and agreed with or any disagreements were addressed in the HPI. REVIEW OF SYSTEMS    (2-9 systems for level 4, 10 or more for level 5)     Positives and pertinent negatives as per HPI.      PAST MEDICAL HISTORY     Past Medical History:   Diagnosis Date    Adrenal insufficiency     Asthma     Bipolar 1 disorder ([facility] [address]

## 2023-05-20 NOTE — ED PROVIDER NOTES
Attending Supervisory Note/Shared Visit   I have personally performed a face to face diagnostic evaluation on this patient. I have reviewed the mid-levels findings and agree. History and Exam by me shows white female no acute distress. Patient initially came in stating that she missed her methadone dose today at the clinic and either methadone. She subsequently admitted that she was having some suicidal thoughts. She plans on taking pills and overdosing. She has a previous history of suicide attempt. She states she has been on the methadone for about a year and a half. She states she takes it mainly for chronic pain. She also said she twisted her right ankle yesterday and wants that checked as well. General: Alert white female no acute distress. HEENT: Atraumatic. Pupils equal and reactive. Oropharynx negative. Heart: Regular rate and rhythm. No murmurs or gallops noted. Lungs: Breath sounds equal bilaterally and clear. Abdomen: Soft, nondistended, nontender. Musculoskeletal: There is mild swelling and tenderness over the lateral ankle near the distal fibula. There are some mild bruising in that area. The foot is otherwise nontender. Intact distal pulses. Mental status: Alert oriented and cooperative. Positive suicidal ideations with a plan. No homicidal ideations.   No hallucinations or delusions    Labs Reviewed   BASIC METABOLIC PANEL W/ REFLEX TO MG FOR LOW K - Abnormal; Notable for the following components:       Result Value    Sodium 135 (*)     Chloride 97 (*)     All other components within normal limits   URINALYSIS WITH REFLEX TO CULTURE - Abnormal; Notable for the following components:    Leukocyte Esterase, Urine TRACE (*)     All other components within normal limits   URINE DRUG SCREEN - Abnormal; Notable for the following components:    Amphetamine Screen, Urine POSITIVE (*)     Benzodiazepine Screen, Urine POSITIVE (*)     Cocaine Metabolite Screen, Urine POSITIVE (*)

## 2023-05-20 NOTE — ED TRIAGE NOTES
Pt into ER for medication refill. Pt missed her weekend appointment for her methadone and is requesting methadone.

## 2023-05-20 NOTE — VIRTUAL HEALTH
[facility] to [employer]  Consult performed by: MARTÍN Ramos  Consult ordered by: MARTÍN Ramos  Reason for consult: suicidal ideation        Anjelica Tafoya, was evaluated through a synchronous (real-time) audio-video encounter. The patient (and/or guardian if applicable) is aware that this is a billable service, which includes applicable co-pays. This virtual visit was conducted with patient's (and/or legal guardian's) consent. Patient identification was verified, and a caregiver was present when appropriate. The patient was located at [facility] (Appt Department): [address]: [phone]  The provider was located at [place] ([facility]): [place], [place]      Total time spent on this encounter: Not billed by time    --Jada Cornell on 5/20/2023 at 3:30 PM    An electronic signature was used to authenticate this note. Telepsych  Crisis Assessment       Chief Complaint: Pt. Is a 37year old  Female with a hx of MDD, Anxiety, and Opiate Use Disorder. She came in after missing her [facility] and missed her medication. She wanted a refill of her methadone medication but she also is tearful, very depressed, suicidal and has multiple psychosocial stressors in her life. She reports having SI with a plan to take medications and to \"just go to sleep and die\". She reports having access to medication to do this. Pt. Reports she is homeless and has been staying at the [facility] shelter for the past 3 weeks which has been very stressful. She reports being in a custody mcclure over her son who was abused by his Father and Stepmom. She reports having family issues and very little support. She states she won't call anyone in her family because she is a burden and they all are sick with their own problems. Pt. Is currently denying any HI/AH/VH.  She states she has
tablet, Take 1 mg by mouth 3 times daily as needed for Sleep or Anxiety. , Disp: , Rfl:     cetirizine (ZYRTEC ALLERGY) 10 MG tablet, Take 10 mg by mouth daily , Disp: , Rfl:       Vital signs:   Vitals:    05/20/23 1429   BP: (!) 157/109   Pulse: 80   Resp: 18   Temp: 97.9 °F (36.6 °C)   SpO2: 98%         Labs:   Recent Results (from the past 48 hour(s))   CBC with Auto Differential    Collection Time: 05/20/23  3:24 PM   Result Value Ref Range    WBC 4.9 4.0 - 11.0 K/uL    RBC 4.12 4.00 - 5.20 M/uL    Hemoglobin 13.0 12.0 - 16.0 g/dL    Hematocrit 37.7 36.0 - 48.0 %    MCV 91.6 80.0 - 100.0 fL    MCH 31.6 26.0 - 34.0 pg    MCHC 34.5 31.0 - 36.0 g/dL    RDW 14.0 12.4 - 15.4 %    Platelets 822 121 - 223 K/uL    MPV 7.9 5.0 - 10.5 fL    Neutrophils % 47.5 %    Lymphocytes % 40.2 %    Monocytes % 8.1 %    Eosinophils % 3.4 %    Basophils % 0.8 %    Neutrophils Absolute 2.3 1.7 - 7.7 K/uL    Lymphocytes Absolute 2.0 1.0 - 5.1 K/uL    Monocytes Absolute 0.4 0.0 - 1.3 K/uL    Eosinophils Absolute 0.2 0.0 - 0.6 K/uL    Basophils Absolute 0.0 0.0 - 0.2 K/uL   BMP w/ Reflex to MG    Collection Time: 05/20/23  3:24 PM   Result Value Ref Range    Sodium 135 (L) 136 - 145 mmol/L    Potassium reflex Magnesium 3.9 3.5 - 5.1 mmol/L    Chloride 97 (L) 99 - 110 mmol/L    CO2 30 21 - 32 mmol/L    Anion Gap 8 3 - 16    Glucose 98 70 - 99 mg/dL    BUN 15 7 - 20 mg/dL    Creatinine 0.9 0.6 - 1.1 mg/dL    Est, Glom Filt Rate >60 >60    Calcium 8.8 8.3 - 10.6 mg/dL   Ethanol    Collection Time: 05/20/23  3:24 PM   Result Value Ref Range    Ethanol Lvl None Detected mg/dL   COVID-19, Rapid    Collection Time: 05/20/23  3:24 PM    Specimen: Nasopharyngeal Swab   Result Value Ref Range    SARS-CoV-2, NAAT Not Detected Not Detected   Acetaminophen Level    Collection Time: 05/20/23  3:24 PM   Result Value Ref Range    Acetaminophen Level 11 10 - 30 ug/mL       Review of Systems:  Pt c/o musculoskeletal pain. Otherwise negative ROS.

## 2023-05-21 PROBLEM — G43.909 MIGRAINE: Status: ACTIVE | Noted: 2018-11-13

## 2023-05-21 PROBLEM — E03.9 HYPOTHYROIDISM: Status: ACTIVE | Noted: 2023-05-21

## 2023-05-21 PROBLEM — F19.90 POLYSUBSTANCE USE DISORDER: Status: ACTIVE | Noted: 2023-05-21

## 2023-05-21 PROBLEM — J06.9 URI (UPPER RESPIRATORY INFECTION): Status: ACTIVE | Noted: 2023-05-21

## 2023-05-21 PROBLEM — J44.9 COPD (CHRONIC OBSTRUCTIVE PULMONARY DISEASE) (HCC): Status: ACTIVE | Noted: 2023-05-21

## 2023-05-21 LAB — TSH SERPL DL<=0.005 MIU/L-ACNC: 1.31 UIU/ML (ref 0.27–4.2)

## 2023-05-21 PROCEDURE — 6370000000 HC RX 637 (ALT 250 FOR IP)

## 2023-05-21 PROCEDURE — 84443 ASSAY THYROID STIM HORMONE: CPT

## 2023-05-21 PROCEDURE — 1240000000 HC EMOTIONAL WELLNESS R&B

## 2023-05-21 PROCEDURE — 6370000000 HC RX 637 (ALT 250 FOR IP): Performed by: PSYCHIATRY & NEUROLOGY

## 2023-05-21 PROCEDURE — 99223 1ST HOSP IP/OBS HIGH 75: CPT

## 2023-05-21 PROCEDURE — 36415 COLL VENOUS BLD VENIPUNCTURE: CPT

## 2023-05-21 RX ORDER — ACETAMINOPHEN, ASPIRIN AND CAFFEINE 250; 250; 65 MG/1; MG/1; MG/1
1 TABLET, FILM COATED ORAL EVERY 12 HOURS PRN
Status: DISCONTINUED | OUTPATIENT
Start: 2023-05-21 | End: 2023-05-26 | Stop reason: HOSPADM

## 2023-05-21 RX ORDER — ONDANSETRON 4 MG/1
4 TABLET, ORALLY DISINTEGRATING ORAL EVERY 8 HOURS PRN
Status: DISCONTINUED | OUTPATIENT
Start: 2023-05-21 | End: 2023-05-26 | Stop reason: HOSPADM

## 2023-05-21 RX ORDER — METHADONE HYDROCHLORIDE 10 MG/1
60 TABLET ORAL DAILY
Status: DISCONTINUED | OUTPATIENT
Start: 2023-05-21 | End: 2023-05-26 | Stop reason: HOSPADM

## 2023-05-21 RX ORDER — ALPRAZOLAM 1 MG/1
1 TABLET ORAL 3 TIMES DAILY
Status: DISCONTINUED | OUTPATIENT
Start: 2023-05-21 | End: 2023-05-26 | Stop reason: HOSPADM

## 2023-05-21 RX ORDER — POLYETHYLENE GLYCOL 3350 17 G/17G
17 POWDER, FOR SOLUTION ORAL DAILY PRN
Status: DISCONTINUED | OUTPATIENT
Start: 2023-05-21 | End: 2023-05-26 | Stop reason: HOSPADM

## 2023-05-21 RX ORDER — IBUPROFEN 600 MG/1
600 TABLET ORAL EVERY 6 HOURS PRN
Status: DISCONTINUED | OUTPATIENT
Start: 2023-05-21 | End: 2023-05-22

## 2023-05-21 RX ORDER — ACETAMINOPHEN 325 MG/1
650 TABLET ORAL EVERY 8 HOURS PRN
Status: DISCONTINUED | OUTPATIENT
Start: 2023-05-21 | End: 2023-05-21

## 2023-05-21 RX ORDER — GUAIFENESIN 600 MG/1
600 TABLET, EXTENDED RELEASE ORAL 2 TIMES DAILY
Status: DISCONTINUED | OUTPATIENT
Start: 2023-05-21 | End: 2023-05-26 | Stop reason: HOSPADM

## 2023-05-21 RX ORDER — POLYETHYLENE GLYCOL 3350 17 G
2 POWDER IN PACKET (EA) ORAL
Status: DISCONTINUED | OUTPATIENT
Start: 2023-05-21 | End: 2023-05-21

## 2023-05-21 RX ORDER — DIVALPROEX SODIUM 500 MG/1
500 TABLET, EXTENDED RELEASE ORAL NIGHTLY
Status: DISCONTINUED | OUTPATIENT
Start: 2023-05-21 | End: 2023-05-26 | Stop reason: HOSPADM

## 2023-05-21 RX ORDER — HYDROXYZINE 50 MG/1
50 TABLET, FILM COATED ORAL 3 TIMES DAILY PRN
Status: DISCONTINUED | OUTPATIENT
Start: 2023-05-21 | End: 2023-05-26 | Stop reason: HOSPADM

## 2023-05-21 RX ORDER — ZOLPIDEM TARTRATE 5 MG/1
10 TABLET ORAL NIGHTLY
Status: DISCONTINUED | OUTPATIENT
Start: 2023-05-21 | End: 2023-05-26 | Stop reason: HOSPADM

## 2023-05-21 RX ORDER — PREGABALIN 100 MG/1
100 CAPSULE ORAL 2 TIMES DAILY
Status: DISCONTINUED | OUTPATIENT
Start: 2023-05-21 | End: 2023-05-26 | Stop reason: HOSPADM

## 2023-05-21 RX ORDER — MIDODRINE HYDROCHLORIDE 5 MG/1
2.5 TABLET ORAL 3 TIMES DAILY
Status: DISCONTINUED | OUTPATIENT
Start: 2023-05-21 | End: 2023-05-23

## 2023-05-21 RX ORDER — LEVOTHYROXINE SODIUM 0.05 MG/1
50 TABLET ORAL DAILY
Status: DISCONTINUED | OUTPATIENT
Start: 2023-05-21 | End: 2023-05-26 | Stop reason: HOSPADM

## 2023-05-21 RX ADMIN — PREGABALIN 100 MG: 100 CAPSULE ORAL at 15:10

## 2023-05-21 RX ADMIN — ALPRAZOLAM 1 MG: 1 TABLET ORAL at 13:19

## 2023-05-21 RX ADMIN — NICOTINE POLACRILEX 4 MG: 4 GUM, CHEWING BUCCAL at 19:17

## 2023-05-21 RX ADMIN — GUAIFENESIN 600 MG: 600 TABLET, EXTENDED RELEASE ORAL at 20:55

## 2023-05-21 RX ADMIN — MIDODRINE HYDROCHLORIDE 2.5 MG: 5 TABLET ORAL at 20:54

## 2023-05-21 RX ADMIN — NICOTINE POLACRILEX 4 MG: 4 GUM, CHEWING BUCCAL at 17:33

## 2023-05-21 RX ADMIN — NICOTINE POLACRILEX 4 MG: 4 GUM, CHEWING BUCCAL at 15:11

## 2023-05-21 RX ADMIN — METHADONE HYDROCHLORIDE 60 MG: 10 TABLET ORAL at 15:10

## 2023-05-21 RX ADMIN — DIVALPROEX SODIUM 500 MG: 500 TABLET, EXTENDED RELEASE ORAL at 20:55

## 2023-05-21 RX ADMIN — ONDANSETRON 4 MG: 4 TABLET, ORALLY DISINTEGRATING ORAL at 16:09

## 2023-05-21 RX ADMIN — ZOLPIDEM TARTRATE 10 MG: 5 TABLET ORAL at 20:55

## 2023-05-21 RX ADMIN — QUETIAPINE FUMARATE 200 MG: 200 TABLET ORAL at 20:55

## 2023-05-21 RX ADMIN — PREGABALIN 100 MG: 100 CAPSULE ORAL at 20:55

## 2023-05-21 RX ADMIN — ALPRAZOLAM 1 MG: 1 TABLET ORAL at 20:01

## 2023-05-21 RX ADMIN — LEVOTHYROXINE SODIUM 50 MCG: 50 TABLET ORAL at 15:10

## 2023-05-21 ASSESSMENT — SLEEP AND FATIGUE QUESTIONNAIRES
AVERAGE NUMBER OF SLEEP HOURS: 8
SLEEP PATTERN: DIFFICULTY FALLING ASLEEP
DO YOU HAVE DIFFICULTY SLEEPING: NO
DO YOU HAVE DIFFICULTY SLEEPING: NO
SLEEP PATTERN: DIFFICULTY FALLING ASLEEP
DO YOU USE A SLEEP AID: YES
DO YOU USE A SLEEP AID: YES
AVERAGE NUMBER OF SLEEP HOURS: 8

## 2023-05-21 ASSESSMENT — PAIN DESCRIPTION - DESCRIPTORS
DESCRIPTORS: ACHING
DESCRIPTORS: ACHING

## 2023-05-21 ASSESSMENT — PATIENT HEALTH QUESTIONNAIRE - PHQ9
SUM OF ALL RESPONSES TO PHQ QUESTIONS 1-9: 20
SUM OF ALL RESPONSES TO PHQ QUESTIONS 1-9: 20

## 2023-05-21 ASSESSMENT — PAIN SCALES - GENERAL
PAINLEVEL_OUTOF10: 4
PAINLEVEL_OUTOF10: 0

## 2023-05-21 ASSESSMENT — PAIN - FUNCTIONAL ASSESSMENT: PAIN_FUNCTIONAL_ASSESSMENT: ACTIVITIES ARE NOT PREVENTED

## 2023-05-21 ASSESSMENT — PAIN DESCRIPTION - PAIN TYPE: TYPE: OTHER (COMMENT)

## 2023-05-21 ASSESSMENT — PAIN DESCRIPTION - LOCATION
LOCATION: FOOT
LOCATION: FOOT

## 2023-05-22 PROCEDURE — 6370000000 HC RX 637 (ALT 250 FOR IP): Performed by: PSYCHIATRY & NEUROLOGY

## 2023-05-22 PROCEDURE — 6370000000 HC RX 637 (ALT 250 FOR IP)

## 2023-05-22 PROCEDURE — 1240000000 HC EMOTIONAL WELLNESS R&B

## 2023-05-22 RX ORDER — OXYCODONE HYDROCHLORIDE AND ACETAMINOPHEN 5; 325 MG/1; MG/1
1 TABLET ORAL EVERY 6 HOURS PRN
Status: DISCONTINUED | OUTPATIENT
Start: 2023-05-22 | End: 2023-05-22

## 2023-05-22 RX ORDER — ESCITALOPRAM OXALATE 10 MG/1
5 TABLET ORAL DAILY
Status: DISCONTINUED | OUTPATIENT
Start: 2023-05-22 | End: 2023-05-23

## 2023-05-22 RX ORDER — IBUPROFEN 600 MG/1
600 TABLET ORAL EVERY 6 HOURS PRN
Status: DISCONTINUED | OUTPATIENT
Start: 2023-05-22 | End: 2023-05-22

## 2023-05-22 RX ORDER — IBUPROFEN 600 MG/1
600 TABLET ORAL EVERY 6 HOURS PRN
Status: DISCONTINUED | OUTPATIENT
Start: 2023-05-22 | End: 2023-05-26 | Stop reason: HOSPADM

## 2023-05-22 RX ADMIN — METHADONE HYDROCHLORIDE 60 MG: 10 TABLET ORAL at 10:19

## 2023-05-22 RX ADMIN — ESCITALOPRAM 5 MG: 10 TABLET, FILM COATED ORAL at 13:14

## 2023-05-22 RX ADMIN — GUAIFENESIN 600 MG: 600 TABLET, EXTENDED RELEASE ORAL at 10:19

## 2023-05-22 RX ADMIN — LEVOTHYROXINE SODIUM 50 MCG: 50 TABLET ORAL at 07:50

## 2023-05-22 RX ADMIN — NICOTINE POLACRILEX 4 MG: 4 GUM, CHEWING BUCCAL at 10:36

## 2023-05-22 RX ADMIN — QUETIAPINE FUMARATE 200 MG: 200 TABLET ORAL at 20:19

## 2023-05-22 RX ADMIN — NICOTINE POLACRILEX 4 MG: 4 GUM, CHEWING BUCCAL at 14:40

## 2023-05-22 RX ADMIN — PREGABALIN 100 MG: 100 CAPSULE ORAL at 20:19

## 2023-05-22 RX ADMIN — MIDODRINE HYDROCHLORIDE 2.5 MG: 5 TABLET ORAL at 10:19

## 2023-05-22 RX ADMIN — ALPRAZOLAM 1 MG: 1 TABLET ORAL at 15:02

## 2023-05-22 RX ADMIN — PREGABALIN 100 MG: 100 CAPSULE ORAL at 10:19

## 2023-05-22 RX ADMIN — NICOTINE POLACRILEX 4 MG: 4 GUM, CHEWING BUCCAL at 16:17

## 2023-05-22 RX ADMIN — GUAIFENESIN 600 MG: 600 TABLET, EXTENDED RELEASE ORAL at 20:19

## 2023-05-22 RX ADMIN — MIDODRINE HYDROCHLORIDE 2.5 MG: 5 TABLET ORAL at 13:15

## 2023-05-22 RX ADMIN — ALPRAZOLAM 1 MG: 1 TABLET ORAL at 10:19

## 2023-05-22 RX ADMIN — POLYETHYLENE GLYCOL 3350 17 G: 17 POWDER, FOR SOLUTION ORAL at 20:18

## 2023-05-22 RX ADMIN — ALPRAZOLAM 1 MG: 1 TABLET ORAL at 20:19

## 2023-05-22 RX ADMIN — NICOTINE POLACRILEX 4 MG: 4 GUM, CHEWING BUCCAL at 13:09

## 2023-05-22 RX ADMIN — ZOLPIDEM TARTRATE 10 MG: 5 TABLET ORAL at 20:20

## 2023-05-22 RX ADMIN — MIDODRINE HYDROCHLORIDE 2.5 MG: 5 TABLET ORAL at 20:19

## 2023-05-22 RX ADMIN — DIVALPROEX SODIUM 500 MG: 500 TABLET, EXTENDED RELEASE ORAL at 20:20

## 2023-05-22 NOTE — BH NOTE
Patient asked writer if se could have midodrine with her nightly medicine. Patient said when she takes the meds it usually causes her bp to drop. Writer looked at nightly BP of 92/61 and texted Miles Pierson with patients concern after speaking with supervisor. Thad Adalberto ordered Midodrine 2.5 mg 3 x day. Patient wanted a 5 mg dose just at bedtime but took the dose and said she will see how it goes.

## 2023-05-22 NOTE — PLAN OF CARE
Problem: Self Harm/Suicidality  Goal: Will have no self-injury during hospital stay  Description: INTERVENTIONS:  1. Ensure constant observer at bedside with Q15M safety checks  2. Maintain a safe environment  3. Secure patient belongings  4. Ensure family/visitors adhere to safety recommendations  5. Ensure safety tray has been added to patient's diet order  6. Every shift and PRN: Re-assess suicidal risk via Frequent Screener    5/21/2023 2142 by Maddie Caba LPN  Outcome: Progressing  Flowsheets (Taken 5/21/2023 2045)  Will have no self-injury during hospital stay:   Ensure constant observer at bedside with Q15M safety checks   Secure patient belongings   Ensure safety tray has been added to patient's diet order   Every shift and PRN: Re-assess suicidal risk via Frequent Screener   Ensure family/visitors adhere to safety recommendations   Maintain a safe environment     Problem: Coping  Goal: Pt/Family able to verbalize concerns and demonstrate effective coping strategies  Description: INTERVENTIONS:  1. Assist patient/family to identify coping skills, available support systems and cultural and spiritual values  2. Provide emotional support, including active listening and acknowledgement of concerns of patient and caregivers  3. Reduce environmental stimuli, as able  4. Instruct patient/family in relaxation techniques, as appropriate  5. Assess for spiritual pain/suffering and initiate Spiritual Care, Psychosocial Clinical Specialist consults as needed  5/21/2023 2142 by Maddie Caba LPN  Outcome: Progressing  Flowsheets (Taken 5/21/2023 2045)  Patient/family able to verbalize anxieties, fears, and concerns, and demonstrate effective coping: Assist patient/family to identify coping skills, available support systems and cultural and spiritual values   Patient Ax0 x 3. Patient  pleasant and cooperative with care. Patient concerned with not having a shower or water.  Patient also mentioned needing

## 2023-05-22 NOTE — DISCHARGE INSTRUCTIONS
Advanced Directives:  Patient does not have a surrogate decision maker appointed   Name (if yes): N/A Phone Number: N/A  Patient does not have a psychiatric and/ or medical advanced directive or power of . Patient was not offered psychiatric and/ or medical advanced directive or power of  information/completion but declined to complete   Why not? N/A    Discharge Planning is Complete. Discharge Date: 5/26/23  Reason for Hospitalization: Suicidal Ideation  Discharge Diagnosis: Depression, unspecified depression type  Discharging to: Home    Your instructions: Your physician here was Kady Martinez MD. If you have any questions please call the unit at [phone] for the adult unit or [phone] for [facility]. Please note that we have a patient family advisory [place] that meets the second Wednesday of January and the second Wednesday of July at [address] in [place] at [facility]. Department leadership would love for you to attend to give feedback on what we are doing well and areas in which we can improve our patient care. Please come if you are able and feel free to reach out to [address] [place] [address] at [phone] with any questions. The crisis number for [facility] is [phone]-[phone]. This crisis line is available 24 hours a day, seven days a week. Please follow up with your PCP regarding any pending labs. You are connected to the [facility]. They provide you with case management as well as manage all of your medications. You will be returning there upon discharge. See below. Name of Provider: Lorie Baez  Provider specialty/license: Case Management/ Housing   Date and time of appointment: 5/26/23 Upon Discharge   The type/s of services requested are: Case Management/ Housing  Agency name: [id number]  Address: [address] [place] [address].  [place], [address] [facility] [address]  Phone Number: [phone]  Special instructions (what to bring to

## 2023-05-23 LAB — S PYO AG THROAT QL: NEGATIVE

## 2023-05-23 PROCEDURE — 6370000000 HC RX 637 (ALT 250 FOR IP): Performed by: PSYCHIATRY & NEUROLOGY

## 2023-05-23 PROCEDURE — 6370000000 HC RX 637 (ALT 250 FOR IP)

## 2023-05-23 PROCEDURE — 1240000000 HC EMOTIONAL WELLNESS R&B

## 2023-05-23 PROCEDURE — 87081 CULTURE SCREEN ONLY: CPT

## 2023-05-23 PROCEDURE — 87880 STREP A ASSAY W/OPTIC: CPT

## 2023-05-23 RX ORDER — ESCITALOPRAM OXALATE 10 MG/1
10 TABLET ORAL DAILY
Status: DISCONTINUED | OUTPATIENT
Start: 2023-05-24 | End: 2023-05-25

## 2023-05-23 RX ORDER — MIDODRINE HYDROCHLORIDE 5 MG/1
5 TABLET ORAL 3 TIMES DAILY
Status: DISCONTINUED | OUTPATIENT
Start: 2023-05-23 | End: 2023-05-26 | Stop reason: HOSPADM

## 2023-05-23 RX ADMIN — ALPRAZOLAM 1 MG: 1 TABLET ORAL at 14:08

## 2023-05-23 RX ADMIN — ESCITALOPRAM 5 MG: 10 TABLET, FILM COATED ORAL at 10:10

## 2023-05-23 RX ADMIN — GUAIFENESIN 600 MG: 600 TABLET, EXTENDED RELEASE ORAL at 20:27

## 2023-05-23 RX ADMIN — PREGABALIN 100 MG: 100 CAPSULE ORAL at 10:09

## 2023-05-23 RX ADMIN — GUAIFENESIN 600 MG: 600 TABLET, EXTENDED RELEASE ORAL at 10:10

## 2023-05-23 RX ADMIN — LEVOTHYROXINE SODIUM 50 MCG: 50 TABLET ORAL at 05:40

## 2023-05-23 RX ADMIN — MIDODRINE HYDROCHLORIDE 2.5 MG: 5 TABLET ORAL at 10:09

## 2023-05-23 RX ADMIN — MIDODRINE HYDROCHLORIDE 5 MG: 5 TABLET ORAL at 14:08

## 2023-05-23 RX ADMIN — PREGABALIN 100 MG: 100 CAPSULE ORAL at 20:27

## 2023-05-23 RX ADMIN — ALPRAZOLAM 1 MG: 1 TABLET ORAL at 20:27

## 2023-05-23 RX ADMIN — MIDODRINE HYDROCHLORIDE 5 MG: 5 TABLET ORAL at 20:27

## 2023-05-23 RX ADMIN — NICOTINE POLACRILEX 4 MG: 4 GUM, CHEWING BUCCAL at 14:40

## 2023-05-23 RX ADMIN — NICOTINE POLACRILEX 4 MG: 4 GUM, CHEWING BUCCAL at 10:11

## 2023-05-23 RX ADMIN — NICOTINE POLACRILEX 4 MG: 4 GUM, CHEWING BUCCAL at 15:57

## 2023-05-23 RX ADMIN — QUETIAPINE FUMARATE 200 MG: 200 TABLET ORAL at 20:27

## 2023-05-23 RX ADMIN — DIVALPROEX SODIUM 500 MG: 500 TABLET, EXTENDED RELEASE ORAL at 20:27

## 2023-05-23 RX ADMIN — NICOTINE POLACRILEX 4 MG: 4 GUM, CHEWING BUCCAL at 19:07

## 2023-05-23 RX ADMIN — ZOLPIDEM TARTRATE 10 MG: 5 TABLET ORAL at 20:27

## 2023-05-23 RX ADMIN — NICOTINE POLACRILEX 4 MG: 4 GUM, CHEWING BUCCAL at 17:07

## 2023-05-23 RX ADMIN — NICOTINE POLACRILEX 4 MG: 4 GUM, CHEWING BUCCAL at 20:45

## 2023-05-23 RX ADMIN — METHADONE HYDROCHLORIDE 60 MG: 10 TABLET ORAL at 10:10

## 2023-05-23 RX ADMIN — NICOTINE POLACRILEX 4 MG: 4 GUM, CHEWING BUCCAL at 11:42

## 2023-05-23 RX ADMIN — ALPRAZOLAM 1 MG: 1 TABLET ORAL at 10:10

## 2023-05-23 NOTE — GROUP NOTE
Group Therapy Note    Date: 5/23/2023    Group Start Time: 1300  Group End Time: 1287  Group Topic: Psychoeducation    [facility]    ELMIRA Ramos        Group Therapy Note  Clinician introduced the group members to the 4 types of supports. They were able to define each type and brainstorm community and online community resources. They covered all needs for the [facility] needs first 3 levels. Group members were able to work together to build their personal list of supports for when they discharged. Attendees: 9       Patient's Goal:      Notes:  Patient was cooperative and fully engaged in the group discussion and activity. Patient was able to come up with community and online supports to share with the group members. Status After Intervention:  Improved    Participation Level:  Active Listener and Interactive    Participation Quality: Appropriate, Attentive, Sharing, and Supportive      Speech:  normal      Thought Process/Content: Logical      Affective Functioning: Congruent      Mood: euthymic      Level of consciousness:  Oriented x4      Response to Learning: Able to verbalize/acknowledge new learning      Endings: None Reported    Modes of Intervention: Education, Support, Exploration, and Activity      Discipline Responsible: /Counselor      Signature:  ELMIRA Ramos

## 2023-05-23 NOTE — CARE COORDINATION
[facility]  Treatment Team Note  Day 3    Review Date & Time: 0900  5/23/2023    Patient was not in treatment team      Status EXAM:   Mental Status and Behavioral Exam  Normal: No  Level of Assistance: Independent/Self  Facial Expression: Worried  Affect: Appropriate  Level of Consciousness: Alert  Frequency of Checks: 4 times per hour, close  Mood:Normal: No  Mood: Anxious, Depressed  Motor Activity:Normal: Yes  Motor Activity: Decreased  Eye Contact: Fair  Observed Behavior: Cooperative, Friendly, Preoccupied  Sexual Misconduct History: Current - no  Preception: [place] to person, [place] to time, [place] to place, [place] to situation  Attention:Normal: No  Attention: Unable to concentrate  Thought Processes: Circumstantial  Thought Content:Normal: No  Thought Content: Preoccupations  Depression Symptoms: Feelings of helplessness, Feelings of hopelessess, Sleep disturbance  Anxiety Symptoms: Generalized  Sarah Symptoms: No problems reported or observed. Hallucinations: None  Delusions: No  Memory:Normal: Yes  Insight and Judgment: No  Insight and Judgment: Poor judgment, Poor insight      Suicide Risk CSSR-S:  1) Within the past month, have you wished you were dead or wished you could go to sleep and not wake up? : Yes  2) Have you actually had any thoughts of killing yourself? : Yes  3) Have you been thinking about how you might kill yourself? : Yes  5) Have you started to work out or worked out the details of how to kill yourself? Do you intend to carry out this plan? : No  6) Have you ever done anything, started to do anything, or prepared to do anything to end your life?: Yes (Hoarding medications. no current access to meds. They are at old apartment)      PLAN/TREATMENT RECOMMENDATIONS UPDATE: Patient will take medication as prescribed, eat 75% of meals, attend groups, participate in milieu activities, participate in treatment team and care planning for discharge and follow up.     Patient is a

## 2023-05-23 NOTE — PLAN OF CARE
Problem: Self Harm/Suicidality  Goal: Will have no self-injury during hospital stay  Description: INTERVENTIONS:  1. Ensure constant observer at bedside with Q15M safety checks  2. Maintain a safe environment  3. Secure patient belongings  4. Ensure family/visitors adhere to safety recommendations  5. Ensure safety tray has been added to patient's diet order  6. Every shift and PRN: Re-assess suicidal risk via Frequent Screener    Outcome: Progressing  Flowsheets (Taken 5/22/2023 2055)  Will have no self-injury during hospital stay:   Ensure constant observer at bedside with Q15M safety checks   Secure patient belongings   Ensure safety tray has been added to patient's diet order   Every shift and PRN: Re-assess suicidal risk via Frequent Screener   Ensure family/visitors adhere to safety recommendations   Maintain a safe environment     Problem: Coping  Goal: Pt/Family able to verbalize concerns and demonstrate effective coping strategies  Description: INTERVENTIONS:  1. Assist patient/family to identify coping skills, available support systems and cultural and spiritual values  2. Provide emotional support, including active listening and acknowledgement of concerns of patient and caregivers  3. Reduce environmental stimuli, as able  4. Instruct patient/family in relaxation techniques, as appropriate  5.  Assess for spiritual pain/suffering and initiate Spiritual Care, Psychosocial Clinical Specialist consults as needed  Outcome: Progressing  Flowsheets (Taken 5/22/2023 2055)  Patient/family able to verbalize anxieties, fears, and concerns, and demonstrate effective coping:   Assist patient/family to identify coping skills, available support systems and cultural and spiritual values   Provide emotional support, including active listening and acknowledgement of concerns of patient and caregivers   Reduce environmental stimuli, as able   Instruct patient/family in relaxation techniques, as appropriate   Assess

## 2023-05-23 NOTE — PLAN OF CARE
Problem: Anxiety  Goal: Will report anxiety at manageable levels  Description: INTERVENTIONS:  1. Administer medication as ordered  2. Teach and rehearse alternative coping skills  3. Provide emotional support with 1:1 interaction with staff  Outcome: Progressing     Problem: Depression/Self Harm  Goal: Effect of psychiatric condition will be minimized and patient will be protected from self harm  Description: INTERVENTIONS:  1. Assess impact of patient's symptoms on level of functioning, self care needs and offer support as indicated  2. Assess patient/family knowledge of depression, impact on illness and need for teaching  3. Provide emotional support, presence and reassurance  4. Assess for possible suicidal thoughts or ideation. If patient expresses suicidal thoughts or statements do not leave alone, initiate Suicide Precautions, move to a room close to the nursing station and obtain sitter  5. Initiate consults as appropriate with Mental Health Professional, Spiritual Care, Psychosocial CNS, and consider a recommendation to the LIP for a Psychiatric Consultation  Outcome: Progressing   Patient has been visible out on the unit social with peers. She is A&Ox4 and denies that she is having thoughts to harm self or others. She denies that she is experiencing hallucinations. She did not make any delusional statements while interacting with staff. She was cooperative with medications and prns and did come up a few hours early requesting her afternoon dose of Xanax, but was able to be redirected to receiving it within the hour it is scheduled. She c/o sore throat and allowed staff to swab her. She also c/o general pain was given a hot pack. She has been no issue.

## 2023-05-24 PROCEDURE — 6370000000 HC RX 637 (ALT 250 FOR IP): Performed by: PSYCHIATRY & NEUROLOGY

## 2023-05-24 PROCEDURE — 6370000000 HC RX 637 (ALT 250 FOR IP)

## 2023-05-24 PROCEDURE — 1240000000 HC EMOTIONAL WELLNESS R&B

## 2023-05-24 RX ADMIN — ESCITALOPRAM 10 MG: 10 TABLET, FILM COATED ORAL at 10:08

## 2023-05-24 RX ADMIN — NICOTINE POLACRILEX 4 MG: 4 GUM, CHEWING BUCCAL at 21:21

## 2023-05-24 RX ADMIN — MIDODRINE HYDROCHLORIDE 5 MG: 5 TABLET ORAL at 21:22

## 2023-05-24 RX ADMIN — ALPRAZOLAM 1 MG: 1 TABLET ORAL at 20:12

## 2023-05-24 RX ADMIN — METHADONE HYDROCHLORIDE 60 MG: 10 TABLET ORAL at 10:08

## 2023-05-24 RX ADMIN — MIDODRINE HYDROCHLORIDE 5 MG: 5 TABLET ORAL at 14:02

## 2023-05-24 RX ADMIN — NICOTINE POLACRILEX 4 MG: 4 GUM, CHEWING BUCCAL at 13:49

## 2023-05-24 RX ADMIN — NICOTINE POLACRILEX 4 MG: 4 GUM, CHEWING BUCCAL at 16:10

## 2023-05-24 RX ADMIN — DIVALPROEX SODIUM 500 MG: 500 TABLET, EXTENDED RELEASE ORAL at 21:22

## 2023-05-24 RX ADMIN — NICOTINE POLACRILEX 4 MG: 4 GUM, CHEWING BUCCAL at 20:12

## 2023-05-24 RX ADMIN — PREGABALIN 100 MG: 100 CAPSULE ORAL at 10:08

## 2023-05-24 RX ADMIN — QUETIAPINE FUMARATE 200 MG: 200 TABLET ORAL at 21:22

## 2023-05-24 RX ADMIN — NICOTINE POLACRILEX 4 MG: 4 GUM, CHEWING BUCCAL at 19:07

## 2023-05-24 RX ADMIN — ALPRAZOLAM 1 MG: 1 TABLET ORAL at 10:08

## 2023-05-24 RX ADMIN — NICOTINE POLACRILEX 4 MG: 4 GUM, CHEWING BUCCAL at 10:09

## 2023-05-24 RX ADMIN — ALPRAZOLAM 1 MG: 1 TABLET ORAL at 14:53

## 2023-05-24 RX ADMIN — POLYETHYLENE GLYCOL 3350 17 G: 17 POWDER, FOR SOLUTION ORAL at 19:14

## 2023-05-24 RX ADMIN — PREGABALIN 100 MG: 100 CAPSULE ORAL at 21:22

## 2023-05-24 RX ADMIN — LEVOTHYROXINE SODIUM 50 MCG: 50 TABLET ORAL at 06:21

## 2023-05-24 RX ADMIN — NICOTINE POLACRILEX 4 MG: 4 GUM, CHEWING BUCCAL at 17:33

## 2023-05-24 RX ADMIN — ZOLPIDEM TARTRATE 10 MG: 5 TABLET ORAL at 21:21

## 2023-05-24 RX ADMIN — GUAIFENESIN 600 MG: 600 TABLET, EXTENDED RELEASE ORAL at 10:08

## 2023-05-24 RX ADMIN — IBUPROFEN 600 MG: 600 TABLET, FILM COATED ORAL at 17:00

## 2023-05-24 RX ADMIN — MIDODRINE HYDROCHLORIDE 5 MG: 5 TABLET ORAL at 10:08

## 2023-05-24 RX ADMIN — GUAIFENESIN 600 MG: 600 TABLET, EXTENDED RELEASE ORAL at 21:22

## 2023-05-24 ASSESSMENT — PAIN DESCRIPTION - ONSET
ONSET: ON-GOING
ONSET: ON-GOING

## 2023-05-24 ASSESSMENT — PAIN DESCRIPTION - LOCATION
LOCATION: GENERALIZED
LOCATION: BACK;HEAD
LOCATION: GENERALIZED
LOCATION: BACK

## 2023-05-24 ASSESSMENT — PAIN SCALES - GENERAL
PAINLEVEL_OUTOF10: 5
PAINLEVEL_OUTOF10: 8
PAINLEVEL_OUTOF10: 2
PAINLEVEL_OUTOF10: 7

## 2023-05-24 ASSESSMENT — PAIN DESCRIPTION - PAIN TYPE
TYPE: CHRONIC PAIN
TYPE: CHRONIC PAIN

## 2023-05-24 ASSESSMENT — PAIN DESCRIPTION - DESCRIPTORS
DESCRIPTORS: ACHING

## 2023-05-24 ASSESSMENT — PAIN DESCRIPTION - FREQUENCY
FREQUENCY: CONTINUOUS
FREQUENCY: CONTINUOUS

## 2023-05-24 NOTE — GROUP NOTE
Group Therapy Note    Date: 5/24/2023    Group Start Time: 1300  Group End Time: 1400  Group Topic: Psychoeducation    [facility]    Damon Jiangx        Group Therapy Note    Topic: The Cycles of Anxiety    Group members discussed the cycle of anxiety in theory, explored their personally experienced symptoms of anxiety and stress, challenges in short-term relief from avoidance. Acknowledged space in the cycle to choose alternative action. Attendees: 5       Notes:  Anjelica played an active role in discussions of avoidance strategies, uses of alcohol and medication as means of avoidance. She discussed her behavioral tendencies in masking for self-protection. She was receptive to feedback from peers while exploring alternatives to avoidance. Status After Intervention:  Improved    Participation Level:  Active Listener and Interactive    Participation Quality: Appropriate, Sharing, and Supportive      Speech:  normal      Thought Process/Content: Logical      Affective Functioning: Congruent      Mood: euthymic      Level of consciousness:  Alert and Oriented x4      Response to Learning: Capable of insight and Progressing to goal      Endings: None Reported    Modes of Intervention: Education, Support, Socialization, Exploration, Clarifying, and Problem-solving      Discipline Responsible: Psychoeducational Specialist      Signature:  Ayla Domingo, VERONICA, MT-BC

## 2023-05-24 NOTE — PLAN OF CARE
Problem: Self Harm/Suicidality  Goal: Will have no self-injury during hospital stay  Description: INTERVENTIONS:  1. Ensure constant observer at bedside with Q15M safety checks  2. Maintain a safe environment  3. Secure patient belongings  4. Ensure family/visitors adhere to safety recommendations  5. Ensure safety tray has been added to patient's diet order  6. Every shift and PRN: Re-assess suicidal risk via Frequent Screener    Outcome: Progressing     Problem: Anxiety  Goal: Will report anxiety at manageable levels  Description: INTERVENTIONS:  1. Administer medication as ordered  2. Teach and rehearse alternative coping skills  3. Provide emotional support with 1:1 interaction with staff  5/23/2023 2314 by Nakul Ordonez RN  Outcome: Progressing  5/23/2023 1411 by Harika White RN  Outcome: Progressing     Problem: Depression/Self Harm  Goal: Effect of psychiatric condition will be minimized and patient will be protected from self harm  Description: INTERVENTIONS:  1. Assess impact of patient's symptoms on level of functioning, self care needs and offer support as indicated  2. Assess patient/family knowledge of depression, impact on illness and need for teaching  3. Provide emotional support, presence and reassurance  4. Assess for possible suicidal thoughts or ideation. If patient expresses suicidal thoughts or statements do not leave alone, initiate Suicide Precautions, move to a room close to the nursing station and obtain sitter  5. Initiate consults as appropriate with Mental Health Professional, Spiritual Care, Psychosocial CNS, and consider a recommendation to the LIP for a Psychiatric Consultation  5/23/2023 1411 by Harika White RN  Outcome: Progressing     Problem: Safety - Adult  Goal: Free from fall injury  Outcome: Progressing   Pt withdrawn to room at start of shift, out for group and social with select peer later on. Pt intrusive at times when writer was speaking with peer.  Denies

## 2023-05-24 NOTE — PLAN OF CARE
Pt visible most of the day social with peers, complies with medication and care denies SI,HI,AVH, noted anxiety at times but subsides quickly.

## 2023-05-25 LAB — S PYO THROAT QL CULT: NORMAL

## 2023-05-25 PROCEDURE — 6370000000 HC RX 637 (ALT 250 FOR IP)

## 2023-05-25 PROCEDURE — 1240000000 HC EMOTIONAL WELLNESS R&B

## 2023-05-25 PROCEDURE — 6370000000 HC RX 637 (ALT 250 FOR IP): Performed by: PSYCHIATRY & NEUROLOGY

## 2023-05-25 RX ORDER — ESCITALOPRAM OXALATE 10 MG/1
20 TABLET ORAL DAILY
Status: DISCONTINUED | OUTPATIENT
Start: 2023-05-26 | End: 2023-05-26 | Stop reason: HOSPADM

## 2023-05-25 RX ADMIN — NICOTINE POLACRILEX 4 MG: 4 GUM, CHEWING BUCCAL at 20:25

## 2023-05-25 RX ADMIN — NICOTINE POLACRILEX 4 MG: 4 GUM, CHEWING BUCCAL at 15:30

## 2023-05-25 RX ADMIN — PREGABALIN 100 MG: 100 CAPSULE ORAL at 09:16

## 2023-05-25 RX ADMIN — GUAIFENESIN 600 MG: 600 TABLET, EXTENDED RELEASE ORAL at 20:20

## 2023-05-25 RX ADMIN — NICOTINE POLACRILEX 4 MG: 4 GUM, CHEWING BUCCAL at 14:02

## 2023-05-25 RX ADMIN — MIDODRINE HYDROCHLORIDE 5 MG: 5 TABLET ORAL at 09:16

## 2023-05-25 RX ADMIN — NICOTINE POLACRILEX 4 MG: 4 GUM, CHEWING BUCCAL at 09:16

## 2023-05-25 RX ADMIN — QUETIAPINE FUMARATE 200 MG: 200 TABLET ORAL at 20:20

## 2023-05-25 RX ADMIN — ESCITALOPRAM 10 MG: 10 TABLET, FILM COATED ORAL at 09:16

## 2023-05-25 RX ADMIN — ZOLPIDEM TARTRATE 10 MG: 5 TABLET ORAL at 20:20

## 2023-05-25 RX ADMIN — METHADONE HYDROCHLORIDE 60 MG: 10 TABLET ORAL at 09:16

## 2023-05-25 RX ADMIN — ALPRAZOLAM 1 MG: 1 TABLET ORAL at 14:34

## 2023-05-25 RX ADMIN — MIDODRINE HYDROCHLORIDE 5 MG: 5 TABLET ORAL at 14:34

## 2023-05-25 RX ADMIN — LEVOTHYROXINE SODIUM 50 MCG: 50 TABLET ORAL at 06:43

## 2023-05-25 RX ADMIN — NICOTINE POLACRILEX 4 MG: 4 GUM, CHEWING BUCCAL at 12:49

## 2023-05-25 RX ADMIN — PREGABALIN 100 MG: 100 CAPSULE ORAL at 20:20

## 2023-05-25 RX ADMIN — DIVALPROEX SODIUM 500 MG: 500 TABLET, EXTENDED RELEASE ORAL at 20:20

## 2023-05-25 RX ADMIN — POLYETHYLENE GLYCOL 3350 17 G: 17 POWDER, FOR SOLUTION ORAL at 20:25

## 2023-05-25 RX ADMIN — ALPRAZOLAM 1 MG: 1 TABLET ORAL at 20:20

## 2023-05-25 RX ADMIN — ALPRAZOLAM 1 MG: 1 TABLET ORAL at 09:16

## 2023-05-25 RX ADMIN — MIDODRINE HYDROCHLORIDE 5 MG: 5 TABLET ORAL at 20:20

## 2023-05-25 RX ADMIN — GUAIFENESIN 600 MG: 600 TABLET, EXTENDED RELEASE ORAL at 09:16

## 2023-05-25 NOTE — GROUP NOTE
Group Therapy Note    Date: 5/24/2023    Group Start Time: 2045  Group End Time: 2125  Group Topic: Wrap-Up    Shelia Thrasher RN        Group Therapy Note    Attendees: 11       Patient's Goal:  no goal expressed    Notes: At first pt stated that she did not want to speak out in group. Then she was fairly verbal and spoke out anyway. Status After Intervention:  Unchanged    Participation Level:  Active Listener and Interactive    Participation Quality: Appropriate, Attentive, and Sharing      Speech:  normal      Thought Process/Content: Logical  Linear      Affective Functioning: Congruent      Mood: anxious      Level of consciousness:  Alert and Oriented x4      Response to Learning: Able to verbalize current knowledge/experience, Able to retain information, and Able to change behavior      Endings: None Reported    Modes of Intervention: Education, Support, and Socialization      Discipline Responsible: Registered Nurse      Signature:  Mak Garcia RN

## 2023-05-25 NOTE — PLAN OF CARE
Pt calm cooperative with care, complies with medication and care. Pt social with peers and staff attending groups this afternoon.

## 2023-05-25 NOTE — PLAN OF CARE
Problem: Chronic Conditions and Co-morbidities  Goal: Patient's chronic conditions and co-morbidity symptoms are monitored and maintained or improved  5/24/2023 2353 by Andres Alexander RN  Outcome: Progressing  5/24/2023 1848 by Emilee Cannon LPN  Outcome: Progressing     Problem: Self Harm/Suicidality  Goal: Will have no self-injury during hospital stay  Description: INTERVENTIONS:  1. Ensure constant observer at bedside with Q15M safety checks  2. Maintain a safe environment  3. Secure patient belongings  4. Ensure family/visitors adhere to safety recommendations  5. Ensure safety tray has been added to patient's diet order  6. Every shift and PRN: Re-assess suicidal risk via Frequent Screener    5/24/2023 2353 by Andres Alexander RN  Outcome: Progressing  5/24/2023 1848 by Emilee Cannon LPN  Outcome: Progressing     Problem: Anxiety  Goal: Will report anxiety at manageable levels  Description: INTERVENTIONS:  1. Administer medication as ordered  2. Teach and rehearse alternative coping skills  3. Provide emotional support with 1:1 interaction with staff  5/24/2023 2353 by Andres Alexander RN  Outcome: Progressing  5/24/2023 1848 by Emilee Cannon LPN  Outcome: Progressing     Problem: Coping  Goal: Pt/Family able to verbalize concerns and demonstrate effective coping strategies  Description: INTERVENTIONS:  1. Assist patient/family to identify coping skills, available support systems and cultural and spiritual values  2. Provide emotional support, including active listening and acknowledgement of concerns of patient and caregivers  3. Reduce environmental stimuli, as able  4. Instruct patient/family in relaxation techniques, as appropriate  5.  Assess for spiritual pain/suffering and initiate Spiritual Care, Psychosocial Clinical Specialist consults as needed  5/24/2023 2353 by Andres Alexander RN  Outcome: Progressing  5/24/2023 1848 by Emilee Cannon LPN  Outcome: Progressing     Problem: Depression/Self Harm  Goal:

## 2023-05-26 VITALS
HEART RATE: 62 BPM | BODY MASS INDEX: 26.69 KG/M2 | RESPIRATION RATE: 16 BRPM | DIASTOLIC BLOOD PRESSURE: 55 MMHG | SYSTOLIC BLOOD PRESSURE: 86 MMHG | WEIGHT: 156.31 LBS | TEMPERATURE: 98.3 F | HEIGHT: 64 IN | OXYGEN SATURATION: 97 %

## 2023-05-26 PROCEDURE — 6370000000 HC RX 637 (ALT 250 FOR IP)

## 2023-05-26 PROCEDURE — 5130000000 HC BRIDGE APPOINTMENT

## 2023-05-26 PROCEDURE — 6370000000 HC RX 637 (ALT 250 FOR IP): Performed by: PSYCHIATRY & NEUROLOGY

## 2023-05-26 RX ORDER — ESCITALOPRAM OXALATE 20 MG/1
20 TABLET ORAL DAILY
Qty: 30 TABLET | Refills: 0 | Status: SHIPPED | OUTPATIENT
Start: 2023-05-27

## 2023-05-26 RX ORDER — ESCITALOPRAM OXALATE 20 MG/1
20 TABLET ORAL DAILY
Qty: 30 TABLET | Refills: 0 | Status: SHIPPED | OUTPATIENT
Start: 2023-05-27 | End: 2023-05-26 | Stop reason: SDUPTHER

## 2023-05-26 RX ORDER — LEVOTHYROXINE SODIUM 0.03 MG/1
50 TABLET ORAL DAILY
Qty: 30 TABLET | Refills: 0
Start: 2023-05-26

## 2023-05-26 RX ORDER — MIDODRINE HYDROCHLORIDE 2.5 MG/1
5 TABLET ORAL 3 TIMES DAILY
Qty: 90 TABLET | Refills: 5
Start: 2023-05-26

## 2023-05-26 RX ADMIN — ESCITALOPRAM 20 MG: 10 TABLET, FILM COATED ORAL at 08:20

## 2023-05-26 RX ADMIN — MIDODRINE HYDROCHLORIDE 5 MG: 5 TABLET ORAL at 08:21

## 2023-05-26 RX ADMIN — GUAIFENESIN 600 MG: 600 TABLET, EXTENDED RELEASE ORAL at 08:21

## 2023-05-26 RX ADMIN — ALPRAZOLAM 1 MG: 1 TABLET ORAL at 14:12

## 2023-05-26 RX ADMIN — NICOTINE POLACRILEX 4 MG: 4 GUM, CHEWING BUCCAL at 12:33

## 2023-05-26 RX ADMIN — NICOTINE POLACRILEX 4 MG: 4 GUM, CHEWING BUCCAL at 15:18

## 2023-05-26 RX ADMIN — MIDODRINE HYDROCHLORIDE 5 MG: 5 TABLET ORAL at 14:12

## 2023-05-26 RX ADMIN — NICOTINE POLACRILEX 4 MG: 4 GUM, CHEWING BUCCAL at 14:12

## 2023-05-26 RX ADMIN — LEVOTHYROXINE SODIUM 50 MCG: 50 TABLET ORAL at 05:59

## 2023-05-26 RX ADMIN — PREGABALIN 100 MG: 100 CAPSULE ORAL at 08:21

## 2023-05-26 RX ADMIN — NICOTINE POLACRILEX 4 MG: 4 GUM, CHEWING BUCCAL at 08:21

## 2023-05-26 RX ADMIN — ALPRAZOLAM 1 MG: 1 TABLET ORAL at 08:21

## 2023-05-26 RX ADMIN — METHADONE HYDROCHLORIDE 60 MG: 10 TABLET ORAL at 08:20

## 2023-05-26 ASSESSMENT — PAIN DESCRIPTION - DESCRIPTORS: DESCRIPTORS: ACHING

## 2023-05-26 ASSESSMENT — PAIN DESCRIPTION - ORIENTATION: ORIENTATION: LOWER

## 2023-05-26 ASSESSMENT — PAIN SCALES - GENERAL: PAINLEVEL_OUTOF10: 8

## 2023-05-26 ASSESSMENT — PAIN DESCRIPTION - LOCATION: LOCATION: BACK

## 2023-05-26 NOTE — BH NOTE
[facility]  Discharge Note    Pt discharged with followings belongings:   Dental Appliances: None  Vision - Corrective Lenses: None  Hearing Aid: None  Jewelry: None  Body Piercings Removed: N/A  Clothing: Footwear, Pants, Shirt, Socks, Undergarments  Other Valuables: Wallet, Purse, Other (Comment) (SSI,ID,3MC,2Visa,care source)   Valuables sent home with patient or returned to patient. Patient educated on aftercare instructions: yes. Patient verbalize understanding of AVS:  yes. Status EXAM upon discharge:  Mental Status and Behavioral Exam  Normal: No  Level of Assistance: Independent/Self  Facial Expression: Flat  Affect: Blunt  Level of Consciousness: Alert  Frequency of Checks: 4 times per hour, close  Mood:Normal: No  Mood: Depressed, Anxious  Motor Activity:Normal: Yes  Motor Activity: Decreased  Eye Contact: Good  Observed Behavior: Friendly, Cooperative  Sexual Misconduct History: Current - no  Preception: [place] to person, [place] to time, [place] to place, [place] to situation  Attention:Normal: Yes  Attention: Distractible  Thought Processes: Perseveration  Thought Content:Normal: Yes  Thought Content: Preoccupations  Depression Symptoms: No problems reported or observed. Anxiety Symptoms: Generalized  Sarah Symptoms: No problems reported or observed.   Hallucinations: None  Delusions: No  Memory:Normal: Yes  Insight and Judgment: No  Insight and Judgment: Poor judgment, Poor insight    Tobacco Screening:  Practical Counseling, on admission, mary X, if applicable and completed (first 3 are required if patient doesn't refuse):            (X) Recognizing danger situations (included triggers and roadblocks)                    (X) Coping skills (new ways to manage stress,relaxation techniques, changing routine, distraction)                                                           (X) Basic information about quitting (benefits of quitting, techniques in how to quit, available resources  ( )

## 2023-05-26 NOTE — PROGRESS NOTES
Department of Psychiatry  AttendingProgress Note      Chief Complaint: Depression, SI    Patient's chart was reviewed and collaborated with  about the treatment plan. SUBJECTIVE: Pt. Seen today on [facility]. Pt states she is feeling better, looking forward and encouraged to be moving into an apartment as opposed to her current living situation. She states her anxiety level has decreased and is hopeful to have a referral to pain management after discharge to help with her back and knee pain. She feels the increase in her Lexapro has helped   Pt denies SI and is forward thinking and goal directed. She states she would like to have information for local community mental health services as she is [place] the [place]. Patient is feeling better. Suicidal ideation:  denies suicidal ideation. Patient does not have medication side effects. ROS: Patient has new complaints: no  Sleeping adequately:  Yes   Appetite adequate: Yes  Attending groups: Yes  Visitors:No    OBJECTIVE    Physical  VITALS:  BP (!) 98/54   Pulse 63   Temp 98.2 °F (36.8 °C) (Oral)   Resp 16   Ht 5' 4\" (1.626 m)   Wt 156 lb 4.9 oz (70.9 kg)   LMP 05/12/2023 (Approximate)   SpO2 98%   Breastfeeding No   BMI 26.83 kg/m²     Mental Status Examination:  Patients appearance was street clothes. Thoughts are Logical. Homicidal ideations none. No abnormal movements, tics or mannerisms. Memory intact Aims 0. Concentration Fair. Alert and oriented X 4. Insight and Judgement normal insight and judgment. Patient was cooperative.  Patient gait normal. Mood anxious and depressed, affect depressed affect Hallucinations Absent, suicidal ideations no specific plan to harm self Speech normal pitch and normal volume    Data  Labs:   Admission on 05/20/2023   Component Date Value Ref Range Status    TSH Reflex FT4 05/21/2023 1.31  0.27 - 4.20 uIU/mL Final    Rapid Strep A Screen 05/23/2023 Negative  Negative Final    Comment: A culture
Department of Psychiatry  AttendingProgress Note  Chief Complaint: Depression, SI    Patient's chart was reviewed and collaborated with  about the treatment plan. SUBJECTIVE: Pt seen today on [facility]. Pt in her room awakened from sleep. She continues to endorse anxiety that she states is constant but at a level higher than her usual baseline. She states she is depressed and has passive suicide ideation but would not harm herself and identifies reasons to live. She states her life is really stressful and her recent break-up was too much. She states she is willing to trial Lexapro for her depressive symptoms and remembers taking an SSRI in the past and doesn't recall any negative side effects. She identifies her sleep as good and her appetite as fair. Pt encouraged to attend groups and the benefits of CBT. Pt. States she would attend. Contracts for safety at this time. Plan: Start Lexapro    Patient is feeling unchanged. Suicidal ideation:  passive without intent. Patient does not have medication side effects. ROS: Patient has new complaints: no  Sleeping adequately:  Yes   Appetite adequate: Yes  Attending groups: No: but will start  Visitors:No    OBJECTIVE    Physical  VITALS:  BP (!) 85/52   Pulse 74   Temp 97.9 °F (36.6 °C) (Oral)   Resp 16   Ht 5' 4\" (1.626 m)   Wt 156 lb 4.9 oz (70.9 kg)   LMP 05/12/2023 (Approximate)   SpO2 99%   Breastfeeding No   BMI 26.83 kg/m²     Mental Status Examination:  Patients appearance was lying in bed. Thoughts are Illogical. Homicidal ideations none. No abnormal movements, tics or mannerisms. Memory intact Aims 0. Concentration Fair. Alert and oriented X 4. Insight and Judgement normal insight and judgment. Patient was cooperative.  Patient gait normal. Mood anxious and depressed, affect depressed affect Hallucinations Absent, suicidal ideations no specific plan to harm self Speech normal pitch and normal volume    Data  Labs:   Admission on
Department of Psychiatry  AttendingProgress Note  Chief Complaint: Depression, SI    Patient's chart was reviewed and collaborated with  about the treatment plan. SUBJECTIVE: Pt seen today on [facility]. Pt still feels depressed, flat. She is up in groups. Pt reports continued pain, asking for Percocet. We discussed her current medications, safety of avoiding too many sedating medications. Pt remains focused on meds, increasing or adding meds to handle pain/anxiety. Agreeable to increase in Lexapro. OK for pain management referral.      Patient is feeling unchanged. Suicidal ideation:  passive without intent. Patient does not have medication side effects. ROS: Patient has new complaints: no  Sleeping adequately:  Yes   Appetite adequate: Yes  Attending groups: No: but will start  Visitors:No    OBJECTIVE    Physical  VITALS:  BP (!) 102/59   Pulse 68   Temp 98.1 °F (36.7 °C) (Oral)   Resp 16   Ht 5' 4\" (1.626 m)   Wt 156 lb 4.9 oz (70.9 kg)   LMP 05/12/2023 (Approximate)   SpO2 99%   Breastfeeding No   BMI 26.83 kg/m²     Mental Status Examination:  Patients appearance was lying in bed. Thoughts are Illogical. Homicidal ideations none. No abnormal movements, tics or mannerisms. Memory intact Aims 0. Concentration Fair. Alert and oriented X 4. Insight and Judgement normal insight and judgment. Patient was cooperative. Patient gait normal. Mood anxious and depressed, affect depressed affect Hallucinations Absent, suicidal ideations no specific plan to harm self Speech normal pitch and normal volume    Data  Labs:   Admission on 05/20/2023   Component Date Value Ref Range Status    TSH Reflex FT4 05/21/2023 1.31  0.27 - 4.20 uIU/mL Final    Rapid Strep A Screen 05/23/2023 Negative  Negative Final    Comment: A culture confirmation plate has been set up and a separate  report will follow. See micro report.               Medications  Current Facility-Administered Medications: midodrine
Group Therapy Note    Date: 5/22/2023  Start Time: 1300  End Time:  1400  Number of Participants: 9    Type of Group: Music Group    Notes:  Pt present for Music Group. Pt actively participated by making song selections and singing along to music. Participation Level:  Active Listener and Interactive    Participation Quality: Appropriate and Attentive      Speech:  normal      Affective Functioning: Congruent      Endings: None Reported    Modes of Intervention: Support, Socialization, Activity, and Media      Discipline Responsible: Chaplain Pamela Moore       05/22/23 1434   Encounter Summary   Encounter Overview/Reason  Behavioral Health   Service Provided For: Patient   Last Encounter    (5/22 Music Group)   Complexity of Encounter Moderate   Begin Time 1300   End Time  1400   Total Time Calculated 60 min   Behavioral Health    Type  Spirituality Group
Group Therapy Note    Date: 5/23/2023  Start Time: 20:00  End Time:  21:00  Number of Participants: 11    Type of Group: Recreational  wrap up    Wellness Binder Information  Module Name:  /  Session Number:  /    Patient's Goal:  no goal    Notes:  /    Status After Intervention:  Unchanged    Participation Level:  Active Listener    Participation Quality: Appropriate      Speech:  mute      Thought Process/Content: Perseverating      Affective Functioning: Blunted      Mood: anxious      Level of consciousness:  Alert and Attentive      Response to Learning: Able to change behavior      Endings: None Reported    Modes of Intervention: Socialization and Problem-solving      Discipline Responsible: [address] [employer]      Signature:  Vahe Castellanos
Group Therapy Note    Date: 5/25/2023  Start Time: 1300  End Time:  1400  Number of Participants: 12    Type of Group: Music Group    Notes:  Pt present for Music Group. Pt actively participated by making song selections and singing along to music. Participation Level:  Active Listener and Interactive    Participation Quality: Appropriate and Attentive      Speech:  normal      Affective Functioning: Congruent    Endings: None Reported    Modes of Intervention: Support, Socialization, Activity, and Media      Discipline Responsible: Chaplain Milagros Tyson       05/25/23 1513   Encounter Summary   Encounter Overview/Reason  Behavioral Health   Service Provided For: Patient   Last Encounter    (5/25 Music Group)   Complexity of Encounter Moderate   Begin Time 1300   End Time  1400   Total Time Calculated 60 min   Behavioral Health    Type  Spirituality Group
PRN milk of magnesia ordered per Dr. Luh Bahena.
Patient visible on unit, socializing well with select peers. Patient cooperative with assessment. She endorses moderate anxiety and depression. Patient denies SI/HI/AVH. Patient reports pain 7/10, scheduled Lyrica given. Patient compliant with HS meds. No distress noted. Q15 minute checks remain in place.
Patient was cooperative with allowing staff to swab her throat. The specimen was sent to lab.
Pt given PRN nicotine gum.
Pt initial HR was reported to this writer by tech as 138, which tech documented. Recheck of HR by this writer showed HR of 67. Pt is asymptomatic, no c/o of anxiety or any other issues at this time.
Pt wanted to take her scheduled xanax and wait until closer to 2100 for her other scheduled medications due to her current anxiety.  Also given PRN nicotine gum by request.
Facility-Administered Medications: [START ON 5/26/2023] escitalopram (LEXAPRO) tablet 20 mg, 20 mg, Oral, Daily  magnesium hydroxide (MILK OF MAGNESIA) 400 MG/5ML suspension 30 mL, 30 mL, Oral, Daily PRN  midodrine (PROAMATINE) tablet 5 mg, 5 mg, Oral, TID  ibuprofen (ADVIL;MOTRIN) tablet 600 mg, 600 mg, Oral, Q6H PRN  hydrOXYzine HCl (ATARAX) tablet 50 mg, 50 mg, Oral, TID PRN  nicotine polacrilex (NICORETTE) gum 4 mg, 4 mg, Oral, Q1H PRN  ondansetron (ZOFRAN-ODT) disintegrating tablet 4 mg, 4 mg, Oral, Q8H PRN  polyethylene glycol (GLYCOLAX) packet 17 g, 17 g, Oral, Daily PRN  pregabalin (LYRICA) capsule 100 mg, 100 mg, Oral, BID  divalproex (DEPAKOTE ER) extended release tablet 500 mg, 500 mg, Oral, Nightly  zolpidem (AMBIEN) tablet 10 mg, 10 mg, Oral, Nightly  ALPRAZolam (XANAX) tablet 1 mg, 1 mg, Oral, TID  levothyroxine (SYNTHROID) tablet 50 mcg, 50 mcg, Oral, Daily  methadone (DOLOPHINE) tablet 60 mg, 60 mg, Oral, Daily  aspirin-acetaminophen-caffeine (EXCEDRIN MIGRAINE) per tablet 1 tablet, 1 tablet, Oral, Q12H PRN  guaiFENesin (MUCINEX) extended release tablet 600 mg, 600 mg, Oral, BID  QUEtiapine (SEROQUEL) tablet 200 mg, 200 mg, Oral, Nightly    ASSESSMENT AND PLAN    Principal Problem:    Depression, unspecified depression type  Active Problems:    Adrenal insufficiency (HCC)    Fibromyalgia    Migraine    Asthma    Polysubstance use disorder    Hypothyroidism    COPD (chronic obstructive pulmonary disease) (HCC)    URI (upper respiratory infection)  Resolved Problems:    * No resolved hospital problems. *       1. Patient's symptoms   are improving  2. Probable discharge is next week  3. Discharge planning is incomplete  4. Suicidal ideation is  denies  5. Total time with patient was 40 minutes and more than 50 % of that time was spent counseling the patient on their symptoms, treatment and expected goals.      Alaina Giles [facility]

## 2023-05-26 NOTE — PLAN OF CARE
Problem: Self Harm/Suicidality  Goal: Will have no self-injury during hospital stay  Description: INTERVENTIONS:  1. Ensure constant observer at bedside with Q15M safety checks  2. Maintain a safe environment  3. Secure patient belongings  4. Ensure family/visitors adhere to safety recommendations  5. Ensure safety tray has been added to patient's diet order  6. Every shift and PRN: Re-assess suicidal risk via Frequent Screener    5/25/2023 2237 by Naye Stephens RN  Outcome: Progressing     Problem: Anxiety  Goal: Will report anxiety at manageable levels  Description: INTERVENTIONS:  1. Administer medication as ordered  2. Teach and rehearse alternative coping skills  3. Provide emotional support with 1:1 interaction with staff  5/25/2023 2237 by Naye Stephens RN  Outcome: Progressing     Problem: Depression/Self Harm  Goal: Effect of psychiatric condition will be minimized and patient will be protected from self harm  Description: INTERVENTIONS:  1. Assess impact of patient's symptoms on level of functioning, self care needs and offer support as indicated  2. Assess patient/family knowledge of depression, impact on illness and need for teaching  3. Provide emotional support, presence and reassurance  4. Assess for possible suicidal thoughts or ideation. If patient expresses suicidal thoughts or statements do not leave alone, initiate Suicide Precautions, move to a room close to the nursing station and obtain sitter  5.  Initiate consults as appropriate with Mental Health Professional, Spiritual Care, Psychosocial CNS, and consider a recommendation to the LIP for a Psychiatric Consultation  5/25/2023 2237 by Naye Stpehens RN  Outcome: Progressing     Problem: Safety - Adult  Goal: Free from fall injury  5/25/2023 2237 by Naye Stephens RN  Outcome: Progressing

## 2023-05-26 NOTE — PLAN OF CARE
Pt visible social with peers, eating in dinning room, friendly on approach. Pt denies SI,HI,AVH, no distress noted complies with medication and care.

## 2023-05-30 ENCOUNTER — FOLLOWUP TELEPHONE ENCOUNTER (OUTPATIENT)
Dept: PSYCHIATRY | Age: 43
End: 2023-05-30

## 2023-06-20 PROBLEM — J06.9 URI (UPPER RESPIRATORY INFECTION): Status: RESOLVED | Noted: 2023-05-21 | Resolved: 2023-06-20

## 2023-08-21 ENCOUNTER — HOSPITAL ENCOUNTER (EMERGENCY)
Age: 43
Discharge: HOME | End: 2023-08-21
Attending: EMERGENCY MEDICINE
Payer: COMMERCIAL

## 2023-08-21 VITALS
DIASTOLIC BLOOD PRESSURE: 80 MMHG | TEMPERATURE: 98.4 F | HEIGHT: 64 IN | BODY MASS INDEX: 29.02 KG/M2 | SYSTOLIC BLOOD PRESSURE: 100 MMHG | RESPIRATION RATE: 16 BRPM | WEIGHT: 170 LBS | HEART RATE: 63 BPM | OXYGEN SATURATION: 96 %

## 2023-08-21 DIAGNOSIS — G25.3 MYOCLONIC JERKING: Primary | ICD-10-CM

## 2023-08-21 LAB
ALBUMIN SERPL-MCNC: 4.4 G/DL (ref 3.4–5)
ALBUMIN/GLOB SERPL: 1.6 {RATIO} (ref 1.1–2.2)
ALP SERPL-CCNC: 58 U/L (ref 40–129)
ALT SERPL-CCNC: 12 U/L (ref 10–40)
ANION GAP SERPL CALCULATED.3IONS-SCNC: 9 MMOL/L (ref 3–16)
AST SERPL-CCNC: 19 U/L (ref 15–37)
BASOPHILS # BLD: 0 K/UL (ref 0–0.2)
BASOPHILS NFR BLD: 0.6 %
BILIRUB SERPL-MCNC: <0.2 MG/DL (ref 0–1)
BUN SERPL-MCNC: 17 MG/DL (ref 7–20)
CALCIUM SERPL-MCNC: 9.3 MG/DL (ref 8.3–10.6)
CHLORIDE SERPL-SCNC: 96 MMOL/L (ref 99–110)
CO2 SERPL-SCNC: 28 MMOL/L (ref 21–32)
CREAT SERPL-MCNC: 1.2 MG/DL (ref 0.6–1.1)
DEPRECATED RDW RBC AUTO: 14 % (ref 12.4–15.4)
EKG ATRIAL RATE: 60 BPM
EKG DIAGNOSIS: NORMAL
EKG P AXIS: 47 DEGREES
EKG P-R INTERVAL: 152 MS
EKG Q-T INTERVAL: 436 MS
EKG QRS DURATION: 84 MS
EKG QTC CALCULATION (BAZETT): 436 MS
EKG R AXIS: -6 DEGREES
EKG T AXIS: 28 DEGREES
EKG VENTRICULAR RATE: 60 BPM
EOSINOPHIL # BLD: 0.1 K/UL (ref 0–0.6)
EOSINOPHIL NFR BLD: 1.4 %
GFR SERPLBLD CREATININE-BSD FMLA CKD-EPI: 57 ML/MIN/{1.73_M2}
GLUCOSE SERPL-MCNC: 73 MG/DL (ref 70–99)
HCT VFR BLD AUTO: 36.4 % (ref 36–48)
HGB BLD-MCNC: 12.4 G/DL (ref 12–16)
LYMPHOCYTES # BLD: 1.8 K/UL (ref 1–5.1)
LYMPHOCYTES NFR BLD: 39.8 %
MCH RBC QN AUTO: 31 PG (ref 26–34)
MCHC RBC AUTO-ENTMCNC: 34.1 G/DL (ref 31–36)
MCV RBC AUTO: 91 FL (ref 80–100)
MONOCYTES # BLD: 0.3 K/UL (ref 0–1.3)
MONOCYTES NFR BLD: 7.3 %
NEUTROPHILS # BLD: 2.2 K/UL (ref 1.7–7.7)
NEUTROPHILS NFR BLD: 50.9 %
PLATELET # BLD AUTO: 258 K/UL (ref 135–450)
PMV BLD AUTO: 8.6 FL (ref 5–10.5)
POTASSIUM SERPL-SCNC: 4.5 MMOL/L (ref 3.5–5.1)
PROT SERPL-MCNC: 7.1 G/DL (ref 6.4–8.2)
RBC # BLD AUTO: 4 M/UL (ref 4–5.2)
SODIUM SERPL-SCNC: 133 MMOL/L (ref 136–145)
VALPROATE SERPL-MCNC: 96.3 UG/ML (ref 50–100)
WBC # BLD AUTO: 4.4 K/UL (ref 4–11)

## 2023-08-21 PROCEDURE — 80164 ASSAY DIPROPYLACETIC ACD TOT: CPT

## 2023-08-21 PROCEDURE — 93010 ELECTROCARDIOGRAM REPORT: CPT | Performed by: INTERNAL MEDICINE

## 2023-08-21 PROCEDURE — 85025 COMPLETE CBC W/AUTO DIFF WBC: CPT

## 2023-08-21 PROCEDURE — 80053 COMPREHEN METABOLIC PANEL: CPT

## 2023-08-21 PROCEDURE — 93005 ELECTROCARDIOGRAM TRACING: CPT | Performed by: EMERGENCY MEDICINE

## 2023-08-21 PROCEDURE — 99284 EMERGENCY DEPT VISIT MOD MDM: CPT

## 2023-08-21 ASSESSMENT — PAIN SCALES - GENERAL: PAINLEVEL_OUTOF10: 7

## 2023-08-21 ASSESSMENT — PAIN - FUNCTIONAL ASSESSMENT: PAIN_FUNCTIONAL_ASSESSMENT: 0-10

## 2023-08-21 NOTE — ED PROVIDER NOTES
[address] [place] [address]  ED  EMERGENCY DEPARTMENT ENCOUNTER      Pt Name: Truong Kaur  MRN: [id number]  [facility] [place] 1980  Date of evaluation: 8/21/2023  Provider: Esvin Ferrari MD    CHIEF COMPLAINT       Chief Complaint   Patient presents with    Other     Patient states she has been having some \"twitches and jerking for about a week now\". HISTORY OF PRESENT ILLNESS   (Location/Symptom, Timing/Onset, Context/Setting, Quality, Duration, Modifying Factors, Severity)  Note limiting factors. Truong Kaur is a 37 y.o. female with past medical history of polysubstance dependence, chronic pain, borderline personality disorder, fibromyalgia, migraine headaches, diabetes here today with abnormal \"twitches and jerks\". Patient presents to the emergency department today complaining of \"twitching and jerks\" to her muscles. She notes that in the past few days she recently started taking Lexapro. Since then she has noticed abnormal \"twitches and jerking motions\" in her arms, legs, and whole body. She states that come and go completely randomly. Uncontrollable. They are not rhythmic. Not isolated to any specific location. No loss of consciousness. No headache. No chest pain. No syncope or palpitations. She states they are totally involuntary. She notes right now they are not that bad but sometimes can be quite noticeable. She called her primary care doctor who encouraged her to come to the hospital.  She was concerned she might have serotonin syndrome. She notes she is on multiple other psychiatric medication including Depakote, the aforementioned Lexapro, methadone, Lyrica, Seroquel, Ambien. When she takes her Xanax it seems to help. She is in no pain. Has no fevers or chills. Does not suffer for any muscle rigidity    HPI    Nursing Notes were reviewed.     REVIEW OF SYSTEMS    (2-9 systems for level 4, 10 or more for level 5)     Review of Systems    Please see HPI for pertinent positive

## 2023-08-21 NOTE — DISCHARGE INSTRUCTIONS
Please discontinue taking your Lexapro. Follow-up with your primary care doctor regarding further treatment recommendations for your depression and anxiety.   Return to the emergency department for any fevers, muscle cramping, rigidity, seizures or other concerning symptoms

## 2023-08-21 NOTE — ED NOTES
Written and verbal discharge provided to patient, patient verbalizes understanding. IV removed, no complications, dressing applied. Patient ambulatory with steady gait, GCS 15, no acute signs or symptoms of distress. Patient discharged at this time.      Hannah Dickerson RN  08/21/23 0802

## 2023-09-25 ENCOUNTER — APPOINTMENT (OUTPATIENT)
Dept: CT IMAGING | Age: 43
End: 2023-09-25
Payer: COMMERCIAL

## 2023-09-25 ENCOUNTER — APPOINTMENT (OUTPATIENT)
Dept: GENERAL RADIOLOGY | Age: 43
End: 2023-09-25
Payer: COMMERCIAL

## 2023-09-25 ENCOUNTER — APPOINTMENT (OUTPATIENT)
Dept: MRI IMAGING | Age: 43
End: 2023-09-25
Payer: COMMERCIAL

## 2023-09-25 ENCOUNTER — HOSPITAL ENCOUNTER (EMERGENCY)
Age: 43
Discharge: HOME | End: 2023-09-25
Attending: EMERGENCY MEDICINE
Payer: COMMERCIAL

## 2023-09-25 VITALS
TEMPERATURE: 98.7 F | HEART RATE: 71 BPM | OXYGEN SATURATION: 95 % | HEIGHT: 64 IN | BODY MASS INDEX: 32.73 KG/M2 | DIASTOLIC BLOOD PRESSURE: 91 MMHG | RESPIRATION RATE: 16 BRPM | SYSTOLIC BLOOD PRESSURE: 129 MMHG | WEIGHT: 191.7 LBS

## 2023-09-25 DIAGNOSIS — R20.2 PARESTHESIA: Primary | ICD-10-CM

## 2023-09-25 LAB
ALBUMIN SERPL-MCNC: 3.8 G/DL (ref 3.4–5)
ALBUMIN/GLOB SERPL: 1.4 {RATIO} (ref 1.1–2.2)
ALP SERPL-CCNC: 71 U/L (ref 40–129)
ALT SERPL-CCNC: 9 U/L (ref 10–40)
ANION GAP SERPL CALCULATED.3IONS-SCNC: 9 MMOL/L (ref 3–16)
AST SERPL-CCNC: 17 U/L (ref 15–37)
BASOPHILS # BLD: 0 K/UL (ref 0–0.2)
BASOPHILS NFR BLD: 0.6 %
BILIRUB SERPL-MCNC: <0.2 MG/DL (ref 0–1)
BUN SERPL-MCNC: 18 MG/DL (ref 7–20)
CALCIUM SERPL-MCNC: 8.9 MG/DL (ref 8.3–10.6)
CHLORIDE SERPL-SCNC: 100 MMOL/L (ref 99–110)
CO2 SERPL-SCNC: 26 MMOL/L (ref 21–32)
CREAT SERPL-MCNC: 0.9 MG/DL (ref 0.6–1.1)
DEPRECATED RDW RBC AUTO: 13.9 % (ref 12.4–15.4)
EKG ATRIAL RATE: 82 BPM
EKG DIAGNOSIS: NORMAL
EKG P AXIS: 48 DEGREES
EKG P-R INTERVAL: 132 MS
EKG Q-T INTERVAL: 394 MS
EKG QRS DURATION: 86 MS
EKG QTC CALCULATION (BAZETT): 460 MS
EKG R AXIS: -7 DEGREES
EKG T AXIS: 22 DEGREES
EKG VENTRICULAR RATE: 82 BPM
EOSINOPHIL # BLD: 0.2 K/UL (ref 0–0.6)
EOSINOPHIL NFR BLD: 5.7 %
GFR SERPLBLD CREATININE-BSD FMLA CKD-EPI: >60 ML/MIN/{1.73_M2}
GLUCOSE BLD-MCNC: 97 MG/DL (ref 70–99)
GLUCOSE SERPL-MCNC: 88 MG/DL (ref 70–99)
HCG SERPL QL: NEGATIVE
HCT VFR BLD AUTO: 33.9 % (ref 36–48)
HGB BLD-MCNC: 11.7 G/DL (ref 12–16)
INR PPP: 0.93 (ref 0.84–1.16)
LYMPHOCYTES # BLD: 2 K/UL (ref 1–5.1)
LYMPHOCYTES NFR BLD: 46.8 %
MCH RBC QN AUTO: 31.2 PG (ref 26–34)
MCHC RBC AUTO-ENTMCNC: 34.4 G/DL (ref 31–36)
MCV RBC AUTO: 90.6 FL (ref 80–100)
MONOCYTES # BLD: 0.4 K/UL (ref 0–1.3)
MONOCYTES NFR BLD: 10.3 %
NEUTROPHILS # BLD: 1.6 K/UL (ref 1.7–7.7)
NEUTROPHILS NFR BLD: 36.6 %
PERFORMED ON: NORMAL
PLATELET # BLD AUTO: 221 K/UL (ref 135–450)
PMV BLD AUTO: 8.3 FL (ref 5–10.5)
POTASSIUM SERPL-SCNC: 4.4 MMOL/L (ref 3.5–5.1)
PROT SERPL-MCNC: 6.5 G/DL (ref 6.4–8.2)
PROTHROMBIN TIME: 12.5 SEC (ref 11.5–14.8)
RBC # BLD AUTO: 3.74 M/UL (ref 4–5.2)
SODIUM SERPL-SCNC: 135 MMOL/L (ref 136–145)
TROPONIN, HIGH SENSITIVITY: <6 NG/L (ref 0–14)
WBC # BLD AUTO: 4.3 K/UL (ref 4–11)

## 2023-09-25 PROCEDURE — 6360000004 HC RX CONTRAST MEDICATION: Performed by: EMERGENCY MEDICINE

## 2023-09-25 PROCEDURE — 84703 CHORIONIC GONADOTROPIN ASSAY: CPT

## 2023-09-25 PROCEDURE — 71045 X-RAY EXAM CHEST 1 VIEW: CPT

## 2023-09-25 PROCEDURE — 70551 MRI BRAIN STEM W/O DYE: CPT

## 2023-09-25 PROCEDURE — 84484 ASSAY OF TROPONIN QUANT: CPT

## 2023-09-25 PROCEDURE — 80053 COMPREHEN METABOLIC PANEL: CPT

## 2023-09-25 PROCEDURE — 85610 PROTHROMBIN TIME: CPT

## 2023-09-25 PROCEDURE — 85025 COMPLETE CBC W/AUTO DIFF WBC: CPT

## 2023-09-25 PROCEDURE — 99285 EMERGENCY DEPT VISIT HI MDM: CPT

## 2023-09-25 PROCEDURE — 70450 CT HEAD/BRAIN W/O DYE: CPT

## 2023-09-25 PROCEDURE — 70498 CT ANGIOGRAPHY NECK: CPT

## 2023-09-25 PROCEDURE — 93010 ELECTROCARDIOGRAM REPORT: CPT | Performed by: INTERNAL MEDICINE

## 2023-09-25 PROCEDURE — 36415 COLL VENOUS BLD VENIPUNCTURE: CPT

## 2023-09-25 PROCEDURE — 93005 ELECTROCARDIOGRAM TRACING: CPT | Performed by: EMERGENCY MEDICINE

## 2023-09-25 PROCEDURE — 6370000000 HC RX 637 (ALT 250 FOR IP): Performed by: EMERGENCY MEDICINE

## 2023-09-25 RX ORDER — QUETIAPINE FUMARATE 200 MG/1
200 TABLET, FILM COATED ORAL NIGHTLY
COMMUNITY

## 2023-09-25 RX ORDER — ASPIRIN 81 MG/1
324 TABLET, CHEWABLE ORAL ONCE
Status: COMPLETED | OUTPATIENT
Start: 2023-09-25 | End: 2023-09-25

## 2023-09-25 RX ORDER — PREGABALIN 150 MG/1
150 CAPSULE ORAL 2 TIMES DAILY
COMMUNITY

## 2023-09-25 RX ORDER — CYCLOBENZAPRINE HCL 10 MG
10 TABLET ORAL 2 TIMES DAILY PRN
Qty: 20 TABLET | Refills: 0 | Status: SHIPPED | OUTPATIENT
Start: 2023-09-25 | End: 2023-10-05

## 2023-09-25 RX ORDER — IBUPROFEN 600 MG/1
600 TABLET ORAL ONCE
Status: COMPLETED | OUTPATIENT
Start: 2023-09-25 | End: 2023-09-25

## 2023-09-25 RX ORDER — CYCLOBENZAPRINE HCL 10 MG
10 TABLET ORAL ONCE
Status: COMPLETED | OUTPATIENT
Start: 2023-09-25 | End: 2023-09-25

## 2023-09-25 RX ORDER — IBUPROFEN 600 MG/1
600 TABLET ORAL EVERY 8 HOURS PRN
Qty: 15 TABLET | Refills: 0 | Status: SHIPPED | OUTPATIENT
Start: 2023-09-25 | End: 2023-09-30

## 2023-09-25 RX ADMIN — IBUPROFEN 600 MG: 600 TABLET, FILM COATED ORAL at 14:52

## 2023-09-25 RX ADMIN — CYCLOBENZAPRINE 10 MG: 10 TABLET, FILM COATED ORAL at 14:52

## 2023-09-25 RX ADMIN — ASPIRIN 324 MG: 81 TABLET, CHEWABLE ORAL at 13:24

## 2023-09-25 RX ADMIN — IOPAMIDOL 75 ML: 755 INJECTION, SOLUTION INTRAVENOUS at 10:37

## 2023-09-25 ASSESSMENT — ENCOUNTER SYMPTOMS
TROUBLE SWALLOWING: 0
VOICE CHANGE: 0
NAUSEA: 0
SHORTNESS OF BREATH: 0
VOMITING: 0
DIARRHEA: 0

## 2023-09-25 ASSESSMENT — PAIN SCALES - GENERAL
PAINLEVEL_OUTOF10: 2
PAINLEVEL_OUTOF10: 0

## 2023-09-25 ASSESSMENT — PAIN DESCRIPTION - LOCATION: LOCATION: BACK

## 2023-09-25 ASSESSMENT — PAIN DESCRIPTION - PAIN TYPE: TYPE: CHRONIC PAIN

## 2023-09-25 NOTE — ED NOTES
1018-EMS arrived. Dr. Ayala Gil at desk with [facility] RN. And Ole Vargas. Code Stroke called by Ole Vargas and Dr. Ayala Gil. Code Stroke called over head. 1018-CT Stroke phone called. 1019- Stroke team called. 1020-Lab called. 1024-Call back received from [facility] Stroke team Dr. Stevie Ruiz spoke with Dr. Ayala Gil. 1025-Stroke sheet placed at [facility].    Ari Quinones  09/25/23 [id number] [facility] [address]  09/25/23 1034

## 2023-09-25 NOTE — ED PROVIDER NOTES
Abdominal:      General: There is no distension. Palpations: Abdomen is soft. Tenderness: There is no abdominal tenderness. There is no guarding or rebound. Musculoskeletal:         General: No tenderness or deformity. Normal range of motion. Cervical back: Neck supple. Skin:     General: Skin is warm and dry. Neurological:      General: No focal deficit present. Mental Status: She is alert and oriented to person, place, and time. Cranial Nerves: No cranial nerve deficit. Comments: Normal speech and mental status. Symmetric smile. No facial droop. Sensation intact and equal in all CN V distributions of the face bilaterally. 5/5 strength in all 4 extremities. Normal gait. Normal sensation equal in all 4 extremities. Negative romberg. Normal finger to nose and heel to shin. NIH Stroke Scale     Interval: Baseline  Time: 10:40am  Person Administering Scale: Ian Armando MD   Administer stroke scale items in the order listed. Record performance in each category after each subscale exam. Do not go back and change scores. Follow directions provided for each exam technique. Scores should reflect what the patient does, not what the clinician thinks the patient can do. The clinician should record answers while administering the exam and work quickly. Except where indicated, the patient should not be coached (i.e., repeated requests to patient to make a special effort). 1a  Level of consciousness: 0=alert; keenly responsive   1b. LOC questions:  0=Performs both tasks correctly   1c. LOC commands: 0=Performs both tasks correctly   2. Best Gaze: 0=normal   3. Visual: 0=No visual loss   4. Facial Palsy: 0=Normal symmetric movement   5a. Motor left arm: 0=No drift, limb holds 90 (or 45) degrees for full 10 seconds   5b.   Motor right arm: 0=No drift, limb holds 90 (or 45) degrees for full 10 seconds   6a. motor left le=No drift, limb holds 90 (or 45) degrees for full 10

## 2023-09-25 NOTE — ED NOTES
Call made for consult to  stroke team    5  Dr. Mario Bloom of  stroke team returned call     Kaylee Canales  09/25/23 1308       Gab Nguyen  09/25/23 1311

## 2023-09-25 NOTE — ED NOTES
1245-Perfect Serve sent by Dr. Arlette Stewart to Dr. Catina Olivera for consult.       [facility]  09/25/23 4767

## 2023-10-02 ENCOUNTER — APPOINTMENT (OUTPATIENT)
Dept: GENERAL RADIOLOGY | Age: 43
End: 2023-10-02
Payer: COMMERCIAL

## 2023-10-02 ENCOUNTER — HOSPITAL ENCOUNTER (EMERGENCY)
Age: 43
Discharge: LEFT BEFORE DISCHARGE | End: 2023-10-02
Payer: COMMERCIAL

## 2023-10-02 VITALS
DIASTOLIC BLOOD PRESSURE: 59 MMHG | RESPIRATION RATE: 16 BRPM | SYSTOLIC BLOOD PRESSURE: 98 MMHG | TEMPERATURE: 98.2 F | HEART RATE: 95 BPM | OXYGEN SATURATION: 93 %

## 2023-10-02 PROCEDURE — 71046 X-RAY EXAM CHEST 2 VIEWS: CPT

## 2023-10-02 PROCEDURE — 4500000002 HC ER NO CHARGE

## 2023-10-02 PROCEDURE — 99283 EMERGENCY DEPT VISIT LOW MDM: CPT

## 2023-10-18 ENCOUNTER — HOSPITAL ENCOUNTER (EMERGENCY)
Age: 43
Discharge: HOME | End: 2023-10-19
Attending: EMERGENCY MEDICINE
Payer: COMMERCIAL

## 2023-10-18 DIAGNOSIS — R04.2 HEMOPTYSIS: ICD-10-CM

## 2023-10-18 DIAGNOSIS — J18.9 PNEUMONIA OF BOTH LUNGS DUE TO INFECTIOUS ORGANISM, UNSPECIFIED PART OF LUNG: Primary | ICD-10-CM

## 2023-10-18 PROCEDURE — 99285 EMERGENCY DEPT VISIT HI MDM: CPT

## 2023-10-18 ASSESSMENT — PAIN - FUNCTIONAL ASSESSMENT: PAIN_FUNCTIONAL_ASSESSMENT: 0-10

## 2023-10-18 ASSESSMENT — LIFESTYLE VARIABLES: HOW OFTEN DO YOU HAVE A DRINK CONTAINING ALCOHOL: NEVER

## 2023-10-18 ASSESSMENT — PAIN SCALES - GENERAL: PAINLEVEL_OUTOF10: 0

## 2023-10-19 ENCOUNTER — APPOINTMENT (OUTPATIENT)
Dept: CT IMAGING | Age: 43
End: 2023-10-19
Payer: COMMERCIAL

## 2023-10-19 VITALS
RESPIRATION RATE: 16 BRPM | HEART RATE: 88 BPM | BODY MASS INDEX: 31.58 KG/M2 | OXYGEN SATURATION: 98 % | TEMPERATURE: 97.6 F | DIASTOLIC BLOOD PRESSURE: 44 MMHG | WEIGHT: 185 LBS | SYSTOLIC BLOOD PRESSURE: 124 MMHG | HEIGHT: 64 IN

## 2023-10-19 LAB
ALBUMIN SERPL-MCNC: 3.6 G/DL (ref 3.4–5)
ALBUMIN/GLOB SERPL: 1.2 {RATIO} (ref 1.1–2.2)
ALP SERPL-CCNC: 71 U/L (ref 40–129)
ALT SERPL-CCNC: 11 U/L (ref 10–40)
ANION GAP SERPL CALCULATED.3IONS-SCNC: 11 MMOL/L (ref 3–16)
APTT BLD: 29.2 SEC (ref 22.7–35.9)
AST SERPL-CCNC: 20 U/L (ref 15–37)
BASOPHILS # BLD: 0.1 K/UL (ref 0–0.2)
BASOPHILS NFR BLD: 0.7 %
BILIRUB SERPL-MCNC: <0.2 MG/DL (ref 0–1)
BUN SERPL-MCNC: 9 MG/DL (ref 7–20)
CALCIUM SERPL-MCNC: 9.4 MG/DL (ref 8.3–10.6)
CHLORIDE SERPL-SCNC: 97 MMOL/L (ref 99–110)
CO2 SERPL-SCNC: 28 MMOL/L (ref 21–32)
CREAT SERPL-MCNC: 0.7 MG/DL (ref 0.6–1.1)
DEPRECATED RDW RBC AUTO: 13.9 % (ref 12.4–15.4)
EOSINOPHIL # BLD: 0.3 K/UL (ref 0–0.6)
EOSINOPHIL NFR BLD: 3.3 %
GFR SERPLBLD CREATININE-BSD FMLA CKD-EPI: >60 ML/MIN/{1.73_M2}
GLUCOSE SERPL-MCNC: 84 MG/DL (ref 70–99)
HCG SERPL QL: NEGATIVE
HCT VFR BLD AUTO: 32.9 % (ref 36–48)
HGB BLD-MCNC: 11.1 G/DL (ref 12–16)
INR PPP: 0.96 (ref 0.84–1.16)
LACTATE BLDV-SCNC: 1.2 MMOL/L (ref 0.4–1.9)
LYMPHOCYTES # BLD: 1.7 K/UL (ref 1–5.1)
LYMPHOCYTES NFR BLD: 21.5 %
MCH RBC QN AUTO: 30.7 PG (ref 26–34)
MCHC RBC AUTO-ENTMCNC: 33.8 G/DL (ref 31–36)
MCV RBC AUTO: 91 FL (ref 80–100)
MONOCYTES # BLD: 0.5 K/UL (ref 0–1.3)
MONOCYTES NFR BLD: 6.8 %
NEUTROPHILS # BLD: 5.3 K/UL (ref 1.7–7.7)
NEUTROPHILS NFR BLD: 67.7 %
PLATELET # BLD AUTO: 274 K/UL (ref 135–450)
PMV BLD AUTO: 7.9 FL (ref 5–10.5)
POTASSIUM SERPL-SCNC: 4.1 MMOL/L (ref 3.5–5.1)
PROT SERPL-MCNC: 6.7 G/DL (ref 6.4–8.2)
PROTHROMBIN TIME: 12.7 SEC (ref 11.5–14.8)
RBC # BLD AUTO: 3.62 M/UL (ref 4–5.2)
SARS-COV-2 RDRP RESP QL NAA+PROBE: NOT DETECTED
SODIUM SERPL-SCNC: 136 MMOL/L (ref 136–145)
WBC # BLD AUTO: 7.9 K/UL (ref 4–11)

## 2023-10-19 PROCEDURE — 6360000004 HC RX CONTRAST MEDICATION: Performed by: EMERGENCY MEDICINE

## 2023-10-19 PROCEDURE — 85730 THROMBOPLASTIN TIME PARTIAL: CPT

## 2023-10-19 PROCEDURE — 85610 PROTHROMBIN TIME: CPT

## 2023-10-19 PROCEDURE — 83605 ASSAY OF LACTIC ACID: CPT

## 2023-10-19 PROCEDURE — 80053 COMPREHEN METABOLIC PANEL: CPT

## 2023-10-19 PROCEDURE — 87635 SARS-COV-2 COVID-19 AMP PRB: CPT

## 2023-10-19 PROCEDURE — 71260 CT THORAX DX C+: CPT

## 2023-10-19 PROCEDURE — 85025 COMPLETE CBC W/AUTO DIFF WBC: CPT

## 2023-10-19 PROCEDURE — 6370000000 HC RX 637 (ALT 250 FOR IP): Performed by: EMERGENCY MEDICINE

## 2023-10-19 PROCEDURE — 84703 CHORIONIC GONADOTROPIN ASSAY: CPT

## 2023-10-19 PROCEDURE — 36415 COLL VENOUS BLD VENIPUNCTURE: CPT

## 2023-10-19 RX ORDER — LEVOFLOXACIN 500 MG/1
500 TABLET, FILM COATED ORAL ONCE
Status: COMPLETED | OUTPATIENT
Start: 2023-10-19 | End: 2023-10-19

## 2023-10-19 RX ORDER — LEVOFLOXACIN 500 MG/1
500 TABLET, FILM COATED ORAL DAILY
Qty: 7 TABLET | Refills: 0 | Status: SHIPPED | OUTPATIENT
Start: 2023-10-19 | End: 2023-10-26

## 2023-10-19 RX ADMIN — LEVOFLOXACIN 500 MG: 500 TABLET, FILM COATED ORAL at 02:21

## 2023-10-19 RX ADMIN — IOPAMIDOL 75 ML: 755 INJECTION, SOLUTION INTRAVENOUS at 00:43

## 2023-10-19 ASSESSMENT — PAIN - FUNCTIONAL ASSESSMENT: PAIN_FUNCTIONAL_ASSESSMENT: NONE - DENIES PAIN

## 2023-10-19 NOTE — ED PROVIDER NOTES
[address] [address] ED  EMERGENCY DEPARTMENT ENCOUNTER      Pt Name: Leda Sánchez  MRN: [id number]  [address] [facility] 1980  Date of evaluation: 10/18/2023  Provider: Abimbola Martinez MD    CHIEF COMPLAINT       Chief Complaint   Patient presents with    Cough     Reports diagnosed with pneumonia 3 weeks ago, finished antibiotics 5 days ago. Reports coughing up blood x1 week. Short of breath, coughing up infection pieces. Believes the pneumonia is not gone          HISTORY OF PRESENT ILLNESS   (Location/Symptom, Timing/Onset, Context/Setting, Quality, Duration, Modifying Factors, Severity)  Note limiting factors. Leda Sánchez is a 37 y.o. female with past medical history of remote polysubstance abuse now sober, diabetes, hepatitis C, COPD here today with cough and hemoptysis. Patient states that earlier in the month she was having a cough with intermittent hemoptysis was diagnosed with pneumonia and was placed on doxycycline. She has finished the doxycycline but continues to have an occasional cough and coughing up occasional streaks of blood. No chest pain. No fevers or chills. No abdominal pain. No vomiting or diarrhea. She is just concerned pneumonia has not fully resolved. She denies any active drug use for the past few years. HPI    Nursing Notes were reviewed. REVIEW OF SYSTEMS    (2-9 systems for level 4, 10 or more for level 5)     Review of Systems    Please see HPI for pertinent positive and negative review of system findings. A full 10 system ROS was performed and otherwise negative.         PAST MEDICAL HISTORY     Past Medical History:   Diagnosis Date    Adrenal insufficiency     Asthma     Bipolar 1 disorder ([address])     Borderline personality disorder ([address])     Chronic prescription benzodiazepine use     Alprazolam    Chronic prescription opiate use     Methadone    COPD (chronic obstructive pulmonary disease) (HCC)     Degenerative spinal arthritis     Howard-Danlos syndrome

## 2023-10-31 ENCOUNTER — APPOINTMENT (OUTPATIENT)
Dept: CT IMAGING | Age: 43
DRG: 710 | End: 2023-10-31
Payer: COMMERCIAL

## 2023-10-31 ENCOUNTER — APPOINTMENT (OUTPATIENT)
Dept: GENERAL RADIOLOGY | Age: 43
DRG: 710 | End: 2023-10-31
Payer: COMMERCIAL

## 2023-10-31 ENCOUNTER — ANCILLARY PROCEDURE (OUTPATIENT)
Dept: EMERGENCY DEPT | Age: 43
DRG: 710 | End: 2023-10-31
Attending: EMERGENCY MEDICINE
Payer: COMMERCIAL

## 2023-10-31 ENCOUNTER — HOSPITAL ENCOUNTER (INPATIENT)
Age: 43
LOS: 4 days | Discharge: HOME | DRG: 710 | End: 2023-11-05
Attending: EMERGENCY MEDICINE | Admitting: HOSPITALIST
Payer: COMMERCIAL

## 2023-10-31 DIAGNOSIS — J18.9 PNEUMONIA DUE TO INFECTIOUS ORGANISM, UNSPECIFIED LATERALITY, UNSPECIFIED PART OF LUNG: ICD-10-CM

## 2023-10-31 DIAGNOSIS — R09.02 HYPOXIA: Primary | ICD-10-CM

## 2023-10-31 LAB
ANION GAP SERPL CALCULATED.3IONS-SCNC: 10 MMOL/L (ref 3–16)
BASE EXCESS BLDV CALC-SCNC: 3.8 MMOL/L (ref -3–3)
BASE EXCESS BLDV CALC-SCNC: 5.6 MMOL/L (ref -3–3)
BASOPHILS # BLD: 0.1 K/UL (ref 0–0.2)
BASOPHILS NFR BLD: 0.4 %
BUN SERPL-MCNC: 14 MG/DL (ref 7–20)
CALCIUM SERPL-MCNC: 8.6 MG/DL (ref 8.3–10.6)
CHLORIDE SERPL-SCNC: 95 MMOL/L (ref 99–110)
CO2 BLDV-SCNC: 32 MMOL/L
CO2 BLDV-SCNC: 33 MMOL/L
CO2 SERPL-SCNC: 30 MMOL/L (ref 21–32)
COHGB MFR BLDV: 1.2 % (ref 0–1.5)
COHGB MFR BLDV: 4.1 % (ref 0–1.5)
CREAT SERPL-MCNC: 1.1 MG/DL (ref 0.6–1.1)
DEPRECATED RDW RBC AUTO: 14.2 % (ref 12.4–15.4)
EKG ATRIAL RATE: 90 BPM
EKG DIAGNOSIS: NORMAL
EKG P AXIS: 57 DEGREES
EKG P-R INTERVAL: 132 MS
EKG Q-T INTERVAL: 378 MS
EKG QRS DURATION: 84 MS
EKG QTC CALCULATION (BAZETT): 462 MS
EKG R AXIS: 20 DEGREES
EKG T AXIS: 36 DEGREES
EKG VENTRICULAR RATE: 90 BPM
EOSINOPHIL # BLD: 0.4 K/UL (ref 0–0.6)
EOSINOPHIL NFR BLD: 1.9 %
FLUAV RNA RESP QL NAA+PROBE: NOT DETECTED
FLUBV RNA RESP QL NAA+PROBE: NOT DETECTED
GFR SERPLBLD CREATININE-BSD FMLA CKD-EPI: >60 ML/MIN/{1.73_M2}
GLUCOSE SERPL-MCNC: 85 MG/DL (ref 70–99)
HCG SERPL QL: NEGATIVE
HCO3 BLDV-SCNC: 30.8 MMOL/L (ref 23–29)
HCO3 BLDV-SCNC: 31.4 MMOL/L (ref 23–29)
HCT VFR BLD AUTO: 33.8 % (ref 36–48)
HGB BLD-MCNC: 11.3 G/DL (ref 12–16)
LACTATE BLDV-SCNC: 1.2 MMOL/L (ref 0.4–2)
LYMPHOCYTES # BLD: 2.1 K/UL (ref 1–5.1)
LYMPHOCYTES NFR BLD: 9.4 %
MCH RBC QN AUTO: 30.4 PG (ref 26–34)
MCHC RBC AUTO-ENTMCNC: 33.4 G/DL (ref 31–36)
MCV RBC AUTO: 90.9 FL (ref 80–100)
METHGB MFR BLDV: 0.5 %
METHGB MFR BLDV: 0.8 %
MONOCYTES # BLD: 0.9 K/UL (ref 0–1.3)
MONOCYTES NFR BLD: 3.9 %
NEUTROPHILS # BLD: 18.9 K/UL (ref 1.7–7.7)
NEUTROPHILS NFR BLD: 84.4 %
O2 THERAPY: ABNORMAL
O2 THERAPY: ABNORMAL
PCO2 BLDV: 47.2 MMHG (ref 40–50)
PCO2 BLDV: 61.5 MMHG (ref 40–50)
PH BLDV: 7.33 [PH] (ref 7.35–7.45)
PH BLDV: 7.43 [PH] (ref 7.35–7.45)
PLATELET # BLD AUTO: 371 K/UL (ref 135–450)
PMV BLD AUTO: 7.4 FL (ref 5–10.5)
PO2 BLDV: 27.8 MMHG (ref 25–40)
PO2 BLDV: 33.9 MMHG (ref 25–40)
POTASSIUM SERPL-SCNC: 3.8 MMOL/L (ref 3.5–5.1)
RBC # BLD AUTO: 3.72 M/UL (ref 4–5.2)
SAO2 % BLDV: 51 %
SAO2 % BLDV: 71 %
SARS-COV-2 RNA RESP QL NAA+PROBE: NOT DETECTED
SODIUM SERPL-SCNC: 135 MMOL/L (ref 136–145)
TROPONIN, HIGH SENSITIVITY: 15 NG/L (ref 0–14)
TROPONIN, HIGH SENSITIVITY: 20 NG/L (ref 0–14)
WBC # BLD AUTO: 22.4 K/UL (ref 4–11)

## 2023-10-31 PROCEDURE — 96366 THER/PROPH/DIAG IV INF ADDON: CPT

## 2023-10-31 PROCEDURE — 76604 US EXAM CHEST: CPT

## 2023-10-31 PROCEDURE — 84703 CHORIONIC GONADOTROPIN ASSAY: CPT

## 2023-10-31 PROCEDURE — 94640 AIRWAY INHALATION TREATMENT: CPT

## 2023-10-31 PROCEDURE — 2580000003 HC RX 258: Performed by: EMERGENCY MEDICINE

## 2023-10-31 PROCEDURE — 84484 ASSAY OF TROPONIN QUANT: CPT

## 2023-10-31 PROCEDURE — 83605 ASSAY OF LACTIC ACID: CPT

## 2023-10-31 PROCEDURE — 85025 COMPLETE CBC W/AUTO DIFF WBC: CPT

## 2023-10-31 PROCEDURE — 93010 ELECTROCARDIOGRAM REPORT: CPT | Performed by: INTERNAL MEDICINE

## 2023-10-31 PROCEDURE — 93005 ELECTROCARDIOGRAM TRACING: CPT | Performed by: EMERGENCY MEDICINE

## 2023-10-31 PROCEDURE — 96367 TX/PROPH/DG ADDL SEQ IV INF: CPT

## 2023-10-31 PROCEDURE — 82803 BLOOD GASES ANY COMBINATION: CPT

## 2023-10-31 PROCEDURE — 99285 EMERGENCY DEPT VISIT HI MDM: CPT

## 2023-10-31 PROCEDURE — 93308 TTE F-UP OR LMTD: CPT

## 2023-10-31 PROCEDURE — 96365 THER/PROPH/DIAG IV INF INIT: CPT

## 2023-10-31 PROCEDURE — 6370000000 HC RX 637 (ALT 250 FOR IP): Performed by: EMERGENCY MEDICINE

## 2023-10-31 PROCEDURE — 36415 COLL VENOUS BLD VENIPUNCTURE: CPT

## 2023-10-31 PROCEDURE — 87636 SARSCOV2 & INF A&B AMP PRB: CPT

## 2023-10-31 PROCEDURE — 80048 BASIC METABOLIC PNL TOTAL CA: CPT

## 2023-10-31 PROCEDURE — 71260 CT THORAX DX C+: CPT

## 2023-10-31 PROCEDURE — 71045 X-RAY EXAM CHEST 1 VIEW: CPT

## 2023-10-31 PROCEDURE — 6360000004 HC RX CONTRAST MEDICATION: Performed by: EMERGENCY MEDICINE

## 2023-10-31 PROCEDURE — 6360000002 HC RX W HCPCS: Performed by: EMERGENCY MEDICINE

## 2023-10-31 RX ORDER — SODIUM CHLORIDE, SODIUM LACTATE, POTASSIUM CHLORIDE, AND CALCIUM CHLORIDE .6; .31; .03; .02 G/100ML; G/100ML; G/100ML; G/100ML
30 INJECTION, SOLUTION INTRAVENOUS ONCE
Status: COMPLETED | OUTPATIENT
Start: 2023-10-31 | End: 2023-10-31

## 2023-10-31 RX ORDER — IPRATROPIUM BROMIDE AND ALBUTEROL SULFATE 2.5; .5 MG/3ML; MG/3ML
1 SOLUTION RESPIRATORY (INHALATION) ONCE
Status: COMPLETED | OUTPATIENT
Start: 2023-10-31 | End: 2023-10-31

## 2023-10-31 RX ADMIN — VANCOMYCIN HYDROCHLORIDE 1250 MG: 10 INJECTION, POWDER, LYOPHILIZED, FOR SOLUTION INTRAVENOUS at 20:59

## 2023-10-31 RX ADMIN — IPRATROPIUM BROMIDE AND ALBUTEROL SULFATE 1 DOSE: 2.5; .5 SOLUTION RESPIRATORY (INHALATION) at 18:32

## 2023-10-31 RX ADMIN — SODIUM CHLORIDE, POTASSIUM CHLORIDE, SODIUM LACTATE AND CALCIUM CHLORIDE 2544 ML: 600; 310; 30; 20 INJECTION, SOLUTION INTRAVENOUS at 20:17

## 2023-10-31 RX ADMIN — IOPAMIDOL 75 ML: 755 INJECTION, SOLUTION INTRAVENOUS at 19:12

## 2023-10-31 RX ADMIN — CEFEPIME 2000 MG: 2 INJECTION, POWDER, FOR SOLUTION INTRAVENOUS at 20:18

## 2023-10-31 ASSESSMENT — PAIN DESCRIPTION - ONSET: ONSET: ON-GOING

## 2023-10-31 ASSESSMENT — PAIN SCALES - GENERAL: PAINLEVEL_OUTOF10: 7

## 2023-10-31 ASSESSMENT — PAIN DESCRIPTION - DESCRIPTORS: DESCRIPTORS: BURNING

## 2023-10-31 ASSESSMENT — PAIN DESCRIPTION - FREQUENCY: FREQUENCY: CONTINUOUS

## 2023-10-31 ASSESSMENT — PAIN DESCRIPTION - LOCATION: LOCATION: BACK;CHEST

## 2023-10-31 ASSESSMENT — PAIN - FUNCTIONAL ASSESSMENT: PAIN_FUNCTIONAL_ASSESSMENT: 0-10

## 2023-10-31 NOTE — ED PROVIDER NOTES
[address] [place] [address]  ED  EMERGENCY DEPARTMENT ENCOUNTER        Patient Name: Nessa Farley  MRN: [id number]  [facility] [facility] 1980  Date of evaluation: 10/31/2023  Provider: Chula Awad MD  PCP: Rohan Harper MD  Note Started: 3:50 PM EDT 10/31/23    CHIEF COMPLAINT       Shortness of Breath (Pt to the ED for shortness of breath that has been going on for about 1 month. States was seen by her Asthma and Allergy Doc today, and was told to come to the ED for this. Reports her oxygen earlier was 84%. )      HISTORY OF PRESENT ILLNESS: 1 or more Elements     History from : Patient    Limitations to history : None    Anjelica Bueno is a 37 y.o. female who presents for evaluation of shortness of breath. Patient states that she came from her asthma and allergy doctor today. She was noted to be tachycardic as well as hypoxic at the office. States that her oxygen level was as low as 84%. She reports that she has had pneumonia recently in the past month. She states that she is currently on Levaquin. She states that her symptoms have not improved. She does have history of COPD and asthma. She is not on any oxygen at baseline. She states that she stopped smoking several months ago. Some mild new bilateral lower extremity swelling. No chest pain. No prior history of DVT or PE. Nursing Notes were all reviewed and agreed with or any disagreements were addressed in the HPI. REVIEW OF SYSTEMS :      Review of Systems    Positives and Pertinent negatives as per HPI.      SURGICAL HISTORY     Past Surgical History:   Procedure Laterality Date    KNEE SURGERY      LUMBAR FUSION N/A 06/03/2019    L5, S1 TRANSFORAMINAL LUMBAR INTERBODY FUSION performed by Mehran Clement MD at [address] [facility] [facility] N/A 08/21/2020    FLEXIBLE SIGMOIDOSCOPY POSSIBLE COLONOSCOPY performed by Charbel Anthony MD at [facility]       Previous Medications

## 2023-10-31 NOTE — ED NOTES
Ambulated pt approximately 300 feet without assistance of staff or assistive device without oxygen. Pt verbalizes feeling SOB,. Pt's gait was even and steady. Pt's pulse ranged from  and oxygen ranged 90-95 throughout the course of the ambulation. Pt's oxygen initially 90% and increased throughout the course of the ambulation. Pt back to bed, bed locked and in lowest position, call light in reach, side rails up x2. Provider notified of results and completion of ambulation.        Stewart Moeller RN  10/31/23 5335

## 2023-11-01 PROBLEM — D72.829 LEUKOCYTOSIS: Status: ACTIVE | Noted: 2023-11-01

## 2023-11-01 PROBLEM — R09.02 HYPOXIA: Status: ACTIVE | Noted: 2023-11-01

## 2023-11-01 PROBLEM — J18.9 COMMUNITY ACQUIRED PNEUMONIA: Status: ACTIVE | Noted: 2023-11-01

## 2023-11-01 PROBLEM — J96.01 ACUTE RESPIRATORY FAILURE WITH HYPOXIA (HCC): Status: ACTIVE | Noted: 2023-11-01

## 2023-11-01 LAB
ANION GAP SERPL CALCULATED.3IONS-SCNC: 8 MMOL/L (ref 3–16)
BUN SERPL-MCNC: 12 MG/DL (ref 7–20)
CALCIUM SERPL-MCNC: 8.7 MG/DL (ref 8.3–10.6)
CHLORIDE SERPL-SCNC: 102 MMOL/L (ref 99–110)
CO2 SERPL-SCNC: 29 MMOL/L (ref 21–32)
CREAT SERPL-MCNC: 0.8 MG/DL (ref 0.6–1.1)
GFR SERPLBLD CREATININE-BSD FMLA CKD-EPI: >60 ML/MIN/{1.73_M2}
GLUCOSE SERPL-MCNC: 91 MG/DL (ref 70–99)
IGA SERPL-MCNC: 139 MG/DL (ref 70–400)
IGG SERPL-MCNC: 1011 MG/DL (ref 700–1600)
IGM SERPL-MCNC: 64 MG/DL (ref 40–230)
POTASSIUM SERPL-SCNC: 4.1 MMOL/L (ref 3.5–5.1)
RHEUMATOID FACT SER IA-ACNC: 12 IU/ML
SODIUM SERPL-SCNC: 139 MMOL/L (ref 136–145)
VANCOMYCIN SERPL-MCNC: 9.8 UG/ML

## 2023-11-01 PROCEDURE — 86200 CCP ANTIBODY: CPT

## 2023-11-01 PROCEDURE — 86038 ANTINUCLEAR ANTIBODIES: CPT

## 2023-11-01 PROCEDURE — 82784 ASSAY IGA/IGD/IGG/IGM EACH: CPT

## 2023-11-01 PROCEDURE — 87385 HISTOPLASMA CAPSUL AG IA: CPT

## 2023-11-01 PROCEDURE — 6370000000 HC RX 637 (ALT 250 FOR IP): Performed by: HOSPITALIST

## 2023-11-01 PROCEDURE — 94640 AIRWAY INHALATION TREATMENT: CPT

## 2023-11-01 PROCEDURE — 87633 RESP VIRUS 12-25 TARGETS: CPT

## 2023-11-01 PROCEDURE — 86331 IMMUNODIFFUSION OUCHTERLONY: CPT

## 2023-11-01 PROCEDURE — 86003 ALLG SPEC IGE CRUDE XTRC EA: CPT

## 2023-11-01 PROCEDURE — 6360000002 HC RX W HCPCS: Performed by: NURSE PRACTITIONER

## 2023-11-01 PROCEDURE — 99255 IP/OBS CONSLTJ NEW/EST HI 80: CPT | Performed by: INTERNAL MEDICINE

## 2023-11-01 PROCEDURE — 99223 1ST HOSP IP/OBS HIGH 75: CPT | Performed by: INTERNAL MEDICINE

## 2023-11-01 PROCEDURE — 87205 SMEAR GRAM STAIN: CPT

## 2023-11-01 PROCEDURE — 86606 ASPERGILLUS ANTIBODY: CPT

## 2023-11-01 PROCEDURE — 6370000000 HC RX 637 (ALT 250 FOR IP): Performed by: INTERNAL MEDICINE

## 2023-11-01 PROCEDURE — 86431 RHEUMATOID FACTOR QUANT: CPT

## 2023-11-01 PROCEDURE — 1200000000 HC SEMI PRIVATE

## 2023-11-01 PROCEDURE — 80048 BASIC METABOLIC PNL TOTAL CA: CPT

## 2023-11-01 PROCEDURE — 83516 IMMUNOASSAY NONANTIBODY: CPT

## 2023-11-01 PROCEDURE — 87641 MR-STAPH DNA AMP PROBE: CPT

## 2023-11-01 PROCEDURE — 86225 DNA ANTIBODY NATIVE: CPT

## 2023-11-01 PROCEDURE — 87070 CULTURE OTHR SPECIMN AEROBIC: CPT

## 2023-11-01 PROCEDURE — 36415 COLL VENOUS BLD VENIPUNCTURE: CPT

## 2023-11-01 PROCEDURE — 6370000000 HC RX 637 (ALT 250 FOR IP): Performed by: NURSE PRACTITIONER

## 2023-11-01 PROCEDURE — 2580000003 HC RX 258: Performed by: NURSE PRACTITIONER

## 2023-11-01 PROCEDURE — 87449 NOS EACH ORGANISM AG IA: CPT

## 2023-11-01 PROCEDURE — 86005 ALLG SPEC IGE MULTIALLG SCR: CPT

## 2023-11-01 PROCEDURE — 99223 1ST HOSP IP/OBS HIGH 75: CPT | Performed by: HOSPITALIST

## 2023-11-01 PROCEDURE — 80202 ASSAY OF VANCOMYCIN: CPT

## 2023-11-01 RX ORDER — NICOTINE 21 MG/24HR
1 PATCH, TRANSDERMAL 24 HOURS TRANSDERMAL DAILY
Status: DISCONTINUED | OUTPATIENT
Start: 2023-11-01 | End: 2023-11-05 | Stop reason: HOSPADM

## 2023-11-01 RX ORDER — POLYETHYLENE GLYCOL 3350 17 G/17G
17 POWDER, FOR SOLUTION ORAL DAILY PRN
Status: DISCONTINUED | OUTPATIENT
Start: 2023-11-01 | End: 2023-11-05 | Stop reason: HOSPADM

## 2023-11-01 RX ORDER — QUETIAPINE FUMARATE 100 MG/1
200 TABLET, FILM COATED ORAL NIGHTLY
Status: DISCONTINUED | OUTPATIENT
Start: 2023-11-01 | End: 2023-11-05 | Stop reason: HOSPADM

## 2023-11-01 RX ORDER — SODIUM CHLORIDE 9 MG/ML
INJECTION, SOLUTION INTRAVENOUS CONTINUOUS
Status: DISCONTINUED | OUTPATIENT
Start: 2023-11-01 | End: 2023-11-01

## 2023-11-01 RX ORDER — AZELASTINE HCL 205.5 UG/1
1 SPRAY NASAL 2 TIMES DAILY
Status: DISCONTINUED | OUTPATIENT
Start: 2023-11-01 | End: 2023-11-01

## 2023-11-01 RX ORDER — ACETAMINOPHEN 325 MG/1
650 TABLET ORAL EVERY 6 HOURS PRN
Status: DISCONTINUED | OUTPATIENT
Start: 2023-11-01 | End: 2023-11-05 | Stop reason: HOSPADM

## 2023-11-01 RX ORDER — ENOXAPARIN SODIUM 100 MG/ML
40 INJECTION SUBCUTANEOUS DAILY
Status: DISCONTINUED | OUTPATIENT
Start: 2023-11-01 | End: 2023-11-05 | Stop reason: HOSPADM

## 2023-11-01 RX ORDER — PANTOPRAZOLE SODIUM 40 MG/1
40 TABLET, DELAYED RELEASE ORAL
Status: DISCONTINUED | OUTPATIENT
Start: 2023-11-01 | End: 2023-11-05 | Stop reason: HOSPADM

## 2023-11-01 RX ORDER — PREGABALIN 75 MG/1
150 CAPSULE ORAL 2 TIMES DAILY
Status: DISCONTINUED | OUTPATIENT
Start: 2023-11-01 | End: 2023-11-05 | Stop reason: HOSPADM

## 2023-11-01 RX ORDER — ACETAMINOPHEN 650 MG/1
650 SUPPOSITORY RECTAL EVERY 6 HOURS PRN
Status: DISCONTINUED | OUTPATIENT
Start: 2023-11-01 | End: 2023-11-05 | Stop reason: HOSPADM

## 2023-11-01 RX ORDER — IPRATROPIUM BROMIDE AND ALBUTEROL SULFATE 2.5; .5 MG/3ML; MG/3ML
1 SOLUTION RESPIRATORY (INHALATION) EVERY 4 HOURS PRN
Status: DISCONTINUED | OUTPATIENT
Start: 2023-11-01 | End: 2023-11-05 | Stop reason: HOSPADM

## 2023-11-01 RX ORDER — SODIUM CHLORIDE 0.9 % (FLUSH) 0.9 %
5-40 SYRINGE (ML) INJECTION PRN
Status: DISCONTINUED | OUTPATIENT
Start: 2023-11-01 | End: 2023-11-05 | Stop reason: HOSPADM

## 2023-11-01 RX ORDER — ZOLPIDEM TARTRATE 5 MG/1
10 TABLET ORAL NIGHTLY PRN
Status: DISCONTINUED | OUTPATIENT
Start: 2023-11-01 | End: 2023-11-05 | Stop reason: HOSPADM

## 2023-11-01 RX ORDER — ALPRAZOLAM 1 MG/1
1 TABLET ORAL 3 TIMES DAILY PRN
Status: DISCONTINUED | OUTPATIENT
Start: 2023-11-01 | End: 2023-11-05 | Stop reason: HOSPADM

## 2023-11-01 RX ORDER — SODIUM CHLORIDE 9 MG/ML
INJECTION, SOLUTION INTRAVENOUS PRN
Status: DISCONTINUED | OUTPATIENT
Start: 2023-11-01 | End: 2023-11-05 | Stop reason: HOSPADM

## 2023-11-01 RX ORDER — MIDODRINE HYDROCHLORIDE 5 MG/1
5 TABLET ORAL 3 TIMES DAILY
Status: DISCONTINUED | OUTPATIENT
Start: 2023-11-01 | End: 2023-11-05 | Stop reason: HOSPADM

## 2023-11-01 RX ORDER — LEVOTHYROXINE SODIUM 0.03 MG/1
25 TABLET ORAL DAILY
Status: DISCONTINUED | OUTPATIENT
Start: 2023-11-01 | End: 2023-11-05 | Stop reason: HOSPADM

## 2023-11-01 RX ORDER — ONDANSETRON 2 MG/ML
4 INJECTION INTRAMUSCULAR; INTRAVENOUS EVERY 6 HOURS PRN
Status: DISCONTINUED | OUTPATIENT
Start: 2023-11-01 | End: 2023-11-05 | Stop reason: HOSPADM

## 2023-11-01 RX ORDER — IBUPROFEN 600 MG/1
600 TABLET ORAL
Status: COMPLETED | OUTPATIENT
Start: 2023-11-01 | End: 2023-11-01

## 2023-11-01 RX ORDER — PREDNISONE 20 MG/1
40 TABLET ORAL DAILY
Status: DISCONTINUED | OUTPATIENT
Start: 2023-11-01 | End: 2023-11-05 | Stop reason: HOSPADM

## 2023-11-01 RX ORDER — DIVALPROEX SODIUM 250 MG/1
500 TABLET, EXTENDED RELEASE ORAL 2 TIMES DAILY
Status: DISCONTINUED | OUTPATIENT
Start: 2023-11-01 | End: 2023-11-05 | Stop reason: HOSPADM

## 2023-11-01 RX ORDER — SODIUM CHLORIDE 0.9 % (FLUSH) 0.9 %
5-40 SYRINGE (ML) INJECTION EVERY 12 HOURS SCHEDULED
Status: DISCONTINUED | OUTPATIENT
Start: 2023-11-01 | End: 2023-11-05 | Stop reason: HOSPADM

## 2023-11-01 RX ORDER — ONDANSETRON 4 MG/1
4 TABLET, ORALLY DISINTEGRATING ORAL EVERY 8 HOURS PRN
Status: DISCONTINUED | OUTPATIENT
Start: 2023-11-01 | End: 2023-11-05 | Stop reason: HOSPADM

## 2023-11-01 RX ORDER — SODIUM CHLORIDE 9 MG/ML
INJECTION, SOLUTION INTRAVENOUS CONTINUOUS
Status: ACTIVE | OUTPATIENT
Start: 2023-11-01 | End: 2023-11-02

## 2023-11-01 RX ADMIN — PREDNISONE 40 MG: 20 TABLET ORAL at 14:13

## 2023-11-01 RX ADMIN — IBUPROFEN 600 MG: 600 TABLET, FILM COATED ORAL at 19:49

## 2023-11-01 RX ADMIN — MIDODRINE HYDROCHLORIDE 5 MG: 5 TABLET ORAL at 20:46

## 2023-11-01 RX ADMIN — IPRATROPIUM BROMIDE AND ALBUTEROL SULFATE 1 DOSE: 2.5; .5 SOLUTION RESPIRATORY (INHALATION) at 16:09

## 2023-11-01 RX ADMIN — LEVOTHYROXINE SODIUM 25 MCG: 0.03 TABLET ORAL at 10:48

## 2023-11-01 RX ADMIN — ALPRAZOLAM 1 MG: 1 TABLET ORAL at 11:05

## 2023-11-01 RX ADMIN — QUETIAPINE FUMARATE 200 MG: 100 TABLET ORAL at 20:46

## 2023-11-01 RX ADMIN — POLYETHYLENE GLYCOL 3350 17 G: 17 POWDER, FOR SOLUTION ORAL at 20:53

## 2023-11-01 RX ADMIN — CEFEPIME 2000 MG: 2 INJECTION, POWDER, FOR SOLUTION INTRAVENOUS at 13:03

## 2023-11-01 RX ADMIN — PREGABALIN 150 MG: 75 CAPSULE ORAL at 09:31

## 2023-11-01 RX ADMIN — ZOLPIDEM TARTRATE 10 MG: 5 TABLET ORAL at 20:46

## 2023-11-01 RX ADMIN — ALPRAZOLAM 1 MG: 1 TABLET ORAL at 19:03

## 2023-11-01 RX ADMIN — SODIUM CHLORIDE: 9 INJECTION, SOLUTION INTRAVENOUS at 06:29

## 2023-11-01 RX ADMIN — PANTOPRAZOLE SODIUM 40 MG: 40 TABLET, DELAYED RELEASE ORAL at 10:47

## 2023-11-01 RX ADMIN — MIDODRINE HYDROCHLORIDE 5 MG: 5 TABLET ORAL at 14:13

## 2023-11-01 RX ADMIN — Medication 10 ML: at 20:50

## 2023-11-01 RX ADMIN — ENOXAPARIN SODIUM 40 MG: 100 INJECTION SUBCUTANEOUS at 09:35

## 2023-11-01 RX ADMIN — DIVALPROEX SODIUM 500 MG: 250 TABLET, EXTENDED RELEASE ORAL at 20:46

## 2023-11-01 RX ADMIN — PREGABALIN 150 MG: 75 CAPSULE ORAL at 20:46

## 2023-11-01 RX ADMIN — MIDODRINE HYDROCHLORIDE 5 MG: 5 TABLET ORAL at 09:28

## 2023-11-01 RX ADMIN — VANCOMYCIN HYDROCHLORIDE 1000 MG: 1 INJECTION, POWDER, LYOPHILIZED, FOR SOLUTION INTRAVENOUS at 20:50

## 2023-11-01 RX ADMIN — CEFEPIME 2000 MG: 2 INJECTION, POWDER, FOR SOLUTION INTRAVENOUS at 22:19

## 2023-11-01 RX ADMIN — CEFEPIME 2000 MG: 2 INJECTION, POWDER, FOR SOLUTION INTRAVENOUS at 06:33

## 2023-11-01 RX ADMIN — SODIUM CHLORIDE: 9 INJECTION, SOLUTION INTRAVENOUS at 11:00

## 2023-11-01 RX ADMIN — Medication 2 PUFF: at 20:25

## 2023-11-01 RX ADMIN — VANCOMYCIN HYDROCHLORIDE 1000 MG: 1 INJECTION, POWDER, LYOPHILIZED, FOR SOLUTION INTRAVENOUS at 09:31

## 2023-11-01 ASSESSMENT — PAIN DESCRIPTION - LOCATION: LOCATION: BACK

## 2023-11-01 ASSESSMENT — PAIN DESCRIPTION - DESCRIPTORS: DESCRIPTORS: ACHING;DISCOMFORT

## 2023-11-01 ASSESSMENT — PAIN SCALES - GENERAL: PAINLEVEL_OUTOF10: 7

## 2023-11-01 NOTE — H&P
V2.0  History and Physical      Name:  Roxi Best /Age/Sex: 1980  (37 y.o. female)   MRN & CSN:  [id number] & [id number] Encounter Date/Time: 2023 4:44 AM EDT   Location:   PCP: Maria Del Carmen Mendosa MD       Hospital Day: 2    Assessment and Plan:   Roxi Best is a 37 y.o. female with a pmh of  who presents with Acute respiratory failure with hypoxia ([address]) secondary to subacute pneumonia/bronchiolitis. Hospital Problems             Last Modified POA    * (Principal) Acute respiratory failure with hypoxia ([address]) 2023 Yes       Plan:  Persistent pneumonia/bronchiolitis: Completed courses of doxycycline and Levaquin. Continue broad-spectrum antibiotics, consult pulmonary medicine and infectious disease. Acute hypoxemic respiratory failure: Secondary to above. SPO2 was in the low 80s as an outpatient. 95% on 2 L. Titrate off of oxygen as able  COPD/asthma: Unclear if the former has been diagnosed by pulmonary function tests. Ex-smoker. Plan as above  Hypertension:?  Continue home midodrine  Polysubstance abuse: Denies recent illicit substance use. Bipolar disorder/BPT: Continue Depakote  GERD: Continue pantoprazole    Disposition:   Current Living situation: Home  Expected Disposition: Home  Estimated D/C: 2 to 3 days pending clinical course and recommendations from consultants    Diet ADULT DIET;  Regular   DVT Prophylaxis [] Lovenox, []  Heparin, [x] SCDs, [] Ambulation,  [] Eliquis, [] Xarelto, [] Coumadin   Code Status Full Code   Surrogate Decision Maker/ POA      Personally reviewed Lab Studies and Imaging       Drugs that require monitoring for toxicity include vancomycin and the method of monitoring was trough levels        History from:     patient    History of Present Illness:     Chief Complaint:  shortness of breath  Anjelica Lugo is a 37 y.o. female with pmh of COPD/Asthma, Migraines, polysubstance use and hypothyroidism who presents with progressive shortness of

## 2023-11-02 LAB
ANA SER QL IA: NEGATIVE
ANION GAP SERPL CALCULATED.3IONS-SCNC: 8 MMOL/L (ref 3–16)
BASOPHILS # BLD: 0.1 K/UL (ref 0–0.2)
BASOPHILS NFR BLD: 0.8 %
BUN SERPL-MCNC: 12 MG/DL (ref 7–20)
CALCIUM SERPL-MCNC: 8.6 MG/DL (ref 8.3–10.6)
CCP IGG SERPL-ACNC: <0.5 U/ML (ref 0–2.9)
CHLORIDE SERPL-SCNC: 104 MMOL/L (ref 99–110)
CO2 SERPL-SCNC: 28 MMOL/L (ref 21–32)
CREAT SERPL-MCNC: 0.7 MG/DL (ref 0.6–1.1)
DEPRECATED RDW RBC AUTO: 14.4 % (ref 12.4–15.4)
DSDNA AB SER-ACNC: <1 IU/ML (ref 0–9)
EOSINOPHIL # BLD: 0.1 K/UL (ref 0–0.6)
EOSINOPHIL NFR BLD: 0.8 %
GFR SERPLBLD CREATININE-BSD FMLA CKD-EPI: >60 ML/MIN/{1.73_M2}
GLUCOSE SERPL-MCNC: 145 MG/DL (ref 70–99)
HCG UR QL: NEGATIVE
HCT VFR BLD AUTO: 30.8 % (ref 36–48)
HGB BLD-MCNC: 10.3 G/DL (ref 12–16)
HIV 1+2 AB+HIV1 P24 AG SERPL QL IA: NORMAL
HIV 2 AB SERPL QL IA: NORMAL
HIV1 AB SERPL QL IA: NORMAL
HIV1 P24 AG SERPL QL IA: NORMAL
LEGIONELLA AG UR QL: NORMAL
LYMPHOCYTES # BLD: 1.3 K/UL (ref 1–5.1)
LYMPHOCYTES NFR BLD: 11 %
MCH RBC QN AUTO: 30.6 PG (ref 26–34)
MCHC RBC AUTO-ENTMCNC: 33.6 G/DL (ref 31–36)
MCV RBC AUTO: 91.2 FL (ref 80–100)
MONOCYTES # BLD: 0.7 K/UL (ref 0–1.3)
MONOCYTES NFR BLD: 5.9 %
MRSA DNA SPEC QL NAA+PROBE: NORMAL
NEUTROPHILS # BLD: 9.9 K/UL (ref 1.7–7.7)
NEUTROPHILS NFR BLD: 81.5 %
PLATELET # BLD AUTO: 376 K/UL (ref 135–450)
PMV BLD AUTO: 7.8 FL (ref 5–10.5)
PNEUMONIA PANEL MOLECULAR: NORMAL
POTASSIUM SERPL-SCNC: 4.6 MMOL/L (ref 3.5–5.1)
RBC # BLD AUTO: 3.38 M/UL (ref 4–5.2)
REPORT: NORMAL
S PNEUM AG UR QL: NORMAL
SODIUM SERPL-SCNC: 140 MMOL/L (ref 136–145)
WBC # BLD AUTO: 12.2 K/UL (ref 4–11)

## 2023-11-02 PROCEDURE — 1200000000 HC SEMI PRIVATE

## 2023-11-02 PROCEDURE — 3609010900 HC BRONCHOSCOPY THERAPUTIC ASPIRATION INITIAL: Performed by: INTERNAL MEDICINE

## 2023-11-02 PROCEDURE — 87102 FUNGUS ISOLATION CULTURE: CPT

## 2023-11-02 PROCEDURE — 2500000003 HC RX 250 WO HCPCS: Performed by: INTERNAL MEDICINE

## 2023-11-02 PROCEDURE — 88112 CYTOPATH CELL ENHANCE TECH: CPT

## 2023-11-02 PROCEDURE — 86635 COCCIDIOIDES ANTIBODY: CPT

## 2023-11-02 PROCEDURE — 0B9D8ZX DRAINAGE OF RIGHT MIDDLE LUNG LOBE, VIA NATURAL OR ARTIFICIAL OPENING ENDOSCOPIC, DIAGNOSTIC: ICD-10-PCS | Performed by: INTERNAL MEDICINE

## 2023-11-02 PROCEDURE — 84703 CHORIONIC GONADOTROPIN ASSAY: CPT

## 2023-11-02 PROCEDURE — 6370000000 HC RX 637 (ALT 250 FOR IP): Performed by: NURSE PRACTITIONER

## 2023-11-02 PROCEDURE — 86702 HIV-2 ANTIBODY: CPT

## 2023-11-02 PROCEDURE — 6370000000 HC RX 637 (ALT 250 FOR IP): Performed by: HOSPITALIST

## 2023-11-02 PROCEDURE — 6360000002 HC RX W HCPCS: Performed by: NURSE PRACTITIONER

## 2023-11-02 PROCEDURE — 94669 MECHANICAL CHEST WALL OSCILL: CPT

## 2023-11-02 PROCEDURE — 0BCK8ZZ EXTIRPATION OF MATTER FROM RIGHT LUNG, VIA NATURAL OR ARTIFICIAL OPENING ENDOSCOPIC: ICD-10-PCS | Performed by: INTERNAL MEDICINE

## 2023-11-02 PROCEDURE — 86698 HISTOPLASMA ANTIBODY: CPT

## 2023-11-02 PROCEDURE — 87106 FUNGI IDENTIFICATION YEAST: CPT

## 2023-11-02 PROCEDURE — 2580000003 HC RX 258: Performed by: NURSE PRACTITIONER

## 2023-11-02 PROCEDURE — 2580000003 HC RX 258: Performed by: INTERNAL MEDICINE

## 2023-11-02 PROCEDURE — 87070 CULTURE OTHR SPECIMN AEROBIC: CPT

## 2023-11-02 PROCEDURE — 3609010800 HC BRONCHOSCOPY ALVEOLAR LAVAGE: Performed by: INTERNAL MEDICINE

## 2023-11-02 PROCEDURE — 87327 CRYPTOCOCCUS NEOFORM AG IA: CPT

## 2023-11-02 PROCEDURE — 86606 ASPERGILLUS ANTIBODY: CPT

## 2023-11-02 PROCEDURE — 7100000010 HC PHASE II RECOVERY - FIRST 15 MIN: Performed by: INTERNAL MEDICINE

## 2023-11-02 PROCEDURE — 99232 SBSQ HOSP IP/OBS MODERATE 35: CPT | Performed by: INTERNAL MEDICINE

## 2023-11-02 PROCEDURE — 87205 SMEAR GRAM STAIN: CPT

## 2023-11-02 PROCEDURE — 86701 HIV-1ANTIBODY: CPT

## 2023-11-02 PROCEDURE — 3609027000 HC BRONCHOSCOPY: Performed by: INTERNAL MEDICINE

## 2023-11-02 PROCEDURE — 36415 COLL VENOUS BLD VENIPUNCTURE: CPT

## 2023-11-02 PROCEDURE — 2709999900 HC NON-CHARGEABLE SUPPLY: Performed by: INTERNAL MEDICINE

## 2023-11-02 PROCEDURE — 31624 DX BRONCHOSCOPE/LAVAGE: CPT | Performed by: INTERNAL MEDICINE

## 2023-11-02 PROCEDURE — 87390 HIV-1 AG IA: CPT

## 2023-11-02 PROCEDURE — 87206 SMEAR FLUORESCENT/ACID STAI: CPT

## 2023-11-02 PROCEDURE — 6360000002 HC RX W HCPCS: Performed by: INTERNAL MEDICINE

## 2023-11-02 PROCEDURE — 94640 AIRWAY INHALATION TREATMENT: CPT

## 2023-11-02 PROCEDURE — 88305 TISSUE EXAM BY PATHOLOGIST: CPT

## 2023-11-02 PROCEDURE — 99152 MOD SED SAME PHYS/QHP 5/>YRS: CPT | Performed by: INTERNAL MEDICINE

## 2023-11-02 PROCEDURE — 99233 SBSQ HOSP IP/OBS HIGH 50: CPT | Performed by: INTERNAL MEDICINE

## 2023-11-02 PROCEDURE — 80048 BASIC METABOLIC PNL TOTAL CA: CPT

## 2023-11-02 PROCEDURE — 85025 COMPLETE CBC W/AUTO DIFF WBC: CPT

## 2023-11-02 PROCEDURE — 6370000000 HC RX 637 (ALT 250 FOR IP): Performed by: INTERNAL MEDICINE

## 2023-11-02 PROCEDURE — 87116 MYCOBACTERIA CULTURE: CPT

## 2023-11-02 PROCEDURE — 87081 CULTURE SCREEN ONLY: CPT

## 2023-11-02 PROCEDURE — 7100000011 HC PHASE II RECOVERY - ADDTL 15 MIN: Performed by: INTERNAL MEDICINE

## 2023-11-02 PROCEDURE — 86612 BLASTOMYCES ANTIBODY: CPT

## 2023-11-02 PROCEDURE — A4217 STERILE WATER/SALINE, 500 ML: HCPCS | Performed by: INTERNAL MEDICINE

## 2023-11-02 RX ORDER — MIDAZOLAM HYDROCHLORIDE 5 MG/ML
INJECTION INTRAMUSCULAR; INTRAVENOUS PRN
Status: DISCONTINUED | OUTPATIENT
Start: 2023-11-02 | End: 2023-11-02 | Stop reason: ALTCHOICE

## 2023-11-02 RX ORDER — FENTANYL CITRATE 50 UG/ML
INJECTION, SOLUTION INTRAMUSCULAR; INTRAVENOUS PRN
Status: DISCONTINUED | OUTPATIENT
Start: 2023-11-02 | End: 2023-11-02 | Stop reason: ALTCHOICE

## 2023-11-02 RX ORDER — METHADONE HYDROCHLORIDE 10 MG/1
120 TABLET ORAL DAILY
Status: DISCONTINUED | OUTPATIENT
Start: 2023-11-02 | End: 2023-11-05 | Stop reason: HOSPADM

## 2023-11-02 RX ORDER — MAGNESIUM HYDROXIDE 1200 MG/15ML
LIQUID ORAL CONTINUOUS PRN
Status: COMPLETED | OUTPATIENT
Start: 2023-11-02 | End: 2023-11-02

## 2023-11-02 RX ORDER — LIDOCAINE HYDROCHLORIDE 20 MG/ML
INJECTION, SOLUTION EPIDURAL; INFILTRATION; INTRACAUDAL; PERINEURAL PRN
Status: DISCONTINUED | OUTPATIENT
Start: 2023-11-02 | End: 2023-11-02 | Stop reason: ALTCHOICE

## 2023-11-02 RX ADMIN — MIDODRINE HYDROCHLORIDE 5 MG: 5 TABLET ORAL at 20:42

## 2023-11-02 RX ADMIN — PREGABALIN 150 MG: 75 CAPSULE ORAL at 12:22

## 2023-11-02 RX ADMIN — ALPRAZOLAM 1 MG: 1 TABLET ORAL at 06:47

## 2023-11-02 RX ADMIN — DIVALPROEX SODIUM 500 MG: 250 TABLET, EXTENDED RELEASE ORAL at 20:42

## 2023-11-02 RX ADMIN — CEFEPIME 2000 MG: 2 INJECTION, POWDER, FOR SOLUTION INTRAVENOUS at 12:19

## 2023-11-02 RX ADMIN — ALPRAZOLAM 1 MG: 1 TABLET ORAL at 15:28

## 2023-11-02 RX ADMIN — PANTOPRAZOLE SODIUM 40 MG: 40 TABLET, DELAYED RELEASE ORAL at 12:11

## 2023-11-02 RX ADMIN — CEFEPIME 2000 MG: 2 INJECTION, POWDER, FOR SOLUTION INTRAVENOUS at 05:05

## 2023-11-02 RX ADMIN — PREDNISONE 40 MG: 20 TABLET ORAL at 12:22

## 2023-11-02 RX ADMIN — PREGABALIN 150 MG: 75 CAPSULE ORAL at 20:42

## 2023-11-02 RX ADMIN — IPRATROPIUM BROMIDE AND ALBUTEROL SULFATE 1 DOSE: 2.5; .5 SOLUTION RESPIRATORY (INHALATION) at 18:03

## 2023-11-02 RX ADMIN — CEFEPIME 2000 MG: 2 INJECTION, POWDER, FOR SOLUTION INTRAVENOUS at 20:55

## 2023-11-02 RX ADMIN — ZOLPIDEM TARTRATE 10 MG: 5 TABLET ORAL at 20:51

## 2023-11-02 RX ADMIN — ENOXAPARIN SODIUM 40 MG: 100 INJECTION SUBCUTANEOUS at 12:11

## 2023-11-02 RX ADMIN — MIDODRINE HYDROCHLORIDE 5 MG: 5 TABLET ORAL at 13:53

## 2023-11-02 RX ADMIN — POLYETHYLENE GLYCOL 3350 17 G: 17 POWDER, FOR SOLUTION ORAL at 20:51

## 2023-11-02 RX ADMIN — Medication 10 ML: at 20:55

## 2023-11-02 RX ADMIN — ALPRAZOLAM 1 MG: 1 TABLET ORAL at 22:53

## 2023-11-02 RX ADMIN — ACETAMINOPHEN 650 MG: 325 TABLET ORAL at 15:28

## 2023-11-02 RX ADMIN — Medication 2 PUFF: at 18:05

## 2023-11-02 RX ADMIN — QUETIAPINE FUMARATE 200 MG: 100 TABLET ORAL at 20:42

## 2023-11-02 RX ADMIN — NICOTINE POLACRILEX 4 MG: 4 LOZENGE ORAL at 20:52

## 2023-11-02 RX ADMIN — VANCOMYCIN HYDROCHLORIDE 1000 MG: 1 INJECTION, POWDER, LYOPHILIZED, FOR SOLUTION INTRAVENOUS at 10:45

## 2023-11-02 RX ADMIN — LEVOTHYROXINE SODIUM 25 MCG: 0.03 TABLET ORAL at 12:11

## 2023-11-02 RX ADMIN — METHADONE HYDROCHLORIDE 120 MG: 10 TABLET ORAL at 13:53

## 2023-11-02 ASSESSMENT — PAIN SCALES - GENERAL: PAINLEVEL_OUTOF10: 4

## 2023-11-02 NOTE — PLAN OF CARE
Problem: Pain  Goal: Verbalizes/displays adequate comfort level or baseline comfort level  11/1/2023 2224 by Emma Fairchild RN  Outcome: Progressing  Flowsheets (Taken 11/1/2023 2224)  Verbalizes/displays adequate comfort level or baseline comfort level:   Encourage patient to monitor pain and request assistance   Assess pain using appropriate pain scale   Administer analgesics based on type and severity of pain and evaluate response   Implement non-pharmacological measures as appropriate and evaluate response     Problem: Safety - Adult  Goal: Free from fall injury  11/1/2023 2224 by Emma Fairchild RN  Outcome: Progressing  Flowsheets (Taken 11/1/2023 2224)  Free From Fall Injury: Instruct family/caregiver on patient safety

## 2023-11-02 NOTE — CARE COORDINATION
Case Management Assessment  Initial Evaluation    Date/Time of Evaluation: 11/2/2023 2:33 PM  Assessment Completed by: Gama Shirley RN    If patient is discharged prior to next notation, then this note serves as note for discharge by case management. Patient Name: Nette Garner                   YOB: 1980  Diagnosis: Hypoxia [R09.02]  Acute respiratory failure with hypoxia ([address]) [J96.01]  Pneumonia due to infectious organism, unspecified laterality, unspecified part of lung [J18.9]                   Date / Time: 10/31/2023  3:49 PM    Patient Admission Status: Inpatient   Readmission Risk (Low < 19, Mod (19-27), High > 27): Readmission Risk Score: 14.2    Current PCP: Chester Denis MD  PCP verified by CM? Yes    Chart Reviewed: Yes      History Provided by: Patient  Patient Orientation: Alert and Oriented    Patient Cognition: Alert    Hospitalization in the last 30 days (Readmission):  No    If yes, Readmission Assessment in CM Navigator will be completed. Advance Directives:      Code Status: Full Code   Patient's Primary Decision Maker is: Legal Next of Kin    Primary Decision Maker: Tee Champion  Child    Secondary Decision Maker: Severiano Fender  Parent - [phone]    Secondary Decision Maker: Roberta Kulkarni - Brother/Sister - [phone]    Discharge Planning:    Patient lives with: Alone Type of Home: Apartment  Primary Care Giver: Self  Patient Support Systems include: Children, Family Members   Current Financial resources: Medicaid  Current community resources: [employer] (Methadone from [facility] in [place])  Current services prior to admission: Other (Comment) (Methadone from [address] [place] [address] in [place])            Current DME:              Type of Home Care services:  None    ADLS  Prior functional level: Independent in ADLs/IADLs  Current functional level:  Independent in ADLs/IADLs    PT AM-PAC:   /24  OT AM-PAC:   /24    Family can provide assistance at DC:

## 2023-11-02 NOTE — PERIOP NOTE
Report obtained from inpatient RN - Updated VS// updated PO status  documentation  / hcg i requested prior to procedure.    Return contact # provided to Rn for any changes or updates prior to procedure

## 2023-11-03 LAB
ACID FAST STN SPEC QL: NORMAL
ANION GAP SERPL CALCULATED.3IONS-SCNC: 9 MMOL/L (ref 3–16)
BACTERIA SPEC RESP CULT: NORMAL
BASOPHILS # BLD: 0.1 K/UL (ref 0–0.2)
BASOPHILS NFR BLD: 0.9 %
BM IGG SER IF-ACNC: 0 AU/ML (ref 0–19)
BUN SERPL-MCNC: 17 MG/DL (ref 7–20)
CALCIUM SERPL-MCNC: 8.9 MG/DL (ref 8.3–10.6)
CHLORIDE SERPL-SCNC: 102 MMOL/L (ref 99–110)
CO2 SERPL-SCNC: 29 MMOL/L (ref 21–32)
CREAT SERPL-MCNC: 0.8 MG/DL (ref 0.6–1.1)
CRYPTOC AG SER QL IA: NEGATIVE
DEPRECATED RDW RBC AUTO: 14.5 % (ref 12.4–15.4)
EOSINOPHIL # BLD: 0.1 K/UL (ref 0–0.6)
EOSINOPHIL NFR BLD: 0.9 %
GFR SERPLBLD CREATININE-BSD FMLA CKD-EPI: >60 ML/MIN/{1.73_M2}
GLUCOSE SERPL-MCNC: 134 MG/DL (ref 70–99)
GRAM STN SPEC: NORMAL
H CAPSUL AG UR IA-ACNC: NOT DETECTED NG/ML
H CAPSUL AG UR QL IA: NOT DETECTED
HCT VFR BLD AUTO: 31.4 % (ref 36–48)
HGB BLD-MCNC: 10.5 G/DL (ref 12–16)
LYMPHOCYTES # BLD: 2.7 K/UL (ref 1–5.1)
LYMPHOCYTES NFR BLD: 21.4 %
MCH RBC QN AUTO: 30.4 PG (ref 26–34)
MCHC RBC AUTO-ENTMCNC: 33.5 G/DL (ref 31–36)
MCV RBC AUTO: 90.6 FL (ref 80–100)
MONOCYTES # BLD: 0.6 K/UL (ref 0–1.3)
MONOCYTES NFR BLD: 4.6 %
MYELOPEROXIDASE AB SER-ACNC: 0 AU/ML (ref 0–19)
NEUTROPHILS # BLD: 9.1 K/UL (ref 1.7–7.7)
NEUTROPHILS NFR BLD: 72.2 %
PLATELET # BLD AUTO: 405 K/UL (ref 135–450)
PMV BLD AUTO: 7.6 FL (ref 5–10.5)
POTASSIUM SERPL-SCNC: 4.3 MMOL/L (ref 3.5–5.1)
PROTEINASE3 AB SER-ACNC: 0 AU/ML (ref 0–19)
RBC # BLD AUTO: 3.46 M/UL (ref 4–5.2)
SODIUM SERPL-SCNC: 140 MMOL/L (ref 136–145)
WBC # BLD AUTO: 12.6 K/UL (ref 4–11)

## 2023-11-03 PROCEDURE — 85025 COMPLETE CBC W/AUTO DIFF WBC: CPT

## 2023-11-03 PROCEDURE — 6360000002 HC RX W HCPCS: Performed by: NURSE PRACTITIONER

## 2023-11-03 PROCEDURE — 6370000000 HC RX 637 (ALT 250 FOR IP): Performed by: HOSPITALIST

## 2023-11-03 PROCEDURE — 99232 SBSQ HOSP IP/OBS MODERATE 35: CPT | Performed by: INTERNAL MEDICINE

## 2023-11-03 PROCEDURE — 6370000000 HC RX 637 (ALT 250 FOR IP): Performed by: INTERNAL MEDICINE

## 2023-11-03 PROCEDURE — 2580000003 HC RX 258: Performed by: NURSE PRACTITIONER

## 2023-11-03 PROCEDURE — 6370000000 HC RX 637 (ALT 250 FOR IP): Performed by: NURSE PRACTITIONER

## 2023-11-03 PROCEDURE — 94669 MECHANICAL CHEST WALL OSCILL: CPT

## 2023-11-03 PROCEDURE — 36415 COLL VENOUS BLD VENIPUNCTURE: CPT

## 2023-11-03 PROCEDURE — 1200000000 HC SEMI PRIVATE

## 2023-11-03 PROCEDURE — 80048 BASIC METABOLIC PNL TOTAL CA: CPT

## 2023-11-03 PROCEDURE — 94640 AIRWAY INHALATION TREATMENT: CPT

## 2023-11-03 RX ADMIN — MIDODRINE HYDROCHLORIDE 5 MG: 5 TABLET ORAL at 13:18

## 2023-11-03 RX ADMIN — NICOTINE POLACRILEX 4 MG: 4 LOZENGE ORAL at 18:57

## 2023-11-03 RX ADMIN — Medication 2 PUFF: at 20:23

## 2023-11-03 RX ADMIN — ACETAMINOPHEN 650 MG: 325 TABLET ORAL at 15:06

## 2023-11-03 RX ADMIN — METHADONE HYDROCHLORIDE 120 MG: 10 TABLET ORAL at 08:41

## 2023-11-03 RX ADMIN — Medication 2 PUFF: at 08:42

## 2023-11-03 RX ADMIN — ZOLPIDEM TARTRATE 10 MG: 5 TABLET ORAL at 20:27

## 2023-11-03 RX ADMIN — PANTOPRAZOLE SODIUM 40 MG: 40 TABLET, DELAYED RELEASE ORAL at 06:20

## 2023-11-03 RX ADMIN — QUETIAPINE FUMARATE 200 MG: 100 TABLET ORAL at 20:27

## 2023-11-03 RX ADMIN — DIVALPROEX SODIUM 500 MG: 250 TABLET, EXTENDED RELEASE ORAL at 20:27

## 2023-11-03 RX ADMIN — MIDODRINE HYDROCHLORIDE 5 MG: 5 TABLET ORAL at 08:41

## 2023-11-03 RX ADMIN — CEFEPIME 2000 MG: 2 INJECTION, POWDER, FOR SOLUTION INTRAVENOUS at 13:21

## 2023-11-03 RX ADMIN — PREGABALIN 150 MG: 75 CAPSULE ORAL at 08:41

## 2023-11-03 RX ADMIN — CEFEPIME 2000 MG: 2 INJECTION, POWDER, FOR SOLUTION INTRAVENOUS at 05:24

## 2023-11-03 RX ADMIN — DIVALPROEX SODIUM 500 MG: 250 TABLET, EXTENDED RELEASE ORAL at 08:41

## 2023-11-03 RX ADMIN — PREGABALIN 150 MG: 75 CAPSULE ORAL at 20:27

## 2023-11-03 RX ADMIN — MIDODRINE HYDROCHLORIDE 5 MG: 5 TABLET ORAL at 20:27

## 2023-11-03 RX ADMIN — TIOTROPIUM BROMIDE INHALATION SPRAY 2 PUFF: 3.12 SPRAY, METERED RESPIRATORY (INHALATION) at 08:42

## 2023-11-03 RX ADMIN — ALPRAZOLAM 1 MG: 1 TABLET ORAL at 18:55

## 2023-11-03 RX ADMIN — PREDNISONE 40 MG: 20 TABLET ORAL at 08:41

## 2023-11-03 RX ADMIN — LEVOTHYROXINE SODIUM 25 MCG: 0.03 TABLET ORAL at 06:20

## 2023-11-03 RX ADMIN — ENOXAPARIN SODIUM 40 MG: 100 INJECTION SUBCUTANEOUS at 08:42

## 2023-11-03 RX ADMIN — Medication 10 ML: at 20:30

## 2023-11-03 RX ADMIN — IPRATROPIUM BROMIDE AND ALBUTEROL SULFATE 1 DOSE: 2.5; .5 SOLUTION RESPIRATORY (INHALATION) at 13:14

## 2023-11-03 RX ADMIN — ALPRAZOLAM 1 MG: 1 TABLET ORAL at 10:44

## 2023-11-03 RX ADMIN — POLYETHYLENE GLYCOL 3350 17 G: 17 POWDER, FOR SOLUTION ORAL at 20:27

## 2023-11-03 ASSESSMENT — PAIN SCALES - GENERAL
PAINLEVEL_OUTOF10: 8
PAINLEVEL_OUTOF10: 6
PAINLEVEL_OUTOF10: 0

## 2023-11-03 ASSESSMENT — PAIN - FUNCTIONAL ASSESSMENT: PAIN_FUNCTIONAL_ASSESSMENT: ACTIVITIES ARE NOT PREVENTED

## 2023-11-03 ASSESSMENT — PAIN DESCRIPTION - DESCRIPTORS: DESCRIPTORS: ACHING

## 2023-11-03 ASSESSMENT — PAIN DESCRIPTION - LOCATION: LOCATION: GENERALIZED

## 2023-11-03 NOTE — CARE COORDINATION
Chart reviewed. Patient IPTA ambulatory in room. No need for IVATBX. Likely d/c with no CM needs.  Leana Begum RN

## 2023-11-03 NOTE — PLAN OF CARE
Problem: Pain  Goal: Verbalizes/displays adequate comfort level or baseline comfort level  Outcome: Progressing  Flowsheets (Taken 11/3/2023 0031)  Verbalizes/displays adequate comfort level or baseline comfort level:   Encourage patient to monitor pain and request assistance   Assess pain using appropriate pain scale   Administer analgesics based on type and severity of pain and evaluate response   Implement non-pharmacological measures as appropriate and evaluate response     Problem: Safety - Adult  Goal: Free from fall injury  Outcome: Progressing  Flowsheets (Taken 11/3/2023 0031)  Free From Fall Injury: Instruct family/caregiver on patient safety

## 2023-11-04 PROBLEM — D64.9 NORMOCYTIC NORMOCHROMIC ANEMIA: Status: ACTIVE | Noted: 2023-11-04

## 2023-11-04 PROBLEM — Z79.899 CHRONIC PRESCRIPTION BENZODIAZEPINE USE: Status: ACTIVE | Noted: 2023-11-04

## 2023-11-04 PROBLEM — R91.8 PULMONARY INFILTRATES: Status: ACTIVE | Noted: 2023-11-04

## 2023-11-04 PROBLEM — E66.9 CLASS 1 OBESITY IN ADULT: Status: ACTIVE | Noted: 2023-11-04

## 2023-11-04 PROBLEM — E66.811 CLASS 1 OBESITY IN ADULT: Status: ACTIVE | Noted: 2023-11-04

## 2023-11-04 LAB
ANION GAP SERPL CALCULATED.3IONS-SCNC: 11 MMOL/L (ref 3–16)
BACTERIA SPEC RESP CULT: NORMAL
BASOPHILS # BLD: 0.2 K/UL (ref 0–0.2)
BASOPHILS NFR BLD: 1.4 %
BUN SERPL-MCNC: 21 MG/DL (ref 7–20)
CALCIUM SERPL-MCNC: 9.5 MG/DL (ref 8.3–10.6)
CHLORIDE SERPL-SCNC: 101 MMOL/L (ref 99–110)
CO2 SERPL-SCNC: 29 MMOL/L (ref 21–32)
CREAT SERPL-MCNC: 0.8 MG/DL (ref 0.6–1.1)
DEPRECATED RDW RBC AUTO: 14.8 % (ref 12.4–15.4)
EOSINOPHIL # BLD: 0.2 K/UL (ref 0–0.6)
EOSINOPHIL NFR BLD: 2 %
GFR SERPLBLD CREATININE-BSD FMLA CKD-EPI: >60 ML/MIN/{1.73_M2}
GLUCOSE SERPL-MCNC: 97 MG/DL (ref 70–99)
GRAM STN SPEC: NORMAL
HCT VFR BLD AUTO: 34.5 % (ref 36–48)
HGB BLD-MCNC: 11.3 G/DL (ref 12–16)
LYMPHOCYTES # BLD: 3.4 K/UL (ref 1–5.1)
LYMPHOCYTES NFR BLD: 28.8 %
MCH RBC QN AUTO: 29.8 PG (ref 26–34)
MCHC RBC AUTO-ENTMCNC: 32.7 G/DL (ref 31–36)
MCV RBC AUTO: 91.2 FL (ref 80–100)
MONOCYTES # BLD: 1 K/UL (ref 0–1.3)
MONOCYTES NFR BLD: 8.7 %
NEUTROPHILS # BLD: 7 K/UL (ref 1.7–7.7)
NEUTROPHILS NFR BLD: 59.1 %
PLATELET # BLD AUTO: 426 K/UL (ref 135–450)
PMV BLD AUTO: 7.6 FL (ref 5–10.5)
POTASSIUM SERPL-SCNC: 4.5 MMOL/L (ref 3.5–5.1)
RBC # BLD AUTO: 3.78 M/UL (ref 4–5.2)
SODIUM SERPL-SCNC: 141 MMOL/L (ref 136–145)
WBC # BLD AUTO: 11.9 K/UL (ref 4–11)

## 2023-11-04 PROCEDURE — 99232 SBSQ HOSP IP/OBS MODERATE 35: CPT | Performed by: INTERNAL MEDICINE

## 2023-11-04 PROCEDURE — 6370000000 HC RX 637 (ALT 250 FOR IP): Performed by: NURSE PRACTITIONER

## 2023-11-04 PROCEDURE — 6360000002 HC RX W HCPCS: Performed by: NURSE PRACTITIONER

## 2023-11-04 PROCEDURE — 94640 AIRWAY INHALATION TREATMENT: CPT

## 2023-11-04 PROCEDURE — 2580000003 HC RX 258: Performed by: NURSE PRACTITIONER

## 2023-11-04 PROCEDURE — 36415 COLL VENOUS BLD VENIPUNCTURE: CPT

## 2023-11-04 PROCEDURE — 80048 BASIC METABOLIC PNL TOTAL CA: CPT

## 2023-11-04 PROCEDURE — 6370000000 HC RX 637 (ALT 250 FOR IP): Performed by: INTERNAL MEDICINE

## 2023-11-04 PROCEDURE — 85025 COMPLETE CBC W/AUTO DIFF WBC: CPT

## 2023-11-04 PROCEDURE — 6370000000 HC RX 637 (ALT 250 FOR IP): Performed by: HOSPITALIST

## 2023-11-04 PROCEDURE — 1200000000 HC SEMI PRIVATE

## 2023-11-04 PROCEDURE — 94669 MECHANICAL CHEST WALL OSCILL: CPT

## 2023-11-04 RX ADMIN — Medication 2 PUFF: at 20:19

## 2023-11-04 RX ADMIN — MIDODRINE HYDROCHLORIDE 5 MG: 5 TABLET ORAL at 15:13

## 2023-11-04 RX ADMIN — PREGABALIN 150 MG: 75 CAPSULE ORAL at 09:10

## 2023-11-04 RX ADMIN — IPRATROPIUM BROMIDE AND ALBUTEROL SULFATE 1 DOSE: 2.5; .5 SOLUTION RESPIRATORY (INHALATION) at 12:16

## 2023-11-04 RX ADMIN — MIDODRINE HYDROCHLORIDE 5 MG: 5 TABLET ORAL at 20:43

## 2023-11-04 RX ADMIN — PREGABALIN 150 MG: 75 CAPSULE ORAL at 20:42

## 2023-11-04 RX ADMIN — DIVALPROEX SODIUM 500 MG: 250 TABLET, EXTENDED RELEASE ORAL at 20:42

## 2023-11-04 RX ADMIN — NICOTINE POLACRILEX 4 MG: 4 LOZENGE ORAL at 21:16

## 2023-11-04 RX ADMIN — ALPRAZOLAM 1 MG: 1 TABLET ORAL at 15:45

## 2023-11-04 RX ADMIN — MIDODRINE HYDROCHLORIDE 5 MG: 5 TABLET ORAL at 09:10

## 2023-11-04 RX ADMIN — NICOTINE POLACRILEX 4 MG: 4 LOZENGE ORAL at 15:12

## 2023-11-04 RX ADMIN — LEVOTHYROXINE SODIUM 25 MCG: 0.03 TABLET ORAL at 06:42

## 2023-11-04 RX ADMIN — PANTOPRAZOLE SODIUM 40 MG: 40 TABLET, DELAYED RELEASE ORAL at 06:42

## 2023-11-04 RX ADMIN — TIOTROPIUM BROMIDE INHALATION SPRAY 2 PUFF: 3.12 SPRAY, METERED RESPIRATORY (INHALATION) at 08:19

## 2023-11-04 RX ADMIN — ZOLPIDEM TARTRATE 10 MG: 5 TABLET ORAL at 20:41

## 2023-11-04 RX ADMIN — Medication 2 PUFF: at 08:19

## 2023-11-04 RX ADMIN — PREDNISONE 40 MG: 20 TABLET ORAL at 09:10

## 2023-11-04 RX ADMIN — QUETIAPINE FUMARATE 200 MG: 100 TABLET ORAL at 20:42

## 2023-11-04 RX ADMIN — Medication 10 ML: at 20:43

## 2023-11-04 RX ADMIN — METHADONE HYDROCHLORIDE 120 MG: 10 TABLET ORAL at 09:10

## 2023-11-04 RX ADMIN — Medication 10 ML: at 09:12

## 2023-11-04 RX ADMIN — ALPRAZOLAM 1 MG: 1 TABLET ORAL at 09:10

## 2023-11-04 RX ADMIN — NICOTINE POLACRILEX 4 MG: 4 LOZENGE ORAL at 11:30

## 2023-11-04 RX ADMIN — DIVALPROEX SODIUM 500 MG: 250 TABLET, EXTENDED RELEASE ORAL at 09:11

## 2023-11-04 ASSESSMENT — PAIN SCALES - GENERAL: PAINLEVEL_OUTOF10: 0

## 2023-11-04 NOTE — PLAN OF CARE
Problem: Pain  Goal: Verbalizes/displays adequate comfort level or baseline comfort level  11/3/2023 2003 by Antoine Nichols, RN  Outcome: Progressing  [address] (Taken 11/3/2023 2003)  Verbalizes/displays adequate comfort level or baseline comfort level:   Encourage patient to monitor pain and request assistance   Assess pain using appropriate pain scale   Administer analgesics based on type and severity of pain and evaluate response   Implement non-pharmacological measures as appropriate and evaluate response     Problem: Safety - Adult  Goal: Free from fall injury  11/3/2023 2003 by Antoine Nichols, RN  Outcome: Progressing  Flowsheets (Taken 11/3/2023 2003)  Free From Fall Injury: Instruct family/caregiver on patient safety

## 2023-11-04 NOTE — PLAN OF CARE
Problem: Pain  Goal: Verbalizes/displays adequate comfort level or baseline comfort level  Outcome: Progressing     Problem: Safety - Adult  Goal: Free from fall injury  Outcome: Progressing     Problem: Chronic Conditions and Co-morbidities  Goal: Patient's chronic conditions and co-morbidity symptoms are monitored and maintained or improved  Outcome: Progressing  Flowsheets (Taken 11/3/2023 1947 by Becky Westbrook RN)  Care Plan - Patient's Chronic Conditions and Co-Morbidity Symptoms are Monitored and Maintained or Improved: Monitor and assess patient's chronic conditions and comorbid symptoms for stability, deterioration, or improvement

## 2023-11-05 VITALS
TEMPERATURE: 97.6 F | BODY MASS INDEX: 33.24 KG/M2 | HEIGHT: 64 IN | DIASTOLIC BLOOD PRESSURE: 80 MMHG | SYSTOLIC BLOOD PRESSURE: 121 MMHG | OXYGEN SATURATION: 96 % | RESPIRATION RATE: 16 BRPM | WEIGHT: 194.7 LBS | HEART RATE: 98 BPM

## 2023-11-05 LAB
ANION GAP SERPL CALCULATED.3IONS-SCNC: 9 MMOL/L (ref 3–16)
BUN SERPL-MCNC: 22 MG/DL (ref 7–20)
CALCIUM SERPL-MCNC: 9.7 MG/DL (ref 8.3–10.6)
CHLORIDE SERPL-SCNC: 98 MMOL/L (ref 99–110)
CO2 SERPL-SCNC: 31 MMOL/L (ref 21–32)
CREAT SERPL-MCNC: 0.8 MG/DL (ref 0.6–1.1)
DEPRECATED RDW RBC AUTO: 15.1 % (ref 12.4–15.4)
GFR SERPLBLD CREATININE-BSD FMLA CKD-EPI: >60 ML/MIN/{1.73_M2}
GLUCOSE SERPL-MCNC: 93 MG/DL (ref 70–99)
H CAPSUL MYC AB TITR SER CF: NORMAL {TITER}
H CAPSUL YST AB TITR SER CF: NORMAL {TITER}
HCT VFR BLD AUTO: 36.4 % (ref 36–48)
HGB BLD-MCNC: 12.1 G/DL (ref 12–16)
MCH RBC QN AUTO: 30.5 PG (ref 26–34)
MCHC RBC AUTO-ENTMCNC: 33.3 G/DL (ref 31–36)
MCV RBC AUTO: 91.6 FL (ref 80–100)
PLATELET # BLD AUTO: 425 K/UL (ref 135–450)
PMV BLD AUTO: 7.3 FL (ref 5–10.5)
POTASSIUM SERPL-SCNC: 4.8 MMOL/L (ref 3.5–5.1)
RBC # BLD AUTO: 3.98 M/UL (ref 4–5.2)
SODIUM SERPL-SCNC: 138 MMOL/L (ref 136–145)
WBC # BLD AUTO: 15 K/UL (ref 4–11)

## 2023-11-05 PROCEDURE — 6360000002 HC RX W HCPCS: Performed by: NURSE PRACTITIONER

## 2023-11-05 PROCEDURE — 94669 MECHANICAL CHEST WALL OSCILL: CPT

## 2023-11-05 PROCEDURE — 99232 SBSQ HOSP IP/OBS MODERATE 35: CPT | Performed by: INTERNAL MEDICINE

## 2023-11-05 PROCEDURE — 6370000000 HC RX 637 (ALT 250 FOR IP): Performed by: HOSPITALIST

## 2023-11-05 PROCEDURE — 6370000000 HC RX 637 (ALT 250 FOR IP): Performed by: INTERNAL MEDICINE

## 2023-11-05 PROCEDURE — 36415 COLL VENOUS BLD VENIPUNCTURE: CPT

## 2023-11-05 PROCEDURE — 2580000003 HC RX 258: Performed by: NURSE PRACTITIONER

## 2023-11-05 PROCEDURE — 80048 BASIC METABOLIC PNL TOTAL CA: CPT

## 2023-11-05 PROCEDURE — 85027 COMPLETE CBC AUTOMATED: CPT

## 2023-11-05 PROCEDURE — 6370000000 HC RX 637 (ALT 250 FOR IP): Performed by: NURSE PRACTITIONER

## 2023-11-05 PROCEDURE — 94640 AIRWAY INHALATION TREATMENT: CPT

## 2023-11-05 RX ORDER — ALBUTEROL SULFATE 90 UG/1
2 AEROSOL, METERED RESPIRATORY (INHALATION) 4 TIMES DAILY PRN
Qty: 18 G | Refills: 0 | Status: SHIPPED | OUTPATIENT
Start: 2023-11-05

## 2023-11-05 RX ORDER — PREDNISONE 10 MG/1
TABLET ORAL
Qty: 18 TABLET | Refills: 0 | Status: SHIPPED | OUTPATIENT
Start: 2023-11-05 | End: 2023-11-14

## 2023-11-05 RX ADMIN — MIDODRINE HYDROCHLORIDE 5 MG: 5 TABLET ORAL at 07:42

## 2023-11-05 RX ADMIN — TIOTROPIUM BROMIDE INHALATION SPRAY 2 PUFF: 3.12 SPRAY, METERED RESPIRATORY (INHALATION) at 08:16

## 2023-11-05 RX ADMIN — Medication 10 ML: at 07:47

## 2023-11-05 RX ADMIN — NICOTINE POLACRILEX 4 MG: 4 LOZENGE ORAL at 05:52

## 2023-11-05 RX ADMIN — PREDNISONE 40 MG: 20 TABLET ORAL at 07:42

## 2023-11-05 RX ADMIN — NICOTINE POLACRILEX 4 MG: 4 LOZENGE ORAL at 09:00

## 2023-11-05 RX ADMIN — METHADONE HYDROCHLORIDE 120 MG: 10 TABLET ORAL at 07:42

## 2023-11-05 RX ADMIN — NICOTINE POLACRILEX 4 MG: 4 LOZENGE ORAL at 11:07

## 2023-11-05 RX ADMIN — ALPRAZOLAM 1 MG: 1 TABLET ORAL at 05:52

## 2023-11-05 RX ADMIN — PANTOPRAZOLE SODIUM 40 MG: 40 TABLET, DELAYED RELEASE ORAL at 05:06

## 2023-11-05 RX ADMIN — IPRATROPIUM BROMIDE AND ALBUTEROL SULFATE 1 DOSE: 2.5; .5 SOLUTION RESPIRATORY (INHALATION) at 08:16

## 2023-11-05 RX ADMIN — DIVALPROEX SODIUM 500 MG: 250 TABLET, EXTENDED RELEASE ORAL at 07:42

## 2023-11-05 RX ADMIN — Medication 2 PUFF: at 08:16

## 2023-11-05 RX ADMIN — LEVOTHYROXINE SODIUM 25 MCG: 0.03 TABLET ORAL at 05:06

## 2023-11-05 RX ADMIN — ENOXAPARIN SODIUM 40 MG: 100 INJECTION SUBCUTANEOUS at 07:49

## 2023-11-05 RX ADMIN — PREGABALIN 150 MG: 75 CAPSULE ORAL at 07:41

## 2023-11-05 ASSESSMENT — PAIN DESCRIPTION - DESCRIPTORS: DESCRIPTORS: ACHING

## 2023-11-05 ASSESSMENT — PAIN DESCRIPTION - LOCATION: LOCATION: BACK

## 2023-11-05 ASSESSMENT — PAIN SCALES - GENERAL
PAINLEVEL_OUTOF10: 6
PAINLEVEL_OUTOF10: 6

## 2023-11-05 ASSESSMENT — PAIN DESCRIPTION - ORIENTATION: ORIENTATION: LOWER

## 2023-11-05 NOTE — PLAN OF CARE
Problem: Pain  Goal: Verbalizes/displays adequate comfort level or baseline comfort level  11/4/2023 2103 by Ra Vann RN  Outcome: Progressing  Flowsheets (Taken 11/3/2023 2003 by Delilah Nunn RN)  Verbalizes/displays adequate comfort level or baseline comfort level:   Encourage patient to monitor pain and request assistance   Assess pain using appropriate pain scale   Administer analgesics based on type and severity of pain and evaluate response   Implement non-pharmacological measures as appropriate and evaluate response     Problem: Safety - Adult  Goal: Free from fall injury  11/4/2023 2103 by Ra Vann RN  Outcome: Progressing  Flowsheets (Taken 11/3/2023 2003 by Delilah Nunn RN)  Free From Fall Injury: Instruct family/caregiver on patient safety     Problem: Chronic Conditions and Co-morbidities  Goal: Patient's chronic conditions and co-morbidity symptoms are monitored and maintained or improved  11/4/2023 2103 by Ra Vann RN  Outcome: Progressing  Flowsheets (Taken 11/4/2023 0800 by Jonathan Callahan RN)  Care Plan - Patient's Chronic Conditions and Co-Morbidity Symptoms are Monitored and Maintained or Improved: Monitor and assess patient's chronic conditions and comorbid symptoms for stability, deterioration, or improvement

## 2023-11-05 NOTE — PLAN OF CARE
Problem: Pain  Goal: Verbalizes/displays adequate comfort level or baseline comfort level  11/5/2023 1203 by Ethan Zhang RN  Outcome: Adequate for Discharge  11/5/2023 8423 by Emeterio Samuels RN  Outcome: Progressing     Problem: Safety - Adult  Goal: Free from fall injury  11/5/2023 1203 by Ethan Zhang RN  Outcome: Adequate for Discharge  11/5/2023 9830 by Emeterio Samuels RN  Outcome: Progressing     Problem: Chronic Conditions and Co-morbidities  Goal: Patient's chronic conditions and co-morbidity symptoms are monitored and maintained or improved  Outcome: Adequate for Discharge

## 2023-11-05 NOTE — DISCHARGE SUMMARY
Pneumothorax:  Not present         Alveolar interstitial syndrome:  focal         Lung consolidation:  Right     Confirmatory study:          What confirmatory study was done?:  Xray  Electronically signed by Primo Aparicio on 2023 at 4:43 PM : AttendinCristina Hernandez    Result Date: 10/31/2023  POCUS_Cardiac     Exam Information:          Exam type:  Clinically indicated     Indication(s) for Exam:          The exam was performed with the following indications[de-identified]  Dyspnea, Concern for effusion     Views Obtained & Images Saved for These Views: The pericardial sac, myocardium, 4 chambers, and IVC were identified using the following views[de-identified]          Parasternal long-axis         Parasternal short-axis         Apical 4-chamber     Findings:          Pericardial Effusion:  No pericardial effusion         Cardiac Activity:  Cardiac activity normal         LV function:  Normal (> 50% EF)         RV diameter:  Normal         IVC collapsibility:  Indeterminate     Interpretation:          Normal limited cardiac ultrasound  Electronically signed by Primo Aparicio on 2023 at 4:42 PM : Attending:     XR CHEST PORTABLE    Result Date: 10/31/2023  EXAMINATION: ONE XRAY VIEW OF THE CHEST 10/31/2023 3:58 pm COMPARISON: CT 10/19/2023 HISTORY: ORDERING SYSTEM PROVIDED HISTORY: Chest Pain TECHNOLOGIST PROVIDED HISTORY: Reason for exam:->Chest Pain Reason for Exam: Shortness of Breath FINDINGS: Lungs are under expanded. Chronic lung changes which are similar to the previous study. Mild right infrahilar opacity. No new lung disease. No pleural effusions.      Mild right infrahilar opacity without change     CT CHEST PULMONARY EMBOLISM W CONTRAST    Result Date: 10/19/2023  EXAMINATION: CTA OF THE CHEST 10/19/2023 12:35 am TECHNIQUE: CTA of the chest was performed after the administration of intravenous contrast.  Multiplanar

## 2023-11-05 NOTE — PLAN OF CARE
Problem: Pain  Goal: Verbalizes/displays adequate comfort level or baseline comfort level  11/5/2023 5131 by David Wilson RN  Outcome: Progressing  11/4/2023 2103 by Clement [address] RN  Outcome: Progressing  Flowsheets (Taken 11/3/2023 2003 by Alise Courtney RN)  Verbalizes/displays adequate comfort level or baseline comfort level:   Encourage patient to monitor pain and request assistance   Assess pain using appropriate pain scale   Administer analgesics based on type and severity of pain and evaluate response   Implement non-pharmacological measures as appropriate and evaluate response     Problem: Safety - Adult  Goal: Free from fall injury  11/5/2023 9204 by David Wilson RN  Outcome: Progressing  11/4/2023 2103 by Clement [address] RN  Outcome: Progressing  Flowsheets (Taken 11/3/2023 2003 by Alise Courtney RN)  Free From Fall Injury: Instruct family/caregiver on patient safety

## 2023-11-06 ENCOUNTER — TELEPHONE (OUTPATIENT)
Dept: PULMONOLOGY | Age: 43
End: 2023-11-06

## 2023-11-06 DIAGNOSIS — J44.9 CHRONIC OBSTRUCTIVE PULMONARY DISEASE, UNSPECIFIED COPD TYPE (HCC): ICD-10-CM

## 2023-11-06 DIAGNOSIS — J45.909 ASTHMA, UNSPECIFIED ASTHMA SEVERITY, UNSPECIFIED WHETHER COMPLICATED, UNSPECIFIED WHETHER PERSISTENT: Primary | ICD-10-CM

## 2023-11-06 LAB
H CAPSUL AG SER IA-ACNC: NOT DETECTED
H CAPSUL AG SER QL IA: NOT DETECTED

## 2023-11-06 NOTE — TELEPHONE ENCOUNTER
----- Message from Sarita Muñoz MD sent at 11/5/2023 10:26 AM EST -----  Patient to have a PFT in 3 to 4 weeks time and follow-up with  after that

## 2023-11-06 NOTE — TELEPHONE ENCOUNTER
----- Message from Nicky Phillip MD sent at 11/5/2023 10:26 AM EST -----  Patient to have a PFT in 3 to 4 weeks time and follow-up with  after that

## 2023-11-07 LAB
A FLAVUS AB SER QL ID: NORMAL
A FUMIGATUS IGE QN: <0.1 KU/L (ref 0–0.34)
A FUMIGATUS1 AB SER QL ID: NORMAL
A FUMIGATUS2 AB SER QL: NORMAL
A FUMIGATUS3 AB SER QL ID: NORMAL
A FUMIGATUS6 AB SER QL ID: NORMAL
A PULLULANS AB SER QL ID: NORMAL
ASPERGILLUS AB SER QL ID: NOT DETECTED
B DERMAT AB SER QL ID: NOT DETECTED
BEEF IGE QN: <0.1 KU/L
COCCIDIOIDES AB SPEC QL ID: NOT DETECTED
DEPRECATED MISC ALLERGEN IGE RAST QL: NORMAL
EPID ALLERG MIX73 IGE QN: NEGATIVE KU/L
H CAPSUL AB TITR SER ID: NOT DETECTED {TITER}
P BETAE IGE QN: <0.1 KU/L
PIGEON SERUM AB QL ID: NORMAL
PORK IGE QN: <0.1 KU/L
S RECTIVIRGULA AB SER QL ID: NORMAL
S VIRIDIS AB SER QL: NORMAL
T CANDIDUS AB SER QL: NORMAL

## 2023-11-09 LAB
FUNGUS SPEC CULT: ABNORMAL
LOEFFLER MB STN SPEC: ABNORMAL
ORGANISM: ABNORMAL

## 2023-11-10 LAB
FINAL REPORT: NORMAL
PRELIMINARY: NORMAL

## 2023-11-14 LAB
ACID FAST STN SPEC QL: NORMAL
MYCOBACTERIUM SPEC CULT: NORMAL

## 2023-11-21 ENCOUNTER — APPOINTMENT (OUTPATIENT)
Dept: CT IMAGING | Age: 43
DRG: 720 | End: 2023-11-21
Payer: COMMERCIAL

## 2023-11-21 ENCOUNTER — APPOINTMENT (OUTPATIENT)
Dept: GENERAL RADIOLOGY | Age: 43
DRG: 720 | End: 2023-11-21
Payer: COMMERCIAL

## 2023-11-21 ENCOUNTER — HOSPITAL ENCOUNTER (INPATIENT)
Age: 43
LOS: 5 days | Discharge: HOME HEALTH | DRG: 720 | End: 2023-11-26
Attending: EMERGENCY MEDICINE | Admitting: STUDENT IN AN ORGANIZED HEALTH CARE EDUCATION/TRAINING PROGRAM
Payer: COMMERCIAL

## 2023-11-21 DIAGNOSIS — R06.03 RESPIRATORY DISTRESS: ICD-10-CM

## 2023-11-21 DIAGNOSIS — R09.02 HYPOXIA: ICD-10-CM

## 2023-11-21 DIAGNOSIS — J18.9 PNEUMONIA OF BOTH LUNGS DUE TO INFECTIOUS ORGANISM, UNSPECIFIED PART OF LUNG: Primary | ICD-10-CM

## 2023-11-21 LAB
ACID FAST STN SPEC QL: NORMAL
ALBUMIN SERPL-MCNC: 3.8 G/DL (ref 3.4–5)
ALBUMIN/GLOB SERPL: 1.5 {RATIO} (ref 1.1–2.2)
ALP SERPL-CCNC: 62 U/L (ref 40–129)
ALT SERPL-CCNC: 11 U/L (ref 10–40)
ANION GAP SERPL CALCULATED.3IONS-SCNC: 10 MMOL/L (ref 3–16)
AST SERPL-CCNC: 24 U/L (ref 15–37)
BACTERIA URNS QL MICRO: ABNORMAL /HPF
BASE EXCESS BLDV CALC-SCNC: 0.9 MMOL/L (ref -3–3)
BASOPHILS # BLD: 0.1 K/UL (ref 0–0.2)
BASOPHILS NFR BLD: 0.5 %
BILIRUB SERPL-MCNC: <0.2 MG/DL (ref 0–1)
BILIRUB UR QL STRIP.AUTO: NEGATIVE
BUN SERPL-MCNC: 13 MG/DL (ref 7–20)
CALCIUM SERPL-MCNC: 8.6 MG/DL (ref 8.3–10.6)
CHLORIDE SERPL-SCNC: 88 MMOL/L (ref 99–110)
CLARITY UR: ABNORMAL
CO2 BLDV-SCNC: 30 MMOL/L
CO2 SERPL-SCNC: 28 MMOL/L (ref 21–32)
COHGB MFR BLDV: 1.3 % (ref 0–1.5)
COLOR UR: YELLOW
CREAT SERPL-MCNC: 1.1 MG/DL (ref 0.6–1.1)
D DIMER: 3.26 UG/ML FEU (ref 0–0.6)
DEPRECATED RDW RBC AUTO: 14.8 % (ref 12.4–15.4)
EKG ATRIAL RATE: 109 BPM
EKG DIAGNOSIS: NORMAL
EKG P AXIS: 55 DEGREES
EKG P-R INTERVAL: 134 MS
EKG Q-T INTERVAL: 328 MS
EKG QRS DURATION: 82 MS
EKG QTC CALCULATION (BAZETT): 441 MS
EKG R AXIS: 6 DEGREES
EKG T AXIS: 42 DEGREES
EKG VENTRICULAR RATE: 109 BPM
EOSINOPHIL # BLD: 0.3 K/UL (ref 0–0.6)
EOSINOPHIL NFR BLD: 2.6 %
EPI CELLS #/AREA URNS HPF: ABNORMAL /HPF (ref 0–5)
FLUAV RNA RESP QL NAA+PROBE: NOT DETECTED
FLUBV RNA RESP QL NAA+PROBE: NOT DETECTED
GFR SERPLBLD CREATININE-BSD FMLA CKD-EPI: >60 ML/MIN/{1.73_M2}
GLUCOSE SERPL-MCNC: 78 MG/DL (ref 70–99)
GLUCOSE UR STRIP.AUTO-MCNC: NEGATIVE MG/DL
HCO3 BLDV-SCNC: 27.8 MMOL/L (ref 23–29)
HCT VFR BLD AUTO: 33.9 % (ref 36–48)
HGB BLD-MCNC: 11.4 G/DL (ref 12–16)
HGB UR QL STRIP.AUTO: NEGATIVE
KETONES UR STRIP.AUTO-MCNC: NEGATIVE MG/DL
LACTATE BLDV-SCNC: 1.6 MMOL/L (ref 0.4–1.9)
LEUKOCYTE ESTERASE UR QL STRIP.AUTO: ABNORMAL
LYMPHOCYTES # BLD: 1 K/UL (ref 1–5.1)
LYMPHOCYTES NFR BLD: 8.6 %
MCH RBC QN AUTO: 30.2 PG (ref 26–34)
MCHC RBC AUTO-ENTMCNC: 33.6 G/DL (ref 31–36)
MCV RBC AUTO: 89.9 FL (ref 80–100)
METHGB MFR BLDV: 0.5 %
MONOCYTES # BLD: 0.6 K/UL (ref 0–1.3)
MONOCYTES NFR BLD: 4.9 %
MYCOBACTERIUM SPEC CULT: NORMAL
NEUTROPHILS # BLD: 9.4 K/UL (ref 1.7–7.7)
NEUTROPHILS NFR BLD: 83.4 %
NITRITE UR QL STRIP.AUTO: NEGATIVE
O2 THERAPY: ABNORMAL
PCO2 BLDV: 54.2 MMHG (ref 40–50)
PH BLDV: 7.33 [PH] (ref 7.35–7.45)
PH UR STRIP.AUTO: 7.5 [PH] (ref 5–8)
PLATELET # BLD AUTO: 213 K/UL (ref 135–450)
PMV BLD AUTO: 7.4 FL (ref 5–10.5)
PO2 BLDV: 45.4 MMHG (ref 25–40)
POTASSIUM SERPL-SCNC: 4.2 MMOL/L (ref 3.5–5.1)
PROT SERPL-MCNC: 6.4 G/DL (ref 6.4–8.2)
PROT UR STRIP.AUTO-MCNC: NEGATIVE MG/DL
RBC # BLD AUTO: 3.77 M/UL (ref 4–5.2)
RBC #/AREA URNS HPF: ABNORMAL /HPF (ref 0–4)
SAO2 % BLDV: 78 %
SARS-COV-2 RNA RESP QL NAA+PROBE: NOT DETECTED
SODIUM SERPL-SCNC: 126 MMOL/L (ref 136–145)
SP GR UR STRIP.AUTO: 1.01 (ref 1–1.03)
UA COMPLETE W REFLEX CULTURE PNL UR: YES
UA DIPSTICK W REFLEX MICRO PNL UR: YES
URN SPEC COLLECT METH UR: ABNORMAL
UROBILINOGEN UR STRIP-ACNC: 0.2 E.U./DL
WBC # BLD AUTO: 11.3 K/UL (ref 4–11)
WBC #/AREA URNS HPF: ABNORMAL /HPF (ref 0–5)

## 2023-11-21 PROCEDURE — 80053 COMPREHEN METABOLIC PANEL: CPT

## 2023-11-21 PROCEDURE — 6370000000 HC RX 637 (ALT 250 FOR IP): Performed by: STUDENT IN AN ORGANIZED HEALTH CARE EDUCATION/TRAINING PROGRAM

## 2023-11-21 PROCEDURE — 94761 N-INVAS EAR/PLS OXIMETRY MLT: CPT

## 2023-11-21 PROCEDURE — 82803 BLOOD GASES ANY COMBINATION: CPT

## 2023-11-21 PROCEDURE — 83605 ASSAY OF LACTIC ACID: CPT

## 2023-11-21 PROCEDURE — 2580000003 HC RX 258: Performed by: STUDENT IN AN ORGANIZED HEALTH CARE EDUCATION/TRAINING PROGRAM

## 2023-11-21 PROCEDURE — 85379 FIBRIN DEGRADATION QUANT: CPT

## 2023-11-21 PROCEDURE — 6370000000 HC RX 637 (ALT 250 FOR IP): Performed by: EMERGENCY MEDICINE

## 2023-11-21 PROCEDURE — 96375 TX/PRO/DX INJ NEW DRUG ADDON: CPT

## 2023-11-21 PROCEDURE — 99285 EMERGENCY DEPT VISIT HI MDM: CPT

## 2023-11-21 PROCEDURE — 87636 SARSCOV2 & INF A&B AMP PRB: CPT

## 2023-11-21 PROCEDURE — 2580000003 HC RX 258: Performed by: EMERGENCY MEDICINE

## 2023-11-21 PROCEDURE — 1200000000 HC SEMI PRIVATE

## 2023-11-21 PROCEDURE — 87086 URINE CULTURE/COLONY COUNT: CPT

## 2023-11-21 PROCEDURE — 6360000004 HC RX CONTRAST MEDICATION: Performed by: EMERGENCY MEDICINE

## 2023-11-21 PROCEDURE — 93010 ELECTROCARDIOGRAM REPORT: CPT | Performed by: INTERNAL MEDICINE

## 2023-11-21 PROCEDURE — 2700000000 HC OXYGEN THERAPY PER DAY

## 2023-11-21 PROCEDURE — 96365 THER/PROPH/DIAG IV INF INIT: CPT

## 2023-11-21 PROCEDURE — 71046 X-RAY EXAM CHEST 2 VIEWS: CPT

## 2023-11-21 PROCEDURE — 93005 ELECTROCARDIOGRAM TRACING: CPT | Performed by: EMERGENCY MEDICINE

## 2023-11-21 PROCEDURE — 96361 HYDRATE IV INFUSION ADD-ON: CPT

## 2023-11-21 PROCEDURE — 94640 AIRWAY INHALATION TREATMENT: CPT

## 2023-11-21 PROCEDURE — 87040 BLOOD CULTURE FOR BACTERIA: CPT

## 2023-11-21 PROCEDURE — 6360000002 HC RX W HCPCS: Performed by: EMERGENCY MEDICINE

## 2023-11-21 PROCEDURE — 85025 COMPLETE CBC W/AUTO DIFF WBC: CPT

## 2023-11-21 PROCEDURE — 81001 URINALYSIS AUTO W/SCOPE: CPT

## 2023-11-21 PROCEDURE — 71260 CT THORAX DX C+: CPT

## 2023-11-21 RX ORDER — MAGNESIUM SULFATE IN WATER 40 MG/ML
2000 INJECTION, SOLUTION INTRAVENOUS PRN
Status: DISCONTINUED | OUTPATIENT
Start: 2023-11-21 | End: 2023-11-26 | Stop reason: HOSPADM

## 2023-11-21 RX ORDER — SODIUM CHLORIDE 0.9 % (FLUSH) 0.9 %
5-40 SYRINGE (ML) INJECTION EVERY 12 HOURS SCHEDULED
Status: DISCONTINUED | OUTPATIENT
Start: 2023-11-21 | End: 2023-11-26 | Stop reason: HOSPADM

## 2023-11-21 RX ORDER — ACETAMINOPHEN 325 MG/1
650 TABLET ORAL EVERY 6 HOURS PRN
Status: DISCONTINUED | OUTPATIENT
Start: 2023-11-21 | End: 2023-11-26 | Stop reason: HOSPADM

## 2023-11-21 RX ORDER — BUTALBITAL, ACETAMINOPHEN AND CAFFEINE 50; 325; 40 MG/1; MG/1; MG/1
2 TABLET ORAL ONCE
Status: COMPLETED | OUTPATIENT
Start: 2023-11-21 | End: 2023-11-21

## 2023-11-21 RX ORDER — SODIUM CHLORIDE 0.9 % (FLUSH) 0.9 %
5-40 SYRINGE (ML) INJECTION PRN
Status: DISCONTINUED | OUTPATIENT
Start: 2023-11-21 | End: 2023-11-26 | Stop reason: HOSPADM

## 2023-11-21 RX ORDER — FENTANYL CITRATE 50 UG/ML
50 INJECTION, SOLUTION INTRAMUSCULAR; INTRAVENOUS ONCE
Status: COMPLETED | OUTPATIENT
Start: 2023-11-21 | End: 2023-11-21

## 2023-11-21 RX ORDER — ENOXAPARIN SODIUM 100 MG/ML
40 INJECTION SUBCUTANEOUS DAILY
Status: DISCONTINUED | OUTPATIENT
Start: 2023-11-22 | End: 2023-11-26 | Stop reason: HOSPADM

## 2023-11-21 RX ORDER — ONDANSETRON 4 MG/1
4 TABLET, ORALLY DISINTEGRATING ORAL EVERY 8 HOURS PRN
Status: DISCONTINUED | OUTPATIENT
Start: 2023-11-21 | End: 2023-11-26 | Stop reason: HOSPADM

## 2023-11-21 RX ORDER — ALBUTEROL SULFATE 90 UG/1
2 AEROSOL, METERED RESPIRATORY (INHALATION) 4 TIMES DAILY PRN
Status: DISCONTINUED | OUTPATIENT
Start: 2023-11-21 | End: 2023-11-26 | Stop reason: HOSPADM

## 2023-11-21 RX ORDER — IPRATROPIUM BROMIDE AND ALBUTEROL SULFATE 2.5; .5 MG/3ML; MG/3ML
1 SOLUTION RESPIRATORY (INHALATION) ONCE
Status: COMPLETED | OUTPATIENT
Start: 2023-11-21 | End: 2023-11-21

## 2023-11-21 RX ORDER — ONDANSETRON 2 MG/ML
4 INJECTION INTRAMUSCULAR; INTRAVENOUS ONCE
Status: COMPLETED | OUTPATIENT
Start: 2023-11-21 | End: 2023-11-21

## 2023-11-21 RX ORDER — POTASSIUM CHLORIDE 7.45 MG/ML
10 INJECTION INTRAVENOUS PRN
Status: DISCONTINUED | OUTPATIENT
Start: 2023-11-21 | End: 2023-11-26 | Stop reason: HOSPADM

## 2023-11-21 RX ORDER — PANTOPRAZOLE SODIUM 40 MG/1
40 TABLET, DELAYED RELEASE ORAL
Status: DISCONTINUED | OUTPATIENT
Start: 2023-11-22 | End: 2023-11-22

## 2023-11-21 RX ORDER — 0.9 % SODIUM CHLORIDE 0.9 %
1000 INTRAVENOUS SOLUTION INTRAVENOUS ONCE
Status: COMPLETED | OUTPATIENT
Start: 2023-11-21 | End: 2023-11-21

## 2023-11-21 RX ORDER — ALPRAZOLAM 1 MG/1
1 TABLET ORAL 3 TIMES DAILY PRN
Status: DISCONTINUED | OUTPATIENT
Start: 2023-11-21 | End: 2023-11-26 | Stop reason: HOSPADM

## 2023-11-21 RX ORDER — SODIUM CHLORIDE 9 MG/ML
INJECTION, SOLUTION INTRAVENOUS PRN
Status: DISCONTINUED | OUTPATIENT
Start: 2023-11-21 | End: 2023-11-26 | Stop reason: HOSPADM

## 2023-11-21 RX ORDER — ZOLPIDEM TARTRATE 5 MG/1
5 TABLET ORAL NIGHTLY PRN
Status: DISCONTINUED | OUTPATIENT
Start: 2023-11-21 | End: 2023-11-26 | Stop reason: HOSPADM

## 2023-11-21 RX ORDER — LEVOTHYROXINE SODIUM 0.05 MG/1
50 TABLET ORAL DAILY
Status: DISCONTINUED | OUTPATIENT
Start: 2023-11-22 | End: 2023-11-25

## 2023-11-21 RX ORDER — PREGABALIN 75 MG/1
150 CAPSULE ORAL 2 TIMES DAILY
Status: DISCONTINUED | OUTPATIENT
Start: 2023-11-21 | End: 2023-11-26 | Stop reason: HOSPADM

## 2023-11-21 RX ORDER — METHYLPREDNISOLONE SODIUM SUCCINATE 125 MG/2ML
125 INJECTION, POWDER, LYOPHILIZED, FOR SOLUTION INTRAMUSCULAR; INTRAVENOUS ONCE
Status: COMPLETED | OUTPATIENT
Start: 2023-11-21 | End: 2023-11-21

## 2023-11-21 RX ORDER — ACETAMINOPHEN 650 MG/1
650 SUPPOSITORY RECTAL EVERY 6 HOURS PRN
Status: DISCONTINUED | OUTPATIENT
Start: 2023-11-21 | End: 2023-11-26 | Stop reason: HOSPADM

## 2023-11-21 RX ORDER — ONDANSETRON 2 MG/ML
4 INJECTION INTRAMUSCULAR; INTRAVENOUS EVERY 6 HOURS PRN
Status: DISCONTINUED | OUTPATIENT
Start: 2023-11-21 | End: 2023-11-26 | Stop reason: HOSPADM

## 2023-11-21 RX ORDER — POLYETHYLENE GLYCOL 3350 17 G/17G
17 POWDER, FOR SOLUTION ORAL DAILY PRN
Status: DISCONTINUED | OUTPATIENT
Start: 2023-11-21 | End: 2023-11-26 | Stop reason: HOSPADM

## 2023-11-21 RX ORDER — POTASSIUM CHLORIDE 20 MEQ/1
40 TABLET, EXTENDED RELEASE ORAL PRN
Status: DISCONTINUED | OUTPATIENT
Start: 2023-11-21 | End: 2023-11-26 | Stop reason: HOSPADM

## 2023-11-21 RX ADMIN — PIPERACILLIN AND TAZOBACTAM 3375 MG: 3; .375 INJECTION, POWDER, LYOPHILIZED, FOR SOLUTION INTRAVENOUS at 20:09

## 2023-11-21 RX ADMIN — VANCOMYCIN HYDROCHLORIDE 1000 MG: 1 INJECTION, POWDER, LYOPHILIZED, FOR SOLUTION INTRAVENOUS at 21:20

## 2023-11-21 RX ADMIN — Medication 10 ML: at 22:24

## 2023-11-21 RX ADMIN — FENTANYL CITRATE 50 MCG: 50 INJECTION INTRAMUSCULAR; INTRAVENOUS at 17:23

## 2023-11-21 RX ADMIN — SODIUM CHLORIDE 1000 ML: 9 INJECTION, SOLUTION INTRAVENOUS at 17:22

## 2023-11-21 RX ADMIN — BUTALBITAL, ACETAMINOPHEN AND CAFFEINE 2 TABLET: 325; 50; 40 TABLET ORAL at 20:04

## 2023-11-21 RX ADMIN — METHYLPREDNISOLONE SODIUM SUCCINATE 125 MG: 125 INJECTION INTRAMUSCULAR; INTRAVENOUS at 20:04

## 2023-11-21 RX ADMIN — ONDANSETRON 4 MG: 2 INJECTION INTRAMUSCULAR; INTRAVENOUS at 17:23

## 2023-11-21 RX ADMIN — IPRATROPIUM BROMIDE AND ALBUTEROL SULFATE 1 DOSE: 2.5; .5 SOLUTION RESPIRATORY (INHALATION) at 18:41

## 2023-11-21 RX ADMIN — IOPAMIDOL 75 ML: 755 INJECTION, SOLUTION INTRAVENOUS at 19:02

## 2023-11-21 ASSESSMENT — PAIN - FUNCTIONAL ASSESSMENT: PAIN_FUNCTIONAL_ASSESSMENT: 0-10

## 2023-11-21 ASSESSMENT — ENCOUNTER SYMPTOMS
VOMITING: 0
DIARRHEA: 0
ABDOMINAL PAIN: 0
BACK PAIN: 0
CHEST TIGHTNESS: 1
RHINORRHEA: 0
COUGH: 1
SHORTNESS OF BREATH: 1

## 2023-11-21 ASSESSMENT — PAIN SCALES - GENERAL
PAINLEVEL_OUTOF10: 0
PAINLEVEL_OUTOF10: 6

## 2023-11-22 LAB
ALBUMIN SERPL-MCNC: 3.4 G/DL (ref 3.4–5)
ALBUMIN/GLOB SERPL: 1.1 {RATIO} (ref 1.1–2.2)
ALP SERPL-CCNC: 61 U/L (ref 40–129)
ALT SERPL-CCNC: 17 U/L (ref 10–40)
ANION GAP SERPL CALCULATED.3IONS-SCNC: 13 MMOL/L (ref 3–16)
AST SERPL-CCNC: 48 U/L (ref 15–37)
BASOPHILS # BLD: 0 K/UL (ref 0–0.2)
BASOPHILS NFR BLD: 0.1 %
BILIRUB SERPL-MCNC: <0.2 MG/DL (ref 0–1)
BUN SERPL-MCNC: 13 MG/DL (ref 7–20)
CALCIUM SERPL-MCNC: 8.7 MG/DL (ref 8.3–10.6)
CHLORIDE SERPL-SCNC: 99 MMOL/L (ref 99–110)
CO2 SERPL-SCNC: 22 MMOL/L (ref 21–32)
CREAT SERPL-MCNC: 0.8 MG/DL (ref 0.6–1.1)
DEPRECATED RDW RBC AUTO: 14.1 % (ref 12.4–15.4)
EOSINOPHIL # BLD: 0 K/UL (ref 0–0.6)
EOSINOPHIL NFR BLD: 0.1 %
GFR SERPLBLD CREATININE-BSD FMLA CKD-EPI: >60 ML/MIN/{1.73_M2}
GLUCOSE SERPL-MCNC: 211 MG/DL (ref 70–99)
HCT VFR BLD AUTO: 33.7 % (ref 36–48)
HGB BLD-MCNC: 11.2 G/DL (ref 12–16)
INR PPP: 1.06 (ref 0.84–1.16)
LYMPHOCYTES # BLD: 0.3 K/UL (ref 1–5.1)
LYMPHOCYTES NFR BLD: 2.5 %
MCH RBC QN AUTO: 30.5 PG (ref 26–34)
MCHC RBC AUTO-ENTMCNC: 33.3 G/DL (ref 31–36)
MCV RBC AUTO: 91.5 FL (ref 80–100)
MONOCYTES # BLD: 0.2 K/UL (ref 0–1.3)
MONOCYTES NFR BLD: 1.4 %
NEUTROPHILS # BLD: 11.9 K/UL (ref 1.7–7.7)
NEUTROPHILS NFR BLD: 95.9 %
PLATELET # BLD AUTO: 194 K/UL (ref 135–450)
PMV BLD AUTO: 7.8 FL (ref 5–10.5)
POTASSIUM SERPL-SCNC: 4.5 MMOL/L (ref 3.5–5.1)
PROCALCITONIN SERPL IA-MCNC: 0.53 NG/ML (ref 0–0.15)
PROCALCITONIN SERPL IA-MCNC: 0.87 NG/ML (ref 0–0.15)
PROT SERPL-MCNC: 6.6 G/DL (ref 6.4–8.2)
PROTHROMBIN TIME: 13.8 SEC (ref 11.5–14.8)
RBC # BLD AUTO: 3.69 M/UL (ref 4–5.2)
SODIUM SERPL-SCNC: 134 MMOL/L (ref 136–145)
WBC # BLD AUTO: 12.4 K/UL (ref 4–11)

## 2023-11-22 PROCEDURE — 85025 COMPLETE CBC W/AUTO DIFF WBC: CPT

## 2023-11-22 PROCEDURE — 80053 COMPREHEN METABOLIC PANEL: CPT

## 2023-11-22 PROCEDURE — 6370000000 HC RX 637 (ALT 250 FOR IP): Performed by: STUDENT IN AN ORGANIZED HEALTH CARE EDUCATION/TRAINING PROGRAM

## 2023-11-22 PROCEDURE — 2580000003 HC RX 258: Performed by: STUDENT IN AN ORGANIZED HEALTH CARE EDUCATION/TRAINING PROGRAM

## 2023-11-22 PROCEDURE — 1200000000 HC SEMI PRIVATE

## 2023-11-22 PROCEDURE — 99254 IP/OBS CNSLTJ NEW/EST MOD 60: CPT | Performed by: INTERNAL MEDICINE

## 2023-11-22 PROCEDURE — 94640 AIRWAY INHALATION TREATMENT: CPT

## 2023-11-22 PROCEDURE — 6370000000 HC RX 637 (ALT 250 FOR IP): Performed by: INTERNAL MEDICINE

## 2023-11-22 PROCEDURE — 36415 COLL VENOUS BLD VENIPUNCTURE: CPT

## 2023-11-22 PROCEDURE — 6360000002 HC RX W HCPCS: Performed by: STUDENT IN AN ORGANIZED HEALTH CARE EDUCATION/TRAINING PROGRAM

## 2023-11-22 PROCEDURE — 85610 PROTHROMBIN TIME: CPT

## 2023-11-22 PROCEDURE — 2700000000 HC OXYGEN THERAPY PER DAY

## 2023-11-22 PROCEDURE — 84145 PROCALCITONIN (PCT): CPT

## 2023-11-22 RX ORDER — PANTOPRAZOLE SODIUM 40 MG/1
40 TABLET, DELAYED RELEASE ORAL
Status: DISCONTINUED | OUTPATIENT
Start: 2023-11-22 | End: 2023-11-26 | Stop reason: HOSPADM

## 2023-11-22 RX ORDER — QUETIAPINE FUMARATE 100 MG/1
200 TABLET, FILM COATED ORAL NIGHTLY
Status: DISCONTINUED | OUTPATIENT
Start: 2023-11-22 | End: 2023-11-26 | Stop reason: HOSPADM

## 2023-11-22 RX ORDER — DIVALPROEX SODIUM 250 MG/1
250 TABLET, DELAYED RELEASE ORAL 2 TIMES DAILY
Status: DISCONTINUED | OUTPATIENT
Start: 2023-11-22 | End: 2023-11-26 | Stop reason: HOSPADM

## 2023-11-22 RX ORDER — METHADONE HYDROCHLORIDE 10 MG/1
120 TABLET ORAL DAILY
Status: DISCONTINUED | OUTPATIENT
Start: 2023-11-22 | End: 2023-11-26 | Stop reason: HOSPADM

## 2023-11-22 RX ORDER — LINEZOLID 2 MG/ML
600 INJECTION, SOLUTION INTRAVENOUS EVERY 12 HOURS
Status: DISCONTINUED | OUTPATIENT
Start: 2023-11-22 | End: 2023-11-24

## 2023-11-22 RX ADMIN — PANTOPRAZOLE SODIUM 40 MG: 40 TABLET, DELAYED RELEASE ORAL at 18:48

## 2023-11-22 RX ADMIN — POLYETHYLENE GLYCOL 3350 17 G: 17 POWDER, FOR SOLUTION ORAL at 18:56

## 2023-11-22 RX ADMIN — PREGABALIN 150 MG: 75 CAPSULE ORAL at 10:30

## 2023-11-22 RX ADMIN — DIVALPROEX SODIUM 250 MG: 250 TABLET, DELAYED RELEASE ORAL at 20:36

## 2023-11-22 RX ADMIN — SODIUM CHLORIDE: 9 INJECTION, SOLUTION INTRAVENOUS at 18:41

## 2023-11-22 RX ADMIN — LINEZOLID 600 MG: 600 INJECTION, SOLUTION INTRAVENOUS at 04:10

## 2023-11-22 RX ADMIN — DIVALPROEX SODIUM 250 MG: 250 TABLET, DELAYED RELEASE ORAL at 10:30

## 2023-11-22 RX ADMIN — ACETAMINOPHEN 650 MG: 325 TABLET ORAL at 00:10

## 2023-11-22 RX ADMIN — Medication 2 PUFF: at 20:15

## 2023-11-22 RX ADMIN — ZOLPIDEM TARTRATE 5 MG: 5 TABLET ORAL at 20:40

## 2023-11-22 RX ADMIN — PIPERACILLIN AND TAZOBACTAM 3375 MG: 3; .375 INJECTION, POWDER, LYOPHILIZED, FOR SOLUTION INTRAVENOUS at 13:54

## 2023-11-22 RX ADMIN — METHADONE HYDROCHLORIDE 120 MG: 10 TABLET ORAL at 10:30

## 2023-11-22 RX ADMIN — PIPERACILLIN AND TAZOBACTAM 3375 MG: 3; .375 INJECTION, POWDER, LYOPHILIZED, FOR SOLUTION INTRAVENOUS at 05:21

## 2023-11-22 RX ADMIN — ALPRAZOLAM 1 MG: 1 TABLET ORAL at 21:30

## 2023-11-22 RX ADMIN — SODIUM CHLORIDE: 9 INJECTION, SOLUTION INTRAVENOUS at 04:07

## 2023-11-22 RX ADMIN — LEVOTHYROXINE SODIUM 50 MCG: 0.05 TABLET ORAL at 07:10

## 2023-11-22 RX ADMIN — ENOXAPARIN SODIUM 40 MG: 100 INJECTION SUBCUTANEOUS at 10:33

## 2023-11-22 RX ADMIN — PREGABALIN 150 MG: 75 CAPSULE ORAL at 20:36

## 2023-11-22 RX ADMIN — LINEZOLID 600 MG: 600 INJECTION, SOLUTION INTRAVENOUS at 18:47

## 2023-11-22 RX ADMIN — ALPRAZOLAM 1 MG: 1 TABLET ORAL at 00:35

## 2023-11-22 RX ADMIN — ALPRAZOLAM 1 MG: 1 TABLET ORAL at 13:56

## 2023-11-22 RX ADMIN — PANTOPRAZOLE SODIUM 40 MG: 40 TABLET, DELAYED RELEASE ORAL at 07:10

## 2023-11-22 RX ADMIN — ZOLPIDEM TARTRATE 5 MG: 5 TABLET ORAL at 00:35

## 2023-11-22 RX ADMIN — PIPERACILLIN AND TAZOBACTAM 3375 MG: 3; .375 INJECTION, POWDER, LYOPHILIZED, FOR SOLUTION INTRAVENOUS at 21:37

## 2023-11-22 RX ADMIN — Medication 10 ML: at 20:41

## 2023-11-22 ASSESSMENT — ENCOUNTER SYMPTOMS
CHOKING: 1
GASTROINTESTINAL NEGATIVE: 1
SHORTNESS OF BREATH: 1
COUGH: 1
ALLERGIC/IMMUNOLOGIC NEGATIVE: 1
EYES NEGATIVE: 1

## 2023-11-22 ASSESSMENT — PAIN DESCRIPTION - ORIENTATION
ORIENTATION: LEFT;LOWER
ORIENTATION: RIGHT

## 2023-11-22 ASSESSMENT — PAIN SCALES - GENERAL
PAINLEVEL_OUTOF10: 6
PAINLEVEL_OUTOF10: 3

## 2023-11-22 ASSESSMENT — PAIN DESCRIPTION - LOCATION
LOCATION: HEAD
LOCATION: BACK;KNEE

## 2023-11-22 ASSESSMENT — PAIN DESCRIPTION - DESCRIPTORS
DESCRIPTORS: ACHING;DISCOMFORT
DESCRIPTORS: ACHING

## 2023-11-22 NOTE — PLAN OF CARE
Problem: Pain  Goal: Verbalizes/displays adequate comfort level or baseline comfort level  Outcome: Progressing  Flowsheets (Taken 11/22/2023 0106)  Verbalizes/displays adequate comfort level or baseline comfort level:   Encourage patient to monitor pain and request assistance   Assess pain using appropriate pain scale   Administer analgesics based on type and severity of pain and evaluate response   Implement non-pharmacological measures as appropriate and evaluate response     Problem: Safety - Adult  Goal: Free from fall injury  Outcome: Progressing  Flowsheets (Taken 11/22/2023 0106)  Free From Fall Injury:   Instruct family/caregiver on patient safety   Based on caregiver fall risk screen, instruct family/caregiver to ask for assistance with transferring infant if caregiver noted to have fall risk factors     Problem: Discharge Planning  Goal: Discharge to home or other facility with appropriate resources  Outcome: Progressing  Flowsheets (Taken 11/22/2023 0106)  Discharge to home or other facility with appropriate resources:   Identify barriers to discharge with patient and caregiver   Arrange for needed discharge resources and transportation as appropriate   Identify discharge learning needs (meds, wound care, etc)

## 2023-11-22 NOTE — H&P
History and Physical      Name:  Shital Lee /Age/Sex: 1980  (37 y.o. female)   MRN & CSN:  [id number] & [id number] Encounter Date/Time: 2023 9:59 PM EST   Location:  [address] [address] PCP: Clyde Shirley MD       Hospital Day: 1    Assessment and Plan:     Patient is a 37 y.o. female who presented with SOB     Sepsis with acute hypoxemic respiratory failure secondary to Chronic pneumonia   - Presented with persistent and refractory SOB and cough x6-8 weeks   - Hemodynamically stable, afebrile, leukocytosis of 11. LA WNL. Requiring 3L NC    - CTA chest showed BLT interstitial and ground-glass opacities   -Patient with recent hospitalization with s/p bronch with extensive workup including bacterial, fungal including acid fast, HIV/PCP and autoimmune workup which was essentially negative, refer to previous notes/documents for more information  -At this time, do not suspect acute bacterial cause. Will consult pulmonology to consider EVALI vs cryptogenic organizing pneumonia requiring high dose steroids    - Continue abx pending pulm consult and pt meeting sepsis criteria  - Follow-up cultures and inflammatory markers. UTI  -abx as above     Polysubstance Abuse  -Continue Methadone     GERD  -Continue PPI    Mood Disorder   -Continue home meds      Hypothyroidism   -Continue Synthroid    Checklist:  Advanced directive: full  Diet: cardiac  DVT ppx: Lovenox      Disposition: admit to inpatient. Estimated discharge: 5 day(s). Current living situation: home. Expected disposition: home. Spoke with ED provider who recommended admission for the patient and I agree with that plan. Personally reviewed lab studies and imaging. EKG interpreted personally and results as stated above. Imaging that was interpreted personally and results as stated above.     History of Present Illness:     Chief Complaint:    Chief Complaint   Patient presents with    Chest Pain     Pt reports increasing chest pain and

## 2023-11-22 NOTE — ACP (ADVANCE CARE PLANNING)
Advance Care Planning     General Advance Care Planning (ACP) Conversation    Date of Conversation: 11/21/2023  Conducted with: Patient with Decision Making Capacity    Healthcare Decision Maker:    Primary Decision Maker: Orjayshree Salinas - Child    Secondary Decision Maker: Adenike Lomeli - Parent - [phone]    Secondary Decision Maker: Jairo Garvey - Brother/Sister - [phone]  Click here to complete Healthcare Decision Makers including selection of the Healthcare Decision Maker Relationship (ie \"Primary\"). Today we documented Decision Maker(s) consistent with Legal Next of Kin hierarchy.     Content/Action Overview:  Has NO ACP documents/care preferences - information provided, considering goals and options  Reviewed DNR/DNI and patient elects Full Code (Attempt Resuscitation)      Length of Voluntary ACP Conversation in minutes:  <16 minutes (Non-Billable)    Jovon Diaz RN

## 2023-11-22 NOTE — CONSULTS
Consult Call Back    Who:Malik, Terrace Holstein, MD   Date:11/22/2023,  Time:10:32 AM    Electronically signed by Keren Parsons on 11/22/23 at 10:32 AM EST
Consult Placed   Consult sent via perfect serve  Who: Dr. Ginette Guzman  Date:11/22/2023    Time:07:38AM     Electronically signed by Reji Minor on 11/22/2023 at 7:38 AM
with  Zosyn 3.375 every 8 hours    Procalcitonin was slightly positive at 0.8  We will continue with antibiotics for now              GERD  I have increased the Protonix to twice daily as the patient does have what appears to be GERD issues to  It would explain the issues of lower lobe infiltrative process with a history of a negative work-up for other potential causes of infiltrative process is seen in the lungs        Obstructive sleep apnea versus central sleep apnea  Patient will need a outpatient work-up for obstructive sleep apnea    I suspect that this is probably one of the other issues that is going on with this patient  She is on methadone which does preclude her to having central sleep apnea  She would probably need a PSG to rule out the degree of central sleep apnea versus obstructive sleep apnea        Electronically signed by Teresa Lundberg MD on 11/22/23 at 7:48 AM EST

## 2023-11-23 PROBLEM — R59.0 MEDIASTINAL ADENOPATHY: Status: ACTIVE | Noted: 2023-11-23

## 2023-11-23 LAB
ALBUMIN SERPL-MCNC: 3.1 G/DL (ref 3.4–5)
ALBUMIN/GLOB SERPL: 1 {RATIO} (ref 1.1–2.2)
ALP SERPL-CCNC: 53 U/L (ref 40–129)
ALT SERPL-CCNC: 14 U/L (ref 10–40)
ANION GAP SERPL CALCULATED.3IONS-SCNC: 9 MMOL/L (ref 3–16)
AST SERPL-CCNC: 30 U/L (ref 15–37)
BACTERIA UR CULT: NORMAL
BASOPHILS # BLD: 0 K/UL (ref 0–0.2)
BASOPHILS NFR BLD: 0.4 %
BILIRUB SERPL-MCNC: <0.2 MG/DL (ref 0–1)
BUN SERPL-MCNC: 13 MG/DL (ref 7–20)
CALCIUM SERPL-MCNC: 8.8 MG/DL (ref 8.3–10.6)
CHLORIDE SERPL-SCNC: 103 MMOL/L (ref 99–110)
CO2 SERPL-SCNC: 27 MMOL/L (ref 21–32)
CREAT SERPL-MCNC: 0.8 MG/DL (ref 0.6–1.1)
DEPRECATED RDW RBC AUTO: 14.8 % (ref 12.4–15.4)
EOSINOPHIL # BLD: 0.3 K/UL (ref 0–0.6)
EOSINOPHIL NFR BLD: 3.1 %
GFR SERPLBLD CREATININE-BSD FMLA CKD-EPI: >60 ML/MIN/{1.73_M2}
GLUCOSE SERPL-MCNC: 119 MG/DL (ref 70–99)
HCT VFR BLD AUTO: 31.6 % (ref 36–48)
HGB BLD-MCNC: 10.4 G/DL (ref 12–16)
LYMPHOCYTES # BLD: 2.4 K/UL (ref 1–5.1)
LYMPHOCYTES NFR BLD: 23.4 %
MCH RBC QN AUTO: 30.6 PG (ref 26–34)
MCHC RBC AUTO-ENTMCNC: 33.1 G/DL (ref 31–36)
MCV RBC AUTO: 92.4 FL (ref 80–100)
MONOCYTES # BLD: 0.9 K/UL (ref 0–1.3)
MONOCYTES NFR BLD: 8.5 %
NEUTROPHILS # BLD: 6.8 K/UL (ref 1.7–7.7)
NEUTROPHILS NFR BLD: 64.6 %
PLATELET # BLD AUTO: 198 K/UL (ref 135–450)
PMV BLD AUTO: 8 FL (ref 5–10.5)
POTASSIUM SERPL-SCNC: 4.3 MMOL/L (ref 3.5–5.1)
PROT SERPL-MCNC: 6.1 G/DL (ref 6.4–8.2)
RBC # BLD AUTO: 3.41 M/UL (ref 4–5.2)
SODIUM SERPL-SCNC: 139 MMOL/L (ref 136–145)
WBC # BLD AUTO: 10.5 K/UL (ref 4–11)

## 2023-11-23 PROCEDURE — 85025 COMPLETE CBC W/AUTO DIFF WBC: CPT

## 2023-11-23 PROCEDURE — 6370000000 HC RX 637 (ALT 250 FOR IP): Performed by: STUDENT IN AN ORGANIZED HEALTH CARE EDUCATION/TRAINING PROGRAM

## 2023-11-23 PROCEDURE — 1200000000 HC SEMI PRIVATE

## 2023-11-23 PROCEDURE — 82533 TOTAL CORTISOL: CPT

## 2023-11-23 PROCEDURE — 6360000002 HC RX W HCPCS: Performed by: STUDENT IN AN ORGANIZED HEALTH CARE EDUCATION/TRAINING PROGRAM

## 2023-11-23 PROCEDURE — 6370000000 HC RX 637 (ALT 250 FOR IP): Performed by: INTERNAL MEDICINE

## 2023-11-23 PROCEDURE — 94640 AIRWAY INHALATION TREATMENT: CPT

## 2023-11-23 PROCEDURE — 80053 COMPREHEN METABOLIC PANEL: CPT

## 2023-11-23 PROCEDURE — 94761 N-INVAS EAR/PLS OXIMETRY MLT: CPT

## 2023-11-23 PROCEDURE — 2580000003 HC RX 258: Performed by: STUDENT IN AN ORGANIZED HEALTH CARE EDUCATION/TRAINING PROGRAM

## 2023-11-23 PROCEDURE — 99232 SBSQ HOSP IP/OBS MODERATE 35: CPT | Performed by: INTERNAL MEDICINE

## 2023-11-23 PROCEDURE — 2700000000 HC OXYGEN THERAPY PER DAY

## 2023-11-23 PROCEDURE — 36415 COLL VENOUS BLD VENIPUNCTURE: CPT

## 2023-11-23 PROCEDURE — 6370000000 HC RX 637 (ALT 250 FOR IP): Performed by: NURSE PRACTITIONER

## 2023-11-23 RX ORDER — MIDODRINE HYDROCHLORIDE 5 MG/1
2.5 TABLET ORAL
Status: DISCONTINUED | OUTPATIENT
Start: 2023-11-23 | End: 2023-11-26 | Stop reason: HOSPADM

## 2023-11-23 RX ORDER — FUROSEMIDE 10 MG/ML
20 INJECTION INTRAMUSCULAR; INTRAVENOUS DAILY
Status: COMPLETED | OUTPATIENT
Start: 2023-11-24 | End: 2023-11-25

## 2023-11-23 RX ORDER — POLYETHYLENE GLYCOL 3350 17 G
2 POWDER IN PACKET (EA) ORAL
Status: DISCONTINUED | OUTPATIENT
Start: 2023-11-23 | End: 2023-11-26 | Stop reason: HOSPADM

## 2023-11-23 RX ADMIN — LEVOTHYROXINE SODIUM 50 MCG: 0.05 TABLET ORAL at 05:24

## 2023-11-23 RX ADMIN — MIDODRINE HYDROCHLORIDE 2.5 MG: 5 TABLET ORAL at 19:39

## 2023-11-23 RX ADMIN — PANTOPRAZOLE SODIUM 40 MG: 40 TABLET, DELAYED RELEASE ORAL at 16:43

## 2023-11-23 RX ADMIN — PIPERACILLIN AND TAZOBACTAM 3375 MG: 3; .375 INJECTION, POWDER, LYOPHILIZED, FOR SOLUTION INTRAVENOUS at 13:26

## 2023-11-23 RX ADMIN — PREGABALIN 150 MG: 75 CAPSULE ORAL at 10:51

## 2023-11-23 RX ADMIN — ALPRAZOLAM 1 MG: 1 TABLET ORAL at 05:28

## 2023-11-23 RX ADMIN — DIVALPROEX SODIUM 250 MG: 250 TABLET, DELAYED RELEASE ORAL at 21:07

## 2023-11-23 RX ADMIN — LINEZOLID 600 MG: 600 INJECTION, SOLUTION INTRAVENOUS at 04:10

## 2023-11-23 RX ADMIN — LINEZOLID 600 MG: 600 INJECTION, SOLUTION INTRAVENOUS at 18:22

## 2023-11-23 RX ADMIN — PANTOPRAZOLE SODIUM 40 MG: 40 TABLET, DELAYED RELEASE ORAL at 05:24

## 2023-11-23 RX ADMIN — DIVALPROEX SODIUM 250 MG: 250 TABLET, DELAYED RELEASE ORAL at 10:51

## 2023-11-23 RX ADMIN — PIPERACILLIN AND TAZOBACTAM 3375 MG: 3; .375 INJECTION, POWDER, LYOPHILIZED, FOR SOLUTION INTRAVENOUS at 05:23

## 2023-11-23 RX ADMIN — METHADONE HYDROCHLORIDE 120 MG: 10 TABLET ORAL at 10:51

## 2023-11-23 RX ADMIN — ZOLPIDEM TARTRATE 5 MG: 5 TABLET ORAL at 21:07

## 2023-11-23 RX ADMIN — Medication 2 PUFF: at 07:53

## 2023-11-23 RX ADMIN — QUETIAPINE FUMARATE 200 MG: 100 TABLET ORAL at 21:06

## 2023-11-23 RX ADMIN — PIPERACILLIN AND TAZOBACTAM 3375 MG: 3; .375 INJECTION, POWDER, LYOPHILIZED, FOR SOLUTION INTRAVENOUS at 21:17

## 2023-11-23 RX ADMIN — SODIUM CHLORIDE: 9 INJECTION, SOLUTION INTRAVENOUS at 21:16

## 2023-11-23 RX ADMIN — ALPRAZOLAM 1 MG: 1 TABLET ORAL at 18:18

## 2023-11-23 RX ADMIN — ENOXAPARIN SODIUM 40 MG: 100 INJECTION SUBCUTANEOUS at 10:51

## 2023-11-23 RX ADMIN — PREGABALIN 150 MG: 75 CAPSULE ORAL at 21:06

## 2023-11-23 RX ADMIN — Medication 10 ML: at 21:12

## 2023-11-23 ASSESSMENT — PAIN SCALES - GENERAL
PAINLEVEL_OUTOF10: 0
PAINLEVEL_OUTOF10: 7

## 2023-11-23 ASSESSMENT — PAIN DESCRIPTION - LOCATION: LOCATION: BACK;KNEE

## 2023-11-23 NOTE — PLAN OF CARE
Problem: Pain  Goal: Verbalizes/displays adequate comfort level or baseline comfort level  Outcome: Progressing  Flowsheets (Taken 11/22/2023 0106 by Maverick Contreras RN)  Verbalizes/displays adequate comfort level or baseline comfort level:   Encourage patient to monitor pain and request assistance   Assess pain using appropriate pain scale   Administer analgesics based on type and severity of pain and evaluate response   Implement non-pharmacological measures as appropriate and evaluate response     Problem: Safety - Adult  Goal: Free from fall injury  Outcome: Progressing  Flowsheets (Taken 11/22/2023 2018)  Free From Fall Injury: Instruct family/caregiver on patient safety     Problem: Discharge Planning  Goal: Discharge to home or other facility with appropriate resources  Outcome: Progressing  Flowsheets (Taken 11/22/2023 0106 by Maverick Contreras RN)  Discharge to home or other facility with appropriate resources:   Identify barriers to discharge with patient and caregiver   Arrange for needed discharge resources and transportation as appropriate   Identify discharge learning needs (meds, wound care, etc)     Problem: Chronic Conditions and Co-morbidities  Goal: Patient's chronic conditions and co-morbidity symptoms are monitored and maintained or improved  Outcome: Progressing  Flowsheets (Taken 11/21/2023 2215 by Maverick Contreras RN)  Care Plan - Patient's Chronic Conditions and Co-Morbidity Symptoms are Monitored and Maintained or Improved:   Monitor and assess patient's chronic conditions and comorbid symptoms for stability, deterioration, or improvement   Collaborate with multidisciplinary team to address chronic and comorbid conditions and prevent exacerbation or deterioration   Update acute care plan with appropriate goals if chronic or comorbid symptoms are exacerbated and prevent overall improvement and discharge

## 2023-11-23 NOTE — PLAN OF CARE
Problem: Pain  Goal: Verbalizes/displays adequate comfort level or baseline comfort level  11/23/2023 0130 by Isaías Gaona RN  Outcome: Progressing  Flowsheets (Taken 11/23/2023 0130)  Verbalizes/displays adequate comfort level or baseline comfort level:   Encourage patient to monitor pain and request assistance   Assess pain using appropriate pain scale   Administer analgesics based on type and severity of pain and evaluate response   Implement non-pharmacological measures as appropriate and evaluate response  11/22/2023 2018 by Ruben Acosta RN  Outcome: Progressing  Flowsheets (Taken 11/22/2023 0106 by Yolanda Xie RN)  Verbalizes/displays adequate comfort level or baseline comfort level:   Encourage patient to monitor pain and request assistance   Assess pain using appropriate pain scale   Administer analgesics based on type and severity of pain and evaluate response   Implement non-pharmacological measures as appropriate and evaluate response     Problem: Safety - Adult  Goal: Free from fall injury  11/23/2023 0130 by Isaías Gaona RN  Outcome: Progressing  Flowsheets (Taken 11/23/2023 0130)  Free From Fall Injury:   Instruct family/caregiver on patient safety   Based on caregiver fall risk screen, instruct family/caregiver to ask for assistance with transferring infant if caregiver noted to have fall risk factors  11/22/2023 2018 by Ruben Acosta RN  Outcome: Progressing  [address] (Taken 11/22/2023 2018)  Free From Fall Injury: Instruct family/caregiver on patient safety     Problem: Discharge Planning  Goal: Discharge to home or other facility with appropriate resources  11/22/2023 2018 by Ruben Acosta RN  Outcome: Progressing  Flowsheets  Taken 11/22/2023 1930 by Isaías Gaona RN  Discharge to home or other facility with appropriate resources: Identify barriers to discharge with patient and caregiver  Taken 11/22/2023 0106 by Yolanda Xie RN  Discharge to home or other

## 2023-11-24 LAB
ALBUMIN SERPL-MCNC: 3.4 G/DL (ref 3.4–5)
ALBUMIN/GLOB SERPL: 1.2 {RATIO} (ref 1.1–2.2)
ALP SERPL-CCNC: 68 U/L (ref 40–129)
ALT SERPL-CCNC: 13 U/L (ref 10–40)
ANION GAP SERPL CALCULATED.3IONS-SCNC: 9 MMOL/L (ref 3–16)
AST SERPL-CCNC: 18 U/L (ref 15–37)
BASOPHILS # BLD: 0.1 K/UL (ref 0–0.2)
BASOPHILS NFR BLD: 0.7 %
BILIRUB SERPL-MCNC: <0.2 MG/DL (ref 0–1)
BUN SERPL-MCNC: 14 MG/DL (ref 7–20)
CALCIUM SERPL-MCNC: 9.1 MG/DL (ref 8.3–10.6)
CHLORIDE SERPL-SCNC: 100 MMOL/L (ref 99–110)
CO2 SERPL-SCNC: 30 MMOL/L (ref 21–32)
CORTIS AM PEAK SERPL-MCNC: 2 UG/DL (ref 4.3–22.4)
CREAT SERPL-MCNC: 1 MG/DL (ref 0.6–1.1)
DEPRECATED RDW RBC AUTO: 14.7 % (ref 12.4–15.4)
EOSINOPHIL # BLD: 0.5 K/UL (ref 0–0.6)
EOSINOPHIL NFR BLD: 6.2 %
GFR SERPLBLD CREATININE-BSD FMLA CKD-EPI: >60 ML/MIN/{1.73_M2}
GLUCOSE SERPL-MCNC: 89 MG/DL (ref 70–99)
HCT VFR BLD AUTO: 35.9 % (ref 36–48)
HGB BLD-MCNC: 12 G/DL (ref 12–16)
LYMPHOCYTES # BLD: 2.7 K/UL (ref 1–5.1)
LYMPHOCYTES NFR BLD: 33.2 %
MCH RBC QN AUTO: 30.2 PG (ref 26–34)
MCHC RBC AUTO-ENTMCNC: 33.4 G/DL (ref 31–36)
MCV RBC AUTO: 90.4 FL (ref 80–100)
MONOCYTES # BLD: 0.6 K/UL (ref 0–1.3)
MONOCYTES NFR BLD: 8 %
NEUTROPHILS # BLD: 4.2 K/UL (ref 1.7–7.7)
NEUTROPHILS NFR BLD: 51.9 %
PLATELET # BLD AUTO: 265 K/UL (ref 135–450)
PMV BLD AUTO: 8.2 FL (ref 5–10.5)
POTASSIUM SERPL-SCNC: 4.3 MMOL/L (ref 3.5–5.1)
PROT SERPL-MCNC: 6.2 G/DL (ref 6.4–8.2)
RBC # BLD AUTO: 3.97 M/UL (ref 4–5.2)
SODIUM SERPL-SCNC: 139 MMOL/L (ref 136–145)
TSH SERPL DL<=0.005 MIU/L-ACNC: 5.13 UIU/ML (ref 0.27–4.2)
WBC # BLD AUTO: 8.1 K/UL (ref 4–11)

## 2023-11-24 PROCEDURE — 6360000002 HC RX W HCPCS: Performed by: STUDENT IN AN ORGANIZED HEALTH CARE EDUCATION/TRAINING PROGRAM

## 2023-11-24 PROCEDURE — 6370000000 HC RX 637 (ALT 250 FOR IP): Performed by: INTERNAL MEDICINE

## 2023-11-24 PROCEDURE — 2580000003 HC RX 258: Performed by: STUDENT IN AN ORGANIZED HEALTH CARE EDUCATION/TRAINING PROGRAM

## 2023-11-24 PROCEDURE — 6370000000 HC RX 637 (ALT 250 FOR IP): Performed by: STUDENT IN AN ORGANIZED HEALTH CARE EDUCATION/TRAINING PROGRAM

## 2023-11-24 PROCEDURE — 99232 SBSQ HOSP IP/OBS MODERATE 35: CPT | Performed by: INTERNAL MEDICINE

## 2023-11-24 PROCEDURE — 2580000003 HC RX 258: Performed by: INTERNAL MEDICINE

## 2023-11-24 PROCEDURE — 84443 ASSAY THYROID STIM HORMONE: CPT

## 2023-11-24 PROCEDURE — 6370000000 HC RX 637 (ALT 250 FOR IP): Performed by: NURSE PRACTITIONER

## 2023-11-24 PROCEDURE — 80053 COMPREHEN METABOLIC PANEL: CPT

## 2023-11-24 PROCEDURE — 6360000002 HC RX W HCPCS: Performed by: INTERNAL MEDICINE

## 2023-11-24 PROCEDURE — 1200000000 HC SEMI PRIVATE

## 2023-11-24 PROCEDURE — 94640 AIRWAY INHALATION TREATMENT: CPT

## 2023-11-24 PROCEDURE — 85025 COMPLETE CBC W/AUTO DIFF WBC: CPT

## 2023-11-24 PROCEDURE — 82533 TOTAL CORTISOL: CPT

## 2023-11-24 RX ORDER — IPRATROPIUM BROMIDE AND ALBUTEROL SULFATE 2.5; .5 MG/3ML; MG/3ML
1 SOLUTION RESPIRATORY (INHALATION) EVERY 4 HOURS PRN
Status: DISCONTINUED | OUTPATIENT
Start: 2023-11-24 | End: 2023-11-26 | Stop reason: HOSPADM

## 2023-11-24 RX ORDER — KETOROLAC TROMETHAMINE 30 MG/ML
15 INJECTION, SOLUTION INTRAMUSCULAR; INTRAVENOUS EVERY 8 HOURS PRN
Status: DISCONTINUED | OUTPATIENT
Start: 2023-11-24 | End: 2023-11-26 | Stop reason: HOSPADM

## 2023-11-24 RX ORDER — LINEZOLID 600 MG/1
600 TABLET, FILM COATED ORAL EVERY 12 HOURS SCHEDULED
Status: DISCONTINUED | OUTPATIENT
Start: 2023-11-24 | End: 2023-11-26

## 2023-11-24 RX ADMIN — PIPERACILLIN AND TAZOBACTAM 3375 MG: 3; .375 INJECTION, POWDER, LYOPHILIZED, FOR SOLUTION INTRAVENOUS at 05:04

## 2023-11-24 RX ADMIN — DIVALPROEX SODIUM 250 MG: 250 TABLET, DELAYED RELEASE ORAL at 20:37

## 2023-11-24 RX ADMIN — PANTOPRAZOLE SODIUM 40 MG: 40 TABLET, DELAYED RELEASE ORAL at 05:45

## 2023-11-24 RX ADMIN — FUROSEMIDE 20 MG: 10 INJECTION, SOLUTION INTRAMUSCULAR; INTRAVENOUS at 09:28

## 2023-11-24 RX ADMIN — ALPRAZOLAM 1 MG: 1 TABLET ORAL at 10:50

## 2023-11-24 RX ADMIN — IPRATROPIUM BROMIDE AND ALBUTEROL SULFATE 1 DOSE: 2.5; .5 SOLUTION RESPIRATORY (INHALATION) at 12:12

## 2023-11-24 RX ADMIN — LEVOTHYROXINE SODIUM 50 MCG: 0.05 TABLET ORAL at 05:45

## 2023-11-24 RX ADMIN — Medication 10 ML: at 09:29

## 2023-11-24 RX ADMIN — NICOTINE POLACRILEX 2 MG: 2 LOZENGE ORAL at 10:51

## 2023-11-24 RX ADMIN — DIVALPROEX SODIUM 250 MG: 250 TABLET, DELAYED RELEASE ORAL at 09:28

## 2023-11-24 RX ADMIN — MIDODRINE HYDROCHLORIDE 2.5 MG: 5 TABLET ORAL at 15:57

## 2023-11-24 RX ADMIN — ZOLPIDEM TARTRATE 5 MG: 5 TABLET ORAL at 20:37

## 2023-11-24 RX ADMIN — LINEZOLID 600 MG: 600 INJECTION, SOLUTION INTRAVENOUS at 04:01

## 2023-11-24 RX ADMIN — PIPERACILLIN AND TAZOBACTAM 3375 MG: 3; .375 INJECTION, POWDER, LYOPHILIZED, FOR SOLUTION INTRAVENOUS at 20:39

## 2023-11-24 RX ADMIN — PIPERACILLIN AND TAZOBACTAM 3375 MG: 3; .375 INJECTION, POWDER, LYOPHILIZED, FOR SOLUTION INTRAVENOUS at 12:25

## 2023-11-24 RX ADMIN — PREGABALIN 150 MG: 75 CAPSULE ORAL at 20:37

## 2023-11-24 RX ADMIN — NICOTINE POLACRILEX 2 MG: 2 LOZENGE ORAL at 16:57

## 2023-11-24 RX ADMIN — METHADONE HYDROCHLORIDE 120 MG: 10 TABLET ORAL at 09:28

## 2023-11-24 RX ADMIN — LINEZOLID 600 MG: 600 TABLET, FILM COATED ORAL at 17:00

## 2023-11-24 RX ADMIN — MIDODRINE HYDROCHLORIDE 2.5 MG: 5 TABLET ORAL at 12:26

## 2023-11-24 RX ADMIN — PANTOPRAZOLE SODIUM 40 MG: 40 TABLET, DELAYED RELEASE ORAL at 15:57

## 2023-11-24 RX ADMIN — PREGABALIN 150 MG: 75 CAPSULE ORAL at 09:29

## 2023-11-24 RX ADMIN — QUETIAPINE FUMARATE 200 MG: 100 TABLET ORAL at 20:37

## 2023-11-24 RX ADMIN — MIDODRINE HYDROCHLORIDE 2.5 MG: 5 TABLET ORAL at 09:29

## 2023-11-24 RX ADMIN — ALPRAZOLAM 1 MG: 1 TABLET ORAL at 16:57

## 2023-11-24 RX ADMIN — ENOXAPARIN SODIUM 40 MG: 100 INJECTION SUBCUTANEOUS at 09:29

## 2023-11-24 RX ADMIN — KETOROLAC TROMETHAMINE 15 MG: 30 INJECTION, SOLUTION INTRAMUSCULAR; INTRAVENOUS at 13:18

## 2023-11-24 ASSESSMENT — PAIN SCALES - GENERAL
PAINLEVEL_OUTOF10: 7
PAINLEVEL_OUTOF10: 7
PAINLEVEL_OUTOF10: 0

## 2023-11-24 ASSESSMENT — PAIN DESCRIPTION - ORIENTATION
ORIENTATION: LOWER
ORIENTATION: LOWER

## 2023-11-24 ASSESSMENT — PAIN DESCRIPTION - DESCRIPTORS
DESCRIPTORS: ACHING
DESCRIPTORS: ACHING;DISCOMFORT

## 2023-11-24 ASSESSMENT — PAIN DESCRIPTION - PAIN TYPE: TYPE: CHRONIC PAIN

## 2023-11-24 ASSESSMENT — PAIN DESCRIPTION - LOCATION
LOCATION: BACK
LOCATION: BACK

## 2023-11-24 NOTE — ADT AUTH CERT
via nc        ABNL/PERTINENT LABS:  wbc: 12.4, na: 134        PHYSICAL EXAM: WDL        MD CONSULTS/ASSESSMENTS & PLANS:  Assessment and Plan:      Patient is a 37 y.o. female who presented with SOB      Sepsis with acute hypoxemic respiratory failure secondary to Chronic pneumonia   - Presented with persistent and refractory SOB and cough x6-8 weeks   - Hemodynamically stable, afebrile, leukocytosis of 11. LA WNL. Requiring 3L NC    - CTA chest showed BLT interstitial and ground-glass opacities   -Patient with recent hospitalization with s/p bronch with extensive workup including bacterial, fungal including acid fast, HIV/PCP and autoimmune workup which was essentially negative, refer to previous notes/documents for more information  -At this time, do not suspect acute bacterial cause. Will consult pulmonology to consider EVALI vs cryptogenic organizing pneumonia requiring high dose steroids     - Continue abx pending pulm consult and pt meeting sepsis criteria  - Follow-up cultures and inflammatory markers. UTI  -abx as above      MEDICATIONS:  Zosyn 3.375g iv every 8 hrs              11/21 INITIAL by Sarah Oconnor RN  Last Updated by Sarah Oconnor RN on 11/24/2023 3329     Review Status Created By   In Alexx Virgen RN       Review Type   --      Criteria Review   DATE: 11/21     TYPE OF BED: [facility]        CHIEF COMPLAINT/ PRESENTATION:  Chest pain     HPI:  Patient is a 37 y.o. female with a PMHx of Asthma/COPD, polysubstance abuse, Bipolar disorder and GERD  who presented to the ED with SOB and cough. Patient with recurrent and persistent symptoms of cough and SOB for the last few days, reports it has been ongoing for the 6-8weeks. She states her sputum is \"neon\" color and at home desatted to the 80s. She reports associated generalized weakness/fatigue and decreased PO intake.   Denied any F/C, HA, dizziness, presyncope, syncope, N/V, abdominal pain, bleeding (hemoptysis /

## 2023-11-24 NOTE — PLAN OF CARE
Problem: Pain  Goal: Verbalizes/displays adequate comfort level or baseline comfort level  11/24/2023 0912 by Maximo Baker RN  Outcome: Progressing  11/24/2023 0040 by Marii Guillen RN  Outcome: Progressing  Flowsheets (Taken 11/24/2023 0040)  Verbalizes/displays adequate comfort level or baseline comfort level:   Encourage patient to monitor pain and request assistance   Assess pain using appropriate pain scale   Administer analgesics based on type and severity of pain and evaluate response   Implement non-pharmacological measures as appropriate and evaluate response  11/23/2023 2008 by Rupa Read RN  Outcome: Progressing  Flowsheets (Taken 11/23/2023 0130 by Marii Guillen RN)  Verbalizes/displays adequate comfort level or baseline comfort level:   Encourage patient to monitor pain and request assistance   Assess pain using appropriate pain scale   Administer analgesics based on type and severity of pain and evaluate response   Implement non-pharmacological measures as appropriate and evaluate response     Problem: Safety - Adult  Goal: Free from fall injury  11/24/2023 0912 by Maximo Baker RN  Outcome: Progressing  11/24/2023 0040 by Marii Guillen RN  Outcome: Progressing  Flowsheets (Taken 11/24/2023 0040)  Free From Fall Injury:   Instruct family/caregiver on patient safety   Based on caregiver fall risk screen, instruct family/caregiver to ask for assistance with transferring infant if caregiver noted to have fall risk factors  11/23/2023 2008 by Rupa Read RN  Outcome: Progressing  Flowsheets (Taken 11/23/2023 0130 by Marii Guillen RN)  Free From Fall Injury:   Instruct family/caregiver on patient safety   Based on caregiver fall risk screen, instruct family/caregiver to ask for assistance with transferring infant if caregiver noted to have fall risk factors     Problem: Discharge Planning  Goal: Discharge to home or other facility with appropriate

## 2023-11-24 NOTE — CARE COORDINATION
CM update: LOS # 3 Patient has pulmonary consult and the patient is now on RA and SATS are 97-98 . Patient has no HHC needs. Patient will need transport home by [employer]. Will follow. Kerline Alcala RN
to discuss the discharge plan with any other family members/significant others, and if so, who? No  Plans to Return to Present Housing: Yes  Other Identified Issues/Barriers to RETURNING to current housing: none  Potential Assistance needed at discharge: N/A            Potential DME:    Patient expects to discharge to: [address] [place] [address] Road for transportation at discharge:      Financial    Payor: Briana Barragan / Plan: Danton Shed / Product Type: *No Product type* /     Does insurance require precert for SNF: Yes    Potential assistance Purchasing Medications: No  Meds-to-Beds request: Yes      [id number], [address] [place] [address] Karthik.. ISABELNU OTERO Jennie Maida [phone] - F [phone]  [address] [facility] [address]  Phone: [phone] Fax: [phone]    [phone] - [facility], [address] [place] [address]. - P [phone] - F [phone]  SandiNorberto Cal[place] [address]  Phone: [phone] Fax: [phone]    [facility], [address] [place] [address] [phone] Can Apar[place]agan [phone]  [address] [place] [address],[address]  Phone: [phone] Fax: [phone]      Notes:    Factors facilitating achievement of predicted outcomes: Family support, Motivated, Cooperative, Pleasant, Sense of humor, and Good insight into deficits    Barriers to discharge: none    Additional Case Management Notes: spoke with patient. Reported lives in 3rd floor apt alone. Has railings to assist with steps. Uses no DME goes to [facility] for Methadone. Will be able to get to  follow up appts. Insurance provides transport. Currently denied needs. Consult pending with Pulm.  Zohra Wolf RN      The Plan for Transition of Care is related to the following treatment goals of Respiratory distress [R06.03]  Hypoxia [R09.02]  Chronic pneumonia [J18.9]  Pneumonia of both lungs due to infectious organism, unspecified part of lung [X14.3]    IF APPLICABLE: The Patient and/or patient

## 2023-11-24 NOTE — PLAN OF CARE
Problem: Pain  Goal: Verbalizes/displays adequate comfort level or baseline comfort level  11/24/2023 0040 by Bairon Funez RN  Outcome: Progressing  Flowsheets (Taken 11/24/2023 0040)  Verbalizes/displays adequate comfort level or baseline comfort level:   Encourage patient to monitor pain and request assistance   Assess pain using appropriate pain scale   Administer analgesics based on type and severity of pain and evaluate response   Implement non-pharmacological measures as appropriate and evaluate response  11/23/2023 2008 by Antonio Nam RN  Outcome: Progressing  Flowsheets (Taken 11/23/2023 0130 by Bairon Funez RN)  Verbalizes/displays adequate comfort level or baseline comfort level:   Encourage patient to monitor pain and request assistance   Assess pain using appropriate pain scale   Administer analgesics based on type and severity of pain and evaluate response   Implement non-pharmacological measures as appropriate and evaluate response     Problem: Safety - Adult  Goal: Free from fall injury  11/24/2023 0040 by Bairon Funez RN  Outcome: Progressing  Flowsheets (Taken 11/24/2023 0040)  Free From Fall Injury:   Instruct family/caregiver on patient safety   Based on caregiver fall risk screen, instruct family/caregiver to ask for assistance with transferring infant if caregiver noted to have fall risk factors  11/23/2023 2008 by Antonio Nam RN  Outcome: Progressing  Flowsheets (Taken 11/23/2023 0130 by Bairon Funez RN)  Free From Fall Injury:   Instruct family/caregiver on patient safety   Based on caregiver fall risk screen, instruct family/caregiver to ask for assistance with transferring infant if caregiver noted to have fall risk factors     Problem: Discharge Planning  Goal: Discharge to home or other facility with appropriate resources  11/23/2023 2008 by Antonio Nam RN  Outcome: Progressing  [address] (Taken 11/22/2023 1930 by Bairon Funez RN)  Discharge

## 2023-11-24 NOTE — PLAN OF CARE
Problem: Pain  Goal: Verbalizes/displays adequate comfort level or baseline comfort level  Outcome: Progressing  Flowsheets (Taken 11/23/2023 0130 by Esau Banks RN)  Verbalizes/displays adequate comfort level or baseline comfort level:   Encourage patient to monitor pain and request assistance   Assess pain using appropriate pain scale   Administer analgesics based on type and severity of pain and evaluate response   Implement non-pharmacological measures as appropriate and evaluate response     Problem: Safety - Adult  Goal: Free from fall injury  Outcome: Progressing  Flowsheets (Taken 11/23/2023 0130 by Esau Banks RN)  Free From Fall Injury:   Instruct family/caregiver on patient safety   Based on caregiver fall risk screen, instruct family/caregiver to ask for assistance with transferring infant if caregiver noted to have fall risk factors     Problem: Discharge Planning  Goal: Discharge to home or other facility with appropriate resources  Outcome: Progressing  Flowsheets (Taken 11/22/2023 1930 by Esau Banks, RN)  Discharge to home or other facility with appropriate resources: Identify barriers to discharge with patient and caregiver     Problem: Chronic Conditions and Co-morbidities  Goal: Patient's chronic conditions and co-morbidity symptoms are monitored and maintained or improved  Outcome: Progressing  Flowsheets (Taken 11/22/2023 1930 by Esau Banks, RN)  Care Plan - Patient's Chronic Conditions and Co-Morbidity Symptoms are Monitored and Maintained or Improved: Monitor and assess patient's chronic conditions and comorbid symptoms for stability, deterioration, or improvement

## 2023-11-25 LAB
ALBUMIN SERPL-MCNC: 4 G/DL (ref 3.4–5)
ANION GAP SERPL CALCULATED.3IONS-SCNC: 14 MMOL/L (ref 3–16)
BACTERIA BLD CULT: NORMAL
BASOPHILS # BLD: 0 K/UL (ref 0–0.2)
BASOPHILS NFR BLD: 0 %
BUN SERPL-MCNC: 18 MG/DL (ref 7–20)
CALCIUM SERPL-MCNC: 9.7 MG/DL (ref 8.3–10.6)
CHLORIDE SERPL-SCNC: 95 MMOL/L (ref 99–110)
CO2 SERPL-SCNC: 27 MMOL/L (ref 21–32)
CREAT SERPL-MCNC: 1.2 MG/DL (ref 0.6–1.1)
DEPRECATED RDW RBC AUTO: 14.6 % (ref 12.4–15.4)
EOSINOPHIL # BLD: 0.4 K/UL (ref 0–0.6)
EOSINOPHIL NFR BLD: 7 %
GFR SERPLBLD CREATININE-BSD FMLA CKD-EPI: 57 ML/MIN/{1.73_M2}
GLUCOSE SERPL-MCNC: 134 MG/DL (ref 70–99)
HCT VFR BLD AUTO: 41.3 % (ref 36–48)
HGB BLD-MCNC: 13.9 G/DL (ref 12–16)
LYMPHOCYTES # BLD: 2.9 K/UL (ref 1–5.1)
LYMPHOCYTES NFR BLD: 48 %
MAGNESIUM SERPL-MCNC: 2.2 MG/DL (ref 1.8–2.4)
MCH RBC QN AUTO: 30.4 PG (ref 26–34)
MCHC RBC AUTO-ENTMCNC: 33.6 G/DL (ref 31–36)
MCV RBC AUTO: 90.5 FL (ref 80–100)
METAMYELOCYTES NFR BLD MANUAL: 1 %
MONOCYTES # BLD: 0.2 K/UL (ref 0–1.3)
MONOCYTES NFR BLD: 4 %
NEUTROPHILS # BLD: 2.4 K/UL (ref 1.7–7.7)
NEUTROPHILS NFR BLD: 33 %
NEUTS BAND NFR BLD MANUAL: 6 % (ref 0–7)
PHOSPHATE SERPL-MCNC: 5.5 MG/DL (ref 2.5–4.9)
PLATELET # BLD AUTO: 329 K/UL (ref 135–450)
PLATELET BLD QL SMEAR: ADEQUATE
PMV BLD AUTO: 7.2 FL (ref 5–10.5)
POTASSIUM SERPL-SCNC: 4.1 MMOL/L (ref 3.5–5.1)
RBC # BLD AUTO: 4.56 M/UL (ref 4–5.2)
SLIDE REVIEW: ABNORMAL
SODIUM SERPL-SCNC: 136 MMOL/L (ref 136–145)
T4 FREE SERPL-MCNC: 1 NG/DL (ref 0.9–1.8)
VARIANT LYMPHS NFR BLD MANUAL: 1 % (ref 0–6)
WBC # BLD AUTO: 5.9 K/UL (ref 4–11)

## 2023-11-25 PROCEDURE — 6370000000 HC RX 637 (ALT 250 FOR IP): Performed by: STUDENT IN AN ORGANIZED HEALTH CARE EDUCATION/TRAINING PROGRAM

## 2023-11-25 PROCEDURE — 99232 SBSQ HOSP IP/OBS MODERATE 35: CPT | Performed by: INTERNAL MEDICINE

## 2023-11-25 PROCEDURE — 1200000000 HC SEMI PRIVATE

## 2023-11-25 PROCEDURE — 2580000003 HC RX 258: Performed by: INTERNAL MEDICINE

## 2023-11-25 PROCEDURE — 6370000000 HC RX 637 (ALT 250 FOR IP): Performed by: INTERNAL MEDICINE

## 2023-11-25 PROCEDURE — 85025 COMPLETE CBC W/AUTO DIFF WBC: CPT

## 2023-11-25 PROCEDURE — 84439 ASSAY OF FREE THYROXINE: CPT

## 2023-11-25 PROCEDURE — 6360000002 HC RX W HCPCS: Performed by: INTERNAL MEDICINE

## 2023-11-25 PROCEDURE — 2580000003 HC RX 258: Performed by: STUDENT IN AN ORGANIZED HEALTH CARE EDUCATION/TRAINING PROGRAM

## 2023-11-25 PROCEDURE — 83735 ASSAY OF MAGNESIUM: CPT

## 2023-11-25 PROCEDURE — 80069 RENAL FUNCTION PANEL: CPT

## 2023-11-25 PROCEDURE — 36415 COLL VENOUS BLD VENIPUNCTURE: CPT

## 2023-11-25 PROCEDURE — 6360000002 HC RX W HCPCS: Performed by: STUDENT IN AN ORGANIZED HEALTH CARE EDUCATION/TRAINING PROGRAM

## 2023-11-25 PROCEDURE — 6370000000 HC RX 637 (ALT 250 FOR IP): Performed by: NURSE PRACTITIONER

## 2023-11-25 RX ORDER — HYDROCORTISONE 10 MG/1
10 TABLET ORAL DAILY
Status: DISCONTINUED | OUTPATIENT
Start: 2023-11-26 | End: 2023-11-26 | Stop reason: HOSPADM

## 2023-11-25 RX ORDER — COSYNTROPIN 0.25 MG/ML
250 INJECTION, POWDER, FOR SOLUTION INTRAMUSCULAR; INTRAVENOUS ONCE
Status: COMPLETED | OUTPATIENT
Start: 2023-11-26 | End: 2023-11-26

## 2023-11-25 RX ORDER — SPIRONOLACTONE 25 MG/1
25 TABLET ORAL DAILY
Status: DISCONTINUED | OUTPATIENT
Start: 2023-11-26 | End: 2023-11-26 | Stop reason: HOSPADM

## 2023-11-25 RX ADMIN — LINEZOLID 600 MG: 600 TABLET, FILM COATED ORAL at 08:45

## 2023-11-25 RX ADMIN — QUETIAPINE FUMARATE 200 MG: 100 TABLET ORAL at 20:37

## 2023-11-25 RX ADMIN — PIPERACILLIN AND TAZOBACTAM 3375 MG: 3; .375 INJECTION, POWDER, LYOPHILIZED, FOR SOLUTION INTRAVENOUS at 20:36

## 2023-11-25 RX ADMIN — PREGABALIN 150 MG: 75 CAPSULE ORAL at 20:37

## 2023-11-25 RX ADMIN — PIPERACILLIN AND TAZOBACTAM 3375 MG: 3; .375 INJECTION, POWDER, LYOPHILIZED, FOR SOLUTION INTRAVENOUS at 05:19

## 2023-11-25 RX ADMIN — ENOXAPARIN SODIUM 40 MG: 100 INJECTION SUBCUTANEOUS at 08:45

## 2023-11-25 RX ADMIN — PIPERACILLIN AND TAZOBACTAM 3375 MG: 3; .375 INJECTION, POWDER, LYOPHILIZED, FOR SOLUTION INTRAVENOUS at 13:48

## 2023-11-25 RX ADMIN — NICOTINE POLACRILEX 2 MG: 2 LOZENGE ORAL at 13:31

## 2023-11-25 RX ADMIN — MIDODRINE HYDROCHLORIDE 2.5 MG: 5 TABLET ORAL at 13:49

## 2023-11-25 RX ADMIN — PREGABALIN 150 MG: 75 CAPSULE ORAL at 08:45

## 2023-11-25 RX ADMIN — ALPRAZOLAM 1 MG: 1 TABLET ORAL at 09:01

## 2023-11-25 RX ADMIN — NICOTINE POLACRILEX 2 MG: 2 LOZENGE ORAL at 16:00

## 2023-11-25 RX ADMIN — MIDODRINE HYDROCHLORIDE 2.5 MG: 5 TABLET ORAL at 20:37

## 2023-11-25 RX ADMIN — METHADONE HYDROCHLORIDE 120 MG: 10 TABLET ORAL at 08:44

## 2023-11-25 RX ADMIN — Medication 10 ML: at 08:48

## 2023-11-25 RX ADMIN — MIDODRINE HYDROCHLORIDE 2.5 MG: 5 TABLET ORAL at 08:45

## 2023-11-25 RX ADMIN — LINEZOLID 600 MG: 600 TABLET, FILM COATED ORAL at 20:37

## 2023-11-25 RX ADMIN — ZOLPIDEM TARTRATE 5 MG: 5 TABLET ORAL at 20:37

## 2023-11-25 RX ADMIN — FUROSEMIDE 20 MG: 10 INJECTION, SOLUTION INTRAMUSCULAR; INTRAVENOUS at 08:48

## 2023-11-25 RX ADMIN — NICOTINE POLACRILEX 2 MG: 2 LOZENGE ORAL at 20:41

## 2023-11-25 RX ADMIN — DIVALPROEX SODIUM 250 MG: 250 TABLET, DELAYED RELEASE ORAL at 08:45

## 2023-11-25 RX ADMIN — LEVOTHYROXINE SODIUM 50 MCG: 0.05 TABLET ORAL at 06:37

## 2023-11-25 RX ADMIN — NICOTINE POLACRILEX 2 MG: 2 LOZENGE ORAL at 18:00

## 2023-11-25 RX ADMIN — DIVALPROEX SODIUM 250 MG: 250 TABLET, DELAYED RELEASE ORAL at 20:37

## 2023-11-25 RX ADMIN — Medication 10 ML: at 20:40

## 2023-11-25 RX ADMIN — PANTOPRAZOLE SODIUM 40 MG: 40 TABLET, DELAYED RELEASE ORAL at 16:00

## 2023-11-25 RX ADMIN — NICOTINE POLACRILEX 2 MG: 2 LOZENGE ORAL at 09:01

## 2023-11-25 RX ADMIN — ALPRAZOLAM 1 MG: 1 TABLET ORAL at 17:12

## 2023-11-25 RX ADMIN — PANTOPRAZOLE SODIUM 40 MG: 40 TABLET, DELAYED RELEASE ORAL at 06:37

## 2023-11-25 ASSESSMENT — PAIN DESCRIPTION - LOCATION: LOCATION: BACK

## 2023-11-25 ASSESSMENT — PAIN DESCRIPTION - DESCRIPTORS: DESCRIPTORS: ACHING

## 2023-11-25 ASSESSMENT — PAIN SCALES - GENERAL
PAINLEVEL_OUTOF10: 0
PAINLEVEL_OUTOF10: 6

## 2023-11-25 NOTE — PLAN OF CARE
Problem: Pain  Goal: Verbalizes/displays adequate comfort level or baseline comfort level  Outcome: Progressing     Problem: Safety - Adult  Goal: Free from fall injury  Outcome: Progressing     Problem: Discharge Planning  Goal: Discharge to home or other facility with appropriate resources  Outcome: Progressing     Problem: Chronic Conditions and Co-morbidities  Goal: Patient's chronic conditions and co-morbidity symptoms are monitored and maintained or improved  Outcome: Progressing

## 2023-11-26 VITALS
TEMPERATURE: 97.9 F | HEART RATE: 77 BPM | WEIGHT: 196.9 LBS | DIASTOLIC BLOOD PRESSURE: 73 MMHG | BODY MASS INDEX: 33.61 KG/M2 | HEIGHT: 64 IN | RESPIRATION RATE: 20 BRPM | SYSTOLIC BLOOD PRESSURE: 118 MMHG | OXYGEN SATURATION: 95 %

## 2023-11-26 LAB
ALBUMIN SERPL-MCNC: 3.7 G/DL (ref 3.4–5)
ANION GAP SERPL CALCULATED.3IONS-SCNC: 13 MMOL/L (ref 3–16)
BUN SERPL-MCNC: 15 MG/DL (ref 7–20)
CALCIUM SERPL-MCNC: 9.2 MG/DL (ref 8.3–10.6)
CHLORIDE SERPL-SCNC: 100 MMOL/L (ref 99–110)
CO2 SERPL-SCNC: 23 MMOL/L (ref 21–32)
CORTIS SERPL-MCNC: 15 UG/DL
CORTIS SERPL-MCNC: 2.3 UG/DL
CREAT SERPL-MCNC: 1.1 MG/DL (ref 0.6–1.1)
GFR SERPLBLD CREATININE-BSD FMLA CKD-EPI: >60 ML/MIN/{1.73_M2}
GLUCOSE SERPL-MCNC: 116 MG/DL (ref 70–99)
PHOSPHATE SERPL-MCNC: 4.1 MG/DL (ref 2.5–4.9)
POTASSIUM SERPL-SCNC: 4.5 MMOL/L (ref 3.5–5.1)
SODIUM SERPL-SCNC: 136 MMOL/L (ref 136–145)

## 2023-11-26 PROCEDURE — 80069 RENAL FUNCTION PANEL: CPT

## 2023-11-26 PROCEDURE — 6370000000 HC RX 637 (ALT 250 FOR IP): Performed by: INTERNAL MEDICINE

## 2023-11-26 PROCEDURE — 99232 SBSQ HOSP IP/OBS MODERATE 35: CPT | Performed by: INTERNAL MEDICINE

## 2023-11-26 PROCEDURE — 2580000003 HC RX 258: Performed by: STUDENT IN AN ORGANIZED HEALTH CARE EDUCATION/TRAINING PROGRAM

## 2023-11-26 PROCEDURE — 82533 TOTAL CORTISOL: CPT

## 2023-11-26 PROCEDURE — 2580000003 HC RX 258: Performed by: INTERNAL MEDICINE

## 2023-11-26 PROCEDURE — 6360000002 HC RX W HCPCS: Performed by: STUDENT IN AN ORGANIZED HEALTH CARE EDUCATION/TRAINING PROGRAM

## 2023-11-26 PROCEDURE — 6360000002 HC RX W HCPCS: Performed by: INTERNAL MEDICINE

## 2023-11-26 PROCEDURE — 36415 COLL VENOUS BLD VENIPUNCTURE: CPT

## 2023-11-26 PROCEDURE — 6370000000 HC RX 637 (ALT 250 FOR IP): Performed by: STUDENT IN AN ORGANIZED HEALTH CARE EDUCATION/TRAINING PROGRAM

## 2023-11-26 RX ORDER — LEVOTHYROXINE SODIUM 0.03 MG/1
75 TABLET ORAL DAILY
Qty: 90 TABLET | Refills: 1 | Status: SHIPPED | OUTPATIENT
Start: 2023-11-27

## 2023-11-26 RX ORDER — 0.9 % SODIUM CHLORIDE 0.9 %
250 INTRAVENOUS SOLUTION INTRAVENOUS ONCE
Status: COMPLETED | OUTPATIENT
Start: 2023-11-26 | End: 2023-11-26

## 2023-11-26 RX ADMIN — MIDODRINE HYDROCHLORIDE 2.5 MG: 5 TABLET ORAL at 08:38

## 2023-11-26 RX ADMIN — ALPRAZOLAM 1 MG: 1 TABLET ORAL at 08:48

## 2023-11-26 RX ADMIN — SODIUM CHLORIDE 250 ML: 9 INJECTION, SOLUTION INTRAVENOUS at 15:20

## 2023-11-26 RX ADMIN — LINEZOLID 600 MG: 600 TABLET, FILM COATED ORAL at 08:38

## 2023-11-26 RX ADMIN — ALPRAZOLAM 1 MG: 1 TABLET ORAL at 15:25

## 2023-11-26 RX ADMIN — DIVALPROEX SODIUM 250 MG: 250 TABLET, DELAYED RELEASE ORAL at 08:38

## 2023-11-26 RX ADMIN — Medication 10 ML: at 08:42

## 2023-11-26 RX ADMIN — PREGABALIN 150 MG: 75 CAPSULE ORAL at 08:38

## 2023-11-26 RX ADMIN — COSYNTROPIN 250 MCG: 0.25 INJECTION, POWDER, LYOPHILIZED, FOR SOLUTION INTRAMUSCULAR; INTRAVENOUS at 08:29

## 2023-11-26 RX ADMIN — MIDODRINE HYDROCHLORIDE 2.5 MG: 5 TABLET ORAL at 12:42

## 2023-11-26 RX ADMIN — NICOTINE POLACRILEX 2 MG: 2 LOZENGE ORAL at 17:33

## 2023-11-26 RX ADMIN — PANTOPRAZOLE SODIUM 40 MG: 40 TABLET, DELAYED RELEASE ORAL at 05:43

## 2023-11-26 RX ADMIN — METHADONE HYDROCHLORIDE 120 MG: 10 TABLET ORAL at 08:38

## 2023-11-26 RX ADMIN — PANTOPRAZOLE SODIUM 40 MG: 40 TABLET, DELAYED RELEASE ORAL at 15:28

## 2023-11-26 RX ADMIN — ENOXAPARIN SODIUM 40 MG: 100 INJECTION SUBCUTANEOUS at 08:39

## 2023-11-26 RX ADMIN — SPIRONOLACTONE 25 MG: 25 TABLET ORAL at 08:38

## 2023-11-26 RX ADMIN — PIPERACILLIN AND TAZOBACTAM 3375 MG: 3; .375 INJECTION, POWDER, LYOPHILIZED, FOR SOLUTION INTRAVENOUS at 05:43

## 2023-11-26 RX ADMIN — NICOTINE POLACRILEX 2 MG: 2 LOZENGE ORAL at 15:25

## 2023-11-26 RX ADMIN — LEVOTHYROXINE SODIUM 75 MCG: 0.03 TABLET ORAL at 05:43

## 2023-11-26 RX ADMIN — MIDODRINE HYDROCHLORIDE 2.5 MG: 5 TABLET ORAL at 17:31

## 2023-11-26 RX ADMIN — NICOTINE POLACRILEX 2 MG: 2 LOZENGE ORAL at 10:32

## 2023-11-26 RX ADMIN — NICOTINE POLACRILEX 2 MG: 2 LOZENGE ORAL at 13:09

## 2023-11-26 ASSESSMENT — PAIN SCALES - GENERAL: PAINLEVEL_OUTOF10: 7

## 2023-11-26 ASSESSMENT — PAIN DESCRIPTION - LOCATION: LOCATION: GENERALIZED;BACK

## 2023-11-26 ASSESSMENT — PAIN DESCRIPTION - DESCRIPTORS: DESCRIPTORS: ACHING;THROBBING

## 2023-11-26 NOTE — DISCHARGE SUMMARY
Hospital Medicine Discharge Summary    Patient: Mima Duenas   : 1980     Admit Date: 2023   Discharge Date: 2023    Disposition:  [x]Home   []HHC  []SNF  []ECF  []Acute Rehab  []LTAC  []Hospice  Code status:  [x]Full  []DNR/CCA  []Limited (DNR/CCA with Do Not Intubate)  []DNRCC  Condition at Discharge: Stable  Primary Care Provider: Damien Marinelli MD    Admitting Provider: Rere Sanz MD  Discharge Provider: Nick Baez MD     Discharge Diagnoses: Active Hospital Problems    Diagnosis     Mediastinal adenopathy [R59.0]     Chronic pneumonia [J18.9]     Pulmonary infiltrates [R91.8]     Normocytic normochromic anemia [D64.9]     COPD (chronic obstructive pulmonary disease) ([address]) [J44.9]        Presenting Admission History:        Patient is a 37 y.o. female with a PMHx of Asthma/COPD, polysubstance abuse, Bipolar disorder and GERD  who presented to the ED with SOB and cough. Patient with recurrent and persistent symptoms of cough and SOB for the last few days, reports it has been ongoing for the 6-8weeks. She states her sputum is \"neon\" color and at home desatted to the 80s. She reports associated generalized weakness/fatigue and decreased PO intake. Denied any F/C, HA, dizziness, presyncope, syncope, N/V, abdominal pain, bleeding (hemoptysis / hematemesis, hematuria, BRBPR), C/D, or changes in urinary habits. reports that she has been smoking cigarettes. She has been smoking an average of .5 packs per day. She has never used smokeless tobacco. She reports that she does not currently use alcohol. She reports that she does not currently use drugs. History obtained from: Patient, ED provider, EMR      Assessment/Plan:          Sepsis with acute hypoxemic respiratory failure secondary to Chronic pneumonia -resolved  - Presented with persistent and refractory SOB and cough x6-8 weeks   - Hemodynamically stable, afebrile, leukocytosis of 11. LA WNL.  Requiring 3L NC ,

## 2023-11-26 NOTE — DISCHARGE INSTRUCTIONS
Follow up with PCP in 1-2 weeks  Follow up with endocrinologist. Call Monday for appointment  Follow up with pulmonologist in 1-2 weeks

## 2023-11-27 LAB — CORTIS SAL-MCNC: <0.025 UG/DL

## 2023-11-28 LAB
ACID FAST STN SPEC QL: NORMAL
MYCOBACTERIUM SPEC CULT: NORMAL

## 2023-12-04 LAB
FUNGUS SPEC CULT: ABNORMAL
LOEFFLER MB STN SPEC: ABNORMAL
ORGANISM: ABNORMAL

## 2023-12-05 LAB
ACID FAST STN SPEC QL: NORMAL
MYCOBACTERIUM SPEC CULT: NORMAL

## 2023-12-12 LAB
ACID FAST STN SPEC QL: NORMAL
MYCOBACTERIUM SPEC CULT: NORMAL

## 2024-01-25 ENCOUNTER — HOSPITAL ENCOUNTER (OUTPATIENT)
Dept: PULMONOLOGY | Age: 44
Discharge: HOME | End: 2024-01-25
Payer: COMMERCIAL

## 2024-01-25 VITALS — OXYGEN SATURATION: 94 %

## 2024-01-25 DIAGNOSIS — J44.9 CHRONIC OBSTRUCTIVE PULMONARY DISEASE, UNSPECIFIED COPD TYPE (HCC): ICD-10-CM

## 2024-01-25 DIAGNOSIS — J45.909 ASTHMA, UNSPECIFIED ASTHMA SEVERITY, UNSPECIFIED WHETHER COMPLICATED, UNSPECIFIED WHETHER PERSISTENT: ICD-10-CM

## 2024-01-25 LAB
DLCO %PRED: 94 %
DLCO PRED: NORMAL
DLCO/VA %PRED: NORMAL
DLCO/VA PRED: NORMAL
DLCO/VA: NORMAL
DLCO: NORMAL
EXPIRATORY TIME-POST: NORMAL
EXPIRATORY TIME: NORMAL
FEF 25-75 %CHNG: NORMAL
FEF 25-75 POST %PRED: NORMAL
FEF 25-75% %PRED-PRE: NORMAL
FEF 25-75% PRED: NORMAL
FEF 25-75-POST: NORMAL
FEF 25-75-PRE: NORMAL
FEV1 %PRED-POST: 75 %
FEV1 %PRED-PRE: 73 %
FEV1 PRED: NORMAL
FEV1-POST: NORMAL
FEV1-PRE: NORMAL
FEV1/FVC %PRED-POST: 105 %
FEV1/FVC %PRED-PRE: 99 %
FEV1/FVC PRED: NORMAL
FEV1/FVC-POST: NORMAL
FEV1/FVC-PRE: NORMAL
FVC %PRED-POST: 71 L
FVC %PRED-PRE: 73 %
FVC PRED: NORMAL
FVC-POST: NORMAL
FVC-PRE: NORMAL
GAW %PRED: NORMAL
GAW PRED: NORMAL
GAW: NORMAL
IC PRE %PRED: NORMAL
IC PRED: NORMAL
IC: NORMAL
MEP: NORMAL
MIP: NORMAL
MVV %PRED-PRE: NORMAL
MVV PRED: NORMAL
MVV-PRE: NORMAL
PEF %PRED-POST: NORMAL
PEF %PRED-PRE: NORMAL
PEF PRED: NORMAL
PEF%CHNG: NORMAL
PEF-POST: NORMAL
PEF-PRE: NORMAL
RAW %PRED: NORMAL
RAW PRED: NORMAL
RAW: NORMAL
RV PRE %PRED: NORMAL
RV PRED: NORMAL
RV: NORMAL
SVC %PRED: NORMAL
SVC PRED: NORMAL
SVC: NORMAL
TLC PRE %PRED: 85 %
TLC PRED: NORMAL
TLC: NORMAL
VA %PRED: NORMAL
VA PRED: NORMAL
VA: NORMAL
VTG %PRED: NORMAL
VTG PRED: NORMAL
VTG: NORMAL

## 2024-01-25 PROCEDURE — 94760 N-INVAS EAR/PLS OXIMETRY 1: CPT

## 2024-01-25 PROCEDURE — 94726 PLETHYSMOGRAPHY LUNG VOLUMES: CPT

## 2024-01-25 PROCEDURE — 94729 DIFFUSING CAPACITY: CPT

## 2024-01-25 PROCEDURE — 94060 EVALUATION OF WHEEZING: CPT

## 2024-01-25 PROCEDURE — 6370000000 HC RX 637 (ALT 250 FOR IP): Performed by: INTERNAL MEDICINE

## 2024-01-25 RX ORDER — ALBUTEROL SULFATE 90 UG/1
4 AEROSOL, METERED RESPIRATORY (INHALATION) ONCE
Status: COMPLETED | OUTPATIENT
Start: 2024-01-25 | End: 2024-01-25

## 2024-01-25 RX ADMIN — Medication 4 PUFF: at 09:20

## 2024-01-25 ASSESSMENT — PULMONARY FUNCTION TESTS
FEV1_PERCENT_PREDICTED_PRE: 73
FVC_PERCENT_PREDICTED_POST: 71
FVC_PERCENT_PREDICTED_PRE: 73
FEV1_PERCENT_PREDICTED_POST: 75
FEV1/FVC_PERCENT_PREDICTED_POST: 105
FEV1/FVC_PERCENT_PREDICTED_PRE: 99

## 2024-01-26 NOTE — PROCEDURES
[address] [address]                               PULMONARY FUNCTION    PATIENT NAME: FERNANDO RINCON                    :        1980  MED REC NO:   [id number]                          ROOM:  ACCOUNT NO:   [id number]                           ADMIT DATE: 2024  PROVIDER:     Sneha Dodson MD    DATE OF PROCEDURE:  2024    PFT INTERPRETATION    The patient is a 43-year-old female with CSN number of [id number] who  underwent a PFT for unspecified asthma.  Spirometry shows FVC to be 73%,  FEV1 to be 73%, FEV1 to FVC ratio was 99%, MHV14-00% was 74%.  The  patient had some postbronchodilator improvement, which was not  significant in the small airways.  The total lung capacity was normal.   The patient has some air trapping.  The patient has decrease in ERV.   The patient's diffusion capacity when adjusted for volume was normal.   Flow-volume loop was normal.  On the basis of this PFT, the patient has  mild obstructive airway disease with some changes in the PFT parameters  because of body habitus.  Please correlate clinically.        SNEHA DODSON MD    D: 2024 17:38:01       T: 2024 17:40:33     [email or web address]  Job#: [id number]     Doc#: [id number]    CC:

## 2024-01-29 ENCOUNTER — TELEPHONE (OUTPATIENT)
Dept: PULMONOLOGY | Age: 44
End: 2024-01-29

## 2024-01-29 NOTE — TELEPHONE ENCOUNTER
Patient called and said she thinks she has an Upper Respiratory Infection, states she is coughing up small amount of blood every time she coughs . She wants to know what she is supposed to do. Please advise.

## 2024-01-29 NOTE — TELEPHONE ENCOUNTER
Spoke with the patient.  She has not been seen in our office.  She was offered an appointment for 01/30 and 01/31 but she declined both appointments.  She did schedule for 02/14 and stated that she will schedule with her PCP for current symptoms.

## 2024-01-31 NOTE — PROGRESS NOTES
PULMONARY CLINIC NOTE      Anjelica Blanchard   : 1980  MRN: [id number]     Date of Service: 2024    PCP: Smith Neff MD    Referring provider: No ref. provider found      Chief Complaint   Patient presents with    Follow-up     Patient is here to discuss her PFT results           ASSESSMENT & PLAN       43 y.o. pleasant  female patient with:    1. Bilateral pulmonary infiltrates on chest x-ray    2. Snoring    3. Excessive daytime sleepiness    4. Mediastinal adenopathy           # Hospital admission follow-up encounter  #Multifocal/multilobar groundglass opacities.  Persistent pneumonia.  Failed outpatient treatment.  Negative FINESSE and normal immunoglobin levels.  # Mediastinal and hilar adenopathy likely reactive  # Bronchial asthma.  Uncontrolled with frequent exacerbations.  Nonspecific defect in the mild range with air trapping on PFTs.  # Recurrent upper respiratory infections  #More than 20-pack-year smoking history.  Quit vaping and smoking in 2023  #Difficulties with moderate sedation.  Needs general anesthesia for future procedures  # Multiple substance use history: Positive UDS in May 2023 for amphetamines, benzodiazepines, cocaine, methadone, oxycodone  Education provided on sleep apnea, contributing factors, benefits of treatment and risks of untreated sleep apnea.  Sleep hygiene education provided .  Home sleep apnea test  Low-dose CT scan for follow-up on lung infiltrates/pneumonia 1 week before next visit  Continue Symbicort 2 puffs twice a day with a spacer.  Wash mouth after use.  Patient got Symbicort from outside provider not in [employer] system.  Does not confirmed  Will order air supra to use instead of albuterol between now and next visit  Inhaler education provided  Follow-up in 6 weeks    # Obesity class II   Body mass index is 35.69 kg/m².  Targeting healthy weight is encouraged. Healthy weight can help treat sleep apnea, decrease breathing difficulties and respiratory

## 2024-02-14 ENCOUNTER — OFFICE VISIT (OUTPATIENT)
Dept: PULMONOLOGY | Age: 44
End: 2024-02-14
Payer: COMMERCIAL

## 2024-02-14 ENCOUNTER — TELEPHONE (OUTPATIENT)
Dept: PULMONOLOGY | Age: 44
End: 2024-02-14

## 2024-02-14 VITALS
HEIGHT: 64 IN | WEIGHT: 207.9 LBS | OXYGEN SATURATION: 97 % | BODY MASS INDEX: 35.49 KG/M2 | HEART RATE: 79 BPM | SYSTOLIC BLOOD PRESSURE: 106 MMHG | DIASTOLIC BLOOD PRESSURE: 66 MMHG | TEMPERATURE: 96.9 F | RESPIRATION RATE: 16 BRPM

## 2024-02-14 DIAGNOSIS — G47.19 EXCESSIVE DAYTIME SLEEPINESS: ICD-10-CM

## 2024-02-14 DIAGNOSIS — R59.0 MEDIASTINAL ADENOPATHY: ICD-10-CM

## 2024-02-14 DIAGNOSIS — R91.8 BILATERAL PULMONARY INFILTRATES ON CHEST X-RAY: Primary | ICD-10-CM

## 2024-02-14 DIAGNOSIS — R06.83 SNORING: ICD-10-CM

## 2024-02-14 PROCEDURE — 99215 OFFICE O/P EST HI 40 MIN: CPT | Performed by: INTERNAL MEDICINE

## 2024-02-14 PROCEDURE — G8484 FLU IMMUNIZE NO ADMIN: HCPCS | Performed by: INTERNAL MEDICINE

## 2024-02-14 PROCEDURE — 4004F PT TOBACCO SCREEN RCVD TLK: CPT | Performed by: INTERNAL MEDICINE

## 2024-02-14 PROCEDURE — G8417 CALC BMI ABV UP PARAM F/U: HCPCS | Performed by: INTERNAL MEDICINE

## 2024-02-14 PROCEDURE — 94664 DEMO&/EVAL PT USE INHALER: CPT | Performed by: INTERNAL MEDICINE

## 2024-02-14 PROCEDURE — G8427 DOCREV CUR MEDS BY ELIG CLIN: HCPCS | Performed by: INTERNAL MEDICINE

## 2024-02-14 NOTE — PATIENT INSTRUCTIONS
Education provided on sleep apnea, contributing factors, benefits of treatment and risks of untreated sleep apnea.  Sleep hygiene education provided .  Home sleep apnea test  Low-dose CT scan for follow-up on lung infiltrates/pneumonia 1 week before next visit  Continue Symbicort 2 puffs twice a day with a spacer.  Wash mouth after use  Will order air supra to use instead of albuterol between now and next visit  Inhaler education provided  Follow-up in 6 weeks      Health Maintenance/Preventive measures:        >>  Avoid exposure to tobacco, irritants, allergens as possible as well as contact with patients with infectious respiratory illness.        >>  Stay up-to-date with influenza & pneumonia vaccines, RSV, & COVID-19 vaccine as recommended by the Advisory Committee on Immunization Practices (ACIP)        >>  Healthy diet and activity as able.        >>  Acid reflux precautions: Head of bed elevation, avoiding tight clothes, avoiding big meals or snacking 3 hours before bedtime, targeting healthy weight.        >>  Practice sleep hygiene measures. Avoid driving or operating heavy machines if tired or sleepy.     Your home sleep test is scheduled to be picked up at the Sleep center located at [facility] .     Address to Sleep Center:  The Sleep Center at [address], [address], [place] [address]            Phone: [phone] Fax: [phone]    If you should need to cancel or reschedule your appointment, please call the Sleep Center at [phone] as soon as possible.     We ask that you please phone the [facility] Patient Pre-Services ([phone]) at least 3-5 days prior to your sleep study to pre-register. Failing to pre-register may ultimately cause your insurance to not pay for this procedure.     Please refrain from or reduce the use of caffeine and/or alcohol prior to your sleep study and avoid napping the day of your study as these will affect the accuracy of your test

## 2024-02-14 NOTE — ED NOTES
A/P: 29y   PPD # 1 S/P  doing well    Current Issues: None    PAST MEDICAL & SURGICAL HISTORY: Denies  
Ace wrap to knee.      Chance Stewart, RN  02/18/22 9081
Patient states she fell on her knee and needs pain medication. NP [place].       Leatrice Nyhan, RN  02/18/22 [address], RN  02/18/22 7374
30yo s/p  PPD#2 doing well

## 2024-02-14 NOTE — PROGRESS NOTES
MA Communication:  The following orders are received by verbal communication from Manisha Medina MD    Orders include:    LDCT  HST  Follow up in 6 weeks

## 2024-02-14 NOTE — TELEPHONE ENCOUNTER
Submitted PA for AIRSUPRA  [facility] Key: [id number] STATUS: PENDING.    Follow up done daily; if no decision with in three days we will refax.  If another three days goes by with no decision will call the insurance for status.

## 2024-02-16 NOTE — TELEPHONE ENCOUNTER
PA denied for Airsupra. Denial scanned to media. Would you like patient switched to another medication?

## 2024-03-04 ENCOUNTER — TRANSCRIBE ORDERS (OUTPATIENT)
Dept: ADMINISTRATIVE | Age: 44
End: 2024-03-04

## 2024-03-04 DIAGNOSIS — R20.2 LEG PARESTHESIA: ICD-10-CM

## 2024-03-04 DIAGNOSIS — M54.2 NECK PAIN: Primary | ICD-10-CM

## 2024-03-04 DIAGNOSIS — M54.9 BACK PAIN, UNSPECIFIED BACK LOCATION, UNSPECIFIED BACK PAIN LATERALITY, UNSPECIFIED CHRONICITY: ICD-10-CM

## 2024-03-05 ENCOUNTER — TELEPHONE (OUTPATIENT)
Dept: PULMONOLOGY | Age: 44
End: 2024-03-05

## 2024-03-05 DIAGNOSIS — J18.9 CHRONIC PNEUMONIA: Primary | ICD-10-CM

## 2024-03-05 RX ORDER — GUAIFENESIN 600 MG/1
1200 TABLET, EXTENDED RELEASE ORAL 2 TIMES DAILY
Qty: 40 TABLET | Refills: 0 | Status: SHIPPED | OUTPATIENT
Start: 2024-03-05 | End: 2024-03-15

## 2024-03-05 NOTE — TELEPHONE ENCOUNTER
Pt feels like she is having rebound pneumonia. Pt wants to know if Carol would like to see her sooner and if he would like to call in any antibiotics. Next CT is scheduled for 3.27.24 and next OV is 4.03.24. Pt prefers to be called at [phone].    Symptoms-      How long have you been experiencing your new symptoms?    : 1 week     Cough:  Yes      If yes please describe if dry, thick, thin, and also the color: yellow, thick     SOB: Yes     Chest tightness: Yes - worse than usual      Wheezing: Yes     Fever No     Chills No    Have you been exposed to COVID-19 or been around someone with COVID-19, or traveled in the last 30 days? no    Do you have any body aches, New or worsening headache, new loss of taste or smell, congestion, diarrhea, Nausea, vomiting? : body aches, dizzy, burning sensation in chest and back, nausea    Have you had the COVID-19 vaccine? (single shot or 2 dose series) no     Any other symtoms you feel we should know: no    Do you have a nebulizer: No    If yes, how many times a day are you using the nebulizer?     Are you on Oxygen or CPAP/BIPAP at night: No     What inhalers are you taking? Symbicort and airsupra    Have you tried anything to make symptoms better? Yes      if yes please state what you have tried: mucinex     Are you currently taking an Antibiotics or Steroids? Steroid for adrenal - hydrocortizone 5mg, 2 in morning and 1 in afternoon     Review drug allergies: Yes - pych meds    Review patient preferred pharmacy for same day pickup of medications and list below: Yes    [facility] [address] [facility] [place]     Last visit with a pulmonary provider: 2.14.24

## 2024-03-05 NOTE — TELEPHONE ENCOUNTER
I have ordered a CXR and would like her to have this completed.   She is to use her albuterol - Budesonide  ( Airsupra) 2 puffs 4 times a day x 2 days.  May use Mucinex or guaifenesin 600-1200 mg twice a day to help thin secretions. Drink with plenty of water.  Will review CXR results for possible antibiotic however should be evaluated in clinic.

## 2024-03-05 NOTE — TELEPHONE ENCOUNTER
Called patient and offered to schedule an appointment with the nurse practitioner and she declined.  She states that she is too busy to come in. She stated that she didn't know why an antibiotic can't just be called in and requested message to be forwarded.  Please advise.

## 2024-03-05 NOTE — TELEPHONE ENCOUNTER
Pt called back to clarify how/when to get the chest XR. Pt was informed that she can go to the main hospital entrance and check in as a walk-in xray Mon-Fri during business hours. Pt was told that if she waited until the weekend she might have to go through the emergency room to get it done.

## 2024-03-05 NOTE — TELEPHONE ENCOUNTER
Spoke with the patient and informed of the response.  She is requesting a script for mucinex sent to the pharmacy because she has no money and her insurance will cover it if she has a script.    [facility] [address]

## 2024-03-11 NOTE — ED NOTES
Meal tray ordered.      Caron Negro RN  05/20/23 [id number]
Pt on video conference with tele psych     Adeola Segundo, CECI  05/20/23 [id number]
No

## 2024-03-12 RX ORDER — ALBUTEROL SULFATE AND BUDESONIDE 90; 80 UG/1; UG/1
AEROSOL, METERED RESPIRATORY (INHALATION)
Qty: 10.7 G | Refills: 3 | Status: SHIPPED | OUTPATIENT
Start: 2024-03-12

## 2024-03-13 ENCOUNTER — TELEPHONE (OUTPATIENT)
Dept: PULMONOLOGY | Age: 44
End: 2024-03-13

## 2024-03-13 NOTE — TELEPHONE ENCOUNTER
CANDIDA for for pt. With note from Dr. Medina. Will have to get med from former Sleep MD or PCP. To call us back in a few weeks when we have a ang. For Dr. Khan.

## 2024-03-13 NOTE — TELEPHONE ENCOUNTER
Pt did not realize you did sleep her sleep  Moved far away and she needs someone to take over her sleep. And to prescribe he Ambien 12.5. I explained that we have a DrNorberto Coming in June that covers insomia but she said she can't wait and wants to know if you can order med until Dr. Khan comes. Call pt [phone].

## 2024-03-20 ENCOUNTER — TELEPHONE (OUTPATIENT)
Dept: PULMONOLOGY | Age: 44
End: 2024-03-20

## 2024-03-20 ENCOUNTER — HOSPITAL ENCOUNTER (OUTPATIENT)
Dept: SLEEP CENTER | Age: 44
Discharge: HOME | End: 2024-03-22
Attending: INTERNAL MEDICINE
Payer: COMMERCIAL

## 2024-03-20 DIAGNOSIS — R06.83 SNORING: ICD-10-CM

## 2024-03-20 DIAGNOSIS — G47.19 EXCESSIVE DAYTIME SLEEPINESS: ICD-10-CM

## 2024-03-20 PROCEDURE — 95806 SLEEP STUDY UNATT&RESP EFFT: CPT

## 2024-03-20 NOTE — TELEPHONE ENCOUNTER
Pt called and said she thinks her pneumonia is back again.  She said she has been battling this for over 3 months.  She was ordered to get a CXR on 3/5/24 by Arabella Vaughn CNP, but pt did not get it done.    She is sick today and wants to get the CXR but wants us to order it STAT so she can wait there for results.  She feels like she needs to be direct admitted and be put in OV antibiotics.    I s/w Arabella and she said if pt feels she needs to be direct admitted, then she needs to go to ER for evaluation.    Pt informed and verbalized understanding.

## 2024-03-22 ENCOUNTER — APPOINTMENT (OUTPATIENT)
Dept: CT IMAGING | Age: 44
DRG: 194 | End: 2024-03-22
Payer: COMMERCIAL

## 2024-03-22 ENCOUNTER — APPOINTMENT (OUTPATIENT)
Dept: GENERAL RADIOLOGY | Age: 44
DRG: 194 | End: 2024-03-22
Payer: COMMERCIAL

## 2024-03-22 ENCOUNTER — HOSPITAL ENCOUNTER (INPATIENT)
Age: 44
LOS: 1 days | Discharge: HOME | DRG: 194 | End: 2024-03-25
Attending: EMERGENCY MEDICINE | Admitting: FAMILY MEDICINE
Payer: COMMERCIAL

## 2024-03-22 DIAGNOSIS — R06.02 SHORTNESS OF BREATH: ICD-10-CM

## 2024-03-22 DIAGNOSIS — R07.9 CHEST PAIN, UNSPECIFIED TYPE: Primary | ICD-10-CM

## 2024-03-22 DIAGNOSIS — I50.9 NEW ONSET OF CONGESTIVE HEART FAILURE (HCC): ICD-10-CM

## 2024-03-22 PROBLEM — E87.70 FLUID OVERLOAD: Status: ACTIVE | Noted: 2024-03-22

## 2024-03-22 PROBLEM — F11.20 OPIOID DEPENDENCE (HCC): Status: ACTIVE | Noted: 2020-03-02

## 2024-03-22 PROBLEM — I95.9 HYPOTENSION: Status: ACTIVE | Noted: 2024-03-22

## 2024-03-22 LAB
ALBUMIN SERPL-MCNC: 3.8 G/DL (ref 3.4–5)
ALBUMIN/GLOB SERPL: 1.1 {RATIO} (ref 1.1–2.2)
ALP SERPL-CCNC: 91 U/L (ref 40–129)
ALT SERPL-CCNC: 15 U/L (ref 10–40)
ANION GAP SERPL CALCULATED.3IONS-SCNC: 12 MMOL/L (ref 3–16)
AST SERPL-CCNC: 36 U/L (ref 15–37)
BACTERIA URNS QL MICRO: ABNORMAL /HPF
BASE EXCESS BLDV CALC-SCNC: 0.7 MMOL/L (ref -3–3)
BASOPHILS # BLD: 0.1 K/UL (ref 0–0.2)
BASOPHILS NFR BLD: 0.4 %
BILIRUB SERPL-MCNC: <0.2 MG/DL (ref 0–1)
BILIRUB UR QL STRIP.AUTO: NEGATIVE
BUN SERPL-MCNC: 12 MG/DL (ref 7–20)
CALCIUM SERPL-MCNC: 9.2 MG/DL (ref 8.3–10.6)
CHLORIDE SERPL-SCNC: 94 MMOL/L (ref 99–110)
CLARITY UR: CLEAR
CO2 BLDV-SCNC: 28 MMOL/L
CO2 SERPL-SCNC: 25 MMOL/L (ref 21–32)
COHGB MFR BLDV: 2.1 % (ref 0–1.5)
COLOR UR: YELLOW
CREAT SERPL-MCNC: 1 MG/DL (ref 0.6–1.1)
DEPRECATED RDW RBC AUTO: 15.8 % (ref 12.4–15.4)
EKG ATRIAL RATE: 76 BPM
EKG DIAGNOSIS: NORMAL
EKG P AXIS: 44 DEGREES
EKG P-R INTERVAL: 132 MS
EKG Q-T INTERVAL: 384 MS
EKG QRS DURATION: 86 MS
EKG QTC CALCULATION (BAZETT): 432 MS
EKG R AXIS: -3 DEGREES
EKG T AXIS: 38 DEGREES
EKG VENTRICULAR RATE: 76 BPM
EOSINOPHIL # BLD: 0.1 K/UL (ref 0–0.6)
EOSINOPHIL NFR BLD: 1.3 %
EPI CELLS #/AREA URNS HPF: ABNORMAL /HPF (ref 0–5)
FLUAV RNA RESP QL NAA+PROBE: NOT DETECTED
FLUBV RNA RESP QL NAA+PROBE: NOT DETECTED
GFR SERPLBLD CREATININE-BSD FMLA CKD-EPI: >60 ML/MIN/{1.73_M2}
GLUCOSE SERPL-MCNC: 106 MG/DL (ref 70–99)
GLUCOSE UR STRIP.AUTO-MCNC: NEGATIVE MG/DL
HCG SERPL QL: NEGATIVE
HCO3 BLDV-SCNC: 26.4 MMOL/L (ref 23–29)
HCT VFR BLD AUTO: 34.9 % (ref 36–48)
HGB BLD-MCNC: 11.3 G/DL (ref 12–16)
HGB UR QL STRIP.AUTO: NEGATIVE
HYALINE CASTS #/AREA URNS LPF: ABNORMAL /LPF (ref 0–2)
KETONES UR STRIP.AUTO-MCNC: NEGATIVE MG/DL
LACTATE BLDV-SCNC: 1 MMOL/L (ref 0.4–1.9)
LACTATE BLDV-SCNC: 1.7 MMOL/L (ref 0.4–1.9)
LEUKOCYTE ESTERASE UR QL STRIP.AUTO: ABNORMAL
LYMPHOCYTES # BLD: 1.2 K/UL (ref 1–5.1)
LYMPHOCYTES NFR BLD: 10 %
MCH RBC QN AUTO: 28.3 PG (ref 26–34)
MCHC RBC AUTO-ENTMCNC: 32.4 G/DL (ref 31–36)
MCV RBC AUTO: 87.4 FL (ref 80–100)
METHGB MFR BLDV: 0.1 %
MONOCYTES # BLD: 0.4 K/UL (ref 0–1.3)
MONOCYTES NFR BLD: 3.6 %
NEUTROPHILS # BLD: 9.9 K/UL (ref 1.7–7.7)
NEUTROPHILS NFR BLD: 84.7 %
NITRITE UR QL STRIP.AUTO: NEGATIVE
NT-PROBNP SERPL-MCNC: 622 PG/ML (ref 0–124)
O2 THERAPY: ABNORMAL
PCO2 BLDV: 46.7 MMHG (ref 40–50)
PH BLDV: 7.37 [PH] (ref 7.35–7.45)
PH UR STRIP.AUTO: 8 [PH] (ref 5–8)
PLATELET # BLD AUTO: 254 K/UL (ref 135–450)
PMV BLD AUTO: 7.7 FL (ref 5–10.5)
PO2 BLDV: 56.4 MMHG (ref 25–40)
POTASSIUM SERPL-SCNC: 5 MMOL/L (ref 3.5–5.1)
PROCALCITONIN SERPL IA-MCNC: 0.08 NG/ML (ref 0–0.15)
PROT SERPL-MCNC: 7.4 G/DL (ref 6.4–8.2)
PROT UR STRIP.AUTO-MCNC: NEGATIVE MG/DL
RBC # BLD AUTO: 4 M/UL (ref 4–5.2)
RBC #/AREA URNS HPF: ABNORMAL /HPF (ref 0–4)
SAO2 % BLDV: 89 %
SARS-COV-2 RNA RESP QL NAA+PROBE: NOT DETECTED
SODIUM SERPL-SCNC: 131 MMOL/L (ref 136–145)
SP GR UR STRIP.AUTO: 1.02 (ref 1–1.03)
TROPONIN, HIGH SENSITIVITY: 13 NG/L (ref 0–14)
TROPONIN, HIGH SENSITIVITY: 9 NG/L (ref 0–14)
UA COMPLETE W REFLEX CULTURE PNL UR: ABNORMAL
UA DIPSTICK W REFLEX MICRO PNL UR: YES
URN SPEC COLLECT METH UR: ABNORMAL
UROBILINOGEN UR STRIP-ACNC: 0.2 E.U./DL
WBC # BLD AUTO: 11.7 K/UL (ref 4–11)
WBC #/AREA URNS HPF: ABNORMAL /HPF (ref 0–5)

## 2024-03-22 PROCEDURE — 82803 BLOOD GASES ANY COMBINATION: CPT

## 2024-03-22 PROCEDURE — 84484 ASSAY OF TROPONIN QUANT: CPT

## 2024-03-22 PROCEDURE — 6360000004 HC RX CONTRAST MEDICATION: Performed by: REGISTERED NURSE

## 2024-03-22 PROCEDURE — 6370000000 HC RX 637 (ALT 250 FOR IP): Performed by: REGISTERED NURSE

## 2024-03-22 PROCEDURE — 71045 X-RAY EXAM CHEST 1 VIEW: CPT

## 2024-03-22 PROCEDURE — 83880 ASSAY OF NATRIURETIC PEPTIDE: CPT

## 2024-03-22 PROCEDURE — 84145 PROCALCITONIN (PCT): CPT

## 2024-03-22 PROCEDURE — 71260 CT THORAX DX C+: CPT

## 2024-03-22 PROCEDURE — G0378 HOSPITAL OBSERVATION PER HR: HCPCS

## 2024-03-22 PROCEDURE — 6360000002 HC RX W HCPCS: Performed by: REGISTERED NURSE

## 2024-03-22 PROCEDURE — 87636 SARSCOV2 & INF A&B AMP PRB: CPT

## 2024-03-22 PROCEDURE — 93010 ELECTROCARDIOGRAM REPORT: CPT | Performed by: INTERNAL MEDICINE

## 2024-03-22 PROCEDURE — 81001 URINALYSIS AUTO W/SCOPE: CPT

## 2024-03-22 PROCEDURE — 93005 ELECTROCARDIOGRAM TRACING: CPT | Performed by: EMERGENCY MEDICINE

## 2024-03-22 PROCEDURE — 6360000002 HC RX W HCPCS: Performed by: EMERGENCY MEDICINE

## 2024-03-22 PROCEDURE — 99285 EMERGENCY DEPT VISIT HI MDM: CPT

## 2024-03-22 PROCEDURE — 6360000002 HC RX W HCPCS

## 2024-03-22 PROCEDURE — 83605 ASSAY OF LACTIC ACID: CPT

## 2024-03-22 PROCEDURE — 85025 COMPLETE CBC W/AUTO DIFF WBC: CPT

## 2024-03-22 PROCEDURE — 96375 TX/PRO/DX INJ NEW DRUG ADDON: CPT

## 2024-03-22 PROCEDURE — 84703 CHORIONIC GONADOTROPIN ASSAY: CPT

## 2024-03-22 PROCEDURE — 80053 COMPREHEN METABOLIC PANEL: CPT

## 2024-03-22 PROCEDURE — 96374 THER/PROPH/DIAG INJ IV PUSH: CPT

## 2024-03-22 PROCEDURE — 94640 AIRWAY INHALATION TREATMENT: CPT

## 2024-03-22 RX ORDER — ONDANSETRON 2 MG/ML
4 INJECTION INTRAMUSCULAR; INTRAVENOUS ONCE
Status: COMPLETED | OUTPATIENT
Start: 2024-03-22 | End: 2024-03-22

## 2024-03-22 RX ORDER — IPRATROPIUM BROMIDE AND ALBUTEROL SULFATE 2.5; .5 MG/3ML; MG/3ML
1 SOLUTION RESPIRATORY (INHALATION) ONCE
Status: COMPLETED | OUTPATIENT
Start: 2024-03-22 | End: 2024-03-22

## 2024-03-22 RX ORDER — KETOROLAC TROMETHAMINE 30 MG/ML
30 INJECTION, SOLUTION INTRAMUSCULAR; INTRAVENOUS ONCE
Status: COMPLETED | OUTPATIENT
Start: 2024-03-22 | End: 2024-03-22

## 2024-03-22 RX ORDER — ASPIRIN 81 MG/1
324 TABLET, CHEWABLE ORAL ONCE
Status: COMPLETED | OUTPATIENT
Start: 2024-03-22 | End: 2024-03-22

## 2024-03-22 RX ORDER — ONDANSETRON 2 MG/ML
INJECTION INTRAMUSCULAR; INTRAVENOUS
Status: COMPLETED
Start: 2024-03-22 | End: 2024-03-22

## 2024-03-22 RX ORDER — FUROSEMIDE 10 MG/ML
INJECTION INTRAMUSCULAR; INTRAVENOUS
Status: DISCONTINUED
Start: 2024-03-22 | End: 2024-03-23

## 2024-03-22 RX ORDER — FUROSEMIDE 10 MG/ML
20 INJECTION INTRAMUSCULAR; INTRAVENOUS ONCE
Status: COMPLETED | OUTPATIENT
Start: 2024-03-22 | End: 2024-03-22

## 2024-03-22 RX ORDER — ALBUTEROL SULFATE 2.5 MG/3ML
5 SOLUTION RESPIRATORY (INHALATION) EVERY 6 HOURS PRN
Status: DISCONTINUED | OUTPATIENT
Start: 2024-03-22 | End: 2024-03-22

## 2024-03-22 RX ADMIN — KETOROLAC TROMETHAMINE 30 MG: 30 INJECTION, SOLUTION INTRAMUSCULAR; INTRAVENOUS at 21:02

## 2024-03-22 RX ADMIN — FUROSEMIDE 20 MG: 10 INJECTION, SOLUTION INTRAMUSCULAR; INTRAVENOUS at 21:03

## 2024-03-22 RX ADMIN — IPRATROPIUM BROMIDE AND ALBUTEROL SULFATE 1 DOSE: 2.5; .5 SOLUTION RESPIRATORY (INHALATION) at 18:29

## 2024-03-22 RX ADMIN — ASPIRIN 324 MG: 81 TABLET, CHEWABLE ORAL at 19:22

## 2024-03-22 RX ADMIN — ONDANSETRON 4 MG: 2 INJECTION INTRAMUSCULAR; INTRAVENOUS at 19:53

## 2024-03-22 RX ADMIN — IOPAMIDOL 75 ML: 755 INJECTION, SOLUTION INTRAVENOUS at 19:01

## 2024-03-22 ASSESSMENT — PAIN SCALES - GENERAL
PAINLEVEL_OUTOF10: 7
PAINLEVEL_OUTOF10: 5
PAINLEVEL_OUTOF10: 7
PAINLEVEL_OUTOF10: 7

## 2024-03-22 ASSESSMENT — PAIN DESCRIPTION - FREQUENCY: FREQUENCY: CONTINUOUS

## 2024-03-22 ASSESSMENT — PAIN DESCRIPTION - PAIN TYPE: TYPE: ACUTE PAIN

## 2024-03-22 ASSESSMENT — HEART SCORE: ECG: NORMAL

## 2024-03-22 ASSESSMENT — PAIN DESCRIPTION - LOCATION
LOCATION: CHEST;BACK
LOCATION: CHEST;BACK;KNEE
LOCATION: CHEST;BACK

## 2024-03-22 ASSESSMENT — PAIN DESCRIPTION - DESCRIPTORS
DESCRIPTORS: ACHING
DESCRIPTORS: ACHING

## 2024-03-22 ASSESSMENT — PAIN DESCRIPTION - ORIENTATION
ORIENTATION: RIGHT;LEFT
ORIENTATION: MID

## 2024-03-22 ASSESSMENT — PAIN DESCRIPTION - ONSET: ONSET: ON-GOING

## 2024-03-22 ASSESSMENT — PAIN - FUNCTIONAL ASSESSMENT: PAIN_FUNCTIONAL_ASSESSMENT: 0-10

## 2024-03-22 NOTE — ED PROVIDER NOTES
[facility]  ED  EMERGENCY DEPARTMENT ENCOUNTER        Pt Name: Anjelica Blanchard  MRN: [id number]  Birthdate 1980  Date of evaluation: 3/22/2024  Provider: ANNA Caruso - CNP  PCP: Smith Neff MD    This patient was seen and evaluated by the attending physician Dr. Terryhip    I have evaluated this patient. My supervising physician was available for consultation.      CHIEF COMPLAINT       Chief Complaint   Patient presents with    Chest Pain     Patient states she was diagnosed with pneumonia in October. States since then, she has \"been sick\" and thinks she has \"rebound pneumonia\". Patient states she has had intermittent chest pain since October. Has had constant pain for the past week.        HISTORY OF PRESENT ILLNESS   (Location/Symptom, Timing/Onset, Context/Setting, Quality, Duration, Modifying Factors, Severity)  Note limiting factors.     History from : Patient  Anjelica Blanchard is a 43 y.o. female who presents via private car for chest pain, shortness of breath. Onset was several months ago but seems worsening over the last week. Duration has been since the onset. Context includes patient presents to the emergency department today stating that she was sick with multifocal pneumonia and October or November and states that she has had intermittent upper respiratory infections since that time and has been on antibiotics multiple times but does not feel like she gets improvement.  She does state that over the last week she feels like she has had increasing shortness of breath and chest pain, increasing dyspnea on exertion and wheezing.  She does have midsternal chest pain that does not radiate.  She does state that it is worse with activity.  She denies palpitations but does feel like she has had some swelling in her extremities.  She endorses cough productive of yellow to green sputum with congestion and states that she has had intermittent fevers most recently yesterday

## 2024-03-22 NOTE — ED PROVIDER NOTES
I independently evaluated and obtained a history and physical on Anjelica Blanchard. I personally saw the patient and made/approved the management plan and take responsibility for the patient management.    All diagnostic, treatment, and disposition assistants were made to myself in conjunction the advanced practice provider.    For further details of this patient's emergency department encounter, please see the advanced practice provider's documentation.    History: 43-year-old female comes into the emergency department with chest pain and shortness of breath.  Patient states for the last several months she has been having some issues with just feeling poor.  She states she was diagnosed with multifocal pneumonia in October or November and she just has not been getting any better.  Today she states she has been having some midsternal chest discomfort.  No radiation.  No nausea or vomiting.  While she has no fever today she had a fever yesterday.    Physician Exam: Pleasant female in no acute distress.  On arrival she had decreased pulse ox below 90.  She improved with oxygen.  Breath sounds reveal no wheezing rales or rhonchi.  Cardiac exam is unremarkable.  Abdomen reveals a BMI of 36.    The Ekg interpreted by me shows  normal sinus rhythm with a rate of 76  Axis is   44  QTc is  normal  Intervals and Durations are unremarkable.      ST Segments: normal  When compared to an EKG performed on November 21, 2023 sinus tachycardia has been replaced with sinus rhythm    I personally saw the patient and performed a substantive portion of the visit including all aspects of the medical decision making.    MDM:     DDX: Pneumonia, COVID, flu, sepsis, PE, other    EKG shows no ST elevation MI.  The patient's high-sensitivity troponin is negative.  Her procalcitonin is low so I do not think she has infection and her CT does not show any definitive pneumonia.  Lactic acid is normal.  Urinalysis shows no infection.  What is of  concern is the patient has a new elevated proBNP of 622 and mild anemia with hemoglobin 11.3 hematocrit 34.9.  CT of the chest shows no PE but concerning for mild congestive heart failure.  Given the patient's young age and no known history of cardiac disease feel she warrants admission for further workup and evaluation.     Popeye Villalobos MD  03/22/24 [id number]

## 2024-03-23 PROCEDURE — 6360000002 HC RX W HCPCS: Performed by: STUDENT IN AN ORGANIZED HEALTH CARE EDUCATION/TRAINING PROGRAM

## 2024-03-23 PROCEDURE — 6370000000 HC RX 637 (ALT 250 FOR IP): Performed by: STUDENT IN AN ORGANIZED HEALTH CARE EDUCATION/TRAINING PROGRAM

## 2024-03-23 PROCEDURE — G0378 HOSPITAL OBSERVATION PER HR: HCPCS

## 2024-03-23 PROCEDURE — 96376 TX/PRO/DX INJ SAME DRUG ADON: CPT

## 2024-03-23 PROCEDURE — 2580000003 HC RX 258: Performed by: STUDENT IN AN ORGANIZED HEALTH CARE EDUCATION/TRAINING PROGRAM

## 2024-03-23 PROCEDURE — 99222 1ST HOSP IP/OBS MODERATE 55: CPT | Performed by: STUDENT IN AN ORGANIZED HEALTH CARE EDUCATION/TRAINING PROGRAM

## 2024-03-23 PROCEDURE — 6370000000 HC RX 637 (ALT 250 FOR IP): Performed by: FAMILY MEDICINE

## 2024-03-23 PROCEDURE — 94640 AIRWAY INHALATION TREATMENT: CPT

## 2024-03-23 PROCEDURE — 96365 THER/PROPH/DIAG IV INF INIT: CPT

## 2024-03-23 PROCEDURE — 87086 URINE CULTURE/COLONY COUNT: CPT

## 2024-03-23 PROCEDURE — 96366 THER/PROPH/DIAG IV INF ADDON: CPT

## 2024-03-23 PROCEDURE — 6370000000 HC RX 637 (ALT 250 FOR IP): Performed by: NURSE PRACTITIONER

## 2024-03-23 RX ORDER — MAGNESIUM SULFATE IN WATER 40 MG/ML
2000 INJECTION, SOLUTION INTRAVENOUS PRN
Status: DISCONTINUED | OUTPATIENT
Start: 2024-03-23 | End: 2024-03-25 | Stop reason: HOSPADM

## 2024-03-23 RX ORDER — ACETAMINOPHEN 325 MG/1
650 TABLET ORAL EVERY 6 HOURS PRN
Status: DISCONTINUED | OUTPATIENT
Start: 2024-03-23 | End: 2024-03-25 | Stop reason: HOSPADM

## 2024-03-23 RX ORDER — IPRATROPIUM BROMIDE AND ALBUTEROL SULFATE 2.5; .5 MG/3ML; MG/3ML
1 SOLUTION RESPIRATORY (INHALATION)
Status: DISCONTINUED | OUTPATIENT
Start: 2024-03-23 | End: 2024-03-24

## 2024-03-23 RX ORDER — METHADONE HYDROCHLORIDE 10 MG/1
140 TABLET ORAL DAILY
Status: DISCONTINUED | OUTPATIENT
Start: 2024-03-24 | End: 2024-03-25 | Stop reason: HOSPADM

## 2024-03-23 RX ORDER — SODIUM CHLORIDE 0.9 % (FLUSH) 0.9 %
5-40 SYRINGE (ML) INJECTION EVERY 12 HOURS SCHEDULED
Status: DISCONTINUED | OUTPATIENT
Start: 2024-03-23 | End: 2024-03-25 | Stop reason: HOSPADM

## 2024-03-23 RX ORDER — LEVOTHYROXINE SODIUM 0.03 MG/1
75 TABLET ORAL DAILY
Status: DISCONTINUED | OUTPATIENT
Start: 2024-03-23 | End: 2024-03-25 | Stop reason: HOSPADM

## 2024-03-23 RX ORDER — ONDANSETRON 2 MG/ML
4 INJECTION INTRAMUSCULAR; INTRAVENOUS EVERY 6 HOURS PRN
Status: DISCONTINUED | OUTPATIENT
Start: 2024-03-23 | End: 2024-03-25 | Stop reason: HOSPADM

## 2024-03-23 RX ORDER — ACYCLOVIR 200 MG/1
200 CAPSULE ORAL
Status: DISCONTINUED | OUTPATIENT
Start: 2024-03-23 | End: 2024-03-25 | Stop reason: HOSPADM

## 2024-03-23 RX ORDER — POLYETHYLENE GLYCOL 3350 17 G/17G
17 POWDER, FOR SOLUTION ORAL DAILY PRN
Status: DISCONTINUED | OUTPATIENT
Start: 2024-03-23 | End: 2024-03-25 | Stop reason: HOSPADM

## 2024-03-23 RX ORDER — METHADONE HYDROCHLORIDE 10 MG/1
120 TABLET ORAL DAILY
Status: DISCONTINUED | OUTPATIENT
Start: 2024-03-23 | End: 2024-03-23

## 2024-03-23 RX ORDER — SODIUM CHLORIDE 0.9 % (FLUSH) 0.9 %
5-40 SYRINGE (ML) INJECTION PRN
Status: DISCONTINUED | OUTPATIENT
Start: 2024-03-23 | End: 2024-03-25 | Stop reason: HOSPADM

## 2024-03-23 RX ORDER — SODIUM CHLORIDE 9 MG/ML
INJECTION, SOLUTION INTRAVENOUS PRN
Status: DISCONTINUED | OUTPATIENT
Start: 2024-03-23 | End: 2024-03-25 | Stop reason: HOSPADM

## 2024-03-23 RX ORDER — QUETIAPINE FUMARATE 100 MG/1
200 TABLET, FILM COATED ORAL NIGHTLY
Status: DISCONTINUED | OUTPATIENT
Start: 2024-03-23 | End: 2024-03-23

## 2024-03-23 RX ORDER — DIVALPROEX SODIUM 250 MG/1
250 TABLET, DELAYED RELEASE ORAL 2 TIMES DAILY
Status: DISCONTINUED | OUTPATIENT
Start: 2024-03-23 | End: 2024-03-24

## 2024-03-23 RX ORDER — ZOLPIDEM TARTRATE 5 MG/1
5 TABLET ORAL NIGHTLY PRN
Status: DISCONTINUED | OUTPATIENT
Start: 2024-03-23 | End: 2024-03-25 | Stop reason: HOSPADM

## 2024-03-23 RX ORDER — AZELASTINE 1 MG/ML
1 SPRAY, METERED NASAL 2 TIMES DAILY
Status: DISCONTINUED | OUTPATIENT
Start: 2024-03-23 | End: 2024-03-25 | Stop reason: HOSPADM

## 2024-03-23 RX ORDER — DIVALPROEX SODIUM 250 MG/1
750 TABLET, DELAYED RELEASE ORAL ONCE
Status: COMPLETED | OUTPATIENT
Start: 2024-03-23 | End: 2024-03-23

## 2024-03-23 RX ORDER — ALPRAZOLAM 1 MG/1
1 TABLET ORAL NIGHTLY PRN
Status: DISCONTINUED | OUTPATIENT
Start: 2024-03-23 | End: 2024-03-23

## 2024-03-23 RX ORDER — FUROSEMIDE 10 MG/ML
20 INJECTION INTRAMUSCULAR; INTRAVENOUS ONCE
Status: COMPLETED | OUTPATIENT
Start: 2024-03-23 | End: 2024-03-23

## 2024-03-23 RX ORDER — PANTOPRAZOLE SODIUM 40 MG/1
40 TABLET, DELAYED RELEASE ORAL
Status: DISCONTINUED | OUTPATIENT
Start: 2024-03-23 | End: 2024-03-25 | Stop reason: HOSPADM

## 2024-03-23 RX ORDER — ONDANSETRON 4 MG/1
4 TABLET, ORALLY DISINTEGRATING ORAL EVERY 8 HOURS PRN
Status: DISCONTINUED | OUTPATIENT
Start: 2024-03-23 | End: 2024-03-25 | Stop reason: HOSPADM

## 2024-03-23 RX ORDER — MIDODRINE HYDROCHLORIDE 5 MG/1
10 TABLET ORAL 3 TIMES DAILY
Status: DISCONTINUED | OUTPATIENT
Start: 2024-03-23 | End: 2024-03-25 | Stop reason: HOSPADM

## 2024-03-23 RX ORDER — POTASSIUM CHLORIDE 7.45 MG/ML
10 INJECTION INTRAVENOUS PRN
Status: DISCONTINUED | OUTPATIENT
Start: 2024-03-23 | End: 2024-03-25 | Stop reason: HOSPADM

## 2024-03-23 RX ORDER — POTASSIUM CHLORIDE 20 MEQ/1
40 TABLET, EXTENDED RELEASE ORAL PRN
Status: DISCONTINUED | OUTPATIENT
Start: 2024-03-23 | End: 2024-03-25 | Stop reason: HOSPADM

## 2024-03-23 RX ORDER — PREGABALIN 75 MG/1
150 CAPSULE ORAL 2 TIMES DAILY
Status: DISCONTINUED | OUTPATIENT
Start: 2024-03-23 | End: 2024-03-25 | Stop reason: HOSPADM

## 2024-03-23 RX ORDER — ALPRAZOLAM 1 MG/1
1 TABLET ORAL EVERY 8 HOURS
Status: DISCONTINUED | OUTPATIENT
Start: 2024-03-23 | End: 2024-03-25 | Stop reason: HOSPADM

## 2024-03-23 RX ORDER — ACETAMINOPHEN 650 MG/1
650 SUPPOSITORY RECTAL EVERY 6 HOURS PRN
Status: DISCONTINUED | OUTPATIENT
Start: 2024-03-23 | End: 2024-03-25 | Stop reason: HOSPADM

## 2024-03-23 RX ORDER — GUAIFENESIN 600 MG/1
600 TABLET, EXTENDED RELEASE ORAL 2 TIMES DAILY
Status: DISCONTINUED | OUTPATIENT
Start: 2024-03-23 | End: 2024-03-25 | Stop reason: HOSPADM

## 2024-03-23 RX ORDER — ENOXAPARIN SODIUM 100 MG/ML
40 INJECTION SUBCUTANEOUS DAILY
Status: DISCONTINUED | OUTPATIENT
Start: 2024-03-24 | End: 2024-03-25 | Stop reason: HOSPADM

## 2024-03-23 RX ORDER — QUETIAPINE FUMARATE 100 MG/1
200 TABLET, FILM COATED ORAL NIGHTLY
Status: DISCONTINUED | OUTPATIENT
Start: 2024-03-23 | End: 2024-03-25 | Stop reason: HOSPADM

## 2024-03-23 RX ADMIN — MIDODRINE HYDROCHLORIDE 10 MG: 5 TABLET ORAL at 14:16

## 2024-03-23 RX ADMIN — CEFTRIAXONE SODIUM 1000 MG: 1 INJECTION, POWDER, FOR SOLUTION INTRAMUSCULAR; INTRAVENOUS at 14:39

## 2024-03-23 RX ADMIN — MIDODRINE HYDROCHLORIDE 10 MG: 5 TABLET ORAL at 20:51

## 2024-03-23 RX ADMIN — ALPRAZOLAM 1 MG: 1 TABLET ORAL at 12:08

## 2024-03-23 RX ADMIN — MIDODRINE HYDROCHLORIDE 10 MG: 5 TABLET ORAL at 10:00

## 2024-03-23 RX ADMIN — QUETIAPINE FUMARATE 200 MG: 100 TABLET ORAL at 01:24

## 2024-03-23 RX ADMIN — ZOLPIDEM TARTRATE 5 MG: 5 TABLET ORAL at 21:00

## 2024-03-23 RX ADMIN — DIVALPROEX SODIUM 750 MG: 250 TABLET, DELAYED RELEASE ORAL at 23:06

## 2024-03-23 RX ADMIN — AZELASTINE HYDROCHLORIDE 1 SPRAY: 137 SPRAY, METERED NASAL at 20:51

## 2024-03-23 RX ADMIN — NICOTINE POLACRILEX 4 MG: 4 LOZENGE ORAL at 14:17

## 2024-03-23 RX ADMIN — ALPRAZOLAM 1 MG: 1 TABLET ORAL at 01:22

## 2024-03-23 RX ADMIN — SODIUM CHLORIDE, PRESERVATIVE FREE 10 ML: 5 INJECTION INTRAVENOUS at 20:51

## 2024-03-23 RX ADMIN — IPRATROPIUM BROMIDE AND ALBUTEROL SULFATE 1 DOSE: 2.5; .5 SOLUTION RESPIRATORY (INHALATION) at 12:36

## 2024-03-23 RX ADMIN — FUROSEMIDE 20 MG: 10 INJECTION, SOLUTION INTRAMUSCULAR; INTRAVENOUS at 12:09

## 2024-03-23 RX ADMIN — PREGABALIN 150 MG: 75 CAPSULE ORAL at 20:50

## 2024-03-23 RX ADMIN — DIVALPROEX SODIUM 250 MG: 250 TABLET, DELAYED RELEASE ORAL at 20:51

## 2024-03-23 RX ADMIN — ONDANSETRON 4 MG: 2 INJECTION INTRAMUSCULAR; INTRAVENOUS at 23:06

## 2024-03-23 RX ADMIN — ACYCLOVIR 200 MG: 200 CAPSULE ORAL at 23:06

## 2024-03-23 RX ADMIN — AZELASTINE HYDROCHLORIDE 1 SPRAY: 137 SPRAY, METERED NASAL at 11:38

## 2024-03-23 RX ADMIN — DIVALPROEX SODIUM 250 MG: 250 TABLET, DELAYED RELEASE ORAL at 10:00

## 2024-03-23 RX ADMIN — Medication 2 PUFF: at 19:56

## 2024-03-23 RX ADMIN — IPRATROPIUM BROMIDE AND ALBUTEROL SULFATE 1 DOSE: 2.5; .5 SOLUTION RESPIRATORY (INHALATION) at 19:56

## 2024-03-23 RX ADMIN — PANTOPRAZOLE SODIUM 40 MG: 40 TABLET, DELAYED RELEASE ORAL at 10:00

## 2024-03-23 RX ADMIN — ALPRAZOLAM 1 MG: 1 TABLET ORAL at 20:51

## 2024-03-23 RX ADMIN — PREGABALIN 150 MG: 75 CAPSULE ORAL at 11:38

## 2024-03-23 RX ADMIN — ZOLPIDEM TARTRATE 5 MG: 5 TABLET ORAL at 01:22

## 2024-03-23 RX ADMIN — GUAIFENESIN 600 MG: 600 TABLET ORAL at 20:51

## 2024-03-23 RX ADMIN — QUETIAPINE FUMARATE 200 MG: 100 TABLET ORAL at 20:51

## 2024-03-23 RX ADMIN — NICOTINE POLACRILEX 4 MG: 4 LOZENGE ORAL at 21:00

## 2024-03-23 RX ADMIN — Medication 2 PUFF: at 12:36

## 2024-03-23 RX ADMIN — ACYCLOVIR 200 MG: 200 CAPSULE ORAL at 20:50

## 2024-03-23 RX ADMIN — DIVALPROEX SODIUM 250 MG: 250 TABLET, DELAYED RELEASE ORAL at 01:22

## 2024-03-23 RX ADMIN — GUAIFENESIN 600 MG: 600 TABLET ORAL at 13:15

## 2024-03-23 RX ADMIN — IPRATROPIUM BROMIDE AND ALBUTEROL SULFATE 1 DOSE: 2.5; .5 SOLUTION RESPIRATORY (INHALATION) at 16:19

## 2024-03-23 RX ADMIN — LEVOTHYROXINE SODIUM 75 MCG: 0.03 TABLET ORAL at 10:00

## 2024-03-23 ASSESSMENT — PAIN SCALES - GENERAL: PAINLEVEL_OUTOF10: 0

## 2024-03-23 NOTE — CONSULTS
Pulmonary New Consultation       Patient Name:  Anjelica Blanchard    REASONFOR ADMISSION/CC: Shortness of breath    PCP: Smith Neff MD    HISTORY OF PRESENT ILLNESS:   43 y.o. year old female with significant past medical history of polysubstance abuse, bipolar disorder, asthma, and GERD that presents to [facility] for shortness of breath.  Patient reports for the past couple months she has been having shortness of breath.  She reports having over 10 infections and multiple pneumonias in the past.  She endorses nausea, diarrhea, cough with productive sputum, shortness of breath.  She denies any fever, chills, chest pains.  She uses air supra and Symbicort for inhalers.  She is not on oxygen therapy.  She has had a bronchoscopy in the past with negative results.    Patient quit smoking 6 months ago, she was smoking 1 pack/day for 20 years.  She denies any recent use of illicit drugs.  She is currently not working, she says she is on disability.  She has a dog at home, no other pets/birds.  She denies any household exposures.    Past Medical History:   Diagnosis Date    Adrenal insufficiency     Asthma     Bipolar 1 disorder (HCC)     Borderline personality disorder (HCC)     Chronic prescription benzodiazepine use     Alprazolam    Chronic prescription opiate use     Methadone    COPD (chronic obstructive pulmonary disease) (HCC)     Degenerative spinal arthritis     Howard-Danlos syndrome     Fibromyalgia     Hepatitis C     Herniated disc     Intervertebral disc disease     Opiate overdose (HCC)     Osteoarthritis     Polysubstance abuse (HCC)     Sedative dependence (HCC)     Pregabalin and Zolpidem    TCA (tricyclic antidepressant) overdose     Vertebral compression fracture (HCC)        Past Surgical History:   Procedure Laterality Date    BRONCHOSCOPY N/A 11/2/2023    BRONCHOSCOPY DIAGNOSTIC OR CELL WASH ONLY performed by Manisha Medina MD at [facility] ENDOSCOPY    BRONCHOSCOPY  11/2/2023     injection 5-40 mL, 5-40 mL, IntraVENous, PRN, Daniel Peres MD    0.9 % sodium chloride infusion, , IntraVENous, PRN, Daniel Peres MD    potassium chloride (KLOR-CON M) extended release tablet 40 mEq, 40 mEq, Oral, PRN **OR** potassium bicarb-citric acid (EFFER-K) effervescent tablet 40 mEq, 40 mEq, Oral, PRN **OR** potassium chloride 10 mEq/100 mL IVPB (Peripheral Line), 10 mEq, IntraVENous, PRN, Daniel Peres MD    magnesium sulfate 2000 mg in 50 mL IVPB premix, 2,000 mg, IntraVENous, PRN, Daniel Peres MD    [START ON 3/24/2024] enoxaparin (LOVENOX) injection 40 mg, 40 mg, SubCUTAneous, Daily, Daniel Peres MD    ondansetron (ZOFRAN-ODT) disintegrating tablet 4 mg, 4 mg, Oral, Q8H PRN **OR** ondansetron (ZOFRAN) injection 4 mg, 4 mg, IntraVENous, Q6H PRN, Daniel Peres MD    polyethylene glycol (GLYCOLAX) packet 17 g, 17 g, Oral, Daily PRN, Daniel Peres MD    acetaminophen (TYLENOL) tablet 650 mg, 650 mg, Oral, Q6H PRN **OR** acetaminophen (TYLENOL) suppository 650 mg, 650 mg, Rectal, Q6H PRN, Daniel Peres MD    mometasone-formoterol (DULERA) 200-5 MCG/ACT inhaler 2 puff, 2 puff, Inhalation, BID RT, Daniel Peres MD, 2 puff at 03/23/24 1236    azelastine (ASTELIN) 0.1 % nasal spray 1 spray, 1 spray, Each Nostril, BID, Daniel Peres MD, 1 spray at 03/23/24 1138    pregabalin (LYRICA) capsule 150 mg, 150 mg, Oral, BID, Daniel Peres MD, 150 mg at 03/23/24 1138    ALPRAZolam (XANAX) tablet 1 mg, 1 mg, Oral, q8h, Daniel Peres MD, 1 mg at 03/23/24 1208    ipratropium 0.5 mg-albuterol 2.5 mg (DUONEB) nebulizer solution 1 Dose, 1 Dose, Inhalation, Q4H WA RT, Daniel Peres MD, 1 Dose at 03/23/24 1236    guaiFENesin (MUCINEX) extended release tablet 600 mg, 600 mg, Oral, BID, Daniel Peres MD    [START ON 3/24/2024] methadone (DOLOPHINE) tablet 140 mg, 140 mg, Oral, Daily, Daniel Peres MD    cefTRIAXone (ROCEPHIN) 1,000 mg in sodium chloride 0.9 % 50 mL IVPB (mini-bag), 1,000 mg, IntraVENous, Q24H, Daniel Peres MD    nicotine

## 2024-03-23 NOTE — ED NOTES
Patient resting in bed with call light in reach, rails up x1. No further needs at this time. Patient has bedside commode, instructed to call to use.

## 2024-03-23 NOTE — H&P
V2.0  History and Physical      Name:  Anjelica Blanchard /Age/Sex: 1980  (43 y.o. female)   MRN & CSN:  [id number] & [id number] Encounter Date/Time: 3/22/2024 11:23 PM EDT   Location:   PCP: Smith Neff MD       Hospital Day: 1    Assessment and Plan:   Anjelica Blanchard is a 43 y.o. female with pmh of Borderline personality disorder, opioid dependence, PTSD who presents with shortness of breath.    Hospital Problems             Last Modified POA    * (Principal) Mild congestive heart failure (HCC) 3/22/2024 Yes    Adrenal insufficiency (HCC) 3/22/2024 Yes    Borderline personality disorder (HCC) 3/22/2024 Yes    Opioid dependence (HCC) 3/22/2024 Yes    Hypotension 3/22/2024 Yes       Plan:    Mild congestive heart failure  , procalcitonin negative  Favors cardiac etiology for CXR findings  1 x lasix given with consideration for relative hypotension  Echo ordered  Regular diet  BP will not tolerate GDMT  Outpatient cardiac f/u unless Echo significantly abnormal, in which case inpatient evaluation may be warranted    Hypotension  Borderline personality disorder  Due to multiple psychiatric medications including Xanax, methadone, cymbalta, ambien, seroquel  Will increase patient's midodrine from 5 to 10 mg TID    Opioid dependence  Continue methadone 140 mg if patient ends up staying beyond morning  It is not currently ordered since dose cannot be verified at this time and she has obviously already had today's dose    Adrenal insufficiency  Continue hydrocortisone 5 mg QD  Increased midodrine, 10 mg TID      Disposition:   Current Living situation: Home  Expected Disposition: Home  Estimated D/C: 3/23/24      Diet Regular diet   DVT Prophylaxis [x] Lovenox, []  Heparin, [] SCDs, [] Ambulation,  [] Eliquis, [] Xarelto, [] Coumadin   Code Status Full code     Surrogate Decision Maker/ POA N/A     Personally reviewed Lab Studies and Imaging       History from:     Patient    History of Present

## 2024-03-23 NOTE — PROGRESS NOTES
V2.0  [facility] Hospitalist Progress Note      Name:  Anjelica Blanchard /Age/Sex: 1980  (43 y.o. female)   MRN & CSN:  [id number] & [id number] Encounter Date/Time: 3/23/2024 12:17 PM EDT    Location:  [id number] PCP: Smith Neff MD       Hospital Day: 2      Subjective:     Chief Complaint:  Chest Pain (Patient states she was diagnosed with pneumonia in October. States since then, she has \"been sick\" and thinks she has \"rebound pneumonia\". Patient states she has had intermittent chest pain since October. Has had constant pain for the past week. )     Pt still fees SOB and has cough. Doesn't feel better compared to when she came in.   Pt reports symptoms of nasal congestion, productive cough green sputum, SOB, not feeling well for the past few months but worse for the past few days.        Assessment and Plan:   Suspected CHF exacerbation vs asthma exacerbation. P/w SOB, productive cough with green sputum. CTPA suggestive of mild CHF vs viral infection.   No sign of volume overload on exam, has gained wt past few months unsure if 2/2 fluid vs lifestyle  - will give another lasix 20 mg IV   - await ECHO  - breathing tx  - consult pulm; defer starting steroids to pulm  - strict I/o  - resume home meds  - add mucinex    UTI. Has been having symptoms. UA suggestive of UTI. Was on Bactrim past 5 days.   - start rocephin  - f/u urine cx    Borderline personality disorder  On multiple psychiatric medications including Xanax, methadone, cymbalta, ambien, seroquel     Opioid dependence  Continue methadone 140 mg ; confirmed pt taking as she has recent bottle dated 3/23/2024      Adrenal insufficiency  Continue hydrocortisone 5 mg QD  Was hypotensive this admission, increased midodrine, 10 mg TID      Personally reviewed Lab Studies and Imaging     Imaging that was interpreted personally includes CTPA and results as above    Drugs that require monitoring for toxicity include lovenox and the method of monitoring was  Lungs/pleura: Vascular engorgement with hazy opacity in the parahilar regions throughout the bilateral lungs.  Mildly thickened interlobular septa.  No focal pleural effusion.  No soft tissue pulmonary nodule.  No inspissated secretions or endobronchial lesion evident. Upper Abdomen: Limited images of the upper abdomen are unremarkable. Soft Tissues/Bones: No acute bone or soft tissue abnormality.     1.  No acute pulmonary embolism. 2. Mild congestive heart failure is most likely given the findings; no associated dense pulmonary consolidation or pleural effusion demonstrated. Involvement with atypical/viral pneumonia or other acute infectious or inflammatory pulmonary process is a consideration.  Correlate with clinical presentation, physical findings and history. 3. Low-density enlarged subcarinal and right hilar lymph nodes are a little larger than on prior study, probably reactive.  Consider IV contrast-enhanced chest CT at 6 months to evaluate for persistence.     XR CHEST PORTABLE    Result Date: 3/22/2024  EXAMINATION: ONE XRAY VIEW OF THE CHEST 3/22/2024 5:28 pm COMPARISON: Chest CT 11/21/2023, chest radiograph 11/21/2023 HISTORY: chest pain/shortness of breath FINDINGS: The cardiomediastinal and hilar silhouettes appear unremarkable.  Chronic versus recurrent scattered pulmonary opacities bilateral mid and lower lungs. No pleural effusion evident. No pneumothorax is seen. No acute osseous abnormality is identified.     Chronic versus recurrent, nonspecific pulmonary infiltrates are commonly seen with atypical/viral pneumonia.  Chronic interstitial processes may present with this appearance as well.       Electronically signed by Daniel Peres MD on 3/23/2024 at 12:25 PM

## 2024-03-23 NOTE — PROGRESS NOTES
Pt arrived to floor A&OX4, ambulatory, oriented to room and call light, vitals obtained, admission assessment completed. Pt still in need of CHF packet/ folder. Bed in lowest locked position rails up 2/4, requesting apple juice at this time, MD messaged for diet order. Call light in reach.

## 2024-03-23 NOTE — ED NOTES
Patient ambulating in room. Replaced bp cuff and pulse ox. Helped patient back to bed. 200mL output to bedside commode. Updated patient on plan of care.

## 2024-03-23 NOTE — PROGRESS NOTES
4 Eyes Skin Assessment     NAME:  Anjelica Blanchard  YOB: 1980  MEDICAL RECORD NUMBER:  [id number]    The patient is being assessed for  Admission    I agree that at least one RN has performed a thorough Head to Toe Skin Assessment on the patient. ALL assessment sites listed below have been assessed.      Areas assessed by both nurses:    Head, Face, Ears, Shoulders, Back, Chest, Arms, Elbows, Hands, Sacrum. Buttock, Coccyx, Ischium, Legs. Feet and Heels, and Under Medical Devices         Does the Patient have a Wound? No noted wound(s)       Serge Prevention initiated by RN: No  Wound Care Orders initiated by RN: No    Pressure Injury (Stage 3,4, Unstageable, DTI, NWPT, and Complex wounds) if present, place Wound referral order by RN under : No    New Ostomies, if present place, Ostomy referral order under : No     Nurse 1 eSignature: Electronically signed by Jessica Dior RN on 3/23/24 at 1:46 AM EDT    **SHARE this note so that the co-signing nurse can place an eSignature**    Nurse 2 eSignature: Electronically signed by Cheryl Llanos RN on 3/23/24 at 1:46 AM EDT\

## 2024-03-24 LAB
ANION GAP SERPL CALCULATED.3IONS-SCNC: 8 MMOL/L (ref 3–16)
BACTERIA UR CULT: NORMAL
BASOPHILS # BLD: 0 K/UL (ref 0–0.2)
BASOPHILS NFR BLD: 0.6 %
BUN SERPL-MCNC: 16 MG/DL (ref 7–20)
CALCIUM SERPL-MCNC: 9 MG/DL (ref 8.3–10.6)
CHLORIDE SERPL-SCNC: 97 MMOL/L (ref 99–110)
CO2 SERPL-SCNC: 31 MMOL/L (ref 21–32)
CREAT SERPL-MCNC: 0.9 MG/DL (ref 0.6–1.1)
DEPRECATED RDW RBC AUTO: 15.7 % (ref 12.4–15.4)
EOSINOPHIL # BLD: 0.3 K/UL (ref 0–0.6)
EOSINOPHIL NFR BLD: 5.2 %
GFR SERPLBLD CREATININE-BSD FMLA CKD-EPI: >60 ML/MIN/{1.73_M2}
GLUCOSE SERPL-MCNC: 95 MG/DL (ref 70–99)
HCT VFR BLD AUTO: 33.1 % (ref 36–48)
HGB BLD-MCNC: 10.9 G/DL (ref 12–16)
LYMPHOCYTES # BLD: 1.9 K/UL (ref 1–5.1)
LYMPHOCYTES NFR BLD: 34.8 %
MCH RBC QN AUTO: 28.9 PG (ref 26–34)
MCHC RBC AUTO-ENTMCNC: 32.9 G/DL (ref 31–36)
MCV RBC AUTO: 87.7 FL (ref 80–100)
MONOCYTES # BLD: 0.5 K/UL (ref 0–1.3)
MONOCYTES NFR BLD: 9.2 %
NEUTROPHILS # BLD: 2.8 K/UL (ref 1.7–7.7)
NEUTROPHILS NFR BLD: 50.2 %
PLATELET # BLD AUTO: 249 K/UL (ref 135–450)
PMV BLD AUTO: 7.7 FL (ref 5–10.5)
POTASSIUM SERPL-SCNC: 5.1 MMOL/L (ref 3.5–5.1)
RBC # BLD AUTO: 3.77 M/UL (ref 4–5.2)
SODIUM SERPL-SCNC: 136 MMOL/L (ref 136–145)
WBC # BLD AUTO: 5.6 K/UL (ref 4–11)

## 2024-03-24 PROCEDURE — 6370000000 HC RX 637 (ALT 250 FOR IP): Performed by: FAMILY MEDICINE

## 2024-03-24 PROCEDURE — 6370000000 HC RX 637 (ALT 250 FOR IP): Performed by: STUDENT IN AN ORGANIZED HEALTH CARE EDUCATION/TRAINING PROGRAM

## 2024-03-24 PROCEDURE — 96376 TX/PRO/DX INJ SAME DRUG ADON: CPT

## 2024-03-24 PROCEDURE — G0378 HOSPITAL OBSERVATION PER HR: HCPCS

## 2024-03-24 PROCEDURE — 36415 COLL VENOUS BLD VENIPUNCTURE: CPT

## 2024-03-24 PROCEDURE — 96372 THER/PROPH/DIAG INJ SC/IM: CPT

## 2024-03-24 PROCEDURE — 80048 BASIC METABOLIC PNL TOTAL CA: CPT

## 2024-03-24 PROCEDURE — 6370000000 HC RX 637 (ALT 250 FOR IP): Performed by: NURSE PRACTITIONER

## 2024-03-24 PROCEDURE — 94640 AIRWAY INHALATION TREATMENT: CPT

## 2024-03-24 PROCEDURE — 96366 THER/PROPH/DIAG IV INF ADDON: CPT

## 2024-03-24 PROCEDURE — 6360000002 HC RX W HCPCS: Performed by: STUDENT IN AN ORGANIZED HEALTH CARE EDUCATION/TRAINING PROGRAM

## 2024-03-24 PROCEDURE — 2580000003 HC RX 258: Performed by: STUDENT IN AN ORGANIZED HEALTH CARE EDUCATION/TRAINING PROGRAM

## 2024-03-24 PROCEDURE — 85025 COMPLETE CBC W/AUTO DIFF WBC: CPT

## 2024-03-24 PROCEDURE — 94669 MECHANICAL CHEST WALL OSCILL: CPT

## 2024-03-24 RX ORDER — FUROSEMIDE 10 MG/ML
20 INJECTION INTRAMUSCULAR; INTRAVENOUS ONCE
Status: COMPLETED | OUTPATIENT
Start: 2024-03-24 | End: 2024-03-24

## 2024-03-24 RX ORDER — DIVALPROEX SODIUM 250 MG/1
1000 TABLET, DELAYED RELEASE ORAL NIGHTLY
Status: DISCONTINUED | OUTPATIENT
Start: 2024-03-24 | End: 2024-03-25 | Stop reason: HOSPADM

## 2024-03-24 RX ORDER — IPRATROPIUM BROMIDE AND ALBUTEROL SULFATE 2.5; .5 MG/3ML; MG/3ML
1 SOLUTION RESPIRATORY (INHALATION) EVERY 4 HOURS PRN
Status: DISCONTINUED | OUTPATIENT
Start: 2024-03-24 | End: 2024-03-24

## 2024-03-24 RX ORDER — IPRATROPIUM BROMIDE AND ALBUTEROL SULFATE 2.5; .5 MG/3ML; MG/3ML
1 SOLUTION RESPIRATORY (INHALATION)
Status: DISCONTINUED | OUTPATIENT
Start: 2024-03-24 | End: 2024-03-25 | Stop reason: HOSPADM

## 2024-03-24 RX ORDER — DIVALPROEX SODIUM 250 MG/1
500 TABLET, DELAYED RELEASE ORAL DAILY
Status: DISCONTINUED | OUTPATIENT
Start: 2024-03-24 | End: 2024-03-25 | Stop reason: HOSPADM

## 2024-03-24 RX ORDER — GUAIFENESIN/DEXTROMETHORPHAN 100-10MG/5
5 SYRUP ORAL EVERY 4 HOURS PRN
Status: DISCONTINUED | OUTPATIENT
Start: 2024-03-24 | End: 2024-03-25 | Stop reason: HOSPADM

## 2024-03-24 RX ADMIN — NICOTINE POLACRILEX 4 MG: 4 LOZENGE ORAL at 13:46

## 2024-03-24 RX ADMIN — AZELASTINE HYDROCHLORIDE 1 SPRAY: 137 SPRAY, METERED NASAL at 08:37

## 2024-03-24 RX ADMIN — ACYCLOVIR 200 MG: 200 CAPSULE ORAL at 08:37

## 2024-03-24 RX ADMIN — AZELASTINE HYDROCHLORIDE 1 SPRAY: 137 SPRAY, METERED NASAL at 19:55

## 2024-03-24 RX ADMIN — ALPRAZOLAM 1 MG: 1 TABLET ORAL at 05:04

## 2024-03-24 RX ADMIN — FUROSEMIDE 20 MG: 10 INJECTION, SOLUTION INTRAMUSCULAR; INTRAVENOUS at 12:07

## 2024-03-24 RX ADMIN — ALPRAZOLAM 1 MG: 1 TABLET ORAL at 19:55

## 2024-03-24 RX ADMIN — IPRATROPIUM BROMIDE AND ALBUTEROL SULFATE 1 DOSE: 2.5; .5 SOLUTION RESPIRATORY (INHALATION) at 15:24

## 2024-03-24 RX ADMIN — ACYCLOVIR 200 MG: 200 CAPSULE ORAL at 20:02

## 2024-03-24 RX ADMIN — DIVALPROEX SODIUM 1000 MG: 250 TABLET, DELAYED RELEASE ORAL at 19:54

## 2024-03-24 RX ADMIN — CEFTRIAXONE SODIUM 1000 MG: 1 INJECTION, POWDER, FOR SOLUTION INTRAMUSCULAR; INTRAVENOUS at 12:09

## 2024-03-24 RX ADMIN — Medication 2 PUFF: at 08:13

## 2024-03-24 RX ADMIN — IPRATROPIUM BROMIDE AND ALBUTEROL SULFATE 1 DOSE: 2.5; .5 SOLUTION RESPIRATORY (INHALATION) at 08:13

## 2024-03-24 RX ADMIN — DIVALPROEX SODIUM 500 MG: 250 TABLET, DELAYED RELEASE ORAL at 08:36

## 2024-03-24 RX ADMIN — PANTOPRAZOLE SODIUM 40 MG: 40 TABLET, DELAYED RELEASE ORAL at 05:04

## 2024-03-24 RX ADMIN — NICOTINE POLACRILEX 4 MG: 4 LOZENGE ORAL at 19:54

## 2024-03-24 RX ADMIN — ZOLPIDEM TARTRATE 5 MG: 5 TABLET ORAL at 20:02

## 2024-03-24 RX ADMIN — GUAIFENESIN 600 MG: 600 TABLET ORAL at 08:37

## 2024-03-24 RX ADMIN — SODIUM CHLORIDE, PRESERVATIVE FREE 10 ML: 5 INJECTION INTRAVENOUS at 08:38

## 2024-03-24 RX ADMIN — GUAIFENESIN 600 MG: 600 TABLET ORAL at 19:55

## 2024-03-24 RX ADMIN — IPRATROPIUM BROMIDE AND ALBUTEROL SULFATE 1 DOSE: 2.5; .5 SOLUTION RESPIRATORY (INHALATION) at 19:31

## 2024-03-24 RX ADMIN — ACYCLOVIR 200 MG: 200 CAPSULE ORAL at 12:07

## 2024-03-24 RX ADMIN — ALPRAZOLAM 1 MG: 1 TABLET ORAL at 12:07

## 2024-03-24 RX ADMIN — MIDODRINE HYDROCHLORIDE 10 MG: 5 TABLET ORAL at 08:36

## 2024-03-24 RX ADMIN — ENOXAPARIN SODIUM 40 MG: 100 INJECTION SUBCUTANEOUS at 08:36

## 2024-03-24 RX ADMIN — QUETIAPINE FUMARATE 200 MG: 100 TABLET ORAL at 19:55

## 2024-03-24 RX ADMIN — METHADONE HYDROCHLORIDE 140 MG: 10 TABLET ORAL at 08:37

## 2024-03-24 RX ADMIN — ACYCLOVIR 200 MG: 200 CAPSULE ORAL at 13:46

## 2024-03-24 RX ADMIN — Medication 2 PUFF: at 19:38

## 2024-03-24 RX ADMIN — LEVOTHYROXINE SODIUM 75 MCG: 0.03 TABLET ORAL at 05:04

## 2024-03-24 RX ADMIN — MIDODRINE HYDROCHLORIDE 10 MG: 5 TABLET ORAL at 19:54

## 2024-03-24 RX ADMIN — PREGABALIN 150 MG: 75 CAPSULE ORAL at 08:37

## 2024-03-24 RX ADMIN — MIDODRINE HYDROCHLORIDE 10 MG: 5 TABLET ORAL at 13:46

## 2024-03-24 RX ADMIN — PREGABALIN 150 MG: 75 CAPSULE ORAL at 19:54

## 2024-03-24 ASSESSMENT — PAIN SCALES - GENERAL
PAINLEVEL_OUTOF10: 8
PAINLEVEL_OUTOF10: 0

## 2024-03-24 NOTE — RT PROTOCOL NOTE
RT Inhaler-Nebulizer Bronchodilator Protocol Note    There is a bronchodilator order in the chart from a provider indicating to follow the RT Bronchodilator Protocol and there is an “Initiate RT Inhaler-Nebulizer Bronchodilator Protocol” order as well (see protocol at bottom of note).    CXR Findings:  XR CHEST PORTABLE    Result Date: 3/22/2024  Chronic versus recurrent, nonspecific pulmonary infiltrates are commonly seen with atypical/viral pneumonia.  Chronic interstitial processes may present with this appearance as well.       The findings from the last RT Protocol Assessment were as follows:   History Pulmonary Disease: Smoker 15 pack years or more  Respiratory Pattern: Regular pattern and RR 12-20 bpm  Breath Sounds: Clear breath sounds  Cough: Strong, spontaneous, non-productive  Indication for Bronchodilator Therapy: None  Bronchodilator Assessment Score: 1    Aerosolized bronchodilator medication orders have been revised according to the RT Inhaler-Nebulizer Bronchodilator Protocol below.    Respiratory Therapist to perform RT Therapy Protocol Assessment initially then follow the protocol.  Repeat RT Therapy Protocol Assessment PRN for score 0-3 or on second treatment, BID, and PRN for scores above 3.    No Indications - adjust the frequency to every 6 hours PRN wheezing or bronchospasm, if no treatments needed after 48 hours then discontinue using Per Protocol order mode.     If indication present, adjust the RT bronchodilator orders based on the Bronchodilator Assessment Score as indicated below.  Use Inhaler orders unless patient has one or more of the following: on home nebulizer, not able to hold breath for 10 seconds, is not alert and oriented, cannot activate and use MDI correctly, or respiratory rate 25 breaths per minute or more, then use the equivalent nebulizer order(s) with same Frequency and PRN reasons based on the score.  If a patient is on this medication at home then do not decrease

## 2024-03-24 NOTE — PROGRESS NOTES
Assessment complete and charted earlier in shift. Pt stable on RA with asymptomatic hypotension noted- pt states that this is her baseline. Pt denies needs at this time. Call light within reach, will continue to monitor.

## 2024-03-24 NOTE — PLAN OF CARE
CHF Care Plan      Patient's EF (Ejection Fraction) is greater than 40%    Heart Failure Medications:  Diuretics:: None    (One of the following REQUIRED for EF </= 40%/SYSTOLIC FAILURE but MAY be used in EF% >40%/DIASTOLIC FAILURE)        ACE:: None        ARB:: None         ARNI:: None    (Beta Blockers)  NON- Evidenced Based Beta Blocker (for EF% >40%/DIASTOLIC FAILURE): None    Evidenced Based Beta Blocker::(REQUIRED for EF% <40%/SYSTOLIC FAILURE) None  ...................................................................................................................................................    Failed to redirect to the Timeline version of the [employer] SmartLink.      Patient's weights and intake/output reviewed    Daily Weight log at bedside, patient/family participation in use of log: \"yes    Patient's current weight today shows no difference than last documented weight.      Intake/Output Summary (Last 24 hours) at 3/23/2024 2129  Last data filed at 3/23/2024 2044  Gross per 24 hour   Intake 240 ml   Output 1550 ml   Net -1310 ml       Education Booklet Provided: yes    Comorbidities Reviewed Yes    Patient has a past medical history of Adrenal insufficiency, Asthma, Bipolar 1 disorder (HCC), Borderline personality disorder (HCC), Chronic prescription benzodiazepine use, Chronic prescription opiate use, COPD (chronic obstructive pulmonary disease) (HCC), Degenerative spinal arthritis, Howard-Danlos syndrome, Fibromyalgia, Hepatitis C, Herniated disc, Intervertebral disc disease, Opiate overdose (HCC), Osteoarthritis, Polysubstance abuse (HCC), Sedative dependence (HCC), TCA (tricyclic antidepressant) overdose, and Vertebral compression fracture (HCC).     >>For CHF and Comorbidity documentation on Education Time and Topics, please see Education Tab      Pt resting in bed at this time on room air. Pt with complaints of shortness of breath. Pt without lower extremity edema.     Patient and/or Family's  stated Goal of Care this Admission: reduce shortness of breath, increase activity tolerance, better understand heart failure and disease management, and be more comfortable prior to discharge        :

## 2024-03-24 NOTE — PROGRESS NOTES
Shift assessment completed and charted. VSS. A&Ox4. RA. Cough noted. Per pt productive cough. Bed locked and in lowest position. Call light within reach. Pt denies any other needs at this time. Will continue to monitor.

## 2024-03-24 NOTE — PROGRESS NOTES
03/24/24 0816   RT Protocol   History Pulmonary Disease 1   Respiratory pattern 0   Breath sounds 0   Cough 0   Indications for Bronchodilator Therapy None   Bronchodilator Assessment Score 1

## 2024-03-24 NOTE — PROGRESS NOTES
V2.0  [facility] [place] Hospitalist Progress Note      Name:  Anjelica Blanchard /Age/Sex: 1980  (43 y.o. female)   MRN & CSN:  [id number] & [id number] Encounter Date/Time: 3/24/2024 12:17 PM EDT    Location:  [id number]- PCP: Smith Neff MD       Hospital Day: 3      Subjective:     Chief Complaint:  Chest Pain (Patient states she was diagnosed with pneumonia in October. States since then, she has \"been sick\" and thinks she has \"rebound pneumonia\". Patient states she has had intermittent chest pain since October. Has had constant pain for the past week. )     Pt reports improvement in her breathing but cough is the same. She is currently on room air and was laying flat when seen in the room.     Pt reports she has been desating below 90% at home with ambulation .  No new complaints or concerns.      Assessment and Plan:   Suspected CHF exacerbation vs asthma not in exacerbation. P/w SOB, productive cough with green sputum past few months but worse past week. Reports desating at home with ambulation. CTPA suggestive of mild CHF vs viral infection.   No sign of volume overload on exam, has gained wt past few months unsure if 2/2 fluid vs lifestyle  Pt had bronchoscopies in the past unremarkable, Immunoglobulins and connective tissue disease work-up negative  - will give another lasix 20 mg IV   - await ECHO  - breathing tx  - consulted pulm; no further recs  - strict I/o  - resume home meds  - add mucinex, robitussin, acapalla  - walk test    UTI. Reports symptoms. UA suggestive of UTI. Was on Bactrim past 5 days.   Urine cx negative so far  - will do rocephin total 3 days  - f/u urine cx    Borderline personality disorder  On multiple psychiatric medications including Xanax, methadone, cymbalta, ambien, seroquel     Opioid dependence  Continue methadone 140 mg ; confirmed pt taking as she has recent bottle dated 3/23/2024      Adrenal insufficiency  Continue hydrocortisone 5 mg QD  Was hypotensive this admission,  Absolute 0.0 0.0 - 0.2 K/uL        Imaging/Diagnostics Last 24 Hours   CT CHEST PULMONARY EMBOLISM W CONTRAST    Result Date: 3/22/2024  EXAMINATION: CTA OF THE CHEST 3/22/2024 6:54 pm TECHNIQUE: CTA of the chest was performed after the administration of intravenous contrast.  Multiplanar reformatted images are provided for review.  MIP images are provided for review. Automated exposure control, iterative reconstruction, and/or weight based adjustment of the mA/kV was utilized to reduce the radiation dose to as low as reasonably achievable. COMPARISON: Chest radiograph 03/22/2024 at 5:31 p.m., chest CT 11/21/2023 HISTORY: Short of breath, dyspnea on exertion.  Chest pain. FINDINGS: Pulmonary Arteries: Pulmonary arteries are adequately opacified for evaluation.  No evidence of intraluminal filling defect to suggest pulmonary embolism.  Main pulmonary artery is normal in caliber, 27 mm. Mediastinum: Unchanged 13 mm subcarinal lymph node and shotty right paratracheal and prevascular lymph nodes.  No new mediastinal lymphadenopathy.  The heart and pericardium demonstrate no acute abnormality. There is no acute abnormality of the thoracic aorta.  The ascending thoracic aorta is 28 mm, descending thoracic aorta 21 mm.  Relatively low-density right hilar lymph node is 17 mm, 15 mm on prior study. Lungs/pleura: Vascular engorgement with hazy opacity in the parahilar regions throughout the bilateral lungs.  Mildly thickened interlobular septa.  No focal pleural effusion.  No soft tissue pulmonary nodule.  No inspissated secretions or endobronchial lesion evident. Upper Abdomen: Limited images of the upper abdomen are unremarkable. Soft Tissues/Bones: No acute bone or soft tissue abnormality.     1.  No acute pulmonary embolism. 2. Mild congestive heart failure is most likely given the findings; no associated dense pulmonary consolidation or pleural effusion demonstrated. Involvement with atypical/viral pneumonia or other

## 2024-03-24 NOTE — PLAN OF CARE
Problem: Pain  Goal: Verbalizes/displays adequate comfort level or baseline comfort level  3/24/2024 1010 by Cindy Reddy, RN  Outcome: Progressing     Problem: Chronic Conditions and Co-morbidities  Goal: Patient's chronic conditions and co-morbidity symptoms are monitored and maintained or improved  3/24/2024 1010 by Cindy Reddy, RN  Outcome: Progressing     Problem: Safety - Adult  Goal: Free from fall injury  Outcome: Progressing

## 2024-03-24 NOTE — PROGRESS NOTES
Oxygen documentation:     O2 saturation at REST on ROOM AIR = 92%     If saturation is 89% or above please proceed with steps 2 and 3……….     O2 saturation with AMBULATION of 200 feet on ROOM AIR = 85%      Pt recovered after sitting in the bed for a minute and increased to 93%.     Per pt gets winded and starts wheezing when walking long distance.

## 2024-03-24 NOTE — PLAN OF CARE
CHF Care Plan      Patient's EF (Ejection Fraction) is greater than 40%    Heart Failure Medications:  Diuretics:: Furosemide x1 (once-3/24)    (One of the following REQUIRED for EF </= 40%/SYSTOLIC FAILURE but MAY be used in EF% >40%/DIASTOLIC FAILURE)        ACE:: None        ARB:: None         ARNI:: None    (Beta Blockers)  NON- Evidenced Based Beta Blocker (for EF% >40%/DIASTOLIC FAILURE): None    Evidenced Based Beta Blocker::(REQUIRED for EF% <40%/SYSTOLIC FAILURE) None  ...................................................................................................................................................    Failed to redirect to the Timeline version of the [employer] SmartLink.      Patient's weights and intake/output reviewed    Daily Weight log at bedside, patient/family participation in use of log: \"yes    Patient's current weight today shows a difference of none.       Intake/Output Summary (Last 24 hours) at 3/24/2024 1009  Last data filed at 3/24/2024 0839  Gross per 24 hour   Intake 880 ml   Output 2150 ml   Net -1270 ml       Education Booklet Provided: yes    Comorbidities Reviewed Yes    Patient has a past medical history of Adrenal insufficiency, Asthma, Bipolar 1 disorder (HCC), Borderline personality disorder (HCC), Chronic prescription benzodiazepine use, Chronic prescription opiate use, COPD (chronic obstructive pulmonary disease) (HCC), Degenerative spinal arthritis, Howard-Danlos syndrome, Fibromyalgia, Hepatitis C, Herniated disc, Intervertebral disc disease, Opiate overdose (HCC), Osteoarthritis, Polysubstance abuse (HCC), Sedative dependence (HCC), TCA (tricyclic antidepressant) overdose, and Vertebral compression fracture (HCC).     >>For CHF and Comorbidity documentation on Education Time and Topics, please see Education Tab      Pt resting in bed at this time on room air. Pt denies shortness of breath. Pt without lower extremity edema.     Patient and/or Family's stated Goal of  Care this Admission: reduce shortness of breath, increase activity tolerance, better understand heart failure and disease management, be more comfortable, and reduce lower extremity edema prior to discharge        :

## 2024-03-25 ENCOUNTER — APPOINTMENT (OUTPATIENT)
Dept: GENERAL RADIOLOGY | Age: 44
DRG: 194 | End: 2024-03-25
Payer: COMMERCIAL

## 2024-03-25 ENCOUNTER — TELEPHONE (OUTPATIENT)
Dept: PULMONOLOGY | Age: 44
End: 2024-03-25

## 2024-03-25 VITALS
RESPIRATION RATE: 18 BRPM | BODY MASS INDEX: 37.56 KG/M2 | TEMPERATURE: 98.8 F | HEIGHT: 64 IN | SYSTOLIC BLOOD PRESSURE: 116 MMHG | DIASTOLIC BLOOD PRESSURE: 69 MMHG | HEART RATE: 73 BPM | OXYGEN SATURATION: 98 % | WEIGHT: 220 LBS

## 2024-03-25 PROBLEM — E87.70 VOLUME OVERLOAD: Status: ACTIVE | Noted: 2024-03-25

## 2024-03-25 PROCEDURE — 6370000000 HC RX 637 (ALT 250 FOR IP): Performed by: FAMILY MEDICINE

## 2024-03-25 PROCEDURE — 71045 X-RAY EXAM CHEST 1 VIEW: CPT

## 2024-03-25 PROCEDURE — 6370000000 HC RX 637 (ALT 250 FOR IP): Performed by: STUDENT IN AN ORGANIZED HEALTH CARE EDUCATION/TRAINING PROGRAM

## 2024-03-25 PROCEDURE — 94669 MECHANICAL CHEST WALL OSCILL: CPT

## 2024-03-25 PROCEDURE — 94640 AIRWAY INHALATION TREATMENT: CPT

## 2024-03-25 PROCEDURE — 6370000000 HC RX 637 (ALT 250 FOR IP): Performed by: NURSE PRACTITIONER

## 2024-03-25 PROCEDURE — 93306 TTE W/DOPPLER COMPLETE: CPT

## 2024-03-25 PROCEDURE — 6360000002 HC RX W HCPCS: Performed by: STUDENT IN AN ORGANIZED HEALTH CARE EDUCATION/TRAINING PROGRAM

## 2024-03-25 PROCEDURE — 1200000000 HC SEMI PRIVATE

## 2024-03-25 PROCEDURE — 2580000003 HC RX 258: Performed by: STUDENT IN AN ORGANIZED HEALTH CARE EDUCATION/TRAINING PROGRAM

## 2024-03-25 RX ORDER — DIVALPROEX SODIUM 250 MG/1
500 TABLET, DELAYED RELEASE ORAL DAILY
COMMUNITY
Start: 2024-03-25

## 2024-03-25 RX ADMIN — IPRATROPIUM BROMIDE AND ALBUTEROL SULFATE 1 DOSE: 2.5; .5 SOLUTION RESPIRATORY (INHALATION) at 08:10

## 2024-03-25 RX ADMIN — ALPRAZOLAM 1 MG: 1 TABLET ORAL at 04:55

## 2024-03-25 RX ADMIN — ENOXAPARIN SODIUM 40 MG: 100 INJECTION SUBCUTANEOUS at 09:25

## 2024-03-25 RX ADMIN — METHADONE HYDROCHLORIDE 140 MG: 10 TABLET ORAL at 09:26

## 2024-03-25 RX ADMIN — ACYCLOVIR 200 MG: 200 CAPSULE ORAL at 11:33

## 2024-03-25 RX ADMIN — MIDODRINE HYDROCHLORIDE 10 MG: 5 TABLET ORAL at 14:24

## 2024-03-25 RX ADMIN — ACYCLOVIR 200 MG: 200 CAPSULE ORAL at 04:55

## 2024-03-25 RX ADMIN — LEVOTHYROXINE SODIUM 75 MCG: 0.03 TABLET ORAL at 04:55

## 2024-03-25 RX ADMIN — GUAIFENESIN 600 MG: 600 TABLET ORAL at 09:26

## 2024-03-25 RX ADMIN — PANTOPRAZOLE SODIUM 40 MG: 40 TABLET, DELAYED RELEASE ORAL at 04:55

## 2024-03-25 RX ADMIN — ACYCLOVIR 200 MG: 200 CAPSULE ORAL at 14:24

## 2024-03-25 RX ADMIN — Medication 2 PUFF: at 08:10

## 2024-03-25 RX ADMIN — ALPRAZOLAM 1 MG: 1 TABLET ORAL at 11:33

## 2024-03-25 RX ADMIN — CEFTRIAXONE SODIUM 1000 MG: 1 INJECTION, POWDER, FOR SOLUTION INTRAMUSCULAR; INTRAVENOUS at 12:58

## 2024-03-25 RX ADMIN — AZELASTINE HYDROCHLORIDE 1 SPRAY: 137 SPRAY, METERED NASAL at 09:25

## 2024-03-25 RX ADMIN — DIVALPROEX SODIUM 500 MG: 250 TABLET, DELAYED RELEASE ORAL at 09:26

## 2024-03-25 RX ADMIN — MIDODRINE HYDROCHLORIDE 10 MG: 5 TABLET ORAL at 09:26

## 2024-03-25 RX ADMIN — IPRATROPIUM BROMIDE AND ALBUTEROL SULFATE 1 DOSE: 2.5; .5 SOLUTION RESPIRATORY (INHALATION) at 11:04

## 2024-03-25 RX ADMIN — PREGABALIN 150 MG: 75 CAPSULE ORAL at 09:26

## 2024-03-25 NOTE — DISCHARGE INSTRUCTIONS
Heart Failure Resources:  Heart Failure Interactive Workbook:  Go to https://[email or web address]/publication/?e=544169 for a Free Heart Failure Interactive Workbook provided by The American Heart Association. This interactive workbook will provide information on Healthier Living with Heart Failure. Please copy and paste link into search bar. Use your mouse to scroll through the pages.    HF [place] jesse:   Heart Failure Free smart phone jesse available for iPhone and Android download. Use your phone to track sodium intake, fluid intake, symptoms, and weight.     Low Sodium Diet / Recipes:  Go to [email or web address] website for “renal” diet which is Low Sodium! [employer] is a dialysis company, but this website offers free seasonal cookbooks. Each quarter, they will release 25-30 new recipes with a breakdown of calories, sodium, and glucose. You can also go to [email or web address]/recipes website for free recipes.     Home Exercise Program:   Identification of Green/Yellow/Red zones:  You should be able to identify when you feel good (green zone), if you have 1-2 symptoms of HF (yellow zone), or if you are in need of medical attention (red zone).  In your CHF education folder you were provided a “stop light tool” to outline this information.     We want to you to rate your exertion levels:    Our therapy team has discussed means of identification with you such as the \"Richard scale.\"  The Richard rating scale ranges from 6 to 20, where 6 means \"no exertion at all\" and 20 means \"maximal exertion.\" The goal is to use this to gauge how much effort it is taking for you to do your normal daily tasks.   You should be able to recognize when too much exertion is being expended.    Elements of Energy Conservation:   Prioritize/Plan: Decide what needs to be done today, and what can wait for a later date, write to do lists, plan ahead to avoid extra trips, and gather supplies and equipment needed before starting an activity.   Position:  Avoid tiring and awkward posture that may impair breathing.  Pace: Slow and steady pace, never rushing!  Pursed lip breathing.  Pursed Lip Breathing: \"Smell the roses, blow out the candles\".

## 2024-03-25 NOTE — TELEPHONE ENCOUNTER
Pt called from hospital requesting to speak with Dr Medina. Pt reports she is currently admitted for congestive heart failure and is being told this was the reason for being ill the last five months. Pt is skeptical and quoted the green mucus she has consistently been coughing up. Pt would like Dr Medina to review chart if possible.

## 2024-03-25 NOTE — PLAN OF CARE
CHF Care Plan      Patient's EF (Ejection Fraction) is greater than 40%    Heart Failure Medications:  Diuretics:: Furosemide    (One of the following REQUIRED for EF </= 40%/SYSTOLIC FAILURE but MAY be used in EF% >40%/DIASTOLIC FAILURE)        ACE:: None        ARB:: None         ARNI:: None    (Beta Blockers)  NON- Evidenced Based Beta Blocker (for EF% >40%/DIASTOLIC FAILURE): None    Evidenced Based Beta Blocker::(REQUIRED for EF% <40%/SYSTOLIC FAILURE) None  ...................................................................................................................................................    Failed to redirect to the Timeline version of the [employer] SmartLink.      Patient's weights and intake/output reviewed    Daily Weight log at bedside, patient/family participation in use of log: \"yes    Patient's current weight today shows a difference of 2 lbs more than last documented weight.      Intake/Output Summary (Last 24 hours) at 3/25/2024 1201  Last data filed at 3/25/2024 1058  Gross per 24 hour   Intake 1200 ml   Output 2400 ml   Net -1200 ml       Education Booklet Provided: yes    Comorbidities Reviewed Yes    Patient has a past medical history of Adrenal insufficiency, Asthma, Bipolar 1 disorder (HCC), Borderline personality disorder (HCC), Chronic prescription benzodiazepine use, Chronic prescription opiate use, COPD (chronic obstructive pulmonary disease) (HCC), Degenerative spinal arthritis, Howard-Danlos syndrome, Fibromyalgia, Hepatitis C, Herniated disc, Intervertebral disc disease, Opiate overdose (HCC), Osteoarthritis, Polysubstance abuse (HCC), Sedative dependence (HCC), TCA (tricyclic antidepressant) overdose, and Vertebral compression fracture (HCC).     >>For CHF and Comorbidity documentation on Education Time and Topics, please see Education Tab      Pt sitting in bed at this time on room air. Pt denies shortness of breath. Pt without lower extremity edema.     Patient and/or

## 2024-03-25 NOTE — PROGRESS NOTES
Discharge instructions, along with upcoming appointment information and new medications reviewed with patient; pt verbalized understanding. Tele monitor removed & logged, CMU aware. Peripheral IV removed w/o complication. Patient dressed and personal belongings packed.

## 2024-03-25 NOTE — PROGRESS NOTES
Shift assessments completed and documented. Scheduled medications administered as ordered. VSS. Care plan and education reviewed with patient. Patient acknowledges understanding. Patient reports all care needs are met at this time and is resting comfortably in bed. Call light and bedside table within reach. Bed locked and in lowest position.

## 2024-03-25 NOTE — FLOWSHEET NOTE
03/24/24 2155   Assessment   Charting Type Shift assessment   Psychosocial   Psychosocial (WDL) WDL   Neurological   Neuro (WDL) WDL   Georges Coma Scale   Eye Opening 4   Best Verbal Response 5   Best Motor Response 6   Rapidan Coma Scale Score 15   HEENT (Head, Ears, Eyes, Nose, & Throat)   HEENT (WDL) X   Right Eye Impaired vision;Glasses   Left Eye Impaired vision;Glasses   Respiratory   Respiratory (WDL) X   Respiratory Pattern Regular   Respiratory Depth Normal   Respiratory Quality/Effort Dyspnea with exertion;Unlabored   L Breath Sounds Clear   R Breath Sounds Clear   Cardiac   Cardiac (WDL) X  (CHF ex)   Cardiac Regularity Regular   Heart Sounds S1, S2   Cardiac Rhythm Sinus rhythm   Cardiac Monitor   Cardiac/Telemetry Monitor On Portable telemetry pack applied   Gastrointestinal   Abdominal (WDL) X   Abdomen Inspection Soft;Rounded;Obese   RUQ Bowel Sounds Active   LUQ Bowel Sounds Active   RLQ Bowel Sounds Active   LLQ Bowel Sounds Active   GI Symptoms Constipation   Tenderness Soft;No guarding;Nontender   Genitourinary   Genitourinary (WDL) X   Suprapubic Tenderness Yes   Dysuria (Pain/Burning w/Urination) Yes   Difficulty Urinating/Starting Stream No   Urine Frequency   Urine Frequency No   Urine Urgency   Urine Urgency No   Urinary Retention   Urinary Retention Absent   Peripheral Vascular   Peripheral Vascular (WDL) WDL   Edema None   Anti-Embolism   Anti-Embolism Intervention Medication   Skin Integumentary    Skin Integumentary (WDL) X  (vaginal herpes)   Musculoskeletal   Musculoskeletal (WDL) WDL

## 2024-03-25 NOTE — CONSULTS
Nutrition Education    Consult received for CHF diet education. Provided pt with written and verbal instruction on HF nutrition therapy. Discussed low sodium diet, daily weights, and fluid restriction. Pt voiced understanding. Reports occasionally making meals at home, does not add additional salt. Reports having a scale at home, RD encouraged daily weights. No further questions at time of visit. Left handout at bedside.     Time spent: 10 minutes    Educated on HF nutrition therapy  Learners: Patient  Readiness: Acceptance  Method: Explanation and Handout  Response: Verbalizes Understanding  Contact name and number provided.    Joanna Oneill RD  Contact Number: Office: [phone]; [place]: [phone]

## 2024-03-25 NOTE — PROGRESS NOTES
Hospital Medicine Progress Note      Date of Admission: 3/22/2024  Hospital Day: 4    Chief Admission Complaint:  ***     Subjective:  ***    Telemetry (reviewed by me):  ***    Presenting Admission History:       43 y.o. female with pmh of Borderline personality disorder, opioid dependence, PTSD who presents with shortness of breath that has been present for the last several months, but is acutely worse over the last couple of days. Patient states she has been on multiple rounds of antibiotics but continues to have phlegm come up daily. She endorses associated cough and intermittent chest tightness. She is convinced she once again has \"rebound pneumonia.\"      CXR shows mild congestive heart failure or possible atypical infection. Procalcitonin was negative. . Patient takes multiple psychiatric medications and has BP that runs low, systolic 90s. She is on hydrocortisone and midodrine as well for adrenal insufficiency.     Assessment/Plan:      Current Principal Problem:  Mild congestive heart failure (HCC)    Suspected CHF exacerbation vs asthma not in exacerbation. P/w SOB, productive cough with green sputum past few months but worse past week. Reports desating at home with ambulation. CTPA suggestive of mild CHF vs viral infection.   No sign of volume overload on exam, has gained wt past few months unsure if 2/2 fluid vs lifestyle  Pt had bronchoscopies in the past unremarkable, Immunoglobulins and connective tissue disease work-up negative  - will give another lasix 20 mg IV   - await ECHO  - breathing tx  - consulted pulm; no further recs  - strict I/o  - resume home meds  - add mucinex, robitussin, acapalla  - walk test     UTI. Reports symptoms. UA suggestive of UTI. Was on Bactrim past 5 days.   Urine cx negative so far  - will do rocephin total 3 days  - f/u urine cx     Borderline personality disorder  On multiple psychiatric medications including Xanax, methadone, cymbalta, ambien, seroquel    requiring serial renal monitoring for nephrotoxicity:     [] IV Narcotic analgesia for adverse drug reaction  [] IV diuresis requiring serial monitoring for renal impairment and electrolyte derangements  [] Critical electrolyte abnormalities requiring IV replacement and close serial monitoring  [] Insulin - monitoring serial FSBS for Hypoglycemic adverse drug reaction  [] Anticoagulation requiring serial monitoring of coagulation factors  [] Other -   [] Change in code status:    [] Decision to escalate care:    [] Major surgery/procedure with associated risk factors:    ----------------------------------------------------------------------  C. Data (any 2)  [] Discussed current management and discharge planning options with Case Management.  [] Discussed management of the case with:    [] Telemetry personally reviewed and interpreted as documented above    [] Imaging personally reviewed and interpreted, includes:    [] Data Review (any 3)  [] All available Consultant notes from yesterday/today were reviewed  [] All current labs were reviewed and interpreted for clinical significance   [] Appropriate follow-up labs were ordered  [] Collateral history obtained from:      Medications:  Personally reviewed in detail in conjunction w/ labs as documented for evidence of drug toxicity.     Infusion Medications    sodium chloride       Scheduled Medications    divalproex  500 mg Oral Daily    divalproex  1,000 mg Oral Nightly    cefTRIAXone (ROCEPHIN) IV  1,000 mg IntraVENous Q24H    ipratropium 0.5 mg-albuterol 2.5 mg  1 Dose Inhalation Q4H WA RT    levothyroxine  75 mcg Oral Daily    midodrine  10 mg Oral TID    pantoprazole  40 mg Oral QAM AC    QUEtiapine  200 mg Oral Nightly    sodium chloride flush  5-40 mL IntraVENous 2 times per day    enoxaparin  40 mg SubCUTAneous Daily    mometasone-formoterol  2 puff Inhalation BID RT    azelastine  1 spray Each Nostril BID    pregabalin  150 mg Oral BID    ALPRAZolam  1 mg

## 2024-03-25 NOTE — CARE COORDINATION
Case Management Assessment  Initial Evaluation    Date/Time of Evaluation: 3/25/2024 11:25 AM  Assessment Completed by: Skye Jones RN    If patient is discharged prior to next notation, then this note serves as note for discharge by case management.    Patient Name: Anjelica Blanchard                   YOB: 1980  Diagnosis: Shortness of breath [R06.02]  Fluid overload [E87.70]  Chest pain, unspecified type [R07.9]  New onset of congestive heart failure (HCC) [I50.9]  Volume overload [E87.70]                   Date / Time: 3/22/2024  5:20 PM    Patient Admission Status: Inpatient   Readmission Risk (Low < 19, Mod (19-27), High > 27): Readmission Risk Score: 13.9    Current PCP: Smith Neff MD  PCP verified by CM? Yes    Chart Reviewed: Yes      History Provided by: Patient  Patient Orientation: Alert and Oriented    Patient Cognition: Alert    Hospitalization in the last 30 days (Readmission):  No    If yes, Readmission Assessment in  Navigator will be completed.    Advance Directives:      Code Status: Full Code   Patient's Primary Decision Maker is: Legal Next of Kin    Primary Decision Maker: Talia Blanchard - Child    Secondary Decision Maker: [place] Blanchard - Parent - [phone]    Secondary Decision Maker: Mally Blanchard - Brother/Sister - [phone]    Discharge Planning:    Patient lives with: Alone Type of Home: Apartment  Primary Care Giver: Self  Patient Support Systems include: Family Members, Friends/Neighbors   Current Financial resources: Medicaid  Current community resources: None  Current services prior to admission: None            Current DME:              Type of Home Care services:  None    ADLS  Prior functional level: Independent in ADLs/IADLs  Current functional level: Independent in ADLs/IADLs    PT AM-PAC:   /24  OT AM-PAC:   /24    Family can provide assistance at DC: Yes  Would you like Case Management to discuss the discharge plan with any other family

## 2024-03-25 NOTE — TELEPHONE ENCOUNTER
----- Message from Ismael Arechiga MD sent at 3/23/2024  2:24 PM EDT -----  Can make a f/u appt for patient in 1 month. Thank you    KD

## 2024-03-26 ENCOUNTER — TELEPHONE (OUTPATIENT)
Dept: PULMONOLOGY | Age: 44
End: 2024-03-26

## 2024-03-26 NOTE — TELEPHONE ENCOUNTER
Pt called back. I cancelled pt's CT scan scheduled for 3.27.24 since she just had one in the hospital and repeated Dr Medina's message that he will discuss everything will her next week. Pt was informed of her appt on 4.3.24 at 2pm.

## 2024-03-26 NOTE — TELEPHONE ENCOUNTER
Pt called again. Pt states her lungs are really hurting her. I informed pt that this could be discussed at upcoming appt but that if her symptoms get worse then to call back or seek emergent care.

## 2024-03-28 NOTE — TELEPHONE ENCOUNTER
Care Transitions Initial Follow Up Call    Call within 2 business days of discharge: Yes     Patient: Anjelica Blanchard Patient : 1980 MRN: [id number]    [unfilled]    RARS: Readmission Risk Score: 13.2       Spoke with: patient     Discharge department/facility: home    Non-face-to-face services provided:  Scheduled appointment with PCP-4/3/24    Spoke to patient for hospital follow up.   Pt unsure if she has CHF vs lung issue.  Reviewed notes with patient.  She is to see lung doctor next week.  Denies any additional needs.     Follow Up  Future Appointments   Date Time Provider Department Center   4/3/2024  2:00 PM Manisha Medina MD AND PULM [facility]       Fallon Bocanegra RN

## 2024-04-02 DIAGNOSIS — R06.83 SNORING: Primary | ICD-10-CM

## 2024-04-02 DIAGNOSIS — G47.19 EXCESSIVE DAYTIME SLEEPINESS: ICD-10-CM

## 2024-04-02 NOTE — PROGRESS NOTES
Patient requested inpatient sleep study as she failed home sleep study previously.  Will order and see insurance response.

## 2024-04-04 NOTE — PROGRESS NOTES
Physician Progress Note      PATIENT:               FERNANDO RINCON  CSN #:                  [id number]  :                       1980  ADMIT DATE:       3/22/2024 5:20 PM  DISCH DATE:        3/25/2024 4:53 PM  RESPONDING  PROVIDER #:        Jefry Padron MD          QUERY TEXT:    Pt admitted with SOB and has suspected CHF exacerbation documented. If   possible, please document in progress notes and discharge summary further   specificity regarding the type and acuity of CHF:      The medical record reflects the following:  Risk Factors: Asthma, COPD, substance abuse, adrenal insufficiency  Clinical Indicators:  PN -  \"Suspected CHF exacerbation vs asthma not in exacerbation. P/w SOB, productive   cough with green sputum past few months but worse past week. Reports desating   at home with ambulation. CTPA suggestive of mild CHF vs viral infection.  No sign of volume overload on exam, has gained wt past few months unsure if   2/2 fluid vs lifestyle  Pt had bronchoscopies in the past unremarkable, Immunoglobulins and connective   tissue disease work-up negative  - will give another lasix 20 mg IV  - await ECHO\"  H&P -  \"Mild congestive heart failure  , procalcitonin negative  Favors cardiac etiology for CXR findings  1 x lasix given with consideration for relative hypotension  Echo ordered  Regular diet  BP will not tolerate GDMT\"  H&P -  \"Left ventricular systolic function is normal with a visually estimated  ejection fraction of 55-60%.  The left ventricle is normal in size with normal wall thickness.  No obvious regional wall motion abnormalities noted.  Grade I diastolic dysfunction.\"  Treatment: IV Lasix, ECHO, CHF diet education, strict I/Os, serial labs, and   supportive care    Thank you,  Fallon Bhatti, RN, BSN, CRCR  Options provided:  -- Acute on Chronic Systolic CHF/HFrEF  -- Acute on Chronic Diastolic CHF/HFpEF  -- Acute on Chronic Systolic and Diastolic CHF  -- Acute

## 2024-04-05 ENCOUNTER — TELEPHONE (OUTPATIENT)
Dept: ADMINISTRATIVE | Age: 44
End: 2024-04-05

## 2024-04-05 ENCOUNTER — OFFICE VISIT (OUTPATIENT)
Dept: PULMONOLOGY | Age: 44
End: 2024-04-05
Payer: COMMERCIAL

## 2024-04-05 VITALS
WEIGHT: 213 LBS | RESPIRATION RATE: 16 BRPM | DIASTOLIC BLOOD PRESSURE: 62 MMHG | BODY MASS INDEX: 36.37 KG/M2 | SYSTOLIC BLOOD PRESSURE: 93 MMHG | OXYGEN SATURATION: 93 % | HEART RATE: 77 BPM | HEIGHT: 64 IN | TEMPERATURE: 96.9 F

## 2024-04-05 DIAGNOSIS — J45.40 MODERATE PERSISTENT ASTHMA, UNSPECIFIED WHETHER COMPLICATED: ICD-10-CM

## 2024-04-05 DIAGNOSIS — R91.8 BILATERAL PULMONARY INFILTRATES ON CHEST X-RAY: Primary | ICD-10-CM

## 2024-04-05 DIAGNOSIS — J45.909 UNCOMPLICATED ASTHMA, UNSPECIFIED ASTHMA SEVERITY, UNSPECIFIED WHETHER PERSISTENT: Primary | ICD-10-CM

## 2024-04-05 DIAGNOSIS — I50.32 CHRONIC HEART FAILURE WITH PRESERVED EJECTION FRACTION (HCC): ICD-10-CM

## 2024-04-05 PROCEDURE — 1036F TOBACCO NON-USER: CPT | Performed by: INTERNAL MEDICINE

## 2024-04-05 PROCEDURE — 1111F DSCHRG MED/CURRENT MED MERGE: CPT | Performed by: INTERNAL MEDICINE

## 2024-04-05 PROCEDURE — 99215 OFFICE O/P EST HI 40 MIN: CPT | Performed by: INTERNAL MEDICINE

## 2024-04-05 PROCEDURE — G8417 CALC BMI ABV UP PARAM F/U: HCPCS | Performed by: INTERNAL MEDICINE

## 2024-04-05 PROCEDURE — G8428 CUR MEDS NOT DOCUMENT: HCPCS | Performed by: INTERNAL MEDICINE

## 2024-04-05 RX ORDER — ALBUTEROL SULFATE 0.63 MG/3ML
1 SOLUTION RESPIRATORY (INHALATION) EVERY 6 HOURS PRN
Qty: 270 ML | Refills: 6 | Status: SHIPPED | OUTPATIENT
Start: 2024-04-05 | End: 2024-07-19

## 2024-04-05 RX ORDER — FUROSEMIDE 20 MG/1
20 TABLET ORAL EVERY OTHER DAY
Qty: 15 TABLET | Refills: 0 | Status: SHIPPED | OUTPATIENT
Start: 2024-04-05 | End: 2024-05-04

## 2024-04-05 RX ORDER — BUDESONIDE AND FORMOTEROL FUMARATE DIHYDRATE 160; 4.5 UG/1; UG/1
2 AEROSOL RESPIRATORY (INHALATION) 2 TIMES DAILY
Qty: 1 EACH | Refills: 5 | Status: SHIPPED | OUTPATIENT
Start: 2024-04-05 | End: 2024-07-04

## 2024-04-05 RX ORDER — NEBULIZER ACCESSORIES
1 KIT MISCELLANEOUS DAILY PRN
Qty: 1 KIT | Refills: 0 | Status: SHIPPED | OUTPATIENT
Start: 2024-04-05 | End: 2024-04-05 | Stop reason: CLARIF

## 2024-04-05 RX ORDER — PREDNISONE 20 MG/1
TABLET ORAL
Qty: 10 TABLET | Refills: 0 | Status: SHIPPED | OUTPATIENT
Start: 2024-04-05

## 2024-04-05 NOTE — PROGRESS NOTES
MA Communication:  The following orders are received by verbal communication from Manisha Medina MD    Orders include:    Orders faxed to [facility]  Follow up 6 weeks    
cardiology in the coming few weeks to discuss guideline based HFpEF therapy.  HSAT as soon as possible to qualify patient for CPAP  Continue Symbicort with air supra as needed  Will give 5-day course of steroids  Will order nebulizer machine and pulse ox for the patient  CT scan of the chest without contrast in 6 months to follow-up on the lung infiltrates  Targeting healthy weight is encouraged. Healthy weight can help treat sleep apnea, decrease breathing difficulties and respiratory illness exacerbations  Follow-up in 6 weeks      Health Maintenance/Preventive measures:        >>  Avoid exposure to tobacco, irritants, allergens as possible as well as contact with patients with infectious respiratory illness.        >>  Stay up-to-date with influenza & pneumonia vaccines, RSV, & COVID-19 vaccine as recommended by the Advisory Committee on Immunization Practices (ACIP)        >>  Healthy diet and activity as able.        >>  Acid reflux precautions: Head of bed elevation, avoiding tight clothes, avoiding big meals or snacking 3 hours before bedtime, targeting healthy weight.        >>  Practice sleep hygiene measures. Avoid driving or operating heavy machines if tired or sleepy.    Return in about 6 weeks (around 5/17/2024) for Sleep apnea, Asthma, Lung infiltrates/opacity.     I spent total of 40 minutes on this encounter on physical exam, chart review, interpreting labs and images, coordinating care, medical documentation and counseling the patient on diagnosis listed above.     Subjective/Objective     Interval History: April 5, 2024  Since last visit the patient continued to struggle with her breathing and had to be admitted for a few days in the third week of March 2023.  Her CT scan showed no PE but worsening bilateral lung infiltrates suggestive of pulmonary edema.  Echocardiogram showed mild diastolic dysfunction with a preserved action fraction with no pHTN or severe valvular issues.  Patient was treated

## 2024-04-05 NOTE — PATIENT INSTRUCTIONS
Discussed with the patient and educated about the multifactorial etiology of her shortness of breath and lung capacities including her bronchial asthma, previous vaping related lung injury, HFpEF, untreated sleep apnea.  Continue Lasix 20 mg daily that was ordered by PCP.  Will give another 20 mg to be taken every other day due to persistent leg edema and shortness of breath.  Educated about electrolyte replacement with fruits.  Salt restriction education provided by nutrition during recent hospital stay  Patient will be meeting with cardiology in the coming few weeks to discuss guideline based HFpEF therapy.  HSAT as soon as possible to qualify patient for CPAP  Continue Symbicort with air supra as needed  Will order nebulizer machine and pulse ox for the patient  CT scan of the chest without contrast in 6 months to follow-up on the lung infiltrates  Weight loss efforts encouraged.  Follow-up in 6 weeks      Health Maintenance/Preventive measures:        >>  Avoid exposure to tobacco, irritants, allergens as possible as well as contact with patients with infectious respiratory illness.        >>  Stay up-to-date with influenza & pneumonia vaccines, RSV, & COVID-19 vaccine as recommended by the Advisory Committee on Immunization Practices (ACIP)        >>  Healthy diet and activity as able.        >>  Acid reflux precautions: Head of bed elevation, avoiding tight clothes, avoiding big meals or snacking 3 hours before bedtime, targeting healthy weight.        >>  Practice sleep hygiene measures. Avoid driving or operating heavy machines if tired or sleepy.     Remember to bring a list of pulmonary medications and any CPAP or BiPAP machines to your next appointment with the office.     Please keep all of your future appointments scheduled by [facility] office. Out of respect for other patients and providers, you may be asked to reschedule your appointment if you arrive later than your

## 2024-04-05 NOTE — TELEPHONE ENCOUNTER
Submitted PA for Albuterol Sulfate 0.63MG/3ML nebulizer solution   Via CMM Key: [id number]  STATUS:     This PA cannot be submitted. Please refer to the other notes for further details.   Outcome   Drug is not a payable drug    If this requires a response please respond to the pool ( P MHCX PSC MEDICATION PRE-AUTH).      Thank you please advise patient.

## 2024-04-08 ENCOUNTER — TELEPHONE (OUTPATIENT)
Dept: PULMONOLOGY | Age: 44
End: 2024-04-08

## 2024-04-08 DIAGNOSIS — J45.909 UNCOMPLICATED ASTHMA, UNSPECIFIED ASTHMA SEVERITY, UNSPECIFIED WHETHER PERSISTENT: ICD-10-CM

## 2024-04-08 RX ORDER — ALBUTEROL SULFATE 2.5 MG/3ML
2.5 SOLUTION RESPIRATORY (INHALATION) EVERY 6 HOURS PRN
Qty: 120 EACH | Refills: 3 | Status: SHIPPED | OUTPATIENT
Start: 2024-04-08 | End: 2024-04-08 | Stop reason: SDUPTHER

## 2024-04-08 RX ORDER — ALBUTEROL SULFATE 2.5 MG/3ML
2.5 SOLUTION RESPIRATORY (INHALATION) EVERY 6 HOURS PRN
Qty: 120 EACH | Refills: 3 | Status: SHIPPED | OUTPATIENT
Start: 2024-04-08

## 2024-04-08 NOTE — TELEPHONE ENCOUNTER
Pt called again requesting that Carol send in lab work to monitor her potassium, sodium, and electrolytes while her endocrinologist is on vacation. Pt states she has adrenal pituitary insufficiency which she did not fully discuss with Carol at the last visit.

## 2024-04-08 NOTE — TELEPHONE ENCOUNTER
Pt says that since getting out of the hospital she has been prescribed Lasix 20mg 1x per day. Since seeing Carol on 4.5.24 pt reports that she has been taking 2x per day. I informed pt that instructions were to take one 20mg every other day. Pt says she thinks Carol may be confused as they discussed potentially upping the dosage, and she was already on one per day. Please call patient to clarify instructions

## 2024-04-12 ENCOUNTER — TELEPHONE (OUTPATIENT)
Dept: PULMONOLOGY | Age: 44
End: 2024-04-12

## 2024-04-12 NOTE — TELEPHONE ENCOUNTER
Sumatriptan is OK to take from a pulmonary standpoint.  She will need to discuss with cardiology once she is seen.

## 2024-04-12 NOTE — TELEPHONE ENCOUNTER
Patient is wanting to know if sumatripta is ok to take. Her PCP wanted to know if it's ok to take for migraines. If that is not ok to take what can she take until she gets in to a cardiologist. She is scheduled to see one in May. Please advise. Thanks

## 2024-04-17 ENCOUNTER — TELEPHONE (OUTPATIENT)
Dept: PULMONOLOGY | Age: 44
End: 2024-04-17

## 2024-04-17 NOTE — TELEPHONE ENCOUNTER
Pt called to state that she still has not received nebulizer machine and pulse ox from DME. I confirmed for pt per our notes that order was sent and in process as of 04/10/24. Pt was unaware of who her DME is so I informed pt that she should contact [facility] to follow up on this. Pt was provided phone number for [facility] and also directly transferred after call was complete.

## 2024-04-26 ENCOUNTER — APPOINTMENT (OUTPATIENT)
Dept: GENERAL RADIOLOGY | Age: 44
End: 2024-04-26
Payer: COMMERCIAL

## 2024-04-26 ENCOUNTER — HOSPITAL ENCOUNTER (INPATIENT)
Age: 44
LOS: 5 days | Discharge: HOME | End: 2024-05-01
Attending: EMERGENCY MEDICINE | Admitting: STUDENT IN AN ORGANIZED HEALTH CARE EDUCATION/TRAINING PROGRAM
Payer: COMMERCIAL

## 2024-04-26 ENCOUNTER — APPOINTMENT (OUTPATIENT)
Dept: CT IMAGING | Age: 44
End: 2024-04-26
Payer: COMMERCIAL

## 2024-04-26 DIAGNOSIS — T17.908A ASPIRATION INTO AIRWAY, INITIAL ENCOUNTER: ICD-10-CM

## 2024-04-26 DIAGNOSIS — E87.5 HYPERKALEMIA: ICD-10-CM

## 2024-04-26 DIAGNOSIS — N17.9 AKI (ACUTE KIDNEY INJURY) (HCC): ICD-10-CM

## 2024-04-26 DIAGNOSIS — J96.01 ACUTE RESPIRATORY FAILURE WITH HYPOXIA (HCC): ICD-10-CM

## 2024-04-26 DIAGNOSIS — T40.3X4A: ICD-10-CM

## 2024-04-26 DIAGNOSIS — T50.904A DRUG OVERDOSE OF UNDETERMINED INTENT, INITIAL ENCOUNTER: Primary | ICD-10-CM

## 2024-04-26 LAB
ALBUMIN SERPL-MCNC: 3.5 G/DL (ref 3.4–5)
ALBUMIN/GLOB SERPL: 1.4 {RATIO} (ref 1.1–2.2)
ALP SERPL-CCNC: 128 U/L (ref 40–129)
ALT SERPL-CCNC: 59 U/L (ref 10–40)
AMPHETAMINES UR QL SCN>1000 NG/ML: ABNORMAL
ANION GAP SERPL CALCULATED.3IONS-SCNC: 17 MMOL/L (ref 3–16)
APTT BLD: 30 SEC (ref 22.1–36.4)
AST SERPL-CCNC: 69 U/L (ref 15–37)
BARBITURATES UR QL SCN>200 NG/ML: ABNORMAL
BASE EXCESS BLDV CALC-SCNC: -5.7 MMOL/L (ref -3–3)
BASOPHILS # BLD: 0 K/UL (ref 0–0.2)
BASOPHILS NFR BLD: 0.4 %
BENZODIAZ UR QL SCN>200 NG/ML: POSITIVE
BILIRUB SERPL-MCNC: <0.2 MG/DL (ref 0–1)
BILIRUB UR QL STRIP.AUTO: NEGATIVE
BUN SERPL-MCNC: 22 MG/DL (ref 7–20)
CALCIUM SERPL-MCNC: 7.9 MG/DL (ref 8.3–10.6)
CANNABINOIDS UR QL SCN>50 NG/ML: ABNORMAL
CHLORIDE SERPL-SCNC: 95 MMOL/L (ref 99–110)
CLARITY UR: CLEAR
CO2 BLDV-SCNC: 22 MMOL/L
CO2 SERPL-SCNC: 23 MMOL/L (ref 21–32)
COCAINE UR QL SCN: ABNORMAL
COHGB MFR BLDV: 3.2 % (ref 0–1.5)
COLOR UR: YELLOW
CREAT SERPL-MCNC: 1.8 MG/DL (ref 0.6–1.1)
DEPRECATED RDW RBC AUTO: 16.6 % (ref 12.4–15.4)
DRUG SCREEN COMMENT UR-IMP: ABNORMAL
EOSINOPHIL # BLD: 0 K/UL (ref 0–0.6)
EOSINOPHIL NFR BLD: 1.4 %
FENTANYL SCREEN, URINE: ABNORMAL
GFR SERPLBLD CREATININE-BSD FMLA CKD-EPI: 35 ML/MIN/{1.73_M2}
GLUCOSE BLD-MCNC: 88 MG/DL (ref 70–99)
GLUCOSE SERPL-MCNC: 86 MG/DL (ref 70–99)
GLUCOSE UR STRIP.AUTO-MCNC: NEGATIVE MG/DL
HCG UR QL: NEGATIVE
HCO3 BLDV-SCNC: 20.3 MMOL/L (ref 23–29)
HCT VFR BLD AUTO: 38.6 % (ref 36–48)
HGB BLD-MCNC: 12.4 G/DL (ref 12–16)
HGB UR QL STRIP.AUTO: NEGATIVE
INR PPP: 1.97 (ref 0.85–1.15)
KETONES UR STRIP.AUTO-MCNC: NEGATIVE MG/DL
LACTATE BLDV-SCNC: 5.3 MMOL/L (ref 0.4–2)
LEUKOCYTE ESTERASE UR QL STRIP.AUTO: NEGATIVE
LYMPHOCYTES # BLD: 0.5 K/UL (ref 1–5.1)
LYMPHOCYTES NFR BLD: 20.7 %
MCH RBC QN AUTO: 28.8 PG (ref 26–34)
MCHC RBC AUTO-ENTMCNC: 32.1 G/DL (ref 31–36)
MCV RBC AUTO: 89.6 FL (ref 80–100)
METHADONE UR QL SCN>300 NG/ML: POSITIVE
METHGB MFR BLDV: 0.3 %
MONOCYTES # BLD: 0.2 K/UL (ref 0–1.3)
MONOCYTES NFR BLD: 8.3 %
NEUTROPHILS # BLD: 1.8 K/UL (ref 1.7–7.7)
NEUTROPHILS NFR BLD: 69.2 %
NITRITE UR QL STRIP.AUTO: NEGATIVE
O2 THERAPY: ABNORMAL
OPIATES UR QL SCN>300 NG/ML: ABNORMAL
OXYCODONE UR QL SCN: ABNORMAL
PCO2 BLDV: 41.8 MMHG (ref 40–50)
PCP UR QL SCN>25 NG/ML: ABNORMAL
PERFORMED ON: NORMAL
PH BLDV: 7.3 [PH] (ref 7.35–7.45)
PH UR STRIP.AUTO: 6.5 [PH] (ref 5–8)
PH UR STRIP: 6.5 [PH]
PLATELET # BLD AUTO: 235 K/UL (ref 135–450)
PMV BLD AUTO: 7.6 FL (ref 5–10.5)
PO2 BLDV: 150.7 MMHG (ref 25–40)
POTASSIUM SERPL-SCNC: 3.7 MMOL/L (ref 3.5–5.1)
POTASSIUM SERPL-SCNC: 5.9 MMOL/L (ref 3.5–5.1)
PROT SERPL-MCNC: 6 G/DL (ref 6.4–8.2)
PROT UR STRIP.AUTO-MCNC: NEGATIVE MG/DL
PROTHROMBIN TIME: 22.5 SEC (ref 11.9–14.9)
RBC # BLD AUTO: 4.3 M/UL (ref 4–5.2)
SAO2 % BLDV: 99 %
SODIUM SERPL-SCNC: 135 MMOL/L (ref 136–145)
SP GR UR STRIP.AUTO: 1.01 (ref 1–1.03)
TROPONIN, HIGH SENSITIVITY: 67 NG/L (ref 0–14)
UA COMPLETE W REFLEX CULTURE PNL UR: NORMAL
UA DIPSTICK W REFLEX MICRO PNL UR: NORMAL
URN SPEC COLLECT METH UR: NORMAL
UROBILINOGEN UR STRIP-ACNC: 0.2 E.U./DL
WBC # BLD AUTO: 2.5 K/UL (ref 4–11)

## 2024-04-26 PROCEDURE — 31645 BRNCHSC W/THER ASPIR 1ST: CPT | Performed by: INTERNAL MEDICINE

## 2024-04-26 PROCEDURE — 82803 BLOOD GASES ANY COMBINATION: CPT

## 2024-04-26 PROCEDURE — 94002 VENT MGMT INPAT INIT DAY: CPT

## 2024-04-26 PROCEDURE — 80307 DRUG TEST PRSMV CHEM ANLYZR: CPT

## 2024-04-26 PROCEDURE — 96374 THER/PROPH/DIAG INJ IV PUSH: CPT

## 2024-04-26 PROCEDURE — 87070 CULTURE OTHR SPECIMN AEROBIC: CPT

## 2024-04-26 PROCEDURE — 0B9M8ZZ DRAINAGE OF BILATERAL LUNGS, VIA NATURAL OR ARTIFICIAL OPENING ENDOSCOPIC: ICD-10-PCS | Performed by: INTERNAL MEDICINE

## 2024-04-26 PROCEDURE — 71045 X-RAY EXAM CHEST 1 VIEW: CPT

## 2024-04-26 PROCEDURE — 99291 CRITICAL CARE FIRST HOUR: CPT

## 2024-04-26 PROCEDURE — 36415 COLL VENOUS BLD VENIPUNCTURE: CPT

## 2024-04-26 PROCEDURE — 31622 DX BRONCHOSCOPE/WASH: CPT

## 2024-04-26 PROCEDURE — 99291 CRITICAL CARE FIRST HOUR: CPT | Performed by: INTERNAL MEDICINE

## 2024-04-26 PROCEDURE — 80053 COMPREHEN METABOLIC PANEL: CPT

## 2024-04-26 PROCEDURE — 93005 ELECTROCARDIOGRAM TRACING: CPT | Performed by: EMERGENCY MEDICINE

## 2024-04-26 PROCEDURE — 85610 PROTHROMBIN TIME: CPT

## 2024-04-26 PROCEDURE — 2500000003 HC RX 250 WO HCPCS: Performed by: EMERGENCY MEDICINE

## 2024-04-26 PROCEDURE — 96375 TX/PRO/DX INJ NEW DRUG ADDON: CPT

## 2024-04-26 PROCEDURE — 70450 CT HEAD/BRAIN W/O DYE: CPT

## 2024-04-26 PROCEDURE — 6360000002 HC RX W HCPCS: Performed by: INTERNAL MEDICINE

## 2024-04-26 PROCEDURE — 83605 ASSAY OF LACTIC ACID: CPT

## 2024-04-26 PROCEDURE — 36556 INSERT NON-TUNNEL CV CATH: CPT | Performed by: INTERNAL MEDICINE

## 2024-04-26 PROCEDURE — 2580000003 HC RX 258: Performed by: INTERNAL MEDICINE

## 2024-04-26 PROCEDURE — 84484 ASSAY OF TROPONIN QUANT: CPT

## 2024-04-26 PROCEDURE — 85730 THROMBOPLASTIN TIME PARTIAL: CPT

## 2024-04-26 PROCEDURE — 0B9M8ZX DRAINAGE OF BILATERAL LUNGS, VIA NATURAL OR ARTIFICIAL OPENING ENDOSCOPIC, DIAGNOSTIC: ICD-10-PCS | Performed by: INTERNAL MEDICINE

## 2024-04-26 PROCEDURE — 85025 COMPLETE CBC W/AUTO DIFF WBC: CPT

## 2024-04-26 PROCEDURE — 81003 URINALYSIS AUTO W/O SCOPE: CPT

## 2024-04-26 PROCEDURE — 84132 ASSAY OF SERUM POTASSIUM: CPT

## 2024-04-26 PROCEDURE — 87205 SMEAR GRAM STAIN: CPT

## 2024-04-26 PROCEDURE — 71250 CT THORAX DX C-: CPT

## 2024-04-26 PROCEDURE — 6370000000 HC RX 637 (ALT 250 FOR IP): Performed by: INTERNAL MEDICINE

## 2024-04-26 PROCEDURE — 0BH17EZ INSERTION OF ENDOTRACHEAL AIRWAY INTO TRACHEA, VIA NATURAL OR ARTIFICIAL OPENING: ICD-10-PCS | Performed by: EMERGENCY MEDICINE

## 2024-04-26 PROCEDURE — 02HV33Z INSERTION OF INFUSION DEVICE INTO SUPERIOR VENA CAVA, PERCUTANEOUS APPROACH: ICD-10-PCS | Performed by: INTERNAL MEDICINE

## 2024-04-26 PROCEDURE — 0WCQ8ZZ EXTIRPATION OF MATTER FROM RESPIRATORY TRACT, VIA NATURAL OR ARTIFICIAL OPENING ENDOSCOPIC: ICD-10-PCS | Performed by: INTERNAL MEDICINE

## 2024-04-26 PROCEDURE — 6360000002 HC RX W HCPCS

## 2024-04-26 PROCEDURE — 2580000003 HC RX 258: Performed by: STUDENT IN AN ORGANIZED HEALTH CARE EDUCATION/TRAINING PROGRAM

## 2024-04-26 PROCEDURE — 31500 INSERT EMERGENCY AIRWAY: CPT

## 2024-04-26 PROCEDURE — 84703 CHORIONIC GONADOTROPIN ASSAY: CPT

## 2024-04-26 PROCEDURE — 5A1945Z RESPIRATORY VENTILATION, 24-96 CONSECUTIVE HOURS: ICD-10-PCS | Performed by: INTERNAL MEDICINE

## 2024-04-26 PROCEDURE — 2000000000 HC ICU R&B

## 2024-04-26 RX ORDER — POTASSIUM CHLORIDE 7.45 MG/ML
10 INJECTION INTRAVENOUS PRN
Status: DISCONTINUED | OUTPATIENT
Start: 2024-04-26 | End: 2024-04-30

## 2024-04-26 RX ORDER — ACETYLCYSTEINE 200 MG/ML
600 SOLUTION ORAL; RESPIRATORY (INHALATION)
Status: DISCONTINUED | OUTPATIENT
Start: 2024-04-26 | End: 2024-04-29

## 2024-04-26 RX ORDER — IPRATROPIUM BROMIDE AND ALBUTEROL SULFATE 2.5; .5 MG/3ML; MG/3ML
1 SOLUTION RESPIRATORY (INHALATION)
Status: DISCONTINUED | OUTPATIENT
Start: 2024-04-27 | End: 2024-04-30

## 2024-04-26 RX ORDER — SODIUM CHLORIDE, SODIUM LACTATE, POTASSIUM CHLORIDE, AND CALCIUM CHLORIDE .6; .31; .03; .02 G/100ML; G/100ML; G/100ML; G/100ML
30 INJECTION, SOLUTION INTRAVENOUS ONCE
Status: DISCONTINUED | OUTPATIENT
Start: 2024-04-26 | End: 2024-04-26

## 2024-04-26 RX ORDER — PROPOFOL 10 MG/ML
INJECTION, EMULSION INTRAVENOUS
Status: COMPLETED
Start: 2024-04-26 | End: 2024-04-26

## 2024-04-26 RX ORDER — ACETAMINOPHEN 650 MG/1
650 SUPPOSITORY RECTAL EVERY 6 HOURS PRN
Status: DISCONTINUED | OUTPATIENT
Start: 2024-04-26 | End: 2024-05-01 | Stop reason: HOSPADM

## 2024-04-26 RX ORDER — ACETAMINOPHEN 325 MG/1
650 TABLET ORAL EVERY 6 HOURS PRN
Status: DISCONTINUED | OUTPATIENT
Start: 2024-04-26 | End: 2024-05-01 | Stop reason: HOSPADM

## 2024-04-26 RX ORDER — GUAIFENESIN 600 MG/1
600 TABLET, EXTENDED RELEASE ORAL 2 TIMES DAILY
Status: DISCONTINUED | OUTPATIENT
Start: 2024-04-26 | End: 2024-05-01

## 2024-04-26 RX ORDER — 0.9 % SODIUM CHLORIDE 0.9 %
1000 INTRAVENOUS SOLUTION INTRAVENOUS ONCE
Status: DISCONTINUED | OUTPATIENT
Start: 2024-04-26 | End: 2024-04-26

## 2024-04-26 RX ORDER — SODIUM CHLORIDE 0.9 % (FLUSH) 0.9 %
5-40 SYRINGE (ML) INJECTION EVERY 12 HOURS SCHEDULED
Status: DISCONTINUED | OUTPATIENT
Start: 2024-04-26 | End: 2024-05-01 | Stop reason: HOSPADM

## 2024-04-26 RX ORDER — FUROSEMIDE 10 MG/ML
40 INJECTION INTRAMUSCULAR; INTRAVENOUS ONCE
Status: DISCONTINUED | OUTPATIENT
Start: 2024-04-26 | End: 2024-04-26

## 2024-04-26 RX ORDER — ETOMIDATE 2 MG/ML
20 INJECTION INTRAVENOUS ONCE
Status: COMPLETED | OUTPATIENT
Start: 2024-04-26 | End: 2024-04-26

## 2024-04-26 RX ORDER — ENOXAPARIN SODIUM 100 MG/ML
40 INJECTION SUBCUTANEOUS DAILY
Status: DISCONTINUED | OUTPATIENT
Start: 2024-04-27 | End: 2024-05-01 | Stop reason: HOSPADM

## 2024-04-26 RX ORDER — POLYETHYLENE GLYCOL 3350 17 G/17G
17 POWDER, FOR SOLUTION ORAL DAILY PRN
Status: DISCONTINUED | OUTPATIENT
Start: 2024-04-26 | End: 2024-05-01 | Stop reason: HOSPADM

## 2024-04-26 RX ORDER — MAGNESIUM SULFATE IN WATER 40 MG/ML
2000 INJECTION, SOLUTION INTRAVENOUS PRN
Status: DISCONTINUED | OUTPATIENT
Start: 2024-04-26 | End: 2024-05-01 | Stop reason: HOSPADM

## 2024-04-26 RX ORDER — ONDANSETRON 2 MG/ML
4 INJECTION INTRAMUSCULAR; INTRAVENOUS EVERY 6 HOURS PRN
Status: DISCONTINUED | OUTPATIENT
Start: 2024-04-26 | End: 2024-05-01 | Stop reason: HOSPADM

## 2024-04-26 RX ORDER — SODIUM CHLORIDE 0.9 % (FLUSH) 0.9 %
5-40 SYRINGE (ML) INJECTION PRN
Status: DISCONTINUED | OUTPATIENT
Start: 2024-04-26 | End: 2024-05-01 | Stop reason: HOSPADM

## 2024-04-26 RX ORDER — PROPOFOL 10 MG/ML
5-50 INJECTION, EMULSION INTRAVENOUS CONTINUOUS
Status: DISCONTINUED | OUTPATIENT
Start: 2024-04-26 | End: 2024-04-29

## 2024-04-26 RX ORDER — IPRATROPIUM BROMIDE AND ALBUTEROL SULFATE 2.5; .5 MG/3ML; MG/3ML
1 SOLUTION RESPIRATORY (INHALATION) EVERY 4 HOURS PRN
Status: DISCONTINUED | OUTPATIENT
Start: 2024-04-26 | End: 2024-04-29 | Stop reason: SDUPTHER

## 2024-04-26 RX ORDER — SODIUM CHLORIDE 9 MG/ML
INJECTION, SOLUTION INTRAVENOUS PRN
Status: DISCONTINUED | OUTPATIENT
Start: 2024-04-26 | End: 2024-05-01 | Stop reason: HOSPADM

## 2024-04-26 RX ORDER — SODIUM CHLORIDE 9 MG/ML
INJECTION, SOLUTION INTRAVENOUS CONTINUOUS
Status: DISCONTINUED | OUTPATIENT
Start: 2024-04-26 | End: 2024-04-27

## 2024-04-26 RX ORDER — POTASSIUM CHLORIDE 20 MEQ/1
40 TABLET, EXTENDED RELEASE ORAL PRN
Status: DISCONTINUED | OUTPATIENT
Start: 2024-04-26 | End: 2024-04-30

## 2024-04-26 RX ORDER — ROCURONIUM BROMIDE 10 MG/ML
100 INJECTION, SOLUTION INTRAVENOUS ONCE
Status: COMPLETED | OUTPATIENT
Start: 2024-04-26 | End: 2024-04-26

## 2024-04-26 RX ORDER — ONDANSETRON 4 MG/1
4 TABLET, ORALLY DISINTEGRATING ORAL EVERY 8 HOURS PRN
Status: DISCONTINUED | OUTPATIENT
Start: 2024-04-26 | End: 2024-05-01 | Stop reason: HOSPADM

## 2024-04-26 RX ORDER — PANTOPRAZOLE SODIUM 40 MG/10ML
40 INJECTION, POWDER, LYOPHILIZED, FOR SOLUTION INTRAVENOUS DAILY
Status: DISCONTINUED | OUTPATIENT
Start: 2024-04-27 | End: 2024-04-30

## 2024-04-26 RX ORDER — FUROSEMIDE 10 MG/ML
20 INJECTION INTRAMUSCULAR; INTRAVENOUS 2 TIMES DAILY
Status: COMPLETED | OUTPATIENT
Start: 2024-04-27 | End: 2024-04-28

## 2024-04-26 RX ADMIN — PROPOFOL 20 MCG/KG/MIN: 10 INJECTION, EMULSION INTRAVENOUS at 21:12

## 2024-04-26 RX ADMIN — ETOMIDATE 20 MG: 2 INJECTION INTRAVENOUS at 20:59

## 2024-04-26 RX ADMIN — SODIUM CHLORIDE: 9 INJECTION, SOLUTION INTRAVENOUS at 23:20

## 2024-04-26 RX ADMIN — ROCURONIUM BROMIDE 100 MG: 10 INJECTION INTRAVENOUS at 20:59

## 2024-04-26 RX ADMIN — WATER 40 MG: 1 INJECTION INTRAMUSCULAR; INTRAVENOUS; SUBCUTANEOUS at 23:09

## 2024-04-26 ASSESSMENT — PULMONARY FUNCTION TESTS
PIF_VALUE: 38
PIF_VALUE: 35

## 2024-04-26 ASSESSMENT — PAIN SCALES - WONG BAKER: WONGBAKER_NUMERICALRESPONSE: HURTS A LITTLE BIT

## 2024-04-27 LAB
ALBUMIN SERPL-MCNC: 3.6 G/DL (ref 3.4–5)
ALBUMIN/GLOB SERPL: 1.5 {RATIO} (ref 1.1–2.2)
ALP SERPL-CCNC: 78 U/L (ref 40–129)
ALT SERPL-CCNC: 25 U/L (ref 10–40)
ANION GAP SERPL CALCULATED.3IONS-SCNC: 16 MMOL/L (ref 3–16)
ANISOCYTOSIS BLD QL SMEAR: ABNORMAL
AST SERPL-CCNC: 57 U/L (ref 15–37)
BASE EXCESS BLDA CALC-SCNC: -1.2 MMOL/L (ref -3–3)
BASOPHILS # BLD: 0 K/UL (ref 0–0.2)
BASOPHILS NFR BLD: 0 %
BILIRUB SERPL-MCNC: <0.2 MG/DL (ref 0–1)
BUN SERPL-MCNC: 26 MG/DL (ref 7–20)
CALCIUM SERPL-MCNC: 8.2 MG/DL (ref 8.3–10.6)
CHLORIDE SERPL-SCNC: 95 MMOL/L (ref 99–110)
CO2 BLDA-SCNC: 24.3 MMOL/L
CO2 SERPL-SCNC: 24 MMOL/L (ref 21–32)
COHGB MFR BLDA: 0.4 % (ref 0–1.5)
CREAT SERPL-MCNC: 1.6 MG/DL (ref 0.6–1.1)
DACRYOCYTES BLD QL SMEAR: ABNORMAL
DEPRECATED RDW RBC AUTO: 16.1 % (ref 12.4–15.4)
EKG ATRIAL RATE: 111 BPM
EKG DIAGNOSIS: NORMAL
EKG P AXIS: 70 DEGREES
EKG P-R INTERVAL: 136 MS
EKG Q-T INTERVAL: 334 MS
EKG QRS DURATION: 92 MS
EKG QTC CALCULATION (BAZETT): 454 MS
EKG R AXIS: 21 DEGREES
EKG T AXIS: 56 DEGREES
EKG VENTRICULAR RATE: 111 BPM
EOSINOPHIL # BLD: 0 K/UL (ref 0–0.6)
EOSINOPHIL NFR BLD: 0 %
GFR SERPLBLD CREATININE-BSD FMLA CKD-EPI: 40 ML/MIN/{1.73_M2}
GLUCOSE BLD-MCNC: 97 MG/DL (ref 70–99)
GLUCOSE SERPL-MCNC: 180 MG/DL (ref 70–99)
HCO3 BLDA-SCNC: 23.1 MMOL/L (ref 21–29)
HCT VFR BLD AUTO: 37.3 % (ref 36–48)
HGB BLD-MCNC: 12.1 G/DL (ref 12–16)
HGB BLDA-MCNC: 12.6 G/DL (ref 12–16)
LACTATE BLDV-SCNC: 5 MMOL/L (ref 0.4–2)
LYMPHOCYTES # BLD: 0.4 K/UL (ref 1–5.1)
LYMPHOCYTES NFR BLD: 8 %
MACROCYTES BLD QL SMEAR: ABNORMAL
MAGNESIUM SERPL-MCNC: 1.8 MG/DL (ref 1.8–2.4)
MCH RBC QN AUTO: 28.8 PG (ref 26–34)
MCHC RBC AUTO-ENTMCNC: 32.4 G/DL (ref 31–36)
MCV RBC AUTO: 89.1 FL (ref 80–100)
METAMYELOCYTES NFR BLD MANUAL: 2 %
METHGB MFR BLDA: 0.1 %
MONOCYTES # BLD: 0.2 K/UL (ref 0–1.3)
MONOCYTES NFR BLD: 4 %
NEUTROPHILS # BLD: 4.3 K/UL (ref 1.7–7.7)
NEUTROPHILS NFR BLD: 42 %
NEUTS BAND NFR BLD MANUAL: 44 % (ref 0–7)
NEUTS VAC BLD QL SMEAR: PRESENT
O2 THERAPY: ABNORMAL
PCO2 BLDA: 37.5 MMHG (ref 35–45)
PERFORMED ON: NORMAL
PH BLDA: 7.41 [PH] (ref 7.35–7.45)
PLATELET # BLD AUTO: 254 K/UL (ref 135–450)
PLATELET BLD QL SMEAR: ADEQUATE
PMV BLD AUTO: 8.3 FL (ref 5–10.5)
PO2 BLDA: 149.2 MMHG (ref 75–108)
POIKILOCYTOSIS BLD QL SMEAR: ABNORMAL
POLYCHROMASIA BLD QL SMEAR: ABNORMAL
POTASSIUM SERPL-SCNC: 4.1 MMOL/L (ref 3.5–5.1)
PROT SERPL-MCNC: 6 G/DL (ref 6.4–8.2)
RBC # BLD AUTO: 4.18 M/UL (ref 4–5.2)
REASON FOR REJECTION: NORMAL
REJECTED TEST: NORMAL
SAO2 % BLDA: 99.3 %
SLIDE REVIEW: ABNORMAL
SODIUM SERPL-SCNC: 135 MMOL/L (ref 136–145)
TOXIC GRANULES BLD QL SMEAR: PRESENT
TROPONIN, HIGH SENSITIVITY: 86 NG/L (ref 0–14)
WBC # BLD AUTO: 4.9 K/UL (ref 4–11)

## 2024-04-27 PROCEDURE — 94761 N-INVAS EAR/PLS OXIMETRY MLT: CPT

## 2024-04-27 PROCEDURE — 2700000000 HC OXYGEN THERAPY PER DAY

## 2024-04-27 PROCEDURE — 93010 ELECTROCARDIOGRAM REPORT: CPT | Performed by: INTERNAL MEDICINE

## 2024-04-27 PROCEDURE — 6370000000 HC RX 637 (ALT 250 FOR IP): Performed by: STUDENT IN AN ORGANIZED HEALTH CARE EDUCATION/TRAINING PROGRAM

## 2024-04-27 PROCEDURE — 2580000003 HC RX 258: Performed by: INTERNAL MEDICINE

## 2024-04-27 PROCEDURE — 2580000003 HC RX 258: Performed by: STUDENT IN AN ORGANIZED HEALTH CARE EDUCATION/TRAINING PROGRAM

## 2024-04-27 PROCEDURE — 83735 ASSAY OF MAGNESIUM: CPT

## 2024-04-27 PROCEDURE — 6360000002 HC RX W HCPCS: Performed by: INTERNAL MEDICINE

## 2024-04-27 PROCEDURE — 6370000000 HC RX 637 (ALT 250 FOR IP): Performed by: INTERNAL MEDICINE

## 2024-04-27 PROCEDURE — C9113 INJ PANTOPRAZOLE SODIUM, VIA: HCPCS | Performed by: INTERNAL MEDICINE

## 2024-04-27 PROCEDURE — 6360000002 HC RX W HCPCS: Performed by: EMERGENCY MEDICINE

## 2024-04-27 PROCEDURE — 94003 VENT MGMT INPAT SUBQ DAY: CPT

## 2024-04-27 PROCEDURE — 99222 1ST HOSP IP/OBS MODERATE 55: CPT | Performed by: INTERNAL MEDICINE

## 2024-04-27 PROCEDURE — 6360000002 HC RX W HCPCS: Performed by: STUDENT IN AN ORGANIZED HEALTH CARE EDUCATION/TRAINING PROGRAM

## 2024-04-27 PROCEDURE — 85025 COMPLETE CBC W/AUTO DIFF WBC: CPT

## 2024-04-27 PROCEDURE — 94640 AIRWAY INHALATION TREATMENT: CPT

## 2024-04-27 PROCEDURE — 94669 MECHANICAL CHEST WALL OSCILL: CPT

## 2024-04-27 PROCEDURE — 82803 BLOOD GASES ANY COMBINATION: CPT

## 2024-04-27 PROCEDURE — 2000000000 HC ICU R&B

## 2024-04-27 PROCEDURE — 84484 ASSAY OF TROPONIN QUANT: CPT

## 2024-04-27 PROCEDURE — 99291 CRITICAL CARE FIRST HOUR: CPT | Performed by: INTERNAL MEDICINE

## 2024-04-27 PROCEDURE — 2500000003 HC RX 250 WO HCPCS: Performed by: STUDENT IN AN ORGANIZED HEALTH CARE EDUCATION/TRAINING PROGRAM

## 2024-04-27 PROCEDURE — 83605 ASSAY OF LACTIC ACID: CPT

## 2024-04-27 PROCEDURE — 80053 COMPREHEN METABOLIC PANEL: CPT

## 2024-04-27 RX ORDER — LORAZEPAM 2 MG/ML
2 INJECTION INTRAMUSCULAR ONCE
Status: COMPLETED | OUTPATIENT
Start: 2024-04-27 | End: 2024-04-27

## 2024-04-27 RX ORDER — ALPRAZOLAM 0.25 MG/1
0.5 TABLET ORAL EVERY 8 HOURS
Status: DISCONTINUED | OUTPATIENT
Start: 2024-04-27 | End: 2024-04-29

## 2024-04-27 RX ORDER — METHADONE HYDROCHLORIDE 10 MG/1
60 TABLET ORAL DAILY
Status: DISCONTINUED | OUTPATIENT
Start: 2024-04-27 | End: 2024-04-28

## 2024-04-27 RX ORDER — FENTANYL CITRATE-0.9 % NACL/PF 10 MCG/ML
25-200 PLASTIC BAG, INJECTION (ML) INTRAVENOUS CONTINUOUS
Status: DISCONTINUED | OUTPATIENT
Start: 2024-04-27 | End: 2024-04-29

## 2024-04-27 RX ADMIN — GUAIFENESIN 600 MG: 600 TABLET ORAL at 10:20

## 2024-04-27 RX ADMIN — WATER 40 MG: 1 INJECTION INTRAMUSCULAR; INTRAVENOUS; SUBCUTANEOUS at 10:19

## 2024-04-27 RX ADMIN — AMPICILLIN SODIUM AND SULBACTAM SODIUM 3000 MG: 2; 1 INJECTION, POWDER, FOR SOLUTION INTRAMUSCULAR; INTRAVENOUS at 00:22

## 2024-04-27 RX ADMIN — ENOXAPARIN SODIUM 40 MG: 100 INJECTION SUBCUTANEOUS at 10:20

## 2024-04-27 RX ADMIN — PROPOFOL 25 MCG/KG/MIN: 10 INJECTION, EMULSION INTRAVENOUS at 18:07

## 2024-04-27 RX ADMIN — PANTOPRAZOLE SODIUM 40 MG: 40 INJECTION, POWDER, FOR SOLUTION INTRAVENOUS at 10:20

## 2024-04-27 RX ADMIN — SODIUM CHLORIDE: 9 INJECTION, SOLUTION INTRAVENOUS at 06:24

## 2024-04-27 RX ADMIN — FUROSEMIDE 20 MG: 10 INJECTION, SOLUTION INTRAMUSCULAR; INTRAVENOUS at 10:20

## 2024-04-27 RX ADMIN — IPRATROPIUM BROMIDE AND ALBUTEROL SULFATE 1 DOSE: 2.5; .5 SOLUTION RESPIRATORY (INHALATION) at 07:52

## 2024-04-27 RX ADMIN — PROPOFOL 10 MCG/KG/MIN: 10 INJECTION, EMULSION INTRAVENOUS at 09:56

## 2024-04-27 RX ADMIN — AMPICILLIN SODIUM AND SULBACTAM SODIUM 3000 MG: 2; 1 INJECTION, POWDER, FOR SOLUTION INTRAMUSCULAR; INTRAVENOUS at 13:24

## 2024-04-27 RX ADMIN — Medication 2 PUFF: at 07:52

## 2024-04-27 RX ADMIN — ACETYLCYSTEINE 600 MG: 200 INHALANT RESPIRATORY (INHALATION) at 07:52

## 2024-04-27 RX ADMIN — SODIUM CHLORIDE 5 MCG/MIN: 9 INJECTION, SOLUTION INTRAVENOUS at 02:05

## 2024-04-27 RX ADMIN — WATER 40 MG: 1 INJECTION INTRAMUSCULAR; INTRAVENOUS; SUBCUTANEOUS at 22:53

## 2024-04-27 RX ADMIN — Medication 2 PUFF: at 20:15

## 2024-04-27 RX ADMIN — SODIUM CHLORIDE, PRESERVATIVE FREE 10 ML: 5 INJECTION INTRAVENOUS at 10:21

## 2024-04-27 RX ADMIN — FUROSEMIDE 20 MG: 10 INJECTION, SOLUTION INTRAMUSCULAR; INTRAVENOUS at 17:37

## 2024-04-27 RX ADMIN — Medication 175 MCG/HR: at 17:34

## 2024-04-27 RX ADMIN — ACETAMINOPHEN 650 MG: 325 TABLET ORAL at 03:30

## 2024-04-27 RX ADMIN — Medication 50 MCG/HR: at 03:24

## 2024-04-27 RX ADMIN — LORAZEPAM 2 MG: 2 INJECTION INTRAMUSCULAR; INTRAVENOUS at 17:58

## 2024-04-27 RX ADMIN — IPRATROPIUM BROMIDE AND ALBUTEROL SULFATE 1 DOSE: 2.5; .5 SOLUTION RESPIRATORY (INHALATION) at 15:41

## 2024-04-27 RX ADMIN — SODIUM CHLORIDE, PRESERVATIVE FREE 10 ML: 5 INJECTION INTRAVENOUS at 00:28

## 2024-04-27 RX ADMIN — METHADONE HYDROCHLORIDE 60 MG: 10 TABLET ORAL at 10:20

## 2024-04-27 RX ADMIN — ALPRAZOLAM 0.5 MG: 0.25 TABLET ORAL at 10:20

## 2024-04-27 RX ADMIN — ACETYLCYSTEINE 600 MG: 200 INHALANT RESPIRATORY (INHALATION) at 20:15

## 2024-04-27 RX ADMIN — ALPRAZOLAM 0.5 MG: 0.25 TABLET ORAL at 17:37

## 2024-04-27 RX ADMIN — IPRATROPIUM BROMIDE AND ALBUTEROL SULFATE 1 DOSE: 2.5; .5 SOLUTION RESPIRATORY (INHALATION) at 11:48

## 2024-04-27 RX ADMIN — AMPICILLIN SODIUM AND SULBACTAM SODIUM 3000 MG: 2; 1 INJECTION, POWDER, FOR SOLUTION INTRAMUSCULAR; INTRAVENOUS at 05:18

## 2024-04-27 RX ADMIN — AMPICILLIN SODIUM AND SULBACTAM SODIUM 3000 MG: 2; 1 INJECTION, POWDER, FOR SOLUTION INTRAMUSCULAR; INTRAVENOUS at 17:32

## 2024-04-27 RX ADMIN — IPRATROPIUM BROMIDE AND ALBUTEROL SULFATE 1 DOSE: 2.5; .5 SOLUTION RESPIRATORY (INHALATION) at 20:15

## 2024-04-27 RX ADMIN — SODIUM CHLORIDE, PRESERVATIVE FREE 10 ML: 5 INJECTION INTRAVENOUS at 19:58

## 2024-04-27 RX ADMIN — Medication 150 MCG/HR: at 13:13

## 2024-04-27 ASSESSMENT — PULMONARY FUNCTION TESTS
PIF_VALUE: 28
PIF_VALUE: 31
PIF_VALUE: 31
PIF_VALUE: 28
PIF_VALUE: 28
PIF_VALUE: 31
PIF_VALUE: 29
PIF_VALUE: 31
PIF_VALUE: 28
PIF_VALUE: 31
PIF_VALUE: 28
PIF_VALUE: 31
PIF_VALUE: 31
PIF_VALUE: 28
PIF_VALUE: 31
PIF_VALUE: 31
PIF_VALUE: 30
PIF_VALUE: 31
PIF_VALUE: 29
PIF_VALUE: 28
PIF_VALUE: 28
PIF_VALUE: 31
PIF_VALUE: 31
PIF_VALUE: 28
PIF_VALUE: 32
PIF_VALUE: 31
PIF_VALUE: 29
PIF_VALUE: 31

## 2024-04-27 ASSESSMENT — PAIN DESCRIPTION - ORIENTATION: ORIENTATION: RIGHT;LEFT

## 2024-04-27 ASSESSMENT — PAIN DESCRIPTION - DESCRIPTORS: DESCRIPTORS: ACHING

## 2024-04-27 ASSESSMENT — PAIN DESCRIPTION - LOCATION: LOCATION: SHOULDER;LEG

## 2024-04-27 ASSESSMENT — PAIN SCALES - GENERAL: PAINLEVEL_OUTOF10: 7

## 2024-04-27 NOTE — RT PROTOCOL NOTE
RT Inhaler-Nebulizer Bronchodilator Protocol Note    There is a bronchodilator order in the chart from a provider indicating to follow the RT Bronchodilator Protocol and there is an “Initiate RT Inhaler-Nebulizer Bronchodilator Protocol” order as well (see protocol at bottom of note).    CXR Findings:  XR CHEST PORTABLE    Result Date: 4/26/2024  New left jugular line with the tip over the SVC.  No pneumothorax is identified. Perihilar opacities are present.     XR CHEST PORTABLE    Result Date: 4/26/2024  Perihilar congestion and left lower lobe atelectatic changes.  Successful intubation and placement of nasogastric tube.       The findings from the last RT Protocol Assessment were as follows:   History Pulmonary Disease: Smoker 15 pack years or more  Respiratory Pattern: Regular pattern and RR 12-20 bpm  Breath Sounds: Slightly diminished and/or crackles  Cough: Weak, productive  Indication for Bronchodilator Therapy: Mucolytic ordered  Bronchodilator Assessment Score: 5    Aerosolized bronchodilator medication orders have been revised according to the RT Inhaler-Nebulizer Bronchodilator Protocol below.    Respiratory Therapist to perform RT Therapy Protocol Assessment initially then follow the protocol.  Repeat RT Therapy Protocol Assessment PRN for score 0-3 or on second treatment, BID, and PRN for scores above 3.    No Indications - adjust the frequency to every 6 hours PRN wheezing or bronchospasm, if no treatments needed after 48 hours then discontinue using Per Protocol order mode.     If indication present, adjust the RT bronchodilator orders based on the Bronchodilator Assessment Score as indicated below.  Use Inhaler orders unless patient has one or more of the following: on home nebulizer, not able to hold breath for 10 seconds, is not alert and oriented, cannot activate and use MDI correctly, or respiratory rate 25 breaths per minute or more, then use the equivalent nebulizer order(s) with same

## 2024-04-27 NOTE — PROGRESS NOTES
04/27/24 0605   Patient Observation   Pulse 96   Respirations 24   SpO2 100 %   Vent Information   Vent Mode AC/PC   Ventilator Settings   FiO2  (S)  40 %   Resp Rate (Set) 24 bpm   PEEP/CPAP (cmH2O) (S)  8   Vent Patient Data (Readings)   Vt (Measured) 463 mL   Peak Inspiratory Pressure (cmH2O) 31 cmH2O   Rate Measured 24 br/min   Minute Volume (L/min) 12 Liters   Mean Airway Pressure (cmH2O) 17 cmH20   Plateau Pressure (cm H2O) 0 cm H2O   Driving Pressure -8   I:E Ratio 1:2.10   I Time/ I Time % 0.8 s   Vent Alarm Settings   High Pressure (cmH2O) 45 cmH2O   Low Minute Volume (lpm) 2 L/min   Low Exhaled Vt (ml) 200 mL   RR High (bpm) 40 br/min

## 2024-04-27 NOTE — ED NOTES
2125- Attempted to page [facility] pulmonology, no one was on call.  Per Dr. Retana  2130- Dr. Retana contacted Dr. Jacobs   Dr. Jacobs calling pulmonologist   RE Emergent Bronch  2135- Dr. Medina returned page

## 2024-04-27 NOTE — H&P
Hospital Medicine History & Physical      Date of Admission: 4/26/2024    Date of Service:  Pt seen/examined on 4/26/2024    [x]Admitted to Inpatient with expected LOS greater than two midnights due to medical therapy.  []Placed in Observation status.    Chief Admission Complaint: Methadone overdose    Presenting Admission History:      43 y.o. female with past medical history of polysubstance abuse, diabetes, drug overdose presented to emergency department after being found unresponsive.  HPI obtained from ED provider as patient is intubated and sedated on my exam.  Patient was found to be unresponsive after methadone overdose.  At that time patient was found to have vomit in her airway.  When EMS arrived patient was not responsive to Narcan at that time.  On arrival to the emergency department patient was intubated.  Shortly after intubation there was difficulty in getting patient's O2 sats to be appropriate even though the FiO2 was 100% on ventilator.  Pulmonology consultation was placed for bronchoscopy in the emergency department for aspiration of vomit.  Assessment/Plan:      Current Principal Problem:  Acute hypoxic respiratory failure (HCC)    Acute hypoxic respiratory failure  - Secondary to methadone overdose plus large aspiration event  - Status post bronchoscopy with pulmonology with aspiration of large amount of vomit  - Wean O2 as tolerated, scheduled inhalers    Aspiration pneumonia  - Secondary to methadone overdose  - Unasyn    Methadone overdose  -?  Intentional  - Monitor for signs of withdrawal          Discussed management and the need for Hospitalization of the patient w/ the Emergency Department Provider: Mazin    CXR: I have reviewed the CXR with the following interpretation: Perihilar congestion, left lower lobe atelectasis  EKG:  I have reviewed the EKG with the following interpretation: Sinus tach    Physical Exam Performed:      BP 92/75   Pulse (!) 105   Temp (!) 101.3 °F (38.5 °C)  PORTABLE    Result Date: 4/27/2024  EXAMINATION: ONE XRAY VIEW OF THE CHEST 4/26/2024 8:59 pm COMPARISON: March 25, 2024 HISTORY: ORDERING SYSTEM PROVIDED HISTORY: resp arrest TECHNOLOGIST PROVIDED HISTORY: Reason for exam:->resp arrest FINDINGS: Patient intubated with endotracheal tube 2.4 cm above the allie. Nasogastric tube in the stomach.  Perihilar congestion and left lower lobe atelectatic changes.     Perihilar congestion and left lower lobe atelectatic changes.  Successful intubation and placement of nasogastric tube.     CT CHEST ABDOMEN PELVIS WO CONTRAST Additional Contrast? None    1. Evidence of dense opacity in bilateral pulmonary lower lobes, indicating dense pneumonia, and/or atelectatic changes. 2. In the perihilar and suprahilar portion of right pulmonary upper lobe there are findings of focal atelectatic changes, and/or infiltrates. 3. No acute intra-abdominal or intrapelvic process.     CT HEAD WO CONTRAST    No evidence of intracranial hemorrhage.  No definite evidence of cerebral edema.  No definite focal abnormality in the brain parenchyma. With history of respiratory arrest, for further evaluation, MRI of brain may be considered.     XR CHEST PORTABLE    Result Date: 4/26/2024  EXAMINATION: ONE XRAY VIEW OF THE CHEST 4/26/2024 10:31 pm COMPARISON: 04/26/2024 at 2132 hours and 04/25/2024 HISTORY: ORDERING SYSTEM PROVIDED HISTORY: CVC placement TECHNOLOGIST PROVIDED HISTORY: Reason for exam:->CVC placement Reason for Exam: CVC placement FINDINGS: Endotracheal tube tip is approximately 2 cm above the allie.  NG tube is coursing into the stomach and below the diaphragm.  Left-sided jugular catheter with the tip over the SVC is now noted.  External leads overlie the chest. Perihilar opacities are present in both lungs.  The denser degree of opacities in the left retrocardiac region but may also have some underpenetration of the retrocardiac region.  No pneumothorax is seen on the current exam.   capsule by mouth daily as needed 6/24/21   Timmy Meza MD   ondansetron (ZOFRAN ODT) 4 MG disintegrating tablet Take 1 tablet by mouth every 8 hours as needed for Nausea 12/23/20   Power Marr MD   Vitamin D (CHOLECALCIFEROL) 25 MCG (1000 UT) TABS tablet Take 1 tablet by mouth daily 11/17/20   Timmy Meza MD   TRULANCE 3 MG TABS Take 3 mg by mouth daily 4/20/19   Timmy Meza MD   ALPRAZolam (XANAX) 1 MG tablet Take 1 tablet by mouth 3 times daily as needed for Sleep or Anxiety.    Timmy Meza MD       Labs: Personally reviewed and interpreted for clinical significance.   Recent Labs     04/26/24  2232   WBC 2.5*   HGB 12.4   HCT 38.6        Recent Labs     04/26/24 2105 04/26/24  2253   *  --    K 5.9* 3.7   CL 95*  --    CO2 23  --    BUN 22*  --    CREATININE 1.8*  --    CALCIUM 7.9*  --      Recent Labs     04/26/24 2105   TROPHS 67*     No results for input(s): \"LABA1C\" in the last 72 hours.  Recent Labs     04/26/24 2105   AST 69*   ALT 59*   BILITOT <0.2   ALKPHOS 128     Recent Labs     04/26/24 2101 04/26/24 2105   INR  --  1.97*   LACTA 5.3*  --         Lauren Oh DO

## 2024-04-27 NOTE — PROGRESS NOTES
PULMONARY AND CRITICAL CARE INPATIENT NOTE        Anjelica Blanchard   : 1980  MRN: [id number]     Admitting Physician: Lauren Oh DO  Attending Physician: Filiberto Cameron DO  PCP: Smith Neff MD    Admission: 2024   Date of Service: 2024    Chief Complaint   Patient presents with    Respiratory Arrest     Found down for unknown amt of time by landlord w/ bottle of methadone bedside. 4mg narcan given w/ some response. BG 84.            ASSESSMENT & PLAN       43 y.o. pleasant  female patient with:    Hospital Problems             Last Modified POA    * (Principal) Acute hypoxic respiratory failure (HCC) 2024 Yes      # Acute hypoxemic respiratory failure  # Acute encephalopathy secondary to drug overdose.  Found down unresponsive.  Methadone/benzodiazepines positive  # Aspiration pneumonia  # Lactic acidosis  # Severe sepsis  # Acute kidney injury with hyperkalemia  # Leukopenia  # Multisubstance abuse.  Previously amphetamines, benzodiazepines, cocaine, oxycodone and now positive for visit with me was admitted on  # Hep C  # Tobacco abuse, vaping  # Obesity class II. More than 70 pound weight gain over the last few months  # Recurrent admissions to the hospital  # HFpEF  # Mediastinal and hilar adenopathy likely reactive  # Asthma/COPD  # Difficulties with her sedation for previous bronchoscopies    PLAN:  Neuropsych: Titrate sedation for ventilator synchrony.  Half home dose of Xanax and methadone started.  CVS: Diuresis to decongest the lungs with her HFpEF   Pulm/Sleep: Vent reviewed/adjusted, chest x-ray ABG reviewed.  Dulera and Sandy.  Bronchial hygiene measures.  Bronchoscopy completed with clearance of aspirated material.  SBT tomorrow  ID: Follow-up micro workup.  On antibiotics for sepsis/aspiration.  Nephro: Avoid IV fluids.  Diuresis started.  GI/Hepato/Diet: Keep NPO.  Aspiration precautions  Hem/Onc: Monitor leukocyte count  Endo: Will keep blood sugar between 140 and 180  ETT in place.    Cardiac: No murmur  Lungs: Symmetric air entry.  Coarse breath sounds. No wheezes.  Abdomen: Soft.    Skin, Back & Extremities: No rash.  Neurological: No focal or lateralizing signs.  Sedated and mechanically ventilated, symmetrical pupils.      ________________________________________________________  Electronically signed by:  Manisha Medina MD,FACP    4/27/2024    8:07 AM.     [facility]  [address], [place] [address]   Phone (office): [phone]

## 2024-04-27 NOTE — ED PROVIDER NOTES
[facility]  ED  EMERGENCY DEPARTMENT ENCOUNTER        Patient Name: Anjelica Blanchard  MRN: [id number]  Birthdate 1980  Date of evaluation: 4/26/2024  Provider: Jared Retana MD  PCP: Smith Neff MD  Note Started: 9:43 PM EDT 4/26/24    CHIEF COMPLAINT       Respiratory Arrest (Found down for unknown amt of time by landlord w/ bottle of methadone bedside. 4mg narcan given w/ some response. BG 84. )      HISTORY OF PRESENT ILLNESS: 1 or more Elements     History from : EMS    Limitations to history : Altered Mental Status    Anjelica Blanchard is a 43 y.o. female who presents for evaluation of respiratory arrest.  Patient according to EMS was found with a bottle of empty methadone.  She was found down for an unknown period of time by her landlord.  EMS had given Narcan without significant response.  She has continuous vomiting.  She is not protecting her airway.    Nursing Notes were all reviewed and agreed with or any disagreements were addressed in the HPI.    REVIEW OF SYSTEMS :      Review of Systems  Unable to be performed as patient is altered and otherwise unresponsive    SURGICAL HISTORY     Past Surgical History:   Procedure Laterality Date    BRONCHOSCOPY N/A 11/2/2023    BRONCHOSCOPY DIAGNOSTIC OR CELL WASH ONLY performed by Manisha Medina MD at [facility] ENDOSCOPY    BRONCHOSCOPY  11/2/2023    BRONCHOSCOPY ALVEOLAR LAVAGE performed by Manisha Medina MD at [facility] ENDOSCOPY    BRONCHOSCOPY  11/2/2023    BRONCHOSCOPY THERAPUTIC ASPIRATION INITIAL performed by Manisha Medina MD at [facility] ENDOSCOPY    KNEE SURGERY      LUMBAR FUSION N/A 06/03/2019    L5, S1 TRANSFORAMINAL LUMBAR INTERBODY FUSION performed by Saad Rojo MD at [facility] OR    SHOULDER SURGERY Left     SIGMOIDOSCOPY N/A 08/21/2020    FLEXIBLE SIGMOIDOSCOPY POSSIBLE COLONOSCOPY performed by Shon Nixon MD at [facility] ENDOSCOPY       CURRENTMEDICATIONS       Previous Medications    AIRSUPRA 90-80  and IV attempt  Skin:No rash, no erythema        DIAGNOSTIC RESULTS   LABS:    Labs Reviewed   COMPREHENSIVE METABOLIC PANEL W/ REFLEX TO MG FOR LOW K - Abnormal; Notable for the following components:       Result Value    Sodium 135 (*)     Potassium reflex Magnesium 5.9 (*)     Chloride 95 (*)     Anion Gap 17 (*)     BUN 22 (*)     Creatinine 1.8 (*)     Est, Glom Filt Rate 35 (*)     Calcium 7.9 (*)     Total Protein 6.0 (*)     ALT 59 (*)     AST 69 (*)     All other components within normal limits   TROPONIN - Abnormal; Notable for the following components:    Troponin, High Sensitivity 67 (*)     All other components within normal limits   BLOOD GAS, VENOUS - Abnormal; Notable for the following components:    pH, Tae 7.305 (*)     pO2, Tae 150.7 (*)     HCO3, Venous 20.3 (*)     Base Excess, Tae -5.7 (*)     Carboxyhemoglobin 3.2 (*)     All other components within normal limits   LACTIC ACID - Abnormal; Notable for the following components:    Lactic Acid 5.3 (*)     All other components within normal limits    Narrative:     CALL  Sandhu  [facility] tel. [phone],  Chemistry results called to and read back by CECI fernández, 04/26/2024  21:30, by FEI   PROTIME-INR - Abnormal; Notable for the following components:    Protime 22.5 (*)     INR 1.97 (*)     All other components within normal limits   APTT   URINALYSIS WITH REFLEX TO CULTURE   URINE DRUG SCREEN   URINE DRUG SCREEN   CBC WITH AUTO DIFFERENTIAL   POCT GLUCOSE       When ordered only abnormal lab results are displayed. All other labs were within normal range or not returned as of this dictation.    EKG  The EKG, as interpreted by myself, in the emergency department in the absence of a cardiologist.  sinus tachycardia, mbqu=481  with a rate of 111  Axis is   Normal  QTc is  within an acceptable range  Intervals and Durations are unremarkable.      No specific ST-T wave changes appreciated.  No evidence of acute ischemia.   No significant change from
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
## 2024-04-27 NOTE — PROCEDURES
PRE-PROCEDURE  DIAGNOSIS: Aspiration, difficult to ventilate  POST-PROCEDURE  DIAGNOSIS: Aspiration, difficult to ventilate    PRE-PROCEDURE ASSESSMENT AND CONSENT:   A full history and physical was performed. The patient is a 43-year-old female patient with multiple comorbidities, multisubstance abuse who came with acute encephalopathy/found down with intoxication and aspiration.  Patient was difficult to ventilate and oxygenate..  The patient's medications, allergies and sensitivities have been reviewed. The risks and benefits of the procedure were not discussed due to emergency situation and patient's status.  Family cannot be reached. All questions were answered and informed consent was obtained.     PHYSICAL EXAM PRIOR TO PROCEDURE:  Airway Examination: Mallampati Class could not be assessed.  No close breathing sounds. Normal S1/S2. Soft lax abdomen. ASA Grade Assessment: 4.     After reviewing the risks and benefits, the patient was deemed in satisfactory condition to undergo the procedure (benefits: more diagnostic info, risks: bleeding, infection, pneumothorax, death). The anesthesia plan was to use deep sedation for mechanical ventilation.    Immediately prior to administration of medications, the patient was re-assessed for adequacy to receive sedatives. The heart rate, respiratory rate, oxygen saturations, blood pressure, adequacy of pulmonary ventilation, and response to care were monitored throughout the procedure.     MEDICATIONS:   60 cc of normal saline    PROCEDURE AND FINDINGS:  A time-out was called verifying the patient's identification immediately before the procedure, then, the scope was advanced through the  ETT to the trachea and lower airways.     The lower trachea was normal in caliber. The allie was sharp. The tracheobronchial tree of the bilateral lungs was examined.   No concerning endobronchial lesions.  Large amount of aspirated gastric material was suctioned from the

## 2024-04-27 NOTE — PROGRESS NOTES
04/27/24 0758   Patient Observation   Pulse (!) 110   Respirations 18   SpO2 100 %   Vent Information   Vent Mode AC/PC   Ventilator Settings   FiO2  40 %   Insp Time (sec) 0.8 sec   Resp Rate (Set) 24 bpm   Set Pressure 20   PEEP/CPAP (cmH2O) 8   Vent Patient Data (Readings)   Vt (Measured) 436 mL   Peak Inspiratory Pressure (cmH2O) 31 cmH2O   Rate Measured 51 br/min   Minute Volume (L/min) 10.2 Liters   Mean Airway Pressure (cmH2O) 21 cmH20   Plateau Pressure (cm H2O) 0 cm H2O   Driving Pressure -8   I:E Ratio 2.10:1   I Time/ I Time % 0 s   Vent Alarm Settings   High Pressure (cmH2O) 45 cmH2O   Low Minute Volume (lpm) 2 L/min   High Minute Volume (lpm) 26 L/min   Low Exhaled Vt (ml) 200 mL   High Exhaled Vt (ml) 1200 mL   RR High (bpm) 40 br/min   Apnea (secs) 20 secs   Additional Respiratoray Assessments   Humidification Source HME   Ambu Bag With Mask At Bedside Yes   Non-Surgical Airway 04/26/24 Endotracheal tube   Placement Date/Time: 04/26/24 2103   Present on Admission/Arrival: No  Placed By: In ED  Placement Verified By: Auscultation;Direct visualization  Mask Ventilation: Unable to mask ventilate (4)  Insertion attempts: 1  Location: Oral  Airway Device: En...   Secured At 23 cm   Measured From Lips   Secured Location Right   Secured By Commercial tube andersen   Site Assessment Dry   Cuff Pressure 30 cm H2O

## 2024-04-27 NOTE — PROGRESS NOTES
04/27/24 0021   Patient Observation   Pulse (!) 105   Respirations 23   SpO2 97 %   Breath Sounds   Right Upper Lobe Rhonchi   Right Middle Lobe Diminished   Right Lower Lobe Diminished   Left Upper Lobe Rhonchi   Left Lower Lobe Diminished   Vent Information   Equipment Changed HME   Vent Mode AC/PC   Ventilator Settings   FiO2  100 %   Resp Rate (Set) 24 bpm   PEEP/CPAP (cmH2O) (S)  10   Vent Patient Data (Readings)   Vt (Measured) 524 mL   Peak Inspiratory Pressure (cmH2O) 31 cmH2O   Rate Measured 24 br/min   Minute Volume (L/min) 12.9 Liters   Mean Airway Pressure (cmH2O) 18 cmH20   Plateau Pressure (cm H2O) 0 cm H2O   Driving Pressure -10   I:E Ratio 1:1.50   I Time/ I Time % 1 s   Vent Alarm Settings   High Pressure (cmH2O) 45 cmH2O   Low Minute Volume (lpm) 2 L/min   Low Exhaled Vt (ml) 200 mL   RR High (bpm) 40 br/min   Additional Respiratoray Assessments   Humidification Source HME   Ambu Bag With Mask At Bedside Yes   Airway Clearance   Suction ET Tube   Suction Device Inline suction catheter   Sputum Method Obtained Endotracheal   Sputum Amount Small   Sputum Color/Odor White;Tan   Sputum Consistency Thin   Non-Surgical Airway 04/26/24 Endotracheal tube   Placement Date/Time: 04/26/24 2103   Present on Admission/Arrival: No  Placed By: In ED  Placement Verified By: Auscultation;Direct visualization  Mask Ventilation: Unable to mask ventilate (4)  Insertion attempts: 1  Location: Oral  Airway Device: En...   Secured At 23 cm   Measured From Lips   Secured Location Left   Secured By Commercial tube andersen   Site Assessment Dry   Cuff Pressure 32 cm H2O

## 2024-04-27 NOTE — PROGRESS NOTES
04/27/24 1543   Patient Observation   Pulse 78   Respirations 24   SpO2 97 %   Vent Information   Vent Mode AC/PC   Ventilator Settings   FiO2  40 %   Insp Time (sec) 0.8 sec   Resp Rate (Set) 24 bpm   Set Pressure 20   PEEP/CPAP (cmH2O) 8   Vent Patient Data (Readings)   Vt Mandatory Exp (mL) 424 mL   Vt (Measured) 438 mL   Peak Inspiratory Pressure (cmH2O) 28 cmH2O   Rate Measured 24 br/min   Minute Volume (L/min) 10.6 Liters   Mean Airway Pressure (cmH2O) 15 cmH20   Plateau Pressure (cm H2O) 0 cm H2O   Driving Pressure -8   Inspiratory Time 0.8 sec   I:E Ratio 1:2.10   Flow Sensitivity 2 L/min   I Time/ I Time % 0 s   Vent Alarm Settings   High Pressure (cmH2O) 45 cmH2O   Low Minute Volume (lpm) 2 L/min   RR High (bpm) 40 br/min   Additional Respiratoray Assessments   Humidification Source HME   Ambu Bag With Mask At Bedside Yes   Non-Surgical Airway 04/26/24 Endotracheal tube   Placement Date/Time: 04/26/24 2103   Present on Admission/Arrival: No  Placed By: In ED  Placement Verified By: Auscultation;Direct visualization  Mask Ventilation: Unable to mask ventilate (4)  Insertion attempts: 1  Location: Oral  Airway Device: En...   Secured At 23 cm   Measured From Lips   Secured Location Right   Secured By Commercial tube andersen   Site Assessment Dry

## 2024-04-27 NOTE — PROGRESS NOTES
04/26/24 2136   Patient Observation   Pulse (!) 114   Respirations 22   SpO2 (!) 82 %   Breath Sounds   Right Upper Lobe Rhonchi   Right Middle Lobe Crackles   Right Lower Lobe Rhonchi   Left Upper Lobe Crackles   Left Lower Lobe Crackles   Vent Information   Vent Mode AC/PC   Ventilator Settings   FiO2  100 %   Insp Time (sec) 1.3 sec   Resp Rate (Set) 22 bpm   Set Pressure 20   PEEP/CPAP (cmH2O) 15   Vent Patient Data (Readings)   Vt Mandatory Exp (mL) 390 mL   Peak Inspiratory Pressure (cmH2O) 35 cmH2O   Rate Measured 22 br/min   Minute Volume (L/min) 9 Liters   Mean Airway Pressure (cmH2O) 14 cmH20   Inspiratory Time 1.3 sec   I:E Ratio 1:1   Flow Sensitivity 3 L/min   Additional Respiratoray Assessments   Humidification Source HME   Ambu Bag With Mask At Bedside Yes   Airway Clearance   Suction ET Tube   Sputum Amount Small   Sputum Color/Odor Other (comment)   Non-Surgical Airway 04/26/24 Endotracheal tube   Placement Date/Time: 04/26/24 2103   Present on Admission/Arrival: No  Placed By: In ED  Placement Verified By: Auscultation;Direct visualization  Mask Ventilation: Unable to mask ventilate (4)  Insertion attempts: 1  Location: Oral  Airway Device: En...   Secured At 23 cm   Measured From Lips   Secured Location Right   Secured By Commercial tube andersen   Site Assessment Dry     Dr Retana in the room. PEEP increased to 15 from 12. Changed to PC delta of 20 with Vt's in the 390's.

## 2024-04-27 NOTE — CONSULTS
PULMONARY AND CRITICAL CARE INPATIENT NOTE        Anjelica Blanchard   : 1980  MRN: [id number]     Admitting Physician: No admitting provider for patient encounter.  Attending Physician: Jared Retana MD  PCP: Smith Neff MD    Admission: 2024   Date of Service: 2024    Chief Complaint   Patient presents with    Respiratory Arrest     Found down for unknown amt of time by landlord w/ bottle of methadone bedside. 4mg narcan given w/ some response. BG 84.            ASSESSMENT & PLAN       43 y.o. pleasant  female patient with:       # Acute hypoxemic respiratory failure  # Acute encephalopathy secondary to drug overdose.  Found down unresponsive.  Methadone/benzodiazepines positive  # Aspiration pneumonia  # Lactic acidosis  # Severe sepsis  # Acute kidney injury with hyperkalemia  # Leukopenia  # Multisubstance abuse.  Previously amphetamines, benzodiazepines, cocaine, oxycodone and now positive for visit with me was admitted on  # Hep C  # Tobacco abuse, vaping  # Obesity class II. More than 70 pound weight gain over the last few months  # Recurrent admissions to the hospital  # HFpEF  # Mediastinal and hilar adenopathy likely reactive  # Asthma/COPD  # Difficulties with her sedation for previous bronchoscopies    PLAN:  Neuropsych: Titrate sedation for ventilator synchrony.  Follow-up head CT.  CVS: Diuresis to decongest the lungs with her HFpEF   Pulm/Sleep: Vent reviewed/adjusted, chest x-ray ABG reviewed.  Dulera and DuoNebs.  Bronchial hygiene measures.  Bronchoscopy completed with clearance of aspirated material.    ID: Follow-up micro workup.  On antibiotics for sepsis/aspiration.  Nephro: Avoid IV fluids.  Diuresis started.  GI/Hepato/Diet: Keep NPO.  Aspiration precautions  Hem/Onc: Monitor leukocyte count  Endo: Will keep blood sugar between 140 and 180 mg/dL  MSK/Skin: Bedsore prevention measures.    GI ppx: Pantoprazole   DVT ppx: Lovenox      ICU checklist/quality  Comments)     \"I get really dizzy and I pass out.\"  Other reaction(s): Swelling  Other reaction(s): Other (See Comments), Swelling  Other reaction(s): Other (See Comments)  \"I get really dizzy and I pass out.\"  \"I get really dizzy and I pass out.\"      Risperidone Swelling, Shortness Of Breath and Anaphylaxis     Other reaction(s): Not available  Throat swelling      Trazodone Swelling, Shortness Of Breath and Anaphylaxis     Swelling throat  Other reaction(s): Not available  Throat swelling       Morphine Hives and Itching    Asenapine Maleate     Buprenorphine Hcl-Naloxone Hcl     Codeine Hives and Itching     Patient says she can take Percocet, Vicodin    Reglan [Metoclopramide Hcl] Swelling    Risperidone And Related Swelling    Seroquel [Quetiapine Fumarate] Swelling     Swelling throat    Trazodone And Nefazodone Swelling     Swelling throat    Buprenorphine Hcl-Naloxone Hcl     Guanfacine Hcl Swelling     tongue  tongue  Other reaction(s): Swelling  tongue      Lurasidone Anxiety       Prior to Admission medications    Medication Sig Start Date End Date Taking? Authorizing Provider   albuterol (PROVENTIL) (2.5 MG/3ML) 0.083% nebulizer solution Take 3 mLs by nebulization every 6 hours as needed for Wheezing 4/8/24   Arabella Vaughn, APRN - CNP   SYMBICORT 160-4.5 MCG/ACT AERO Inhale 2 puffs into the lungs 2 times daily Rinse mouth after inhaler use to avoid oral thrush. Use AeroChamber. Review inhaler technique at (use-inhalers.[place]) 4/5/24 7/4/24  Manisha Medina MD   furosemide (LASIX) 20 MG tablet Take 1 tablet by mouth every other day for 15 doses 4/5/24 5/4/24  Manisha Medina MD   albuterol (ACCUNEB) 0.63 MG/3ML nebulizer solution Take 3 mLs by nebulization every 6 hours as needed for Wheezing or Shortness of Breath 4/5/24 7/19/24  Manisha Medina MD   predniSONE (DELTASONE) 20 MG tablet Take 2 tablets daily for 5 days 4/5/24   Manisha Medina MD   divalproex (DEPAKOTE) 250 MG DR tablet Take 2

## 2024-04-27 NOTE — PROGRESS NOTES
Hospital Medicine Progress Note      Date of Admission: 4/26/2024  Hospital Day: 2    Chief Admission Complaint:  Methadone OD     Subjective:  Patient is in hospital bed awake and alert. Intubated. Responds to commands. Patient communicates with whiteboard and marker. Patient confused on what has been going on in the past 6 months. No intentional OD of methadone. Unable to obtain full ROS. Spoke with nursing staff and was informed of no acute issues overnight. Informed by nursing staff that patient will be on vent for 1 more day. Currently on Levo. Currently running a temp of 100.6F with max of 102F.     Presenting Admission History:       43 y.o. female with past medical history of polysubstance abuse, diabetes, drug overdose presented to emergency department after being found unresponsive.  HPI obtained from ED provider as patient is intubated and sedated on my exam.  Patient was found to be unresponsive after methadone overdose.  At that time patient was found to have vomit in her airway.  When EMS arrived patient was not responsive to Narcan at that time.  On arrival to the emergency department patient was intubated.  Shortly after intubation there was difficulty in getting patient's O2 sats to be appropriate even though the FiO2 was 100% on ventilator.  Pulmonology consultation was placed for bronchoscopy in the emergency department for aspiration of vomit.     Assessment/Plan:      Current Principal Problem:  Acute hypoxic respiratory failure (HCC)    Acute Respiratory Failure - w/ hypoxia, POArrival.  Presence of clinical respiratory distress w/ tachypnea/dyspnea/SOB and wheezing w/ use of accessory muscles to breath.  Supplemental O2 and wean as tolerated.   -Bronchoscopy performed 2/26/24; large amounts of emesis present   -Possible extubation tomorrow   -Wean O2 as tolerated with SPO2 > 88%  -Continue respiratory supportive care     Lactic Acidosis   -Etiology unclear at this time   -LA 5.0  -Continue    [] Telemetry monitoring w/    [] Data Review (any 3)  [] Collateral history obtained from:    [] All available Consultant notes from yesterday/today were reviewed  [] All current labs were reviewed and interpreted for clinical significance   [] Appropriate follow-up labs were ordered      Medications:  Personally reviewed in detail in conjunction w/ labs as documented for evidence of drug toxicity.     Infusion Medications    norepinephrine 10 mcg/min (04/27/24 0623)    fentaNYL 100 mcg/hr (04/27/24 0600)    propofol 10 mcg/kg/min (04/27/24 0126)    sodium chloride      sodium chloride 100 mL/hr at 04/27/24 0624     Scheduled Medications    vancomycin  25 mg/kg IntraVENous Once    acetylcysteine  600 mg Inhalation BID RT    guaiFENesin  600 mg Oral BID    methylPREDNISolone  40 mg IntraVENous Q12H    mometasone-formoterol  2 puff Inhalation BID RT    furosemide  20 mg IntraVENous BID    enoxaparin  40 mg SubCUTAneous Daily    pantoprazole  40 mg IntraVENous Daily    ampicillin-sulbactam  3,000 mg IntraVENous Q6H    ipratropium 0.5 mg-albuterol 2.5 mg  1 Dose Inhalation Q4H WA RT    sodium chloride flush  5-40 mL IntraVENous 2 times per day     PRN Meds: ipratropium 0.5 mg-albuterol 2.5 mg, sodium chloride flush, sodium chloride, potassium chloride **OR** potassium alternative oral replacement **OR** potassium chloride, magnesium sulfate, ondansetron **OR** ondansetron, polyethylene glycol, acetaminophen **OR** acetaminophen     Labs:  Personally reviewed and interpreted for clinical significance.     Recent Labs     04/26/24 2232 04/27/24  0402   WBC 2.5* 4.9   HGB 12.4 12.1   HCT 38.6 37.3    254     Recent Labs     04/26/24 2105 04/26/24  2253 04/27/24  0402   *  --  135*   K 5.9* 3.7 4.1   CL 95*  --  95*   CO2 23  --  24   BUN 22*  --  26*   CREATININE 1.8*  --  1.6*   CALCIUM 7.9*  --  8.2*   MG  --   --  1.80     Recent Labs     04/26/24 2105 04/27/24  0402   TROPHS 67* 86*     No results for  input(s): \"LABA1C\" in the last 72 hours.  Recent Labs     04/26/24 2105 04/27/24  0402   AST 69* 57*   ALT 59* 25   BILITOT <0.2 <0.2   ALKPHOS 128 78     Recent Labs     04/26/24 2101 04/26/24 2105 04/27/24 0402   INR  --  1.97*  --    LACTA 5.3*  --  5.0*       Urine Cultures:   Lab Results   Component Value Date/Time    LABURIN No growth at 18 to 36 hours 03/23/2024 02:30 PM     Blood Cultures:   Lab Results   Component Value Date/Time    BC No Growth after 4 days of incubation. 11/21/2023 05:02 PM     Lab Results   Component Value Date/Time    BLOODCULT2 No Growth after 4 days of incubation. 06/04/2022 05:52 AM     Organism:   Lab Results   Component Value Date/Time    ORG Annelise dubliniensis 11/02/2023 09:24 AM         Filiberto Cameron DO

## 2024-04-27 NOTE — PROGRESS NOTES
04/27/24 1150   Patient Observation   Pulse (!) 101   Respirations 19   SpO2 96 %   Vent Information   Vent Mode AC/PC   Ventilator Settings   FiO2  40 %   Insp Time (sec) 0.8 sec   Resp Rate (Set) 24 bpm   Set Pressure 20   PEEP/CPAP (cmH2O) 8   Vent Patient Data (Readings)   Vt (Measured) 441 mL   Peak Inspiratory Pressure (cmH2O) 28 cmH2O   Rate Measured 55 br/min   Minute Volume (L/min) 9.15 Liters   Mean Airway Pressure (cmH2O) 21 cmH20   Plateau Pressure (cm H2O) 0 cm H2O   Driving Pressure -8   I:E Ratio 1:2.1   Flow Sensitivity 2 L/min   I Time/ I Time % 0 s   Vent Alarm Settings   High Pressure (cmH2O) 45 cmH2O   Low Minute Volume (lpm) 2 L/min   High Minute Volume (lpm) 26 L/min   Low Exhaled Vt (ml) 200 mL   High Exhaled Vt (ml) 1200 mL   RR High (bpm) 40 br/min   Apnea (secs) 20 secs   Additional Respiratoray Assessments   Humidification Source HME   Ambu Bag With Mask At Bedside Yes   Non-Surgical Airway 04/26/24 Endotracheal tube   Placement Date/Time: 04/26/24 2103   Present on Admission/Arrival: No  Placed By: In ED  Placement Verified By: Auscultation;Direct visualization  Mask Ventilation: Unable to mask ventilate (4)  Insertion attempts: 1  Location: Oral  Airway Device: En...   Secured At 23 cm   Measured From Lips   Secured Location Center   Site Assessment Dry

## 2024-04-27 NOTE — PROGRESS NOTES
04/27/24 0206   Patient Observation   Pulse (!) 101   Respirations 24   SpO2 100 %   Vent Information   Vent Mode AC/PC   Ventilator Settings   FiO2  (S)  75 %   Resp Rate (Set) 24 bpm   PEEP/CPAP (cmH2O) 10   Vent Patient Data (Readings)   Vt (Measured) 518 mL   Peak Inspiratory Pressure (cmH2O) 31 cmH2O   Rate Measured 24 br/min   Minute Volume (L/min) 12.4 Liters   Mean Airway Pressure (cmH2O) 17 cmH20   Plateau Pressure (cm H2O) 0 cm H2O   Driving Pressure -10   I:E Ratio 1:2.10   I Time/ I Time % 0.9 s   Vent Alarm Settings   High Pressure (cmH2O) 45 cmH2O   Low Minute Volume (lpm) 2 L/min   Low Exhaled Vt (ml) 200 mL   RR High (bpm) 40 br/min

## 2024-04-27 NOTE — PROGRESS NOTES
Comprehensive Nutrition Assessment    Type and Reason for Visit:  Initial    Nutrition Recommendations/Plan:   Monitor ability to extubate and advance diet vs need for TF.  If TF desired: recommend order: \"Diet: Tube feed continuous/ NPO\".  Initiate Jevity 1.5 (standard with fiber formula) at 10 mL/hr and as tolerated, increase by 10 mL/hr q 4 hours until goal of 50 mL/hr is met via N/G  Recommend 60 mL H20 flush q 4 hours.  Monitor IVF infusion, Na labs and need for adjustments in water flush  Consider daily micronutrient supplementation: 2000 IU Vitamin D3, MVM, + Probiotic   Monitor TF tolerance (abd distention, bowel habits, N/V, cramping)  Check TG while on diprivan infusion  Monitor nutrition adequacy, pertinent labs, bowel habits, wt changes, and clinical progress     Malnutrition Assessment:  Malnutrition Status:  Insufficient data (04/27/24 1342)      Nutrition Assessment:    New vent. Pt with PMHx of polysubstance abuse, diabetes, drug overdose, presented to ED after being found unresponsive. S/p bronchoscopy with pulmonology with aspiration of large amount of vomit. Pt intubated for airway protection. Sedated on Propofol at 11.6 mL/hr (provides 306 kcal from lipids). Hypotensive on Levophed at 10.3 mL/hr. NG in place. No plans for TF today. Possible extubation soon tomorrow. Will provide TF recommendations and monitor.    Nutrition Related Findings:    Intubated and sedated. +1 non-pitting BUE edema. Labs reviewed. Wound Type: None       Current Nutrition Intake & Therapies:    Average Meal Intake: NPO  Average Supplements Intake: NPO  Diet NPO  Current Tube Feeding (TF) Orders:  Feeding Route: Nasogastric  Formula: Standard with Fiber  Schedule: Continuous  Goal TF & Flush Orders Provides: Recommend Jevity 1.5 at 50 ml/hr x 20 hr to provide 1000 ml TV, 1500 kcal, 68 gm protein and 760 ml free fluid + 60 ml free water flush q 4 hr    Anthropometric Measures:  Height: 162.6 cm (5' 4\")  Ideal Body Weight  (IBW): 120 lbs (55 kg)       Current Body Weight: 99.2 kg (218 lb 11.1 oz),   IBW. Weight Source: Bed Scale  Current BMI (kg/m2): 37.5                          BMI Categories: Obese Class 2 (BMI 35.0 -39.9)    Estimated Daily Nutrient Needs:  Energy Requirements Based On: Kcal/kg  Weight Used for Energy Requirements: Current  Energy (kcal/day): 1490-1985  Weight Used for Protein Requirements: Ideal  Protein (g/day):   Method Used for Fluid Requirements: 1 ml/kcal  Fluid (ml/day): 1490-1985    Nutrition Diagnosis:   Inadequate oral intake related to lack or limited access to food, impaired respiratory function as evidenced by NPO or clear liquid status due to medical condition, intubation    Nutrition Interventions:   Food and/or Nutrient Delivery: Start Tube Feeding  Nutrition Education/Counseling: No recommendation at this time  Coordination of Nutrition Care: Continue to monitor while inpatient, Interdisciplinary Rounds       Goals:     Goals: Meet at least 75% of estimated needs, prior to discharge       Nutrition Monitoring and Evaluation:   Behavioral-Environmental Outcomes: None Identified  Food/Nutrient Intake Outcomes: Diet Advancement/Tolerance (Ability to extube and advance diet vs need for TF)  Physical Signs/Symptoms Outcomes: Biochemical Data, Weight, GI Status    Discharge Planning:    Too soon to determine     Martha Denis RD, LD  Contact: [phone]

## 2024-04-27 NOTE — ED PROVIDER NOTES
10:53 PM: I discussed the history, physical examination, laboratory and imaging studies, and treatment plan with Dr. Retana. Anjelica Blanchard was signed out to me in stable condition. Please see Dr. Retana's documentation for details of their history, physical, and laboratory studies.  Upon re-examination, a summary of Anjelica Blanchard's history, physical examination, and studies are as follows:      Patient presented for respiratory arrest.  There was concern for methadone overdose with aspiration.  Patient was hypoxic and was intubated on arrival.  Even intubated patient's oxygen saturation in the low 80s.  Patient also has an acute kidney injury and elevated lactate.  Pulmonology is coming in to complete bronchoscopy.  Pending at the time of signout is CT imaging.  [employer] is already been sent to hospitalist team.  ICU team will also place central venous catheter.    ED Course as of 04/27/24 0043 Fri Apr 26, 2024   2242 Patient meeting sepsis criteria due to leukopenia to 2.5 and tachycardia and tachypnea.  Lactate 5.3.  Patient will be started on antibiotics and fluids. [ER]   2251 Pulmonologist requested that fluids be canceled due to the patient having a history of heart failure.  He was in agreement with antibiotics.  Broncoscopy did show evidence of aspiration [ER]   2336 Patient was taken to the ICU, imaging will be completed along the way.  Patient will not return back to the emergency department after imaging completed.  Hospitalist Dr. Oh aware to watch out for imaging [ER]      ED Course User Index  [ER] Audrey Live MD     Patient admitted in fair condition.              Audrey Live MD  04/27/24 0044

## 2024-04-27 NOTE — PROGRESS NOTES
Continuous fluids to be at a rate of 150 per MD orders. Vancomycin to not be administered as well.

## 2024-04-27 NOTE — PLAN OF CARE
Problem: Safety - Medical Restraint  Goal: Remains free of injury from restraints (Restraint for Interference with Medical Device)  Description: INTERVENTIONS:  1. Determine that other, less restrictive measures have been tried or would not be effective before applying the restraint  2. Evaluate the patient's condition at the time of restraint application  3. Inform patient/family regarding the reason for restraint  4. Q2H: Monitor safety, psychosocial status, comfort, nutrition and hydration  Outcome: Completed  Flowsheets (Taken 4/27/2024 0131)  Remains free of injury from restraints (restraint for interference with medical device):   Determine that other, less restrictive measures have been tried or would not be effective before applying the restraint   Evaluate the patient's condition at the time of restraint application   Inform patient/family regarding the reason for restraint   Every 2 hours: Monitor safety, psychosocial status, comfort, nutrition and hydration     Problem: Safety - Adult  Goal: Free from fall injury  Outcome: Completed  Flowsheets (Taken 4/27/2024 0165)  Free From Fall Injury:   Instruct family/caregiver on patient safety   Based on caregiver fall risk screen, instruct family/caregiver to ask for assistance with transferring infant if caregiver noted to have fall risk factors

## 2024-04-27 NOTE — PROCEDURES
PROCEDURE NOTE: Central line placement     This procedure was an emergency central line to establish access for medications.  Patient cannot consent.  Family cannot be accessed.    The procedure was performed under sterile conditions following our institutional policy.  The area was cleansed with chlorhexidine.   Local anesthetic was administered.  Ultrasound was used for vessel localization and needle introduction. The catheter was placed in the left IJ with Seldinger technique.   The type of catheter placed was triple-lumen catheter.  I aspirated blood from all ports and all ports were flushed with saline.  Line secured and covered with proper dressing.     Estimated blood loss: <25cc.    A chest xray showed proper position of the catheter with no pneumothorax.  The patient tolerated the procedure well and there were no complications.    Estimated blood loss: <25cc.  _______________________________  Manisha Medina MD  Pulmonary and Critical Care Specialist

## 2024-04-27 NOTE — PROGRESS NOTES
04/27/24 0406   Patient Observation   Pulse (!) 105   Respirations 24   SpO2 100 %   Breath Sounds   Right Upper Lobe Diminished   Right Middle Lobe Diminished   Right Lower Lobe Diminished   Left Upper Lobe Diminished   Left Lower Lobe Diminished   Vent Information   Vent Mode AC/PC   Ventilator Settings   FiO2  (S)  60 %   Resp Rate (Set) 24 bpm   Set Pressure 20   PEEP/CPAP (cmH2O) 10   Vent Patient Data (Readings)   Vt (Measured) 511 mL   Peak Inspiratory Pressure (cmH2O) 31 cmH2O   Rate Measured 24 br/min   Minute Volume (L/min) 12.2 Liters   Mean Airway Pressure (cmH2O) 17 cmH20   Plateau Pressure (cm H2O) 0 cm H2O   Driving Pressure -10   I:E Ratio 1:2.10   I Time/ I Time % 0.8 s   Vent Alarm Settings   High Pressure (cmH2O) 45 cmH2O   Low Minute Volume (lpm) 2 L/min   Low Exhaled Vt (ml) 200 mL   RR High (bpm) 40 br/min   Additional Respiratoray Assessments   Humidification Source HME   Ambu Bag With Mask At Bedside Yes   Airway Clearance   Suction Device Inline suction catheter   Sputum Method Obtained Endotracheal   Sputum Amount Small   Sputum Color/Odor Tan;White   Sputum Consistency Thick   Non-Surgical Airway 04/26/24 Endotracheal tube   Placement Date/Time: 04/26/24 2103   Present on Admission/Arrival: No  Placed By: In ED  Placement Verified By: Auscultation;Direct visualization  Mask Ventilation: Unable to mask ventilate (4)  Insertion attempts: 1  Location: Oral  Airway Device: En...   Secured At 23 cm   Measured From Lips   Secured Location Center   Secured By Commercial tube andersen   Site Assessment Dry   Cuff Pressure 30 cm H2O

## 2024-04-27 NOTE — PROGRESS NOTES
04/26/24 2228   Patient Observation   Pulse (!) 111   Respirations 24   SpO2 91 %   Vent Information   Ventilator Initiate Yes   Vent Mode AC/PC   $Ventilation $Initial Day   Ventilator Settings   FiO2  100 %   Insp Time (sec) 1.2 sec   Resp Rate (Set) 24 bpm   PEEP/CPAP (cmH2O) 15   Vent Patient Data (Readings)   Vt (Measured) 536 mL   Peak Inspiratory Pressure (cmH2O) 38 cmH2O   Rate Measured 24 br/min   Minute Volume (L/min) 12.9 Liters   Mean Airway Pressure (cmH2O) 26 cmH20   Inspiratory Time 1.2 sec   I:E Ratio 1:1.1   Flow Sensitivity 2 L/min   Vent Alarm Settings   High Pressure (cmH2O) 40 cmH2O   High Minute Volume (lpm) 50.5 L/min   Low Exhaled Vt (ml) 210 mL   High Exhaled Vt (ml) 1200 mL   Additional Respiratoray Assessments   Humidification Source HME   Ambu Bag With Mask At Bedside Yes   Non-Surgical Airway 04/26/24 Endotracheal tube   Placement Date/Time: 04/26/24 2103   Present on Admission/Arrival: No  Placed By: In ED  Placement Verified By: Auscultation;Direct visualization  Mask Ventilation: Unable to mask ventilate (4)  Insertion attempts: 1  Location: Oral  Airway Device: En...   Secured At 23 cm   Measured From Lips   Secured Location Center   Secured By Commercial tube andersen   Site Assessment Dry   Cuff Pressure   (mov)

## 2024-04-27 NOTE — CONSULTS
[facility]   CONSULTATION  [phone]        Reason for Consultation/Chief Complaint: no complaints; intubated   Consulted by Dr. Oh for elevated troponin     History of Present Illness:    Anjelica Blanchard is a 43 y.o. patient who presented to [facility] 4/26/24 with unresponsiveness and methadone overdose. She has PMH asthma, bipolar d/o, polysubstance abuse, DM, hepatitis C, COPD, borderline personality d/o, and OA. Prior testing: ECHO 3/22/24 EF=55-60%; no WMA; grade I DD; nml SPAP   Now presents with being found unresponsive and OD on methadone. Found to have emesis in airway. EMS arrived and unresponsive to narcan at time. Intubated with difficulty getting O2 sats up. Noted hypotensive and started on IVF and levophed gtt. S/P bronchoscopy 4/26 normal bronchial tree anatomy; no lesions; therapeutic aspiration gastric material from both lungs. EKG ; NST change (no sig change 3/24). CXR perihilar congestion; LLL atelectatis; NGT; CT head no ICH or edema; CT imaging dense opacity bilateral lower lobes; RUL atelectasis or infiltrate; no abd/pelvic process. Labs: Marcia 67, 86; BUN/Cr 22/1.8 (normal 3/24); K+ 5.9, 4.1; nml CBC; ALT 59, 25; AST 69; 57; Urine tox screen + benzo and methadone. Unable to give ROS on ventilator. Nods yes to pain and pointing to abdomen. .I have been asked to provide consultation regarding further management and testing.      Past Medical History:   has a past medical history of Adrenal insufficiency, Asthma, Bipolar 1 disorder (HCC), Borderline personality disorder (HCC), Chronic prescription benzodiazepine use, Chronic prescription opiate use, COPD (chronic obstructive pulmonary disease) ([facility]), Degenerative spinal arthritis, Howard-Danlos syndrome, Fibromyalgia, Hepatitis C, Herniated disc, Intervertebral disc disease, Opiate overdose (HCC), Osteoarthritis, Polysubstance abuse (HCC), Sedative dependence (HCC), TCA (tricyclic antidepressant) overdose, and Vertebral

## 2024-04-27 NOTE — PLAN OF CARE
Problem: Discharge Planning  Goal: Discharge to home or other facility with appropriate resources  Outcome: Progressing  Flowsheets (Taken 4/27/2024 0014 by Yesy Denney, RN)  Discharge to home or other facility with appropriate resources:   Identify barriers to discharge with patient and caregiver   Arrange for needed discharge resources and transportation as appropriate   Identify discharge learning needs (meds, wound care, etc)     Problem: Pain  Goal: Verbalizes/displays adequate comfort level or baseline comfort level  Outcome: Progressing  Flowsheets  Taken 4/27/2024 0400 by Yesy Denney, RN  Verbalizes/displays adequate comfort level or baseline comfort level:   Encourage patient to monitor pain and request assistance   Assess pain using appropriate pain scale   Administer analgesics based on type and severity of pain and evaluate response   Implement non-pharmacological measures as appropriate and evaluate response  Taken 4/27/2024 0012 by Yesy Denney RN  Verbalizes/displays adequate comfort level or baseline comfort level:   Encourage patient to monitor pain and request assistance   Assess pain using appropriate pain scale   Administer analgesics based on type and severity of pain and evaluate response   Implement non-pharmacological measures as appropriate and evaluate response     Problem: Risk for Elopement  Goal: Patient will not exit the unit/facility without proper excort  Outcome: Progressing  Flowsheets (Taken 4/27/2024 0012 by Yesy Denney, RN)  Nursing Interventions for Elopement Risk:   Assist with personal care needs such as toileting, eating, dressing, as needed to reduce the risk of wandering   Collaborate with family members/caregivers to mitigate the elopement risk   Collaborate with treatment team for drug withdrawal symptoms treatment   Reduce environmental triggers   Shoes and clothing collected and placed in gown attire     Problem: Safety - Medical Restraint  Goal:  Remains free of injury from restraints (Restraint for Interference with Medical Device)  Description: INTERVENTIONS:  1. Determine that other, less restrictive measures have been tried or would not be effective before applying the restraint  2. Evaluate the patient's condition at the time of restraint application  3. Inform patient/family regarding the reason for restraint  4. Q2H: Monitor safety, psychosocial status, comfort, nutrition and hydration  4/27/2024 0433 by Yesy Denney, RN  Outcome: Completed  Flowsheets (Taken 4/27/2024 0131)  Remains free of injury from restraints (restraint for interference with medical device):   Determine that other, less restrictive measures have been tried or would not be effective before applying the restraint   Evaluate the patient's condition at the time of restraint application   Inform patient/family regarding the reason for restraint   Every 2 hours: Monitor safety, psychosocial status, comfort, nutrition and hydration     Problem: Safety - Adult  Goal: Free from fall injury  4/27/2024 0433 by Yesy Denney, RN  Outcome: Completed  Flowsheets (Taken 4/27/2024 0433)  Free From Fall Injury:   Instruct family/caregiver on patient safety   Based on caregiver fall risk screen, instruct family/caregiver to ask for assistance with transferring infant if caregiver noted to have fall risk factors     Problem: Skin/Tissue Integrity  Goal: Absence of new skin breakdown  Description: 1.  Monitor for areas of redness and/or skin breakdown  2.  Assess vascular access sites hourly  3.  Every 4-6 hours minimum:  Change oxygen saturation probe site  4.  Every 4-6 hours:  If on nasal continuous positive airway pressure, respiratory therapy assess nares and determine need for appliance change or resting period.  Outcome: Progressing

## 2024-04-28 LAB
ALBUMIN SERPL-MCNC: 3.6 G/DL (ref 3.4–5)
ALBUMIN/GLOB SERPL: 1.2 {RATIO} (ref 1.1–2.2)
ALP SERPL-CCNC: 77 U/L (ref 40–129)
ALT SERPL-CCNC: 28 U/L (ref 10–40)
ANION GAP SERPL CALCULATED.3IONS-SCNC: 12 MMOL/L (ref 3–16)
AST SERPL-CCNC: 65 U/L (ref 15–37)
BACTERIA SPEC RESP CULT: NORMAL
BASOPHILS # BLD: 0 K/UL (ref 0–0.2)
BASOPHILS NFR BLD: 0.3 %
BILIRUB SERPL-MCNC: <0.2 MG/DL (ref 0–1)
BUN SERPL-MCNC: 28 MG/DL (ref 7–20)
CALCIUM SERPL-MCNC: 8.8 MG/DL (ref 8.3–10.6)
CHLORIDE SERPL-SCNC: 99 MMOL/L (ref 99–110)
CO2 SERPL-SCNC: 29 MMOL/L (ref 21–32)
CREAT SERPL-MCNC: 1.2 MG/DL (ref 0.6–1.1)
DEPRECATED RDW RBC AUTO: 16.3 % (ref 12.4–15.4)
EOSINOPHIL # BLD: 0 K/UL (ref 0–0.6)
EOSINOPHIL NFR BLD: 0 %
GFR SERPLBLD CREATININE-BSD FMLA CKD-EPI: 57 ML/MIN/{1.73_M2}
GLUCOSE SERPL-MCNC: 135 MG/DL (ref 70–99)
GRAM STN SPEC: NORMAL
HCT VFR BLD AUTO: 32.9 % (ref 36–48)
HGB BLD-MCNC: 10.5 G/DL (ref 12–16)
LYMPHOCYTES # BLD: 0.4 K/UL (ref 1–5.1)
LYMPHOCYTES NFR BLD: 2.5 %
MCH RBC QN AUTO: 27.9 PG (ref 26–34)
MCHC RBC AUTO-ENTMCNC: 31.9 G/DL (ref 31–36)
MCV RBC AUTO: 87.5 FL (ref 80–100)
MONOCYTES # BLD: 0.8 K/UL (ref 0–1.3)
MONOCYTES NFR BLD: 4.9 %
NEUTROPHILS # BLD: 15.8 K/UL (ref 1.7–7.7)
NEUTROPHILS NFR BLD: 92.3 %
PLATELET # BLD AUTO: 243 K/UL (ref 135–450)
PMV BLD AUTO: 8.1 FL (ref 5–10.5)
POTASSIUM SERPL-SCNC: 4.1 MMOL/L (ref 3.5–5.1)
PROT SERPL-MCNC: 6.5 G/DL (ref 6.4–8.2)
RBC # BLD AUTO: 3.77 M/UL (ref 4–5.2)
SODIUM SERPL-SCNC: 140 MMOL/L (ref 136–145)
WBC # BLD AUTO: 17.1 K/UL (ref 4–11)

## 2024-04-28 PROCEDURE — 99291 CRITICAL CARE FIRST HOUR: CPT | Performed by: INTERNAL MEDICINE

## 2024-04-28 PROCEDURE — 6370000000 HC RX 637 (ALT 250 FOR IP): Performed by: STUDENT IN AN ORGANIZED HEALTH CARE EDUCATION/TRAINING PROGRAM

## 2024-04-28 PROCEDURE — 2580000003 HC RX 258: Performed by: STUDENT IN AN ORGANIZED HEALTH CARE EDUCATION/TRAINING PROGRAM

## 2024-04-28 PROCEDURE — 85025 COMPLETE CBC W/AUTO DIFF WBC: CPT

## 2024-04-28 PROCEDURE — C9113 INJ PANTOPRAZOLE SODIUM, VIA: HCPCS | Performed by: INTERNAL MEDICINE

## 2024-04-28 PROCEDURE — 6370000000 HC RX 637 (ALT 250 FOR IP): Performed by: INTERNAL MEDICINE

## 2024-04-28 PROCEDURE — 94761 N-INVAS EAR/PLS OXIMETRY MLT: CPT

## 2024-04-28 PROCEDURE — 6360000002 HC RX W HCPCS: Performed by: INTERNAL MEDICINE

## 2024-04-28 PROCEDURE — 2000000000 HC ICU R&B

## 2024-04-28 PROCEDURE — 87641 MR-STAPH DNA AMP PROBE: CPT

## 2024-04-28 PROCEDURE — 94003 VENT MGMT INPAT SUBQ DAY: CPT

## 2024-04-28 PROCEDURE — 2700000000 HC OXYGEN THERAPY PER DAY

## 2024-04-28 PROCEDURE — 6360000002 HC RX W HCPCS: Performed by: STUDENT IN AN ORGANIZED HEALTH CARE EDUCATION/TRAINING PROGRAM

## 2024-04-28 PROCEDURE — 2580000003 HC RX 258: Performed by: INTERNAL MEDICINE

## 2024-04-28 PROCEDURE — 6360000002 HC RX W HCPCS: Performed by: EMERGENCY MEDICINE

## 2024-04-28 PROCEDURE — 94640 AIRWAY INHALATION TREATMENT: CPT

## 2024-04-28 PROCEDURE — 2500000003 HC RX 250 WO HCPCS: Performed by: STUDENT IN AN ORGANIZED HEALTH CARE EDUCATION/TRAINING PROGRAM

## 2024-04-28 PROCEDURE — 80053 COMPREHEN METABOLIC PANEL: CPT

## 2024-04-28 PROCEDURE — 94669 MECHANICAL CHEST WALL OSCILL: CPT

## 2024-04-28 RX ORDER — ZOLPIDEM TARTRATE 5 MG/1
5 TABLET ORAL NIGHTLY PRN
Status: DISCONTINUED | OUTPATIENT
Start: 2024-04-28 | End: 2024-05-01 | Stop reason: HOSPADM

## 2024-04-28 RX ORDER — ALPRAZOLAM 0.25 MG/1
0.5 TABLET ORAL ONCE
Status: COMPLETED | OUTPATIENT
Start: 2024-04-28 | End: 2024-04-28

## 2024-04-28 RX ORDER — METHADONE HYDROCHLORIDE 10 MG/1
100 TABLET ORAL DAILY
Status: DISCONTINUED | OUTPATIENT
Start: 2024-04-29 | End: 2024-05-01 | Stop reason: HOSPADM

## 2024-04-28 RX ORDER — QUETIAPINE FUMARATE 100 MG/1
100 TABLET, FILM COATED ORAL 2 TIMES DAILY
Status: DISCONTINUED | OUTPATIENT
Start: 2024-04-28 | End: 2024-05-01 | Stop reason: HOSPADM

## 2024-04-28 RX ORDER — DIVALPROEX SODIUM 250 MG/1
1000 TABLET, EXTENDED RELEASE ORAL NIGHTLY
Status: DISCONTINUED | OUTPATIENT
Start: 2024-04-28 | End: 2024-05-01 | Stop reason: HOSPADM

## 2024-04-28 RX ORDER — METHADONE HYDROCHLORIDE 10 MG/1
60 TABLET ORAL ONCE
Status: COMPLETED | OUTPATIENT
Start: 2024-04-28 | End: 2024-04-28

## 2024-04-28 RX ORDER — POLYETHYLENE GLYCOL 3350 17 G
2 POWDER IN PACKET (EA) ORAL
Status: DISCONTINUED | OUTPATIENT
Start: 2024-04-28 | End: 2024-05-01 | Stop reason: HOSPADM

## 2024-04-28 RX ADMIN — ALPRAZOLAM 0.5 MG: 0.25 TABLET ORAL at 02:27

## 2024-04-28 RX ADMIN — QUETIAPINE FUMARATE 100 MG: 100 TABLET ORAL at 20:40

## 2024-04-28 RX ADMIN — PROPOFOL 25 MCG/KG/MIN: 10 INJECTION, EMULSION INTRAVENOUS at 00:15

## 2024-04-28 RX ADMIN — AMPICILLIN SODIUM AND SULBACTAM SODIUM 3000 MG: 2; 1 INJECTION, POWDER, FOR SOLUTION INTRAMUSCULAR; INTRAVENOUS at 17:38

## 2024-04-28 RX ADMIN — Medication 2 PUFF: at 19:38

## 2024-04-28 RX ADMIN — Medication 175 MCG/HR: at 01:05

## 2024-04-28 RX ADMIN — PANTOPRAZOLE SODIUM 40 MG: 40 INJECTION, POWDER, FOR SOLUTION INTRAVENOUS at 10:29

## 2024-04-28 RX ADMIN — ACETAMINOPHEN 650 MG: 325 TABLET ORAL at 20:40

## 2024-04-28 RX ADMIN — AMPICILLIN SODIUM AND SULBACTAM SODIUM 3000 MG: 2; 1 INJECTION, POWDER, FOR SOLUTION INTRAMUSCULAR; INTRAVENOUS at 10:50

## 2024-04-28 RX ADMIN — AMPICILLIN SODIUM AND SULBACTAM SODIUM 3000 MG: 2; 1 INJECTION, POWDER, FOR SOLUTION INTRAMUSCULAR; INTRAVENOUS at 23:37

## 2024-04-28 RX ADMIN — NICOTINE POLACRILEX 2 MG: 2 LOZENGE ORAL at 19:28

## 2024-04-28 RX ADMIN — GUAIFENESIN 600 MG: 600 TABLET ORAL at 20:40

## 2024-04-28 RX ADMIN — SODIUM CHLORIDE 7 MCG/MIN: 9 INJECTION, SOLUTION INTRAVENOUS at 05:28

## 2024-04-28 RX ADMIN — IPRATROPIUM BROMIDE AND ALBUTEROL SULFATE 1 DOSE: 2.5; .5 SOLUTION RESPIRATORY (INHALATION) at 12:35

## 2024-04-28 RX ADMIN — SODIUM CHLORIDE, PRESERVATIVE FREE 10 ML: 5 INJECTION INTRAVENOUS at 10:28

## 2024-04-28 RX ADMIN — IPRATROPIUM BROMIDE AND ALBUTEROL SULFATE 1 DOSE: 2.5; .5 SOLUTION RESPIRATORY (INHALATION) at 07:58

## 2024-04-28 RX ADMIN — GUAIFENESIN 600 MG: 600 TABLET ORAL at 10:29

## 2024-04-28 RX ADMIN — METHADONE HYDROCHLORIDE 60 MG: 10 TABLET ORAL at 10:51

## 2024-04-28 RX ADMIN — FUROSEMIDE 20 MG: 10 INJECTION, SOLUTION INTRAMUSCULAR; INTRAVENOUS at 10:33

## 2024-04-28 RX ADMIN — ALPRAZOLAM 0.5 MG: 0.25 TABLET ORAL at 10:29

## 2024-04-28 RX ADMIN — AMPICILLIN SODIUM AND SULBACTAM SODIUM 3000 MG: 2; 1 INJECTION, POWDER, FOR SOLUTION INTRAMUSCULAR; INTRAVENOUS at 05:31

## 2024-04-28 RX ADMIN — ALPRAZOLAM 0.5 MG: 0.25 TABLET ORAL at 17:41

## 2024-04-28 RX ADMIN — Medication 175 MCG/HR: at 05:30

## 2024-04-28 RX ADMIN — WATER 40 MG: 1 INJECTION INTRAMUSCULAR; INTRAVENOUS; SUBCUTANEOUS at 10:28

## 2024-04-28 RX ADMIN — FUROSEMIDE 20 MG: 10 INJECTION, SOLUTION INTRAMUSCULAR; INTRAVENOUS at 17:41

## 2024-04-28 RX ADMIN — ACETYLCYSTEINE 600 MG: 200 INHALANT RESPIRATORY (INHALATION) at 19:38

## 2024-04-28 RX ADMIN — Medication 2 PUFF: at 07:59

## 2024-04-28 RX ADMIN — AMPICILLIN SODIUM AND SULBACTAM SODIUM 3000 MG: 2; 1 INJECTION, POWDER, FOR SOLUTION INTRAMUSCULAR; INTRAVENOUS at 00:06

## 2024-04-28 RX ADMIN — IPRATROPIUM BROMIDE AND ALBUTEROL SULFATE 1 DOSE: 2.5; .5 SOLUTION RESPIRATORY (INHALATION) at 19:38

## 2024-04-28 RX ADMIN — ACETYLCYSTEINE 600 MG: 200 INHALANT RESPIRATORY (INHALATION) at 07:58

## 2024-04-28 RX ADMIN — PREGABALIN 100 MG: 75 CAPSULE ORAL at 20:39

## 2024-04-28 RX ADMIN — WATER 40 MG: 1 INJECTION INTRAMUSCULAR; INTRAVENOUS; SUBCUTANEOUS at 23:35

## 2024-04-28 RX ADMIN — ZOLPIDEM TARTRATE 5 MG: 5 TABLET ORAL at 20:40

## 2024-04-28 RX ADMIN — ALPRAZOLAM 0.5 MG: 0.25 TABLET ORAL at 22:35

## 2024-04-28 RX ADMIN — ENOXAPARIN SODIUM 40 MG: 100 INJECTION SUBCUTANEOUS at 10:30

## 2024-04-28 RX ADMIN — DIVALPROEX SODIUM 1000 MG: 250 TABLET, EXTENDED RELEASE ORAL at 20:39

## 2024-04-28 ASSESSMENT — PULMONARY FUNCTION TESTS
PIF_VALUE: 29
PIF_VALUE: 28
PIF_VALUE: 29
PIF_VALUE: 28
PIF_VALUE: 16
PIF_VALUE: 28
PIF_VALUE: 16
PIF_VALUE: 28

## 2024-04-28 ASSESSMENT — PAIN - FUNCTIONAL ASSESSMENT: PAIN_FUNCTIONAL_ASSESSMENT: PREVENTS OR INTERFERES SOME ACTIVE ACTIVITIES AND ADLS

## 2024-04-28 ASSESSMENT — PAIN DESCRIPTION - ORIENTATION: ORIENTATION: MID;LOWER

## 2024-04-28 ASSESSMENT — PAIN DESCRIPTION - DESCRIPTORS: DESCRIPTORS: THROBBING

## 2024-04-28 ASSESSMENT — PAIN SCALES - GENERAL
PAINLEVEL_OUTOF10: 8
PAINLEVEL_OUTOF10: 7

## 2024-04-28 ASSESSMENT — PAIN DESCRIPTION - ONSET: ONSET: ON-GOING

## 2024-04-28 ASSESSMENT — PAIN DESCRIPTION - LOCATION
LOCATION: GENERALIZED
LOCATION: BACK

## 2024-04-28 ASSESSMENT — PAIN DESCRIPTION - PAIN TYPE: TYPE: CHRONIC PAIN

## 2024-04-28 ASSESSMENT — PAIN DESCRIPTION - FREQUENCY: FREQUENCY: CONTINUOUS

## 2024-04-28 NOTE — PLAN OF CARE
Problem: Pain  Goal: Verbalizes/displays adequate comfort level or baseline comfort level  Outcome: Completed  Flowsheets  Taken 4/28/2024 0400  Verbalizes/displays adequate comfort level or baseline comfort level:   Encourage patient to monitor pain and request assistance   Assess pain using appropriate pain scale   Administer analgesics based on type and severity of pain and evaluate response   Implement non-pharmacological measures as appropriate and evaluate response  Taken 4/28/2024 0000  Verbalizes/displays adequate comfort level or baseline comfort level:   Encourage patient to monitor pain and request assistance   Assess pain using appropriate pain scale   Administer analgesics based on type and severity of pain and evaluate response   Implement non-pharmacological measures as appropriate and evaluate response  Taken 4/27/2024 2000  Verbalizes/displays adequate comfort level or baseline comfort level:   Encourage patient to monitor pain and request assistance   Assess pain using appropriate pain scale   Administer analgesics based on type and severity of pain and evaluate response   Implement non-pharmacological measures as appropriate and evaluate response     Problem: Risk for Elopement  Goal: Patient will not exit the unit/facility without proper excort  Outcome: Completed  Flowsheets (Taken 4/27/2024 2000)  Nursing Interventions for Elopement Risk:   Assist with personal care needs such as toileting, eating, dressing, as needed to reduce the risk of wandering   Collaborate with family members/caregivers to mitigate the elopement risk   Collaborate with treatment team for drug withdrawal symptoms treatment   Reduce environmental triggers   Shoes and clothing collected and placed in gown attire     Problem: Skin/Tissue Integrity  Goal: Absence of new skin breakdown  Description: 1.  Monitor for areas of redness and/or skin breakdown  2.  Assess vascular access sites hourly  3.  Every 4-6 hours minimum:   Change oxygen saturation probe site  4.  Every 4-6 hours:  If on nasal continuous positive airway pressure, respiratory therapy assess nares and determine need for appliance change or resting period.  Outcome: Completed

## 2024-04-28 NOTE — PROGRESS NOTES
04/28/24 0930   Oxygen Therapy/Pulse Ox   O2 Therapy Oxygen   O2 Device (S)  Nasal cannula   O2 Flow Rate (L/min) 3 L/min   Pulse 69   Respirations 16   SpO2 98 %

## 2024-04-28 NOTE — PROGRESS NOTES
PULMONARY AND CRITICAL CARE INPATIENT NOTE        Anjelica Blanchard   : 1980  MRN: [id number]     Admitting Physician: Lauren Oh DO  Attending Physician: Filiberto Cameron DO  PCP: Smith Neff MD    Admission: 2024   Date of Service: 2024    Chief Complaint   Patient presents with    Respiratory Arrest     Found down for unknown amt of time by landlord w/ bottle of methadone bedside. 4mg narcan given w/ some response. BG 84.            ASSESSMENT & PLAN       43 y.o. pleasant  female patient with:    Hospital Problems             Last Modified POA    * (Principal) Acute hypoxic respiratory failure (HCC) 2024 Yes    Elevated troponin 2024 Yes      # Acute hypoxemic respiratory failure  # Acute encephalopathy secondary to drug overdose.  Found down unresponsive.  Methadone/benzodiazepines positive  # Aspiration pneumonia  # Lactic acidosis  # Severe sepsis  # Acute kidney injury with hyperkalemia  # Leukopenia  # Multisubstance abuse.  Previously amphetamines, benzodiazepines, cocaine, oxycodone and now positive for visit with me was admitted on  # Hep C  # Tobacco abuse, vaping  # Obesity class II. More than 70 pound weight gain over the last few months  # Recurrent admissions to the hospital  # HFpEF  # Mediastinal and hilar adenopathy likely reactive  # Asthma/COPD  # Difficulties with her sedation for previous bronchoscopies    PLAN:  Neuropsych: SAT to extubate.  Continue Xanax half home dose until kidney function improves, methadone increased to 100 mg.  Home dose 140.  CVS: Continue diuresis as tolerated to decongest the lungs with her HFpEF   Pulm/Sleep: Patient passed SBT.  Will extubate.  Continue Dulera and DuoNebs.  Bronchial hygiene measures.  Bronchoscopy completed with clearance of aspirated material on admission.    ID: Follow-up micro workup.  On antibiotics for sepsis/aspiration.  DC vancomycin if MRSA screen negative.  Nephro: Avoid IV fluids.  Diuresis  04/27/24 0402 04/28/24  0350   WBC 2.5* 4.9 17.1*   HGB 12.4 12.1 10.5*   HCT 38.6 37.3 32.9*   MCV 89.6 89.1 87.5    254 243       Recent Labs     04/26/24 2105 04/26/24 2253 04/27/24 0402 04/28/24  0350   *  --  135* 140   K 5.9* 3.7 4.1 4.1   CL 95*  --  95* 99   CO2 23  --  24 29   BUN 22*  --  26* 28*   CREATININE 1.8*  --  1.6* 1.2*   AST 69*  --  57* 65*   ALT 59*  --  25 28   ALKPHOS 128  --  78 77   BILITOT <0.2  --  <0.2 <0.2       Recent Labs     04/26/24 2105 04/26/24 2114 04/27/24 0402 04/28/24  0350   TROPHS 67*  --  86*  --    GLUCOSE 86  --  180* 135*   KETUA  --  Negative  --   --        Eosinophils Absolute (K/uL)   Date Value   04/28/2024 0.0     Pro-BNP (pg/mL)   Date Value   03/22/2024 622 (H)     D-Dimer, Quant (ug/mL FEU)   Date Value   11/21/2023 3.26 (H)     FINESSE (no units)   Date Value   11/01/2023 Negative       Results       Procedure Component Value Units Date/Time    Culture, Respiratory [[id number]] Collected: 04/26/24 2253    Order Status: Completed Specimen: Bronchial Washing Updated: 04/28/24 0606     CULTURE, RESPIRATORY Normal respiratory amanda     Gram Stain Result 1+ WBC's (Mononuclear)  1+ Gram positive cocci  2+ Gram positive rods      Narrative:      ORDER#: [id number]                          ORDERED BY: KELI ANDERSON  SOURCE: Bronchial Washing                  COLLECTED:  04/26/24 22:53  ANTIBIOTICS AT [facility].:                      RECEIVED :  04/27/24 11:10    Pneumonia Panel, Molecular [[id number]] Collected: 04/26/24 2253    Order Status: Canceled Specimen: Sputum, Suctioned                ______________  Vitals:    04/28/24 0500 04/28/24 0530 04/28/24 0600 04/28/24 0757   BP: 116/78 110/65 104/62    Pulse: 59 63 62 58   Resp: 24 24 16 22   Temp:       TempSrc:       SpO2: 97% 96% 96% 97%   Weight:       Height:              Intake/Output Summary (Last 24 hours) at 4/28/2024 0810  Last data filed at 4/28/2024 0641  Gross per 24 hour   Intake 1870.8 ml    Output 4225 ml   Net -2354.2 ml     Net IO Since Admission: -2,028.03 mL [04/28/24 0810]        Physical Exam:  General appearance: Critically ill on mechanical ventilation  HEENT: ETT in place.    Cardiac: No murmur  Lungs: Symmetric air entry.  Coarse breath sounds. No wheezes.  Abdomen: Soft.    Skin, Back & Extremities: No rash.  Neurological: No focal or lateralizing signs.  Sedated and mechanically ventilated, symmetrical pupils.      ________________________________________________________  Electronically signed by:  Manisha Medina MD,FACP    4/28/2024    8:10 AM.     [facility]  [address], [place] [address]   Phone (office): [phone]

## 2024-04-28 NOTE — PROGRESS NOTES
04/28/24 0821   Patient Observation   Pulse 80   Respirations 16   SpO2 100 %   Vent Information   Vent Mode (S)  CPAP/PS   Ventilator Settings   FiO2  40 %   PEEP/CPAP (cmH2O) (S)  5   Pressure Support (cm H2O) (S)  10 cm H2O   Vent Patient Data (Readings)   Vt Spont (mL) 711 mL   Vt (Measured) 904 mL   Peak Inspiratory Pressure (cmH2O) 16 cmH2O   Rate Measured 10 br/min   Minute Volume (L/min) 7.14 Liters   Mean Airway Pressure (cmH2O) 8.3 cmH20   Plateau Pressure (cm H2O) 0 cm H2O   Driving Pressure -5   I:E Ratio 1:2.80   Vent Alarm Settings   High Pressure (cmH2O) 45 cmH2O   Low Minute Volume (lpm) 2 L/min   RR High (bpm) 40 br/min

## 2024-04-28 NOTE — PROGRESS NOTES
Hospital Medicine Progress Note      Date of Admission: 4/26/2024  Hospital Day: 3    Chief Admission Complaint:  Methadone OD     Subjective:  Patient is in hospital bed awake and alert. Intubated. Responds to commands. Patient communicates with whiteboard and marker. Patient confused on what has been going on in the past 6 months. No intentional OD of methadone. Unable to obtain full ROS. Spoke with nursing staff and was informed of no acute issues overnight. Informed by nursing staff that patient will be on vent for 1 more day. Currently on Levo. Currently running a temp of 100.6F with max of 102F.     Presenting Admission History:       43 y.o. female with past medical history of polysubstance abuse, diabetes, drug overdose presented to emergency department after being found unresponsive.  HPI obtained from ED provider as patient is intubated and sedated on my exam.  Patient was found to be unresponsive after methadone overdose.  At that time patient was found to have vomit in her airway.  When EMS arrived patient was not responsive to Narcan at that time.  On arrival to the emergency department patient was intubated.  Shortly after intubation there was difficulty in getting patient's O2 sats to be appropriate even though the FiO2 was 100% on ventilator.  Pulmonology consultation was placed for bronchoscopy in the emergency department for aspiration of vomit.     Assessment/Plan:      Current Principal Problem:  Acute hypoxic respiratory failure (HCC)    Acute Respiratory Failure - w/ hypoxia, POArrival.  Presence of clinical respiratory distress w/ tachypnea/dyspnea/SOB and wheezing w/ use of accessory muscles to breath.  Supplemental O2 and wean as tolerated.   -Bronchoscopy performed 2/26/24; large amounts of emesis present   -Wean O2 as tolerated with SPO2 > 88%  -Continue respiratory supportive care   -Extubated 4/28/24, tolerating well     Sepsis - w/ Leukocytosis/Tachycardia/Fever/Elevated Lactate  Discussed the discharge plan in detail with case mgt including timing/barriers to discharge, need for support services and placement decision   [] Imaging personally reviewed and interpreted, includes:   [] Telemetry monitoring w/    [] Data Review (any 3)  [] Collateral history obtained from:    [] All available Consultant notes from yesterday/today were reviewed  [] All current labs were reviewed and interpreted for clinical significance   [] Appropriate follow-up labs were ordered      Medications:  Personally reviewed in detail in conjunction w/ labs as documented for evidence of drug toxicity.     Infusion Medications    norepinephrine 5 mcg/min (04/28/24 0643)    fentaNYL 125 mcg/hr (04/28/24 0641)    propofol 10 mcg/kg/min (04/28/24 0641)    sodium chloride       Scheduled Medications    [START ON 4/29/2024] methadone  100 mg Oral Daily    ALPRAZolam  0.5 mg Oral q8h    vancomycin  25 mg/kg IntraVENous Once    acetylcysteine  600 mg Inhalation BID RT    guaiFENesin  600 mg Oral BID    methylPREDNISolone  40 mg IntraVENous Q12H    mometasone-formoterol  2 puff Inhalation BID RT    furosemide  20 mg IntraVENous BID    enoxaparin  40 mg SubCUTAneous Daily    pantoprazole  40 mg IntraVENous Daily    ampicillin-sulbactam  3,000 mg IntraVENous Q6H    ipratropium 0.5 mg-albuterol 2.5 mg  1 Dose Inhalation Q4H WA RT    sodium chloride flush  5-40 mL IntraVENous 2 times per day     PRN Meds: ipratropium 0.5 mg-albuterol 2.5 mg, sodium chloride flush, sodium chloride, potassium chloride **OR** potassium alternative oral replacement **OR** potassium chloride, magnesium sulfate, ondansetron **OR** ondansetron, polyethylene glycol, acetaminophen **OR** acetaminophen     Labs:  Personally reviewed and interpreted for clinical significance.     Recent Labs     04/26/24  2232 04/27/24  0402 04/28/24  0350   WBC 2.5* 4.9 17.1*   HGB 12.4 12.1 10.5*   HCT 38.6 37.3 32.9*    254 243       Recent Labs     04/26/24  2105  04/26/24 2253 04/27/24 0402 04/28/24  0350   *  --  135* 140   K 5.9* 3.7 4.1 4.1   CL 95*  --  95* 99   CO2 23  --  24 29   BUN 22*  --  26* 28*   CREATININE 1.8*  --  1.6* 1.2*   CALCIUM 7.9*  --  8.2* 8.8   MG  --   --  1.80  --        Recent Labs     04/26/24 2105 04/27/24  0402   TROPHS 67* 86*       No results for input(s): \"LABA1C\" in the last 72 hours.  Recent Labs     04/26/24 2105 04/27/24 0402 04/28/24  0350   AST 69* 57* 65*   ALT 59* 25 28   BILITOT <0.2 <0.2 <0.2   ALKPHOS 128 78 77       Recent Labs     04/26/24 2101 04/26/24 2105 04/27/24  0402   INR  --  1.97*  --    LACTA 5.3*  --  5.0*         Urine Cultures:   Lab Results   Component Value Date/Time    LABURIN No growth at 18 to 36 hours 03/23/2024 02:30 PM     Blood Cultures:   Lab Results   Component Value Date/Time    BC No Growth after 4 days of incubation. 11/21/2023 05:02 PM     Lab Results   Component Value Date/Time    BLOODCULT2 No Growth after 4 days of incubation. 06/04/2022 05:52 AM     Organism:   Lab Results   Component Value Date/Time    ORG Annelise dubliniensis 11/02/2023 09:24 AM         Filiberto Cameron DO

## 2024-04-28 NOTE — PROGRESS NOTES
04/28/24 0910   Patient Observation   Pulse 66   Respirations 19   SpO2 99 %   Vent Information   Vent Mode CPAP/PS   Ventilator Settings   FiO2  40 %   PEEP/CPAP (cmH2O) 5   Pressure Support (cm H2O) 10 cm H2O   Vent Patient Data (Readings)   Vt Spont (mL) 620 mL   Vt (Measured) 976 mL   Peak Inspiratory Pressure (cmH2O) 16 cmH2O   Rate Measured 13 br/min   Minute Volume (L/min) 8.78 Liters   Mean Airway Pressure (cmH2O) 8.1 cmH20   Plateau Pressure (cm H2O) 0 cm H2O   Driving Pressure -5   I:E Ratio 1:1.50   Vent Alarm Settings   High Pressure (cmH2O) 45 cmH2O   Low Minute Volume (lpm) 2 L/min   High Minute Volume (lpm) 20 L/min   Low Exhaled Vt (ml) 200 mL   RR High (bpm) 40 br/min   Apnea (secs) 20 secs   Additional Respiratoray Assessments   Humidification Source HME   Ambu Bag With Mask At Bedside Yes   B: Both Spontaneous Awakening and Breathing Trials   Safety Screening Spontaneous Awakening Trial (SAT) Proceed with SAT - no exclusion criteria met   Safety Screening Spontaneous Breathing Trial (SBT) Proceed with SBT - no exclusion criteria met   Spontaneous  mL   RSBI Calculated 14.89   Spontaneous Breathing Trial (SBT) Outcome SBT Passed   Weaning Parameters   Respiratory Rate Observed 13

## 2024-04-28 NOTE — RT PROTOCOL NOTE
RT Inhaler-Nebulizer Bronchodilator Protocol Note    There is a bronchodilator order in the chart from a provider indicating to follow the RT Bronchodilator Protocol and there is an “Initiate RT Inhaler-Nebulizer Bronchodilator Protocol” order as well (see protocol at bottom of note).    CXR Findings:  XR CHEST PORTABLE    Result Date: 4/27/2024  Perihilar congestion and left lower lobe atelectatic changes.  Successful intubation and placement of nasogastric tube.     XR CHEST PORTABLE    Result Date: 4/26/2024  New left jugular line with the tip over the SVC.  No pneumothorax is identified. Perihilar opacities are present.       The findings from the last RT Protocol Assessment were as follows:   History Pulmonary Disease: Smoker 15 pack years or more  Respiratory Pattern: Regular pattern and RR 12-20 bpm  Breath Sounds: Slightly diminished and/or crackles  Cough: Strong, spontaneous, non-productive  Indication for Bronchodilator Therapy: Mucolytic ordered  Bronchodilator Assessment Score: 3    Aerosolized bronchodilator medication orders have been revised according to the RT Inhaler-Nebulizer Bronchodilator Protocol below.    Respiratory Therapist to perform RT Therapy Protocol Assessment initially then follow the protocol.  Repeat RT Therapy Protocol Assessment PRN for score 0-3 or on second treatment, BID, and PRN for scores above 3.    No Indications - adjust the frequency to every 6 hours PRN wheezing or bronchospasm, if no treatments needed after 48 hours then discontinue using Per Protocol order mode.     If indication present, adjust the RT bronchodilator orders based on the Bronchodilator Assessment Score as indicated below.  Use Inhaler orders unless patient has one or more of the following: on home nebulizer, not able to hold breath for 10 seconds, is not alert and oriented, cannot activate and use MDI correctly, or respiratory rate 25 breaths per minute or more, then use the equivalent nebulizer

## 2024-04-28 NOTE — PROGRESS NOTES
04/28/24 0435   Patient Observation   Pulse 62   Respirations 24   SpO2 97 %   Vent Information   Vent Mode AC/PC   Ventilator Settings   FiO2  40 %   Resp Rate (Set) 24 bpm   PEEP/CPAP (cmH2O) 8   Vent Patient Data (Readings)   Vt (Measured) 430 mL   Peak Inspiratory Pressure (cmH2O) 28 cmH2O   Rate Measured 24 br/min   Minute Volume (L/min) 10.4 Liters   Mean Airway Pressure (cmH2O) 15 cmH20   Plateau Pressure (cm H2O) 0 cm H2O   Driving Pressure -8   I:E Ratio 1:2.10   I Time/ I Time % 0 s   Vent Alarm Settings   High Pressure (cmH2O) 45 cmH2O   Low Minute Volume (lpm) 2 L/min   High Minute Volume (lpm) 26 L/min   Low Exhaled Vt (ml) 200 mL   High Exhaled Vt (ml) 1200 mL   RR High (bpm) 40 br/min   Apnea (secs) 20 secs   Additional Respiratoray Assessments   Humidification Source HME   Circuit Condensation Drained   Ambu Bag With Mask At Bedside Yes   Non-Surgical Airway 04/26/24 Endotracheal tube   Placement Date/Time: 04/26/24 2103   Present on Admission/Arrival: No  Placed By: In ED  Placement Verified By: Auscultation;Direct visualization  Mask Ventilation: Unable to mask ventilate (4)  Insertion attempts: 1  Location: Oral  Airway Device: En...   Secured At 23 cm   Measured From Lips   Secured Location Left   Secured By Commercial tube andersen   Site Assessment Dry   Cuff Pressure 30 cm H2O

## 2024-04-28 NOTE — PROGRESS NOTES
Pt tolerated SBT well. Pt had an RSBI of 14.89 with a positive leak test. Order to extubate, pt suctioned and extubated to 3L nasal cannula. Spo2 98% at this time.

## 2024-04-28 NOTE — PROGRESS NOTES
04/28/24 0034   Vent Information   Ventilator Initiate Yes   Vent Mode AC/PC   $Ventilation $Subsequent Day   Vent Alarm Settings   High Minute Volume (lpm) 26 L/min   Low Exhaled Vt (ml) 200 mL   High Exhaled Vt (ml) 1200 mL   Apnea (secs) 20 secs   Additional Respiratoray Assessments   Humidification Source HME   Non-Surgical Airway 04/26/24 Endotracheal tube   Placement Date/Time: 04/26/24 2103   Present on Admission/Arrival: No  Placed By: In ED  Placement Verified By: Auscultation;Direct visualization  Mask Ventilation: Unable to mask ventilate (4)  Insertion attempts: 1  Location: Oral  Airway Device: En...   Secured At 23 cm   Measured From Lips   Secured Location Center   Secured By Commercial tube andersen   Site Assessment Dry   Cuff Pressure 30 cm H2O

## 2024-04-28 NOTE — PROGRESS NOTES
04/28/24 0757   Patient Observation   Pulse 58   Respirations 22   SpO2 97 %   Vent Information   Vent Mode AC/PC   Ventilator Settings   FiO2  40 %   Insp Time (sec) 0.8 sec   Resp Rate (Set) 24 bpm   Set Pressure 20   PEEP/CPAP (cmH2O) 8   Vent Patient Data (Readings)   Vt (Measured) 437 mL   Peak Inspiratory Pressure (cmH2O) 28 cmH2O   Rate Measured 24 br/min   Minute Volume (L/min) 10.2 Liters   Mean Airway Pressure (cmH2O) 15 cmH20   Plateau Pressure (cm H2O) 0 cm H2O   Driving Pressure -8   I:E Ratio 1:2.00   I Time/ I Time % 0.8 s   Vent Alarm Settings   High Pressure (cmH2O) 45 cmH2O   Low Minute Volume (lpm) 2 L/min   Low Exhaled Vt (ml) 200 mL   RR High (bpm) 40 br/min   Apnea (secs) 20 secs   Additional Respiratoray Assessments   Humidification Source HME   Ambu Bag With Mask At Bedside Yes   Airway Clearance   Suction ET Tube   Suction Device Inline suction catheter   Sputum Method Obtained Endotracheal   Sputum Amount Small   Sputum Color/Odor Tan;White   Sputum Consistency Thin   Non-Surgical Airway 04/26/24 Endotracheal tube   Placement Date/Time: 04/26/24 2103   Present on Admission/Arrival: No  Placed By: In ED  Placement Verified By: Auscultation;Direct visualization  Mask Ventilation: Unable to mask ventilate (4)  Insertion attempts: 1  Location: Oral  Airway Device: En...   Secured At 23 cm   Measured From Lips   Secured Location (S)  Left   Secured By Commercial tube andersen   Site Assessment Dry   Cuff Pressure 30 cm H2O

## 2024-04-28 NOTE — PROGRESS NOTES
04/27/24 2002   Patient Observation   Pulse 68   Respirations 24   SpO2 98 %   Breath Sounds   Breath Sounds Bilateral Clear;Diminished   Vent Information   Equipment Changed HME   Vent Mode AC/PC   Ventilator Settings   FiO2  40 %   Resp Rate (Set) 24 bpm   PEEP/CPAP (cmH2O) 8   Vent Patient Data (Readings)   Vt (Measured) 516 mL   Peak Inspiratory Pressure (cmH2O) 29 cmH2O   Rate Measured 24 br/min   Minute Volume (L/min) 11 Liters   Mean Airway Pressure (cmH2O) 15 cmH20   Plateau Pressure (cm H2O) 0 cm H2O   Driving Pressure -8   I:E Ratio 1:2.10   I Time/ I Time % 0 s   Vent Alarm Settings   High Pressure (cmH2O) 45 cmH2O   Low Minute Volume (lpm) 2 L/min   High Minute Volume (lpm) 26 L/min   Low Exhaled Vt (ml) 200 mL   High Exhaled Vt (ml) 1200 mL   RR High (bpm) 40 br/min   Apnea (secs) 20 secs   Additional Respiratoray Assessments   Humidification Source HME   Circuit Condensation Not drained   Ambu Bag With Mask At Bedside Yes   Airway Clearance   Suction ET Tube   Suction Device Inline suction catheter   Sputum Method Obtained Endotracheal   Sputum Amount Moderate   Sputum Color/Odor Tan;White   Sputum Consistency Thick   Non-Surgical Airway 04/26/24 Endotracheal tube   Placement Date/Time: 04/26/24 2103   Present on Admission/Arrival: No  Placed By: In ED  Placement Verified By: Auscultation;Direct visualization  Mask Ventilation: Unable to mask ventilate (4)  Insertion attempts: 1  Location: Oral  Airway Device: En...   Secured At 23 cm   Measured From Lips   Secured Location Left   Secured By Commercial tube andersen   Site Assessment Dry   Cuff Pressure 30 cm H2O

## 2024-04-29 PROBLEM — Z87.891 FORMER SMOKER: Status: ACTIVE | Noted: 2024-04-29

## 2024-04-29 PROBLEM — F11.20 METHADONE DEPENDENCE (HCC): Status: ACTIVE | Noted: 2024-04-29

## 2024-04-29 PROBLEM — E87.20 LACTIC ACIDOSIS: Status: ACTIVE | Noted: 2024-04-29

## 2024-04-29 PROBLEM — E66.812 CLASS 2 SEVERE OBESITY DUE TO EXCESS CALORIES WITH SERIOUS COMORBIDITY AND BODY MASS INDEX (BMI) OF 36.0 TO 36.9 IN ADULT: Status: ACTIVE | Noted: 2023-11-04

## 2024-04-29 PROBLEM — J41.0 SIMPLE CHRONIC BRONCHITIS (HCC): Status: ACTIVE | Noted: 2024-04-29

## 2024-04-29 PROBLEM — R09.2 RESPIRATORY ARREST (HCC): Status: ACTIVE | Noted: 2024-04-29

## 2024-04-29 PROBLEM — G47.30 OBSERVED SLEEP APNEA: Status: ACTIVE | Noted: 2024-04-29

## 2024-04-29 PROBLEM — R57.9 SHOCK CIRCULATORY (HCC): Status: ACTIVE | Noted: 2024-04-29

## 2024-04-29 PROBLEM — E66.01 CLASS 2 SEVERE OBESITY DUE TO EXCESS CALORIES WITH SERIOUS COMORBIDITY AND BODY MASS INDEX (BMI) OF 36.0 TO 36.9 IN ADULT (HCC): Status: ACTIVE | Noted: 2023-11-04

## 2024-04-29 PROBLEM — N17.9 AKI (ACUTE KIDNEY INJURY) (HCC): Status: ACTIVE | Noted: 2024-04-29

## 2024-04-29 LAB
ALBUMIN SERPL-MCNC: 3.4 G/DL (ref 3.4–5)
ALBUMIN/GLOB SERPL: 1.1 {RATIO} (ref 1.1–2.2)
ALP SERPL-CCNC: 68 U/L (ref 40–129)
ALT SERPL-CCNC: 30 U/L (ref 10–40)
ANION GAP SERPL CALCULATED.3IONS-SCNC: 13 MMOL/L (ref 3–16)
AST SERPL-CCNC: 45 U/L (ref 15–37)
BASOPHILS # BLD: 0 K/UL (ref 0–0.2)
BASOPHILS NFR BLD: 0.1 %
BILIRUB SERPL-MCNC: <0.2 MG/DL (ref 0–1)
BUN SERPL-MCNC: 23 MG/DL (ref 7–20)
CALCIUM SERPL-MCNC: 8.8 MG/DL (ref 8.3–10.6)
CHLORIDE SERPL-SCNC: 99 MMOL/L (ref 99–110)
CO2 SERPL-SCNC: 28 MMOL/L (ref 21–32)
CREAT SERPL-MCNC: 0.9 MG/DL (ref 0.6–1.1)
DEPRECATED RDW RBC AUTO: 16.4 % (ref 12.4–15.4)
EOSINOPHIL # BLD: 0 K/UL (ref 0–0.6)
EOSINOPHIL NFR BLD: 0 %
GFR SERPLBLD CREATININE-BSD FMLA CKD-EPI: 80 ML/MIN/{1.73_M2}
GLUCOSE SERPL-MCNC: 176 MG/DL (ref 70–99)
HCT VFR BLD AUTO: 31.2 % (ref 36–48)
HGB BLD-MCNC: 10.1 G/DL (ref 12–16)
LYMPHOCYTES # BLD: 0.6 K/UL (ref 1–5.1)
LYMPHOCYTES NFR BLD: 4.5 %
MCH RBC QN AUTO: 28.2 PG (ref 26–34)
MCHC RBC AUTO-ENTMCNC: 32.4 G/DL (ref 31–36)
MCV RBC AUTO: 87.3 FL (ref 80–100)
MONOCYTES # BLD: 0.3 K/UL (ref 0–1.3)
MONOCYTES NFR BLD: 2 %
MRSA DNA SPEC QL NAA+PROBE: NORMAL
NEUTROPHILS # BLD: 12.6 K/UL (ref 1.7–7.7)
NEUTROPHILS NFR BLD: 93.4 %
PLATELET # BLD AUTO: 222 K/UL (ref 135–450)
PMV BLD AUTO: 8.1 FL (ref 5–10.5)
POTASSIUM SERPL-SCNC: 4 MMOL/L (ref 3.5–5.1)
PROT SERPL-MCNC: 6.4 G/DL (ref 6.4–8.2)
RBC # BLD AUTO: 3.58 M/UL (ref 4–5.2)
SODIUM SERPL-SCNC: 140 MMOL/L (ref 136–145)
WBC # BLD AUTO: 13.4 K/UL (ref 4–11)

## 2024-04-29 PROCEDURE — 6360000002 HC RX W HCPCS: Performed by: STUDENT IN AN ORGANIZED HEALTH CARE EDUCATION/TRAINING PROGRAM

## 2024-04-29 PROCEDURE — C9113 INJ PANTOPRAZOLE SODIUM, VIA: HCPCS | Performed by: INTERNAL MEDICINE

## 2024-04-29 PROCEDURE — 80053 COMPREHEN METABOLIC PANEL: CPT

## 2024-04-29 PROCEDURE — 85025 COMPLETE CBC W/AUTO DIFF WBC: CPT

## 2024-04-29 PROCEDURE — 6370000000 HC RX 637 (ALT 250 FOR IP): Performed by: INTERNAL MEDICINE

## 2024-04-29 PROCEDURE — 6360000002 HC RX W HCPCS: Performed by: INTERNAL MEDICINE

## 2024-04-29 PROCEDURE — 2700000000 HC OXYGEN THERAPY PER DAY

## 2024-04-29 PROCEDURE — 2580000003 HC RX 258: Performed by: STUDENT IN AN ORGANIZED HEALTH CARE EDUCATION/TRAINING PROGRAM

## 2024-04-29 PROCEDURE — 94640 AIRWAY INHALATION TREATMENT: CPT

## 2024-04-29 PROCEDURE — 2000000000 HC ICU R&B

## 2024-04-29 PROCEDURE — 6370000000 HC RX 637 (ALT 250 FOR IP): Performed by: STUDENT IN AN ORGANIZED HEALTH CARE EDUCATION/TRAINING PROGRAM

## 2024-04-29 PROCEDURE — 94761 N-INVAS EAR/PLS OXIMETRY MLT: CPT

## 2024-04-29 PROCEDURE — 94669 MECHANICAL CHEST WALL OSCILL: CPT

## 2024-04-29 PROCEDURE — 99291 CRITICAL CARE FIRST HOUR: CPT | Performed by: INTERNAL MEDICINE

## 2024-04-29 PROCEDURE — 2580000003 HC RX 258: Performed by: INTERNAL MEDICINE

## 2024-04-29 RX ORDER — BUDESONIDE 0.5 MG/2ML
0.5 INHALANT ORAL
Status: DISCONTINUED | OUTPATIENT
Start: 2024-04-29 | End: 2024-05-01

## 2024-04-29 RX ORDER — TRAMADOL HYDROCHLORIDE 50 MG/1
100 TABLET ORAL EVERY 6 HOURS PRN
Status: DISCONTINUED | OUTPATIENT
Start: 2024-04-29 | End: 2024-05-01 | Stop reason: HOSPADM

## 2024-04-29 RX ORDER — ALPRAZOLAM 1 MG/1
1 TABLET ORAL EVERY 8 HOURS PRN
Status: DISCONTINUED | OUTPATIENT
Start: 2024-04-29 | End: 2024-05-01 | Stop reason: HOSPADM

## 2024-04-29 RX ORDER — MIDODRINE HYDROCHLORIDE 5 MG/1
5 TABLET ORAL
Status: DISCONTINUED | OUTPATIENT
Start: 2024-04-29 | End: 2024-05-01

## 2024-04-29 RX ADMIN — WATER 40 MG: 1 INJECTION INTRAMUSCULAR; INTRAVENOUS; SUBCUTANEOUS at 09:21

## 2024-04-29 RX ADMIN — MIDODRINE HYDROCHLORIDE 5 MG: 5 TABLET ORAL at 13:03

## 2024-04-29 RX ADMIN — ALPRAZOLAM 0.5 MG: 0.25 TABLET ORAL at 02:27

## 2024-04-29 RX ADMIN — GUAIFENESIN 600 MG: 600 TABLET ORAL at 20:03

## 2024-04-29 RX ADMIN — PREGABALIN 100 MG: 75 CAPSULE ORAL at 20:03

## 2024-04-29 RX ADMIN — BUDESONIDE 500 MCG: 0.5 SUSPENSION RESPIRATORY (INHALATION) at 19:55

## 2024-04-29 RX ADMIN — GUAIFENESIN 600 MG: 600 TABLET ORAL at 09:22

## 2024-04-29 RX ADMIN — SODIUM CHLORIDE, PRESERVATIVE FREE 10 ML: 5 INJECTION INTRAVENOUS at 09:22

## 2024-04-29 RX ADMIN — QUETIAPINE FUMARATE 100 MG: 100 TABLET ORAL at 09:23

## 2024-04-29 RX ADMIN — MUPIROCIN: 20 OINTMENT TOPICAL at 20:04

## 2024-04-29 RX ADMIN — PANTOPRAZOLE SODIUM 40 MG: 40 INJECTION, POWDER, FOR SOLUTION INTRAVENOUS at 09:21

## 2024-04-29 RX ADMIN — IPRATROPIUM BROMIDE AND ALBUTEROL SULFATE 1 DOSE: 2.5; .5 SOLUTION RESPIRATORY (INHALATION) at 08:27

## 2024-04-29 RX ADMIN — MIDODRINE HYDROCHLORIDE 5 MG: 5 TABLET ORAL at 17:41

## 2024-04-29 RX ADMIN — AMPICILLIN SODIUM AND SULBACTAM SODIUM 3000 MG: 2; 1 INJECTION, POWDER, FOR SOLUTION INTRAMUSCULAR; INTRAVENOUS at 13:09

## 2024-04-29 RX ADMIN — Medication 2 PUFF: at 08:27

## 2024-04-29 RX ADMIN — ALPRAZOLAM 0.5 MG: 0.25 TABLET ORAL at 09:23

## 2024-04-29 RX ADMIN — MUPIROCIN: 20 OINTMENT TOPICAL at 09:00

## 2024-04-29 RX ADMIN — AMPICILLIN SODIUM AND SULBACTAM SODIUM 3000 MG: 2; 1 INJECTION, POWDER, FOR SOLUTION INTRAMUSCULAR; INTRAVENOUS at 23:54

## 2024-04-29 RX ADMIN — NICOTINE POLACRILEX 2 MG: 2 LOZENGE ORAL at 17:47

## 2024-04-29 RX ADMIN — ACETYLCYSTEINE 600 MG: 200 INHALANT RESPIRATORY (INHALATION) at 08:27

## 2024-04-29 RX ADMIN — IPRATROPIUM BROMIDE AND ALBUTEROL SULFATE 1 DOSE: 2.5; .5 SOLUTION RESPIRATORY (INHALATION) at 19:55

## 2024-04-29 RX ADMIN — ZOLPIDEM TARTRATE 5 MG: 5 TABLET ORAL at 20:04

## 2024-04-29 RX ADMIN — IPRATROPIUM BROMIDE AND ALBUTEROL SULFATE 1 DOSE: 2.5; .5 SOLUTION RESPIRATORY (INHALATION) at 15:55

## 2024-04-29 RX ADMIN — NICOTINE POLACRILEX 2 MG: 2 LOZENGE ORAL at 13:48

## 2024-04-29 RX ADMIN — METHADONE HYDROCHLORIDE 100 MG: 10 TABLET ORAL at 09:22

## 2024-04-29 RX ADMIN — AMPICILLIN SODIUM AND SULBACTAM SODIUM 3000 MG: 2; 1 INJECTION, POWDER, FOR SOLUTION INTRAMUSCULAR; INTRAVENOUS at 06:21

## 2024-04-29 RX ADMIN — ENOXAPARIN SODIUM 40 MG: 100 INJECTION SUBCUTANEOUS at 09:21

## 2024-04-29 RX ADMIN — QUETIAPINE FUMARATE 100 MG: 100 TABLET ORAL at 20:03

## 2024-04-29 RX ADMIN — SODIUM CHLORIDE, PRESERVATIVE FREE 10 ML: 5 INJECTION INTRAVENOUS at 20:04

## 2024-04-29 RX ADMIN — IPRATROPIUM BROMIDE AND ALBUTEROL SULFATE 1 DOSE: 2.5; .5 SOLUTION RESPIRATORY (INHALATION) at 11:50

## 2024-04-29 RX ADMIN — DIVALPROEX SODIUM 1000 MG: 250 TABLET, EXTENDED RELEASE ORAL at 20:48

## 2024-04-29 RX ADMIN — ALPRAZOLAM 0.5 MG: 0.25 TABLET ORAL at 17:41

## 2024-04-29 RX ADMIN — AMPICILLIN SODIUM AND SULBACTAM SODIUM 3000 MG: 2; 1 INJECTION, POWDER, FOR SOLUTION INTRAMUSCULAR; INTRAVENOUS at 17:49

## 2024-04-29 RX ADMIN — PREGABALIN 100 MG: 75 CAPSULE ORAL at 09:22

## 2024-04-29 ASSESSMENT — PAIN DESCRIPTION - LOCATION: LOCATION: BACK

## 2024-04-29 ASSESSMENT — PAIN SCALES - WONG BAKER: WONGBAKER_NUMERICALRESPONSE: NO HURT

## 2024-04-29 ASSESSMENT — PAIN DESCRIPTION - ONSET: ONSET: ON-GOING

## 2024-04-29 ASSESSMENT — PAIN - FUNCTIONAL ASSESSMENT: PAIN_FUNCTIONAL_ASSESSMENT: ACTIVITIES ARE NOT PREVENTED

## 2024-04-29 ASSESSMENT — PAIN DESCRIPTION - DESCRIPTORS: DESCRIPTORS: THROBBING

## 2024-04-29 ASSESSMENT — PAIN DESCRIPTION - PAIN TYPE: TYPE: CHRONIC PAIN

## 2024-04-29 ASSESSMENT — PAIN SCALES - GENERAL: PAINLEVEL_OUTOF10: 4

## 2024-04-29 ASSESSMENT — PAIN DESCRIPTION - FREQUENCY: FREQUENCY: CONTINUOUS

## 2024-04-29 ASSESSMENT — PAIN DESCRIPTION - ORIENTATION: ORIENTATION: MID;LOWER

## 2024-04-29 NOTE — ACP (ADVANCE CARE PLANNING)
Advance Care Planning     General Advance Care Planning (ACP) Conversation    Date of Conversation: 4/29/2024  Conducted with: Patient with Decision Making Capacity  Other persons present: None    Healthcare Decision Maker:   Primary Decision Maker: Talia Blanchard - Child    Secondary Decision Maker: Apolinar Blanchardald - Parent - [phone]    Secondary Decision Maker: Luis Miguel Blanchardie - Brother/Sister - [phone]  Click here to complete Healthcare Decision Makers including selection of the Healthcare Decision Maker Relationship (ie \"Primary\").       Content/Action Overview:  DECLINED ACP Conversation - will revisit periodically  Reviewed DNR/DNI and patient elects Full Code (Attempt Resuscitation)        Length of Voluntary ACP Conversation in minutes:  <16 minutes (Non-Billable)    Rabia Duncan RN

## 2024-04-29 NOTE — PROGRESS NOTES
INPATIENT PULMONARY CRITICAL CARE PROGRESS NOTE      Reason for visit    Respiratory arrest    SUBJECTIVE: Patient when seen this morning continues to be critically ill, patient was on IV pressors to maintain hemodynamics overnight, patient was snoring when seen, patient had slight increase in work of breathing, patient has a Tmax of 99 °F, patient has sinus rhythm on the monitor, patient was on 1 L of nasal oxygen with sats 97% when evaluated, patient's urine output has been adequate, patient's p.o. intake is on the lower side, patient has a documented cumulative fluid balance of -3.8 L, patient's blood sugars were not optimally controlled, no other pertinent review of system could be obtained             Physical Exam:  Blood pressure (!) 94/52, pulse 78, temperature 98.4 °F (36.9 °C), temperature source Bladder, resp. rate 20, height 1.626 m (5' 4\"), weight 97.5 kg (214 lb 15.2 oz), SpO2 94 %, not currently breastfeeding.'   Constitutional:  No acute distress.   HENT:  Oropharynx is clear and moist. No thyromegaly.  Eyes:  Conjunctivae are normal. Pupils equal, round, and reactive to light. No scleral icterus.   Neck: . No tracheal deviation present. No obvious thyroid mass.  Short and large neck  Cardiovascular: Normal rate, regular rhythm, normal heart sounds.  No right ventricular heave. No lower extremity edema.  Pulmonary/Chest: No wheezes.  No rales.  Chest wall is not dull to percussion.  No accessory muscle usage or stridor.   Abdominal: Soft. Bowel sounds present. No distension or hernia. No tenderness.    Musculoskeletal: No cyanosis. No clubbing. No obvious joint deformity.   Lymphadenopathy: No cervical or supraclavicular adenopathy.   Skin: Skin is warm and dry. No rash or nodules on the exposed extremities.  Neurologic: Sleeping and snoring loudly        Results:  CBC:   Recent Labs     04/27/24  0402 04/28/24  0350 04/29/24  0448   WBC 4.9 17.1* 13.4*   HGB 12.1 10.5* 10.1*   HCT 37.3 32.9* 31.2*

## 2024-04-29 NOTE — PROGRESS NOTES
Hospital Medicine Progress Note      Date of Admission: 4/26/2024  Hospital Day: 4    Chief Admission Complaint:  Methadone OD     Subjective: Patient is in hospital bed alert and oriented.  No new issues or complaints today.  Patient states that she feels \"overall a little better \".  Continues to have left-sided knee pain stating that it is likely a torn meniscus that has worsened over time.  Would like to pain medication for this pain.  Patient states 8-10/10 pain on severity scale. Denies fevers, chills, sweats. Denies chest pain & palpitations. Denies shortness of breath and cough. Denies nausea, vomiting, or diarrhea. Denies changes in urination.  Spoke with nursing staff and was informed that patient got Xanax overnight as she was having anxiety issues.    Presenting Admission History:       43 y.o. female with past medical history of polysubstance abuse, diabetes, drug overdose presented to emergency department after being found unresponsive.  HPI obtained from ED provider as patient is intubated and sedated on my exam.  Patient was found to be unresponsive after methadone overdose.  At that time patient was found to have vomit in her airway.  When EMS arrived patient was not responsive to Narcan at that time.  On arrival to the emergency department patient was intubated.  Shortly after intubation there was difficulty in getting patient's O2 sats to be appropriate even though the FiO2 was 100% on ventilator.  Pulmonology consultation was placed for bronchoscopy in the emergency department for aspiration of vomit.     Assessment/Plan:      Current Principal Problem:  <principal problem not specified>    Acute Respiratory Failure - w/ hypoxia, POArrival.  Presence of clinical respiratory distress w/ tachypnea/dyspnea/SOB and wheezing w/ use of accessory muscles to breath.  Supplemental O2 and wean as tolerated.   -Bronchoscopy performed 2/26/24; large amounts of emesis present   -Wean O2 as tolerated with

## 2024-04-29 NOTE — PLAN OF CARE
Problem: Discharge Planning  Goal: Discharge to home or other facility with appropriate resources  4/28/2024 2056 by Olga Graf RN  Outcome: Progressing  4/28/2024 2056 by Olga Graf RN  Outcome: Progressing     Problem: Nutrition Deficit:  Goal: Optimize nutritional status  4/28/2024 2056 by Olga Graf RN  Outcome: Progressing  4/28/2024 2056 by Olga Graf RN  Outcome: Progressing     Problem: Safety - Adult  Goal: Free from fall injury  Outcome: Progressing     Problem: Risk for Elopement  Goal: Patient will not exit the unit/facility without proper excort  Outcome: Progressing  Flowsheets (Taken 4/28/2024 0800 by Kia Huffman RN)  Nursing Interventions for Elopement Risk:   Assist with personal care needs such as toileting, eating, dressing, as needed to reduce the risk of wandering   Collaborate with family members/caregivers to mitigate the elopement risk   Collaborate with treatment team for drug withdrawal symptoms treatment   Reduce environmental triggers   Shoes and clothing collected and placed in gown attire

## 2024-04-29 NOTE — CARE COORDINATION
Case Management Assessment  Initial Evaluation    Date/Time of Evaluation: 4/29/2024 3:06 PM  Assessment Completed by: Rabia Duncan RN    If patient is discharged prior to next notation, then this note serves as note for discharge by case management.    Patient Name: Anjelica Blanchard                   YOB: 1980  Diagnosis: Hyperkalemia [E87.5]  COLLEEN (acute kidney injury) (HCC) [N17.9]  Acute respiratory failure with hypoxia (HCC) [J96.01]  Methadone overdose of undetermined intent, initial encounter (HCC) [T40.3X4A]  Aspiration into airway, initial encounter [T17.908A]  Drug overdose of undetermined intent, initial encounter [T50.904A]  Acute hypoxic respiratory failure (HCC) [J96.01]                   Date / Time: 4/26/2024  8:51 PM    Patient Admission Status: Inpatient   Readmission Risk (Low < 19, Mod (19-27), High > 27): Readmission Risk Score: 23    Current PCP: Smith Neff MD  PCP verified by CM? Yes    Chart Reviewed: No      History Provided by: Patient, Medical Record  Patient Orientation: Alert and Oriented, Person, Place, Situation, Self    Patient Cognition: Alert    Hospitalization in the last 30 days (Readmission):  No    If yes, Readmission Assessment in CM Navigator will be completed.    Advance Directives:      Code Status: Full Code   Patient's Primary Decision Maker is: Legal Next of Kin    Primary Decision Maker: Talia Blanchard - Child    Secondary Decision Maker: Modesto Blanchard - Parent - [phone]    Secondary Decision Maker: Mally Blanchard - Brother/Sister - [phone]    Discharge Planning:    Patient lives with: Alone Type of Home: Apartment  Primary Care Giver: Self  Patient Support Systems include: Family Members, Friends/Neighbors   Current Financial resources: Medicaid  Current community resources: None  Current services prior to admission: None            Current DME:              Type of Home Care services:  None    ADLS  Prior functional level: Independent in  ADLs/IADLs  Current functional level: Independent in ADLs/IADLs    PT AM-PAC:   /24  OT AM-PAC:   /24    Family can provide assistance at DC: No  Would you like Case Management to discuss the discharge plan with any other family members/significant others, and if so, who?    Plans to Return to Present Housing: Yes  Other Identified Issues/Barriers to RETURNING to current housing: no  Potential Assistance needed at discharge: N/A            Potential DME:    Patient expects to discharge to: Apartment  Plan for transportation at discharge:      Financial    Payor: [facility] / Plan: [facility] MEDICAID / Product Type: *No Product type* /     Does insurance require precert for SNF: Yes    Potential assistance Purchasing Medications:    Meds-to-Beds request: Yes      [id number] [address] - P [phone] - F [phone]  [facility] [facility] [address]  Phone: [phone] Fax: [phone]    [id number] [place], [place] [facility] [address] [facility] - P [phone] - F [phone]  [address] [facility] [address]  Phone: [phone] Fax: [phone]    [id number] [place], [place] - [address] - P [phone] - F [phone]  [address]  [facility] [address]  Phone: [phone] Fax: [phone]      Notes:    Factors facilitating achievement of predicted outcomes: Cooperative and Pleasant    Barriers to discharge: Limited family support, Limited safety awareness, Decreased motivation, Limited insight into deficits, Medical complications, and Medication managment    Additional Case Management Notes: SPoke with the pt at  bedside. She says she lives alone in an apt. IPTA- She normally takes 140 mg Methadone from the clinic and she has been taking lwess of all her meds cause she thinks she is on too many. Pt denies having any drug history or problems. Her history tells otherwise. Pt  also has BPD, bipolar.      Will follow for DC

## 2024-04-30 PROCEDURE — 2580000003 HC RX 258: Performed by: INTERNAL MEDICINE

## 2024-04-30 PROCEDURE — 6370000000 HC RX 637 (ALT 250 FOR IP): Performed by: STUDENT IN AN ORGANIZED HEALTH CARE EDUCATION/TRAINING PROGRAM

## 2024-04-30 PROCEDURE — 6360000002 HC RX W HCPCS: Performed by: INTERNAL MEDICINE

## 2024-04-30 PROCEDURE — 6360000002 HC RX W HCPCS: Performed by: STUDENT IN AN ORGANIZED HEALTH CARE EDUCATION/TRAINING PROGRAM

## 2024-04-30 PROCEDURE — 6370000000 HC RX 637 (ALT 250 FOR IP): Performed by: INTERNAL MEDICINE

## 2024-04-30 PROCEDURE — C9113 INJ PANTOPRAZOLE SODIUM, VIA: HCPCS | Performed by: INTERNAL MEDICINE

## 2024-04-30 PROCEDURE — 99233 SBSQ HOSP IP/OBS HIGH 50: CPT | Performed by: INTERNAL MEDICINE

## 2024-04-30 PROCEDURE — 2580000003 HC RX 258: Performed by: STUDENT IN AN ORGANIZED HEALTH CARE EDUCATION/TRAINING PROGRAM

## 2024-04-30 PROCEDURE — 1200000000 HC SEMI PRIVATE

## 2024-04-30 PROCEDURE — 94640 AIRWAY INHALATION TREATMENT: CPT

## 2024-04-30 RX ORDER — PANTOPRAZOLE SODIUM 40 MG/1
40 TABLET, DELAYED RELEASE ORAL
Status: DISCONTINUED | OUTPATIENT
Start: 2024-05-01 | End: 2024-05-01 | Stop reason: HOSPADM

## 2024-04-30 RX ORDER — IPRATROPIUM BROMIDE AND ALBUTEROL SULFATE 2.5; .5 MG/3ML; MG/3ML
1 SOLUTION RESPIRATORY (INHALATION)
Status: DISCONTINUED | OUTPATIENT
Start: 2024-04-30 | End: 2024-05-01

## 2024-04-30 RX ORDER — IPRATROPIUM BROMIDE AND ALBUTEROL SULFATE 2.5; .5 MG/3ML; MG/3ML
1 SOLUTION RESPIRATORY (INHALATION) EVERY 4 HOURS PRN
Status: DISCONTINUED | OUTPATIENT
Start: 2024-04-30 | End: 2024-05-01 | Stop reason: SDUPTHER

## 2024-04-30 RX ORDER — CYCLOBENZAPRINE HCL 10 MG
10 TABLET ORAL 3 TIMES DAILY PRN
Status: DISCONTINUED | OUTPATIENT
Start: 2024-04-30 | End: 2024-05-01 | Stop reason: HOSPADM

## 2024-04-30 RX ADMIN — ONDANSETRON 4 MG: 4 TABLET, ORALLY DISINTEGRATING ORAL at 23:25

## 2024-04-30 RX ADMIN — ENOXAPARIN SODIUM 40 MG: 100 INJECTION SUBCUTANEOUS at 09:05

## 2024-04-30 RX ADMIN — MUPIROCIN: 20 OINTMENT TOPICAL at 09:03

## 2024-04-30 RX ADMIN — NICOTINE POLACRILEX 2 MG: 2 LOZENGE ORAL at 17:40

## 2024-04-30 RX ADMIN — ALPRAZOLAM 1 MG: 1 TABLET ORAL at 01:25

## 2024-04-30 RX ADMIN — SODIUM CHLORIDE, PRESERVATIVE FREE 10 ML: 5 INJECTION INTRAVENOUS at 09:04

## 2024-04-30 RX ADMIN — IPRATROPIUM BROMIDE AND ALBUTEROL SULFATE 1 DOSE: .5; 2.5 SOLUTION RESPIRATORY (INHALATION) at 20:02

## 2024-04-30 RX ADMIN — ZOLPIDEM TARTRATE 5 MG: 5 TABLET ORAL at 20:57

## 2024-04-30 RX ADMIN — QUETIAPINE FUMARATE 100 MG: 100 TABLET ORAL at 09:05

## 2024-04-30 RX ADMIN — NICOTINE POLACRILEX 2 MG: 2 LOZENGE ORAL at 09:05

## 2024-04-30 RX ADMIN — GUAIFENESIN 600 MG: 600 TABLET ORAL at 20:57

## 2024-04-30 RX ADMIN — MIDODRINE HYDROCHLORIDE 5 MG: 5 TABLET ORAL at 17:39

## 2024-04-30 RX ADMIN — DIVALPROEX SODIUM 1000 MG: 250 TABLET, EXTENDED RELEASE ORAL at 20:57

## 2024-04-30 RX ADMIN — BUDESONIDE 500 MCG: 0.5 SUSPENSION RESPIRATORY (INHALATION) at 20:02

## 2024-04-30 RX ADMIN — POLYETHYLENE GLYCOL 3350 17 G: 17 POWDER, FOR SOLUTION ORAL at 20:56

## 2024-04-30 RX ADMIN — TRAMADOL HYDROCHLORIDE 100 MG: 50 TABLET ORAL at 20:56

## 2024-04-30 RX ADMIN — MIDODRINE HYDROCHLORIDE 5 MG: 5 TABLET ORAL at 14:16

## 2024-04-30 RX ADMIN — IPRATROPIUM BROMIDE AND ALBUTEROL SULFATE 1 DOSE: 2.5; .5 SOLUTION RESPIRATORY (INHALATION) at 08:13

## 2024-04-30 RX ADMIN — ONDANSETRON 4 MG: 4 TABLET, ORALLY DISINTEGRATING ORAL at 01:25

## 2024-04-30 RX ADMIN — BUDESONIDE 500 MCG: 0.5 SUSPENSION RESPIRATORY (INHALATION) at 08:13

## 2024-04-30 RX ADMIN — PREGABALIN 100 MG: 75 CAPSULE ORAL at 09:04

## 2024-04-30 RX ADMIN — AMPICILLIN SODIUM AND SULBACTAM SODIUM 3000 MG: 2; 1 INJECTION, POWDER, FOR SOLUTION INTRAMUSCULAR; INTRAVENOUS at 05:51

## 2024-04-30 RX ADMIN — MIDODRINE HYDROCHLORIDE 5 MG: 5 TABLET ORAL at 09:04

## 2024-04-30 RX ADMIN — METHADONE HYDROCHLORIDE 100 MG: 10 TABLET ORAL at 09:05

## 2024-04-30 RX ADMIN — PREGABALIN 100 MG: 75 CAPSULE ORAL at 20:56

## 2024-04-30 RX ADMIN — CYCLOBENZAPRINE 10 MG: 10 TABLET, FILM COATED ORAL at 20:57

## 2024-04-30 RX ADMIN — GUAIFENESIN 600 MG: 600 TABLET ORAL at 09:04

## 2024-04-30 RX ADMIN — IPRATROPIUM BROMIDE AND ALBUTEROL SULFATE 1 DOSE: 2.5; .5 SOLUTION RESPIRATORY (INHALATION) at 11:17

## 2024-04-30 RX ADMIN — ALPRAZOLAM 1 MG: 1 TABLET ORAL at 09:32

## 2024-04-30 RX ADMIN — METHYLPREDNISOLONE SODIUM SUCCINATE 40 MG: 40 INJECTION INTRAMUSCULAR; INTRAVENOUS at 09:04

## 2024-04-30 RX ADMIN — ALPRAZOLAM 1 MG: 1 TABLET ORAL at 17:39

## 2024-04-30 RX ADMIN — QUETIAPINE FUMARATE 100 MG: 100 TABLET ORAL at 20:56

## 2024-04-30 RX ADMIN — PANTOPRAZOLE SODIUM 40 MG: 40 INJECTION, POWDER, FOR SOLUTION INTRAVENOUS at 09:04

## 2024-04-30 ASSESSMENT — PAIN DESCRIPTION - PAIN TYPE: TYPE: CHRONIC PAIN

## 2024-04-30 ASSESSMENT — PAIN DESCRIPTION - DESCRIPTORS
DESCRIPTORS: ACHING;SHARP
DESCRIPTORS: SHARP

## 2024-04-30 ASSESSMENT — PAIN DESCRIPTION - ORIENTATION
ORIENTATION: LEFT
ORIENTATION: LEFT

## 2024-04-30 ASSESSMENT — PAIN DESCRIPTION - FREQUENCY: FREQUENCY: CONTINUOUS

## 2024-04-30 ASSESSMENT — PAIN SCALES - GENERAL
PAINLEVEL_OUTOF10: 8
PAINLEVEL_OUTOF10: 8

## 2024-04-30 ASSESSMENT — PAIN DESCRIPTION - LOCATION
LOCATION: BACK;KNEE
LOCATION: BACK;KNEE

## 2024-04-30 NOTE — PROGRESS NOTES
INPATIENT PULMONARY CRITICAL CARE PROGRESS NOTE      Reason for visit    Respiratory arrest    SUBJECTIVE: Patient when seen this morning was feeling better, patient did not have any significant cough or expectoration or increasing chest congestion per se, patient was not have any chest pain or palpitation, patient was alert and oriented, patient was afebrile and hemodynamically maintained, patient did not require any supplemental oxygen to maintain oxygen saturation which was 91% when seen, patient has a sinus rhythm on the monitor, patient is comfortable with the home dose of methadone and Xanax, patient has had improving urine output, patient has a cumulative fluid balance of -2.4 L, patient is tolerating p.o. diet well, patient's blood sugars are slightly on the high side, no other pertinent review of system of concern           Physical Exam:  Blood pressure 101/69, pulse 61, temperature 97.2 °F (36.2 °C), temperature source Oral, resp. rate 11, height 1.626 m (5' 4\"), weight 97.5 kg (214 lb 15.2 oz), SpO2 96 %, not currently breastfeeding.'   Constitutional:  No acute distress.   HENT:  Oropharynx is clear and moist. No thyromegaly.  Eyes:  Conjunctivae are normal. Pupils equal, round, and reactive to light. No scleral icterus.   Neck: . No tracheal deviation present. No obvious thyroid mass.  Short and large neck  Cardiovascular: Normal rate, regular rhythm, normal heart sounds.  No right ventricular heave. No lower extremity edema.  Pulmonary/Chest: No wheezes.  No rales.  Chest wall is not dull to percussion.  No accessory muscle usage or stridor.   Abdominal: Soft. Bowel sounds present. No distension or hernia. No tenderness.    Musculoskeletal: No cyanosis. No clubbing. No obvious joint deformity.   Lymphadenopathy: No cervical or supraclavicular adenopathy.   Skin: Skin is warm and dry. No rash or nodules on the exposed extremities.  Neurologic: Alert and communicative with no focal cranial nerve  (Mononuclear)  1+ Gram positive cocci  2+ Gram positive rods [facility]                          PFT INTERPRETATION     The patient is a 43-year-old female with CSN number of [id number] who  underwent a PFT for unspecified asthma.  Spirometry shows FVC to be 73%,  FEV1 to be 73%, FEV1 to FVC ratio was 99%, CWP41-76% was 74%.  The  patient had some postbronchodilator improvement, which was not  significant in the small airways.  The total lung capacity was normal.   The patient has some air trapping.  The patient has decrease in ERV.   The patient's diffusion capacity when adjusted for volume was normal.   Flow-volume loop was normal.  On the basis of this PFT, the patient has  mild obstructive airway disease with some changes in the PFT parameters  because of body habitus.        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 *          Plan:   Oxygen supplementation to keep saturation between 90 and 94% only  Please titrate the oxygen as per the above parameters  Patient was on room air oxygen  Pulmonary toilet  Patient is on Unasyn and vancomycin at this time  Patient's vancomycin was discontinued yesterday  Patient's bronchoscopy culture showed patient to have normal respite amanda and patient is afebrile and does not have any obvious source of infection  Unasyn being discontinued for now and can be reevaluated depending on patient's status and new data later  Bronchodilators  Patient is off IV

## 2024-04-30 NOTE — PLAN OF CARE
Problem: Discharge Planning  Goal: Discharge to home or other facility with appropriate resources  Outcome: Progressing     Problem: Nutrition Deficit:  Goal: Optimize nutritional status  Outcome: Progressing     Problem: Safety - Adult  Goal: Free from fall injury  Outcome: Progressing     Problem: Risk for Elopement  Goal: Patient will not exit the unit/facility without proper excort  Outcome: Progressing

## 2024-04-30 NOTE — PROGRESS NOTES
Physical Therapy/Occupational Therapy    PT/OT orders received, chart reviewed.  Pt is independent with functional mobility per ICU RN and ambulating around her room without staff assistance.  No PT/OT needs at this time, will sign off.    Thank you.    June Guillen  PT, DPT #[id number]  Joey Devlin, OTR/L

## 2024-04-30 NOTE — PLAN OF CARE
Problem: Discharge Planning  Goal: Discharge to home or other facility with appropriate resources  4/30/2024 1535 by Celeste Bazan, RN  Outcome: Progressing     Problem: Nutrition Deficit:  Goal: Optimize nutritional status  4/30/2024 1535 by Celeste Bazan, RN  Outcome: Progressing     Problem: Safety - Adult  Goal: Free from fall injury  4/30/2024 1535 by Celeste Bazan, RN  Outcome: Progressing     Problem: Risk for Elopement  Goal: Patient will not exit the unit/facility without proper excort  4/30/2024 1535 by Celeste Bazan, RN  Outcome: Progressing

## 2024-04-30 NOTE — PROGRESS NOTES
Hospital Medicine Progress Note      Date of Admission: 4/26/2024  Hospital Day: 5    Chief Admission Complaint:  Methadone OD      Subjective: She is sitting up in bed, in no acute distress this time.  She denies chest pain or shortness of breath.  She is eating and drinking well.  She has been restarted on her methadone and states pain seems to be better controlled.    Presenting Admission History:       43 y.o. female with past medical history of polysubstance abuse, diabetes type II, presented to emergency department after being found unresponsive. HPI obtained from ED provider as patient was intubated and sedated. Patient was found to be unresponsive after methadone overdose. At that time patient was found to have vomit in her airway. When EMS arrived patient was not responsive to Narcan at that time. On arrival to the emergency department patient was intubated. Shortly after intubation there was difficulty in getting patient's O2 sats to be appropriate even though the FiO2 was 100% on ventilator. Pulmonology consultation was placed for bronchoscopy in the emergency department for aspiration of vomit.     Assessment/Plan:      Current Principal Problem:  <principal problem not specified>    Acute Respiratory Failure - w/ hypoxia, POArrival.  Presence of clinical respiratory distress w/ tachypnea/dyspnea/SOB and wheezing w/ use of accessory muscles to breath.   She was intubated at time of admission and has been extubated on 4/28/2024.  -Bronchoscopy performed 2/26/24; large amounts of emesis present   -Continue respiratory supportive care but she seems to be progressing well.       Sepsis - w/ Leukocytosis/Tachycardia/Fever/Elevated Lactate POArrival 2nd to above infection.  Continue IVF as appropriate and monitor clinical response w/ ABX.   -Continue unasyn     Lactic Acidosis   -Etiology unclear at this time   -LA 5.0  -Continue to monitor LA daily   -Continue fluids for dilutional effect     ARF - w/  Oral TID WC    methylPREDNISolone  40 mg IntraVENous Daily    budesonide  0.5 mg Nebulization BID RT    methadone  100 mg Oral Daily    divalproex  1,000 mg Oral Nightly    pregabalin  100 mg Oral BID    QUEtiapine  100 mg Oral BID    guaiFENesin  600 mg Oral BID    enoxaparin  40 mg SubCUTAneous Daily    pantoprazole  40 mg IntraVENous Daily    ampicillin-sulbactam  3,000 mg IntraVENous Q6H    ipratropium 0.5 mg-albuterol 2.5 mg  1 Dose Inhalation Q4H WA RT    sodium chloride flush  5-40 mL IntraVENous 2 times per day     PRN Meds: traMADol, ALPRAZolam, zolpidem, nicotine polacrilex, sodium chloride flush, sodium chloride, potassium chloride **OR** potassium alternative oral replacement **OR** potassium chloride, magnesium sulfate, ondansetron **OR** ondansetron, polyethylene glycol, acetaminophen **OR** acetaminophen     Labs:  Personally reviewed and interpreted for clinical significance.     Recent Labs     04/28/24 0350 04/29/24 0448   WBC 17.1* 13.4*   HGB 10.5* 10.1*   HCT 32.9* 31.2*    222     Recent Labs     04/28/24  0350 04/29/24 0448    140   K 4.1 4.0   CL 99 99   CO2 29 28   BUN 28* 23*   CREATININE 1.2* 0.9   CALCIUM 8.8 8.8     No results for input(s): \"PROBNP\", \"TROPHS\" in the last 72 hours.  No results for input(s): \"LABA1C\" in the last 72 hours.  Recent Labs     04/28/24  0350 04/29/24 0448   AST 65* 45*   ALT 28 30   BILITOT <0.2 <0.2   ALKPHOS 77 68     No results for input(s): \"INR\", \"LACTA\", \"TSH\" in the last 72 hours.    Urine Cultures:   Lab Results   Component Value Date/Time    LABURIN No growth at 18 to 36 hours 03/23/2024 02:30 PM     Blood Cultures:   Lab Results   Component Value Date/Time    BC No Growth after 4 days of incubation. 11/21/2023 05:02 PM     Lab Results   Component Value Date/Time    BLOODCULT2 No Growth after 4 days of incubation. 06/04/2022 05:52 AM     Organism:   Lab Results   Component Value Date/Time    ORG Annelise dubliniensis 11/02/2023 09:24 AM

## 2024-04-30 NOTE — PROGRESS NOTES
Pt just arrived to floor from the ICU. VSS. Pt a/o.       Standard Precautions   Bed in lowest position and wheels locked.   Call light within reach.   Bedside table within reach.   Non-skid socks in place.

## 2024-04-30 NOTE — CONSULTS
Full note dictated.     Dx:  polysubstance dependence         PTSD    Rec:  1).  Pt is not suicidal and will not need an inpatient psychiatric hospital stay.  She will return to [facility] on discharge and [facility] for methadone.       Landry De Los Santos MD

## 2024-05-01 ENCOUNTER — TELEPHONE (OUTPATIENT)
Dept: PULMONOLOGY | Age: 44
End: 2024-05-01

## 2024-05-01 VITALS
RESPIRATION RATE: 16 BRPM | BODY MASS INDEX: 36.7 KG/M2 | WEIGHT: 214.95 LBS | SYSTOLIC BLOOD PRESSURE: 119 MMHG | TEMPERATURE: 98.6 F | OXYGEN SATURATION: 94 % | HEART RATE: 84 BPM | HEIGHT: 64 IN | DIASTOLIC BLOOD PRESSURE: 73 MMHG

## 2024-05-01 LAB
ANION GAP SERPL CALCULATED.3IONS-SCNC: 11 MMOL/L (ref 3–16)
BUN SERPL-MCNC: 19 MG/DL (ref 7–20)
CALCIUM SERPL-MCNC: 9.5 MG/DL (ref 8.3–10.6)
CHLORIDE SERPL-SCNC: 100 MMOL/L (ref 99–110)
CO2 SERPL-SCNC: 29 MMOL/L (ref 21–32)
CREAT SERPL-MCNC: 0.9 MG/DL (ref 0.6–1.1)
DEPRECATED RDW RBC AUTO: 16.7 % (ref 12.4–15.4)
GFR SERPLBLD CREATININE-BSD FMLA CKD-EPI: 80 ML/MIN/{1.73_M2}
GLUCOSE SERPL-MCNC: 110 MG/DL (ref 70–99)
HCT VFR BLD AUTO: 35.2 % (ref 36–48)
HGB BLD-MCNC: 11.4 G/DL (ref 12–16)
MAGNESIUM SERPL-MCNC: 1.9 MG/DL (ref 1.8–2.4)
MCH RBC QN AUTO: 28.7 PG (ref 26–34)
MCHC RBC AUTO-ENTMCNC: 32.4 G/DL (ref 31–36)
MCV RBC AUTO: 88.4 FL (ref 80–100)
PLATELET # BLD AUTO: 252 K/UL (ref 135–450)
PMV BLD AUTO: 8 FL (ref 5–10.5)
POTASSIUM SERPL-SCNC: 4.1 MMOL/L (ref 3.5–5.1)
RBC # BLD AUTO: 3.98 M/UL (ref 4–5.2)
SODIUM SERPL-SCNC: 140 MMOL/L (ref 136–145)
WBC # BLD AUTO: 9.3 K/UL (ref 4–11)

## 2024-05-01 PROCEDURE — 85027 COMPLETE CBC AUTOMATED: CPT

## 2024-05-01 PROCEDURE — 83735 ASSAY OF MAGNESIUM: CPT

## 2024-05-01 PROCEDURE — 6370000000 HC RX 637 (ALT 250 FOR IP): Performed by: INTERNAL MEDICINE

## 2024-05-01 PROCEDURE — 80048 BASIC METABOLIC PNL TOTAL CA: CPT

## 2024-05-01 PROCEDURE — 99232 SBSQ HOSP IP/OBS MODERATE 35: CPT | Performed by: INTERNAL MEDICINE

## 2024-05-01 PROCEDURE — 36415 COLL VENOUS BLD VENIPUNCTURE: CPT

## 2024-05-01 PROCEDURE — 94669 MECHANICAL CHEST WALL OSCILL: CPT

## 2024-05-01 PROCEDURE — 6360000002 HC RX W HCPCS: Performed by: INTERNAL MEDICINE

## 2024-05-01 PROCEDURE — 94640 AIRWAY INHALATION TREATMENT: CPT

## 2024-05-01 RX ORDER — MIDODRINE HYDROCHLORIDE 5 MG/1
5 TABLET ORAL 2 TIMES DAILY WITH MEALS
Status: DISCONTINUED | OUTPATIENT
Start: 2024-05-01 | End: 2024-05-01 | Stop reason: HOSPADM

## 2024-05-01 RX ORDER — PREDNISONE 10 MG/1
TABLET ORAL
Qty: 18 TABLET | Refills: 0 | Status: SHIPPED | OUTPATIENT
Start: 2024-05-02

## 2024-05-01 RX ORDER — IPRATROPIUM BROMIDE AND ALBUTEROL SULFATE 2.5; .5 MG/3ML; MG/3ML
1 SOLUTION RESPIRATORY (INHALATION) EVERY 4 HOURS PRN
Status: DISCONTINUED | OUTPATIENT
Start: 2024-05-01 | End: 2024-05-01 | Stop reason: HOSPADM

## 2024-05-01 RX ADMIN — QUETIAPINE FUMARATE 100 MG: 100 TABLET ORAL at 10:19

## 2024-05-01 RX ADMIN — IPRATROPIUM BROMIDE AND ALBUTEROL SULFATE 1 DOSE: .5; 2.5 SOLUTION RESPIRATORY (INHALATION) at 08:12

## 2024-05-01 RX ADMIN — PREGABALIN 100 MG: 75 CAPSULE ORAL at 10:19

## 2024-05-01 RX ADMIN — ALPRAZOLAM 1 MG: 1 TABLET ORAL at 01:50

## 2024-05-01 RX ADMIN — ALPRAZOLAM 1 MG: 1 TABLET ORAL at 10:18

## 2024-05-01 RX ADMIN — ENOXAPARIN SODIUM 40 MG: 100 INJECTION SUBCUTANEOUS at 10:18

## 2024-05-01 RX ADMIN — MIDODRINE HYDROCHLORIDE 5 MG: 5 TABLET ORAL at 11:51

## 2024-05-01 RX ADMIN — PANTOPRAZOLE SODIUM 40 MG: 40 TABLET, DELAYED RELEASE ORAL at 06:28

## 2024-05-01 RX ADMIN — PREDNISONE 30 MG: 20 TABLET ORAL at 10:19

## 2024-05-01 RX ADMIN — METHADONE HYDROCHLORIDE 100 MG: 10 TABLET ORAL at 10:18

## 2024-05-01 RX ADMIN — GUAIFENESIN 600 MG: 600 TABLET ORAL at 10:19

## 2024-05-01 NOTE — PROGRESS NOTES
Pt sitting up in bed awake. Gets up independently. Is alert and oriented. Complains of chronic back and left knee pain. Tolerates a regular diet and takes pills whole. Assessment completed and charted. No IV access and MD's aware. Pt remains on RA. Asks for snacks often. Pt instructed to call out for needs. VSS. Call light in reach, will continue to monitor.

## 2024-05-01 NOTE — CARE COORDINATION
Chart reviewed day 5. Moved to C3 yesterday. Care managed by IM and psyche. Psych eval completed. Plan for resumption of services with [facility] and [facility] for methadone. Not suicidal or in need of IP psych care. Ambulatory in room independently. Prednisone po, no current IVATBX. On RA with sat of 94%. No CM needs identified. Thierry Zamudio RN

## 2024-05-01 NOTE — PROGRESS NOTES
Comprehensive Nutrition Assessment    Type and Reason for Visit:  Reassess    Nutrition Recommendations/Plan:   Continue regular diet and encourage po intakes  Add ensure BID - monitor for acceptance  Monitor nutrition adequacy, pertinent labs, bowel habits, wt changes, and clinical progress     Malnutrition Assessment:  Malnutrition Status:  At risk for malnutrition (Comment) (05/01/24 1146)    Context:  Acute Illness     Findings of the 6 clinical characteristics of malnutrition:  Energy Intake:  Mild decrease in energy intake (Comment)  Fluid Accumulation:  Mild Extremities    Nutrition Assessment:    Follow up: Pt extubated 4/28. Diet advanced from NPO to regular 4/28, po intakes % per EMR. No significant weight loss noted in past 3 months per EMR. Pt reports decreased appetite d/t N/V. Reports having nausea for the past few days and vomiting last night. Reports eating some meals better than others. RD offered ONS to promote po intakes, pt agreeable. No further questions, will monitor.    Nutrition Related Findings:    +BM 4/28, +active BS, +constipation Wound Type: None       Current Nutrition Intake & Therapies:    Average Meal Intake: 51-75%, %  Average Supplements Intake: None Ordered  ADULT DIET; Regular    Anthropometric Measures:  Height: 162.6 cm (5' 4\")  Ideal Body Weight (IBW): 120 lbs (55 kg)       Current Body Weight: 97.5 kg (214 lb 15.2 oz),   IBW. Weight Source: Bed Scale  Current BMI (kg/m2): 36.9  Usual Body Weight: 94.3 kg (207 lb 14.3 oz) (2/14/24 standing scale)  % Weight Change (Calculated): 3.4  Weight Adjustment For: No Adjustment                 BMI Categories: Obese Class 2 (BMI 35.0 -39.9)    Estimated Daily Nutrient Needs:  Energy Requirements Based On: Kcal/kg (25-30)  Weight Used for Energy Requirements: Ideal  Energy (kcal/day): 8323-3137 kcals  Weight Used for Protein Requirements: Ideal (1-1.2)  Protein (g/day): 55-66 g  Method Used for Fluid Requirements: 1

## 2024-05-01 NOTE — TELEPHONE ENCOUNTER
Advised pt. That Dr. Medina is not in hospital this week but that Pulm that is there can't see her unless they are asked to see her by the house  That is following her. Voiced understanding and was happy with that explanation.

## 2024-05-01 NOTE — PROGRESS NOTES
INPATIENT PULMONARY CRITICAL CARE PROGRESS NOTE      Reason for visit    Respiratory arrest    SUBJECTIVE: Patient when seen this morning was somewhat angry and agitated, patient states that she has come to this hospital several times in the last 1 year time, patient is not able to get any answers about why she came to the hospital, patient states that on her own she has been cutting down on her methadone and Xanax also patient has not been taking other medications so that she does not have symptoms but then also patient had respiratory arrest and patient could not figure out why patient states that It may be fatal for her heart and she was concerned about that, patient has been afebrile and hemodynamically maintained, patient has been on room air oxygen with saturation of 94% when evaluated, patient urine output has not been recorded in epic for review, patient glycemic control was acceptable, no other pertinent review of system of concern      Physical Exam:  Blood pressure 108/68, pulse 74, temperature 97.8 °F (36.6 °C), temperature source Oral, resp. rate 16, height 1.626 m (5' 4\"), weight 97.5 kg (214 lb 15.2 oz), SpO2 94 %, not currently breastfeeding.'   Constitutional:  No acute distress.   HENT:  Oropharynx is clear and moist. No thyromegaly.  Eyes:  Conjunctivae are normal. Pupils equal, round, and reactive to light. No scleral icterus.   Neck: . No tracheal deviation present. No obvious thyroid mass.  Short and large neck  Cardiovascular: Normal rate, regular rhythm, normal heart sounds.  No right ventricular heave. No lower extremity edema.  Pulmonary/Chest: No wheezes.  No rales.  Chest wall is not dull to percussion.  No accessory muscle usage or stridor.   Abdominal: Soft. Bowel sounds present. No distension or hernia. No tenderness.    Musculoskeletal: No cyanosis. No clubbing. No obvious joint deformity.   Lymphadenopathy: No cervical or supraclavicular adenopathy.   Skin: Skin is warm and dry. No

## 2024-05-01 NOTE — TELEPHONE ENCOUNTER
Patient is in the hospital and is wondering if Dr. Medina is going to come and see her, she wants to know what is going on and what they are going to do for her. Please advise. She states they don't know what is wrong with her. States she almost  this time. She wants to know the cause of all that is going on. Thanks

## 2024-05-01 NOTE — PROGRESS NOTES
Pt assessment completed and charted. VSS. Pt a/o. Bed in lowest position and wheels locked. Call light within reach. Bedside table within reach. Non-skid socks in place. Pt denies any other needs at this time.  Pt calls out appropriately.

## 2024-05-01 NOTE — PLAN OF CARE
Problem: Discharge Planning  Goal: Discharge to home or other facility with appropriate resources  4/30/2024 2253 by Janna Cintron RN  Outcome: Progressing  4/30/2024 1535 by Celeste Bazan RN  Outcome: Progressing     Problem: Nutrition Deficit:  Goal: Optimize nutritional status  4/30/2024 2253 by Janna Cintron RN  Outcome: Progressing  4/30/2024 1535 by Celeste Bazan RN  Outcome: Progressing     Problem: Safety - Adult  Goal: Free from fall injury  4/30/2024 2253 by Janna Cintron RN  Outcome: Progressing  4/30/2024 1535 by Celeste Bazan RN  Outcome: Progressing     Problem: Risk for Elopement  Goal: Patient will not exit the unit/facility without proper excort  4/30/2024 2253 by Janna Cintron RN  Outcome: Progressing  4/30/2024 1535 by Celeste Bazan RN  Outcome: Progressing

## 2024-05-01 NOTE — DISCHARGE SUMMARY
Hospital Medicine Discharge Summary    Patient: Anjelica Blanchard   : 1980     Admit Date: 2024   Discharge Date: 2024    Disposition:  [x]Home   []HHC  []SNF  []ECF  []Acute Rehab  []LTAC  []Hospice  Code status:  [x]Full  []DNR/CCA  []Limited (DNR/CCA with Do Not Intubate)  []DNRCC  Condition at Discharge: Stable  Primary Care Provider: Smith Neff MD    Admitting Provider: Lauren Oh DO  Discharge Provider: MARK URENA MD     Discharge Diagnoses:      Active Hospital Problems    Diagnosis     COLLEEN (acute kidney injury) (HCC) [N17.9]     Shock circulatory (HCC) [R57.9]     Lactic acidosis [E87.20]     Former smoker [Z87.891]     Simple chronic bronchitis (HCC) [J41.0]     Methadone dependence (HCC) [F11.20]     Observed sleep apnea [G47.30]     Respiratory arrest (HCC) [R09.2]     Pulmonary infiltrates [R91.8]     Chronic prescription benzodiazepine use [Z79.899]     Class 2 severe obesity due to excess calories with serious comorbidity and body mass index (BMI) of 36.0 to 36.9 in adult (HCC) [E66.01, Z68.36]     Leukocytosis [D72.829]     Elevated troponin [R79.89]     Drug overdose [T50.901A]        Presenting Admission History:      43 y.o. female with past medical history of polysubstance abuse, diabetes type II, presented to emergency department after being found unresponsive. HPI obtained from ED provider as patient was intubated and sedated. Patient was found to be unresponsive after suspected methadone overdose.   . On arrival to the emergency department patient was intubated. Shortly after intubation there was difficulty in getting patient's O2 sats to be appropriate even though the FiO2 was 100% on ventilator. Pulmonology consultation was placed for bronchoscopy in the emergency department for aspiration of vomit.   She was admitted to the ICU, she was stabilized and she was extubated on 2024.  She was also treated with antibiotics and IV fluids and her condition did  Tissues/Bones: No evidence of fracture or acute process in the bony thorax.  No evidence of hematoma or acute process in the soft tissues of chest wall. Abdomen/Pelvis: NG tube extends into lower portion of body of stomach.  No hiatal hernia.  No pneumoperitoneum. Organs: No demonstrable abnormality in liver, gallbladder, spleen, pancreas, or in bilateral adrenal glands.  In both kidneys there is no evidence of stone or hydronephrosis.  Bilateral ureters are of normal size without evidence of stone or obstruction.  Bladder is decompressed with Ogden catheter.  There is IUD in uterus. GI/Bowel: Stomach contains moderate amount of food material.  Loops of small bowel are not abnormally distended or dilated. The appendix is not clearly identified.  Small appendix or possibly appendicular stump at the posteroinferior aspect of cecum.  Minimal stool and gas in the right side of colon.  Presence of transverse colon contains large amount of gas and stool.  In the descending colon moderate amount of stool scattered.  Rectum contains large amount of stool.  In the sigmoid colon small amount of stool scattered. Pelvis: No localized abnormal fluid collection or inflammatory process in the pelvis.  Evidence of IUD in the uterus. Peritoneum/Retroperitoneum: No periaortic or mesenteric pathologic lymphadenopathy.  No ascites.  The abdominal aorta is of normal size.  No intraperitoneal or retroperitoneal hemorrhage. Bones/Soft Tissues: No evidence of acute process in the lumbar spine, pelvic bones and joints and bilateral hip joints.  Evidence of previous posterior spinal fusion and anterior spinal fusion at L5-S1 level.     1. Evidence of dense opacity in bilateral pulmonary lower lobes, indicating dense pneumonia, and/or atelectatic changes. 2. In the perihilar and suprahilar portion of right pulmonary upper lobe there are findings of focal atelectatic changes, and/or infiltrates. 3. No acute intra-abdominal or intrapelvic

## 2024-05-01 NOTE — PROGRESS NOTES
05/01/24 0817   RT Protocol   History Pulmonary Disease 0   Respiratory pattern 0   Breath sounds 2   Cough 0   Indications for Bronchodilator Therapy None   Bronchodilator Assessment Score 2

## 2024-05-01 NOTE — PLAN OF CARE
Problem: Discharge Planning  Goal: Discharge to home or other facility with appropriate resources  Outcome: Adequate for Discharge     Problem: Nutrition Deficit:  Goal: Optimize nutritional status  Outcome: Adequate for Discharge  Flowsheets (Taken 5/1/2024 0911 by Joanna Oneill RD)  Nutrient intake appropriate for improving, restoring, or maintaining nutritional needs:   Assess nutritional status and recommend course of action   Recommend appropriate diets, oral nutritional supplements, and vitamin/mineral supplements   Monitor oral intake, labs, and treatment plans     Problem: Safety - Adult  Goal: Free from fall injury  Outcome: Adequate for Discharge     Problem: Risk for Elopement  Goal: Patient will not exit the unit/facility without proper excort  Outcome: Adequate for Discharge

## 2024-05-01 NOTE — PROGRESS NOTES
Pt a/o x4. VSS. All prescriptions, discharge and follow up instructions given to the patient.  The patient verbalizes understanding and denies questions.  All belongings collected and sent with the patient. The patient was discharged off the unit by wheelchair and RN in stable condition.

## 2024-05-07 ENCOUNTER — TELEPHONE (OUTPATIENT)
Dept: PULMONOLOGY | Age: 44
End: 2024-05-07

## 2024-05-07 NOTE — TELEPHONE ENCOUNTER
Pt called stating that she had recently been admitted to the hospital and was seen by Dr. Medina while there.Pt requested that Dr. Medina write a letter out stating her hospitalization and the medical issues that caused it. Pt stated she has received an eviction notice from her apartment complex due to being given narcan at the scene and needs the letter to keep her home and show proof that it was not a drug related incident. Pt also asked for the letter to be emailed to her.     Email- [email or web address]

## 2024-05-07 NOTE — TELEPHONE ENCOUNTER
Dr. Medina has given drug screen results and it has been emailed to patient. Also scanned in media.

## 2024-05-07 NOTE — TELEPHONE ENCOUNTER
Patient called and is wanting to see if \"they had checked her drug serum levels\" and would like a call back to see if it was done and email her the information. Patient is trying to show apartment complex the narcan use was not related to drug usage.   I advised her we can not see this information but she insisted that Dr. Medina can because he saw her in the hospital.  Please advise.     [email or web address]

## 2024-05-09 ENCOUNTER — TELEPHONE (OUTPATIENT)
Dept: PULMONOLOGY | Age: 44
End: 2024-05-09

## 2024-05-09 NOTE — TELEPHONE ENCOUNTER
Patient call stating still feeling sick,  patient state she's using her nebulizer for breathing treatment, and is state having a lot of phlegm coming out,explain patient that is normal after using a nebulizer, patient state is mucinex and any other medication can be sent to her pharmacy to make her feel better.

## 2024-05-10 RX ORDER — GUAIFENESIN 600 MG/1
600 TABLET, EXTENDED RELEASE ORAL 2 TIMES DAILY
Qty: 20 TABLET | Refills: 0 | Status: SHIPPED | OUTPATIENT
Start: 2024-05-10 | End: 2024-05-20

## 2024-05-13 ENCOUNTER — TELEPHONE (OUTPATIENT)
Dept: PULMONOLOGY | Age: 44
End: 2024-05-13

## 2024-05-13 ENCOUNTER — PREP FOR PROCEDURE (OUTPATIENT)
Dept: PULMONOLOGY | Age: 44
End: 2024-05-13

## 2024-05-13 DIAGNOSIS — R05.3 CHRONIC COUGH: ICD-10-CM

## 2024-05-13 RX ORDER — SODIUM CHLORIDE 0.9 % (FLUSH) 0.9 %
5-40 SYRINGE (ML) INJECTION EVERY 12 HOURS SCHEDULED
OUTPATIENT
Start: 2024-05-13

## 2024-05-13 RX ORDER — SODIUM CHLORIDE 9 MG/ML
INJECTION, SOLUTION INTRAVENOUS PRN
OUTPATIENT
Start: 2024-05-13

## 2024-05-13 RX ORDER — LIDOCAINE HYDROCHLORIDE 20 MG/ML
15 SOLUTION OROPHARYNGEAL ONCE
OUTPATIENT
Start: 2024-05-13 | End: 2024-05-13

## 2024-05-13 RX ORDER — FENTANYL CITRATE 50 UG/ML
100 INJECTION, SOLUTION INTRAMUSCULAR; INTRAVENOUS ONCE
OUTPATIENT
Start: 2024-05-13 | End: 2024-05-13

## 2024-05-13 RX ORDER — SODIUM CHLORIDE 0.9 % (FLUSH) 0.9 %
5-40 SYRINGE (ML) INJECTION PRN
OUTPATIENT
Start: 2024-05-13

## 2024-05-13 NOTE — TELEPHONE ENCOUNTER
Patient called and stated that she still feels there is something in her chest and said that Dr. Medina would do another bronchoscopy so she would like to schedule that. Please advise.

## 2024-05-14 PROBLEM — R05.3 CHRONIC COUGH: Status: ACTIVE | Noted: 2024-05-13

## 2024-05-14 NOTE — TELEPHONE ENCOUNTER
Called the patient and informed that the bronch was scheduled for 05/16/2024.  Patient states that she has other appointments this week and she is not willing to reschedule them. I explained to the patient that the provider has patient's scheduled next week in office and will be out of the office the month of June. She has a follow up appt scheduled with Dr. Medina on 05/23/2024. Do you want to evaluate the patient in office next week since she does not want the bronch this week?

## 2024-05-16 NOTE — TELEPHONE ENCOUNTER
Left message for the patient that Dr. Medina will see her week at her scheduled appointment and will discuss bronch at that time.

## 2024-05-27 PROBLEM — R79.89 ELEVATED TROPONIN: Status: RESOLVED | Noted: 2020-06-15 | Resolved: 2024-05-27

## 2024-05-28 ENCOUNTER — TELEPHONE (OUTPATIENT)
Dept: PULMONOLOGY | Age: 44
End: 2024-05-28

## 2024-05-28 RX ORDER — PREDNISONE 20 MG/1
TABLET ORAL
Qty: 10 TABLET | Refills: 0 | Status: SHIPPED | OUTPATIENT
Start: 2024-05-28

## 2024-05-28 RX ORDER — GUAIFENESIN 600 MG/1
600 TABLET, EXTENDED RELEASE ORAL 2 TIMES DAILY
Qty: 60 TABLET | Refills: 0 | Status: CANCELLED | OUTPATIENT
Start: 2024-05-28 | End: 2024-06-27

## 2024-05-28 RX ORDER — GUAIFENESIN 600 MG/1
600 TABLET, EXTENDED RELEASE ORAL 2 TIMES DAILY
Qty: 30 TABLET | Refills: 0 | Status: SHIPPED | OUTPATIENT
Start: 2024-05-28 | End: 2024-06-12

## 2024-05-28 NOTE — TELEPHONE ENCOUNTER
Pt called stating she was recently back in the hospital for pneumonia again and needs some mucinex to help clear her chest out, her PCP is on vacation so she wanted to see if Dr. Medina could call her some in. Please Advise.

## 2024-06-09 ENCOUNTER — HOSPITAL ENCOUNTER (INPATIENT)
Age: 44
LOS: 4 days | Discharge: STILL ADMITTED OR ADMINISTRATIVE | End: 2024-06-13
Attending: STUDENT IN AN ORGANIZED HEALTH CARE EDUCATION/TRAINING PROGRAM | Admitting: PSYCHIATRY & NEUROLOGY
Payer: COMMERCIAL

## 2024-06-09 DIAGNOSIS — R45.851 SUICIDAL IDEATION: Primary | ICD-10-CM

## 2024-06-09 PROBLEM — F32.A DEPRESSION, UNSPECIFIED: Status: ACTIVE | Noted: 2024-06-09

## 2024-06-09 LAB
ALBUMIN SERPL-MCNC: 4 G/DL (ref 3.4–5)
ALBUMIN/GLOB SERPL: 1.2 {RATIO} (ref 1.1–2.2)
ALP SERPL-CCNC: 78 U/L (ref 40–129)
ALT SERPL-CCNC: 19 U/L (ref 10–40)
AMPHETAMINES UR QL SCN>1000 NG/ML: ABNORMAL
ANION GAP SERPL CALCULATED.3IONS-SCNC: 11 MMOL/L (ref 3–16)
APAP SERPL-MCNC: <5 UG/ML (ref 10–30)
AST SERPL-CCNC: 22 U/L (ref 15–37)
BARBITURATES UR QL SCN>200 NG/ML: ABNORMAL
BASOPHILS # BLD: 0 K/UL (ref 0–0.2)
BASOPHILS NFR BLD: 0.2 %
BENZODIAZ UR QL SCN>200 NG/ML: POSITIVE
BILIRUB SERPL-MCNC: <0.2 MG/DL (ref 0–1)
BUN SERPL-MCNC: 20 MG/DL (ref 7–20)
CALCIUM SERPL-MCNC: 9.5 MG/DL (ref 8.3–10.6)
CANNABINOIDS UR QL SCN>50 NG/ML: ABNORMAL
CHLORIDE SERPL-SCNC: 97 MMOL/L (ref 99–110)
CO2 SERPL-SCNC: 27 MMOL/L (ref 21–32)
COCAINE UR QL SCN: ABNORMAL
CREAT SERPL-MCNC: 1 MG/DL (ref 0.6–1.1)
DEPRECATED RDW RBC AUTO: 16.7 % (ref 12.4–15.4)
DRUG SCREEN COMMENT UR-IMP: ABNORMAL
EOSINOPHIL # BLD: 0 K/UL (ref 0–0.6)
EOSINOPHIL NFR BLD: 0.4 %
ETHANOLAMINE SERPL-MCNC: NORMAL MG/DL (ref 0–0.08)
FENTANYL SCREEN, URINE: ABNORMAL
GFR SERPLBLD CREATININE-BSD FMLA CKD-EPI: 70 ML/MIN/{1.73_M2}
GLUCOSE SERPL-MCNC: 116 MG/DL (ref 70–99)
HCG SERPL QL: NEGATIVE
HCT VFR BLD AUTO: 39.9 % (ref 36–48)
HGB BLD-MCNC: 13.1 G/DL (ref 12–16)
LYMPHOCYTES # BLD: 2.1 K/UL (ref 1–5.1)
LYMPHOCYTES NFR BLD: 18.1 %
MCH RBC QN AUTO: 28.6 PG (ref 26–34)
MCHC RBC AUTO-ENTMCNC: 32.8 G/DL (ref 31–36)
MCV RBC AUTO: 87.2 FL (ref 80–100)
METHADONE UR QL SCN>300 NG/ML: POSITIVE
MONOCYTES # BLD: 0.6 K/UL (ref 0–1.3)
MONOCYTES NFR BLD: 5.1 %
NEUTROPHILS # BLD: 8.9 K/UL (ref 1.7–7.7)
NEUTROPHILS NFR BLD: 76.2 %
OPIATES UR QL SCN>300 NG/ML: ABNORMAL
OXYCODONE UR QL SCN: ABNORMAL
PCP UR QL SCN>25 NG/ML: ABNORMAL
PH UR STRIP: 6 [PH]
PLATELET # BLD AUTO: 329 K/UL (ref 135–450)
PMV BLD AUTO: 7.9 FL (ref 5–10.5)
POTASSIUM SERPL-SCNC: 4.4 MMOL/L (ref 3.5–5.1)
PROT SERPL-MCNC: 7.3 G/DL (ref 6.4–8.2)
RBC # BLD AUTO: 4.58 M/UL (ref 4–5.2)
SALICYLATES SERPL-MCNC: <0.3 MG/DL (ref 15–30)
SARS-COV-2 RDRP RESP QL NAA+PROBE: NOT DETECTED
SODIUM SERPL-SCNC: 135 MMOL/L (ref 136–145)
WBC # BLD AUTO: 11.6 K/UL (ref 4–11)

## 2024-06-09 PROCEDURE — 36415 COLL VENOUS BLD VENIPUNCTURE: CPT

## 2024-06-09 PROCEDURE — 99285 EMERGENCY DEPT VISIT HI MDM: CPT

## 2024-06-09 PROCEDURE — 94640 AIRWAY INHALATION TREATMENT: CPT

## 2024-06-09 PROCEDURE — 84703 CHORIONIC GONADOTROPIN ASSAY: CPT

## 2024-06-09 PROCEDURE — 80053 COMPREHEN METABOLIC PANEL: CPT

## 2024-06-09 PROCEDURE — 81001 URINALYSIS AUTO W/SCOPE: CPT

## 2024-06-09 PROCEDURE — 6360000002 HC RX W HCPCS

## 2024-06-09 PROCEDURE — 6370000000 HC RX 637 (ALT 250 FOR IP)

## 2024-06-09 PROCEDURE — 87635 SARS-COV-2 COVID-19 AMP PRB: CPT

## 2024-06-09 PROCEDURE — 82077 ASSAY SPEC XCP UR&BREATH IA: CPT

## 2024-06-09 PROCEDURE — 1240000000 HC EMOTIONAL WELLNESS R&B

## 2024-06-09 PROCEDURE — 80179 DRUG ASSAY SALICYLATE: CPT

## 2024-06-09 PROCEDURE — 80307 DRUG TEST PRSMV CHEM ANLYZR: CPT

## 2024-06-09 PROCEDURE — 80143 DRUG ASSAY ACETAMINOPHEN: CPT

## 2024-06-09 PROCEDURE — 85025 COMPLETE CBC W/AUTO DIFF WBC: CPT

## 2024-06-09 RX ORDER — PANTOPRAZOLE SODIUM 40 MG/1
40 TABLET, DELAYED RELEASE ORAL
Status: DISCONTINUED | OUTPATIENT
Start: 2024-06-10 | End: 2024-06-13 | Stop reason: HOSPADM

## 2024-06-09 RX ORDER — OLANZAPINE 5 MG/1
5 TABLET ORAL EVERY 4 HOURS PRN
Status: DISCONTINUED | OUTPATIENT
Start: 2024-06-09 | End: 2024-06-13 | Stop reason: HOSPADM

## 2024-06-09 RX ORDER — LANOLIN ALCOHOL/MO/W.PET/CERES
3 CREAM (GRAM) TOPICAL NIGHTLY
Status: DISCONTINUED | OUTPATIENT
Start: 2024-06-09 | End: 2024-06-10

## 2024-06-09 RX ORDER — POLYETHYLENE GLYCOL 3350 17 G
2 POWDER IN PACKET (EA) ORAL
Status: DISCONTINUED | OUTPATIENT
Start: 2024-06-09 | End: 2024-06-13 | Stop reason: HOSPADM

## 2024-06-09 RX ORDER — VITAMIN B COMPLEX
1000 TABLET ORAL DAILY
Status: DISCONTINUED | OUTPATIENT
Start: 2024-06-10 | End: 2024-06-13 | Stop reason: HOSPADM

## 2024-06-09 RX ORDER — ASCORBIC ACID 500 MG
500 TABLET ORAL DAILY
Status: ON HOLD | COMMUNITY

## 2024-06-09 RX ORDER — ALBUTEROL SULFATE 2.5 MG/3ML
2.5 SOLUTION RESPIRATORY (INHALATION) EVERY 6 HOURS PRN
Status: DISCONTINUED | OUTPATIENT
Start: 2024-06-09 | End: 2024-06-09

## 2024-06-09 RX ORDER — ZOLPIDEM TARTRATE 5 MG/1
5 TABLET ORAL NIGHTLY PRN
Status: DISCONTINUED | OUTPATIENT
Start: 2024-06-09 | End: 2024-06-10

## 2024-06-09 RX ORDER — CLONIDINE HYDROCHLORIDE 0.1 MG/1
0.1 TABLET, EXTENDED RELEASE ORAL 2 TIMES DAILY
Status: ON HOLD | COMMUNITY

## 2024-06-09 RX ORDER — ACETAMINOPHEN 325 MG/1
650 TABLET ORAL EVERY 4 HOURS PRN
Status: DISCONTINUED | OUTPATIENT
Start: 2024-06-09 | End: 2024-06-13 | Stop reason: HOSPADM

## 2024-06-09 RX ORDER — DIPHENHYDRAMINE HYDROCHLORIDE 50 MG/ML
50 INJECTION INTRAMUSCULAR; INTRAVENOUS EVERY 4 HOURS PRN
Status: DISCONTINUED | OUTPATIENT
Start: 2024-06-09 | End: 2024-06-13 | Stop reason: HOSPADM

## 2024-06-09 RX ORDER — HYDROXYZINE 50 MG/1
50 TABLET, FILM COATED ORAL 3 TIMES DAILY PRN
Status: DISCONTINUED | OUTPATIENT
Start: 2024-06-09 | End: 2024-06-10

## 2024-06-09 RX ORDER — ALBUTEROL SULFATE 2.5 MG/3ML
0.63 SOLUTION RESPIRATORY (INHALATION) EVERY 6 HOURS PRN
Status: DISCONTINUED | OUTPATIENT
Start: 2024-06-09 | End: 2024-06-09 | Stop reason: SDUPTHER

## 2024-06-09 RX ORDER — BUDESONIDE AND FORMOTEROL FUMARATE DIHYDRATE 160; 4.5 UG/1; UG/1
2 AEROSOL RESPIRATORY (INHALATION) 2 TIMES DAILY
Status: DISCONTINUED | OUTPATIENT
Start: 2024-06-09 | End: 2024-06-13 | Stop reason: HOSPADM

## 2024-06-09 RX ORDER — MAGNESIUM HYDROXIDE/ALUMINUM HYDROXICE/SIMETHICONE 120; 1200; 1200 MG/30ML; MG/30ML; MG/30ML
30 SUSPENSION ORAL EVERY 6 HOURS PRN
Status: DISCONTINUED | OUTPATIENT
Start: 2024-06-09 | End: 2024-06-13 | Stop reason: HOSPADM

## 2024-06-09 RX ORDER — DIVALPROEX SODIUM 500 MG/1
500 TABLET, EXTENDED RELEASE ORAL DAILY
Status: DISCONTINUED | OUTPATIENT
Start: 2024-06-10 | End: 2024-06-10

## 2024-06-09 RX ORDER — DIVALPROEX SODIUM 500 MG/1
1000 TABLET, EXTENDED RELEASE ORAL NIGHTLY
Status: DISCONTINUED | OUTPATIENT
Start: 2024-06-09 | End: 2024-06-10

## 2024-06-09 RX ORDER — ALPRAZOLAM 1 MG/1
1 TABLET ORAL 3 TIMES DAILY PRN
Status: DISCONTINUED | OUTPATIENT
Start: 2024-06-09 | End: 2024-06-13 | Stop reason: HOSPADM

## 2024-06-09 RX ORDER — IBUPROFEN 400 MG/1
400 TABLET ORAL EVERY 6 HOURS PRN
Status: DISCONTINUED | OUTPATIENT
Start: 2024-06-09 | End: 2024-06-10

## 2024-06-09 RX ORDER — ALBUTEROL SULFATE 2.5 MG/3ML
2.5 SOLUTION RESPIRATORY (INHALATION)
Status: DISCONTINUED | OUTPATIENT
Start: 2024-06-10 | End: 2024-06-13 | Stop reason: HOSPADM

## 2024-06-09 RX ORDER — QUETIAPINE FUMARATE 50 MG/1
150 TABLET, FILM COATED ORAL NIGHTLY
Status: ON HOLD | COMMUNITY
Start: 2024-05-25 | End: 2024-06-10

## 2024-06-09 RX ORDER — LEVOTHYROXINE SODIUM 0.03 MG/1
25 TABLET ORAL DAILY
Status: DISCONTINUED | OUTPATIENT
Start: 2024-06-10 | End: 2024-06-10

## 2024-06-09 RX ORDER — MIDODRINE HYDROCHLORIDE 5 MG/1
5 TABLET ORAL 3 TIMES DAILY
Status: DISCONTINUED | OUTPATIENT
Start: 2024-06-10 | End: 2024-06-11

## 2024-06-09 RX ORDER — ALBUTEROL SULFATE 90 UG/1
2 AEROSOL, METERED RESPIRATORY (INHALATION) EVERY 6 HOURS PRN
Status: ON HOLD | COMMUNITY

## 2024-06-09 RX ADMIN — ALBUTEROL SULFATE 2.5 MG: 2.5 SOLUTION RESPIRATORY (INHALATION) at 23:24

## 2024-06-09 RX ADMIN — Medication 3 MG: at 22:42

## 2024-06-09 RX ADMIN — ZOLPIDEM TARTRATE 5 MG: 5 TABLET ORAL at 22:42

## 2024-06-09 RX ADMIN — ALPRAZOLAM 1 MG: 1 TABLET ORAL at 21:22

## 2024-06-09 RX ADMIN — Medication 2 PUFF: at 23:24

## 2024-06-09 RX ADMIN — DIVALPROEX SODIUM 1000 MG: 500 TABLET, EXTENDED RELEASE ORAL at 21:21

## 2024-06-09 RX ADMIN — ALUMINUM HYDROXIDE, MAGNESIUM HYDROXIDE, AND SIMETHICONE 30 ML: 1200; 120; 1200 SUSPENSION ORAL at 22:38

## 2024-06-09 RX ADMIN — NICOTINE POLACRILEX 2 MG: 2 LOZENGE ORAL at 21:54

## 2024-06-09 RX ADMIN — IBUPROFEN 400 MG: 400 TABLET, FILM COATED ORAL at 22:37

## 2024-06-09 RX ADMIN — PREGABALIN 150 MG: 100 CAPSULE ORAL at 21:21

## 2024-06-09 ASSESSMENT — PAIN DESCRIPTION - ORIENTATION: ORIENTATION: RIGHT;LEFT

## 2024-06-09 ASSESSMENT — PAIN DESCRIPTION - DESCRIPTORS: DESCRIPTORS: ACHING

## 2024-06-09 ASSESSMENT — PAIN DESCRIPTION - PAIN TYPE: TYPE: CHRONIC PAIN

## 2024-06-09 ASSESSMENT — PAIN SCALES - GENERAL
PAINLEVEL_OUTOF10: 0
PAINLEVEL_OUTOF10: 6

## 2024-06-09 ASSESSMENT — PAIN DESCRIPTION - FREQUENCY: FREQUENCY: CONTINUOUS

## 2024-06-09 ASSESSMENT — PAIN DESCRIPTION - LOCATION: LOCATION: BACK;KNEE

## 2024-06-09 ASSESSMENT — PAIN DESCRIPTION - ONSET: ONSET: ON-GOING

## 2024-06-09 NOTE — ED PROVIDER NOTES
[facility] ED     EMERGENCY DEPARTMENT ENCOUNTER            Pt Name: Anjelica Blanchard   MRN: [id number]   Birthdate 1980   Date of evaluation: 6/9/2024   Provider: Lydia Cm MD   PCP: Smith Neff MD   Note Started: 4:49 PM EDT 6/9/24          CHIEF COMPLAINT     Chief Complaint   Patient presents with    Suicidal     Pt states she has not slept in 5 days cause she is out of her sleep meds. States she has SI stats she's going to take all of her sleep meds and not wake up       HISTORY OF PRESENT ILLNESS:   History from : Patient   Limitations to history : None     Anjelica Blanchard is a 44 y.o. female who presents with complaints of suicidal ideation.  Patient states that she has not slept in the past 5 days because she has been out of her Ambien.  States that she feels suicidal when she does not sleep and is currently feeling suicidal with plan to overdose on medications.  She denies any physical complaints or concerns.  Denies homicidal ideation.  Denies other complaints.    Nursing Notes were all reviewed and agreed with, or any disagreements were addressed in the HPI.     REVIEW OF SYSTEMS :    Positives and Pertinent negatives as per HPI.      MEDICAL HISTORY   has a past medical history of Adrenal insufficiency, Asthma, Bipolar 1 disorder (HCC), Borderline personality disorder (HCC), Chronic prescription benzodiazepine use, Chronic prescription opiate use, COPD (chronic obstructive pulmonary disease) (HCC), Degenerative spinal arthritis, Howard-Danlos syndrome, Fibromyalgia, Hepatitis C, Herniated disc, Intervertebral disc disease, Opiate overdose (HCC), Osteoarthritis, Polysubstance abuse (HCC), Sedative dependence (HCC), TCA (tricyclic antidepressant) overdose, and Vertebral compression fracture ([facility]).    Past Surgical History:   Procedure Laterality Date    BRONCHOSCOPY N/A 11/2/2023    BRONCHOSCOPY DIAGNOSTIC OR CELL WASH ONLY performed by Manisha Medina MD at [facility]  TABLET    Take 1 tablet by mouth every 8 hours as needed for Nausea    PREGABALIN (LYRICA) 150 MG CAPSULE    Take 1 capsule by mouth 2 times daily.    SYMBICORT 160-4.5 MCG/ACT AERO    Inhale 2 puffs into the lungs 2 times daily Rinse mouth after inhaler use to avoid oral thrush. Use AeroChamber. Review inhaler technique at ([email or web address])    TRULANCE 3 MG TABS    Take 3 mg by mouth daily    VITAMIN D (CHOLECALCIFEROL) 25 MCG (1000 UT) TABS TABLET    Take 1 tablet by mouth daily    ZOLPIDEM (AMBIEN CR) 12.5 MG EXTENDED RELEASE TABLET    Take 1 tablet by mouth nightly as needed for Sleep.      SCREENINGS          Pope Coma Scale  Eye Opening: Spontaneous  Best Verbal Response: Oriented  Best Motor Response: Obeys commands  Pope Coma Scale Score: 15                CIWA Assessment  BP: 114/73  Pulse: 98                  PHYSICAL EXAM :  ED Triage Vitals   BP Temp Temp src Pulse Resp SpO2 Height Weight   -- -- -- -- -- -- -- --      GENERAL APPEARANCE: Awake and alert. Cooperative. No acute distress.  HEAD: Normocephalic. Atraumatic.  EYES: PERRL. EOM's grossly intact.   ENT: Mucous membranes are moist.   NECK: Supple, trachea midline.   HEART: RRR. Normal S1, S2. No murmurs, rubs or gallops.   LUNGS: Respirations unlabored. CTAB. Good air exchange. No wheezes, rales, or rhonchi.  Speaking comfortably in full sentences.   ABDOMEN: Soft. Non-distended. Non-tender. No guarding or rebound.  EXTREMITIES: No peripheral edema. No acute deformities.  SKIN: Warm and dry. No acute rashes.   NEUROLOGICAL: Alert and oriented    DIAGNOSTIC RESULTS     LABS:   Labs Reviewed   CBC WITH AUTO DIFFERENTIAL - Abnormal; Notable for the following components:       Result Value    WBC 11.6 (*)     RDW 16.7 (*)     Neutrophils Absolute 8.9 (*)     All other components within normal limits   COMPREHENSIVE METABOLIC PANEL W/ REFLEX TO MG FOR LOW K - Abnormal; Notable for the following components:    Sodium 135 (*)     Chloride 97 (*)

## 2024-06-09 NOTE — ED NOTES
Presenting Problem: Patient presents to ED voluntarily after having depression and suicidal ideations. Pt reports she has been depressed for a long time. She reports she is suicidal with a plan and intent to overdose on medications to go to sleep and not wake up. Pt reports she has attempted and overdosed in the past as well. Pt denies homicidal ideations. Pt denies audio/visual hallucinations.       Appearance/Hygiene:  well-appearing, hospital attire, lying in bed, good grooming, and good hygiene   Motor Behavior: WNL   Attitude: cooperative  Affect: depressed affect   Speech: normal pitch and normal volume  Mood: depressed   Thought Processes: [place]  Perceptions: Absent   Thought content: hopelessness   Orientation: A&Ox4   Memory: intact  Concentration: Good    Insight/ judgement: impaired judgment and insight       Psychosocial and contextual factors: Pt reports she lives on her own. She reports she had her neighbor bring her to the ED. Pt reports she does not drive. She reports she is on disability. Pt reports she does not work due to health issues. Pt reports her appetite has decreased. She reports she has not been sleeping. Pt states she has not slept in days. Pt reports she has support from her dad, sister, and neighbor.     C-SSRS flowsheet is  Complete.    Psychiatric History (including current outpatient provider and past inpatient admissions): Pt has been admitted to [facility] in the past, 5/20/23. Pt reports she has been admitted to , [facility], and other psych facilities she does not remember where. Pt reports she is on Methadone. Pt reports she has a psychiatrist at [facility] but states it is hard to get in with them. She reports she would like to find a new psychiatrist. She reports she was in therapy but states had to stop therapy due to her health issues four months ago.       Access to Firearms: none     ASSESSMENT FOR IMMINENT FUTURE DANGER:    RISK FACTORS:    []  Age <25 or >55   []  Male gender   [x]

## 2024-06-09 NOTE — ED NOTES
Pt states she has been out of her sleep meds and not being able to sleep has increased her anxiety which has made her SI.When I asked pt how she if she has a plan pt states she would take whatever type of pill she could so wouldn't wake up. Pt placed in a blue gown all belongings placed in bags and locked in locker. Dr Cm notified.

## 2024-06-09 NOTE — ED NOTES
Level of Care Disposition: admit     Patient was seen by ED provider and Nursing/SW staff.  The case presented to psychiatric provider on-call ADELE Garcia.  Based on the ED evaluation and information presented to the provider by this writer it is the recommendation of the on call psychiatric provider that inpatient hospitalization is the least restrictive environment for the patient at this time.  The patient will be admitted to the inpatient unit.       Insurance Pre certification Authorization: Care source           Jennifer Stephenson MSW,LSW

## 2024-06-10 PROBLEM — R45.851 SUICIDAL IDEATION: Status: ACTIVE | Noted: 2024-06-10

## 2024-06-10 LAB
BILIRUB UR QL STRIP.AUTO: NEGATIVE
CHARACTER UR: ABNORMAL
CLARITY UR: CLEAR
COLOR UR: YELLOW
CRYSTALS URNS MICRO: ABNORMAL /HPF
EPI CELLS #/AREA URNS HPF: ABNORMAL /HPF (ref 0–5)
GLUCOSE UR STRIP.AUTO-MCNC: NEGATIVE MG/DL
HGB UR QL STRIP.AUTO: NEGATIVE
KETONES UR STRIP.AUTO-MCNC: NEGATIVE MG/DL
LEUKOCYTE ESTERASE UR QL STRIP.AUTO: ABNORMAL
NITRITE UR QL STRIP.AUTO: NEGATIVE
PH UR STRIP.AUTO: 6 [PH] (ref 5–8)
PROT UR STRIP.AUTO-MCNC: NEGATIVE MG/DL
RBC #/AREA URNS HPF: ABNORMAL /HPF (ref 0–4)
SP GR UR STRIP.AUTO: 1.02 (ref 1–1.03)
UA COMPLETE W REFLEX CULTURE PNL UR: ABNORMAL
UA DIPSTICK W REFLEX MICRO PNL UR: YES
URN SPEC COLLECT METH UR: ABNORMAL
UROBILINOGEN UR STRIP-ACNC: 1 E.U./DL
WBC #/AREA URNS HPF: ABNORMAL /HPF (ref 0–5)

## 2024-06-10 PROCEDURE — 1240000000 HC EMOTIONAL WELLNESS R&B

## 2024-06-10 PROCEDURE — 6370000000 HC RX 637 (ALT 250 FOR IP): Performed by: PSYCHIATRY & NEUROLOGY

## 2024-06-10 PROCEDURE — 94640 AIRWAY INHALATION TREATMENT: CPT

## 2024-06-10 PROCEDURE — 6370000000 HC RX 637 (ALT 250 FOR IP)

## 2024-06-10 PROCEDURE — 99223 1ST HOSP IP/OBS HIGH 75: CPT | Performed by: PSYCHIATRY & NEUROLOGY

## 2024-06-10 PROCEDURE — 6360000002 HC RX W HCPCS: Performed by: PSYCHIATRY & NEUROLOGY

## 2024-06-10 PROCEDURE — 6370000000 HC RX 637 (ALT 250 FOR IP): Performed by: NURSE PRACTITIONER

## 2024-06-10 PROCEDURE — 99223 1ST HOSP IP/OBS HIGH 75: CPT

## 2024-06-10 RX ORDER — DIMETHICONE, OXYBENZONE, AND PADIMATE O 2; 2.5; 6.6 G/100G; G/100G; G/100G
STICK TOPICAL PRN
Status: DISCONTINUED | OUTPATIENT
Start: 2024-06-10 | End: 2024-06-13 | Stop reason: HOSPADM

## 2024-06-10 RX ORDER — METHADONE HYDROCHLORIDE 10 MG/1
140 TABLET ORAL DAILY
Status: DISCONTINUED | OUTPATIENT
Start: 2024-06-10 | End: 2024-06-13 | Stop reason: HOSPADM

## 2024-06-10 RX ORDER — DIVALPROEX SODIUM 500 MG/1
1500 TABLET, EXTENDED RELEASE ORAL NIGHTLY
Status: DISCONTINUED | OUTPATIENT
Start: 2024-06-10 | End: 2024-06-13 | Stop reason: HOSPADM

## 2024-06-10 RX ORDER — FUROSEMIDE 20 MG/1
20 TABLET ORAL DAILY
Status: DISCONTINUED | OUTPATIENT
Start: 2024-06-10 | End: 2024-06-13 | Stop reason: HOSPADM

## 2024-06-10 RX ORDER — ZOLPIDEM TARTRATE 5 MG/1
10 TABLET ORAL NIGHTLY PRN
Status: DISCONTINUED | OUTPATIENT
Start: 2024-06-10 | End: 2024-06-13 | Stop reason: HOSPADM

## 2024-06-10 RX ORDER — CALCIUM CARBONATE 500 MG/1
500 TABLET, CHEWABLE ORAL 3 TIMES DAILY PRN
Status: DISCONTINUED | OUTPATIENT
Start: 2024-06-10 | End: 2024-06-13 | Stop reason: HOSPADM

## 2024-06-10 RX ADMIN — QUETIAPINE FUMARATE 150 MG: 25 TABLET ORAL at 00:18

## 2024-06-10 RX ADMIN — Medication 1000 UNITS: at 09:04

## 2024-06-10 RX ADMIN — MIDODRINE HYDROCHLORIDE 5 MG: 5 TABLET ORAL at 09:04

## 2024-06-10 RX ADMIN — ALPRAZOLAM 1 MG: 1 TABLET ORAL at 04:10

## 2024-06-10 RX ADMIN — Medication 2 PUFF: at 08:13

## 2024-06-10 RX ADMIN — ALPRAZOLAM 1 MG: 1 TABLET ORAL at 12:38

## 2024-06-10 RX ADMIN — ALPRAZOLAM 1 MG: 1 TABLET ORAL at 20:03

## 2024-06-10 RX ADMIN — ACETAMINOPHEN 650 MG: 325 TABLET ORAL at 17:56

## 2024-06-10 RX ADMIN — ZOLPIDEM TARTRATE 10 MG: 5 TABLET ORAL at 20:42

## 2024-06-10 RX ADMIN — NICOTINE POLACRILEX 2 MG: 2 LOZENGE ORAL at 13:44

## 2024-06-10 RX ADMIN — PANTOPRAZOLE SODIUM 40 MG: 40 TABLET, DELAYED RELEASE ORAL at 06:29

## 2024-06-10 RX ADMIN — DIVALPROEX SODIUM 500 MG: 500 TABLET, FILM COATED, EXTENDED RELEASE ORAL at 09:04

## 2024-06-10 RX ADMIN — ALBUTEROL SULFATE 2.5 MG: 2.5 SOLUTION RESPIRATORY (INHALATION) at 08:13

## 2024-06-10 RX ADMIN — PREGABALIN 150 MG: 100 CAPSULE ORAL at 09:04

## 2024-06-10 RX ADMIN — METHADONE HYDROCHLORIDE 140 MG: 10 TABLET ORAL at 15:19

## 2024-06-10 RX ADMIN — NICOTINE POLACRILEX 2 MG: 2 LOZENGE ORAL at 17:43

## 2024-06-10 RX ADMIN — MAGNESIUM HYDROXIDE 30 ML: 400 SUSPENSION ORAL at 21:02

## 2024-06-10 RX ADMIN — NICOTINE POLACRILEX 2 MG: 2 LOZENGE ORAL at 22:02

## 2024-06-10 RX ADMIN — NICOTINE POLACRILEX 2 MG: 2 LOZENGE ORAL at 19:03

## 2024-06-10 RX ADMIN — HYDROXYZINE HYDROCHLORIDE 50 MG: 50 TABLET, FILM COATED ORAL at 02:10

## 2024-06-10 RX ADMIN — QUETIAPINE FUMARATE 300 MG: 200 TABLET ORAL at 20:42

## 2024-06-10 RX ADMIN — NICOTINE POLACRILEX 2 MG: 2 LOZENGE ORAL at 20:03

## 2024-06-10 RX ADMIN — DIVALPROEX SODIUM 1500 MG: 500 TABLET, FILM COATED, EXTENDED RELEASE ORAL at 20:41

## 2024-06-10 RX ADMIN — NICOTINE POLACRILEX 2 MG: 2 LOZENGE ORAL at 15:58

## 2024-06-10 RX ADMIN — Medication 2 PUFF: at 22:16

## 2024-06-10 RX ADMIN — DIMETHICONE, OXYBENZONE, AND PADIMATE O: 2; 2.5; 6.6 STICK TOPICAL at 23:08

## 2024-06-10 RX ADMIN — MIDODRINE HYDROCHLORIDE 5 MG: 5 TABLET ORAL at 12:43

## 2024-06-10 RX ADMIN — NICOTINE POLACRILEX 2 MG: 2 LOZENGE ORAL at 12:41

## 2024-06-10 RX ADMIN — PREGABALIN 150 MG: 100 CAPSULE ORAL at 20:41

## 2024-06-10 RX ADMIN — FUROSEMIDE 20 MG: 20 TABLET ORAL at 15:20

## 2024-06-10 RX ADMIN — ALBUTEROL SULFATE 2.5 MG: 2.5 SOLUTION RESPIRATORY (INHALATION) at 22:15

## 2024-06-10 RX ADMIN — MIDODRINE HYDROCHLORIDE 5 MG: 5 TABLET ORAL at 17:57

## 2024-06-10 RX ADMIN — LEVOTHYROXINE SODIUM 75 MCG: 25 TABLET ORAL at 06:29

## 2024-06-10 ASSESSMENT — SLEEP AND FATIGUE QUESTIONNAIRES
DO YOU USE A SLEEP AID: YES
DO YOU HAVE DIFFICULTY SLEEPING: YES
DO YOU USE A SLEEP AID: YES
SLEEP PATTERN: DISTURBED/INTERRUPTED SLEEP
DO YOU HAVE DIFFICULTY SLEEPING: YES
SLEEP PATTERN: DISTURBED/INTERRUPTED SLEEP
AVERAGE NUMBER OF SLEEP HOURS: 4

## 2024-06-10 ASSESSMENT — PATIENT HEALTH QUESTIONNAIRE - PHQ9
1. LITTLE INTEREST OR PLEASURE IN DOING THINGS: NEARLY EVERY DAY
SUM OF ALL RESPONSES TO PHQ QUESTIONS 1-9: 16
SUM OF ALL RESPONSES TO PHQ QUESTIONS 1-9: 13
SUM OF ALL RESPONSES TO PHQ QUESTIONS 1-9: 16
4. FEELING TIRED OR HAVING LITTLE ENERGY: MORE THAN HALF THE DAYS
SUM OF ALL RESPONSES TO PHQ9 QUESTIONS 1 & 2: 6
3. TROUBLE FALLING OR STAYING ASLEEP: NEARLY EVERY DAY
SUM OF ALL RESPONSES TO PHQ QUESTIONS 1-9: 13
8. MOVING OR SPEAKING SO SLOWLY THAT OTHER PEOPLE COULD HAVE NOTICED. OR THE OPPOSITE, BEING SO FIGETY OR RESTLESS THAT YOU HAVE BEEN MOVING AROUND A LOT MORE THAN USUAL: NOT AT ALL
4. FEELING TIRED OR HAVING LITTLE ENERGY: MORE THAN HALF THE DAYS
5. POOR APPETITE OR OVEREATING: SEVERAL DAYS
6. FEELING BAD ABOUT YOURSELF - OR THAT YOU ARE A FAILURE OR HAVE LET YOURSELF OR YOUR FAMILY DOWN: SEVERAL DAYS
SUM OF ALL RESPONSES TO PHQ QUESTIONS 1-9: 16
10. IF YOU CHECKED OFF ANY PROBLEMS, HOW DIFFICULT HAVE THESE PROBLEMS MADE IT FOR YOU TO DO YOUR WORK, TAKE CARE OF THINGS AT HOME, OR GET ALONG WITH OTHER PEOPLE: EXTREMELY DIFFICULT
SUM OF ALL RESPONSES TO PHQ QUESTIONS 1-9: 16
6. FEELING BAD ABOUT YOURSELF - OR THAT YOU ARE A FAILURE OR HAVE LET YOURSELF OR YOUR FAMILY DOWN: SEVERAL DAYS
8. MOVING OR SPEAKING SO SLOWLY THAT OTHER PEOPLE COULD HAVE NOTICED. OR THE OPPOSITE, BEING SO FIGETY OR RESTLESS THAT YOU HAVE BEEN MOVING AROUND A LOT MORE THAN USUAL: NOT AT ALL
1. LITTLE INTEREST OR PLEASURE IN DOING THINGS: NEARLY EVERY DAY
2. FEELING DOWN, DEPRESSED OR HOPELESS: NEARLY EVERY DAY
2. FEELING DOWN, DEPRESSED OR HOPELESS: NEARLY EVERY DAY
SUM OF ALL RESPONSES TO PHQ9 QUESTIONS 1 & 2: 6
9. THOUGHTS THAT YOU WOULD BE BETTER OFF DEAD, OR OF HURTING YOURSELF: NEARLY EVERY DAY
SUM OF ALL RESPONSES TO PHQ QUESTIONS 1-9: 16
7. TROUBLE CONCENTRATING ON THINGS, SUCH AS READING THE NEWSPAPER OR WATCHING TELEVISION: NOT AT ALL
10. IF YOU CHECKED OFF ANY PROBLEMS, HOW DIFFICULT HAVE THESE PROBLEMS MADE IT FOR YOU TO DO YOUR WORK, TAKE CARE OF THINGS AT HOME, OR GET ALONG WITH OTHER PEOPLE: EXTREMELY DIFFICULT
7. TROUBLE CONCENTRATING ON THINGS, SUCH AS READING THE NEWSPAPER OR WATCHING TELEVISION: NOT AT ALL
9. THOUGHTS THAT YOU WOULD BE BETTER OFF DEAD, OR OF HURTING YOURSELF: NEARLY EVERY DAY
3. TROUBLE FALLING OR STAYING ASLEEP: NEARLY EVERY DAY
5. POOR APPETITE OR OVEREATING: SEVERAL DAYS
SUM OF ALL RESPONSES TO PHQ QUESTIONS 1-9: 16

## 2024-06-10 ASSESSMENT — LIFESTYLE VARIABLES
HOW MANY STANDARD DRINKS CONTAINING ALCOHOL DO YOU HAVE ON A TYPICAL DAY: PATIENT DOES NOT DRINK
HOW OFTEN DO YOU HAVE A DRINK CONTAINING ALCOHOL: NEVER

## 2024-06-10 ASSESSMENT — PAIN DESCRIPTION - DESCRIPTORS: DESCRIPTORS: ACHING

## 2024-06-10 ASSESSMENT — PAIN DESCRIPTION - LOCATION: LOCATION: HEAD

## 2024-06-10 ASSESSMENT — PAIN SCALES - WONG BAKER: WONGBAKER_NUMERICALRESPONSE: HURTS A LITTLE BIT

## 2024-06-10 ASSESSMENT — PAIN SCALES - GENERAL
PAINLEVEL_OUTOF10: 6
PAINLEVEL_OUTOF10: 3
PAINLEVEL_OUTOF10: 0

## 2024-06-10 NOTE — PROGRESS NOTES
[facility]  Admission Note     Admission Type:   Admission Type: Voluntary    Reason for admission:  Reason for Admission: suicidal ideation      Addictive Behavior:   Addictive Behavior  In the Past 3 Months, Have You Felt or Has Someone Told You That You Have a Problem With  : None    Medical Problems:   Past Medical History:   Diagnosis Date    Adrenal insufficiency     Asthma     Bipolar 1 disorder (HCC)     Borderline personality disorder (HCC)     Chronic prescription benzodiazepine use     Alprazolam    Chronic prescription opiate use     Methadone    COPD (chronic obstructive pulmonary disease) ([facility])     Degenerative spinal arthritis     Howard-Danlos syndrome     Fibromyalgia     Hepatitis C     Herniated disc     Intervertebral disc disease     Opiate overdose ([facility])     Osteoarthritis     Polysubstance abuse ([facility])     Sedative dependence ([facility])     Pregabalin and Zolpidem    TCA (tricyclic antidepressant) overdose     Vertebral compression fracture ([facility])        Status EXAM:  Mental Status and Behavioral Exam  Normal: No  Level of Assistance: Independent/Self  Facial Expression: Worried, Exaggerated  Affect: Congruent  Level of Consciousness: Alert  Frequency of Checks: 4 times per hour, close  Mood:Normal: No  Mood: Anxious, Depressed  Motor Activity:Normal: Yes  Eye Contact: Good  Observed Behavior: Cooperative, Friendly, Preoccupied  Sexual Misconduct History: Current - no  Preception: [place] to person, Jesse to time, Jesse to place, [place] to situation  Attention:Normal: No  Attention: Distractible  Thought Processes: Perseveration  Thought Content:Normal: No  Thought Content: Preoccupations  Depression Symptoms: Feelings of helplessness, Feelings of hopelessess, Impaired concentration, Sleep disturbance  Anxiety Symptoms: Generalized  Sarah Symptoms: Less need to sleep, Poor judgment  Hallucinations: None  Delusions: No  Memory:Normal: Yes  Insight and Judgment: No  Insight and

## 2024-06-10 NOTE — PLAN OF CARE
[facility]  Treatment Team Note  Review Date & Time: 06/10/24  0945    Patient was not in treatment team      Status EXAM:   Mental Status and Behavioral Exam  Normal: No  Level of Assistance: Independent/Self  Facial Expression: Worried, Exaggerated  Affect: Congruent  Level of Consciousness: Alert  Frequency of Checks: 4 times per hour, close  Mood:Normal: No  Mood: Anxious, Depressed  Motor Activity:Normal: Yes  Eye Contact: Good  Observed Behavior: Cooperative, Friendly, Preoccupied  Sexual Misconduct History: Current - no  Preception: [place] to person, [place] to time, [place] to place, [place] to situation  Attention:Normal: No  Attention: Distractible  Thought Processes: Perseveration  Thought Content:Normal: No  Thought Content: Preoccupations  Depression Symptoms: Feelings of helplessness, Feelings of hopelessess, Impaired concentration, Sleep disturbance  Anxiety Symptoms: Generalized  Sarah Symptoms: Less need to sleep, Poor judgment  Hallucinations: None  Delusions: No  Memory:Normal: Yes  Insight and Judgment: No  Insight and Judgment: Poor judgment, Poor insight      Suicide Risk CSSR-S:  1) Within the past month, have you wished you were dead or wished you could go to sleep and not wake up? : Yes  2) Have you actually had any thoughts of killing yourself? : Yes  3) Have you been thinking about how you might kill yourself? : Yes  5) Have you started to work out or worked out the details of how to kill yourself? Do you intend to carry out this plan? : No  6) Have you ever done anything, started to do anything, or prepared to do anything to end your life?: No      PLAN/TREATMENT RECOMMENDATIONS UPDATE:   Patient will take medication as prescribed, eat 75% of meals, attend groups, participate in milieu activities, participate in treatment team and care planning for discharge and follow up.           Kenisha Elder RN

## 2024-06-10 NOTE — PLAN OF CARE
Pt visible social with peers all day, denies SI,HI,AVH, no distress noted, pt at nurses station numerous times through out the day. Medication verified and  notified.

## 2024-06-10 NOTE — PROGRESS NOTES
CSSR-S Assessment completed with patient who then scored HIGH RISK.     Provider ANNA Dykes was notified of HIGH RISK score, via Telephone at 2149.      Were suicide precautions ordered: NO    If not ordered, justification as follows: Upon arrival patient states still having thoughts of wanting to be dead but no any active plan nor intent to harm self. States that she feels safe here & plans to inform the staff if with any active suicidal thoughts or if at anytime she feels unsafe. On call ALEXANDRIA Garcia was notified at 2149, suicide & constant observation discontinued at 2155 as ordered.     Completed by: Gabrielle Olguin RN

## 2024-06-10 NOTE — PROGRESS NOTES
2135, patient arrived in the unit on wheelchair from ED accompanied by security & staff, appears physically well, alert & oriented, calm & cooperative, wearing hospital gown. Upon  arrival, suicide & constant observation initiated per order. Patient was checked for any contraband items & non was found. All belongings secured & labeled. Pt appears very comfortable & familiar with the unit, went directly to get some markers to color & took some juice from the fridge. Agreed to participated in the admission process. VS checked & recorded all within normal, oriented to the unit & her room. Snacks & drinks provide. Safe environment provided & maintained.

## 2024-06-10 NOTE — PROGRESS NOTES
Home Medication Reconciliation Status          [x] COMPLETE       Medication history has been reviewed and obtained from the following source(s):       [] patient/family verbal report             [] patient/family provided written list       [] external pharmacy   [] external facility list         []  Provider notified that home medication reconciliation is complete          [x] IN PROGRESS       Medication reconciliation marked in progress at this time due to:       [] patient/family poor historian      [] waiting arrival of family to clarify       [] waiting for accurate list        [x] external pharmacy needs called      * Follow up is needed.          [] UNABLE TO ASSESS       Medication reconciliation is incomplete and unable to assess at this time due to:       [] critical patient condition   [] patient is unresponsive        [] no family available                       [] unknown pharmacy       [] anonymous patient          * Follow up is needed.      [] Pharmacy consult placed for medication reconciliation assistance   Additional comments:

## 2024-06-10 NOTE — PROGRESS NOTES
Patient appears physically well, alert & oriented, pleasant & cooperative, upon admission, states still with thoughts of wanting to be dead but no active plan nor intent. States that she plans to inform the staff if with active SI or if at anytime she feels unsafe. Denies any AVH. Patient was focused on getting her meds. Appears very comfortable & familiar in the unit. Went directly to get some markers to color & some juice for drinks. HS meds given & PRNs Ibuprofen, Maalox, Ambien 5mg, please see MAR. Safe environemnt provided & maintained.

## 2024-06-10 NOTE — PROGRESS NOTES
Patient received the following PRNs:    2237, Ibuprofen 400mg given for headache & back pain    2238, Maalox given after complaints of heartburn & indigestion    2242, Ambien 5mg given per pt request, states she usually takes 12.5mg to help her sleep.     Upon this writing patient has no further complaints on her pain & heartburn PRNs ibuprofen & Maalox appears to be effective. Still to continue to monitor patient's sleep pattern.

## 2024-06-10 NOTE — PLAN OF CARE
Problem: Self Harm/Suicidality  Goal: Will have no self-injury during hospital stay  Description: INTERVENTIONS:  1.  Ensure constant observer at bedside with Q15M safety checks  2.  Maintain a safe environment  3.  Secure patient belongings  4.  Ensure family/visitors adhere to safety recommendations  5.  Ensure safety tray has been added to patient's diet order  6.  Every shift and PRN: Re-assess suicidal risk via Frequent Screener    Outcome: Completed     Problem: Chronic Conditions and Co-morbidities  Goal: Patient's chronic conditions and co-morbidity symptoms are monitored and maintained or improved  Outcome: Progressing     Problem: Pain  Goal: Verbalizes/displays adequate comfort level or baseline comfort level  Outcome: Progressing     Problem: Anxiety  Goal: Will report anxiety at manageable levels  Description: INTERVENTIONS:  1. Administer medication as ordered  2. Teach and rehearse alternative coping skills  3. Provide emotional support with 1:1 interaction with staff  Outcome: Progressing     Problem: Coping  Goal: Pt/Family able to verbalize concerns and demonstrate effective coping strategies  Description: INTERVENTIONS:  1. Assist patient/family to identify coping skills, available support systems and cultural and spiritual values  2. Provide emotional support, including active listening and acknowledgement of concerns of patient and caregivers  3. Reduce environmental stimuli, as able  4. Instruct patient/family in relaxation techniques, as appropriate  5. Assess for spiritual pain/suffering and initiate Spiritual Care, Psychosocial Clinical Specialist consults as needed  Outcome: Progressing     Problem: Behavior  Goal: Pt/Family maintain appropriate behavior and adhere to behavioral management agreement, if implemented  Description: INTERVENTIONS:  1. Assess patient/family's coping skills and  non-compliant behavior (including use of illegal substances)  2. Notify security of behavior or

## 2024-06-10 NOTE — PROGRESS NOTES
FYI:  Additive QT interval prolongation may occur during combination therapy of Methadone, Olanzapine, and Quetiapine.  Most adverse events occur when the QTC > 500.  Patient's QTC = 454.  Respiratory depression may occur with the Methadone/Alprazolam combination.  Observe for signs and symptoms.  Beni Teran R.Ph.6/10/54688:05 PM

## 2024-06-10 NOTE — PROGRESS NOTES
4 Eyes Skin Assessment     NAME:  Anjelica Blanchard  YOB: 1980  MEDICAL RECORD NUMBER:  [id number]    The patient is being assessed for  Admission    I agree that at least one RN has performed a thorough Head to Toe Skin Assessment on the patient. ALL assessment sites listed below have been assessed.      Areas assessed by both nurses:    Head, Face, Ears, Shoulders, Back, Chest, Arms, Elbows, Hands, Sacrum. Buttock, Coccyx, Ischium, and Legs. Feet and Heels        Does the Patient have a Wound? No noted wound(s)     No fresh injury noted nor reported.  Serge Prevention initiated by RN: No  Wound Care Orders initiated by RN: No    Pressure Injury (Stage 3,4, Unstageable, DTI, NWPT, and Complex wounds) if present, place Wound referral order by RN under : No    New Ostomies, if present place, Ostomy referral order under : No     Nurse 1 eSignature: Electronically signed by Gabrielle Olguin RN on 6/10/24 at 12:48 AM EDT    **SHARE this note so that the co-signing nurse can place an eSignature**    Nurse 2 eSignature: Electronically signed by Katrin Weir RN on 6/10/24 at 1:16 AM EDT

## 2024-06-10 NOTE — CARE COORDINATION
06/10/24 1243   Psychiatric History   Psychiatric history treatment Psychiatric admissions  (i 2 years ago, [facility], [facility], [place]. currently goes to [facility] but not happy there. sees January Grossman but no therapist. wants to change outpt services)   Are there any medication issues? Yes  (pt reported has not been getting her ambien consistently so has not slept in 5-6 days before this admission)   Recent Psychological Experiences Other(comment)  (not sleeping, medical problems- in and out of hospital 8-10 times over past year, depression. pt was also suicidal.)   Support System   Support system Adequate   Types of Support System Sister;Father;Friend   Problems in support system Other (Comment)  (pt reported that she does not share her mental heatlh issues)   Current Living Situation   Home Living Adequate   Living information Lives alone   Problems with living situation  Yes   Problems with landlord some issues but getting resolved   Lack of basic needs No   SSDI/SSI disability for 16 years   Other government assistance food stamps   Problems with environment none   Current abuse issues none   Supervised setting None   Relationship problems Yes   Relationship problems due to  Other (Comment)  (has had a breakup 4-5 months ago. had been together for 10 years)   Medical and Self-Care Issues   Relevant medical problems connective tissue disease, adrenal pituitary insufficiency, COPD, asthma,fibromyalgia, bulging discs and other issues as well   Relevant self-care issues not slept in 5-6 days.  pt reported eating too much. gained 85 lbs in 14 months   Barriers to treatment No   Family Constellation   Spouse/partner-name/age recent breakup 4-5 months ago   Children-names/ages 2 grown daughters. has a 17 yo son and will be getting custody soon. he is currently with a foster family   Parents mom passed away recently. dad is living   Siblings 1 sister and 2 brothers- half siblings   Support services  treatment No   Comment pt feels not an issue anymore   Education   Education HS graduate -GED   Special education   (pt was in above average classes)   Work History   Currently employed No   Recent job loss or change   (pt reported she is on disability)    service   (none)   /VA involvement none   Cultural and Spiritual   Spiritual concerns No   Cultural concerns No        met with pt and completed assessments.

## 2024-06-10 NOTE — PROGRESS NOTES
06/09/24 2300   RT Protocol   History Pulmonary Disease 2   Respiratory pattern 0   Breath sounds 2   Cough 0   Indications for Bronchodilator Therapy On home bronchodilators   Bronchodilator Assessment Score 4     RT Inhaler-Nebulizer Bronchodilator Protocol Note    There is a bronchodilator order in the chart from a provider indicating to follow the RT Bronchodilator Protocol and there is an “Initiate RT Inhaler-Nebulizer Bronchodilator Protocol” order as well (see protocol at bottom of note).    CXR Findings:  No results found.    The findings from the last RT Protocol Assessment were as follows:   History Pulmonary Disease: Chronic pulmonary disease  Respiratory Pattern: Regular pattern and RR 12-20 bpm  Breath Sounds: Slightly diminished and/or crackles  Cough: Strong, spontaneous, non-productive  Indication for Bronchodilator Therapy: On home bronchodilators  Bronchodilator Assessment Score: 4    Aerosolized bronchodilator medication orders have been revised according to the RT Inhaler-Nebulizer Bronchodilator Protocol below.    Respiratory Therapist to perform RT Therapy Protocol Assessment initially then follow the protocol.  Repeat RT Therapy Protocol Assessment PRN for score 0-3 or on second treatment, BID, and PRN for scores above 3.    No Indications - adjust the frequency to every 6 hours PRN wheezing or bronchospasm, if no treatments needed after 48 hours then discontinue using Per Protocol order mode.     If indication present, adjust the RT bronchodilator orders based on the Bronchodilator Assessment Score as indicated below.  Use Inhaler orders unless patient has one or more of the following: on home nebulizer, not able to hold breath for 10 seconds, is not alert and oriented, cannot activate and use MDI correctly, or respiratory rate 25 breaths per minute or more, then use the equivalent nebulizer order(s) with same Frequency and PRN reasons based on the score.  If a patient is on this  medication at home then do not decrease Frequency below that used at home.    0-3 - enter or revise RT bronchodilator order(s) to equivalent RT Bronchodilator order with Frequency of every 4 hours PRN for wheezing or increased work of breathing using Per Protocol order mode.        4-6 - enter or revise RT Bronchodilator order(s) to two equivalent RT bronchodilator orders with one order with BID Frequency and one order with Frequency of every 4 hours PRN wheezing or increased work of breathing using Per Protocol order mode.        7-10 - enter or revise RT Bronchodilator order(s) to two equivalent RT bronchodilator orders with one order with TID Frequency and one order with Frequency of every 4 hours PRN wheezing or increased work of breathing using Per Protocol order mode.       11-13 - enter or revise RT Bronchodilator order(s) to one equivalent RT bronchodilator order with QID Frequency and an Albuterol order with Frequency of every 4 hours PRN wheezing or increased work of breathing using Per Protocol order mode.      Greater than 13 - enter or revise RT Bronchodilator order(s) to one equivalent RT bronchodilator order with every 4 hours Frequency and an Albuterol order with Frequency of every 2 hours PRN wheezing or increased work of breathing using Per Protocol order mode.       Electronically signed by Nicko Braun RCP on 6/9/2024 at 11:29 PM

## 2024-06-10 NOTE — H&P
Hospital Medicine History & Physical      PCP: Smith Neff MD    Date of Admission: 6/9/2024    Date of Service: Pt seen/examined on 06/10/24     Chief Complaint:    Chief Complaint   Patient presents with    Suicidal     Pt states she has not slept in 5 days cause she is out of her sleep meds. States she has SI stats she's going to take all of her sleep meds and not wake up          History Of Present Illness:      The patient is a 44 y.o. female with PMH of adrenal insufficiency, asthma, bipolar 1 disorder, chronic benzo and opiate use, COPD, howard-danlos syndrome, fibromyalgia, HepC who [place] [facility] [place] ideation.  Patient was seen and evaluated in the ED by the ED medical provider, patient was medically cleared for [facility]  This note serves as an admission medical H&P.    Tobacco use: denies  ETOH use: denies  Illicit drug use: denies    Patient denies any medical complaints.     Past Medical History:        Diagnosis Date    Adrenal insufficiency     Asthma     Bipolar 1 disorder (HCC)     Borderline personality disorder (HCC)     Chronic prescription benzodiazepine use     Alprazolam    Chronic prescription opiate use     Methadone    COPD (chronic obstructive pulmonary disease) (HCC)     Degenerative spinal arthritis     Howard-Danlos syndrome     Fibromyalgia     Hepatitis C     Herniated disc     Intervertebral disc disease     Opiate overdose (HCC)     Osteoarthritis     Polysubstance abuse (HCC)     Sedative dependence (HCC)     Pregabalin and Zolpidem    TCA (tricyclic antidepressant) overdose     Vertebral compression fracture (HCC)        Past Surgical History:        Procedure Laterality Date    BRONCHOSCOPY N/A 11/2/2023    BRONCHOSCOPY DIAGNOSTIC OR CELL WASH ONLY performed by Manisha Medina MD at [facility] ENDOSCOPY    BRONCHOSCOPY  11/2/2023    BRONCHOSCOPY ALVEOLAR LAVAGE performed by Manisha Medina MD at [facility] ENDOSCOPY    BRONCHOSCOPY  11/2/2023             ASSESSMENT/PLAN:  Suicidal ideation   Bipolar disorder   - per psychiatry team    Mild leukocytosis   - possibly reactive or 2/2 recent prednisone use   - UA ordered  - pt denies any s/sx of infection     Hx of adrenal insufficiency   - on midodrine   - monitor BP    COPD/asthma no AE  - continue home inhalers    Fibromyalgia  - on lyrica     Hypothyroidism   - on synthroid     Chronic Diastolic CHF  - last echo on 3/25/24 with EF of 55-60% and grade I diastolic dysfunction  - on lasix     GERD  - on PPI    Chronic opioid use  - on methadone, nursing communication in to verify     Chronic HepC w/o hepatic coma    Possible MANUEL  - has outpt testing scheduled     Morbid Obesity  - Body mass index is 38.6 kg/m².  - Complicating assessment and treatment. Placing patient at risk for multiple co-morbidities as well as early death and contributing to the patient's presentation.   - Counseled on weight loss.     Pt has no medical complaints at this time. They were informed that should a medical concern arise during their admission they may have [facility] contact us.     Jennifer Headley PA-C   6/10/2024 10:08 AM

## 2024-06-11 PROBLEM — F39 MOOD DISORDER (HCC): Status: ACTIVE | Noted: 2024-06-09

## 2024-06-11 LAB — VALPROATE SERPL-MCNC: 61.7 UG/ML (ref 50–100)

## 2024-06-11 PROCEDURE — 6370000000 HC RX 637 (ALT 250 FOR IP)

## 2024-06-11 PROCEDURE — 94640 AIRWAY INHALATION TREATMENT: CPT

## 2024-06-11 PROCEDURE — 1240000000 HC EMOTIONAL WELLNESS R&B

## 2024-06-11 PROCEDURE — 80164 ASSAY DIPROPYLACETIC ACD TOT: CPT

## 2024-06-11 PROCEDURE — 6370000000 HC RX 637 (ALT 250 FOR IP): Performed by: PSYCHIATRY & NEUROLOGY

## 2024-06-11 PROCEDURE — 99233 SBSQ HOSP IP/OBS HIGH 50: CPT | Performed by: PSYCHIATRY & NEUROLOGY

## 2024-06-11 PROCEDURE — 6360000002 HC RX W HCPCS: Performed by: PSYCHIATRY & NEUROLOGY

## 2024-06-11 PROCEDURE — 36415 COLL VENOUS BLD VENIPUNCTURE: CPT

## 2024-06-11 RX ORDER — MIDODRINE HYDROCHLORIDE 5 MG/1
5 TABLET ORAL
Status: DISCONTINUED | OUTPATIENT
Start: 2024-06-12 | End: 2024-06-13 | Stop reason: HOSPADM

## 2024-06-11 RX ORDER — POLYETHYLENE GLYCOL 3350 17 G/17G
17 POWDER, FOR SOLUTION ORAL DAILY
Status: DISCONTINUED | OUTPATIENT
Start: 2024-06-11 | End: 2024-06-12

## 2024-06-11 RX ADMIN — PANTOPRAZOLE SODIUM 40 MG: 40 TABLET, DELAYED RELEASE ORAL at 06:34

## 2024-06-11 RX ADMIN — FUROSEMIDE 20 MG: 20 TABLET ORAL at 08:16

## 2024-06-11 RX ADMIN — ACETAMINOPHEN 650 MG: 325 TABLET ORAL at 12:19

## 2024-06-11 RX ADMIN — QUETIAPINE FUMARATE 300 MG: 200 TABLET ORAL at 20:32

## 2024-06-11 RX ADMIN — MIDODRINE HYDROCHLORIDE 5 MG: 5 TABLET ORAL at 12:20

## 2024-06-11 RX ADMIN — ALPRAZOLAM 1 MG: 1 TABLET ORAL at 12:20

## 2024-06-11 RX ADMIN — METHADONE HYDROCHLORIDE 140 MG: 10 TABLET ORAL at 08:17

## 2024-06-11 RX ADMIN — CALCIUM CARBONATE 500 MG: 500 TABLET, CHEWABLE ORAL at 23:20

## 2024-06-11 RX ADMIN — ALPRAZOLAM 1 MG: 1 TABLET ORAL at 18:33

## 2024-06-11 RX ADMIN — MAGNESIUM HYDROXIDE 30 ML: 400 SUSPENSION ORAL at 20:32

## 2024-06-11 RX ADMIN — ACETAMINOPHEN 650 MG: 325 TABLET ORAL at 19:57

## 2024-06-11 RX ADMIN — CALCIUM CARBONATE 500 MG: 500 TABLET, CHEWABLE ORAL at 10:48

## 2024-06-11 RX ADMIN — ALUMINUM HYDROXIDE, MAGNESIUM HYDROXIDE, AND SIMETHICONE 30 ML: 1200; 120; 1200 SUSPENSION ORAL at 22:29

## 2024-06-11 RX ADMIN — ALBUTEROL SULFATE 2.5 MG: 2.5 SOLUTION RESPIRATORY (INHALATION) at 08:44

## 2024-06-11 RX ADMIN — ALBUTEROL SULFATE 2.5 MG: 2.5 SOLUTION RESPIRATORY (INHALATION) at 20:56

## 2024-06-11 RX ADMIN — POLYETHYLENE GLYCOL 3350 17 G: 17 POWDER, FOR SOLUTION ORAL at 18:29

## 2024-06-11 RX ADMIN — CALCIUM CARBONATE 500 MG: 500 TABLET, CHEWABLE ORAL at 23:22

## 2024-06-11 RX ADMIN — Medication 1000 UNITS: at 08:16

## 2024-06-11 RX ADMIN — ZOLPIDEM TARTRATE 10 MG: 5 TABLET ORAL at 20:32

## 2024-06-11 RX ADMIN — NICOTINE POLACRILEX 2 MG: 2 LOZENGE ORAL at 17:40

## 2024-06-11 RX ADMIN — LEVOTHYROXINE SODIUM 75 MCG: 25 TABLET ORAL at 06:34

## 2024-06-11 RX ADMIN — PREGABALIN 150 MG: 100 CAPSULE ORAL at 20:32

## 2024-06-11 RX ADMIN — NICOTINE POLACRILEX 2 MG: 2 LOZENGE ORAL at 08:16

## 2024-06-11 RX ADMIN — NICOTINE POLACRILEX 2 MG: 2 LOZENGE ORAL at 14:22

## 2024-06-11 RX ADMIN — ALPRAZOLAM 1 MG: 1 TABLET ORAL at 08:16

## 2024-06-11 RX ADMIN — DIVALPROEX SODIUM 1500 MG: 500 TABLET, FILM COATED, EXTENDED RELEASE ORAL at 20:32

## 2024-06-11 RX ADMIN — NICOTINE POLACRILEX 2 MG: 2 LOZENGE ORAL at 18:29

## 2024-06-11 RX ADMIN — MIDODRINE HYDROCHLORIDE 5 MG: 5 TABLET ORAL at 08:16

## 2024-06-11 RX ADMIN — NICOTINE POLACRILEX 2 MG: 2 LOZENGE ORAL at 10:48

## 2024-06-11 RX ADMIN — Medication 2 PUFF: at 08:44

## 2024-06-11 RX ADMIN — PREGABALIN 150 MG: 100 CAPSULE ORAL at 08:16

## 2024-06-11 RX ADMIN — Medication 2 PUFF: at 20:56

## 2024-06-11 RX ADMIN — NICOTINE POLACRILEX 2 MG: 2 LOZENGE ORAL at 12:20

## 2024-06-11 ASSESSMENT — PAIN DESCRIPTION - LOCATION
LOCATION: GENERALIZED
LOCATION: THROAT
LOCATION: HEAD

## 2024-06-11 ASSESSMENT — PAIN SCALES - GENERAL
PAINLEVEL_OUTOF10: 8
PAINLEVEL_OUTOF10: 2
PAINLEVEL_OUTOF10: 7
PAINLEVEL_OUTOF10: 5

## 2024-06-11 ASSESSMENT — PAIN DESCRIPTION - DESCRIPTORS
DESCRIPTORS: BURNING
DESCRIPTORS: ACHING
DESCRIPTORS: ACHING

## 2024-06-11 ASSESSMENT — PAIN - FUNCTIONAL ASSESSMENT: PAIN_FUNCTIONAL_ASSESSMENT: ACTIVITIES ARE NOT PREVENTED

## 2024-06-11 NOTE — PLAN OF CARE
Patient appears physically well, alert & oriented, pleasant & cooperative, visible in the unit, coloring & socializing with peers. Denies all, states she slept some last night but on & off. Patient was at the desk multiple times with multiple needs. HS meds given, & PRNs Milk of Magnesia, Xanax, Zolpidem, & lozenges, please see MAR. VS checked & monitored all within normal. No somatic complaints nor issues at this time.     Problem: Chronic Conditions and Co-morbidities  Goal: Patient's chronic conditions and co-morbidity symptoms are monitored and maintained or improved  6/10/2024 2220 by Gabrielle Olguin, CECI  Outcome: Progressing     Problem: Pain  Goal: Verbalizes/displays adequate comfort level or baseline comfort level  6/10/2024 2220 by Gabrielle Olguin, CECI  Outcome: Progressing     Problem: Risk for Elopement  Goal: Patient will not exit the unit/facility without proper excort  6/10/2024 2220 by Gabrielle Olguin, CECI  Outcome: Progressing     Problem: Self Harm/Suicidality  Goal: Will have no self-injury during hospital stay  Description: INTERVENTIONS:  1.  Ensure constant observer at bedside with Q15M safety checks  2.  Maintain a safe environment  3.  Secure patient belongings  4.  Ensure family/visitors adhere to safety recommendations  5.  Ensure safety tray has been added to patient's diet order  6.  Every shift and PRN: Re-assess suicidal risk via Frequent Screener    Outcome: Progressing     Problem: Anxiety  Goal: Will report anxiety at manageable levels  Description: INTERVENTIONS:  1. Administer medication as ordered  2. Teach and rehearse alternative coping skills  3. Provide emotional support with 1:1 interaction with staff  6/10/2024 2220 by Gabrielle Olguin, CECI  Outcome: Progressing     Problem: Coping  Goal: Pt/Family able to verbalize concerns and demonstrate effective coping strategies  Description: INTERVENTIONS:  1. Assist  patient/family to identify coping skills, available support systems and cultural and spiritual values  2. Provide emotional support, including active listening and acknowledgement of concerns of patient and caregivers  3. Reduce environmental stimuli, as able  4. Instruct patient/family in relaxation techniques, as appropriate  5. Assess for spiritual pain/suffering and initiate Spiritual Care, Psychosocial Clinical Specialist consults as needed  6/10/2024 2220 by Gabrielle Olguin, RN  Outcome: Progressing     Problem: Behavior  Goal: Pt/Family maintain appropriate behavior and adhere to behavioral management agreement, if implemented  Description: INTERVENTIONS:  1. Assess patient/family's coping skills and  non-compliant behavior (including use of illegal substances)  2. Notify security of behavior or suspected illegal substances which indicate the need for search of the family and/or belongings  3. Encourage verbalization of thoughts and concerns in a socially appropriate manner  4. Utilize positive, consistent limit setting strategies supporting safety of patient, staff and others  5. Encourage participation in the decision making process about the behavioral management agreement  6. If a visitor's behavior poses a threat to safety call refer to organization policy.  7. Initiate consult with , Psychosocial CNS, Spiritual Care as appropriate  6/10/2024 2220 by Gabrielle Olguin, RN  Outcome: Progressing     Problem: Depression/Self Harm  Goal: Effect of psychiatric condition will be minimized and patient will be protected from self harm  Description: INTERVENTIONS:  1. Assess impact of patient's symptoms on level of functioning, self care needs and offer support as indicated  2. Assess patient/family knowledge of depression, impact on illness and need for teaching  3. Provide emotional support, presence and reassurance  4. Assess for possible suicidal thoughts or

## 2024-06-11 NOTE — PROGRESS NOTES
Behavioral Services  Medicare Certification Upon Admission    I certify that this patient's inpatient psychiatric hospital admission is medically necessary for:    [x] (1) Treatment which could reasonably be expected to improve this patient's condition,       [x] (2) Or for diagnostic study;     AND     [x](2) The inpatient psychiatric services are provided while the individual is under the care of a physician and are included in the individualized plan of care.    Estimated length of stay/service 4-6 days    Plan for post-hospital care incomplete     Electronically signed by YOUNG WHITEHEAD MD on 6/11/2024 at 3:09 PM

## 2024-06-11 NOTE — PROGRESS NOTES
2102, complains of constipation, states last BM was 3 days ago. Milk of Magnesia given as PRN, encouraged to increase OFI & report of with any BM.

## 2024-06-11 NOTE — PLAN OF CARE
Ginger is alert and oriented x4. She is pleasant and cooperative with staff and social with her peers. She has been in the common area playing cards or coloring with her peers most of the day. She has been attending groups. She continues to endorse passive suicidal thoughts, but agrees to seek out staff if she feels like harming herself or others.  She denies any needs.  Problem: Coping  Goal: Pt/Family able to verbalize concerns and demonstrate effective coping strategies  Description: INTERVENTIONS:  1. Assist patient/family to identify coping skills, available support systems and cultural and spiritual values  2. Provide emotional support, including active listening and acknowledgement of concerns of patient and caregivers  3. Reduce environmental stimuli, as able  4. Instruct patient/family in relaxation techniques, as appropriate  5. Assess for spiritual pain/suffering and initiate Spiritual Care, Psychosocial Clinical Specialist consults as needed  Outcome: Not Progressing  Flowsheets (Taken 6/11/2024 1323)  Patient/family able to verbalize anxieties, fears, and concerns, and demonstrate effective coping:   Provide emotional support, including active listening and acknowledgement of concerns of patient and caregivers   Reduce environmental stimuli, as able   Instruct patient/family in relaxation techniques, as appropriate

## 2024-06-11 NOTE — GROUP NOTE
Group Therapy Note    Date: 6/11/2024    Group Start Time: 1000  Group End Time: 1045  Group Topic: Psychoeducation    [facility]    Shelby Castano LISW        Group Therapy Note    Attendees: 5       Patient's Goal:  to learn and discuss communication styles of being assertive, passive and aggressive and that communicating in an assertive manner is the healthiest way to communicate. Pt's asked to apply to their lives.                                                                     Notes:  pt came to group late. She participated in group discussion and was able to apply to herself.    Status After Intervention:  Improved    Participation Level: Active Listener and Interactive    Participation Quality: Appropriate and Attentive      Speech:  loud      Thought Process/Content: Logical      Affective Functioning: Exaggerated      Mood: euthymic      Level of consciousness:  Alert, Oriented x4, and Attentive      Response to Learning: Able to verbalize current knowledge/experience and Able to verbalize/acknowledge new learning      Endings: None Reported    Modes of Intervention: Education, Support, Socialization, Exploration, and Clarifying      Discipline Responsible: /Counselor      Signature:  KAHLIL Quintana

## 2024-06-11 NOTE — PROGRESS NOTES
Patient slept well during the shift, woke up a few times to go to the toilet but able to go back to sleep after. No further somatic complaints raised, all needs attended at this time.

## 2024-06-11 NOTE — PROGRESS NOTES
Requested to check her BP, states she feels a little dizzy & worries her BP might be low,check on her VS, BP was normal at 120/70 IN-81 RR17 O2-95. Went back to her room steadily, reassured that VS are all within normal.
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
## 2024-06-12 ENCOUNTER — APPOINTMENT (OUTPATIENT)
Dept: GENERAL RADIOLOGY | Age: 44
End: 2024-06-12
Payer: COMMERCIAL

## 2024-06-12 PROCEDURE — 6370000000 HC RX 637 (ALT 250 FOR IP)

## 2024-06-12 PROCEDURE — 71045 X-RAY EXAM CHEST 1 VIEW: CPT

## 2024-06-12 PROCEDURE — 6370000000 HC RX 637 (ALT 250 FOR IP): Performed by: PSYCHIATRY & NEUROLOGY

## 2024-06-12 PROCEDURE — 6360000002 HC RX W HCPCS: Performed by: PSYCHIATRY & NEUROLOGY

## 2024-06-12 PROCEDURE — 94640 AIRWAY INHALATION TREATMENT: CPT

## 2024-06-12 PROCEDURE — 99233 SBSQ HOSP IP/OBS HIGH 50: CPT | Performed by: PSYCHIATRY & NEUROLOGY

## 2024-06-12 PROCEDURE — 94761 N-INVAS EAR/PLS OXIMETRY MLT: CPT

## 2024-06-12 PROCEDURE — 1240000000 HC EMOTIONAL WELLNESS R&B

## 2024-06-12 RX ORDER — POLYETHYLENE GLYCOL 3350 17 G/17G
17 POWDER, FOR SOLUTION ORAL 2 TIMES DAILY
Status: DISCONTINUED | OUTPATIENT
Start: 2024-06-12 | End: 2024-06-13 | Stop reason: HOSPADM

## 2024-06-12 RX ORDER — DOCUSATE SODIUM 100 MG/1
100 CAPSULE, LIQUID FILLED ORAL 2 TIMES DAILY PRN
Status: DISCONTINUED | OUTPATIENT
Start: 2024-06-12 | End: 2024-06-13 | Stop reason: HOSPADM

## 2024-06-12 RX ORDER — ONDANSETRON 4 MG/1
8 TABLET, ORALLY DISINTEGRATING ORAL EVERY 8 HOURS PRN
Status: DISCONTINUED | OUTPATIENT
Start: 2024-06-12 | End: 2024-06-13 | Stop reason: HOSPADM

## 2024-06-12 RX ADMIN — NICOTINE POLACRILEX 2 MG: 2 LOZENGE ORAL at 20:57

## 2024-06-12 RX ADMIN — NICOTINE POLACRILEX 2 MG: 2 LOZENGE ORAL at 15:53

## 2024-06-12 RX ADMIN — Medication 2 PUFF: at 20:33

## 2024-06-12 RX ADMIN — ALBUTEROL SULFATE 2.5 MG: 2.5 SOLUTION RESPIRATORY (INHALATION) at 20:33

## 2024-06-12 RX ADMIN — MAGNESIUM HYDROXIDE 30 ML: 400 SUSPENSION ORAL at 16:36

## 2024-06-12 RX ADMIN — MIDODRINE HYDROCHLORIDE 5 MG: 5 TABLET ORAL at 08:53

## 2024-06-12 RX ADMIN — FUROSEMIDE 20 MG: 20 TABLET ORAL at 08:53

## 2024-06-12 RX ADMIN — QUETIAPINE FUMARATE 300 MG: 200 TABLET ORAL at 20:58

## 2024-06-12 RX ADMIN — CALCIUM CARBONATE 500 MG: 500 TABLET, CHEWABLE ORAL at 20:57

## 2024-06-12 RX ADMIN — ALBUTEROL SULFATE 2.5 MG: 2.5 SOLUTION RESPIRATORY (INHALATION) at 08:12

## 2024-06-12 RX ADMIN — ZOLPIDEM TARTRATE 10 MG: 5 TABLET ORAL at 21:04

## 2024-06-12 RX ADMIN — MIDODRINE HYDROCHLORIDE 5 MG: 5 TABLET ORAL at 16:36

## 2024-06-12 RX ADMIN — ALPRAZOLAM 1 MG: 1 TABLET ORAL at 18:31

## 2024-06-12 RX ADMIN — ACETAMINOPHEN 650 MG: 325 TABLET ORAL at 16:36

## 2024-06-12 RX ADMIN — ALUMINUM HYDROXIDE, MAGNESIUM HYDROXIDE, AND SIMETHICONE 30 ML: 1200; 120; 1200 SUSPENSION ORAL at 23:45

## 2024-06-12 RX ADMIN — ALPRAZOLAM 1 MG: 1 TABLET ORAL at 13:14

## 2024-06-12 RX ADMIN — PREGABALIN 150 MG: 100 CAPSULE ORAL at 20:57

## 2024-06-12 RX ADMIN — DOCUSATE SODIUM 100 MG: 100 CAPSULE, LIQUID FILLED ORAL at 18:31

## 2024-06-12 RX ADMIN — NICOTINE POLACRILEX 2 MG: 2 LOZENGE ORAL at 12:24

## 2024-06-12 RX ADMIN — NICOTINE POLACRILEX 2 MG: 2 LOZENGE ORAL at 17:36

## 2024-06-12 RX ADMIN — POLYETHYLENE GLYCOL 3350 17 G: 17 POWDER, FOR SOLUTION ORAL at 08:52

## 2024-06-12 RX ADMIN — ALUMINUM HYDROXIDE, MAGNESIUM HYDROXIDE, AND SIMETHICONE 30 ML: 1200; 120; 1200 SUSPENSION ORAL at 18:53

## 2024-06-12 RX ADMIN — PANTOPRAZOLE SODIUM 40 MG: 40 TABLET, DELAYED RELEASE ORAL at 06:55

## 2024-06-12 RX ADMIN — NICOTINE POLACRILEX 2 MG: 2 LOZENGE ORAL at 08:53

## 2024-06-12 RX ADMIN — Medication 1000 UNITS: at 08:53

## 2024-06-12 RX ADMIN — DIVALPROEX SODIUM 1500 MG: 500 TABLET, FILM COATED, EXTENDED RELEASE ORAL at 20:57

## 2024-06-12 RX ADMIN — ONDANSETRON 8 MG: 4 TABLET, ORALLY DISINTEGRATING ORAL at 22:46

## 2024-06-12 RX ADMIN — ALPRAZOLAM 1 MG: 1 TABLET ORAL at 08:53

## 2024-06-12 RX ADMIN — PREGABALIN 150 MG: 100 CAPSULE ORAL at 08:53

## 2024-06-12 RX ADMIN — LEVOTHYROXINE SODIUM 75 MCG: 25 TABLET ORAL at 06:55

## 2024-06-12 RX ADMIN — NICOTINE POLACRILEX 2 MG: 2 LOZENGE ORAL at 18:31

## 2024-06-12 RX ADMIN — Medication 2 PUFF: at 08:12

## 2024-06-12 RX ADMIN — METHADONE HYDROCHLORIDE 140 MG: 10 TABLET ORAL at 08:52

## 2024-06-12 RX ADMIN — MIDODRINE HYDROCHLORIDE 5 MG: 5 TABLET ORAL at 12:24

## 2024-06-12 RX ADMIN — POLYETHYLENE GLYCOL 3350 17 G: 17 POWDER, FOR SOLUTION ORAL at 20:57

## 2024-06-12 ASSESSMENT — PAIN DESCRIPTION - DESCRIPTORS
DESCRIPTORS: BURNING
DESCRIPTORS: ACHING

## 2024-06-12 ASSESSMENT — PAIN SCALES - GENERAL
PAINLEVEL_OUTOF10: 2
PAINLEVEL_OUTOF10: 7
PAINLEVEL_OUTOF10: 7

## 2024-06-12 ASSESSMENT — PAIN DESCRIPTION - LOCATION
LOCATION: GENERALIZED
LOCATION: THROAT

## 2024-06-12 ASSESSMENT — PAIN SCALES - WONG BAKER: WONGBAKER_NUMERICALRESPONSE: HURTS A LITTLE BIT

## 2024-06-12 NOTE — PLAN OF CARE
self care needs and offer support as indicated  2. Assess patient/family knowledge of depression, impact on illness and need for teaching  3. Provide emotional support, presence and reassurance  4. Assess for possible suicidal thoughts or ideation. If patient expresses suicidal thoughts or statements do not leave alone, initiate Suicide Precautions, move to a room close to the nursing station and obtain sitter  5. Initiate consults as appropriate with Mental Health Professional, Spiritual Care, Psychosocial CNS, and consider a recommendation to the [facility] for a Psychiatric Consultation  6/11/2024 2338 by Louis Xiong, RN  Outcome: Progressing  Flowsheets (Taken 6/11/2024 2030)  Effect of psychiatric condition will be minimized and patient will be protected from self harm: Provide emotional support, presence and reassurance   Pt has been out on the unit most of the evening. Wanted her HS meds at 2030.  Went in to lie down a few minutes and then would come back to the dayroom.  Pt is needy. Seems preoccupied with medications. Seems to plan when she can take which medication.  Pt states that she needs to be on a steroid for her adrenal insufficiency.   Pt also talked about her midodrine, which can't be given within 4 hours of bedtime or after 1800. Asked pharmacy if they would put the times it for it to be given so this wouldn't be an issue if needed later in the day. They did so and midodrine is to be given with each meal.     Pt does seem to be s/w depressed.  Pt has had no falls.  She admits that she still has occasional SI without plan or intent. Pt promises she can remain safe and if needed she states she will go to staff.     Pt concerned about being constipated. States she has had no BM in 3 days. Gave MOM 30 ml po at 2032.  Pt had Ambien 10 mg for sleep at 2032. Pt did sleep  a bit off and on but went to sleep later. At  2032 pt had Maalox 30 mg for heartburn. Later she returned to say the Maalox hadn't

## 2024-06-12 NOTE — BH NOTE
Anjelica c/o generalized pain 7/10 on pain scale. She states she is achy mostly all over. She continues to complain of constipation and requested MOM. It was admin with Tylenol per PRN orders. Pt requests Miralax BID and writer sent PerfectServe to medical NP.

## 2024-06-12 NOTE — PLAN OF CARE
Anjelica is sleeping in this am. She is awake for VS, meds and breakfast and then back to bed. She was noted to be awake eating a snack in bed earlier. She continues to endorse passive SI, stating she has no plan or intent. She states she will seek out staff should she feel like harming herself or others. She continues to have anxiety and requested Xanax this am. It was admin per PRN order and it was effective. She has not been noted to be responding to internal stimuli and denies any hallucinations. She denies HI.  Problem: Anxiety  Goal: Will report anxiety at manageable levels  Description: INTERVENTIONS:  1. Administer medication as ordered  2. Teach and rehearse alternative coping skills  3. Provide emotional support with 1:1 interaction with staff  6/12/2024 1013 by Shani Lenz, RN  Outcome: Not Progressing  Flowsheets (Taken 6/12/2024 0916)  Will report anxiety at manageable levels:   Administer medication as ordered   Teach and rehearse alternative coping skills   Provide emotional support with 1:1 interaction with staff  6/11/2024 2338 by Louis Xiong, RN  Outcome: Not Progressing  Flowsheets (Taken 6/11/2024 2030)  Will report anxiety at manageable levels: Administer medication as ordered

## 2024-06-12 NOTE — GROUP NOTE
Group Therapy Note    Date: 6/12/2024    Group Start Time: 2035  Group End Time: 2100  Group Topic: Wrap-Up    [facility]    Louis Xiong RN        Group Therapy Note    Attendees: 6       Patient's Goal:  To make several phone calls     Notes:  States that she made a few calls but no as many as she'd planned on making    Status After Intervention:  Unchanged    Participation Level: Active Listener and Interactive    Participation Quality: Appropriate and Attentive      Speech:  normal      Thought Process/Content: Logical  Linear      Affective Functioning: Congruent      Mood: euthymic      Level of consciousness:  Alert, Oriented x4, and Attentive      Response to Learning: Able to verbalize current knowledge/experience, Able to verbalize/acknowledge new learning, and Able to retain information      Endings: None Reported    Modes of Intervention: Education, Support, and Socialization      Discipline Responsible: Registered Nurse      Signature:  Louis Xiong RN

## 2024-06-12 NOTE — BH NOTE
Anjelica to the station stating she has heartburn. Requests Tums and Maalox. Maalox admin per PRN order. Pt will wait for Tums in case she needs it later.

## 2024-06-12 NOTE — PROGRESS NOTES
Department of Psychiatry  Progress Note    Patient's chart was reviewed. Discussed with treatment team. Met with patient.     SUBJECTIVE:      Reports sleeping much better last night.    Continues to report feeling depressed but presents elevated, hyperverbal, and bright. She is active in the milieu, with peers, and in groups.    Tolerating medication changes well and without side effects.     Has demonstrated safe behavior throughout the admission.      ROS:   Patient has new complaints: no  Sleeping adequately:  Yes   Appetite adequate: Yes  Engaged in programming: Yes    OBJECTIVE:  VITALS:  /66   Pulse 75   Temp 98.2 °F (36.8 °C) (Oral)   Resp 14   Ht 1.626 m (5' 4\")   Wt 102 kg (224 lb 13.9 oz)   SpO2 96%   BMI 38.60 kg/m²     Mental Status Examination:    Appearance: fair grooming and hygiene  Behavior/Attitude toward examiner:  cooperative, attentive and fair eye contact  Speech: mildly pressured  Mood:  \"depressed\"  Affect:  mood incongruent    Thought processes:  circumstantial but directable   Thought Content: no SI, no HI, no delusions voiced, no obsessions  Perceptions: no AVH  Attention: attention span and concentration were intact to interview   Abstraction: intact  Cognition:  Alert and oriented to person, place, time, and situation, recall intact  Insight: fair  Judgment: fair    Medication:  Scheduled:   polyethylene glycol  17 g Oral Daily    midodrine  5 mg Oral TID WC    levothyroxine  75 mcg Oral Daily    furosemide  20 mg Oral Daily    methadone  140 mg Oral Daily    divalproex  1,500 mg Oral Nightly    QUEtiapine  300 mg Oral Nightly    pantoprazole  40 mg Oral QAM AC    pregabalin  150 mg Oral BID    budesonide-formoterol  2 puff Inhalation BID    Vitamin D  1,000 Units Oral Daily    albuterol  2.5 mg Nebulization BID RT        PRN:  calcium carbonate, zolpidem, medicated lip balm, Albuterol-Budesonide, ALPRAZolam, acetaminophen, magnesium hydroxide, nicotine polacrilex, aluminum

## 2024-06-13 ENCOUNTER — HOSPITAL ENCOUNTER (INPATIENT)
Age: 44
LOS: 5 days | Discharge: HOME | DRG: 137 | End: 2024-06-18
Attending: EMERGENCY MEDICINE | Admitting: INTERNAL MEDICINE
Payer: COMMERCIAL

## 2024-06-13 ENCOUNTER — APPOINTMENT (OUTPATIENT)
Dept: INTERVENTIONAL RADIOLOGY/VASCULAR | Age: 44
DRG: 137 | End: 2024-06-13
Payer: COMMERCIAL

## 2024-06-13 ENCOUNTER — APPOINTMENT (OUTPATIENT)
Dept: CT IMAGING | Age: 44
DRG: 137 | End: 2024-06-13
Payer: COMMERCIAL

## 2024-06-13 ENCOUNTER — APPOINTMENT (OUTPATIENT)
Dept: GENERAL RADIOLOGY | Age: 44
DRG: 137 | End: 2024-06-13
Payer: COMMERCIAL

## 2024-06-13 VITALS
WEIGHT: 224.87 LBS | OXYGEN SATURATION: 98 % | HEART RATE: 133 BPM | RESPIRATION RATE: 18 BRPM | SYSTOLIC BLOOD PRESSURE: 116 MMHG | BODY MASS INDEX: 38.39 KG/M2 | DIASTOLIC BLOOD PRESSURE: 61 MMHG | HEIGHT: 64 IN | TEMPERATURE: 97.3 F

## 2024-06-13 DIAGNOSIS — J45.40 MODERATE PERSISTENT ASTHMA, UNSPECIFIED WHETHER COMPLICATED: ICD-10-CM

## 2024-06-13 DIAGNOSIS — R91.8 BILATERAL PULMONARY INFILTRATES ON CHEST X-RAY: ICD-10-CM

## 2024-06-13 DIAGNOSIS — R41.82 ALTERED MENTAL STATUS, UNSPECIFIED ALTERED MENTAL STATUS TYPE: ICD-10-CM

## 2024-06-13 DIAGNOSIS — J96.02 ACUTE RESPIRATORY FAILURE WITH HYPOXIA AND HYPERCAPNIA (HCC): Primary | ICD-10-CM

## 2024-06-13 DIAGNOSIS — J69.0 ASPIRATION PNEUMONITIS (HCC): ICD-10-CM

## 2024-06-13 DIAGNOSIS — I50.32 CHRONIC HEART FAILURE WITH PRESERVED EJECTION FRACTION (HCC): ICD-10-CM

## 2024-06-13 DIAGNOSIS — J96.01 ACUTE RESPIRATORY FAILURE WITH HYPOXIA AND HYPERCAPNIA (HCC): Primary | ICD-10-CM

## 2024-06-13 PROBLEM — J44.1 CHRONIC OBSTRUCTIVE PULMONARY DISEASE WITH ACUTE EXACERBATION (HCC): Status: ACTIVE | Noted: 2023-05-21

## 2024-06-13 PROBLEM — J96.22 ACUTE ON CHRONIC RESPIRATORY FAILURE WITH HYPOXIA AND HYPERCAPNIA (HCC): Status: ACTIVE | Noted: 2024-06-13

## 2024-06-13 PROBLEM — J96.21 ACUTE ON CHRONIC RESPIRATORY FAILURE WITH HYPOXIA AND HYPERCAPNIA (HCC): Status: ACTIVE | Noted: 2024-06-13

## 2024-06-13 LAB
ALBUMIN SERPL-MCNC: 3.6 G/DL (ref 3.4–5)
ALBUMIN/GLOB SERPL: 1.2 {RATIO} (ref 1.1–2.2)
ALP SERPL-CCNC: 84 U/L (ref 40–129)
ALT SERPL-CCNC: 21 U/L (ref 10–40)
ANION GAP SERPL CALCULATED.3IONS-SCNC: 13 MMOL/L (ref 3–16)
AST SERPL-CCNC: 39 U/L (ref 15–37)
BASE EXCESS BLDA CALC-SCNC: -1.7 MMOL/L (ref -3–3)
BASE EXCESS BLDV CALC-SCNC: -2.4 MMOL/L (ref -3–3)
BASOPHILS # BLD: 0 K/UL (ref 0–0.2)
BASOPHILS NFR BLD: 0.7 %
BILIRUB SERPL-MCNC: <0.2 MG/DL (ref 0–1)
BILIRUB UR QL STRIP.AUTO: NEGATIVE
BUN SERPL-MCNC: 17 MG/DL (ref 7–20)
CALCIUM SERPL-MCNC: 9.2 MG/DL (ref 8.3–10.6)
CHLORIDE SERPL-SCNC: 94 MMOL/L (ref 99–110)
CLARITY UR: CLEAR
CO2 BLDA-SCNC: 26.8 MMOL/L
CO2 BLDV-SCNC: 27 MMOL/L
CO2 SERPL-SCNC: 28 MMOL/L (ref 21–32)
COHGB MFR BLDA: 0.3 % (ref 0–1.5)
COHGB MFR BLDV: 1.6 % (ref 0–1.5)
COLOR UR: YELLOW
CREAT SERPL-MCNC: 1.1 MG/DL (ref 0.6–1.1)
D-DIMER QUANTITATIVE: 5.15 UG/ML FEU (ref 0–0.6)
DEPRECATED RDW RBC AUTO: 16.5 % (ref 12.4–15.4)
EKG ATRIAL RATE: 141 BPM
EKG DIAGNOSIS: NORMAL
EKG P AXIS: 69 DEGREES
EKG P-R INTERVAL: 122 MS
EKG Q-T INTERVAL: 288 MS
EKG QRS DURATION: 76 MS
EKG QTC CALCULATION (BAZETT): 441 MS
EKG R AXIS: 58 DEGREES
EKG T AXIS: 34 DEGREES
EKG VENTRICULAR RATE: 141 BPM
EOSINOPHIL # BLD: 0 K/UL (ref 0–0.6)
EOSINOPHIL NFR BLD: 1.3 %
FLUAV RNA RESP QL NAA+PROBE: NOT DETECTED
FLUBV RNA RESP QL NAA+PROBE: NOT DETECTED
GFR SERPLBLD CREATININE-BSD FMLA CKD-EPI: 63 ML/MIN/{1.73_M2}
GLUCOSE BLD-MCNC: 113 MG/DL (ref 70–99)
GLUCOSE BLD-MCNC: 140 MG/DL (ref 70–99)
GLUCOSE BLD-MCNC: 140 MG/DL (ref 70–99)
GLUCOSE SERPL-MCNC: 109 MG/DL (ref 70–99)
GLUCOSE UR STRIP.AUTO-MCNC: NEGATIVE MG/DL
HCO3 BLDA-SCNC: 25.2 MMOL/L (ref 21–29)
HCO3 BLDV-SCNC: 25.6 MMOL/L (ref 23–29)
HCT VFR BLD AUTO: 38.4 % (ref 36–48)
HGB BLD-MCNC: 12.6 G/DL (ref 12–16)
HGB BLDA-MCNC: 12.5 G/DL (ref 12–16)
HGB UR QL STRIP.AUTO: NEGATIVE
INR PPP: 0.92 (ref 0.85–1.15)
KETONES UR STRIP.AUTO-MCNC: ABNORMAL MG/DL
LACTATE BLDV-SCNC: 3.6 MMOL/L (ref 0.4–1.9)
LACTATE BLDV-SCNC: 3.9 MMOL/L (ref 0.4–2)
LACTATE BLDV-SCNC: 4.5 MMOL/L (ref 0.4–2)
LACTATE BLDV-SCNC: 4.6 MMOL/L (ref 0.4–1.9)
LEUKOCYTE ESTERASE UR QL STRIP.AUTO: NEGATIVE
LYMPHOCYTES # BLD: 0.9 K/UL (ref 1–5.1)
LYMPHOCYTES NFR BLD: 30.9 %
MCH RBC QN AUTO: 29.8 PG (ref 26–34)
MCHC RBC AUTO-ENTMCNC: 32.8 G/DL (ref 31–36)
MCV RBC AUTO: 90.9 FL (ref 80–100)
METHGB MFR BLDA: 0.1 %
METHGB MFR BLDV: 0.3 %
MONOCYTES # BLD: 0 K/UL (ref 0–1.3)
MONOCYTES NFR BLD: 1.2 %
NEUTROPHILS # BLD: 1.8 K/UL (ref 1.7–7.7)
NEUTROPHILS NFR BLD: 65.9 %
NITRITE UR QL STRIP.AUTO: NEGATIVE
NT-PROBNP SERPL-MCNC: 147 PG/ML (ref 0–124)
O2 THERAPY: ABNORMAL
O2 THERAPY: ABNORMAL
PCO2 BLDA: 51.5 MMHG (ref 35–45)
PCO2 BLDV: 58.7 MMHG (ref 40–50)
PERFORMED ON: ABNORMAL
PH BLDA: 7.31 [PH] (ref 7.35–7.45)
PH BLDV: 7.26 [PH] (ref 7.35–7.45)
PH UR STRIP.AUTO: 7 [PH] (ref 5–8)
PLATELET # BLD AUTO: 226 K/UL (ref 135–450)
PMV BLD AUTO: 7.5 FL (ref 5–10.5)
PO2 BLDA: 186.4 MMHG (ref 75–108)
PO2 BLDV: 65.8 MMHG (ref 25–40)
POTASSIUM SERPL-SCNC: 4.5 MMOL/L (ref 3.5–5.1)
PROCALCITONIN SERPL IA-MCNC: 2.36 NG/ML (ref 0–0.15)
PROT SERPL-MCNC: 6.5 G/DL (ref 6.4–8.2)
PROT UR STRIP.AUTO-MCNC: NEGATIVE MG/DL
PROTHROMBIN TIME: 12.5 SEC (ref 11.9–14.9)
RBC # BLD AUTO: 4.22 M/UL (ref 4–5.2)
SAO2 % BLDA: 99.1 %
SAO2 % BLDV: 90 %
SARS-COV-2 RNA RESP QL NAA+PROBE: NOT DETECTED
SODIUM SERPL-SCNC: 135 MMOL/L (ref 136–145)
SP GR UR STRIP.AUTO: 1.01 (ref 1–1.03)
TROPONIN, HIGH SENSITIVITY: 21 NG/L (ref 0–14)
TROPONIN, HIGH SENSITIVITY: 65 NG/L (ref 0–14)
UA COMPLETE W REFLEX CULTURE PNL UR: ABNORMAL
UA DIPSTICK W REFLEX MICRO PNL UR: ABNORMAL
URN SPEC COLLECT METH UR: ABNORMAL
UROBILINOGEN UR STRIP-ACNC: 0.2 E.U./DL
WBC # BLD AUTO: 2.8 K/UL (ref 4–11)

## 2024-06-13 PROCEDURE — 2700000000 HC OXYGEN THERAPY PER DAY

## 2024-06-13 PROCEDURE — 85610 PROTHROMBIN TIME: CPT

## 2024-06-13 PROCEDURE — 6370000000 HC RX 637 (ALT 250 FOR IP): Performed by: PSYCHIATRY & NEUROLOGY

## 2024-06-13 PROCEDURE — 80053 COMPREHEN METABOLIC PANEL: CPT

## 2024-06-13 PROCEDURE — 95816 EEG AWAKE AND DROWSY: CPT

## 2024-06-13 PROCEDURE — 96374 THER/PROPH/DIAG INJ IV PUSH: CPT

## 2024-06-13 PROCEDURE — 81003 URINALYSIS AUTO W/O SCOPE: CPT

## 2024-06-13 PROCEDURE — 85379 FIBRIN DEGRADATION QUANT: CPT

## 2024-06-13 PROCEDURE — 99223 1ST HOSP IP/OBS HIGH 75: CPT | Performed by: INTERNAL MEDICINE

## 2024-06-13 PROCEDURE — 2580000003 HC RX 258: Performed by: NURSE PRACTITIONER

## 2024-06-13 PROCEDURE — 71045 X-RAY EXAM CHEST 1 VIEW: CPT

## 2024-06-13 PROCEDURE — 84484 ASSAY OF TROPONIN QUANT: CPT

## 2024-06-13 PROCEDURE — C1751 CATH, INF, PER/CENT/MIDLINE: HCPCS

## 2024-06-13 PROCEDURE — 93005 ELECTROCARDIOGRAM TRACING: CPT | Performed by: NURSE PRACTITIONER

## 2024-06-13 PROCEDURE — 2580000003 HC RX 258: Performed by: INTERNAL MEDICINE

## 2024-06-13 PROCEDURE — 2000000000 HC ICU R&B

## 2024-06-13 PROCEDURE — 6370000000 HC RX 637 (ALT 250 FOR IP): Performed by: INTERNAL MEDICINE

## 2024-06-13 PROCEDURE — 36573 INSJ PICC RS&I 5 YR+: CPT

## 2024-06-13 PROCEDURE — 94660 CPAP INITIATION&MGMT: CPT

## 2024-06-13 PROCEDURE — 71260 CT THORAX DX C+: CPT

## 2024-06-13 PROCEDURE — 6360000004 HC RX CONTRAST MEDICATION: Performed by: INTERNAL MEDICINE

## 2024-06-13 PROCEDURE — 84145 PROCALCITONIN (PCT): CPT

## 2024-06-13 PROCEDURE — 02HV33Z INSERTION OF INFUSION DEVICE INTO SUPERIOR VENA CAVA, PERCUTANEOUS APPROACH: ICD-10-PCS | Performed by: INTERNAL MEDICINE

## 2024-06-13 PROCEDURE — 87040 BLOOD CULTURE FOR BACTERIA: CPT

## 2024-06-13 PROCEDURE — 94640 AIRWAY INHALATION TREATMENT: CPT

## 2024-06-13 PROCEDURE — 94761 N-INVAS EAR/PLS OXIMETRY MLT: CPT

## 2024-06-13 PROCEDURE — 2580000003 HC RX 258: Performed by: EMERGENCY MEDICINE

## 2024-06-13 PROCEDURE — 99285 EMERGENCY DEPT VISIT HI MDM: CPT

## 2024-06-13 PROCEDURE — 82803 BLOOD GASES ANY COMBINATION: CPT

## 2024-06-13 PROCEDURE — 5A09557 ASSISTANCE WITH RESPIRATORY VENTILATION, GREATER THAN 96 CONSECUTIVE HOURS, CONTINUOUS POSITIVE AIRWAY PRESSURE: ICD-10-PCS | Performed by: INTERNAL MEDICINE

## 2024-06-13 PROCEDURE — 70450 CT HEAD/BRAIN W/O DYE: CPT

## 2024-06-13 PROCEDURE — 85025 COMPLETE CBC W/AUTO DIFF WBC: CPT

## 2024-06-13 PROCEDURE — 6360000002 HC RX W HCPCS: Performed by: EMERGENCY MEDICINE

## 2024-06-13 PROCEDURE — 99255 IP/OBS CONSLTJ NEW/EST HI 80: CPT | Performed by: INTERNAL MEDICINE

## 2024-06-13 PROCEDURE — 83880 ASSAY OF NATRIURETIC PEPTIDE: CPT

## 2024-06-13 PROCEDURE — 36415 COLL VENOUS BLD VENIPUNCTURE: CPT

## 2024-06-13 PROCEDURE — 6370000000 HC RX 637 (ALT 250 FOR IP)

## 2024-06-13 PROCEDURE — 87636 SARSCOV2 & INF A&B AMP PRB: CPT

## 2024-06-13 PROCEDURE — 6360000002 HC RX W HCPCS: Performed by: NURSE PRACTITIONER

## 2024-06-13 PROCEDURE — 83605 ASSAY OF LACTIC ACID: CPT

## 2024-06-13 PROCEDURE — 95816 EEG AWAKE AND DROWSY: CPT | Performed by: PSYCHIATRY & NEUROLOGY

## 2024-06-13 PROCEDURE — 93010 ELECTROCARDIOGRAM REPORT: CPT | Performed by: INTERNAL MEDICINE

## 2024-06-13 PROCEDURE — 5A0935A ASSISTANCE WITH RESPIRATORY VENTILATION, LESS THAN 24 CONSECUTIVE HOURS, HIGH NASAL FLOW/VELOCITY: ICD-10-PCS | Performed by: INTERNAL MEDICINE

## 2024-06-13 PROCEDURE — 2500000003 HC RX 250 WO HCPCS: Performed by: EMERGENCY MEDICINE

## 2024-06-13 RX ORDER — SODIUM CHLORIDE 9 MG/ML
INJECTION, SOLUTION INTRAVENOUS PRN
Status: DISCONTINUED | OUTPATIENT
Start: 2024-06-13 | End: 2024-06-18 | Stop reason: HOSPADM

## 2024-06-13 RX ORDER — ONDANSETRON 2 MG/ML
4 INJECTION INTRAMUSCULAR; INTRAVENOUS ONCE
Status: DISCONTINUED | OUTPATIENT
Start: 2024-06-13 | End: 2024-06-13

## 2024-06-13 RX ORDER — SODIUM CHLORIDE 0.9 % (FLUSH) 0.9 %
5-40 SYRINGE (ML) INJECTION PRN
Status: DISCONTINUED | OUTPATIENT
Start: 2024-06-13 | End: 2024-06-14 | Stop reason: SDUPTHER

## 2024-06-13 RX ORDER — MAGNESIUM SULFATE IN WATER 40 MG/ML
2000 INJECTION, SOLUTION INTRAVENOUS PRN
Status: DISCONTINUED | OUTPATIENT
Start: 2024-06-13 | End: 2024-06-18 | Stop reason: HOSPADM

## 2024-06-13 RX ORDER — ZOLPIDEM TARTRATE 5 MG/1
10 TABLET ORAL NIGHTLY PRN
Status: DISCONTINUED | OUTPATIENT
Start: 2024-06-13 | End: 2024-06-18 | Stop reason: HOSPADM

## 2024-06-13 RX ORDER — SODIUM CHLORIDE 9 MG/ML
INJECTION, SOLUTION INTRAVENOUS PRN
Status: DISCONTINUED | OUTPATIENT
Start: 2024-06-13 | End: 2024-06-14 | Stop reason: SDUPTHER

## 2024-06-13 RX ORDER — ONDANSETRON 2 MG/ML
4 INJECTION INTRAMUSCULAR; INTRAVENOUS EVERY 6 HOURS PRN
Status: DISCONTINUED | OUTPATIENT
Start: 2024-06-13 | End: 2024-06-18 | Stop reason: HOSPADM

## 2024-06-13 RX ORDER — POLYETHYLENE GLYCOL 3350 17 G/17G
17 POWDER, FOR SOLUTION ORAL DAILY PRN
Status: DISCONTINUED | OUTPATIENT
Start: 2024-06-13 | End: 2024-06-18 | Stop reason: HOSPADM

## 2024-06-13 RX ORDER — ONDANSETRON 2 MG/ML
4 INJECTION INTRAMUSCULAR; INTRAVENOUS ONCE
Status: COMPLETED | OUTPATIENT
Start: 2024-06-13 | End: 2024-06-13

## 2024-06-13 RX ORDER — MIDODRINE HYDROCHLORIDE 5 MG/1
5 TABLET ORAL
Status: DISCONTINUED | OUTPATIENT
Start: 2024-06-13 | End: 2024-06-18 | Stop reason: HOSPADM

## 2024-06-13 RX ORDER — SODIUM CHLORIDE 0.9 % (FLUSH) 0.9 %
5-40 SYRINGE (ML) INJECTION EVERY 12 HOURS SCHEDULED
Status: DISCONTINUED | OUTPATIENT
Start: 2024-06-13 | End: 2024-06-18 | Stop reason: HOSPADM

## 2024-06-13 RX ORDER — LEVALBUTEROL 1.25 MG/.5ML
0.63 SOLUTION, CONCENTRATE RESPIRATORY (INHALATION) ONCE
Status: COMPLETED | OUTPATIENT
Start: 2024-06-13 | End: 2024-06-13

## 2024-06-13 RX ORDER — ACETAMINOPHEN 325 MG/1
650 TABLET ORAL EVERY 6 HOURS PRN
Status: DISCONTINUED | OUTPATIENT
Start: 2024-06-13 | End: 2024-06-18 | Stop reason: HOSPADM

## 2024-06-13 RX ORDER — POTASSIUM CHLORIDE 7.45 MG/ML
10 INJECTION INTRAVENOUS PRN
Status: DISCONTINUED | OUTPATIENT
Start: 2024-06-13 | End: 2024-06-18 | Stop reason: HOSPADM

## 2024-06-13 RX ORDER — POLYETHYLENE GLYCOL 3350 17 G
2 POWDER IN PACKET (EA) ORAL
Status: DISCONTINUED | OUTPATIENT
Start: 2024-06-13 | End: 2024-06-18 | Stop reason: HOSPADM

## 2024-06-13 RX ORDER — 0.9 % SODIUM CHLORIDE 0.9 %
1000 INTRAVENOUS SOLUTION INTRAVENOUS ONCE
Status: COMPLETED | OUTPATIENT
Start: 2024-06-13 | End: 2024-06-13

## 2024-06-13 RX ORDER — POTASSIUM CHLORIDE 29.8 MG/ML
20 INJECTION INTRAVENOUS PRN
Status: DISCONTINUED | OUTPATIENT
Start: 2024-06-13 | End: 2024-06-18 | Stop reason: HOSPADM

## 2024-06-13 RX ORDER — METHADONE HYDROCHLORIDE 10 MG/1
140 TABLET ORAL DAILY
Status: DISCONTINUED | OUTPATIENT
Start: 2024-06-13 | End: 2024-06-14

## 2024-06-13 RX ORDER — SODIUM CHLORIDE 9 MG/ML
INJECTION, SOLUTION INTRAVENOUS CONTINUOUS
Status: DISCONTINUED | OUTPATIENT
Start: 2024-06-13 | End: 2024-06-15

## 2024-06-13 RX ORDER — LEVALBUTEROL 1.25 MG/.5ML
SOLUTION, CONCENTRATE RESPIRATORY (INHALATION)
Status: DISCONTINUED
Start: 2024-06-13 | End: 2024-06-13

## 2024-06-13 RX ORDER — ONDANSETRON 4 MG/1
4 TABLET, ORALLY DISINTEGRATING ORAL EVERY 8 HOURS PRN
Status: DISCONTINUED | OUTPATIENT
Start: 2024-06-13 | End: 2024-06-18 | Stop reason: HOSPADM

## 2024-06-13 RX ORDER — ALPRAZOLAM 1 MG/1
1 TABLET ORAL 3 TIMES DAILY PRN
Status: DISCONTINUED | OUTPATIENT
Start: 2024-06-13 | End: 2024-06-18 | Stop reason: HOSPADM

## 2024-06-13 RX ORDER — LEVALBUTEROL 1.25 MG/.5ML
0.63 SOLUTION, CONCENTRATE RESPIRATORY (INHALATION) EVERY 8 HOURS PRN
Status: DISCONTINUED | OUTPATIENT
Start: 2024-06-13 | End: 2024-06-18 | Stop reason: HOSPADM

## 2024-06-13 RX ORDER — SODIUM CHLORIDE 0.9 % (FLUSH) 0.9 %
5-40 SYRINGE (ML) INJECTION PRN
Status: DISCONTINUED | OUTPATIENT
Start: 2024-06-13 | End: 2024-06-18 | Stop reason: HOSPADM

## 2024-06-13 RX ORDER — SODIUM CHLORIDE 0.9 % (FLUSH) 0.9 %
5-40 SYRINGE (ML) INJECTION EVERY 12 HOURS SCHEDULED
Status: DISCONTINUED | OUTPATIENT
Start: 2024-06-13 | End: 2024-06-14 | Stop reason: SDUPTHER

## 2024-06-13 RX ORDER — ENOXAPARIN SODIUM 100 MG/ML
30 INJECTION SUBCUTANEOUS 2 TIMES DAILY
Status: DISCONTINUED | OUTPATIENT
Start: 2024-06-13 | End: 2024-06-18 | Stop reason: HOSPADM

## 2024-06-13 RX ORDER — ACETAMINOPHEN 650 MG/1
650 SUPPOSITORY RECTAL EVERY 6 HOURS PRN
Status: DISCONTINUED | OUTPATIENT
Start: 2024-06-13 | End: 2024-06-18 | Stop reason: HOSPADM

## 2024-06-13 RX ORDER — DIVALPROEX SODIUM 500 MG/1
1500 TABLET, EXTENDED RELEASE ORAL NIGHTLY
Status: DISCONTINUED | OUTPATIENT
Start: 2024-06-13 | End: 2024-06-14

## 2024-06-13 RX ORDER — LEVALBUTEROL 1.25 MG/.5ML
1.25 SOLUTION, CONCENTRATE RESPIRATORY (INHALATION) ONCE
Status: COMPLETED | OUTPATIENT
Start: 2024-06-13 | End: 2024-06-13

## 2024-06-13 RX ADMIN — FAMOTIDINE 20 MG: 10 INJECTION, SOLUTION INTRAVENOUS at 10:28

## 2024-06-13 RX ADMIN — MIDODRINE HYDROCHLORIDE 5 MG: 5 TABLET ORAL at 18:56

## 2024-06-13 RX ADMIN — LEVALBUTEROL 1.25 MG: 1.25 SOLUTION, CONCENTRATE RESPIRATORY (INHALATION) at 08:06

## 2024-06-13 RX ADMIN — ALPRAZOLAM 1 MG: 1 TABLET ORAL at 21:18

## 2024-06-13 RX ADMIN — SODIUM CHLORIDE 1000 ML: 9 INJECTION, SOLUTION INTRAVENOUS at 07:46

## 2024-06-13 RX ADMIN — SODIUM CHLORIDE: 9 INJECTION, SOLUTION INTRAVENOUS at 14:02

## 2024-06-13 RX ADMIN — NICOTINE POLACRILEX 2 MG: 2 LOZENGE ORAL at 22:14

## 2024-06-13 RX ADMIN — AMPICILLIN AND SULBACTAM 3000 MG: 2; 1 INJECTION, POWDER, FOR SOLUTION INTRAVENOUS at 10:29

## 2024-06-13 RX ADMIN — AMPICILLIN AND SULBACTAM 3000 MG: 2; 1 INJECTION, POWDER, FOR SOLUTION INTRAVENOUS at 22:15

## 2024-06-13 RX ADMIN — PREGABALIN 150 MG: 100 CAPSULE ORAL at 21:18

## 2024-06-13 RX ADMIN — ZOLPIDEM TARTRATE 10 MG: 5 TABLET ORAL at 21:18

## 2024-06-13 RX ADMIN — WATER 40 MG: 1 INJECTION INTRAMUSCULAR; INTRAVENOUS; SUBCUTANEOUS at 21:17

## 2024-06-13 RX ADMIN — ENOXAPARIN SODIUM 30 MG: 100 INJECTION SUBCUTANEOUS at 21:17

## 2024-06-13 RX ADMIN — DIVALPROEX SODIUM 1500 MG: 500 TABLET, FILM COATED, EXTENDED RELEASE ORAL at 21:18

## 2024-06-13 RX ADMIN — Medication 10 ML: at 21:19

## 2024-06-13 RX ADMIN — Medication 10 ML: at 21:18

## 2024-06-13 RX ADMIN — SODIUM CHLORIDE 1000 ML: 9 INJECTION, SOLUTION INTRAVENOUS at 07:58

## 2024-06-13 RX ADMIN — LEVALBUTEROL 0.63 MG: 1.25 SOLUTION, CONCENTRATE RESPIRATORY (INHALATION) at 08:06

## 2024-06-13 RX ADMIN — LEVOTHYROXINE SODIUM 75 MCG: 25 TABLET ORAL at 06:47

## 2024-06-13 RX ADMIN — METHADONE HYDROCHLORIDE 140 MG: 10 TABLET ORAL at 15:48

## 2024-06-13 RX ADMIN — Medication 10 ML: at 14:04

## 2024-06-13 RX ADMIN — AMPICILLIN AND SULBACTAM 3000 MG: 2; 1 INJECTION, POWDER, FOR SOLUTION INTRAVENOUS at 16:18

## 2024-06-13 RX ADMIN — IOPAMIDOL 75 ML: 755 INJECTION, SOLUTION INTRAVENOUS at 17:05

## 2024-06-13 RX ADMIN — WATER 125 MG: 1 INJECTION INTRAMUSCULAR; INTRAVENOUS; SUBCUTANEOUS at 07:42

## 2024-06-13 RX ADMIN — ONDANSETRON 4 MG: 2 INJECTION INTRAMUSCULAR; INTRAVENOUS at 07:45

## 2024-06-13 RX ADMIN — ENOXAPARIN SODIUM 30 MG: 100 INJECTION SUBCUTANEOUS at 15:49

## 2024-06-13 RX ADMIN — PANTOPRAZOLE SODIUM 40 MG: 40 TABLET, DELAYED RELEASE ORAL at 06:47

## 2024-06-13 RX ADMIN — QUETIAPINE FUMARATE 300 MG: 200 TABLET ORAL at 21:17

## 2024-06-13 ASSESSMENT — PAIN SCALES - GENERAL
PAINLEVEL_OUTOF10: 3
PAINLEVEL_OUTOF10: 9
PAINLEVEL_OUTOF10: 4
PAINLEVEL_OUTOF10: 4

## 2024-06-13 ASSESSMENT — PAIN DESCRIPTION - ONSET
ONSET: ON-GOING
ONSET: ON-GOING

## 2024-06-13 ASSESSMENT — PAIN DESCRIPTION - LOCATION
LOCATION: BACK

## 2024-06-13 ASSESSMENT — PAIN DESCRIPTION - PAIN TYPE
TYPE: CHRONIC PAIN
TYPE: CHRONIC PAIN

## 2024-06-13 ASSESSMENT — PAIN DESCRIPTION - ORIENTATION
ORIENTATION: MID
ORIENTATION: MID

## 2024-06-13 ASSESSMENT — PAIN DESCRIPTION - FREQUENCY
FREQUENCY: CONTINUOUS
FREQUENCY: CONTINUOUS

## 2024-06-13 ASSESSMENT — PAIN DESCRIPTION - DESCRIPTORS
DESCRIPTORS: ACHING
DESCRIPTORS: ACHING

## 2024-06-13 NOTE — GROUP NOTE
Group Therapy Note    Date: 6/13/2024    Group Start Time: 2100  Group End Time: 2130  Group Topic: Wrap-Up    [facility]    Louis Xiong RN        Group Therapy Note    Attendees: 9       Patient's Goal:  States she didn't knock out a goal.    Notes:  Feels like her depression is on a hold, because it is not improving like she'd hoped. States she doesn't feel she is ready to go yet.  States this is her chance to get better. \"This is my chance.\"    Status After Intervention:  Unchanged    Participation Level: Active Listener and Interactive    Participation Quality: Appropriate, Attentive, Sharing, and Supportive      Speech:  normal      Thought Process/Content: Logical  Linear      Affective Functioning: Congruent      Mood: anxious and depressed      Level of consciousness:  Alert, Oriented x4, and Attentive      Response to Learning: Able to verbalize current knowledge/experience, Able to verbalize/acknowledge new learning, and Able to retain information      Endings: None Reported    Modes of Intervention: Education, Support, and Socialization      Discipline Responsible: Registered Nurse      Signature:  Louis Xiong RN

## 2024-06-13 NOTE — PROGRESS NOTES
Department of Psychiatry  Progress Note    Patient's chart was reviewed. Discussed with treatment team. Met with patient.     SUBJECTIVE:      Active in the milieu and peers.    Tolerating medication changes well.     Continues to voice some symptoms of depression, but her affect is bright.     Discussed discharge tomorrow if she continues to make gains.     Valproic acid level 61.    ROS:   Patient has new complaints: no  Sleeping adequately:  Yes   Appetite adequate: Yes  Engaged in programming: Yes    OBJECTIVE:  VITALS:  /61   Pulse (!) 133   Temp 97.3 °F (36.3 °C) (Temporal)   Resp 18   Ht 1.626 m (5' 4\")   Wt 102 kg (224 lb 13.9 oz)   SpO2 98%   BMI 38.60 kg/m²     Mental Status Examination:    Appearance: fair grooming and hygiene  Behavior/Attitude toward examiner:  cooperative, attentive and fair eye contact  Speech: mildly pressured  Mood:  \"depressed\"  Affect:  mood incongruent    Thought processes:  circumstantial but directable   Thought Content: no SI, no HI, no delusions voiced, no obsessions  Perceptions: no AVH  Attention: attention span and concentration were intact to interview   Abstraction: intact  Cognition:  Alert and oriented to person, place, time, and situation, recall intact  Insight: fair  Judgment: fair    Medication:  Scheduled:         PRN:       Formulation: This is a domiciled, , and psychiatrically disabled 44-year-old with a history of mood and substance use disorders who self-presented to our ED making suicidal statements.     She presents with a mix of meed symptoms; primarily symptoms of margarita.     Principal Problem:    Mood disorder (HCC)  Active Problems:    Adrenal insufficiency (HCC)    Fibromyalgia    Viral hepatitis C    Opioid use disorder, severe, on maintenance therapy (HCC)    Asthma    Hypothyroidism    COPD (chronic obstructive pulmonary disease) (HCC)  Resolved Problems:    * No resolved hospital problems. *     Plan:  1.  Admitted to psychiatry  for further evaluation and stabilization.    2. On admission, increase and consolidate Depakote ER to 1500mg QHS. Increase quetiapine to 300mg QHS in the short-term for mood stabilization. Continue the rest of her outpatient medication as prescribed. Order q15min checks for safety, programming, and prn medication for anxiety, agitation, and insomnia.    6/11/2024 - Reports sleeping much better last night. Continues to report feeling depressed but presents elevated, hyperverbal, and bright. She is active in the milieu, with peers, and in groups.  Tolerating medication changes well and without side effects.  Has demonstrated safe behavior throughout the admission.    - valproic acid level tonight before evening dose.     6/12/2024 - Active in the milieu and peers. Tolerating medication changes well.  Continues to voice some symptoms of depression, but her affect is bright. Discussed discharge tomorrow if she continues to make gains.  Valproic acid level 61.    3. Hospitalist consult for admission.  Mild leukocytosis   - possibly reactive or 2/2 recent prednisone use   - UA clear  - pt denies any s/sx of infection      Hx of adrenal insufficiency   - on midodrine   - monitor BP     COPD/asthma no AE  - continue home inhalers     Fibromyalgia  - on lyrica      Hypothyroidism   - on synthroid      Chronic Diastolic CHF  - last echo on 3/25/24 with EF of 55-60% and grade I diastolic dysfunction  - on lasix      GERD  - on PPI    Chronic HepC w/o hepatic coma     Possible MANUEL  - has outpt testing scheduled      Morbid Obesity  - Body mass index is 38.6 kg/m².  - Complicating assessment and treatment. Placing patient at risk for multiple co-morbidities as well as early death and contributing to the patient's presentation.   - Counseled on weight loss.     4. Collateral for diagnostic clarification and care coordination.    5. ELOS 5-8 days. Voluntary.  Target DC tomorrow.     Total time with patient was 50 minutes and more

## 2024-06-13 NOTE — PLAN OF CARE
Problem: Risk for Elopement  Goal: Patient will not exit the unit/facility without proper excort  Outcome: Progressing     Problem: Chronic Conditions and Co-morbidities  Goal: Patient's chronic conditions and co-morbidity symptoms are monitored and maintained or improved  Outcome: Not Progressing     Problem: Anxiety  Goal: Will report anxiety at manageable levels  Description: INTERVENTIONS:  1. Administer medication as ordered  2. Teach and rehearse alternative coping skills  3. Provide emotional support with 1:1 interaction with staff  Outcome: Not Progressing  Flowsheets (Taken 6/12/2024 2200)  Will report anxiety at manageable levels: Administer medication as ordered     Problem: Self Harm/Suicidality  Goal: Will have no self-injury during hospital stay  Description: INTERVENTIONS:  1.  Ensure constant observer at bedside with Q15M safety checks  2.  Maintain a safe environment  3.  Secure patient belongings  4.  Ensure family/visitors adhere to safety recommendations  5.  Ensure safety tray has been added to patient's diet order  6.  Every shift and PRN: Re-assess suicidal risk via Frequent Screener    Outcome: Not Progressing   Pt states that she still has some SI but has no plan or intent. Says she can remain safe. States still has a lot of depression an anxiety. She doesn't want to leave yet. Thinks she needs more time.    Pt seems somatic. Takes many scheduled meds and asks for many prn meds.  Pt had Tums tab 1 po at 2057 for heartburn.  She had Ambien 10 mg po for sleep which seemed to be effective.  This nurse got an order for Zofran for nausea from Dr. Jaimes. She had Zofran 8 mg po for nausea at 2245. Pt stated she vomited once. This nurse asked Anjelica to let staff see any other emesis. She voiced understanding. Pt came to team station again for Maalox 30 ml po  at 2335. She rated the hearburn at a 7/10. Stated Tums was ineffective. While at the team station, pt asked for her Xanax but was told it  was too early.  Pt apparently went back to sleep and has not asked for anything further since 2335.

## 2024-06-13 NOTE — FLOWSHEET NOTE
On admission screening patient is considered a HIGH suicide risk.  Patient denies any intent to act in any self harming plans.  Attending MD, House supervisor and CN all notified of HIGH screen.          6/13/2024     1:00 PM   C-SSRS Suicide Screening   1) Within the past month, have you wished you were dead or wished you could go to sleep and not wake up?  Yes   2) Have you actually had any thoughts of killing yourself?  Yes   3) Have you been thinking about how you might kill yourself?  Yes   4) Have you had these thoughts and had some intention of acting on them?  No   5) Have you started to work out or worked out the details of how to kill yourself? Do you intend to carry out this plan?  No   6) Have you ever done anything, started to do anything, or prepared to do anything to end your life? Yes   Did this occur within the past 3 months?  Yes       No constant observer required per those mentioned above.      Patient admitted to room 3009 from ED/ was admitted on  prior. Patient room has been modified to remove extraneous ligature risks prior to patient arrival.  Oxygen tubing, vital monitoring wires, suction tubing and call light left with patient.  Sharps container removed.     Patient oriented to room and instructed about the schedule of the day including: vital sign frequency, lab draws, possible tests, frequency of MD and staff rounds, daily weights, I &O's and prescribed diet.     Patient belongings was performed by  staff and remain secured and placed at nursing station.       Recliner Assessment  Patient is not able to demonstrated the ability to move from a reclining position to an upright position within the recliner. however patient is alert, oriented and able to provide informed consent

## 2024-06-13 NOTE — BH NOTE
Writer was called to the room at 0712 by a Kristine RN, stating a rapid response had been called. Upon entering the room the pt was seen sitting up in bed with her body weight supported by [facility] staff, lips were blue, tachypnea noted, she was pale and diaphoretic, she was not responding to voice.   Respiratory, clinical , ICU and PCU RN, hospitalist NP and security then arrived in room.   She had intermittent episodes of jerking movements of arms and legs and lost control of her bladder.   Pt was transferred from the bed onto stretcher using bed sheet and was transported off the unit to ED for further evaluation.

## 2024-06-13 NOTE — ED NOTES
0814-Perfect Serve sent by Dr. Hart to Dr. Stewart  Hospitalist for consult.   0841-Consult completed to Hospitalist admission orders placed by Stefanie Velarde NP.   
Bipap applied.  
Nasal trumpet inserted.   
Oxygen sat 90-91% on 5L, turned up to 7L at this time.   
PICC team at bedside.  
Pt is more alert answering questions appropriately, c/o some back pain, tolerating bipap well. Warm blanket given.   
Trial off bipap at this time, per ABG results trending more appropriately. Pt is now on high flow NC at 5L. Tolerating well. Alert and oriented answering all questions correctly.   
Unable to collect 2nd set of cultures, repeat lactic and ddimer, was able to collect one tube for troponin. After multiple tries, dr suh notified.   
52

## 2024-06-13 NOTE — ED PROVIDER NOTES
errors related to that system including errors in grammar, punctuation, and spelling, as well as words and phrases that may be inappropriate. If there are any questions or concerns please feel free to contact the dictating provider for clarification.)          Jigna Hart MD  06/13/24 7765

## 2024-06-13 NOTE — H&P
History and Physical        HISTORY OF PRESENT ILLNESS:     The patient is a 44-year-old white female who is morbidly obese.  She has a past medical history of asthma, chronic diastolic heart failure, borderline personality disorder, history of drug abuse in remission, on chronic methadone, history of asthma/COPD, history of seizure disorder, history of hepatitis C who was admitted to the psychiatric floor for suicidal ideation depression.  This morning a rapid response was called.    According to the rapid response note patient became hypoxic.  She was placed on BiPAP.  There was a questionable seizure.  She was mildly tachycardic.  Troponin was slightly elevated.  There is a possibility that she aspirated.  She was placed on BiPAP and transferred to the ER as he had no ICU beds.    When I saw the patient in the ER she had been on BiPAP.  She was waking up and answering some questions.  She denied any recent drug use.    Patient is allergic to asenapine, buprenorphine-naloxone, metoclopramide, morphine and codeine, risperidone, trazodone, morphine, asenapine maleate, buprenorphine hcl-naloxone hcl, codeine, reglan [metoclopramide hcl], risperidone and related, trazodone and nefazodone, buprenorphine hcl-naloxone hcl, guanfacine hcl, and lurasidone.    Past Medical History:   Diagnosis Date    Adrenal insufficiency     Asthma     Bipolar 1 disorder (HCC)     Borderline personality disorder (HCC)     Chronic prescription benzodiazepine use     Alprazolam    Chronic prescription opiate use     Methadone    COPD (chronic obstructive pulmonary disease) (HCC)     Degenerative spinal arthritis     Howard-Danlos syndrome     Fibromyalgia     Hepatitis C     Herniated disc     Intervertebral disc disease     Opiate overdose (HCC)     Osteoarthritis     Polysubstance abuse (HCC)     Sedative dependence (HCC)     Pregabalin and Zolpidem    Seizure (HCC)     with benzo withdrawal    TCA (tricyclic antidepressant) overdose

## 2024-06-13 NOTE — PROGRESS NOTES
RT Inhaler-Nebulizer Bronchodilator Protocol Note    There is a bronchodilator order in the chart from a provider indicating to follow the RT Bronchodilator Protocol and there is an “Initiate RT Inhaler-Nebulizer Bronchodilator Protocol” order as well (see protocol at bottom of note).    CXR Findings:  XR CHEST PORTABLE    Result Date: 6/12/2024  No evidence for acute cardiopulmonary process.       The findings from the last RT Protocol Assessment were as follows:   History Pulmonary Disease: Chronic pulmonary disease  Respiratory Pattern: Regular pattern and RR 12-20 bpm  Breath Sounds: Slightly diminished and/or crackles  Cough: Strong, spontaneous, non-productive  Indication for Bronchodilator Therapy: Decreased or absent breath sounds  Bronchodilator Assessment Score: 4    Aerosolized bronchodilator medication orders have been revised according to the RT Inhaler-Nebulizer Bronchodilator Protocol below.    Respiratory Therapist to perform RT Therapy Protocol Assessment initially then follow the protocol.  Repeat RT Therapy Protocol Assessment PRN for score 0-3 or on second treatment, BID, and PRN for scores above 3.    No Indications - adjust the frequency to every 6 hours PRN wheezing or bronchospasm, if no treatments needed after 48 hours then discontinue using Per Protocol order mode.     If indication present, adjust the RT bronchodilator orders based on the Bronchodilator Assessment Score as indicated below.  Use Inhaler orders unless patient has one or more of the following: on home nebulizer, not able to hold breath for 10 seconds, is not alert and oriented, cannot activate and use MDI correctly, or respiratory rate 25 breaths per minute or more, then use the equivalent nebulizer order(s) with same Frequency and PRN reasons based on the score.  If a patient is on this medication at home then do not decrease Frequency below that used at home.    0-3 - enter or revise RT bronchodilator order(s) to equivalent

## 2024-06-14 ENCOUNTER — FOLLOWUP TELEPHONE ENCOUNTER (OUTPATIENT)
Dept: PSYCHIATRY | Age: 44
End: 2024-06-14

## 2024-06-14 ENCOUNTER — APPOINTMENT (OUTPATIENT)
Dept: GENERAL RADIOLOGY | Age: 44
DRG: 137 | End: 2024-06-14
Payer: COMMERCIAL

## 2024-06-14 LAB
ANION GAP SERPL CALCULATED.3IONS-SCNC: 7 MMOL/L (ref 3–16)
BASOPHILS # BLD: 0 K/UL (ref 0–0.2)
BASOPHILS NFR BLD: 0.2 %
BUN SERPL-MCNC: 19 MG/DL (ref 7–20)
CALCIUM SERPL-MCNC: 8.7 MG/DL (ref 8.3–10.6)
CHLORIDE SERPL-SCNC: 101 MMOL/L (ref 99–110)
CO2 SERPL-SCNC: 29 MMOL/L (ref 21–32)
CREAT SERPL-MCNC: 0.9 MG/DL (ref 0.6–1.1)
DEPRECATED RDW RBC AUTO: 17 % (ref 12.4–15.4)
EOSINOPHIL # BLD: 0 K/UL (ref 0–0.6)
EOSINOPHIL NFR BLD: 0 %
GFR SERPLBLD CREATININE-BSD FMLA CKD-EPI: 80 ML/MIN/{1.73_M2}
GLUCOSE BLD-MCNC: 124 MG/DL (ref 70–99)
GLUCOSE SERPL-MCNC: 120 MG/DL (ref 70–99)
HCT VFR BLD AUTO: 29.5 % (ref 36–48)
HGB BLD-MCNC: 9.5 G/DL (ref 12–16)
LACTATE BLDV-SCNC: 1.6 MMOL/L (ref 0.4–2)
LYMPHOCYTES # BLD: 0.5 K/UL (ref 1–5.1)
LYMPHOCYTES NFR BLD: 2.8 %
MCH RBC QN AUTO: 28.8 PG (ref 26–34)
MCHC RBC AUTO-ENTMCNC: 32.4 G/DL (ref 31–36)
MCV RBC AUTO: 89.2 FL (ref 80–100)
MONOCYTES # BLD: 0.9 K/UL (ref 0–1.3)
MONOCYTES NFR BLD: 4.5 %
NEUTROPHILS # BLD: 18.1 K/UL (ref 1.7–7.7)
NEUTROPHILS NFR BLD: 92.5 %
PERFORMED ON: ABNORMAL
PLATELET # BLD AUTO: 181 K/UL (ref 135–450)
PMV BLD AUTO: 7.6 FL (ref 5–10.5)
POTASSIUM SERPL-SCNC: 5.1 MMOL/L (ref 3.5–5.1)
PROCALCITONIN SERPL IA-MCNC: 4.62 NG/ML (ref 0–0.15)
RBC # BLD AUTO: 3.3 M/UL (ref 4–5.2)
REASON FOR REJECTION: NORMAL
REJECTED TEST: NORMAL
SODIUM SERPL-SCNC: 137 MMOL/L (ref 136–145)
WBC # BLD AUTO: 19.6 K/UL (ref 4–11)

## 2024-06-14 PROCEDURE — 6370000000 HC RX 637 (ALT 250 FOR IP): Performed by: INTERNAL MEDICINE

## 2024-06-14 PROCEDURE — 92611 MOTION FLUOROSCOPY/SWALLOW: CPT

## 2024-06-14 PROCEDURE — 99233 SBSQ HOSP IP/OBS HIGH 50: CPT | Performed by: INTERNAL MEDICINE

## 2024-06-14 PROCEDURE — 92610 EVALUATE SWALLOWING FUNCTION: CPT

## 2024-06-14 PROCEDURE — 92526 ORAL FUNCTION THERAPY: CPT

## 2024-06-14 PROCEDURE — 2060000000 HC ICU INTERMEDIATE R&B

## 2024-06-14 PROCEDURE — 6360000002 HC RX W HCPCS: Performed by: INTERNAL MEDICINE

## 2024-06-14 PROCEDURE — 2700000000 HC OXYGEN THERAPY PER DAY

## 2024-06-14 PROCEDURE — 80048 BASIC METABOLIC PNL TOTAL CA: CPT

## 2024-06-14 PROCEDURE — 2580000003 HC RX 258: Performed by: INTERNAL MEDICINE

## 2024-06-14 PROCEDURE — 6360000002 HC RX W HCPCS: Performed by: NURSE PRACTITIONER

## 2024-06-14 PROCEDURE — 84145 PROCALCITONIN (PCT): CPT

## 2024-06-14 PROCEDURE — 83605 ASSAY OF LACTIC ACID: CPT

## 2024-06-14 PROCEDURE — 94761 N-INVAS EAR/PLS OXIMETRY MLT: CPT

## 2024-06-14 PROCEDURE — 2580000003 HC RX 258: Performed by: NURSE PRACTITIONER

## 2024-06-14 PROCEDURE — 94660 CPAP INITIATION&MGMT: CPT

## 2024-06-14 PROCEDURE — 85025 COMPLETE CBC W/AUTO DIFF WBC: CPT

## 2024-06-14 PROCEDURE — 74230 X-RAY XM SWLNG FUNCJ C+: CPT

## 2024-06-14 PROCEDURE — 6370000000 HC RX 637 (ALT 250 FOR IP): Performed by: PSYCHIATRY & NEUROLOGY

## 2024-06-14 PROCEDURE — 99233 SBSQ HOSP IP/OBS HIGH 50: CPT | Performed by: PSYCHIATRY & NEUROLOGY

## 2024-06-14 PROCEDURE — 36415 COLL VENOUS BLD VENIPUNCTURE: CPT

## 2024-06-14 PROCEDURE — 99223 1ST HOSP IP/OBS HIGH 75: CPT | Performed by: PSYCHIATRY & NEUROLOGY

## 2024-06-14 RX ORDER — METHADONE HYDROCHLORIDE 10 MG/1
100 TABLET ORAL DAILY
Status: DISCONTINUED | OUTPATIENT
Start: 2024-06-15 | End: 2024-06-18 | Stop reason: HOSPADM

## 2024-06-14 RX ORDER — QUETIAPINE 150 MG/1
300 TABLET, FILM COATED, EXTENDED RELEASE ORAL NIGHTLY
Status: DISCONTINUED | OUTPATIENT
Start: 2024-06-14 | End: 2024-06-18 | Stop reason: HOSPADM

## 2024-06-14 RX ORDER — FAMOTIDINE 20 MG/1
20 TABLET, FILM COATED ORAL DAILY
Status: DISCONTINUED | OUTPATIENT
Start: 2024-06-14 | End: 2024-06-18 | Stop reason: HOSPADM

## 2024-06-14 RX ORDER — METHADONE HYDROCHLORIDE 10 MG/1
70 TABLET ORAL DAILY
Status: DISCONTINUED | OUTPATIENT
Start: 2024-06-15 | End: 2024-06-14

## 2024-06-14 RX ORDER — LORAZEPAM 2 MG/ML
2 INJECTION INTRAMUSCULAR EVERY 6 HOURS PRN
Status: DISCONTINUED | OUTPATIENT
Start: 2024-06-14 | End: 2024-06-18 | Stop reason: HOSPADM

## 2024-06-14 RX ADMIN — DIVALPROEX SODIUM 750 MG: 500 TABLET, FILM COATED, EXTENDED RELEASE ORAL at 10:29

## 2024-06-14 RX ADMIN — POLYETHYLENE GLYCOL (3350) 17 G: 17 POWDER, FOR SOLUTION ORAL at 21:46

## 2024-06-14 RX ADMIN — NICOTINE POLACRILEX 2 MG: 2 LOZENGE ORAL at 21:48

## 2024-06-14 RX ADMIN — MIDODRINE HYDROCHLORIDE 5 MG: 5 TABLET ORAL at 07:47

## 2024-06-14 RX ADMIN — MUPIROCIN: 20 OINTMENT TOPICAL at 10:41

## 2024-06-14 RX ADMIN — AMPICILLIN AND SULBACTAM 3000 MG: 2; 1 INJECTION, POWDER, FOR SOLUTION INTRAVENOUS at 21:56

## 2024-06-14 RX ADMIN — QUETIAPINE FUMARATE 300 MG: 150 TABLET, EXTENDED RELEASE ORAL at 22:20

## 2024-06-14 RX ADMIN — NICOTINE POLACRILEX 2 MG: 2 LOZENGE ORAL at 13:50

## 2024-06-14 RX ADMIN — PREGABALIN 150 MG: 100 CAPSULE ORAL at 21:47

## 2024-06-14 RX ADMIN — METHADONE HYDROCHLORIDE 140 MG: 10 TABLET ORAL at 07:47

## 2024-06-14 RX ADMIN — WATER 40 MG: 1 INJECTION INTRAMUSCULAR; INTRAVENOUS; SUBCUTANEOUS at 21:48

## 2024-06-14 RX ADMIN — FAMOTIDINE 20 MG: 20 TABLET ORAL at 10:41

## 2024-06-14 RX ADMIN — AMPICILLIN AND SULBACTAM 3000 MG: 2; 1 INJECTION, POWDER, FOR SOLUTION INTRAVENOUS at 04:13

## 2024-06-14 RX ADMIN — ENOXAPARIN SODIUM 30 MG: 100 INJECTION SUBCUTANEOUS at 07:47

## 2024-06-14 RX ADMIN — ALPRAZOLAM 1 MG: 1 TABLET ORAL at 16:14

## 2024-06-14 RX ADMIN — WATER 40 MG: 1 INJECTION INTRAMUSCULAR; INTRAVENOUS; SUBCUTANEOUS at 07:48

## 2024-06-14 RX ADMIN — MIDODRINE HYDROCHLORIDE 5 MG: 5 TABLET ORAL at 16:20

## 2024-06-14 RX ADMIN — AMPICILLIN AND SULBACTAM 3000 MG: 2; 1 INJECTION, POWDER, FOR SOLUTION INTRAVENOUS at 16:17

## 2024-06-14 RX ADMIN — SODIUM CHLORIDE: 9 INJECTION, SOLUTION INTRAVENOUS at 21:50

## 2024-06-14 RX ADMIN — DIVALPROEX SODIUM 750 MG: 500 TABLET, FILM COATED, EXTENDED RELEASE ORAL at 21:47

## 2024-06-14 RX ADMIN — ENOXAPARIN SODIUM 30 MG: 100 INJECTION SUBCUTANEOUS at 21:47

## 2024-06-14 RX ADMIN — SODIUM CHLORIDE: 9 INJECTION, SOLUTION INTRAVENOUS at 04:12

## 2024-06-14 RX ADMIN — ZOLPIDEM TARTRATE 10 MG: 5 TABLET ORAL at 21:47

## 2024-06-14 RX ADMIN — AMPICILLIN AND SULBACTAM 3000 MG: 2; 1 INJECTION, POWDER, FOR SOLUTION INTRAVENOUS at 10:30

## 2024-06-14 RX ADMIN — PREGABALIN 150 MG: 100 CAPSULE ORAL at 07:47

## 2024-06-14 NOTE — CONSULTS
[facility]                                 Pulmonary/Critical care  Consult /Progress Note :                                                                  CC : Worsening shortness of breath, status post rapid response, altered  Patient is being seen at the request of Stefanie Velarde for a consultation for acute hypoxic respiratory    HISTORY OF PRESENT ILLNESS:     Patient is 44-year-old female who was sent from [facility] to the emergency after rapid response was called as the patient was found to be altered mental status and hypoxic    The patient was having jerking movement upon arrival of the rapid response team and seems to be she urinated on herself    She was unresponsive and become more responsive after that and she was evaluated for possible seizure there is concern about aspiration and with her hypoxia she was started on 15 L nonrebreather    The patient has about 30 pack.  Smoking history and she denies any alcohol abuse    Patient noted to have benzo and methadone on her urine drug screen from her admission lab      I was informed about the patient around 4:00 this p.m. when I see here in the ICU she was tachycardic in the 90s and her respiratory rate 14 her sat was 98 on 5 L    She was awake alert  Lactic acid was 3.6  PAST MEDICAL HISTORY:  Past Medical History:   Diagnosis Date    Adrenal insufficiency     Asthma     Bipolar 1 disorder (HCC)     Borderline personality disorder (HCC)     Chronic prescription benzodiazepine use     Alprazolam    Chronic prescription opiate use     Methadone    COPD (chronic obstructive pulmonary disease) (HCC)     Degenerative spinal arthritis     Howard-Danlos syndrome     Fibromyalgia     Hepatitis C     Herniated disc     Intervertebral disc disease     Opiate overdose (HCC)     Osteoarthritis     Polysubstance abuse (HCC)     Sedative dependence (HCC)     Pregabalin and Zolpidem    
Department of Psychiatry  Progress Note    Patient's chart was reviewed. Met with patient.     SUBJECTIVE:      Patient was on our service and scheduled to be discharged the morning she had seizure-like activity. She reports one similar episode years ago when she was using substances, but not since.     Today, her mood is stable. No psychotic symptoms. No thoughts of self-harm or suicide.    Nurse reports she was overly somnolent this morning - difficult to arouse.  Discussed with the patent at length who agreed to reduce methadone to 100mg and change quetiapine to XR formulation.    ROS:   Patient has new complaints: no  Sleeping adequately:  Yes   Appetite adequate: Yes    OBJECTIVE:  VITALS:  BP (!) 119/106   Pulse 87   Temp 97.3 °F (36.3 °C) (Temporal)   Resp 21   Ht 1.626 m (5' 4\")   Wt 108.6 kg (239 lb 8 oz)   SpO2 92%   BMI 41.11 kg/m²     Mental Status Examination:    Appearance: fair grooming and hygiene  Behavior/Attitude toward examiner:  cooperative, attentive and fair eye contact  Speech: non-pressured  Mood:  \"better\"  Affect:  mood incongruent    Thought processes:  circumstantial but directable   Thought Content: no SI, no HI, no delusions voiced, no obsessions  Perceptions: no AVH  Attention: attention span and concentration were intact to interview   Abstraction: intact  Cognition:  Alert and oriented to person, place, time, and situation, recall intact  Insight: fair  Judgment: fair    Medication:  Scheduled:   divalproex  750 mg Oral BID    mupirocin   Each Nostril BID    famotidine  20 mg Oral Daily    sodium chloride flush  5-40 mL IntraVENous 2 times per day    enoxaparin  30 mg SubCUTAneous BID    ampicillin-sulbactam  3,000 mg IntraVENous Q6H    methylPREDNISolone  40 mg IntraVENous Q12H    methadone  140 mg Oral Daily    midodrine  5 mg Oral TID WC    levothyroxine  75 mcg Oral Daily    pregabalin  150 mg Oral BID    QUEtiapine  300 mg Oral Nightly        PRN:  LORazepam, sodium 
01/25/2012    Trazodone and nefazodone Swelling 10/15/2013    Buprenorphine hcl-naloxone hcl  05/02/2013    Guanfacine hcl Swelling 09/27/2016    Lurasidone Anxiety 10/04/2016        Social history:     reports that she quit smoking about 6 years ago. Her smoking use included cigarettes. She started smoking about 6 months ago. She has a 0.3 pack-year smoking history. She has never used smokeless tobacco. She reports that she does not currently use alcohol. She reports that she does not currently use drugs.     Family history:    Family History   Problem Relation Age of Onset    Diabetes type 2  Mother     Diabetes type 2  Father     Cancer Sister     Diabetes type 2  Brother         Review of system:  No chest pain, shortness of breath, palpitation, cough, fever, abdominal pain, vomiting, diarrhea, dysuria, vertigo, joint pain, change in speech/vision or new onset of weakness/numbness. Remaining as per HPI.      BP 99/60   Pulse 82   Temp 97.3 °F (36.3 °C) (Temporal)   Resp 15   Ht 1.626 m (5' 4\")   Wt 108.6 kg (239 lb 8 oz)   SpO2 93%   BMI 41.11 kg/m²     Neurological examination:  MENTAL STATUS:  Alert and oriented to person.  LANG/SPEECH: No dysarthria.  CRANIAL NERVES: No facial asymmetry.  MOTOR: No pronator drift or leg drift.  REFLEXES: Generalized 2+.  SENSORY: Grossly intact.  COORD: No tremor.    Imaging, procedure, and laboratory data:   I reviewed the brain imagings and EEG.    Assessment:    1.  New onset of seizure-like activity.  2.  Borderline personality.  3.  Acute on top chronic respiratory failure.    The patient has no focal deficit during my assessment.  The CT brain showed no acute pathology.  The EEG showed no seizure tendency but it also showed focal slowing over the central head region.    Based on the history, seizure-like activity in this case can be secondarily from hypoxia or unknown seizure disorder.  This is not common for nonepileptic event however, the patient is high risk

## 2024-06-14 NOTE — PROCEDURES
INTERPRETATION:  This 25-minute, computer-assisted video EEG recording is abnormal.  It showed mild to moderate degree of generalized slowing background activity, maximal central head region.  No potentially epileptiform activity was present during the recording.       The EEG findings were consistent with mild to moderate degree of generalized non-specific cerebral dysfunction, maximal central head region.     CLASSIFICATION:  Dysrhythmia grade 2, generalized, maximal central head region.  Sleep - unsuccessful.  EKG channel.     DESCRIPTION:     BACKGROUND:  The awake recording revealed 9 Hz alpha activity over the posterior head region.  There were increased 2 to 7 Hz theta/delta activity into the EEG background, maximal central head region.  Given the extensive study, the patient still did not sleep.  The EEG showed normal V waves during drowsiness.  There was no significant change on the EEG background with photic stimulation.  Hyperventilation was omitted due to old age.     INTERICTAL DISCHARGES: None     CLINICAL EVENTS:  None     The EKG channel was unremarkable.           
was called.     According to the rapid response note patient became hypoxic.  She was placed on BiPAP.  There was a questionable seizure.  She was mildly tachycardic.  Troponin was slightly elevated.  There is a possibility that she aspirated.  She was placed on BiPAP and transferred to the ER as he had no ICU beds.     When I saw the patient in the ER she had been on BiPAP.  She was waking up and answering some questions.  She denied any recent drug use.     Patient is allergic to asenapine, buprenorphine-naloxone, metoclopramide, morphine and codeine, risperidone, trazodone, morphine, asenapine maleate, buprenorphine hcl-naloxone hcl, codeine, reglan [metoclopramide hcl], risperidone and related, trazodone and nefazodone, buprenorphine hcl-naloxone hcl, guanfacine hcl, and lurasidone\".    General Comments:  pt admitted to psychiatric floor d/t suicidal ideation.  Pt s/p rapid response called on 6/13 requiring transfer to ICU.  EEG completed 6/13 per chart.  Pt has PMHx of asthma, EDS, seizure, polysubstance abuse, hepatitis C, bipolar disorder, and COPD per chart.     Per Care Everywhere, pt had MBSS completed in October 2018.  Per radiologist report: \"Impression:     1. No evidence of aspiration. Flash penetration of thin liquids.   2. Please see speech pathology notes for further details\".     Pt reported having a \"scope swallow test\" completed \"a few weeks ago\" at OSH.  Unable to locate records per EMR.           Predisposing dysphagia risk factors: COPD and GERD  Clinical signs of possible chronic dysphagia: recurrent PNA  Precipitating dysphagia risk factors: increased O2 demands and acute neurological event       Pain   Patient Currently in Pain: No    Recent Chest Radiography: [] Chest XR   [x] CT of Chest  [] No recent chest  radiography on file   Date: 6/13/24  IMPRESSION:  Evaluation of multiple pulmonary arteries is suboptimal secondary to patient  respiratory motion throughout the examination.  No

## 2024-06-15 LAB
ANION GAP SERPL CALCULATED.3IONS-SCNC: 7 MMOL/L (ref 3–16)
BASOPHILS # BLD: 0 K/UL (ref 0–0.2)
BASOPHILS NFR BLD: 0.1 %
BUN SERPL-MCNC: 19 MG/DL (ref 7–20)
CALCIUM SERPL-MCNC: 9 MG/DL (ref 8.3–10.6)
CHLORIDE SERPL-SCNC: 101 MMOL/L (ref 99–110)
CO2 SERPL-SCNC: 31 MMOL/L (ref 21–32)
CREAT SERPL-MCNC: 0.7 MG/DL (ref 0.6–1.1)
DEPRECATED RDW RBC AUTO: 17.2 % (ref 12.4–15.4)
EOSINOPHIL # BLD: 0 K/UL (ref 0–0.6)
EOSINOPHIL NFR BLD: 0 %
GFR SERPLBLD CREATININE-BSD FMLA CKD-EPI: >90 ML/MIN/{1.73_M2}
GLUCOSE SERPL-MCNC: 154 MG/DL (ref 70–99)
HCT VFR BLD AUTO: 29.5 % (ref 36–48)
HGB BLD-MCNC: 9.7 G/DL (ref 12–16)
LYMPHOCYTES # BLD: 0.7 K/UL (ref 1–5.1)
LYMPHOCYTES NFR BLD: 3.9 %
MCH RBC QN AUTO: 28.8 PG (ref 26–34)
MCHC RBC AUTO-ENTMCNC: 32.9 G/DL (ref 31–36)
MCV RBC AUTO: 87.5 FL (ref 80–100)
MONOCYTES # BLD: 0.7 K/UL (ref 0–1.3)
MONOCYTES NFR BLD: 3.9 %
NEUTROPHILS # BLD: 16 K/UL (ref 1.7–7.7)
NEUTROPHILS NFR BLD: 92.1 %
PLATELET # BLD AUTO: 168 K/UL (ref 135–450)
PMV BLD AUTO: 8 FL (ref 5–10.5)
POTASSIUM SERPL-SCNC: 4.8 MMOL/L (ref 3.5–5.1)
RBC # BLD AUTO: 3.37 M/UL (ref 4–5.2)
SODIUM SERPL-SCNC: 139 MMOL/L (ref 136–145)
WBC # BLD AUTO: 17.4 K/UL (ref 4–11)

## 2024-06-15 PROCEDURE — 94660 CPAP INITIATION&MGMT: CPT

## 2024-06-15 PROCEDURE — 2700000000 HC OXYGEN THERAPY PER DAY

## 2024-06-15 PROCEDURE — 85025 COMPLETE CBC W/AUTO DIFF WBC: CPT

## 2024-06-15 PROCEDURE — 2580000003 HC RX 258: Performed by: INTERNAL MEDICINE

## 2024-06-15 PROCEDURE — 94761 N-INVAS EAR/PLS OXIMETRY MLT: CPT

## 2024-06-15 PROCEDURE — 80048 BASIC METABOLIC PNL TOTAL CA: CPT

## 2024-06-15 PROCEDURE — 99232 SBSQ HOSP IP/OBS MODERATE 35: CPT | Performed by: INTERNAL MEDICINE

## 2024-06-15 PROCEDURE — 94640 AIRWAY INHALATION TREATMENT: CPT

## 2024-06-15 PROCEDURE — 6370000000 HC RX 637 (ALT 250 FOR IP): Performed by: INTERNAL MEDICINE

## 2024-06-15 PROCEDURE — 99233 SBSQ HOSP IP/OBS HIGH 50: CPT | Performed by: INTERNAL MEDICINE

## 2024-06-15 PROCEDURE — 6360000002 HC RX W HCPCS: Performed by: INTERNAL MEDICINE

## 2024-06-15 PROCEDURE — 2060000000 HC ICU INTERMEDIATE R&B

## 2024-06-15 RX ORDER — IPRATROPIUM BROMIDE AND ALBUTEROL SULFATE 2.5; .5 MG/3ML; MG/3ML
1 SOLUTION RESPIRATORY (INHALATION)
Status: DISCONTINUED | OUTPATIENT
Start: 2024-06-15 | End: 2024-06-15

## 2024-06-15 RX ORDER — DOCUSATE SODIUM 100 MG/1
100 CAPSULE, LIQUID FILLED ORAL 2 TIMES DAILY
Status: DISCONTINUED | OUTPATIENT
Start: 2024-06-15 | End: 2024-06-18 | Stop reason: HOSPADM

## 2024-06-15 RX ORDER — IPRATROPIUM BROMIDE AND ALBUTEROL SULFATE 2.5; .5 MG/3ML; MG/3ML
1 SOLUTION RESPIRATORY (INHALATION)
Status: DISCONTINUED | OUTPATIENT
Start: 2024-06-15 | End: 2024-06-18 | Stop reason: HOSPADM

## 2024-06-15 RX ORDER — POLYETHYLENE GLYCOL 3350 17 G/17G
17 POWDER, FOR SOLUTION ORAL 2 TIMES DAILY
Status: DISCONTINUED | OUTPATIENT
Start: 2024-06-15 | End: 2024-06-18 | Stop reason: HOSPADM

## 2024-06-15 RX ADMIN — NICOTINE POLACRILEX 2 MG: 2 LOZENGE ORAL at 07:06

## 2024-06-15 RX ADMIN — QUETIAPINE FUMARATE 300 MG: 150 TABLET, EXTENDED RELEASE ORAL at 21:33

## 2024-06-15 RX ADMIN — ALPRAZOLAM 1 MG: 1 TABLET ORAL at 13:00

## 2024-06-15 RX ADMIN — DIVALPROEX SODIUM 750 MG: 500 TABLET, FILM COATED, EXTENDED RELEASE ORAL at 08:36

## 2024-06-15 RX ADMIN — NICOTINE POLACRILEX 2 MG: 2 LOZENGE ORAL at 21:29

## 2024-06-15 RX ADMIN — Medication 10 ML: at 21:35

## 2024-06-15 RX ADMIN — IPRATROPIUM BROMIDE AND ALBUTEROL SULFATE 1 DOSE: 2.5; .5 SOLUTION RESPIRATORY (INHALATION) at 20:16

## 2024-06-15 RX ADMIN — ALPRAZOLAM 1 MG: 1 TABLET ORAL at 07:06

## 2024-06-15 RX ADMIN — NICOTINE POLACRILEX 2 MG: 2 LOZENGE ORAL at 17:44

## 2024-06-15 RX ADMIN — ENOXAPARIN SODIUM 30 MG: 100 INJECTION SUBCUTANEOUS at 08:36

## 2024-06-15 RX ADMIN — AMPICILLIN AND SULBACTAM 3000 MG: 2; 1 INJECTION, POWDER, FOR SOLUTION INTRAVENOUS at 21:41

## 2024-06-15 RX ADMIN — AMPICILLIN AND SULBACTAM 3000 MG: 2; 1 INJECTION, POWDER, FOR SOLUTION INTRAVENOUS at 11:04

## 2024-06-15 RX ADMIN — NICOTINE POLACRILEX 2 MG: 2 LOZENGE ORAL at 13:00

## 2024-06-15 RX ADMIN — POLYETHYLENE GLYCOL (3350) 17 G: 17 POWDER, FOR SOLUTION ORAL at 21:28

## 2024-06-15 RX ADMIN — AMPICILLIN AND SULBACTAM 3000 MG: 2; 1 INJECTION, POWDER, FOR SOLUTION INTRAVENOUS at 16:57

## 2024-06-15 RX ADMIN — ZOLPIDEM TARTRATE 10 MG: 5 TABLET ORAL at 21:28

## 2024-06-15 RX ADMIN — IPRATROPIUM BROMIDE AND ALBUTEROL SULFATE 1 DOSE: 2.5; .5 SOLUTION RESPIRATORY (INHALATION) at 11:48

## 2024-06-15 RX ADMIN — FAMOTIDINE 20 MG: 20 TABLET ORAL at 08:36

## 2024-06-15 RX ADMIN — WATER 40 MG: 1 INJECTION INTRAMUSCULAR; INTRAVENOUS; SUBCUTANEOUS at 08:36

## 2024-06-15 RX ADMIN — POLYETHYLENE GLYCOL (3350) 17 G: 17 POWDER, FOR SOLUTION ORAL at 08:45

## 2024-06-15 RX ADMIN — ENOXAPARIN SODIUM 30 MG: 100 INJECTION SUBCUTANEOUS at 21:27

## 2024-06-15 RX ADMIN — DOCUSATE SODIUM 100 MG: 100 CAPSULE, LIQUID FILLED ORAL at 11:08

## 2024-06-15 RX ADMIN — LEVOTHYROXINE SODIUM 75 MCG: 0.05 TABLET ORAL at 07:06

## 2024-06-15 RX ADMIN — NICOTINE POLACRILEX 2 MG: 2 LOZENGE ORAL at 08:36

## 2024-06-15 RX ADMIN — METHADONE HYDROCHLORIDE 100 MG: 10 TABLET ORAL at 08:35

## 2024-06-15 RX ADMIN — NICOTINE POLACRILEX 2 MG: 2 LOZENGE ORAL at 19:45

## 2024-06-15 RX ADMIN — Medication 10 ML: at 08:38

## 2024-06-15 RX ADMIN — MIDODRINE HYDROCHLORIDE 5 MG: 5 TABLET ORAL at 08:36

## 2024-06-15 RX ADMIN — PREGABALIN 150 MG: 100 CAPSULE ORAL at 08:36

## 2024-06-15 RX ADMIN — DIVALPROEX SODIUM 750 MG: 500 TABLET, FILM COATED, EXTENDED RELEASE ORAL at 21:28

## 2024-06-15 RX ADMIN — DOCUSATE SODIUM 100 MG: 100 CAPSULE, LIQUID FILLED ORAL at 21:28

## 2024-06-15 RX ADMIN — MIDODRINE HYDROCHLORIDE 5 MG: 5 TABLET ORAL at 16:58

## 2024-06-15 RX ADMIN — SODIUM CHLORIDE: 9 INJECTION, SOLUTION INTRAVENOUS at 21:40

## 2024-06-15 RX ADMIN — WATER 40 MG: 1 INJECTION INTRAMUSCULAR; INTRAVENOUS; SUBCUTANEOUS at 21:29

## 2024-06-15 RX ADMIN — PREGABALIN 150 MG: 100 CAPSULE ORAL at 21:27

## 2024-06-15 RX ADMIN — ALPRAZOLAM 1 MG: 1 TABLET ORAL at 17:44

## 2024-06-15 RX ADMIN — MIDODRINE HYDROCHLORIDE 5 MG: 5 TABLET ORAL at 11:08

## 2024-06-15 RX ADMIN — AMPICILLIN AND SULBACTAM 3000 MG: 2; 1 INJECTION, POWDER, FOR SOLUTION INTRAVENOUS at 04:52

## 2024-06-15 NOTE — FLOWSHEET NOTE
06/14/24 2324   Vital Signs   Temp 97.5 °F (36.4 °C)   Temp Source Oral   Pulse 94   Heart Rate Source Monitor   Respirations 18   /66   MAP (Calculated) 82   BP Location Left upper arm   BP Method Automatic   Oxygen Therapy   SpO2 96 %   O2 Device Nasal cannula   O2 Flow Rate (L/min) 2 L/min     Reassessment complete.  No changes noted.  Pt resting quietly with eyes closed.  Call light within reach. Bed exit alarm on. Telesitter in place for pt safety.

## 2024-06-15 NOTE — FLOWSHEET NOTE
06/14/24 2007   Vital Signs   Temp 97.6 °F (36.4 °C)   Temp Source Oral   Pulse 96   Heart Rate Source Monitor   Respirations 18   /69   MAP (Calculated) 91   BP Location Right lower arm   BP Method Automatic   Oxygen Therapy   SpO2 97 %   O2 Device Nasal cannula   O2 Flow Rate (L/min) 2 L/min     Pt assessment complete.  Pt sitting up in bed quietly.  Pt calm and cooperative.  Lung sounds diminished.  Pt on 02 2liters via nasal canula.  Pt NSR per monitor with HR of 96.  IV fluids infusing at 75ml/hr.  Nightly medications given. Sitter in place for pt safety.  Snack provided.  No other needs at this time. Call light within reach.  Bed exit alarm on.

## 2024-06-16 LAB
ANION GAP SERPL CALCULATED.3IONS-SCNC: 8 MMOL/L (ref 3–16)
BASOPHILS # BLD: 0 K/UL (ref 0–0.2)
BASOPHILS NFR BLD: 0.1 %
BUN SERPL-MCNC: 11 MG/DL (ref 7–20)
CALCIUM SERPL-MCNC: 9.3 MG/DL (ref 8.3–10.6)
CHLORIDE SERPL-SCNC: 99 MMOL/L (ref 99–110)
CO2 SERPL-SCNC: 33 MMOL/L (ref 21–32)
CREAT SERPL-MCNC: 0.7 MG/DL (ref 0.6–1.1)
DEPRECATED RDW RBC AUTO: 16.7 % (ref 12.4–15.4)
EOSINOPHIL # BLD: 0 K/UL (ref 0–0.6)
EOSINOPHIL NFR BLD: 0 %
GFR SERPLBLD CREATININE-BSD FMLA CKD-EPI: >90 ML/MIN/{1.73_M2}
GLUCOSE SERPL-MCNC: 128 MG/DL (ref 70–99)
HCT VFR BLD AUTO: 31.3 % (ref 36–48)
HGB BLD-MCNC: 10.2 G/DL (ref 12–16)
LYMPHOCYTES # BLD: 1.2 K/UL (ref 1–5.1)
LYMPHOCYTES NFR BLD: 6.5 %
MCH RBC QN AUTO: 28.4 PG (ref 26–34)
MCHC RBC AUTO-ENTMCNC: 32.6 G/DL (ref 31–36)
MCV RBC AUTO: 87 FL (ref 80–100)
MONOCYTES # BLD: 0.5 K/UL (ref 0–1.3)
MONOCYTES NFR BLD: 3 %
NEUTROPHILS # BLD: 16.1 K/UL (ref 1.7–7.7)
NEUTROPHILS NFR BLD: 90.4 %
PLATELET # BLD AUTO: 206 K/UL (ref 135–450)
PMV BLD AUTO: 8.1 FL (ref 5–10.5)
POTASSIUM SERPL-SCNC: 4.7 MMOL/L (ref 3.5–5.1)
RBC # BLD AUTO: 3.6 M/UL (ref 4–5.2)
SODIUM SERPL-SCNC: 140 MMOL/L (ref 136–145)
WBC # BLD AUTO: 17.9 K/UL (ref 4–11)

## 2024-06-16 PROCEDURE — 2580000003 HC RX 258: Performed by: INTERNAL MEDICINE

## 2024-06-16 PROCEDURE — 6370000000 HC RX 637 (ALT 250 FOR IP): Performed by: INTERNAL MEDICINE

## 2024-06-16 PROCEDURE — 80048 BASIC METABOLIC PNL TOTAL CA: CPT

## 2024-06-16 PROCEDURE — 94761 N-INVAS EAR/PLS OXIMETRY MLT: CPT

## 2024-06-16 PROCEDURE — 94640 AIRWAY INHALATION TREATMENT: CPT

## 2024-06-16 PROCEDURE — 99233 SBSQ HOSP IP/OBS HIGH 50: CPT | Performed by: INTERNAL MEDICINE

## 2024-06-16 PROCEDURE — 6360000002 HC RX W HCPCS: Performed by: INTERNAL MEDICINE

## 2024-06-16 PROCEDURE — 94660 CPAP INITIATION&MGMT: CPT

## 2024-06-16 PROCEDURE — 85025 COMPLETE CBC W/AUTO DIFF WBC: CPT

## 2024-06-16 PROCEDURE — 2060000000 HC ICU INTERMEDIATE R&B

## 2024-06-16 PROCEDURE — 99232 SBSQ HOSP IP/OBS MODERATE 35: CPT | Performed by: INTERNAL MEDICINE

## 2024-06-16 PROCEDURE — 2700000000 HC OXYGEN THERAPY PER DAY

## 2024-06-16 RX ORDER — GUAIFENESIN 600 MG/1
600 TABLET, EXTENDED RELEASE ORAL 2 TIMES DAILY
Status: DISCONTINUED | OUTPATIENT
Start: 2024-06-16 | End: 2024-06-18 | Stop reason: HOSPADM

## 2024-06-16 RX ADMIN — AMPICILLIN AND SULBACTAM 3000 MG: 2; 1 INJECTION, POWDER, FOR SOLUTION INTRAVENOUS at 22:18

## 2024-06-16 RX ADMIN — DOCUSATE SODIUM 100 MG: 100 CAPSULE, LIQUID FILLED ORAL at 08:52

## 2024-06-16 RX ADMIN — POLYETHYLENE GLYCOL (3350) 17 G: 17 POWDER, FOR SOLUTION ORAL at 08:53

## 2024-06-16 RX ADMIN — NICOTINE POLACRILEX 2 MG: 2 LOZENGE ORAL at 21:06

## 2024-06-16 RX ADMIN — NICOTINE POLACRILEX 2 MG: 2 LOZENGE ORAL at 17:34

## 2024-06-16 RX ADMIN — METHADONE HYDROCHLORIDE 100 MG: 10 TABLET ORAL at 08:52

## 2024-06-16 RX ADMIN — AMPICILLIN AND SULBACTAM 3000 MG: 2; 1 INJECTION, POWDER, FOR SOLUTION INTRAVENOUS at 15:09

## 2024-06-16 RX ADMIN — LEVOTHYROXINE SODIUM 75 MCG: 0.05 TABLET ORAL at 05:24

## 2024-06-16 RX ADMIN — NICOTINE POLACRILEX 2 MG: 2 LOZENGE ORAL at 08:53

## 2024-06-16 RX ADMIN — AMPICILLIN AND SULBACTAM 3000 MG: 2; 1 INJECTION, POWDER, FOR SOLUTION INTRAVENOUS at 03:48

## 2024-06-16 RX ADMIN — MUPIROCIN: 20 OINTMENT TOPICAL at 21:07

## 2024-06-16 RX ADMIN — IPRATROPIUM BROMIDE AND ALBUTEROL SULFATE 1 DOSE: 2.5; .5 SOLUTION RESPIRATORY (INHALATION) at 20:28

## 2024-06-16 RX ADMIN — DIVALPROEX SODIUM 750 MG: 500 TABLET, FILM COATED, EXTENDED RELEASE ORAL at 21:07

## 2024-06-16 RX ADMIN — DIVALPROEX SODIUM 750 MG: 500 TABLET, FILM COATED, EXTENDED RELEASE ORAL at 08:52

## 2024-06-16 RX ADMIN — ENOXAPARIN SODIUM 30 MG: 100 INJECTION SUBCUTANEOUS at 08:54

## 2024-06-16 RX ADMIN — ALPRAZOLAM 1 MG: 1 TABLET ORAL at 19:45

## 2024-06-16 RX ADMIN — AMPICILLIN AND SULBACTAM 3000 MG: 2; 1 INJECTION, POWDER, FOR SOLUTION INTRAVENOUS at 08:48

## 2024-06-16 RX ADMIN — Medication 10 ML: at 21:07

## 2024-06-16 RX ADMIN — POLYETHYLENE GLYCOL (3350) 17 G: 17 POWDER, FOR SOLUTION ORAL at 21:06

## 2024-06-16 RX ADMIN — NICOTINE POLACRILEX 2 MG: 2 LOZENGE ORAL at 15:13

## 2024-06-16 RX ADMIN — SODIUM CHLORIDE: 9 INJECTION, SOLUTION INTRAVENOUS at 22:15

## 2024-06-16 RX ADMIN — QUETIAPINE FUMARATE 300 MG: 150 TABLET, EXTENDED RELEASE ORAL at 21:10

## 2024-06-16 RX ADMIN — PREGABALIN 150 MG: 100 CAPSULE ORAL at 21:07

## 2024-06-16 RX ADMIN — NICOTINE POLACRILEX 2 MG: 2 LOZENGE ORAL at 12:09

## 2024-06-16 RX ADMIN — ZOLPIDEM TARTRATE 10 MG: 5 TABLET ORAL at 21:07

## 2024-06-16 RX ADMIN — IPRATROPIUM BROMIDE AND ALBUTEROL SULFATE 1 DOSE: 2.5; .5 SOLUTION RESPIRATORY (INHALATION) at 07:40

## 2024-06-16 RX ADMIN — FAMOTIDINE 20 MG: 20 TABLET ORAL at 08:52

## 2024-06-16 RX ADMIN — NICOTINE POLACRILEX 2 MG: 2 LOZENGE ORAL at 10:08

## 2024-06-16 RX ADMIN — WATER 40 MG: 1 INJECTION INTRAMUSCULAR; INTRAVENOUS; SUBCUTANEOUS at 21:07

## 2024-06-16 RX ADMIN — WATER 40 MG: 1 INJECTION INTRAMUSCULAR; INTRAVENOUS; SUBCUTANEOUS at 08:53

## 2024-06-16 RX ADMIN — ALPRAZOLAM 1 MG: 1 TABLET ORAL at 09:01

## 2024-06-16 RX ADMIN — ALPRAZOLAM 1 MG: 1 TABLET ORAL at 15:09

## 2024-06-16 RX ADMIN — ENOXAPARIN SODIUM 30 MG: 100 INJECTION SUBCUTANEOUS at 21:06

## 2024-06-16 RX ADMIN — PREGABALIN 150 MG: 100 CAPSULE ORAL at 08:52

## 2024-06-16 RX ADMIN — SODIUM CHLORIDE: 9 INJECTION, SOLUTION INTRAVENOUS at 03:48

## 2024-06-16 RX ADMIN — DOCUSATE SODIUM 100 MG: 100 CAPSULE, LIQUID FILLED ORAL at 21:07

## 2024-06-16 NOTE — FLOWSHEET NOTE
06/15/24 2330   Vital Signs   Temp 97.2 °F (36.2 °C)   Temp Source Oral   Pulse 64   Heart Rate Source Monitor   Respirations 18   /70   MAP (Calculated) 92   BP Location Left lower arm   BP Method Automatic   Patient Position Supine   Oxygen Therapy   SpO2 96 %   O2 Device PAP (positive airway pressure)   FiO2  30 %     Patient resting in bed on bipap. No S/S of acute distress noted. Call light in easy reach patient reminded to use call light for needs and assistance.

## 2024-06-17 ENCOUNTER — FOLLOWUP TELEPHONE ENCOUNTER (OUTPATIENT)
Dept: PSYCHIATRY | Age: 44
End: 2024-06-17

## 2024-06-17 LAB
BACTERIA BLD CULT ORG #2: NORMAL
BACTERIA BLD CULT: NORMAL

## 2024-06-17 PROCEDURE — 94640 AIRWAY INHALATION TREATMENT: CPT

## 2024-06-17 PROCEDURE — 2580000003 HC RX 258: Performed by: INTERNAL MEDICINE

## 2024-06-17 PROCEDURE — 2700000000 HC OXYGEN THERAPY PER DAY

## 2024-06-17 PROCEDURE — 94761 N-INVAS EAR/PLS OXIMETRY MLT: CPT

## 2024-06-17 PROCEDURE — 6370000000 HC RX 637 (ALT 250 FOR IP): Performed by: INTERNAL MEDICINE

## 2024-06-17 PROCEDURE — 94010 BREATHING CAPACITY TEST: CPT

## 2024-06-17 PROCEDURE — 2060000000 HC ICU INTERMEDIATE R&B

## 2024-06-17 PROCEDURE — 99232 SBSQ HOSP IP/OBS MODERATE 35: CPT | Performed by: INTERNAL MEDICINE

## 2024-06-17 PROCEDURE — 6360000002 HC RX W HCPCS: Performed by: INTERNAL MEDICINE

## 2024-06-17 PROCEDURE — 94660 CPAP INITIATION&MGMT: CPT

## 2024-06-17 PROCEDURE — 99233 SBSQ HOSP IP/OBS HIGH 50: CPT | Performed by: INTERNAL MEDICINE

## 2024-06-17 RX ORDER — FUROSEMIDE 10 MG/ML
20 INJECTION INTRAMUSCULAR; INTRAVENOUS 2 TIMES DAILY
Status: DISCONTINUED | OUTPATIENT
Start: 2024-06-17 | End: 2024-06-18 | Stop reason: HOSPADM

## 2024-06-17 RX ORDER — PREDNISONE 20 MG/1
20 TABLET ORAL DAILY
Status: DISCONTINUED | OUTPATIENT
Start: 2024-06-17 | End: 2024-06-18 | Stop reason: HOSPADM

## 2024-06-17 RX ADMIN — NICOTINE POLACRILEX 2 MG: 2 LOZENGE ORAL at 16:08

## 2024-06-17 RX ADMIN — NICOTINE POLACRILEX 2 MG: 2 LOZENGE ORAL at 14:20

## 2024-06-17 RX ADMIN — QUETIAPINE FUMARATE 300 MG: 150 TABLET, EXTENDED RELEASE ORAL at 20:35

## 2024-06-17 RX ADMIN — ZOLPIDEM TARTRATE 10 MG: 5 TABLET ORAL at 20:42

## 2024-06-17 RX ADMIN — NICOTINE POLACRILEX 2 MG: 2 LOZENGE ORAL at 20:42

## 2024-06-17 RX ADMIN — MIDODRINE HYDROCHLORIDE 5 MG: 5 TABLET ORAL at 12:27

## 2024-06-17 RX ADMIN — MIDODRINE HYDROCHLORIDE 5 MG: 5 TABLET ORAL at 18:25

## 2024-06-17 RX ADMIN — PREDNISONE 20 MG: 20 TABLET ORAL at 14:18

## 2024-06-17 RX ADMIN — DIVALPROEX SODIUM 750 MG: 500 TABLET, FILM COATED, EXTENDED RELEASE ORAL at 10:26

## 2024-06-17 RX ADMIN — PREGABALIN 150 MG: 100 CAPSULE ORAL at 10:26

## 2024-06-17 RX ADMIN — FAMOTIDINE 20 MG: 20 TABLET ORAL at 10:26

## 2024-06-17 RX ADMIN — IPRATROPIUM BROMIDE AND ALBUTEROL SULFATE 1 DOSE: 2.5; .5 SOLUTION RESPIRATORY (INHALATION) at 07:22

## 2024-06-17 RX ADMIN — DIVALPROEX SODIUM 750 MG: 500 TABLET, FILM COATED, EXTENDED RELEASE ORAL at 20:35

## 2024-06-17 RX ADMIN — AMPICILLIN AND SULBACTAM 3000 MG: 2; 1 INJECTION, POWDER, FOR SOLUTION INTRAVENOUS at 16:05

## 2024-06-17 RX ADMIN — MIDODRINE HYDROCHLORIDE 5 MG: 5 TABLET ORAL at 10:26

## 2024-06-17 RX ADMIN — AMPICILLIN AND SULBACTAM 3000 MG: 2; 1 INJECTION, POWDER, FOR SOLUTION INTRAVENOUS at 05:04

## 2024-06-17 RX ADMIN — IPRATROPIUM BROMIDE AND ALBUTEROL SULFATE 1 DOSE: 2.5; .5 SOLUTION RESPIRATORY (INHALATION) at 19:47

## 2024-06-17 RX ADMIN — GUAIFENESIN 600 MG: 600 TABLET, EXTENDED RELEASE ORAL at 10:25

## 2024-06-17 RX ADMIN — Medication 10 ML: at 20:35

## 2024-06-17 RX ADMIN — POLYETHYLENE GLYCOL (3350) 17 G: 17 POWDER, FOR SOLUTION ORAL at 20:35

## 2024-06-17 RX ADMIN — METHADONE HYDROCHLORIDE 100 MG: 10 TABLET ORAL at 10:26

## 2024-06-17 RX ADMIN — NICOTINE POLACRILEX 2 MG: 2 LOZENGE ORAL at 10:26

## 2024-06-17 RX ADMIN — FUROSEMIDE 20 MG: 10 INJECTION, SOLUTION INTRAMUSCULAR; INTRAVENOUS at 18:25

## 2024-06-17 RX ADMIN — WATER 40 MG: 1 INJECTION INTRAMUSCULAR; INTRAVENOUS; SUBCUTANEOUS at 10:30

## 2024-06-17 RX ADMIN — DOCUSATE SODIUM 100 MG: 100 CAPSULE, LIQUID FILLED ORAL at 20:35

## 2024-06-17 RX ADMIN — LEVOTHYROXINE SODIUM 75 MCG: 0.05 TABLET ORAL at 05:08

## 2024-06-17 RX ADMIN — SODIUM CHLORIDE: 9 INJECTION, SOLUTION INTRAVENOUS at 05:02

## 2024-06-17 RX ADMIN — AMPICILLIN AND SULBACTAM 3000 MG: 2; 1 INJECTION, POWDER, FOR SOLUTION INTRAVENOUS at 10:35

## 2024-06-17 RX ADMIN — ENOXAPARIN SODIUM 30 MG: 100 INJECTION SUBCUTANEOUS at 10:37

## 2024-06-17 RX ADMIN — MUPIROCIN: 20 OINTMENT TOPICAL at 10:33

## 2024-06-17 RX ADMIN — POLYETHYLENE GLYCOL (3350) 17 G: 17 POWDER, FOR SOLUTION ORAL at 10:23

## 2024-06-17 RX ADMIN — SODIUM CHLORIDE: 9 INJECTION, SOLUTION INTRAVENOUS at 16:03

## 2024-06-17 RX ADMIN — PREGABALIN 150 MG: 100 CAPSULE ORAL at 20:34

## 2024-06-17 RX ADMIN — NICOTINE POLACRILEX 2 MG: 2 LOZENGE ORAL at 12:27

## 2024-06-17 RX ADMIN — AMPICILLIN AND SULBACTAM 3000 MG: 2; 1 INJECTION, POWDER, FOR SOLUTION INTRAVENOUS at 22:32

## 2024-06-17 RX ADMIN — NICOTINE POLACRILEX 2 MG: 2 LOZENGE ORAL at 18:25

## 2024-06-17 RX ADMIN — MUPIROCIN: 20 OINTMENT TOPICAL at 20:35

## 2024-06-17 RX ADMIN — ENOXAPARIN SODIUM 30 MG: 100 INJECTION SUBCUTANEOUS at 20:34

## 2024-06-17 RX ADMIN — FUROSEMIDE 20 MG: 10 INJECTION, SOLUTION INTRAMUSCULAR; INTRAVENOUS at 14:17

## 2024-06-17 RX ADMIN — ALPRAZOLAM 1 MG: 1 TABLET ORAL at 10:42

## 2024-06-17 RX ADMIN — DOCUSATE SODIUM 100 MG: 100 CAPSULE, LIQUID FILLED ORAL at 10:26

## 2024-06-17 RX ADMIN — ALPRAZOLAM 1 MG: 1 TABLET ORAL at 16:08

## 2024-06-17 RX ADMIN — Medication 10 ML: at 10:33

## 2024-06-17 RX ADMIN — GUAIFENESIN 600 MG: 600 TABLET, EXTENDED RELEASE ORAL at 01:09

## 2024-06-17 RX ADMIN — GUAIFENESIN 600 MG: 600 TABLET, EXTENDED RELEASE ORAL at 20:34

## 2024-06-17 ASSESSMENT — COPD QUESTIONNAIRES
QUESTION8_ENERGYLEVEL: 4
QUESTION6_LEAVINGHOUSE: 2
QUESTION5_HOMEACTIVITIES: 3
QUESTION1_COUGHFREQUENCY: 2
CAT_TOTALSCORE: 20
QUESTION7_SLEEPQUALITY: 3
QUESTION2_CHESTPHLEGM: 1
QUESTION4_WALKINCLINE: 3
QUESTION3_CHESTTIGHTNESS: 2

## 2024-06-17 ASSESSMENT — PAIN DESCRIPTION - FREQUENCY: FREQUENCY: CONTINUOUS

## 2024-06-17 ASSESSMENT — PAIN DESCRIPTION - DESCRIPTORS: DESCRIPTORS: ACHING

## 2024-06-17 ASSESSMENT — PAIN DESCRIPTION - LOCATION: LOCATION: BACK

## 2024-06-17 ASSESSMENT — PAIN DESCRIPTION - PAIN TYPE: TYPE: CHRONIC PAIN

## 2024-06-17 ASSESSMENT — PAIN DESCRIPTION - ONSET: ONSET: PROGRESSIVE

## 2024-06-17 ASSESSMENT — PAIN - FUNCTIONAL ASSESSMENT: PAIN_FUNCTIONAL_ASSESSMENT: ACTIVITIES ARE NOT PREVENTED

## 2024-06-17 ASSESSMENT — PAIN DESCRIPTION - ORIENTATION: ORIENTATION: LOWER

## 2024-06-17 ASSESSMENT — PAIN SCALES - GENERAL: PAINLEVEL_OUTOF10: 4

## 2024-06-17 NOTE — ACP (ADVANCE CARE PLANNING)
Advance Care Planning     General Advance Care Planning (ACP) Conversation    Date of Conversation: 6/17/2024  Conducted with: Patient with Decision Making Capacity  Other persons present: None    Healthcare Decision Maker:   Primary Decision Maker: Talia Blanchard - Child    Secondary Decision Maker: Apolinar Blanchardald - Parent - [phone]    Secondary Decision Maker: Luis Miguel Blanchardie - Brother/Sister - [phone]  Click here to complete Healthcare Decision Makers including selection of the Healthcare Decision Maker Relationship (ie \"Primary\").       Content/Action Overview:  DECLINED ACP Conversation - will revisit periodically  Reviewed DNR/DNI and patient elects Full Code (Attempt Resuscitation)        Length of Voluntary ACP Conversation in minutes:  <16 minutes (Non-Billable)    Óscar Torres RN

## 2024-06-17 NOTE — CARE COORDINATION
/24  OT AM-PAC:   /24    Family can provide assistance at DC: Yes  Would you like Case Management to discuss the discharge plan with any other family members/significant others, and if so, who? No  Plans to Return to Present Housing: Yes  Other Identified Issues/Barriers to RETURNING to current housing: na  Potential Assistance needed at discharge: N/A            Potential DME:    Patient expects to discharge to: Apartment  Plan for transportation at discharge:      Financial    Payor: [facility] / Plan: [facility] MEDICAID / Product Type: *No Product type* /     Does insurance require precert for SNF: Yes    Potential assistance Purchasing Medications: No  Meds-to-Beds request: Yes      [id number] - [place], [place] - [address] - P [phone] - F [phone]  [address] [facility] [address]  Phone: [phone] Fax: [phone]    [id number] [place], [place] - [address] - P [phone] - F [phone]  [address] [facility] [address]  Phone: [phone] Fax: [phone]    [id number] - [place], [place] - [address] - P [phone] - F [phone]  [address]  [facility] [address]  Phone: [phone] Fax: [phone]      Notes:    Factors facilitating achievement of predicted outcomes: Cooperative and Pleasant    Barriers to discharge: asp pneu    Additional Case Management Notes: Reviewed chart and met with pt at bedside. Role of CM explained. IPTA. Lives home alone. Has [facility] SN and may need to go home with home O2. Goes to methadone clinic    The Plan for Transition of Care is related to the following treatment goals of Aspiration pneumonitis (HCC) [J69.0]  Acute respiratory failure with hypoxia and hypercapnia (HCC) [J96.01, J96.02]  Altered mental status, unspecified altered mental status type [R41.82]  Acute on chronic respiratory failure with hypoxia and hypercapnia (HCC) [J96.21, J96.22]    IF APPLICABLE: The

## 2024-06-17 NOTE — FLOWSHEET NOTE
06/17/24 1918   Handoff   Communication Given Shift Handoff   Handoff Given To CECI Levy   Handoff Received From CECI Hawk   Handoff Communication Face to Face   Time Handoff Given 1918   End of Shift Check Performed Yes     EOS report given to CECI Levy. Pt in bed, call light within reach. Pt is stable, care is transferred.

## 2024-06-17 NOTE — DISCHARGE INSTR - COC
M48.062    S/P lumbar spinal fusion Z98.1    Abnormal laboratory test result R89.9    Acute lateral meniscal tear, right, initial encounter S83.281A    Adrenal insufficiency ([facility]) E27.40    Allergic rhinitis J30.9    Anxiety F41.9    Asymptomatic bacteriuria R82.71    Attention deficit hyperactivity disorder F90.9    Blurring of visual image H53.8    Borderline personality disorder ([facility]) F60.3    Cervical radiculopathy M54.12    Chronic diarrhea K52.9    Chronic back pain M54.9, G89.29    Chronic pain of both knees M25.561, M25.562, G89.29    Cocaine abuse in remission ([facility]) F14.11    Cystitis N30.90    DDD (degenerative disc disease), lumbar M51.36    Diabetes mellitus ([facility]) E11.9    Drug overdose T50.901A    Fibromyalgia M79.7    Generalized abdominal pain R10.84    Glenoid labral tear, left, initial encounter S43.432A    History of ulcerative colitis Z87.19    Hypermobility syndrome M35.7    Hyponatremia E87.1    Hyposmolality and/or hyponatremia E87.1    Hyposomnia, insomnia, or sleeplessness associated with conditioned arousal F51.03    Insomnia due to medical condition G47.01    Migraine G43.909    Left hip pain M25.552    Left upper arm pain M79.622    Low serum cortisol level R79.89    Lumbar spondylosis M47.816    Bipolar disorder ([facility]) F31.9    Metabolic syndrome E88.810    Mood and affect disturbance R45.86    Muscle spasm M62.838    Myalgia and myositis [id number]    Neuropathic pain M79.2    Opioid overdose ([facility]) T40.2X1A    Pain management contract signed Z02.89    Pain of both hip joints M25.551, M25.552    Pityriasis versicolor B36.0    Polysubstance dependence including opioid type drug, episodic abuse ([facility]) F11.20, F19.20    Postnasal drip R09.82    Pregnancy examination or test, positive result Z32.01    Suicidal ideations R45.851    Suicide and self-inflicted poisoning by barbiturates ([facility]) T42.3X2A    TCA (tricyclic antidepressant) overdose of undetermined intent T43.014A    Tobacco abuse Z72.0

## 2024-06-17 NOTE — FLOWSHEET NOTE
06/17/24 0005   Vital Signs   Temp 97.2 °F (36.2 °C)   Temp Source Oral   Pulse 81   Heart Rate Source Monitor   Respirations 20   /73   MAP (Calculated) 92   BP Location Left upper arm   Patient Position Semi fowlers   Oxygen Therapy   SpO2 94 %   O2 Device High flow nasal cannula   O2 Flow Rate (L/min) 2 L/min   Rhythm Interpretation   Cardiac Rhythm Sinus rhythm     Patient resting in bed. Patient is A/O x4. [place] warm and dry. Call light in easy reach

## 2024-06-17 NOTE — FLOWSHEET NOTE
06/17/24 0735   Vital Signs   Temp 97.2 °F (36.2 °C)   Temp Source Oral   Pulse 66   Heart Rate Source Monitor   Respirations 19   /67   MAP (Calculated) 87   BP Location Left upper arm   BP Method Automatic   Patient Position Semi fowlers     Assessment & vital signs completed. Morning meds given per MAR. Pt requesting Xanax for anxiety-given. Pt denies any further needs at this time. Call light within reach, pt is stable.

## 2024-06-18 ENCOUNTER — FOLLOWUP TELEPHONE ENCOUNTER (OUTPATIENT)
Dept: PSYCHIATRY | Age: 44
End: 2024-06-18

## 2024-06-18 VITALS
OXYGEN SATURATION: 91 % | DIASTOLIC BLOOD PRESSURE: 88 MMHG | TEMPERATURE: 97.5 F | SYSTOLIC BLOOD PRESSURE: 115 MMHG | RESPIRATION RATE: 20 BRPM | HEIGHT: 64 IN | HEART RATE: 107 BPM | BODY MASS INDEX: 41.23 KG/M2 | WEIGHT: 241.5 LBS

## 2024-06-18 LAB
ANION GAP SERPL CALCULATED.3IONS-SCNC: 10 MMOL/L (ref 3–16)
ANISOCYTOSIS BLD QL SMEAR: ABNORMAL
BASOPHILS # BLD: 0 K/UL (ref 0–0.2)
BASOPHILS NFR BLD: 0 %
BUN SERPL-MCNC: 20 MG/DL (ref 7–20)
CALCIUM SERPL-MCNC: 9.7 MG/DL (ref 8.3–10.6)
CHLORIDE SERPL-SCNC: 92 MMOL/L (ref 99–110)
CO2 SERPL-SCNC: 36 MMOL/L (ref 21–32)
CREAT SERPL-MCNC: 1 MG/DL (ref 0.6–1.1)
DEPRECATED RDW RBC AUTO: 16.5 % (ref 12.4–15.4)
EOSINOPHIL # BLD: 0 K/UL (ref 0–0.6)
EOSINOPHIL NFR BLD: 0 %
GFR SERPLBLD CREATININE-BSD FMLA CKD-EPI: 70 ML/MIN/{1.73_M2}
GLUCOSE SERPL-MCNC: 72 MG/DL (ref 70–99)
HCT VFR BLD AUTO: 36.6 % (ref 36–48)
HGB BLD-MCNC: 11.9 G/DL (ref 12–16)
LYMPHOCYTES # BLD: 5.5 K/UL (ref 1–5.1)
LYMPHOCYTES NFR BLD: 39 %
MCH RBC QN AUTO: 28.4 PG (ref 26–34)
MCHC RBC AUTO-ENTMCNC: 32.7 G/DL (ref 31–36)
MCV RBC AUTO: 87 FL (ref 80–100)
MONOCYTES # BLD: 0.3 K/UL (ref 0–1.3)
MONOCYTES NFR BLD: 2 %
MYELOCYTES NFR BLD MANUAL: 1 %
NEUTROPHILS # BLD: 7.9 K/UL (ref 1.7–7.7)
NEUTROPHILS NFR BLD: 55 %
NEUTS BAND NFR BLD MANUAL: 2 % (ref 0–7)
NRBC BLD-RTO: 3 /100 WBC
PLATELET # BLD AUTO: 261 K/UL (ref 135–450)
PLATELET BLD QL SMEAR: ADEQUATE
PMV BLD AUTO: 7.9 FL (ref 5–10.5)
POLYCHROMASIA BLD QL SMEAR: ABNORMAL
POTASSIUM SERPL-SCNC: 3.8 MMOL/L (ref 3.5–5.1)
RBC # BLD AUTO: 4.21 M/UL (ref 4–5.2)
SLIDE REVIEW: ABNORMAL
SODIUM SERPL-SCNC: 138 MMOL/L (ref 136–145)
VARIANT LYMPHS NFR BLD MANUAL: 1 % (ref 0–6)
WBC # BLD AUTO: 13.7 K/UL (ref 4–11)

## 2024-06-18 PROCEDURE — 6370000000 HC RX 637 (ALT 250 FOR IP): Performed by: INTERNAL MEDICINE

## 2024-06-18 PROCEDURE — 94761 N-INVAS EAR/PLS OXIMETRY MLT: CPT

## 2024-06-18 PROCEDURE — 99232 SBSQ HOSP IP/OBS MODERATE 35: CPT | Performed by: INTERNAL MEDICINE

## 2024-06-18 PROCEDURE — 94660 CPAP INITIATION&MGMT: CPT

## 2024-06-18 PROCEDURE — 2580000003 HC RX 258: Performed by: INTERNAL MEDICINE

## 2024-06-18 PROCEDURE — 6360000002 HC RX W HCPCS: Performed by: INTERNAL MEDICINE

## 2024-06-18 PROCEDURE — 80048 BASIC METABOLIC PNL TOTAL CA: CPT

## 2024-06-18 PROCEDURE — 94640 AIRWAY INHALATION TREATMENT: CPT

## 2024-06-18 PROCEDURE — 36415 COLL VENOUS BLD VENIPUNCTURE: CPT

## 2024-06-18 PROCEDURE — 85025 COMPLETE CBC W/AUTO DIFF WBC: CPT

## 2024-06-18 RX ORDER — GUAIFENESIN 600 MG/1
600 TABLET, EXTENDED RELEASE ORAL 2 TIMES DAILY
Qty: 30 TABLET | Refills: 0 | Status: SHIPPED | OUTPATIENT
Start: 2024-06-18

## 2024-06-18 RX ORDER — LACTOBACILLUS RHAMNOSUS GG 10B CELL
1 CAPSULE ORAL 2 TIMES DAILY
Qty: 30 CAPSULE | Refills: 0 | Status: SHIPPED | OUTPATIENT
Start: 2024-06-18

## 2024-06-18 RX ORDER — POTASSIUM CHLORIDE 750 MG/1
10 TABLET, EXTENDED RELEASE ORAL DAILY
Qty: 30 TABLET | Refills: 1 | Status: SHIPPED | OUTPATIENT
Start: 2024-06-18

## 2024-06-18 RX ORDER — PREDNISONE 10 MG/1
10 TABLET ORAL DAILY
Qty: 4 TABLET | Refills: 0 | Status: SHIPPED | OUTPATIENT
Start: 2024-06-19 | End: 2024-06-23

## 2024-06-18 RX ORDER — DIVALPROEX SODIUM 250 MG/1
750 TABLET, EXTENDED RELEASE ORAL 2 TIMES DAILY
Qty: 180 TABLET | Refills: 1 | Status: SHIPPED | OUTPATIENT
Start: 2024-06-18

## 2024-06-18 RX ORDER — AMOXICILLIN AND CLAVULANATE POTASSIUM 875; 125 MG/1; MG/1
1 TABLET, FILM COATED ORAL 2 TIMES DAILY
Qty: 14 TABLET | Refills: 0 | Status: SHIPPED | OUTPATIENT
Start: 2024-06-18 | End: 2024-06-25

## 2024-06-18 RX ORDER — QUETIAPINE 300 MG/1
300 TABLET, FILM COATED, EXTENDED RELEASE ORAL NIGHTLY
Qty: 30 TABLET | Refills: 1 | Status: SHIPPED | OUTPATIENT
Start: 2024-06-18

## 2024-06-18 RX ORDER — LACTOBACILLUS RHAMNOSUS GG 10B CELL
1 CAPSULE ORAL 2 TIMES DAILY
Qty: 30 CAPSULE | Refills: 0 | Status: SHIPPED | OUTPATIENT
Start: 2024-06-18 | End: 2024-06-18

## 2024-06-18 RX ORDER — PSEUDOEPHEDRINE HCL 30 MG
100 TABLET ORAL 2 TIMES DAILY
Qty: 60 CAPSULE | Refills: 1 | Status: SHIPPED | OUTPATIENT
Start: 2024-06-18

## 2024-06-18 RX ORDER — FUROSEMIDE 40 MG/1
40 TABLET ORAL DAILY
Qty: 30 TABLET | Refills: 1 | Status: SHIPPED | OUTPATIENT
Start: 2024-06-18

## 2024-06-18 RX ORDER — BUDESONIDE AND FORMOTEROL FUMARATE DIHYDRATE 160; 4.5 UG/1; UG/1
2 AEROSOL RESPIRATORY (INHALATION) 2 TIMES DAILY
Qty: 1 EACH | Refills: 1 | Status: SHIPPED | OUTPATIENT
Start: 2024-06-18 | End: 2024-07-18

## 2024-06-18 RX ADMIN — NICOTINE POLACRILEX 2 MG: 2 LOZENGE ORAL at 11:57

## 2024-06-18 RX ADMIN — PREDNISONE 20 MG: 20 TABLET ORAL at 08:18

## 2024-06-18 RX ADMIN — DOCUSATE SODIUM 100 MG: 100 CAPSULE, LIQUID FILLED ORAL at 08:19

## 2024-06-18 RX ADMIN — METHADONE HYDROCHLORIDE 100 MG: 10 TABLET ORAL at 08:17

## 2024-06-18 RX ADMIN — NICOTINE POLACRILEX 2 MG: 2 LOZENGE ORAL at 09:53

## 2024-06-18 RX ADMIN — NICOTINE POLACRILEX 2 MG: 2 LOZENGE ORAL at 01:24

## 2024-06-18 RX ADMIN — DIVALPROEX SODIUM 750 MG: 500 TABLET, FILM COATED, EXTENDED RELEASE ORAL at 08:18

## 2024-06-18 RX ADMIN — ONDANSETRON 4 MG: 4 TABLET, ORALLY DISINTEGRATING ORAL at 09:53

## 2024-06-18 RX ADMIN — MIDODRINE HYDROCHLORIDE 5 MG: 5 TABLET ORAL at 11:57

## 2024-06-18 RX ADMIN — ALPRAZOLAM 1 MG: 1 TABLET ORAL at 08:29

## 2024-06-18 RX ADMIN — FAMOTIDINE 20 MG: 20 TABLET ORAL at 08:19

## 2024-06-18 RX ADMIN — ENOXAPARIN SODIUM 30 MG: 100 INJECTION SUBCUTANEOUS at 08:17

## 2024-06-18 RX ADMIN — GUAIFENESIN 600 MG: 600 TABLET, EXTENDED RELEASE ORAL at 08:19

## 2024-06-18 RX ADMIN — MIDODRINE HYDROCHLORIDE 5 MG: 5 TABLET ORAL at 08:19

## 2024-06-18 RX ADMIN — AMPICILLIN AND SULBACTAM 3000 MG: 2; 1 INJECTION, POWDER, FOR SOLUTION INTRAVENOUS at 05:07

## 2024-06-18 RX ADMIN — FUROSEMIDE 20 MG: 10 INJECTION, SOLUTION INTRAMUSCULAR; INTRAVENOUS at 08:19

## 2024-06-18 RX ADMIN — Medication 10 ML: at 08:20

## 2024-06-18 RX ADMIN — LEVOTHYROXINE SODIUM 75 MCG: 0.05 TABLET ORAL at 08:17

## 2024-06-18 RX ADMIN — PREGABALIN 150 MG: 100 CAPSULE ORAL at 08:19

## 2024-06-18 RX ADMIN — IPRATROPIUM BROMIDE AND ALBUTEROL SULFATE 1 DOSE: 2.5; .5 SOLUTION RESPIRATORY (INHALATION) at 07:31

## 2024-06-18 RX ADMIN — NICOTINE POLACRILEX 2 MG: 2 LOZENGE ORAL at 08:29

## 2024-06-18 RX ADMIN — ALPRAZOLAM 1 MG: 1 TABLET ORAL at 01:24

## 2024-06-18 RX ADMIN — AMPICILLIN AND SULBACTAM 3000 MG: 2; 1 INJECTION, POWDER, FOR SOLUTION INTRAVENOUS at 11:58

## 2024-06-18 ASSESSMENT — PAIN DESCRIPTION - FREQUENCY
FREQUENCY: CONTINUOUS

## 2024-06-18 ASSESSMENT — PAIN SCALES - GENERAL
PAINLEVEL_OUTOF10: 10
PAINLEVEL_OUTOF10: 4
PAINLEVEL_OUTOF10: 0
PAINLEVEL_OUTOF10: 0

## 2024-06-18 ASSESSMENT — PAIN DESCRIPTION - DESCRIPTORS
DESCRIPTORS: SHARP
DESCRIPTORS: ACHING
DESCRIPTORS: SHARP
DESCRIPTORS: SHARP
DESCRIPTORS: ACHING

## 2024-06-18 ASSESSMENT — PAIN DESCRIPTION - ORIENTATION
ORIENTATION: LOWER

## 2024-06-18 ASSESSMENT — PAIN - FUNCTIONAL ASSESSMENT
PAIN_FUNCTIONAL_ASSESSMENT: ACTIVITIES ARE NOT PREVENTED

## 2024-06-18 ASSESSMENT — PAIN DESCRIPTION - LOCATION
LOCATION: BACK;KNEE
LOCATION: BACK
LOCATION: BACK;KNEE
LOCATION: BACK
LOCATION: BACK;KNEE
LOCATION: BACK;KNEE

## 2024-06-18 ASSESSMENT — PAIN DESCRIPTION - PAIN TYPE
TYPE: CHRONIC PAIN

## 2024-06-18 ASSESSMENT — PAIN DESCRIPTION - ONSET
ONSET: PROGRESSIVE

## 2024-06-18 NOTE — PLAN OF CARE
Problem: Safety - Adult  Goal: Free from fall injury  Outcome: Progressing     Problem: Discharge Planning  Goal: Discharge to home or other facility with appropriate resources  Outcome: Progressing     Problem: Pain  Goal: Verbalizes/displays adequate comfort level or baseline comfort level  Outcome: Progressing     Problem: Chronic Conditions and Co-morbidities  Goal: Patient's chronic conditions and co-morbidity symptoms are monitored and maintained or improved  Outcome: Progressing     Problem: Respiratory - Adult  Goal: Achieves optimal ventilation and oxygenation  Outcome: Progressing     
  Problem: Self Harm/Suicidality  Goal: Will have no self-injury during hospital stay  Description: INTERVENTIONS:  1.  Ensure constant observer at bedside with Q15M safety checks  2.  Maintain a safe environment  3.  Secure patient belongings  4.  Ensure family/visitors adhere to safety recommendations  5.  Ensure safety tray has been added to patient's diet order  6.  Every shift and PRN: Re-assess suicidal risk via Frequent Screener    6/17/2024 2221 by Mildred Leroy RN  Outcome: Progressing  6/17/2024 1049 by Carine Church RN  Outcome: Progressing     Problem: Safety - Adult  Goal: Free from fall injury  6/17/2024 2221 by Mildred Leroy RN  Outcome: Progressing  6/17/2024 1049 by Carine Church RN  Outcome: Progressing     Problem: Discharge Planning  Goal: Discharge to home or other facility with appropriate resources  6/17/2024 2221 by Mildred Leroy RN  Outcome: Progressing  6/17/2024 1049 by Carine Church RN  Outcome: Progressing     Problem: Pain  Goal: Verbalizes/displays adequate comfort level or baseline comfort level  6/17/2024 2221 by Mildred Leroy RN  Outcome: Progressing  6/17/2024 1049 by Carine Church RN  Outcome: Progressing     Problem: Chronic Conditions and Co-morbidities  Goal: Patient's chronic conditions and co-morbidity symptoms are monitored and maintained or improved  6/17/2024 2221 by Mildred Leroy RN  Outcome: Progressing  6/17/2024 1049 by Carine Church RN  Outcome: Progressing     Problem: Respiratory - Adult  Goal: Achieves optimal ventilation and oxygenation  6/17/2024 2221 by Mildred Leroy RN  Outcome: Progressing  6/17/2024 1049 by Carine Church RN  Outcome: Progressing     
  Problem: Self Harm/Suicidality  Goal: Will have no self-injury during hospital stay  Description: INTERVENTIONS:  1.  Ensure constant observer at bedside with Q15M safety checks  2.  Maintain a safe environment  3.  Secure patient belongings  4.  Ensure family/visitors adhere to safety recommendations  5.  Ensure safety tray has been added to patient's diet order  6.  Every shift and PRN: Re-assess suicidal risk via Frequent Screener    Outcome: Adequate for Discharge     Problem: Safety - Adult  Goal: Free from fall injury  Outcome: Progressing     Problem: Discharge Planning  Goal: Discharge to home or other facility with appropriate resources  Outcome: Progressing     Problem: Pain  Goal: Verbalizes/displays adequate comfort level or baseline comfort level  Outcome: Adequate for Discharge     Problem: Chronic Conditions and Co-morbidities  Goal: Patient's chronic conditions and co-morbidity symptoms are monitored and maintained or improved  Outcome: Progressing     Problem: Respiratory - Adult  Goal: Achieves optimal ventilation and oxygenation  Outcome: Progressing     
  Problem: Self Harm/Suicidality  Goal: Will have no self-injury during hospital stay  Description: INTERVENTIONS:  1.  Ensure constant observer at bedside with Q15M safety checks  2.  Maintain a safe environment  3.  Secure patient belongings  4.  Ensure family/visitors adhere to safety recommendations  5.  Ensure safety tray has been added to patient's diet order  6.  Every shift and PRN: Re-assess suicidal risk via Frequent Screener    Outcome: Progressing     Problem: Safety - Adult  Goal: Free from fall injury  Outcome: Progressing     Problem: Discharge Planning  Goal: Discharge to home or other facility with appropriate resources  Outcome: Progressing     Problem: Pain  Goal: Verbalizes/displays adequate comfort level or baseline comfort level  Outcome: Progressing     Problem: Chronic Conditions and Co-morbidities  Goal: Patient's chronic conditions and co-morbidity symptoms are monitored and maintained or improved  Outcome: Progressing     Problem: Respiratory - Adult  Goal: Achieves optimal ventilation and oxygenation  Outcome: Progressing     
  Problem: Self Harm/Suicidality  Goal: Will have no self-injury during hospital stay  Description: INTERVENTIONS:  1.  Ensure constant observer at bedside with Q15M safety checks  2.  Maintain a safe environment  3.  Secure patient belongings  4.  Ensure family/visitors adhere to safety recommendations  5.  Ensure safety tray has been added to patient's diet order  6.  Every shift and PRN: Re-assess suicidal risk via Frequent Screener    Outcome: Progressing     Problem: Safety - Adult  Goal: Free from fall injury  Outcome: Progressing     Problem: Discharge Planning  Goal: Discharge to home or other facility with appropriate resources  Outcome: Progressing     Problem: Pain  Goal: Verbalizes/displays adequate comfort level or baseline comfort level  Outcome: Progressing     Problem: Chronic Conditions and Co-morbidities  Goal: Patient's chronic conditions and co-morbidity symptoms are monitored and maintained or improved  Outcome: Progressing     Problem: Respiratory - Adult  Goal: Achieves optimal ventilation and oxygenation  Outcome: Progressing     
Bedside swallow evaluation completed this date.    Amber Kate M.S. CCC-SLP  Speech-language pathologist  [id number]      
Rapid response on BHI and transferred to ED    Acute hypoxic respiratory failure with hypercapnia  Placed on BiPAP  Possible seizure, lactic 4.6--trend   Neurology consulted, eeg ordered  Tachycardia- I ordered EKG and D-dimer  Troponin 21--repeat pending   Possible aspiration PNA- started on Unasyn     Home meds need verified, on Methadone       ANNA Fischer - CNP       
improvement   Collaborate with multidisciplinary team to address chronic and comorbid conditions and prevent exacerbation or deterioration

## 2024-06-18 NOTE — PROGRESS NOTES
[facility]                                 Pulmonary/Critical care  Consult /Progress Note :                                                                  CC : Worsening shortness of breath, status post rapid response, altered  Patient is being seen at the request of Stefanie Velarde for a consultation for acute hypoxic respiratory    Subjective    Shortness of breath has improved slot  On RA now  Less cough   Stated has multiple pneumonia last 12-month  Feels a lot better    PHYSICAL EXAM:  Vitals:    06/18/24 1045   BP: 115/88   Pulse: (!) 107   Resp: 20   Temp: 97.5 °F (36.4 °C)   SpO2: 91%     Gen: No distress.   Eyes: PERRL. No sclera icterus. No conjunctival injection.   ENT: No discharge. Pharynx clear.   Neck: Trachea midline. No obvious mass.    Resp: No accessory muscle use. No crackles. No wheezes. No rhonchi. No dullness on percussion.  CV: Regular rate. Regular rhythm. No murmur or rub. No edema. Peripheral pulses are 2+.  Capillary refill is less than 3 seconds.  GI: Non-tender. Non-distended. No hernia.   Skin: Warm and dry. No nodule on exposed extremities.   Lymph: No cervical LAD. No supraclavicular LAD.   M/S: No cyanosis. No joint deformity. No clubbing.   Neuro: Awake. Alert. Moves all four extremities.   Psych: Oriented x 3. No anxiety.     LABS:  CBC:   Recent Labs     06/16/24  0531 06/18/24  0528   WBC 17.9* 13.7*   HGB 10.2* 11.9*   HCT 31.3* 36.6   MCV 87.0 87.0    261       BMP:   Recent Labs     06/16/24  0531 06/18/24  0528    138   K 4.7 3.8   CL 99 92*   CO2 33* 36*   BUN 11 20   CREATININE 0.7 1.0       No results for input(s): \"PROTIME\", \"INR\" in the last 72 hours.        Microbiology:  Pending    Imaging:  Chest imaging was reviewed by me and showed     ASSESSMENT:  Acute hypoxic risk  Possible seizure  Possible  History of as  On benzos and history of methadone and tricyclic 
                                                  [facility]                                 Pulmonary/Critical care  Consult /Progress Note :                                                                  CC : Worsening shortness of breath, status post rapid response, altered  Patient is being seen at the request of Stefanie Velarde for a consultation for acute hypoxic respiratory    Subjective  She has been doing better with better  States shortness of breath has  Weaning oxygen  Stated has multiple pneumonia last 12-month    Followed by Dr. Christianson    PHYSICAL EXAM:  Vitals:    06/15/24 1036   BP: (!) 128/59   Pulse: 79   Resp: 18   Temp: 97.5 °F (36.4 °C)   SpO2: 95%     Gen: No distress.   Eyes: PERRL. No sclera icterus. No conjunctival injection.   ENT: No discharge. Pharynx clear.   Neck: Trachea midline. No obvious mass.    Resp: No accessory muscle use. No crackles. No wheezes. No rhonchi. No dullness on percussion.  CV: Regular rate. Regular rhythm. No murmur or rub. No edema. Peripheral pulses are 2+.  Capillary refill is less than 3 seconds.  GI: Non-tender. Non-distended. No hernia.   Skin: Warm and dry. No nodule on exposed extremities.   Lymph: No cervical LAD. No supraclavicular LAD.   M/S: No cyanosis. No joint deformity. No clubbing.   Neuro: Awake. Alert. Moves all four extremities.   Psych: Oriented x 3. No anxiety.     LABS:  CBC:   Recent Labs     06/13/24  0736 06/14/24  0506 06/15/24  0550   WBC 2.8* 19.6* 17.4*   HGB 12.6 9.5* 9.7*   HCT 38.4 29.5* 29.5*   MCV 90.9 89.2 87.5    181 168       BMP:   Recent Labs     06/13/24  0736 06/14/24  0405 06/15/24  0550   * 137 139   K 4.5 5.1 4.8   CL 94* 101 101   CO2 28 29 31   BUN 17 19 19   CREATININE 1.1 0.9 0.7       LIVER PROFILE:   Recent Labs     06/13/24  0736   AST 39*   ALT 21   BILITOT <0.2   ALKPHOS 84       PT/INR:   Recent Labs     06/13/24  1129   PROTIME 12.5   INR 0.92 
                                                  [facility]                                 Pulmonary/Critical care  Consult /Progress Note :                                                                  CC : Worsening shortness of breath, status post rapid response, altered  Patient is being seen at the request of Stefanie Velarde for a consultation for acute hypoxic respiratory    Subjective  Still with some SOB   She has been doing better with better  States shortness of breath with some cough   Weaning oxygen  Stated has multiple pneumonia last 12-month      PHYSICAL EXAM:  Vitals:    06/16/24 1941   BP: 136/78   Pulse: 77   Resp: 20   Temp: 97.1 °F (36.2 °C)   SpO2: 95%     Gen: No distress.   Eyes: PERRL. No sclera icterus. No conjunctival injection.   ENT: No discharge. Pharynx clear.   Neck: Trachea midline. No obvious mass.    Resp: No accessory muscle use. No crackles. No wheezes. No rhonchi. No dullness on percussion.  CV: Regular rate. Regular rhythm. No murmur or rub. No edema. Peripheral pulses are 2+.  Capillary refill is less than 3 seconds.  GI: Non-tender. Non-distended. No hernia.   Skin: Warm and dry. No nodule on exposed extremities.   Lymph: No cervical LAD. No supraclavicular LAD.   M/S: No cyanosis. No joint deformity. No clubbing.   Neuro: Awake. Alert. Moves all four extremities.   Psych: Oriented x 3. No anxiety.     LABS:  CBC:   Recent Labs     06/14/24  0506 06/15/24  0550 06/16/24  0531   WBC 19.6* 17.4* 17.9*   HGB 9.5* 9.7* 10.2*   HCT 29.5* 29.5* 31.3*   MCV 89.2 87.5 87.0    168 206       BMP:   Recent Labs     06/14/24  0405 06/15/24  0550 06/16/24  0531    139 140   K 5.1 4.8 4.7    101 99   CO2 29 31 33*   BUN 19 19 11   CREATININE 0.9 0.7 0.7       LIVER PROFILE:   No results for input(s): \"AST\", \"ALT\", \"LIPASE\", \"AMYLASE\", \"BILIDIR\", \"BILITOT\", \"ALKPHOS\" in the last 72 hours.    Invalid input(s): \"ALB\"    PT/INR:   No 
   06/13/24 0750   NIV Type   $NIV $Daily Charge   NIV Started/Stopped On   Mode Bilevel   Mask Type Full face mask   Mask Size Medium   Assessment   Pulse (!) 137   Respirations 18   BP (!) 87/71   SpO2 (!) 81 %   Skin Assessment Clean, dry, & intact   Breath Sounds   Breath Sounds Bilateral Diminished;Rhonchi   Settings/Measurements   PIP Observed 20 cm H20   IPAP 18 cmH20   CPAP/EPAP 10 cmH2O   Vt (Measured) 654 mL   Rate Ordered 16   Insp Rise Time (%) 3 %   FiO2  100 %   Minute Volume (L/min) 15.3 Liters   Mask Leak (lpm) 0 lpm   Patient's Home Machine No   Alarm Settings   Alarms On Y   Low Pressure (cmH2O) 8 cmH2O   High Pressure (cmH2O) 30 cmH2O   Apnea (secs) 20 secs   RR Low (bpm) 16   RR High (bpm) 40 br/min   Patient Observation   Observations no dentures.       
   06/13/24 2342   NIV Type   NIV Started/Stopped On   Mode Bilevel   Mask Type Full face mask   Mask Size Medium   Assessment   Pulse 99   Respirations 14   SpO2 96 %   Comfort Level Good   Using Accessory Muscles No   Mask Compliance Good   Skin Protection for O2 Device Yes   Location Nose   Settings/Measurements   IPAP 18 cmH20   CPAP/EPAP 10 cmH2O   Vt (Measured) 470 mL   Rate Ordered 16   FiO2  40 %   Minute Volume (L/min) 7.7 Liters   Mask Leak (lpm) 0 lpm   Patient's Home Machine No   Alarm Settings   Alarms On Y   Low Pressure (cmH2O) 8 cmH2O   High Pressure (cmH2O) 30 cmH2O   Apnea (secs) 20 secs   RR Low (bpm) 16   RR High (bpm) 40 br/min       
   06/14/24 0337   NIV Type   Equipment Type v60   Mode Bilevel   Mask Type Full face mask   Mask Size Medium   Assessment   Pulse 85   Respirations 16   SpO2 97 %   Comfort Level Good   Using Accessory Muscles No   Mask Compliance Good   Skin Protection for O2 Device Yes   Location Nose   Settings/Measurements   IPAP 18 cmH20   CPAP/EPAP 10 cmH2O   Vt (Measured) 376 mL   Rate Ordered 16   FiO2  40 %   Minute Volume (L/min) 6.2 Liters   Mask Leak (lpm) 13 lpm   Patient's Home Machine No   Alarm Settings   Alarms On Y       
   06/15/24 0028   NIV Type   $NIV $Daily Charge   Equipment Type v60   Mode Bilevel   Mask Type Full face mask   Mask Size Medium   Settings/Measurements   IPAP 18 cmH20   CPAP/EPAP 10 cmH2O   Vt (Measured) 798 mL   Rate Ordered 16   FiO2  40 %   Minute Volume (L/min) 21 Liters   Mask Leak (lpm) 1 lpm   Patient's Home Machine No   Patient Observation   Observations spo2 99% on 40% bipap       
   06/15/24 0302   NIV Type   Equipment Type v60   Mode Bilevel   Mask Type Full face mask   Mask Size Medium   Settings/Measurements   IPAP 18 cmH20   CPAP/EPAP 10 cmH2O   Vt (Measured) 599 mL   Rate Ordered 16   FiO2  (S)  30 %   Minute Volume (L/min) 8.6 Liters   Mask Leak (lpm) 22 lpm   Patient's Home Machine No   Patient Observation   Observations spo2 97% on 30% bipap       
   06/15/24 2000   RT Protocol   History Pulmonary Disease 2   Respiratory pattern 0   Breath sounds 0   Cough 0   Indications for Bronchodilator Therapy None   Bronchodilator Assessment Score 2     RT Inhaler-Nebulizer Bronchodilator Protocol Note    There is a bronchodilator order in the chart from a provider indicating to follow the RT Bronchodilator Protocol and there is an “Initiate RT Inhaler-Nebulizer Bronchodilator Protocol” order as well (see protocol at bottom of note).    CXR Findings:  No results found.    The findings from the last RT Protocol Assessment were as follows:   History Pulmonary Disease: Chronic pulmonary disease  Respiratory Pattern: Regular pattern and RR 12-20 bpm  Breath Sounds: Clear breath sounds  Cough: Strong, spontaneous, non-productive  Indication for Bronchodilator Therapy: None  Bronchodilator Assessment Score: 2    Aerosolized bronchodilator medication orders have been revised according to the RT Inhaler-Nebulizer Bronchodilator Protocol below.    Respiratory Therapist to perform RT Therapy Protocol Assessment initially then follow the protocol.  Repeat RT Therapy Protocol Assessment PRN for score 0-3 or on second treatment, BID, and PRN for scores above 3.    No Indications - adjust the frequency to every 6 hours PRN wheezing or bronchospasm, if no treatments needed after 48 hours then discontinue using Per Protocol order mode.     If indication present, adjust the RT bronchodilator orders based on the Bronchodilator Assessment Score as indicated below.  Use Inhaler orders unless patient has one or more of the following: on home nebulizer, not able to hold breath for 10 seconds, is not alert and oriented, cannot activate and use MDI correctly, or respiratory rate 25 breaths per minute or more, then use the equivalent nebulizer order(s) with same Frequency and PRN reasons based on the score.  If a patient is on this medication at home then do not decrease Frequency below that 
   06/15/24 2243   NIV Type   Equipment Type v60   Mode Bilevel   Mask Type Full face mask   Mask Size Medium   Settings/Measurements   IPAP 18 cmH20   CPAP/EPAP 10 cmH2O   Vt (Measured) 628 mL   Rate Ordered 16   FiO2  30 %   Minute Volume (L/min) 18.2 Liters   Mask Leak (lpm) 0 lpm   Patient's Home Machine No   Patient Observation   Observations spo2 95% on 30% bipap       
   06/16/24 0257   NIV Type   Equipment Type v60   Mode Bilevel   Mask Type Full face mask   Mask Size Medium   Settings/Measurements   IPAP 18 cmH20   CPAP/EPAP 10 cmH2O   Vt (Measured) 578 mL   Rate Ordered 16   FiO2  30 %   Minute Volume (L/min) 12.4 Liters   Mask Leak (lpm) 1 lpm   Patient's Home Machine No   Patient Observation   Observations spo2 96% on 30% bipap       
   06/16/24 2000   RT Protocol   History Pulmonary Disease 2   Respiratory pattern 0   Breath sounds 2   Cough 0   Indications for Bronchodilator Therapy Decreased or absent breath sounds   Bronchodilator Assessment Score 4     RT Inhaler-Nebulizer Bronchodilator Protocol Note    There is a bronchodilator order in the chart from a provider indicating to follow the RT Bronchodilator Protocol and there is an “Initiate RT Inhaler-Nebulizer Bronchodilator Protocol” order as well (see protocol at bottom of note).    CXR Findings:  No results found.    The findings from the last RT Protocol Assessment were as follows:   History Pulmonary Disease: Chronic pulmonary disease  Respiratory Pattern: Regular pattern and RR 12-20 bpm  Breath Sounds: Slightly diminished and/or crackles  Cough: Strong, spontaneous, non-productive  Indication for Bronchodilator Therapy: Decreased or absent breath sounds  Bronchodilator Assessment Score: 4    Aerosolized bronchodilator medication orders have been revised according to the RT Inhaler-Nebulizer Bronchodilator Protocol below.    Respiratory Therapist to perform RT Therapy Protocol Assessment initially then follow the protocol.  Repeat RT Therapy Protocol Assessment PRN for score 0-3 or on second treatment, BID, and PRN for scores above 3.    No Indications - adjust the frequency to every 6 hours PRN wheezing or bronchospasm, if no treatments needed after 48 hours then discontinue using Per Protocol order mode.     If indication present, adjust the RT bronchodilator orders based on the Bronchodilator Assessment Score as indicated below.  Use Inhaler orders unless patient has one or more of the following: on home nebulizer, not able to hold breath for 10 seconds, is not alert and oriented, cannot activate and use MDI correctly, or respiratory rate 25 breaths per minute or more, then use the equivalent nebulizer order(s) with same Frequency and PRN reasons based on the score.  If a patient 
   06/16/24 2242   NIV Type   NIV Started/Stopped (S)  On   Equipment Type v60   Mode Bilevel   Mask Type Full face mask   Mask Size Medium   Settings/Measurements   IPAP 18 cmH20   CPAP/EPAP 10 cmH2O   Vt (Measured) 588 mL   Rate Ordered 16   FiO2  30 %   Minute Volume (L/min) 11.7 Liters   Mask Leak (lpm) 21 lpm   Patient's Home Machine No   Alarm Settings   Alarms On Y   Low Pressure (cmH2O) 8 cmH2O   High Pressure (cmH2O) 30 cmH2O   Delay Alarm 20 sec(s)   RR Low (bpm) 16   RR High (bpm) 40 br/min       
   06/17/24 0306   NIV Type   NIV Started/Stopped On   Equipment Type v60   Mode Bilevel   Mask Type Full face mask   Mask Size Medium   Settings/Measurements   IPAP 18 cmH20   CPAP/EPAP 10 cmH2O   Vt (Measured) 568 mL   Rate Ordered 16   FiO2  30 %   Minute Volume (L/min) 11 Liters   Mask Leak (lpm) 28 lpm   Patient's Home Machine No       
  Internal Medicine ICU Progress Note      Events of Last 24 hours:     Seen in the ICU on BiPAP.  More awake and alert.  Doing better.  She is hungry.  Speech and swallow eval pending.  No fever or chills.  CTPA negative for PE.      Invasive Lines: PIV        MV: n/a    Recent Labs     24  1035   PHART 7.307*   LIQ2LRG 51.5*   PO2ART 186.4*       MV Settings:     / / /FiO2 : 40 %    IV:   sodium chloride      sodium chloride 75 mL/hr at 24 0600       Vitals:  Temp  Av.2 °F (36.8 °C)  Min: 97.3 °F (36.3 °C)  Max: 99.1 °F (37.3 °C)  Pulse  Av.1  Min: 75  Max: 114  BP  Min: 89/76  Max: 119/106  SpO2  Av.2 %  Min: 91 %  Max: 100 %  FiO2   Av %  Min: 40 %  Max: 40 %  Patient Vitals for the past 4 hrs:   BP Temp Temp src Pulse Resp SpO2   24 1200 (!) 119/106 97.3 °F (36.3 °C) Temporal 87 21 92 %   24 1100 109/70 -- -- 75 13 97 %       CVP:        Intake/Output Summary (Last 24 hours) at 2024 1428  Last data filed at 2024 0412  Gross per 24 hour   Intake 699 ml   Output 1950 ml   Net -1251 ml       EXAM:  General: Ill-appearing but improved.  More awake and alert.  Seen on BiPAP.  Eyes: PERRL. No sclera icterus. No conjunctiva injected.  ENT: No discharge. Pharynx clear.  Neck: Trachea midline. Normal thyroid.  Resp: Minimal accessory muscle use.  Bilateral crackles. No wheezing. No rhonchi. No dullness on percussion.   CV: Regular rate. Regular rhythm. No mumur or rub. No edema. No JVD. Palpable pedal pulses.   GI: Non-tender. Non-distended. No masses. No organmegaly. Normal bowel sounds. No hernia.  Skin: Warm and dry. No nodule on exposed extremities. No rash on exposed extremities.  Lymph: No cervical LAD. No supraclavicular LAD.   M/S: No cyanosis. No joint deformity. No clubbing.   Neuro: Awake. Follows commands. Positive pupils/gag/corneals. Normal pain response.  Cranial nerves through 2 through 12 normal  Psych: Oriented to person, place, time. No anxiety or 
  Internal Medicine Progress Note      Events of Last 24 hours:     Reports she is feeling about the same today. She is on 4 L O2 but O2 requirement increases when she gets up/ambulates briefly.    Invasive Lines: PIV        MV: n/a    No results for input(s): \"PHART\", \"VUX2XGC\", \"PO2ART\" in the last 72 hours.      MV Settings:     / / /FiO2 : 30 %    IV:   sodium chloride 5 mL/hr at 24 0348       Vitals:  Temp  Av.8 °F (36.6 °C)  Min: 97.2 °F (36.2 °C)  Max: 98.2 °F (36.8 °C)  Pulse  Av.3  Min: 63  Max: 74  BP  Min: 137/70  Max: 160/88  SpO2  Av.8 %  Min: 93 %  Max: 96 %  FiO2   Av %  Min: 30 %  Max: 30 %  Patient Vitals for the past 4 hrs:   BP Temp Temp src Pulse Resp SpO2   24 1630 (!) 145/75 98.1 °F (36.7 °C) Oral 65 20 95 %         CVP:        Intake/Output Summary (Last 24 hours) at 2024 1831  Last data filed at 2024 1732  Gross per 24 hour   Intake 572 ml   Output 1 ml   Net 571 ml         EXAM:  General: Awake, alert  Eyes: PERRL. No sclera icterus. No conjunctiva injected.  ENT: No discharge. Pharynx clear.  Neck: Trachea midline. Normal thyroid.  Resp: No increased wo, Bilateral crackles. No wheezing. No rhonchi. No dullness on percussion.   CV: Regular rate. Regular rhythm. No mumur or rub. No edema. No JVD. Palpable pedal pulses.   GI: Non-tender. Non-distended. No masses. No organmegaly. Normal bowel sounds. No hernia.  Skin: Warm and dry. No nodule on exposed extremities. No rash on exposed extremities.  Lymph: No cervical LAD. No supraclavicular LAD.   M/S: No cyanosis. No joint deformity. No clubbing.   Neuro: Awake. Cranial nerves through 2 through 12 normal  Psych: Oriented to person, place, time. No anxiety or agitation.     Medications:  Scheduled Meds:   polyethylene glycol  17 g Oral BID    docusate sodium  100 mg Oral BID    ipratropium 0.5 mg-albuterol 2.5 mg  1 Dose Inhalation BID RT    divalproex  750 mg Oral BID    mupirocin   Each Nostril BID    
  Internal Medicine Progress Note      Interval history :      Patient transferred from [facility] for acute hypoxic respiratory failure, bilateral pneumonia.    Stable from psychiatric standpoint.  Hypoxia improving.  O2 weaned down to 2 L this morning.  Still needing BiPAP at night    Patient still complains of dyspnea and shortness of breath with exertion.  Complains of increased edema and weight gain of 20 pounds in the last 1 week.-Lasix ordered    Pulmonary following        Invasive Lines: PIV        MV: n/a    No results for input(s): \"PHART\", \"EPL9DHG\", \"PO2ART\" in the last 72 hours.      MV Settings:     / / /FiO2 : 30 %    IV:   sodium chloride 5 mL/hr at 24 1603       Vitals:  Temp  Av °F (36.7 °C)  Min: 97.5 °F (36.4 °C)  Max: 98.8 °F (37.1 °C)  Pulse  Av.3  Min: 60  Max: 107  BP  Min: 115/88  Max: 152/73  SpO2  Av %  Min: 91 %  Max: 94 %  Patient Vitals for the past 4 hrs:   BP Temp Temp src Pulse Resp SpO2   24 1045 115/88 97.5 °F (36.4 °C) Oral (!) 107 20 91 %         CVP:        Intake/Output Summary (Last 24 hours) at 2024 1240  Last data filed at 2024 1213  Gross per 24 hour   Intake 2743.54 ml   Output 2550 ml   Net 193.54 ml         EXAM:  General: Awake, alert, well-oriented  Eyes: PERRL. No sclera icterus. No conjunctiva injected.  ENT: No discharge. Pharynx clear.  Neck: Trachea midline. Normal thyroid.  Resp: Diminished breath sounds , no wheezes rales or rhonchi .  CV: Regular rate. Regular rhythm. No mumur or rub. No edema. No JVD. Palpable pedal pulses.   GI: Non-tender. Non-distended. No masses. No organmegaly. Normal bowel sounds. No hernia.  Skin: Warm and dry. No nodule on exposed extremities. No rash on exposed extremities.  Lymph: No cervical LAD. No supraclavicular LAD.   M/S: No cyanosis. No joint deformity. No clubbing.  + edema  Neuro: Awake. Cranial nerves through 2 through 12 normal  Psych: Oriented to person, place, time. No anxiety or agitation. 
  Physician Progress Note      PATIENT:               FERNANDO RINCON  CSN #:                  [id number]  :                       1980  ADMIT DATE:       2024 7:23 AM  DISCH DATE:  RESPONDING  PROVIDER #:        Lisa Bruce MD          QUERY TEXT:    Internal Medicine,    Pt admitted with respiratory failure due to aspiration PNA and noted to have   HR>100, RR> 20, lactate elevation. If possible, please document in the   progress notes and discharge summary if you are evaluating and /or treating   any of the following:    The medical record reflects the following:  Risk Factors: PNA  Clinical Indicators: SIRS: WBC 2.8, procalcitonin and lactate elevation, BP   76/56, 87/71-verified, O2 sats in 80's, HR verified up to 144, RR over 20,  Treatment: labs, imaging, IV atb, IVF, BIPAP, medical management and   supportive care    Thank you,  Natasha Morgan RN CDS  [phone]  Options provided:  -- Sepsis, due to aspiration PNA present on admission  -- After further study, sepsis was ruled out for this patient.  -- Other - I will add my own diagnosis  -- Disagree - Not applicable / Not valid  -- Disagree - Clinically unable to determine / Unknown  -- Refer to Clinical Documentation Reviewer    PROVIDER RESPONSE TEXT:    After further study, sepsis was ruled out for this patient.    Query created by: Natasha Morgan on 2024 3:27 PM      QUERY TEXT:    Internal Medicine,    Patient admitted with sepsis due to aspiration PNA and has troponin elevation   documented as due to demand ischemia.  If possible, please document in the   progress notes and discharge summary if the demand ischemia can be further   specified as any of the following:    The medical record reflects the following:  Risk Factors: resp failure, sepsis  Clinical Indicators: troponin elevation with rise from 21 to 65; Progress note   documents: Elevated troponins likely demand ischemia.  Denies any chest pain.   \";; EKG 
  Speech Language Pathology  Swallowing Disorders and Dysphagia  Clinical Bedside Swallow Assessment  Facility/Department: [facility] ICU      Instrumentation: Yes. MBSS is warranted to further assess oropharyngeal structures and function  Diet recommendation: NPO exception of low volume ice chips / single sips of water and meds PO as tolerated pending MBSS results/recs  Risk management: upright for all intake, stay upright for at least 30 mins after intake, small bites/sips, close supervision, oral care 2-3x/day to reduce adverse affects in the event of aspiration, increase physical mobility as able, alternate bites/sips, slow rate of intake, general GERD precautions, general aspiration precautions, and hold PO and contact SLP if s/s of aspiration or worsening respiratory status develop.      NAME:Anjelica Blanchard  : 1980 (44 y.o.)   MRN: [id number]  ROOM: 3009/3009-01  ADMISSION DATE: 2024  PATIENT DIAGNOSIS(ES): Aspiration pneumonitis (HCC) [J69.0]  Acute respiratory failure with hypoxia and hypercapnia (HCC) [J96.01, J96.02]  Altered mental status, unspecified altered mental status type [R41.82]  Acute on chronic respiratory failure with hypoxia and hypercapnia (HCC) [J96.21, J96.22]  Chief Complaint   Patient presents with    Loss of Consciousness     Pt from [facility] per nurse report when walked into room pt was unresponsive, moaning to sternal rub, was incontinent, arrived to room on NRB.      Patient Active Problem List    Diagnosis Date Noted    Cellulitis 2022    Acute on chronic respiratory failure with hypoxia and hypercapnia (HCC) 2024    Aspiration pneumonitis (HCC) 2024    Mood disorder (HCC) 2024    Chronic cough 2024    COLLEEN (acute kidney injury) (HCC) 2024    Shock circulatory (HCC) 2024    Lactic acidosis 2024    Former smoker 2024    Simple chronic bronchitis (HCC) 2024    Methadone dependence ([facility]) 2024    Observed sleep apnea 
4 Eyes Skin Assessment     NAME:  Anjelica Blanchard  YOB: 1980  MEDICAL RECORD NUMBER:  [id number]    The patient is being assessed for  Shift Handoff    I agree that at least one RN has performed a thorough Head to Toe Skin Assessment on the patient. ALL assessment sites listed below have been assessed.      Areas assessed by both nurses:    Head, Face, Ears, Shoulders, Back, Chest, Arms, Elbows, Hands, Sacrum. Buttock, Coccyx, Ischium, Legs. Feet and Heels, and Under Medical Devices         Does the Patient have a Wound? No noted wound(s)       Serge Prevention initiated by RN: Yes  Wound Care Orders initiated by RN: No    Pressure Injury (Stage 3,4, Unstageable, DTI, NWPT, and Complex wounds) if present, place Wound referral order by RN under : No    New Ostomies, if present place, Ostomy referral order under : No     Nurse 1 eSignature: Electronically signed by Gabriela Cintron RN on 6/14/24 at 5:47 AM EDT    **SHARE this note so that the co-signing nurse can place an eSignature**    Nurse 2 eSignature: Electronically signed by Jeane Cardona RN on 6/14/24 at 5:59 AM EDT   
Bedside report and transfer of care given to Marlon ORNELAS. Pt currently resting in bed with the call light within reach. Pt denies any other care needs at this time. Pt stable at this time.   
Bedside report and transfer of care given to Shar ORNELAS. Pt currently resting in bed with the call light within reach. Pt denies any other care needs at this time. Pt stable at this time.   
Bipap oxygen decreased from 40% to 30%  
Dr. Ross at bedside, made him aware of her hard time waking up after morning meds given. He is making changes to medications. See his note and orders.   
EEG completed and available for interpretation on the [facility] database .    
EOS report given to CECI Mason. Care transferred at this time.   
Educated patient that she may self ambulate as far as O2 line allows. She may self transfer to BSC but not to remove O2. Telesitter updated.  PCA updated.   
[facility]   COPD PROGRAM      NAME:  Anjelica Blanchard  AGE: 44 y.o.   GENDER: female  : 1980  TODAY'S DATE:  2024    Subjective:     VISIT TYPE: Education    ADMIT DATE: 2024    PAST MEDICAL HISTORY:      Diagnosis Date    Adrenal insufficiency     Asthma     Bipolar 1 disorder ([facility])     Borderline personality disorder ([facility])     Chronic prescription benzodiazepine use     Alprazolam    Chronic prescription opiate use     Methadone    COPD (chronic obstructive pulmonary disease) ([facility])     Degenerative spinal arthritis     Howard-Danlos syndrome     Fibromyalgia     Hepatitis C     Herniated disc     Intervertebral disc disease     Opiate overdose ([facility])     Osteoarthritis     Polysubstance abuse ([facility])     Sedative dependence ([facility])     Pregabalin and Zolpidem    Seizure ([facility])     with benzo withdrawal    TCA (tricyclic antidepressant) overdose     Vertebral compression fracture ([facility])      HOME MEDICATIONS:  Prior to Admission medications    Medication Sig Start Date End Date Taking? Authorizing Provider   albuterol sulfate HFA (VENTOLIN HFA) 108 (90 Base) MCG/ACT inhaler Inhale 2 puffs into the lungs every 6 hours as needed for Wheezing    Timmy Meza MD   vitamin C (ASCORBIC ACID) 500 MG tablet Take 1 tablet by mouth daily    Timmy Meza MD   cloNIDine (KAPVAY) 0.1 MG TB12 extended release tablet Take 1 tablet by mouth 2 times daily  Patient not taking: Reported on 2024    Timmy Meza MD   albuterol (PROVENTIL) (2.5 MG/3ML) 0.083% nebulizer solution Take 3 mLs by nebulization every 6 hours as needed for Wheezing 24   Arabella Vaughn, APRN - CNP   SYMBICORT 160-4.5 MCG/ACT AERO Inhale 2 puffs into the lungs 2 times daily Rinse mouth after inhaler use to avoid oral thrush. Use AeroChamber. Review inhaler technique at (use-inhalers.Mydeo) 24  Manisha Medina MD   divalproex (DEPAKOTE) 250 MG DR tablet Take 2 tablets by mouth 
Lactic 4.5 notified Dr. Gatica no new orders at this time.   
Patient assessed and AM medications given.  Patient expressed she is not currently wanting to self harm but has in the past.  Psych consult on board and no constant observer orders in place. MD aware. Patient a/o x2.  Ambulated to restroom on room air O2 saturating dropped to 80%.  Was placed back on 4L with humidity added for dry nares.  Patient follows commands and is cooperative.  Denies any further needs at this time. Call light and telesitter in place.   
Patient assessed and AM medications given.  Patient was sleeping, awoke patient for meds.  Xanex requested. PRN given.  Vitals stable.  Midodrine held for 's over 80's.  Denies any further needs at this time.  Call light within reach.   
Patient climbing out of bed, assisted to bathroom. Patient placed back in bed.   Alarm remains on side rails up, bed in low position and locked.   
Patient continues to jump out of bed.  Bed alarm is on and patient is already at the end trying to get out. Patient very impulsive, anxious.  Patient educated on importance of staying in bed and asking for help.  Patient worried about getting her night time medication.  Explained to patient that medications have been ordered.   Patient assisted to bathroom patient remains anxious and tries to move all over room.   
Patient okay for discharge per MD. Discharge instructions and script given. PICC removed and site assessment clean, dry, and intact pressure applied. Telebox removed and CMU notified. Patient discharged home in stable conditions with all belongings including cell phone.  .  No questions or concerns at this time. Patient escorted to personal car.     
Patient placed on bipap for the hs on settings of 18/10 30%  
Patient placed on bipap for the hs on settings of 18/10 40%  
Patient pulling off BIPAP and trying to get out of bed.  Purewick placed patient educated on safety.  Bed alarm remains on, side rails up, bed in low position locked and tele sitter remains on.   
Patient refused BiPAP. Stated the doctor told her she doesn't have to wear it tonight. They want to see how she does without it. RN aware  
Patient remains calm, continues on BIPAP at this time.   
Patient remains trying to climb out of bed without assistance.    
Patient resting in bed, patient is pink, warm, and dry. Respirations E/E on 4l/m/nc. Picc site unremarkable. No S/S of acute distress noted. Head to toe completed at this time. Patient is A/O x4. Medication given at this time patient tolerated well. Call light in easy reach, patient reminded to use call light for needs and assistance. Bed locked and in lowest position side rails up x2.   
Patient resting in bed, patient is pink, warm, and dry. Respirations E/E on 4l/m/nc. Picc site unremarkable. No S/S of acute distress noted. Head to toe completed at this time. Patient is A/O x4. Medications given patient tolerated well. Call light in easy reach, patient reminded to use call light for needs and assistance. Bed locked and in lowest position side rails up x2.   
Patient resting, leaving BIPAP in place.   
Patient stable.  Report given to CECI Bolivar  
Patient stable. Report given to CECI Bolivar  
Patient trying to climb out of bed again.  Patient educated again about safety and importance of calling for help.   Patient has noticeable tremors to bilateral arms.   Tele sitter placed in room for safety.   
Patient very anxious this evening.  PRN xanex given.  Patient states lost hair brush on [facility].  [facility] called they have not seen a brush.  No brush was documented to have come with patient from [facility] to ICU or from ICU to PCU.  Patient educated on risk of removing O2 to walk to restroom.  BSC provided for patient to standby transfer without removing O2.  Patient used several times this shift.  Now states \"I have a shy bladder and can not use this.\"  Patient informed it is unsafe to remove O2.  Extension tubing can not be used with High Flow oxygen.  Patient vitals stable.  Telesitter remains in place. Call light within reach.  Bed alarm set.   
Perfect Serve to Bert Gatica MD    Situation:  Home medications reviewed.  Restart medications?    Action  Some medications reordered.  See orders                   
Perfect Serve to Lisa Mackey MD     Situation:  HIGH on the suicide risk. She denies any intent to harm herself right now. Per clinical & charge nurse they spoke to psychiatry and they stated she does not need to be in precautions.      
Perfect Serve to Lisa Mackey MD     Situation:  Home medications reviewed.  Restart medications?    Action  Contact on call MD                  
Perfect Serve to Lisa Mackey MD     Situation:  Home medications reviewed.  Restart medications?    Action  No new orders                  
Perfect Serve to Lisa Mackey MD     Situation:  Home medications reviewed.  Restart?  Methadone    Action  See order for methadone                  
Perfect Serve to Lisa Mackey MD     Situation:  Home medications reviewed.  Restart? Midodrine BP is soft.     Action  Midodrine reordered                   
Perfect serve sent to Dr. Johnson for neuro consult Electronically signed by Sussy Wolf on 6/13/2024 at 12:46 PM    
Pt has had her gown changed 6x today. Pt continually spilling things and being very impulsive. Pt yells out frequently too.   
Pt improving but still drowsy. Arouses to me talking to her but falls back asleep. Titrated FiO2 to 50%.    06/13/24 1200   NIV Type   NIV Started/Stopped On   Mode CPAP   Mask Type Full face mask   Mask Size Medium   Assessment   Pulse (!) 109   Respirations 16   SpO2 97 %   Skin Assessment Clean, dry, & intact   Breath Sounds   Breath Sounds Bilateral Diminished;Rhonchi   Settings/Measurements   PIP Observed 19 cm H20   IPAP 18 cmH20   CPAP/EPAP 10 cmH2O   Vt (Measured) 535 mL   Rate Ordered 16   FiO2  50 %   Minute Volume (L/min) 7.8 Liters   Mask Leak (lpm) 10 lpm   Patient's Home Machine No   Alarm Settings   Alarms On Y   Low Pressure (cmH2O) 8 cmH2O   High Pressure (cmH2O) 30 cmH2O   Apnea (secs) 20 secs   RR Low (bpm) 16   RR High (bpm) 40 br/min       
Pt ringing out continuously all shift. Spent 1 hour at bedside changed her gown, changed her bed sheets, let her walk the room for a little while and helped her change her undergarments and put a pad in place. Pt brushed teeth as well. Pt feeling better and psych at bedside now.   
RRT called overhead.  Pt sitting in bed with nonrebreather mask in place.  Hypoxic in appearance and per pulse ox picking up anywhere from 29-50%.  Pt was incontinent of urine.  Pt did open her eyes when I called her name.  Pt short of breath.  Resp staff, clinical admin, charge nurse in pt room.  Stefanie Velarde responded as well.  Pt taken to ER for work up and medical evaluation.  Terrie GOMEZN, RN  
RT Inhaler-Nebulizer Bronchodilator Protocol Note    There is a bronchodilator order in the chart from a provider indicating to follow the RT Bronchodilator Protocol and there is an “Initiate RT Inhaler-Nebulizer Bronchodilator Protocol” order as well (see protocol at bottom of note).    CXR Findings:  No results found.    The findings from the last RT Protocol Assessment were as follows:   History Pulmonary Disease: (P) Chronic pulmonary disease  Respiratory Pattern: (P) Regular pattern and RR 12-20 bpm  Breath Sounds: (P) Clear breath sounds  Cough: (P) Strong, spontaneous, non-productive  Indication for Bronchodilator Therapy:    Bronchodilator Assessment Score: (P) 2    Aerosolized bronchodilator medication orders have been revised according to the RT Inhaler-Nebulizer Bronchodilator Protocol below.    Respiratory Therapist to perform RT Therapy Protocol Assessment initially then follow the protocol.  Repeat RT Therapy Protocol Assessment PRN for score 0-3 or on second treatment, BID, and PRN for scores above 3.    No Indications - adjust the frequency to every 6 hours PRN wheezing or bronchospasm, if no treatments needed after 48 hours then discontinue using Per Protocol order mode.     If indication present, adjust the RT bronchodilator orders based on the Bronchodilator Assessment Score as indicated below.  Use Inhaler orders unless patient has one or more of the following: on home nebulizer, not able to hold breath for 10 seconds, is not alert and oriented, cannot activate and use MDI correctly, or respiratory rate 25 breaths per minute or more, then use the equivalent nebulizer order(s) with same Frequency and PRN reasons based on the score.  If a patient is on this medication at home then do not decrease Frequency below that used at home.    0-3 - enter or revise RT bronchodilator order(s) to equivalent RT Bronchodilator order with Frequency of every 4 hours PRN for wheezing or increased work of breathing 
RT Inhaler-Nebulizer Bronchodilator Protocol Note    There is a bronchodilator order in the chart from a provider indicating to follow the RT Bronchodilator Protocol and there is an “Initiate RT Inhaler-Nebulizer Bronchodilator Protocol” order as well (see protocol at bottom of note).    CXR Findings:  No results found.    The findings from the last RT Protocol Assessment were as follows:   History Pulmonary Disease: (P) Chronic pulmonary disease  Respiratory Pattern: (P) Regular pattern and RR 12-20 bpm  Breath Sounds: (P) Slightly diminished and/or crackles  Cough: (P) Strong, spontaneous, non-productive  Indication for Bronchodilator Therapy: (P) Decreased or absent breath sounds  Bronchodilator Assessment Score: (P) 4    Aerosolized bronchodilator medication orders have been revised according to the RT Inhaler-Nebulizer Bronchodilator Protocol below.    Respiratory Therapist to perform RT Therapy Protocol Assessment initially then follow the protocol.  Repeat RT Therapy Protocol Assessment PRN for score 0-3 or on second treatment, BID, and PRN for scores above 3.    No Indications - adjust the frequency to every 6 hours PRN wheezing or bronchospasm, if no treatments needed after 48 hours then discontinue using Per Protocol order mode.     If indication present, adjust the RT bronchodilator orders based on the Bronchodilator Assessment Score as indicated below.  Use Inhaler orders unless patient has one or more of the following: on home nebulizer, not able to hold breath for 10 seconds, is not alert and oriented, cannot activate and use MDI correctly, or respiratory rate 25 breaths per minute or more, then use the equivalent nebulizer order(s) with same Frequency and PRN reasons based on the score.  If a patient is on this medication at home then do not decrease Frequency below that used at home.    0-3 - enter or revise RT bronchodilator order(s) to equivalent RT Bronchodilator order with Frequency of every 4 
RT Inhaler-Nebulizer Bronchodilator Protocol Note    There is a bronchodilator order in the chart from a provider indicating to follow the RT Bronchodilator Protocol and there is an “Initiate RT Inhaler-Nebulizer Bronchodilator Protocol” order as well (see protocol at bottom of note).    CXR Findings:  No results found.    The findings from the last RT Protocol Assessment were as follows:   History Pulmonary Disease: Chronic pulmonary disease  Respiratory Pattern: Regular pattern and RR 12-20 bpm  Breath Sounds: Slightly diminished and/or crackles  Cough: Strong, spontaneous, non-productive  Indication for Bronchodilator Therapy: Decreased or absent breath sounds  Bronchodilator Assessment Score: 4    Aerosolized bronchodilator medication orders have been revised according to the RT Inhaler-Nebulizer Bronchodilator Protocol below.    Respiratory Therapist to perform RT Therapy Protocol Assessment initially then follow the protocol.  Repeat RT Therapy Protocol Assessment PRN for score 0-3 or on second treatment, BID, and PRN for scores above 3.    No Indications - adjust the frequency to every 6 hours PRN wheezing or bronchospasm, if no treatments needed after 48 hours then discontinue using Per Protocol order mode.     If indication present, adjust the RT bronchodilator orders based on the Bronchodilator Assessment Score as indicated below.  Use Inhaler orders unless patient has one or more of the following: on home nebulizer, not able to hold breath for 10 seconds, is not alert and oriented, cannot activate and use MDI correctly, or respiratory rate 25 breaths per minute or more, then use the equivalent nebulizer order(s) with same Frequency and PRN reasons based on the score.  If a patient is on this medication at home then do not decrease Frequency below that used at home.    0-3 - enter or revise RT bronchodilator order(s) to equivalent RT Bronchodilator order with Frequency of every 4 hours PRN for wheezing or 
Reassessment done,patient is on room air,she tends to desat but oxygen improves immediately.  She is on a regular diet.  She is able to walk to the bedside commode to void.  Bed is at its lowest level,call light within reach,telesitter in the room,will continue to monitor patient.  
Shift assessment complete- see flowsheets.  Pt is A&Ox4.very demanding and impulsive.  VSS  Respirations are easy, even and unlabored. On 5L NC.  PIV/PICC WDL. Flushed at this time.  Plan of care and goals reviewed. Call light within reach.  Bed in lowest position with wheels locked. No needs expressed at this time. Will continue to monitor.    
Shift assessment completed, see flow sheet.   Pt sleeping upon entering room.  Pt wakes easily.  Pt is A/o x4 .  Pt c/o pain 10/10 back & knees.  She denies CP, feels a little SOB but no different than she has.       Respirations are easy, even, and unlabored.   Bilateral lung sounds diminished on RA.     VSS  SR on the monitor  .      PIV, WNL   All lines and monitoring devices in place.Bed in lowest position with wheels locked. No needs expressed at this time. Will continue to monitor.  Telesitter in room & seizure precautions in place.  
Shift assessment done,patient is alert and oriented X4,a little bit anxious,but currently calm.  She is currently on room air,has diminished breath sounds bilaterally.  She is on a regular diet .  She is able to walk and use the bedside commode to void.  Meds given as per MAR.  She has telesitter in the room.  Bed is at its lowest level,call light within reach,will continue to monitor patient.  
Spoke with Dr Ross regarding need for sitter, per MD pt does not need a sitter, she was an anticipated discharge today and does not need further psychiatric services.   
Transfer report received from Celeste RN  -  patient to room 311  -  report given to Corie RN  -  patient NSR on tele Mxtel 2.  Patient sitting up; in bed, on oxygen 5L min NC requesting colored pencils.  Call light in patient's reach.  
Updated pt sister Radha  
case of any episode to put the patient on her side  At this time we will get neurology evaluate  Swallow study reviewed ,follow direction   Neurology following   Increase Depakote per neurology  Restart xanax per neurology   If any further episode of seizure we will give Ativan IV  Follow with Dr Christianson in 2 weeks ,IgG and rest of immunoglobulins   CT chest compatble with aspiration  On Unasyn to finish course 7  She is to follow with Dr. Christianson  and have immunoglobulin level check and have workup for recurrent  Bronchodilator  Aggressive pulmonary twice  DVT and GI prophylax  P on methadone as per primary team    
seizure  Possible  History of as  On benzos and history of methadone and tricyclic antidepressant over the      PLAN:    Seizure precautions and head of the bed 35 degree and in case of any episode to put the patient on her side  At this time we will get neurology evaluate  Swallow study   Neurology following   Increase Depakote per neurology  Restart xanax per neurology   If any further episode of seizure we will give Ativan IV  Will observe in the ICU at this minute she is able to protect airway airway we will hold on an tube  CT chest compatble with aspiration  On Unasyn l  Bronchodilator  Aggressive pulmonary twice  DVT and GI prophylax  Psychiatrist to follow and adjust her medication  Neurology also to  Further recommendation based on the CT and the chest  Pregnancy test negative  
separate speech pathology report for full discussion of findings   and recommendations.         CT HEAD WO CONTRAST   Final Result   No acute intracranial abnormality.         CT CHEST PULMONARY EMBOLISM W CONTRAST   Final Result   Evaluation of multiple pulmonary arteries is suboptimal secondary to patient   respiratory motion throughout the examination.  No pulmonary embolus   identified.      Improved aeration of the lower lobes when compared to the prior exam.   Persistent consolidations within the lower lobes likely infectious in   etiology.  New infiltrates are seen within the bilateral upper lobes.      Reflux of contrast into the IVC and hepatic veins may be related to right   heart failure or is related to the high-pressure contrast injection.      Additional findings noted above.         IR PICC WO SQ PORT/PUMP > 5 YEARS   Final Result      XR CHEST PORTABLE   Final Result   Increased bilateral airspace disease, either atelectasis or pneumonia              ECHO 3.25.24  Conclusions      Summary   Left ventricular systolic function is normal with a visually estimated   ejection fraction of 55-60%.   The left ventricle is normal in size with normal wall thickness.   No obvious regional wall motion abnormalities noted.   Grade I diastolic dysfunction.   No evidence of aortic valve regurgitation or stenosis.   Trivial tricuspid regurgitation.   Systolic pulmonary artery pressure (SPAP) is normal and estimated at 25 mmHg   (right atrial pressure 3 mmHg).   Trivial mitral regurgitation is present.   The right ventricle is normal in size with normal systolic function.   The IVC is normal in size (<2.1 cm) and collapses greater than 50% with   respirations consistent with normal right atrial pressures (3 mmHg).   No evidence of any pericardial effusion.      Signature      ------------------------------------------------------------------   Electronically signed by Bruce Hernandez MD (Interpreting physician)   on 
tricuspid regurgitation.   Systolic pulmonary artery pressure (SPAP) is normal and estimated at 25 mmHg   (right atrial pressure 3 mmHg).   Trivial mitral regurgitation is present.   The right ventricle is normal in size with normal systolic function.   The IVC is normal in size (<2.1 cm) and collapses greater than 50% with   respirations consistent with normal right atrial pressures (3 mmHg).   No evidence of any pericardial effusion.     ----      Assessment:    Principal Problem:    Acute on chronic respiratory failure with hypoxia and hypercapnia (HCC)  Active Problems:    Anxiety    Borderline personality disorder (HCC)    Chronic obstructive pulmonary disease with acute exacerbation (HCC)    Acute respiratory failure with hypoxia and hypercapnia (HCC)    Aspiration pneumonitis (HCC)  Resolved Problems:    * No resolved hospital problems. *         Plan:    # Patient was admitted to the psych floor for suicidal ideation, was going to be discharged from inpatient psych.  Patient stable and cleared from psych standpoint    #Acute hypoxemic respiratory failure  # Bilateral pneumonia  A rapid response was called for hypoxia.  Patient was found to have acute respiratory failure.  She was transferred to the ER as there was no ICU beds.  She was placed on BiPAP.  Diagnosis consistent with acute hypoxic and hypercapnic respiratory failure.    Improved with BiPAP.  Difficulty with getting blood gas initially.   Pulmonary consultation obtained.  D-dimer was high.   CTPA done. PE is negative. PICC line placed.  Persistent consolidations  New infiltrates bilateral upper lobes  Possible CHF vs findings related to high pressure contrast injection  Transition to nasal cannula oxygen.    SLP eval done  Continues on steroids and antibiotics  IV Lasix ordered  Wean O2 as tolerated     # Poor IV access. PICC line placed     # Possible aspiration pneumonia.  She has bilateral crackles on exam and possible pneumonia on chest x-ray.

## 2024-06-19 NOTE — DISCHARGE SUMMARY
much to take  when to take this  Another medication with the same name was removed. Continue taking this medication, and follow the directions you see here.            CONTINUE taking these medications      Airsupra 90-80 MCG/ACT Aero  Generic drug: Albuterol-Budesonide  INHALE 2 PUFFS INTO THE LUNGS EVERY 6 HOURS AS NEEDED (SOB)     * Ventolin  (90 Base) MCG/ACT inhaler  Generic drug: albuterol sulfate HFA     * albuterol (2.5 MG/3ML) 0.083% nebulizer solution  Commonly known as: PROVENTIL  Take 3 mLs by nebulization every 6 hours as needed for Wheezing     ALPRAZolam 1 MG tablet  Commonly known as: XANAX     levothyroxine 25 MCG tablet  Commonly known as: SYNTHROID  Take 3 tablets by mouth Daily     methadone 10 MG tablet  Commonly known as: DOLOPHINE     midodrine 2.5 MG tablet  Commonly known as: PROAMATINE  Take 2 tablets by mouth 3 times daily     nicotine polacrilex 4 MG gum  Commonly known as: NICORETTE     omeprazole 20 MG delayed release capsule  Commonly known as: PRILOSEC     ondansetron 4 MG disintegrating tablet  Commonly known as: Zofran ODT  Take 1 tablet by mouth every 8 hours as needed for Nausea     pregabalin 150 MG capsule  Commonly known as: LYRICA     Symbicort 160-4.5 MCG/ACT Aero  Generic drug: budesonide-formoterol  Inhale 2 puffs into the lungs 2 times daily Rinse mouth after inhaler use to avoid oral thrush. Use AeroChamber. Review inhaler technique at (use-inhalers.Heidi Shaulis)     vitamin C 500 MG tablet  Commonly known as: ASCORBIC ACID     Vitamin D 25 MCG (1000 UT) Tabs tablet  Commonly known as: CHOLECALCIFEROL     zolpidem 12.5 MG extended release tablet  Commonly known as: AMBIEN CR           * This list has 2 medication(s) that are the same as other medications prescribed for you. Read the directions carefully, and ask your doctor or other care provider to review them with you.                STOP taking these medications      cloNIDine 0.1 MG Tb12 extended release

## 2024-06-24 ENCOUNTER — TELEPHONE (OUTPATIENT)
Dept: PULMONOLOGY | Age: 44
End: 2024-06-24

## 2024-06-24 NOTE — TELEPHONE ENCOUNTER
Been in ED a lot they think shew is aspirating at night and she wants to know if she should see ENT she has sleep study coming up. Please advise.

## 2024-06-24 NOTE — TELEPHONE ENCOUNTER
Patient missed appointment scheduled for today.  Patient was called and she declined to reschedule at this time.

## 2024-06-24 NOTE — TELEPHONE ENCOUNTER
Patient did not show for hospital follow up  with Arabella Vaughn on 6/24/24.    Reason:  patient forgot    This is patient's first no show.  .      Patient did not reschedule.  She will call back when she is ready to reschedule.

## 2024-06-25 ENCOUNTER — FOLLOWUP TELEPHONE ENCOUNTER (OUTPATIENT)
Dept: ADMINISTRATIVE | Age: 44
End: 2024-06-25

## 2024-06-25 NOTE — TELEPHONE ENCOUNTER
COPD Follow-Up Call:    Patient: Anjelica Blanchard   Patient : 1980   MRN: [id number]    Date of discharge: 24    Discharge department/facility: [facility] / [facility]    Discharge Disposition: Home    RARS: Readmission Risk Score: 27.8    Spoke with: Anjelica Dejesus is doing well since discharge on . Stable on RA. Finished prednisone and 1 more day of abx.  Patient missed follow-up appointment with Pulmonology yesterday . Stated she is going to call and reschedule appointment. Denied any further needs or questions. Provided COPD Nurse number at [phone] for any further questions or assistance with resources.     Follow Up  Future Appointments   Date Time Provider Department Center   7/15/2024  8:30 PM SCHEDULE, [facility] SLEEP ROOM 1 [facility]   2024  2:00 PM Bruce Hernandez MD [facility]       Electronically signed by KEMAR Cooley, RN  on 2024 at 10:28 AM

## 2024-06-26 ENCOUNTER — APPOINTMENT (OUTPATIENT)
Dept: GENERAL RADIOLOGY | Age: 44
DRG: 720 | End: 2024-06-26
Payer: COMMERCIAL

## 2024-06-26 ENCOUNTER — HOSPITAL ENCOUNTER (INPATIENT)
Age: 44
LOS: 5 days | Discharge: HOME | DRG: 720 | End: 2024-07-02
Attending: EMERGENCY MEDICINE | Admitting: HOSPITALIST
Payer: COMMERCIAL

## 2024-06-26 DIAGNOSIS — J96.02 ACUTE RESPIRATORY FAILURE WITH HYPOXIA AND HYPERCAPNIA (HCC): ICD-10-CM

## 2024-06-26 DIAGNOSIS — J96.01 ACUTE RESPIRATORY FAILURE WITH HYPOXIA AND HYPERCAPNIA (HCC): ICD-10-CM

## 2024-06-26 DIAGNOSIS — J18.9 SEPSIS DUE TO PNEUMONIA (HCC): Primary | ICD-10-CM

## 2024-06-26 DIAGNOSIS — A41.9 SEPSIS DUE TO PNEUMONIA (HCC): Primary | ICD-10-CM

## 2024-06-26 DIAGNOSIS — N17.9 AKI (ACUTE KIDNEY INJURY) (HCC): ICD-10-CM

## 2024-06-26 LAB
ALBUMIN SERPL-MCNC: 3.5 G/DL (ref 3.4–5)
ALBUMIN/GLOB SERPL: 1.2 {RATIO} (ref 1.1–2.2)
ALP SERPL-CCNC: 89 U/L (ref 40–129)
ALT SERPL-CCNC: 37 U/L (ref 10–40)
ANION GAP SERPL CALCULATED.3IONS-SCNC: 14 MMOL/L (ref 3–16)
AST SERPL-CCNC: 82 U/L (ref 15–37)
BASE EXCESS BLDA CALC-SCNC: -2.5 MMOL/L (ref -3–3)
BASOPHILS # BLD: 0.1 K/UL (ref 0–0.2)
BASOPHILS NFR BLD: 0.3 %
BILIRUB SERPL-MCNC: 0.3 MG/DL (ref 0–1)
BUN SERPL-MCNC: 20 MG/DL (ref 7–20)
CALCIUM SERPL-MCNC: 8.3 MG/DL (ref 8.3–10.6)
CHLORIDE SERPL-SCNC: 99 MMOL/L (ref 99–110)
CO2 BLDA-SCNC: 25.9 MMOL/L
CO2 SERPL-SCNC: 25 MMOL/L (ref 21–32)
COHGB MFR BLDA: 0.2 % (ref 0–1.5)
CREAT SERPL-MCNC: 1.7 MG/DL (ref 0.6–1.1)
DEPRECATED RDW RBC AUTO: 17.1 % (ref 12.4–15.4)
EOSINOPHIL # BLD: 0.1 K/UL (ref 0–0.6)
EOSINOPHIL NFR BLD: 0.6 %
GFR SERPLBLD CREATININE-BSD FMLA CKD-EPI: 37 ML/MIN/{1.73_M2}
GLUCOSE SERPL-MCNC: 123 MG/DL (ref 70–99)
HCO3 BLDA-SCNC: 24.3 MMOL/L (ref 21–29)
HCT VFR BLD AUTO: 34.4 % (ref 36–48)
HGB BLD-MCNC: 11.2 G/DL (ref 12–16)
HGB BLDA-MCNC: 11 G/DL (ref 12–16)
LACTATE BLDV-SCNC: 2.6 MMOL/L (ref 0.4–1.9)
LACTATE BLDV-SCNC: 3.1 MMOL/L (ref 0.4–1.9)
LYMPHOCYTES # BLD: 1.4 K/UL (ref 1–5.1)
LYMPHOCYTES NFR BLD: 5.4 %
MCH RBC QN AUTO: 28.5 PG (ref 26–34)
MCHC RBC AUTO-ENTMCNC: 32.6 G/DL (ref 31–36)
MCV RBC AUTO: 87.7 FL (ref 80–100)
METHGB MFR BLDA: 0.3 %
MONOCYTES # BLD: 1.9 K/UL (ref 0–1.3)
MONOCYTES NFR BLD: 7.6 %
NEUTROPHILS # BLD: 22.1 K/UL (ref 1.7–7.7)
NEUTROPHILS NFR BLD: 86.1 %
O2 THERAPY: ABNORMAL
PCO2 BLDA: 51 MMHG (ref 35–45)
PH BLDA: 7.3 [PH] (ref 7.35–7.45)
PLATELET # BLD AUTO: 331 K/UL (ref 135–450)
PMV BLD AUTO: 7.6 FL (ref 5–10.5)
PO2 BLDA: 62.3 MMHG (ref 75–108)
POTASSIUM SERPL-SCNC: 4.6 MMOL/L (ref 3.5–5.1)
PROCALCITONIN SERPL IA-MCNC: 3.48 NG/ML (ref 0–0.15)
PROT SERPL-MCNC: 6.5 G/DL (ref 6.4–8.2)
RBC # BLD AUTO: 3.93 M/UL (ref 4–5.2)
SAO2 % BLDA: 89.8 %
SODIUM SERPL-SCNC: 138 MMOL/L (ref 136–145)
WBC # BLD AUTO: 25.7 K/UL (ref 4–11)

## 2024-06-26 PROCEDURE — 85025 COMPLETE CBC W/AUTO DIFF WBC: CPT

## 2024-06-26 PROCEDURE — 83605 ASSAY OF LACTIC ACID: CPT

## 2024-06-26 PROCEDURE — 96368 THER/DIAG CONCURRENT INF: CPT

## 2024-06-26 PROCEDURE — 2700000000 HC OXYGEN THERAPY PER DAY

## 2024-06-26 PROCEDURE — 71045 X-RAY EXAM CHEST 1 VIEW: CPT

## 2024-06-26 PROCEDURE — 6370000000 HC RX 637 (ALT 250 FOR IP): Performed by: EMERGENCY MEDICINE

## 2024-06-26 PROCEDURE — 87040 BLOOD CULTURE FOR BACTERIA: CPT

## 2024-06-26 PROCEDURE — 84145 PROCALCITONIN (PCT): CPT

## 2024-06-26 PROCEDURE — 99285 EMERGENCY DEPT VISIT HI MDM: CPT

## 2024-06-26 PROCEDURE — 82803 BLOOD GASES ANY COMBINATION: CPT

## 2024-06-26 PROCEDURE — 96375 TX/PRO/DX INJ NEW DRUG ADDON: CPT

## 2024-06-26 PROCEDURE — 3E043XZ INTRODUCTION OF VASOPRESSOR INTO CENTRAL VEIN, PERCUTANEOUS APPROACH: ICD-10-PCS | Performed by: HOSPITALIST

## 2024-06-26 PROCEDURE — 94761 N-INVAS EAR/PLS OXIMETRY MLT: CPT

## 2024-06-26 PROCEDURE — 80053 COMPREHEN METABOLIC PANEL: CPT

## 2024-06-26 PROCEDURE — 36415 COLL VENOUS BLD VENIPUNCTURE: CPT

## 2024-06-26 PROCEDURE — 36556 INSERT NON-TUNNEL CV CATH: CPT

## 2024-06-26 PROCEDURE — 96365 THER/PROPH/DIAG IV INF INIT: CPT

## 2024-06-26 PROCEDURE — 6360000002 HC RX W HCPCS: Performed by: EMERGENCY MEDICINE

## 2024-06-26 PROCEDURE — 2580000003 HC RX 258: Performed by: EMERGENCY MEDICINE

## 2024-06-26 PROCEDURE — 2500000003 HC RX 250 WO HCPCS: Performed by: EMERGENCY MEDICINE

## 2024-06-26 PROCEDURE — 96361 HYDRATE IV INFUSION ADD-ON: CPT

## 2024-06-26 PROCEDURE — 93005 ELECTROCARDIOGRAM TRACING: CPT | Performed by: EMERGENCY MEDICINE

## 2024-06-26 RX ORDER — SODIUM CHLORIDE, SODIUM LACTATE, POTASSIUM CHLORIDE, AND CALCIUM CHLORIDE .6; .31; .03; .02 G/100ML; G/100ML; G/100ML; G/100ML
30 INJECTION, SOLUTION INTRAVENOUS ONCE
Status: DISCONTINUED | OUTPATIENT
Start: 2024-06-26 | End: 2024-06-26

## 2024-06-26 RX ORDER — SODIUM CHLORIDE, SODIUM LACTATE, POTASSIUM CHLORIDE, AND CALCIUM CHLORIDE .6; .31; .03; .02 G/100ML; G/100ML; G/100ML; G/100ML
30 INJECTION, SOLUTION INTRAVENOUS ONCE
Status: COMPLETED | OUTPATIENT
Start: 2024-06-26 | End: 2024-06-26

## 2024-06-26 RX ORDER — ACETAMINOPHEN 500 MG
1000 TABLET ORAL ONCE
Status: COMPLETED | OUTPATIENT
Start: 2024-06-26 | End: 2024-06-26

## 2024-06-26 RX ORDER — KETOROLAC TROMETHAMINE 30 MG/ML
15 INJECTION, SOLUTION INTRAMUSCULAR; INTRAVENOUS ONCE
Status: COMPLETED | OUTPATIENT
Start: 2024-06-26 | End: 2024-06-26

## 2024-06-26 RX ORDER — BENZONATATE 100 MG/1
100 CAPSULE ORAL ONCE
Status: COMPLETED | OUTPATIENT
Start: 2024-06-26 | End: 2024-06-26

## 2024-06-26 RX ADMIN — SODIUM CHLORIDE, POTASSIUM CHLORIDE, SODIUM LACTATE AND CALCIUM CHLORIDE 1641 ML: 600; 310; 30; 20 INJECTION, SOLUTION INTRAVENOUS at 21:50

## 2024-06-26 RX ADMIN — KETOROLAC TROMETHAMINE 15 MG: 30 INJECTION, SOLUTION INTRAMUSCULAR at 21:50

## 2024-06-26 RX ADMIN — BENZONATATE 100 MG: 100 CAPSULE ORAL at 23:39

## 2024-06-26 RX ADMIN — SODIUM CHLORIDE 5 MCG/MIN: 9 INJECTION, SOLUTION INTRAVENOUS at 23:53

## 2024-06-26 RX ADMIN — ACETAMINOPHEN 1000 MG: 500 TABLET ORAL at 22:39

## 2024-06-26 RX ADMIN — VANCOMYCIN HYDROCHLORIDE 1750 MG: 10 INJECTION, POWDER, LYOPHILIZED, FOR SOLUTION INTRAVENOUS at 23:17

## 2024-06-26 RX ADMIN — PIPERACILLIN AND TAZOBACTAM 4500 MG: 4; .5 INJECTION, POWDER, LYOPHILIZED, FOR SOLUTION INTRAVENOUS at 22:40

## 2024-06-26 RX ADMIN — HYDROCORTISONE SODIUM SUCCINATE 100 MG: 100 INJECTION, POWDER, FOR SOLUTION INTRAMUSCULAR; INTRAVENOUS at 23:38

## 2024-06-26 ASSESSMENT — PAIN - FUNCTIONAL ASSESSMENT: PAIN_FUNCTIONAL_ASSESSMENT: 0-10

## 2024-06-26 ASSESSMENT — PAIN SCALES - GENERAL: PAINLEVEL_OUTOF10: 0

## 2024-06-27 ENCOUNTER — ANCILLARY PROCEDURE (OUTPATIENT)
Dept: EMERGENCY DEPT | Age: 44
DRG: 720 | End: 2024-06-27
Attending: EMERGENCY MEDICINE
Payer: COMMERCIAL

## 2024-06-27 PROBLEM — R65.21 SEPTIC SHOCK (HCC): Status: ACTIVE | Noted: 2024-06-27

## 2024-06-27 PROBLEM — A41.9 SEPTIC SHOCK (HCC): Status: ACTIVE | Noted: 2024-06-27

## 2024-06-27 PROBLEM — Z79.899 POLYPHARMACY: Chronic | Status: ACTIVE | Noted: 2024-06-27

## 2024-06-27 PROBLEM — A41.9 SEPSIS DUE TO PNEUMONIA (HCC): Status: ACTIVE | Noted: 2023-11-21

## 2024-06-27 PROBLEM — E66.01 MORBID OBESITY (HCC): Status: ACTIVE | Noted: 2024-06-27

## 2024-06-27 LAB
ALBUMIN SERPL-MCNC: 3.3 G/DL (ref 3.4–5)
ALBUMIN/GLOB SERPL: 1.2 {RATIO} (ref 1.1–2.2)
ALP SERPL-CCNC: 79 U/L (ref 40–129)
ALT SERPL-CCNC: 29 U/L (ref 10–40)
ANION GAP SERPL CALCULATED.3IONS-SCNC: 10 MMOL/L (ref 3–16)
AST SERPL-CCNC: 47 U/L (ref 15–37)
BILIRUB SERPL-MCNC: 0.3 MG/DL (ref 0–1)
BILIRUB UR QL STRIP.AUTO: NEGATIVE
BUN SERPL-MCNC: 19 MG/DL (ref 7–20)
CALCIUM SERPL-MCNC: 8.2 MG/DL (ref 8.3–10.6)
CHLORIDE SERPL-SCNC: 100 MMOL/L (ref 99–110)
CLARITY UR: CLEAR
CO2 SERPL-SCNC: 28 MMOL/L (ref 21–32)
COLOR UR: NORMAL
CREAT SERPL-MCNC: 1.4 MG/DL (ref 0.6–1.1)
DEPRECATED RDW RBC AUTO: 17.3 % (ref 12.4–15.4)
EKG ATRIAL RATE: 129 BPM
EKG DIAGNOSIS: NORMAL
EKG P AXIS: 14 DEGREES
EKG P-R INTERVAL: 112 MS
EKG Q-T INTERVAL: 234 MS
EKG QRS DURATION: 76 MS
EKG QTC CALCULATION (BAZETT): 343 MS
EKG R AXIS: 3 DEGREES
EKG T AXIS: 41 DEGREES
EKG VENTRICULAR RATE: 129 BPM
GFR SERPLBLD CREATININE-BSD FMLA CKD-EPI: 47 ML/MIN/{1.73_M2}
GLUCOSE SERPL-MCNC: 180 MG/DL (ref 70–99)
GLUCOSE UR STRIP.AUTO-MCNC: NEGATIVE MG/DL
HCT VFR BLD AUTO: 33.3 % (ref 36–48)
HGB BLD-MCNC: 10.6 G/DL (ref 12–16)
HGB UR QL STRIP.AUTO: NEGATIVE
KETONES UR STRIP.AUTO-MCNC: NEGATIVE MG/DL
LACTATE BLDV-SCNC: 0.8 MMOL/L (ref 0.4–1.9)
LACTATE BLDV-SCNC: 1.1 MMOL/L (ref 0.4–1.9)
LEUKOCYTE ESTERASE UR QL STRIP.AUTO: NEGATIVE
MAGNESIUM SERPL-MCNC: 2.1 MG/DL (ref 1.8–2.4)
MCH RBC QN AUTO: 28.1 PG (ref 26–34)
MCHC RBC AUTO-ENTMCNC: 31.9 G/DL (ref 31–36)
MCV RBC AUTO: 88.2 FL (ref 80–100)
NITRITE UR QL STRIP.AUTO: NEGATIVE
PH UR STRIP.AUTO: 7 [PH] (ref 5–8)
PLATELET # BLD AUTO: 263 K/UL (ref 135–450)
PMV BLD AUTO: 7.4 FL (ref 5–10.5)
POTASSIUM SERPL-SCNC: 4.3 MMOL/L (ref 3.5–5.1)
PROT SERPL-MCNC: 6.1 G/DL (ref 6.4–8.2)
PROT UR STRIP.AUTO-MCNC: NEGATIVE MG/DL
RBC # BLD AUTO: 3.78 M/UL (ref 4–5.2)
SODIUM SERPL-SCNC: 138 MMOL/L (ref 136–145)
SP GR UR STRIP.AUTO: <=1.005 (ref 1–1.03)
TROPONIN, HIGH SENSITIVITY: 18 NG/L (ref 0–14)
UA COMPLETE W REFLEX CULTURE PNL UR: NORMAL
UA DIPSTICK W REFLEX MICRO PNL UR: NORMAL
URN SPEC COLLECT METH UR: NORMAL
UROBILINOGEN UR STRIP-ACNC: 0.2 E.U./DL
VANCOMYCIN SERPL-MCNC: 23.7 UG/ML
WBC # BLD AUTO: 22.2 K/UL (ref 4–11)

## 2024-06-27 PROCEDURE — C9113 INJ PANTOPRAZOLE SODIUM, VIA: HCPCS | Performed by: EMERGENCY MEDICINE

## 2024-06-27 PROCEDURE — 6360000002 HC RX W HCPCS: Performed by: HOSPITALIST

## 2024-06-27 PROCEDURE — 94640 AIRWAY INHALATION TREATMENT: CPT

## 2024-06-27 PROCEDURE — 6370000000 HC RX 637 (ALT 250 FOR IP): Performed by: HOSPITALIST

## 2024-06-27 PROCEDURE — 80202 ASSAY OF VANCOMYCIN: CPT

## 2024-06-27 PROCEDURE — 87449 NOS EACH ORGANISM AG IA: CPT

## 2024-06-27 PROCEDURE — 81003 URINALYSIS AUTO W/O SCOPE: CPT

## 2024-06-27 PROCEDURE — 6360000002 HC RX W HCPCS: Performed by: EMERGENCY MEDICINE

## 2024-06-27 PROCEDURE — 83735 ASSAY OF MAGNESIUM: CPT

## 2024-06-27 PROCEDURE — 85027 COMPLETE CBC AUTOMATED: CPT

## 2024-06-27 PROCEDURE — 87040 BLOOD CULTURE FOR BACTERIA: CPT

## 2024-06-27 PROCEDURE — 36415 COLL VENOUS BLD VENIPUNCTURE: CPT

## 2024-06-27 PROCEDURE — 2700000000 HC OXYGEN THERAPY PER DAY

## 2024-06-27 PROCEDURE — 6360000002 HC RX W HCPCS: Performed by: INTERNAL MEDICINE

## 2024-06-27 PROCEDURE — 2000000000 HC ICU R&B

## 2024-06-27 PROCEDURE — 76937 US GUIDE VASCULAR ACCESS: CPT

## 2024-06-27 PROCEDURE — 51702 INSERT TEMP BLADDER CATH: CPT

## 2024-06-27 PROCEDURE — 93010 ELECTROCARDIOGRAM REPORT: CPT | Performed by: INTERNAL MEDICINE

## 2024-06-27 PROCEDURE — 99291 CRITICAL CARE FIRST HOUR: CPT | Performed by: INTERNAL MEDICINE

## 2024-06-27 PROCEDURE — 6370000000 HC RX 637 (ALT 250 FOR IP): Performed by: NURSE PRACTITIONER

## 2024-06-27 PROCEDURE — 6370000000 HC RX 637 (ALT 250 FOR IP): Performed by: INTERNAL MEDICINE

## 2024-06-27 PROCEDURE — 94761 N-INVAS EAR/PLS OXIMETRY MLT: CPT

## 2024-06-27 PROCEDURE — 06HM33Z INSERTION OF INFUSION DEVICE INTO RIGHT FEMORAL VEIN, PERCUTANEOUS APPROACH: ICD-10-PCS | Performed by: HOSPITALIST

## 2024-06-27 PROCEDURE — 83605 ASSAY OF LACTIC ACID: CPT

## 2024-06-27 PROCEDURE — 99223 1ST HOSP IP/OBS HIGH 75: CPT | Performed by: INTERNAL MEDICINE

## 2024-06-27 PROCEDURE — 80053 COMPREHEN METABOLIC PANEL: CPT

## 2024-06-27 PROCEDURE — 84484 ASSAY OF TROPONIN QUANT: CPT

## 2024-06-27 PROCEDURE — 2580000003 HC RX 258: Performed by: HOSPITALIST

## 2024-06-27 PROCEDURE — P9047 ALBUMIN (HUMAN), 25%, 50ML: HCPCS | Performed by: HOSPITALIST

## 2024-06-27 RX ORDER — PANTOPRAZOLE SODIUM 40 MG/10ML
40 INJECTION, POWDER, LYOPHILIZED, FOR SOLUTION INTRAVENOUS ONCE
Status: COMPLETED | OUTPATIENT
Start: 2024-06-27 | End: 2024-06-27

## 2024-06-27 RX ORDER — DIVALPROEX SODIUM 250 MG/1
750 TABLET, EXTENDED RELEASE ORAL 2 TIMES DAILY
Status: DISCONTINUED | OUTPATIENT
Start: 2024-06-27 | End: 2024-07-02 | Stop reason: HOSPADM

## 2024-06-27 RX ORDER — FUROSEMIDE 40 MG/1
40 TABLET ORAL DAILY
Status: DISCONTINUED | OUTPATIENT
Start: 2024-06-27 | End: 2024-07-02 | Stop reason: HOSPADM

## 2024-06-27 RX ORDER — QUETIAPINE FUMARATE 50 MG/1
300 TABLET, EXTENDED RELEASE ORAL NIGHTLY
Status: DISCONTINUED | OUTPATIENT
Start: 2024-06-27 | End: 2024-06-27

## 2024-06-27 RX ORDER — PANTOPRAZOLE SODIUM 40 MG/1
40 TABLET, DELAYED RELEASE ORAL
Status: DISCONTINUED | OUTPATIENT
Start: 2024-06-27 | End: 2024-07-02 | Stop reason: HOSPADM

## 2024-06-27 RX ORDER — ALBUMIN (HUMAN) 12.5 G/50ML
25 SOLUTION INTRAVENOUS EVERY 12 HOURS
Status: COMPLETED | OUTPATIENT
Start: 2024-06-27 | End: 2024-06-28

## 2024-06-27 RX ORDER — BUDESONIDE 0.5 MG/2ML
0.5 INHALANT ORAL
Status: DISCONTINUED | OUTPATIENT
Start: 2024-06-27 | End: 2024-07-02 | Stop reason: HOSPADM

## 2024-06-27 RX ORDER — MIDODRINE HYDROCHLORIDE 5 MG/1
10 TABLET ORAL
Status: DISCONTINUED | OUTPATIENT
Start: 2024-06-27 | End: 2024-07-02 | Stop reason: HOSPADM

## 2024-06-27 RX ORDER — DOCUSATE SODIUM 100 MG/1
100 CAPSULE, LIQUID FILLED ORAL 2 TIMES DAILY
Status: DISCONTINUED | OUTPATIENT
Start: 2024-06-27 | End: 2024-07-02 | Stop reason: HOSPADM

## 2024-06-27 RX ORDER — QUETIAPINE FUMARATE 50 MG/1
100 TABLET, EXTENDED RELEASE ORAL NIGHTLY
Status: DISCONTINUED | OUTPATIENT
Start: 2024-06-27 | End: 2024-07-02 | Stop reason: HOSPADM

## 2024-06-27 RX ORDER — SODIUM CHLORIDE 0.9 % (FLUSH) 0.9 %
5-40 SYRINGE (ML) INJECTION PRN
Status: DISCONTINUED | OUTPATIENT
Start: 2024-06-27 | End: 2024-07-02 | Stop reason: HOSPADM

## 2024-06-27 RX ORDER — GUAIFENESIN 600 MG/1
600 TABLET, EXTENDED RELEASE ORAL 2 TIMES DAILY
Status: DISCONTINUED | OUTPATIENT
Start: 2024-06-27 | End: 2024-07-02 | Stop reason: HOSPADM

## 2024-06-27 RX ORDER — METHADONE HYDROCHLORIDE 10 MG/1
140 TABLET ORAL DAILY
Status: DISCONTINUED | OUTPATIENT
Start: 2024-06-27 | End: 2024-06-28

## 2024-06-27 RX ORDER — ALPRAZOLAM 1 MG/1
1 TABLET ORAL 3 TIMES DAILY PRN
Status: DISCONTINUED | OUTPATIENT
Start: 2024-06-27 | End: 2024-07-02 | Stop reason: HOSPADM

## 2024-06-27 RX ORDER — QUETIAPINE 200 MG/1
200 TABLET, FILM COATED, EXTENDED RELEASE ORAL NIGHTLY
Status: DISCONTINUED | OUTPATIENT
Start: 2024-06-27 | End: 2024-07-02 | Stop reason: HOSPADM

## 2024-06-27 RX ORDER — ACETAMINOPHEN 325 MG/1
650 TABLET ORAL EVERY 6 HOURS PRN
Status: DISCONTINUED | OUTPATIENT
Start: 2024-06-27 | End: 2024-07-02 | Stop reason: HOSPADM

## 2024-06-27 RX ORDER — ACETAMINOPHEN 650 MG/1
650 SUPPOSITORY RECTAL EVERY 6 HOURS PRN
Status: DISCONTINUED | OUTPATIENT
Start: 2024-06-27 | End: 2024-07-02 | Stop reason: HOSPADM

## 2024-06-27 RX ORDER — SODIUM CHLORIDE 0.9 % (FLUSH) 0.9 %
5-40 SYRINGE (ML) INJECTION EVERY 12 HOURS SCHEDULED
Status: DISCONTINUED | OUTPATIENT
Start: 2024-06-27 | End: 2024-07-02 | Stop reason: HOSPADM

## 2024-06-27 RX ORDER — LACTOBACILLUS RHAMNOSUS GG 10B CELL
1 CAPSULE ORAL 2 TIMES DAILY
Status: DISCONTINUED | OUTPATIENT
Start: 2024-06-27 | End: 2024-07-02 | Stop reason: HOSPADM

## 2024-06-27 RX ORDER — ZOLPIDEM TARTRATE 5 MG/1
10 TABLET ORAL NIGHTLY PRN
Status: DISCONTINUED | OUTPATIENT
Start: 2024-06-27 | End: 2024-07-02 | Stop reason: HOSPADM

## 2024-06-27 RX ORDER — ARFORMOTEROL TARTRATE 15 UG/2ML
15 SOLUTION RESPIRATORY (INHALATION)
Status: DISCONTINUED | OUTPATIENT
Start: 2024-06-27 | End: 2024-07-02 | Stop reason: HOSPADM

## 2024-06-27 RX ORDER — SODIUM CHLORIDE 9 MG/ML
INJECTION, SOLUTION INTRAVENOUS PRN
Status: DISCONTINUED | OUTPATIENT
Start: 2024-06-27 | End: 2024-07-02 | Stop reason: HOSPADM

## 2024-06-27 RX ORDER — ENOXAPARIN SODIUM 100 MG/ML
30 INJECTION SUBCUTANEOUS 2 TIMES DAILY
Status: DISCONTINUED | OUTPATIENT
Start: 2024-06-27 | End: 2024-07-02 | Stop reason: HOSPADM

## 2024-06-27 RX ORDER — IPRATROPIUM BROMIDE AND ALBUTEROL SULFATE 2.5; .5 MG/3ML; MG/3ML
1 SOLUTION RESPIRATORY (INHALATION) EVERY 4 HOURS PRN
Status: DISCONTINUED | OUTPATIENT
Start: 2024-06-27 | End: 2024-07-02 | Stop reason: HOSPADM

## 2024-06-27 RX ORDER — PREGABALIN 75 MG/1
150 CAPSULE ORAL 2 TIMES DAILY
Status: DISCONTINUED | OUTPATIENT
Start: 2024-06-27 | End: 2024-07-02 | Stop reason: HOSPADM

## 2024-06-27 RX ADMIN — PANTOPRAZOLE SODIUM 40 MG: 40 TABLET, DELAYED RELEASE ORAL at 06:36

## 2024-06-27 RX ADMIN — DIVALPROEX SODIUM 750 MG: 250 TABLET, EXTENDED RELEASE ORAL at 03:34

## 2024-06-27 RX ADMIN — MIDODRINE HYDROCHLORIDE 10 MG: 5 TABLET ORAL at 18:13

## 2024-06-27 RX ADMIN — HYDROCORTISONE SODIUM SUCCINATE 50 MG: 100 INJECTION, POWDER, FOR SOLUTION INTRAMUSCULAR; INTRAVENOUS at 23:44

## 2024-06-27 RX ADMIN — ALPRAZOLAM 1 MG: 1 TABLET ORAL at 17:02

## 2024-06-27 RX ADMIN — GUAIFENESIN 600 MG: 600 TABLET ORAL at 21:11

## 2024-06-27 RX ADMIN — HYDROCORTISONE SODIUM SUCCINATE 50 MG: 100 INJECTION, POWDER, FOR SOLUTION INTRAMUSCULAR; INTRAVENOUS at 18:13

## 2024-06-27 RX ADMIN — Medication 1 CAPSULE: at 03:34

## 2024-06-27 RX ADMIN — GUAIFENESIN 600 MG: 600 TABLET ORAL at 08:08

## 2024-06-27 RX ADMIN — MEROPENEM 1000 MG: 1 INJECTION INTRAVENOUS at 11:05

## 2024-06-27 RX ADMIN — NICOTINE POLACRILEX 4 MG: 4 LOZENGE ORAL at 11:00

## 2024-06-27 RX ADMIN — ENOXAPARIN SODIUM 30 MG: 100 INJECTION SUBCUTANEOUS at 08:07

## 2024-06-27 RX ADMIN — LEVOTHYROXINE SODIUM 75 MCG: 0.03 TABLET ORAL at 06:36

## 2024-06-27 RX ADMIN — MEROPENEM 2000 MG: 1 INJECTION INTRAVENOUS at 03:04

## 2024-06-27 RX ADMIN — ALBUMIN (HUMAN) 25 G: 0.25 INJECTION, SOLUTION INTRAVENOUS at 03:49

## 2024-06-27 RX ADMIN — PREGABALIN 150 MG: 75 CAPSULE ORAL at 10:58

## 2024-06-27 RX ADMIN — ALBUMIN (HUMAN) 25 G: 0.25 INJECTION, SOLUTION INTRAVENOUS at 14:23

## 2024-06-27 RX ADMIN — ENOXAPARIN SODIUM 30 MG: 100 INJECTION SUBCUTANEOUS at 03:34

## 2024-06-27 RX ADMIN — MIDODRINE HYDROCHLORIDE 10 MG: 5 TABLET ORAL at 08:07

## 2024-06-27 RX ADMIN — ALPRAZOLAM 1 MG: 1 TABLET ORAL at 10:58

## 2024-06-27 RX ADMIN — PANTOPRAZOLE SODIUM 40 MG: 40 INJECTION, POWDER, FOR SOLUTION INTRAVENOUS at 00:32

## 2024-06-27 RX ADMIN — NICOTINE POLACRILEX 4 MG: 4 LOZENGE ORAL at 21:37

## 2024-06-27 RX ADMIN — ARFORMOTEROL TARTRATE 15 MCG: 15 SOLUTION RESPIRATORY (INHALATION) at 19:35

## 2024-06-27 RX ADMIN — HYDROCORTISONE SODIUM SUCCINATE 50 MG: 100 INJECTION, POWDER, FOR SOLUTION INTRAMUSCULAR; INTRAVENOUS at 10:58

## 2024-06-27 RX ADMIN — QUETIAPINE FUMARATE 100 MG: 200 TABLET, EXTENDED RELEASE ORAL at 21:33

## 2024-06-27 RX ADMIN — NICOTINE POLACRILEX 4 MG: 4 LOZENGE ORAL at 18:14

## 2024-06-27 RX ADMIN — SODIUM CHLORIDE, PRESERVATIVE FREE 10 ML: 5 INJECTION INTRAVENOUS at 08:08

## 2024-06-27 RX ADMIN — DOCUSATE SODIUM 100 MG: 100 CAPSULE, LIQUID FILLED ORAL at 08:08

## 2024-06-27 RX ADMIN — Medication 1 CAPSULE: at 08:08

## 2024-06-27 RX ADMIN — METHADONE HYDROCHLORIDE 140 MG: 10 TABLET ORAL at 10:58

## 2024-06-27 RX ADMIN — Medication 2 PUFF: at 08:14

## 2024-06-27 RX ADMIN — MUPIROCIN: 20 OINTMENT TOPICAL at 21:09

## 2024-06-27 RX ADMIN — DIVALPROEX SODIUM 750 MG: 250 TABLET, EXTENDED RELEASE ORAL at 21:10

## 2024-06-27 RX ADMIN — PREGABALIN 150 MG: 75 CAPSULE ORAL at 21:10

## 2024-06-27 RX ADMIN — HYDROCORTISONE SODIUM SUCCINATE 50 MG: 100 INJECTION, POWDER, FOR SOLUTION INTRAMUSCULAR; INTRAVENOUS at 05:24

## 2024-06-27 RX ADMIN — Medication 1 CAPSULE: at 21:10

## 2024-06-27 RX ADMIN — FUROSEMIDE 40 MG: 40 TABLET ORAL at 08:20

## 2024-06-27 RX ADMIN — ZOLPIDEM TARTRATE 10 MG: 5 TABLET ORAL at 21:10

## 2024-06-27 RX ADMIN — BUDESONIDE 500 MCG: 0.5 SUSPENSION RESPIRATORY (INHALATION) at 19:39

## 2024-06-27 RX ADMIN — SODIUM CHLORIDE, PRESERVATIVE FREE 10 ML: 5 INJECTION INTRAVENOUS at 21:09

## 2024-06-27 RX ADMIN — DIVALPROEX SODIUM 750 MG: 250 TABLET, EXTENDED RELEASE ORAL at 08:07

## 2024-06-27 RX ADMIN — VANCOMYCIN HYDROCHLORIDE 1250 MG: 10 INJECTION, POWDER, LYOPHILIZED, FOR SOLUTION INTRAVENOUS at 23:47

## 2024-06-27 RX ADMIN — GUAIFENESIN 600 MG: 600 TABLET ORAL at 03:34

## 2024-06-27 RX ADMIN — QUETIAPINE FUMARATE 200 MG: 200 TABLET, EXTENDED RELEASE ORAL at 21:34

## 2024-06-27 RX ADMIN — MIDODRINE HYDROCHLORIDE 10 MG: 5 TABLET ORAL at 10:58

## 2024-06-27 RX ADMIN — ENOXAPARIN SODIUM 30 MG: 100 INJECTION SUBCUTANEOUS at 21:08

## 2024-06-27 RX ADMIN — MEROPENEM 1000 MG: 1 INJECTION INTRAVENOUS at 18:14

## 2024-06-27 RX ADMIN — DOCUSATE SODIUM 100 MG: 100 CAPSULE, LIQUID FILLED ORAL at 21:33

## 2024-06-27 ASSESSMENT — PAIN SCALES - GENERAL
PAINLEVEL_OUTOF10: 0
PAINLEVEL_OUTOF10: 9

## 2024-06-27 NOTE — RT PROTOCOL NOTE
RT Inhaler-Nebulizer Bronchodilator Protocol Note    There is a bronchodilator order in the chart from a provider indicating to follow the RT Bronchodilator Protocol and there is an “Initiate RT Inhaler-Nebulizer Bronchodilator Protocol” order as well (see protocol at bottom of note).    CXR Findings:  XR CHEST PORTABLE    Result Date: 6/26/2024  Stable to slightly improved bilateral multifocal pneumonia.       The findings from the last RT Protocol Assessment were as follows:   History Pulmonary Disease: Chronic pulmonary disease  Respiratory Pattern: Regular pattern and RR 12-20 bpm  Breath Sounds: Clear breath sounds  Cough: Strong, spontaneous, non-productive  Indication for Bronchodilator Therapy: On home bronchodilators  Bronchodilator Assessment Score: 2    Aerosolized bronchodilator medication orders have been revised according to the RT Inhaler-Nebulizer Bronchodilator Protocol below.    Respiratory Therapist to perform RT Therapy Protocol Assessment initially then follow the protocol.  Repeat RT Therapy Protocol Assessment PRN for score 0-3 or on second treatment, BID, and PRN for scores above 3.    No Indications - adjust the frequency to every 6 hours PRN wheezing or bronchospasm, if no treatments needed after 48 hours then discontinue using Per Protocol order mode.     If indication present, adjust the RT bronchodilator orders based on the Bronchodilator Assessment Score as indicated below.  Use Inhaler orders unless patient has one or more of the following: on home nebulizer, not able to hold breath for 10 seconds, is not alert and oriented, cannot activate and use MDI correctly, or respiratory rate 25 breaths per minute or more, then use the equivalent nebulizer order(s) with same Frequency and PRN reasons based on the score.  If a patient is on this medication at home then do not decrease Frequency below that used at home.    0-3 - enter or revise RT bronchodilator order(s) to equivalent RT

## 2024-06-27 NOTE — ACP (ADVANCE CARE PLANNING)
Advance Care Planning     General Advance Care Planning (ACP) Conversation    Date of Conversation: 6/27/2024  Conducted with: Patient with Decision Making Capacity  Other persons present: None    Healthcare Decision Maker:   Primary Decision Maker: Talia Blanchard - Child    Secondary Decision Maker: Apolinar Blanchardald - Parent - [phone]    Secondary Decision Maker: Luis Miguel Blanchardie - Brother/Sister - [phone]  Click here to complete Healthcare Decision Makers including selection of the Healthcare Decision Maker Relationship (ie \"Primary\").       Content/Action Overview:  DECLINED ACP Conversation - will revisit periodically  Reviewed DNR/DNI and patient elects Full Code (Attempt Resuscitation)        Length of Voluntary ACP Conversation in minutes:  <16 minutes (Non-Billable)    Rabia Duncan RN              Detail Level: Simple Detail Level: Detailed

## 2024-06-27 NOTE — ED PROVIDER NOTES
hypotension persists after fluid resuscitation)        [] No criteria met for Septic Shock.   Patient Vitals for the past 6 hrs:   BP Temp Pulse Resp SpO2 Height Weight Weight Method Percent Weight Change   06/26/24 2129 -- (!) 103.2 °F (39.6 °C) (!) 128 19 (!) 82 % 1.626 m (5' 4\") -- -- --   06/26/24 2132 -- -- -- 18 91 % -- -- -- --   06/26/24 2133 (!) 85/37 -- (!) 130 20 (!) 84 % -- -- -- --   06/26/24 2135 (!) 89/59 -- (!) 127 21 91 % -- -- -- --   06/26/24 2139 -- -- -- -- -- -- 109 kg (240 lb 4.8 oz) Estimated 0   06/26/24 2243 (!) 77/61 -- (!) 107 12 96 % -- -- -- --   06/26/24 2325 (!) 76/60 -- 100 15 91 % -- -- -- --   06/26/24 2359 (!) 81/54 -- 92 (!) 8 90 % -- -- -- --   06/27/24 0004 (!) 80/43 -- 95 (!) 9 91 % -- -- -- --   06/27/24 0009 (!) 85/43 -- 94 10 91 % -- -- -- --   06/27/24 0024 (!) 91/52 97.9 °F (36.6 °C) 91 12 94 % -- -- -- --   06/27/24 0044 (!) 105/55 -- 92 11 94 % -- -- -- --   06/27/24 0115 (!) 105/58 -- 92 (!) 8 95 % -- -- -- --   06/27/24 0125 (!) 109/57 -- 93 (!) 9 95 % -- -- -- --   06/27/24 0135 (!) 98/59 -- 96 (!) 8 95 % -- -- -- --   06/27/24 0154 (!) 105/58 -- 95 (!) 8 93 % -- -- -- --      Recent Labs     06/26/24  2143   WBC 25.7*   CREATININE 1.7*   BILITOT 0.3            Time Septic Shock Identified: 2303    Fluid Resuscitation Rational: at least 30mL/kg based on ideal body weight due to obesity defined as BMI >30 (patient's BMI is Body mass index is 41.25 kg/m². and IBW is Ideal body weight: 54.7 kg (120 lb 9.5 oz)Adjusted ideal body weight: 76.4 kg (168 lb 7.6 oz))      Repeat lactate level: improving    Reassessment Exam:   I have reassessed tissue perfusion and hemodynamic status after fluid bolus at this time: Patient remains hypotensive and tachycardic, respiration appears stable mental status decreased      ED MEDICATIONS GIVEN:   Medications   norepinephrine (LEVOPHED) 16 mg in sodium chloride 0.9 % 250 mL infusion (20 mcg/min IntraVENous Rate/Dose Change

## 2024-06-28 ENCOUNTER — APPOINTMENT (OUTPATIENT)
Dept: GENERAL RADIOLOGY | Age: 44
DRG: 720 | End: 2024-06-28
Payer: COMMERCIAL

## 2024-06-28 LAB
ANION GAP SERPL CALCULATED.3IONS-SCNC: 12 MMOL/L (ref 3–16)
APTT BLD: 32.1 SEC (ref 22.1–36.4)
BASOPHILS # BLD: 0.1 K/UL (ref 0–0.2)
BASOPHILS NFR BLD: 0.4 %
BUN SERPL-MCNC: 16 MG/DL (ref 7–20)
CALCIUM SERPL-MCNC: 8.8 MG/DL (ref 8.3–10.6)
CHLORIDE SERPL-SCNC: 100 MMOL/L (ref 99–110)
CO2 SERPL-SCNC: 28 MMOL/L (ref 21–32)
CREAT SERPL-MCNC: 1 MG/DL (ref 0.6–1.1)
DEPRECATED RDW RBC AUTO: 17.7 % (ref 12.4–15.4)
EOSINOPHIL # BLD: 0.1 K/UL (ref 0–0.6)
EOSINOPHIL NFR BLD: 0.7 %
GFR SERPLBLD CREATININE-BSD FMLA CKD-EPI: 70 ML/MIN/{1.73_M2}
GLUCOSE SERPL-MCNC: 108 MG/DL (ref 70–99)
HCT VFR BLD AUTO: 32.6 % (ref 36–48)
HGB BLD-MCNC: 10.4 G/DL (ref 12–16)
INR PPP: 1.02 (ref 0.85–1.15)
LYMPHOCYTES # BLD: 2.5 K/UL (ref 1–5.1)
LYMPHOCYTES NFR BLD: 17.1 %
MCH RBC QN AUTO: 28.3 PG (ref 26–34)
MCHC RBC AUTO-ENTMCNC: 31.8 G/DL (ref 31–36)
MCV RBC AUTO: 89 FL (ref 80–100)
MONOCYTES # BLD: 1.2 K/UL (ref 0–1.3)
MONOCYTES NFR BLD: 8.1 %
NEUTROPHILS # BLD: 10.9 K/UL (ref 1.7–7.7)
NEUTROPHILS NFR BLD: 73.7 %
PLATELET # BLD AUTO: 259 K/UL (ref 135–450)
PMV BLD AUTO: 8.3 FL (ref 5–10.5)
POTASSIUM SERPL-SCNC: 3.2 MMOL/L (ref 3.5–5.1)
PROTHROMBIN TIME: 13.6 SEC (ref 11.9–14.9)
RBC # BLD AUTO: 3.66 M/UL (ref 4–5.2)
S PNEUM AG UR QL: NORMAL
SODIUM SERPL-SCNC: 140 MMOL/L (ref 136–145)
WBC # BLD AUTO: 14.8 K/UL (ref 4–11)

## 2024-06-28 PROCEDURE — P9047 ALBUMIN (HUMAN), 25%, 50ML: HCPCS | Performed by: HOSPITALIST

## 2024-06-28 PROCEDURE — 6370000000 HC RX 637 (ALT 250 FOR IP): Performed by: INTERNAL MEDICINE

## 2024-06-28 PROCEDURE — 36415 COLL VENOUS BLD VENIPUNCTURE: CPT

## 2024-06-28 PROCEDURE — 6360000002 HC RX W HCPCS: Performed by: HOSPITALIST

## 2024-06-28 PROCEDURE — 99233 SBSQ HOSP IP/OBS HIGH 50: CPT | Performed by: INTERNAL MEDICINE

## 2024-06-28 PROCEDURE — 1200000000 HC SEMI PRIVATE

## 2024-06-28 PROCEDURE — 2580000003 HC RX 258: Performed by: HOSPITALIST

## 2024-06-28 PROCEDURE — 85610 PROTHROMBIN TIME: CPT

## 2024-06-28 PROCEDURE — 92611 MOTION FLUOROSCOPY/SWALLOW: CPT

## 2024-06-28 PROCEDURE — 85025 COMPLETE CBC W/AUTO DIFF WBC: CPT

## 2024-06-28 PROCEDURE — 6370000000 HC RX 637 (ALT 250 FOR IP): Performed by: NURSE PRACTITIONER

## 2024-06-28 PROCEDURE — 74230 X-RAY XM SWLNG FUNCJ C+: CPT

## 2024-06-28 PROCEDURE — 6370000000 HC RX 637 (ALT 250 FOR IP): Performed by: HOSPITALIST

## 2024-06-28 PROCEDURE — 6360000002 HC RX W HCPCS: Performed by: INTERNAL MEDICINE

## 2024-06-28 PROCEDURE — 94640 AIRWAY INHALATION TREATMENT: CPT

## 2024-06-28 PROCEDURE — 74220 X-RAY XM ESOPHAGUS 1CNTRST: CPT

## 2024-06-28 PROCEDURE — 99232 SBSQ HOSP IP/OBS MODERATE 35: CPT | Performed by: INTERNAL MEDICINE

## 2024-06-28 PROCEDURE — 85730 THROMBOPLASTIN TIME PARTIAL: CPT

## 2024-06-28 PROCEDURE — 80048 BASIC METABOLIC PNL TOTAL CA: CPT

## 2024-06-28 RX ORDER — METHADONE HYDROCHLORIDE 10 MG/1
100 TABLET ORAL DAILY
Status: DISCONTINUED | OUTPATIENT
Start: 2024-06-28 | End: 2024-07-02 | Stop reason: HOSPADM

## 2024-06-28 RX ADMIN — NICOTINE POLACRILEX 4 MG: 4 LOZENGE ORAL at 09:22

## 2024-06-28 RX ADMIN — NICOTINE POLACRILEX 4 MG: 4 LOZENGE ORAL at 13:33

## 2024-06-28 RX ADMIN — NICOTINE POLACRILEX 4 MG: 4 LOZENGE ORAL at 21:50

## 2024-06-28 RX ADMIN — ACETAMINOPHEN 650 MG: 325 TABLET ORAL at 15:57

## 2024-06-28 RX ADMIN — GUAIFENESIN 600 MG: 600 TABLET ORAL at 08:37

## 2024-06-28 RX ADMIN — MIDODRINE HYDROCHLORIDE 10 MG: 5 TABLET ORAL at 15:57

## 2024-06-28 RX ADMIN — FUROSEMIDE 40 MG: 40 TABLET ORAL at 08:37

## 2024-06-28 RX ADMIN — PREGABALIN 150 MG: 75 CAPSULE ORAL at 21:50

## 2024-06-28 RX ADMIN — Medication 1 CAPSULE: at 08:37

## 2024-06-28 RX ADMIN — LEVOTHYROXINE SODIUM 75 MCG: 0.03 TABLET ORAL at 06:12

## 2024-06-28 RX ADMIN — MEROPENEM 1000 MG: 1 INJECTION INTRAVENOUS at 03:17

## 2024-06-28 RX ADMIN — NICOTINE POLACRILEX 4 MG: 4 LOZENGE ORAL at 18:44

## 2024-06-28 RX ADMIN — ALPRAZOLAM 1 MG: 1 TABLET ORAL at 09:22

## 2024-06-28 RX ADMIN — DOCUSATE SODIUM 100 MG: 100 CAPSULE, LIQUID FILLED ORAL at 08:37

## 2024-06-28 RX ADMIN — DOCUSATE SODIUM 100 MG: 100 CAPSULE, LIQUID FILLED ORAL at 21:49

## 2024-06-28 RX ADMIN — MIDODRINE HYDROCHLORIDE 10 MG: 5 TABLET ORAL at 12:47

## 2024-06-28 RX ADMIN — ZOLPIDEM TARTRATE 10 MG: 5 TABLET ORAL at 21:50

## 2024-06-28 RX ADMIN — METHADONE HYDROCHLORIDE 100 MG: 10 TABLET ORAL at 13:39

## 2024-06-28 RX ADMIN — GUAIFENESIN 600 MG: 600 TABLET ORAL at 21:50

## 2024-06-28 RX ADMIN — ALBUMIN (HUMAN) 25 G: 0.25 INJECTION, SOLUTION INTRAVENOUS at 03:15

## 2024-06-28 RX ADMIN — QUETIAPINE FUMARATE 100 MG: 200 TABLET, EXTENDED RELEASE ORAL at 21:49

## 2024-06-28 RX ADMIN — HYDROCORTISONE SODIUM SUCCINATE 50 MG: 100 INJECTION, POWDER, FOR SOLUTION INTRAMUSCULAR; INTRAVENOUS at 06:13

## 2024-06-28 RX ADMIN — MUPIROCIN: 20 OINTMENT TOPICAL at 12:48

## 2024-06-28 RX ADMIN — DIVALPROEX SODIUM 750 MG: 250 TABLET, EXTENDED RELEASE ORAL at 21:50

## 2024-06-28 RX ADMIN — METHADONE HYDROCHLORIDE 100 MG: 10 TABLET ORAL at 08:38

## 2024-06-28 RX ADMIN — MEROPENEM 1000 MG: 1 INJECTION INTRAVENOUS at 19:03

## 2024-06-28 RX ADMIN — MEROPENEM 1000 MG: 1 INJECTION INTRAVENOUS at 10:55

## 2024-06-28 RX ADMIN — BUDESONIDE 500 MCG: 0.5 SUSPENSION RESPIRATORY (INHALATION) at 20:02

## 2024-06-28 RX ADMIN — QUETIAPINE FUMARATE 200 MG: 200 TABLET, EXTENDED RELEASE ORAL at 22:08

## 2024-06-28 RX ADMIN — DIVALPROEX SODIUM 750 MG: 250 TABLET, EXTENDED RELEASE ORAL at 08:37

## 2024-06-28 RX ADMIN — VANCOMYCIN HYDROCHLORIDE 1250 MG: 10 INJECTION, POWDER, LYOPHILIZED, FOR SOLUTION INTRAVENOUS at 23:32

## 2024-06-28 RX ADMIN — PANTOPRAZOLE SODIUM 40 MG: 40 TABLET, DELAYED RELEASE ORAL at 06:12

## 2024-06-28 RX ADMIN — ARFORMOTEROL TARTRATE 15 MCG: 15 SOLUTION RESPIRATORY (INHALATION) at 20:02

## 2024-06-28 RX ADMIN — PREGABALIN 150 MG: 75 CAPSULE ORAL at 08:37

## 2024-06-28 RX ADMIN — NICOTINE POLACRILEX 4 MG: 4 LOZENGE ORAL at 15:59

## 2024-06-28 RX ADMIN — ENOXAPARIN SODIUM 30 MG: 100 INJECTION SUBCUTANEOUS at 08:30

## 2024-06-28 RX ADMIN — ARFORMOTEROL TARTRATE 15 MCG: 15 SOLUTION RESPIRATORY (INHALATION) at 09:30

## 2024-06-28 RX ADMIN — ENOXAPARIN SODIUM 30 MG: 100 INJECTION SUBCUTANEOUS at 21:50

## 2024-06-28 RX ADMIN — MIDODRINE HYDROCHLORIDE 10 MG: 5 TABLET ORAL at 08:37

## 2024-06-28 RX ADMIN — BUDESONIDE 500 MCG: 0.5 SUSPENSION RESPIRATORY (INHALATION) at 09:33

## 2024-06-28 ASSESSMENT — PAIN SCALES - GENERAL
PAINLEVEL_OUTOF10: 8
PAINLEVEL_OUTOF10: 7
PAINLEVEL_OUTOF10: 8
PAINLEVEL_OUTOF10: 8
PAINLEVEL_OUTOF10: 4

## 2024-06-28 ASSESSMENT — PAIN DESCRIPTION - LOCATION
LOCATION: GENERALIZED

## 2024-06-28 NOTE — PROCEDURES
ejected from the airway    5 Material enters the airway, contacts the vocal folds, and is not ejected from the airway    6 Material enters the airway, passes below the vocal folds and is ejected into the larynx or out of the airway    7 Material enters the airway, passes below the vocal folds, and is not ejected from the trachea despite effort    8 Material enters the airway, passes below the vocal folds, and no effort is made to eject.           Compensatory Swallowing Strategies Attempted: small sips  Postural Changes and/or Swallow Maneuvers Trialed: upright posture   Patient Position: Lateral and Patient Degrees: 90 degrees, Seated upright in MBSS chair  Consistencies Administered: thin tsp, controlled, cup, straw; puree; soft; regular      Recommendations:  Diet recommendation: IDDSI 7 Regular Solids; IDDSI 0 Thin Liquids; Meds whole with thin liquids  Risk management: upright for all intake, stay upright for at least 30 mins after intake, small bites/sips, increase physical mobility as able, alternate bites/sips, general GERD precautions, and general aspiration precautions  *Recommend GI continue to follow as indicated. Noted esophogram completed immediately following MBS.     Safe Swallow Protocol:  Supervision: N/A  Compensatory Swallowing Strategies: HOB 90* and 30\" after meals; small bites/sips; alternate solids/liquids every 3-5 bites; oral care after every meal      Behavior/Cognition/Vision/Hearing:  Behavior/Cognition: Alert and cooperative  Vision: adequate for purposes of the study  Hearing: adequate for purposes of the study     Recommendations/Treatment  Requires SLP Intervention: yes  D/C Recommendations: GI f/u  Postural Changes and/or Swallow Maneuvers: upright posture with PO  Referral To: GI    Recommended Exercises: N/A  Therapeutic Interventions: diet tolerance monitoring, patient/family education, oral care     Education: Images and recommendations were reviewed with pt following this exam.

## 2024-06-28 NOTE — FLOWSHEET NOTE
4 Eyes Skin Assessment     NAME:  Anjelica Blanchard  YOB: 1980  MEDICAL RECORD NUMBER:  [id number]    The patient is being assessed for  Transfer to New Unit    I agree that at least one RN has performed a thorough Head to Toe Skin Assessment on the patient. ALL assessment sites listed below have been assessed.      Areas assessed by both nurses:    Head, Face, Ears, Shoulders, Back, Chest, Arms, Elbows, Hands, Sacrum. Buttock, Coccyx, Ischium, Legs. Feet and Heels, and Under Medical Devices         Does the Patient have a Wound? No noted wound(s)       Serge Prevention initiated by RN: No  Wound Care Orders initiated by RN: No    Pressure Injury (Stage 3,4, Unstageable, DTI, NWPT, and Complex wounds) if present, place Wound referral order by RN under : No    New Ostomies, if present place, Ostomy referral order under : No     Nurse 1 eSignature: Electronically signed by Berna Bridges RN on 6/28/24 at 3:38 PM EDT    **SHARE this note so that the co-signing nurse can place an eSignature**    Nurse 2 eSignature: {Esignature:[id number]}

## 2024-06-28 NOTE — CONSULTS
06/28/2024 perfect serve @1008. [place] PROCTOR  
ID consult has been perfect served.  
PULMONARY AND CRITICAL CARE INPATIENT NOTE        Anjelica Blanchard   : 1980  MRN: [id number]     Admitting Physician: Lorna Mitchell MD  Attending Physician: Darvin Payne MD  PCP: Smith Neff MD    Admission: 2024   Date of Service: 2024    Chief Complaint   Patient presents with    Shortness of Breath     Patient called EMS for SOB, worsening 2-3 days, chronic SOB for months \"in and out of hospital with pneumonia\". Patient reports when she coughs she has a headache with chest pain. Supposed to follow up with cardiology and has not.           ASSESSMENT & PLAN       44 y.o. pleasant  female patient with:    Assessment:  Frequent admissions to the hospital  Recurrent hypoxic and hypercapnic respiratory failure.  Recurrent multifocal pneumonia.  Recent admission for aspiration pneumonitis, seizures at [facility] with decompensation  Septic shock  Fever, tachycardia, hypotension  Suspected immunocompromise state versus recurrent aspiration related pneumonia.  Medications and seizures and body habitus contributing factors  Neuropsych Multi pharmacy dependence: 140 mg Methadone daily, Xanax 1 mg, 300 mg Seroquel, 150 mgh Lyrical bid, 10 mg Zolpidem.  Asthma/COPD on Symbicort/Airsupra  Reactive mediastinal and hilar adenopathy  Migraine headaches, bipolar disorder  GERD  Hypothyroidism, hep C  Suicidal ideation requring admission in 2024  Multisubstance abuse, opiates in the past, more than 30-pack-year smoking history, vaping  Obesity class III  Likely undiagnosed sleep apnea awaiting the sleep study,  Difficult to sedate. Requires GA for procedures.      Plan:              Continue to wean vasopressors with target MAP 65  Continue broad-spectrum antibiotics  ID consulted by primary follow micro workup  Stress dose steroid  Brovana Pulmicort and DuoNebs  Will resume home neuropsych medications including high-dose methadone, Xanax and others  Bronchial hygiene 
07/01/24  0651   CREATININE 0.9 0.8 0.9     Estimated Creatinine Clearance: 94 mL/min (based on SCr of 0.9 mg/dL).  Recent Labs     06/29/24  0611 06/30/24  0612 07/01/24  0651   WBC 9.9 6.9 12.5*     Renal functions stable  WBC elevated from 6.9-->12.5  Cx no growth  ; Trough 15.1  Continue with current Vancomycin dosing      Americo Montgomery RPH 7/1/2024 9:31 AM    7/1 1258  Vanc rdm = 18.7 mcg/mL  Calculated ; Trough 16  Continue with current Vancomycin dosing of 1250 mg q12h  Estimated Creatinine Clearance: 94 mL/min (based on SCr of 0.9 mg/dL).  Americo Montgomery PharmD  7/1/2024 at 1:54 PM  
* (Principal) Septic shock (HCC) 6/27/2024 Yes    Adrenal insufficiency (HCC) 6/27/2024 Yes    Attention deficit hyperactivity disorder 6/27/2024 Yes    Borderline personality disorder (HCC) 6/27/2024 Yes    Chronic back pain 6/27/2024 Yes    Chronic pain of both knees 6/27/2024 Yes    Cocaine abuse in remission (HCC) 6/27/2024 Yes    Diabetes mellitus (HCC) 6/27/2024 Yes    Fibromyalgia 6/27/2024 Yes    Bipolar disorder (HCC) 6/27/2024 Yes    Polysubstance dependence including opioid type drug, episodic abuse (HCC) 6/27/2024 Yes    Sepsis due to pneumonia (HCC) 6/27/2024 Yes    Morbid obesity (HCC) 6/27/2024 Yes    Polypharmacy (Chronic) 6/27/2024 Yes     Assessment:   44-year-old female with a history of morbid obesity, diastolic heart failure, drug abuse on remission, recurrent pneumonia, COPD, hepatitis C and presents with respiratory failure and is diagnosed with pneumonia/septic shock. The patient originally required vasopressor therapy in the ICU.  GI is consulted due to dysphagia.  Reviewed speech therapy and MBS documentation reports mild oropharyngeal dysphagia but no aspiration.    - Respiratory failure (resolved)  - Aspiration pneumonia (recurrent)  - Chronic dysphagia    Plan:   I suspect the aspiration events are most likely related to oversedation.  The patient is in multiple sedatives.  Management cannot ambulate.  She does report chronic dysphagia that can be explored further as outpatient with an EGD.  We will arrange outpatient follow-up for that.  He was previously seen in our office by Ms. Boone in 2022 for a different complaint.  Follow-up speech therapy recommendations.       Praveen Valerio MD  3:34 PM 6/28/2024            [facility]   [address] [place] [address]    [address] [place], [place] [id number]     Phone: [phone]     Fax: [phone]         
Community acquired pneumonia    Leukocytosis    Pulmonary infiltrates    Class 2 severe obesity due to excess calories with serious comorbidity and body mass index (BMI) of 36.0 to 36.9 in adult (HCC)    Normocytic normochromic anemia    Chronic prescription benzodiazepine use    Chronic pneumonia    Mediastinal adenopathy    Fluid overload    Mild congestive heart failure (HCC)    Hypotension    Volume overload    Acute respiratory failure with hypoxia and hypercapnia (HCC)    COLLEEN (acute kidney injury) (HCC)    Shock circulatory (HCC)    Lactic acidosis    Former smoker    Simple chronic bronchitis (HCC)    Methadone dependence (HCC)    Observed sleep apnea    Respiratory arrest (HCC)    Chronic cough    Mood disorder (HCC)    Acute on chronic respiratory failure with hypoxia and hypercapnia (HCC)    Aspiration pneumonitis (HCC)    Septic shock (HCC)    Morbid obesity (HCC)    Polypharmacy     Plan:   44-year-old female with history of COPD/asthma, migraine, bipolar disorder, GERD, polysubstance use on methadone, hypothyroidism, hepatitis C, heart failure, morbid obesity with BMI 40.7 that presented on 6/27 with complaints of show cough, shortness of breath and chest pain.     Concern for recurrent pneumonia:  -Patient has had chronic issues with recurrent admissions for shortness of breath, cough  -Patient has been empirically started on broad-spectrum antibiotics with vancomycin and meropenem.  -Previous workup has been reviewed and recent bronchoscopy 2 months ago did not show any evidence of pathogenic growth, only normal respiratory amanda.   -Unclear if this is related to bacterial infection.  Will send workup for endemic fungus is to include histoplasma and Blastomyces.  -Agree with evaluation for aspiration and immunoglobulin for workup of autoimmune causes.  -For now continue with vancomycin and meropenem pending further culture data.  If these are negative would recommend de-escalation.  Close monitoring

## 2024-06-28 NOTE — FLOWSHEET NOTE
Pt transferred from C2/ICU floor room 224 to C5 floor room 546 with belongings. Report received from Julia ORNELAS at bedside. Pt connected to telemetry monitor device from CMU, CMU of pt transfer. Pt and family notified of pt transfer with room number as well; family verbalizes understanding. Pt oriented to room, call light, phone. Pt instructed to use call light or assistance when getting out of bed or other needs, pt verbalizes understanding. Pt assessed, VSS.

## 2024-06-28 NOTE — H&P
Pt ok to transfer to med surg unit on telemetry.    Ogden catheter and femoral CVC line removed intact.    Report to Berna given.    
g/dL    Albumin 3.5 3.4 - 5.0 g/dL    Albumin/Globulin Ratio 1.2 1.1 - 2.2    Total Bilirubin 0.3 0.0 - 1.0 mg/dL    Alkaline Phosphatase 89 40 - 129 U/L    ALT 37 10 - 40 U/L    AST 82 (H) 15 - 37 U/L   Lactate, Sepsis    Collection Time: 06/26/24  9:43 PM   Result Value Ref Range    Lactic Acid, Sepsis 3.1 (H) 0.4 - 1.9 mmol/L   Procalcitonin    Collection Time: 06/26/24  9:43 PM   Result Value Ref Range    Procalcitonin 3.48 (H) 0.00 - 0.15 ng/mL   Blood gas, arterial    Collection Time: 06/26/24 10:48 PM   Result Value Ref Range    pH, Arterial 7.296 (L) 7.350 - 7.450    pCO2, Arterial 51.0 (H) 35.0 - 45.0 mmHg    pO2, Arterial 62.3 (L) 75.0 - 108.0 mmHg    HCO3, Arterial 24.3 21.0 - 29.0 mmol/L    Base Excess, Arterial -2.5 -3.0 - 3.0 mmol/L    Hemoglobin, Art, Extended 11.0 (L) 12.0 - 16.0 g/dL    O2 Sat, Arterial 89.8 (L) >92 %    Carboxyhgb, Arterial 0.2 0.0 - 1.5 %    Methemoglobin, Arterial 0.3 <1.5 %    TCO2, Arterial 25.9 Not Established mmol/L    O2 Therapy Unknown    Lactate, Sepsis    Collection Time: 06/26/24 11:17 PM   Result Value Ref Range    Lactic Acid, Sepsis 2.6 (H) 0.4 - 1.9 mmol/L        Imaging/Diagnostics Last 24 Hours   XR CHEST PORTABLE    Result Date: 6/26/2024  EXAMINATION: ONE XRAY VIEW OF THE CHEST 6/26/2024 9:45 pm COMPARISON: CT PE 06/13/2024. Chest radiograph 06/13/2024. HISTORY: ORDERING SYSTEM PROVIDED HISTORY: Sepsis TECHNOLOGIST PROVIDED HISTORY: Reason for exam:->Sepsis FINDINGS: Single view provided. Stable mediastinal and hilar silhouettes.  Normal lung volumes.  Stable to slightly improved bilateral multifocal patchy pulmonary consolidation.  No lobar consolidation.  No pleural effusion or pneumothorax.  No free subdiaphragmatic air.  The visualized bowel gas pattern is normal.     Stable to slightly improved bilateral multifocal pneumonia.       Assessment      Hospital Problems             Last Modified POA    * (Principal) Septic shock (HCC) 6/27/2024 Yes

## 2024-06-28 NOTE — FLOWSHEET NOTE
Got a hold of C2/ICU RN, but RN unable to give report at this time, will call back. C5 RN's phone number given to receive call back from C2/ICU RN.

## 2024-06-29 LAB
ALBUMIN SERPL-MCNC: 3.6 G/DL (ref 3.4–5)
ANION GAP SERPL CALCULATED.3IONS-SCNC: 8 MMOL/L (ref 3–16)
BUN SERPL-MCNC: 14 MG/DL (ref 7–20)
CALCIUM SERPL-MCNC: 8.9 MG/DL (ref 8.3–10.6)
CHLORIDE SERPL-SCNC: 104 MMOL/L (ref 99–110)
CO2 SERPL-SCNC: 31 MMOL/L (ref 21–32)
CREAT SERPL-MCNC: 0.9 MG/DL (ref 0.6–1.1)
DEPRECATED RDW RBC AUTO: 17.4 % (ref 12.4–15.4)
GFR SERPLBLD CREATININE-BSD FMLA CKD-EPI: 80 ML/MIN/{1.73_M2}
GLUCOSE SERPL-MCNC: 108 MG/DL (ref 70–99)
HCT VFR BLD AUTO: 33.3 % (ref 36–48)
HGB BLD-MCNC: 10.9 G/DL (ref 12–16)
MCH RBC QN AUTO: 28.6 PG (ref 26–34)
MCHC RBC AUTO-ENTMCNC: 32.7 G/DL (ref 31–36)
MCV RBC AUTO: 87.6 FL (ref 80–100)
PHOSPHATE SERPL-MCNC: 3.2 MG/DL (ref 2.5–4.9)
PLATELET # BLD AUTO: 278 K/UL (ref 135–450)
PMV BLD AUTO: 7.8 FL (ref 5–10.5)
POTASSIUM SERPL-SCNC: 3.4 MMOL/L (ref 3.5–5.1)
RBC # BLD AUTO: 3.79 M/UL (ref 4–5.2)
SODIUM SERPL-SCNC: 143 MMOL/L (ref 136–145)
VANCOMYCIN SERPL-MCNC: 16.8 UG/ML
WBC # BLD AUTO: 9.9 K/UL (ref 4–11)

## 2024-06-29 PROCEDURE — 99232 SBSQ HOSP IP/OBS MODERATE 35: CPT | Performed by: INTERNAL MEDICINE

## 2024-06-29 PROCEDURE — 6360000002 HC RX W HCPCS: Performed by: INTERNAL MEDICINE

## 2024-06-29 PROCEDURE — 2580000003 HC RX 258: Performed by: HOSPITALIST

## 2024-06-29 PROCEDURE — 6360000002 HC RX W HCPCS: Performed by: HOSPITALIST

## 2024-06-29 PROCEDURE — 6370000000 HC RX 637 (ALT 250 FOR IP): Performed by: INTERNAL MEDICINE

## 2024-06-29 PROCEDURE — 85027 COMPLETE CBC AUTOMATED: CPT

## 2024-06-29 PROCEDURE — 1200000000 HC SEMI PRIVATE

## 2024-06-29 PROCEDURE — 94640 AIRWAY INHALATION TREATMENT: CPT

## 2024-06-29 PROCEDURE — 80069 RENAL FUNCTION PANEL: CPT

## 2024-06-29 PROCEDURE — 80202 ASSAY OF VANCOMYCIN: CPT

## 2024-06-29 PROCEDURE — 2580000003 HC RX 258: Performed by: INTERNAL MEDICINE

## 2024-06-29 PROCEDURE — 6370000000 HC RX 637 (ALT 250 FOR IP): Performed by: HOSPITALIST

## 2024-06-29 PROCEDURE — 6370000000 HC RX 637 (ALT 250 FOR IP): Performed by: NURSE PRACTITIONER

## 2024-06-29 PROCEDURE — 36415 COLL VENOUS BLD VENIPUNCTURE: CPT

## 2024-06-29 RX ORDER — ONDANSETRON 2 MG/ML
4 INJECTION INTRAMUSCULAR; INTRAVENOUS EVERY 6 HOURS PRN
Status: DISCONTINUED | OUTPATIENT
Start: 2024-06-29 | End: 2024-07-02 | Stop reason: HOSPADM

## 2024-06-29 RX ORDER — ONDANSETRON 4 MG/1
4 TABLET, ORALLY DISINTEGRATING ORAL EVERY 8 HOURS PRN
Status: DISCONTINUED | OUTPATIENT
Start: 2024-06-29 | End: 2024-07-02 | Stop reason: HOSPADM

## 2024-06-29 RX ORDER — POLYETHYLENE GLYCOL 3350 17 G/17G
17 POWDER, FOR SOLUTION ORAL DAILY
Status: DISCONTINUED | OUTPATIENT
Start: 2024-06-29 | End: 2024-07-01

## 2024-06-29 RX ADMIN — ALPRAZOLAM 1 MG: 1 TABLET ORAL at 09:33

## 2024-06-29 RX ADMIN — MEROPENEM 1000 MG: 1 INJECTION INTRAVENOUS at 05:58

## 2024-06-29 RX ADMIN — SODIUM CHLORIDE, PRESERVATIVE FREE 10 ML: 5 INJECTION INTRAVENOUS at 09:21

## 2024-06-29 RX ADMIN — QUETIAPINE FUMARATE 200 MG: 200 TABLET, EXTENDED RELEASE ORAL at 21:06

## 2024-06-29 RX ADMIN — ALPRAZOLAM 1 MG: 1 TABLET ORAL at 15:47

## 2024-06-29 RX ADMIN — ARFORMOTEROL TARTRATE 15 MCG: 15 SOLUTION RESPIRATORY (INHALATION) at 19:39

## 2024-06-29 RX ADMIN — GUAIFENESIN 600 MG: 600 TABLET ORAL at 09:20

## 2024-06-29 RX ADMIN — FUROSEMIDE 40 MG: 40 TABLET ORAL at 09:20

## 2024-06-29 RX ADMIN — PREGABALIN 150 MG: 75 CAPSULE ORAL at 21:05

## 2024-06-29 RX ADMIN — PREGABALIN 150 MG: 75 CAPSULE ORAL at 09:20

## 2024-06-29 RX ADMIN — QUETIAPINE FUMARATE 100 MG: 200 TABLET, EXTENDED RELEASE ORAL at 21:04

## 2024-06-29 RX ADMIN — BUDESONIDE 500 MCG: 0.5 SUSPENSION RESPIRATORY (INHALATION) at 19:39

## 2024-06-29 RX ADMIN — SODIUM CHLORIDE, PRESERVATIVE FREE 10 ML: 5 INJECTION INTRAVENOUS at 21:06

## 2024-06-29 RX ADMIN — DOCUSATE SODIUM 100 MG: 100 CAPSULE, LIQUID FILLED ORAL at 09:20

## 2024-06-29 RX ADMIN — GUAIFENESIN 600 MG: 600 TABLET ORAL at 21:06

## 2024-06-29 RX ADMIN — ONDANSETRON 4 MG: 4 TABLET, ORALLY DISINTEGRATING ORAL at 12:46

## 2024-06-29 RX ADMIN — ENOXAPARIN SODIUM 30 MG: 100 INJECTION SUBCUTANEOUS at 21:05

## 2024-06-29 RX ADMIN — VANCOMYCIN HYDROCHLORIDE 1250 MG: 10 INJECTION, POWDER, LYOPHILIZED, FOR SOLUTION INTRAVENOUS at 16:00

## 2024-06-29 RX ADMIN — DIVALPROEX SODIUM 750 MG: 250 TABLET, EXTENDED RELEASE ORAL at 09:18

## 2024-06-29 RX ADMIN — NICOTINE POLACRILEX 4 MG: 4 LOZENGE ORAL at 14:06

## 2024-06-29 RX ADMIN — ZOLPIDEM TARTRATE 10 MG: 5 TABLET ORAL at 21:05

## 2024-06-29 RX ADMIN — NICOTINE POLACRILEX 4 MG: 4 LOZENGE ORAL at 09:24

## 2024-06-29 RX ADMIN — DOCUSATE SODIUM 100 MG: 100 CAPSULE, LIQUID FILLED ORAL at 21:05

## 2024-06-29 RX ADMIN — NICOTINE POLACRILEX 4 MG: 4 LOZENGE ORAL at 21:04

## 2024-06-29 RX ADMIN — PANTOPRAZOLE SODIUM 40 MG: 40 TABLET, DELAYED RELEASE ORAL at 05:56

## 2024-06-29 RX ADMIN — POLYETHYLENE GLYCOL 3350 17 G: 17 POWDER, FOR SOLUTION ORAL at 12:51

## 2024-06-29 RX ADMIN — ENOXAPARIN SODIUM 30 MG: 100 INJECTION SUBCUTANEOUS at 09:18

## 2024-06-29 RX ADMIN — MEROPENEM 1000 MG: 1 INJECTION INTRAVENOUS at 17:50

## 2024-06-29 RX ADMIN — LEVOTHYROXINE SODIUM 75 MCG: 0.03 TABLET ORAL at 05:56

## 2024-06-29 RX ADMIN — METHADONE HYDROCHLORIDE 100 MG: 10 TABLET ORAL at 09:20

## 2024-06-29 RX ADMIN — DIVALPROEX SODIUM 750 MG: 250 TABLET, EXTENDED RELEASE ORAL at 21:05

## 2024-06-30 ENCOUNTER — APPOINTMENT (OUTPATIENT)
Dept: GENERAL RADIOLOGY | Age: 44
DRG: 720 | End: 2024-06-30
Payer: COMMERCIAL

## 2024-06-30 LAB
ALBUMIN SERPL-MCNC: 3.3 G/DL (ref 3.4–5)
ANION GAP SERPL CALCULATED.3IONS-SCNC: 10 MMOL/L (ref 3–16)
BACTERIA BLD CULT: NORMAL
BUN SERPL-MCNC: 11 MG/DL (ref 7–20)
CALCIUM SERPL-MCNC: 8.9 MG/DL (ref 8.3–10.6)
CHLORIDE SERPL-SCNC: 101 MMOL/L (ref 99–110)
CO2 SERPL-SCNC: 31 MMOL/L (ref 21–32)
CREAT SERPL-MCNC: 0.8 MG/DL (ref 0.6–1.1)
DEPRECATED RDW RBC AUTO: 17.3 % (ref 12.4–15.4)
GFR SERPLBLD CREATININE-BSD FMLA CKD-EPI: >90 ML/MIN/{1.73_M2}
GLUCOSE SERPL-MCNC: 89 MG/DL (ref 70–99)
HCT VFR BLD AUTO: 33.8 % (ref 36–48)
HGB BLD-MCNC: 11 G/DL (ref 12–16)
MCH RBC QN AUTO: 28.7 PG (ref 26–34)
MCHC RBC AUTO-ENTMCNC: 32.7 G/DL (ref 31–36)
MCV RBC AUTO: 87.8 FL (ref 80–100)
PHOSPHATE SERPL-MCNC: 5.1 MG/DL (ref 2.5–4.9)
PLATELET # BLD AUTO: 280 K/UL (ref 135–450)
PMV BLD AUTO: 7.3 FL (ref 5–10.5)
POTASSIUM SERPL-SCNC: 4.1 MMOL/L (ref 3.5–5.1)
RBC # BLD AUTO: 3.85 M/UL (ref 4–5.2)
SODIUM SERPL-SCNC: 142 MMOL/L (ref 136–145)
WBC # BLD AUTO: 6.9 K/UL (ref 4–11)

## 2024-06-30 PROCEDURE — 94640 AIRWAY INHALATION TREATMENT: CPT

## 2024-06-30 PROCEDURE — 6360000002 HC RX W HCPCS: Performed by: INTERNAL MEDICINE

## 2024-06-30 PROCEDURE — 6360000002 HC RX W HCPCS: Performed by: HOSPITALIST

## 2024-06-30 PROCEDURE — 36415 COLL VENOUS BLD VENIPUNCTURE: CPT

## 2024-06-30 PROCEDURE — 80069 RENAL FUNCTION PANEL: CPT

## 2024-06-30 PROCEDURE — 85027 COMPLETE CBC AUTOMATED: CPT

## 2024-06-30 PROCEDURE — 99233 SBSQ HOSP IP/OBS HIGH 50: CPT | Performed by: INTERNAL MEDICINE

## 2024-06-30 PROCEDURE — 71046 X-RAY EXAM CHEST 2 VIEWS: CPT

## 2024-06-30 PROCEDURE — 2580000003 HC RX 258: Performed by: HOSPITALIST

## 2024-06-30 PROCEDURE — 1200000000 HC SEMI PRIVATE

## 2024-06-30 PROCEDURE — 94760 N-INVAS EAR/PLS OXIMETRY 1: CPT

## 2024-06-30 PROCEDURE — 6370000000 HC RX 637 (ALT 250 FOR IP): Performed by: NURSE PRACTITIONER

## 2024-06-30 PROCEDURE — 6370000000 HC RX 637 (ALT 250 FOR IP): Performed by: INTERNAL MEDICINE

## 2024-06-30 PROCEDURE — 6370000000 HC RX 637 (ALT 250 FOR IP): Performed by: HOSPITALIST

## 2024-06-30 PROCEDURE — 2580000003 HC RX 258: Performed by: INTERNAL MEDICINE

## 2024-06-30 RX ORDER — OXYCODONE HYDROCHLORIDE AND ACETAMINOPHEN 5; 325 MG/1; MG/1
1 TABLET ORAL EVERY 4 HOURS PRN
Status: DISCONTINUED | OUTPATIENT
Start: 2024-06-30 | End: 2024-07-02 | Stop reason: HOSPADM

## 2024-06-30 RX ORDER — LEVOFLOXACIN 750 MG/1
750 TABLET, FILM COATED ORAL DAILY
Qty: 7 TABLET | Refills: 0 | Status: SHIPPED | OUTPATIENT
Start: 2024-06-30 | End: 2024-07-07

## 2024-06-30 RX ORDER — HYDROXYZINE HYDROCHLORIDE 25 MG/1
50 TABLET, FILM COATED ORAL 3 TIMES DAILY PRN
Status: DISCONTINUED | OUTPATIENT
Start: 2024-06-30 | End: 2024-07-02 | Stop reason: HOSPADM

## 2024-06-30 RX ORDER — DOXYCYCLINE HYCLATE 100 MG
100 TABLET ORAL 2 TIMES DAILY
Qty: 14 TABLET | Refills: 0 | Status: SHIPPED | OUTPATIENT
Start: 2024-06-30 | End: 2024-07-07

## 2024-06-30 RX ADMIN — GUAIFENESIN 600 MG: 600 TABLET ORAL at 08:10

## 2024-06-30 RX ADMIN — ENOXAPARIN SODIUM 30 MG: 100 INJECTION SUBCUTANEOUS at 20:35

## 2024-06-30 RX ADMIN — VANCOMYCIN HYDROCHLORIDE 1250 MG: 10 INJECTION, POWDER, LYOPHILIZED, FOR SOLUTION INTRAVENOUS at 15:34

## 2024-06-30 RX ADMIN — ZOLPIDEM TARTRATE 10 MG: 5 TABLET ORAL at 20:35

## 2024-06-30 RX ADMIN — POLYETHYLENE GLYCOL 3350 17 G: 17 POWDER, FOR SOLUTION ORAL at 08:11

## 2024-06-30 RX ADMIN — NICOTINE POLACRILEX 4 MG: 4 LOZENGE ORAL at 10:03

## 2024-06-30 RX ADMIN — PREGABALIN 150 MG: 75 CAPSULE ORAL at 20:35

## 2024-06-30 RX ADMIN — ARFORMOTEROL TARTRATE 15 MCG: 15 SOLUTION RESPIRATORY (INHALATION) at 07:44

## 2024-06-30 RX ADMIN — SODIUM CHLORIDE: 9 INJECTION, SOLUTION INTRAVENOUS at 08:15

## 2024-06-30 RX ADMIN — LEVOTHYROXINE SODIUM 75 MCG: 0.03 TABLET ORAL at 05:51

## 2024-06-30 RX ADMIN — NICOTINE POLACRILEX 4 MG: 4 LOZENGE ORAL at 18:58

## 2024-06-30 RX ADMIN — GUAIFENESIN 600 MG: 600 TABLET ORAL at 20:35

## 2024-06-30 RX ADMIN — DIVALPROEX SODIUM 750 MG: 250 TABLET, EXTENDED RELEASE ORAL at 08:11

## 2024-06-30 RX ADMIN — PREGABALIN 150 MG: 75 CAPSULE ORAL at 08:10

## 2024-06-30 RX ADMIN — QUETIAPINE FUMARATE 200 MG: 200 TABLET, EXTENDED RELEASE ORAL at 20:41

## 2024-06-30 RX ADMIN — ALPRAZOLAM 1 MG: 1 TABLET ORAL at 17:34

## 2024-06-30 RX ADMIN — MEROPENEM 1000 MG: 1 INJECTION INTRAVENOUS at 01:10

## 2024-06-30 RX ADMIN — QUETIAPINE FUMARATE 100 MG: 200 TABLET, EXTENDED RELEASE ORAL at 20:35

## 2024-06-30 RX ADMIN — OXYCODONE HYDROCHLORIDE AND ACETAMINOPHEN 1 TABLET: 5; 325 TABLET ORAL at 22:10

## 2024-06-30 RX ADMIN — DIVALPROEX SODIUM 750 MG: 250 TABLET, EXTENDED RELEASE ORAL at 20:35

## 2024-06-30 RX ADMIN — MEROPENEM 1000 MG: 1 INJECTION INTRAVENOUS at 08:19

## 2024-06-30 RX ADMIN — SODIUM CHLORIDE: 9 INJECTION, SOLUTION INTRAVENOUS at 15:33

## 2024-06-30 RX ADMIN — DOCUSATE SODIUM 100 MG: 100 CAPSULE, LIQUID FILLED ORAL at 08:11

## 2024-06-30 RX ADMIN — NICOTINE POLACRILEX 4 MG: 4 LOZENGE ORAL at 17:34

## 2024-06-30 RX ADMIN — METHADONE HYDROCHLORIDE 100 MG: 10 TABLET ORAL at 08:11

## 2024-06-30 RX ADMIN — NICOTINE POLACRILEX 4 MG: 4 LOZENGE ORAL at 08:36

## 2024-06-30 RX ADMIN — HYDROXYZINE HYDROCHLORIDE 50 MG: 25 TABLET ORAL at 22:10

## 2024-06-30 RX ADMIN — ALPRAZOLAM 1 MG: 1 TABLET ORAL at 08:18

## 2024-06-30 RX ADMIN — FUROSEMIDE 40 MG: 40 TABLET ORAL at 08:10

## 2024-06-30 RX ADMIN — VANCOMYCIN HYDROCHLORIDE 1250 MG: 10 INJECTION, POWDER, LYOPHILIZED, FOR SOLUTION INTRAVENOUS at 04:17

## 2024-06-30 RX ADMIN — ACETAMINOPHEN 650 MG: 325 TABLET ORAL at 17:37

## 2024-06-30 RX ADMIN — MEROPENEM 1000 MG: 1 INJECTION INTRAVENOUS at 17:34

## 2024-06-30 RX ADMIN — DOCUSATE SODIUM 100 MG: 100 CAPSULE, LIQUID FILLED ORAL at 20:36

## 2024-06-30 RX ADMIN — BUDESONIDE 500 MCG: 0.5 SUSPENSION RESPIRATORY (INHALATION) at 07:44

## 2024-06-30 RX ADMIN — ENOXAPARIN SODIUM 30 MG: 100 INJECTION SUBCUTANEOUS at 08:11

## 2024-06-30 RX ADMIN — SODIUM CHLORIDE, PRESERVATIVE FREE 10 ML: 5 INJECTION INTRAVENOUS at 08:20

## 2024-06-30 RX ADMIN — PANTOPRAZOLE SODIUM 40 MG: 40 TABLET, DELAYED RELEASE ORAL at 05:51

## 2024-06-30 RX ADMIN — NICOTINE POLACRILEX 4 MG: 4 LOZENGE ORAL at 20:41

## 2024-06-30 RX ADMIN — ARFORMOTEROL TARTRATE 15 MCG: 15 SOLUTION RESPIRATORY (INHALATION) at 20:04

## 2024-06-30 ASSESSMENT — PAIN DESCRIPTION - LOCATION
LOCATION: BACK
LOCATION: BACK

## 2024-06-30 ASSESSMENT — PAIN SCALES - GENERAL
PAINLEVEL_OUTOF10: 0
PAINLEVEL_OUTOF10: 9
PAINLEVEL_OUTOF10: 9

## 2024-06-30 ASSESSMENT — PAIN DESCRIPTION - DESCRIPTORS
DESCRIPTORS: THROBBING
DESCRIPTORS: SHARP

## 2024-06-30 ASSESSMENT — PAIN DESCRIPTION - ORIENTATION
ORIENTATION: RIGHT;UPPER
ORIENTATION: RIGHT

## 2024-07-01 ENCOUNTER — TELEPHONE (OUTPATIENT)
Dept: PULMONOLOGY | Age: 44
End: 2024-07-01

## 2024-07-01 LAB
ALBUMIN SERPL-MCNC: 3.9 G/DL (ref 3.4–5)
ANION GAP SERPL CALCULATED.3IONS-SCNC: 11 MMOL/L (ref 3–16)
BACTERIA BLD CULT ORG #2: NORMAL
BUN SERPL-MCNC: 11 MG/DL (ref 7–20)
CALCIUM SERPL-MCNC: 9.3 MG/DL (ref 8.3–10.6)
CHLORIDE SERPL-SCNC: 96 MMOL/L (ref 99–110)
CO2 SERPL-SCNC: 29 MMOL/L (ref 21–32)
CREAT SERPL-MCNC: 0.9 MG/DL (ref 0.6–1.1)
DEPRECATED RDW RBC AUTO: 17.1 % (ref 12.4–15.4)
GFR SERPLBLD CREATININE-BSD FMLA CKD-EPI: 80 ML/MIN/{1.73_M2}
GLUCOSE SERPL-MCNC: 96 MG/DL (ref 70–99)
HCT VFR BLD AUTO: 32.9 % (ref 36–48)
HGB BLD-MCNC: 10.9 G/DL (ref 12–16)
MCH RBC QN AUTO: 28.7 PG (ref 26–34)
MCHC RBC AUTO-ENTMCNC: 33.2 G/DL (ref 31–36)
MCV RBC AUTO: 86.6 FL (ref 80–100)
PHOSPHATE SERPL-MCNC: 4.7 MG/DL (ref 2.5–4.9)
PLATELET # BLD AUTO: 300 K/UL (ref 135–450)
PMV BLD AUTO: 7.2 FL (ref 5–10.5)
POTASSIUM SERPL-SCNC: 4.1 MMOL/L (ref 3.5–5.1)
RBC # BLD AUTO: 3.8 M/UL (ref 4–5.2)
SODIUM SERPL-SCNC: 136 MMOL/L (ref 136–145)
VANCOMYCIN SERPL-MCNC: 18.7 UG/ML
WBC # BLD AUTO: 12.5 K/UL (ref 4–11)

## 2024-07-01 PROCEDURE — 36415 COLL VENOUS BLD VENIPUNCTURE: CPT

## 2024-07-01 PROCEDURE — 6360000002 HC RX W HCPCS: Performed by: INTERNAL MEDICINE

## 2024-07-01 PROCEDURE — 1200000000 HC SEMI PRIVATE

## 2024-07-01 PROCEDURE — 80069 RENAL FUNCTION PANEL: CPT

## 2024-07-01 PROCEDURE — 80202 ASSAY OF VANCOMYCIN: CPT

## 2024-07-01 PROCEDURE — 6360000002 HC RX W HCPCS: Performed by: HOSPITALIST

## 2024-07-01 PROCEDURE — 6370000000 HC RX 637 (ALT 250 FOR IP): Performed by: NURSE PRACTITIONER

## 2024-07-01 PROCEDURE — 6370000000 HC RX 637 (ALT 250 FOR IP): Performed by: INTERNAL MEDICINE

## 2024-07-01 PROCEDURE — 94640 AIRWAY INHALATION TREATMENT: CPT

## 2024-07-01 PROCEDURE — 2580000003 HC RX 258: Performed by: HOSPITALIST

## 2024-07-01 PROCEDURE — 6370000000 HC RX 637 (ALT 250 FOR IP): Performed by: HOSPITALIST

## 2024-07-01 PROCEDURE — 2580000003 HC RX 258: Performed by: INTERNAL MEDICINE

## 2024-07-01 PROCEDURE — 85027 COMPLETE CBC AUTOMATED: CPT

## 2024-07-01 RX ORDER — POLYETHYLENE GLYCOL 3350 17 G/17G
17 POWDER, FOR SOLUTION ORAL 2 TIMES DAILY
Status: DISCONTINUED | OUTPATIENT
Start: 2024-07-01 | End: 2024-07-02 | Stop reason: HOSPADM

## 2024-07-01 RX ORDER — CALCIUM CARBONATE 500 MG/1
500 TABLET, CHEWABLE ORAL 3 TIMES DAILY PRN
Status: DISCONTINUED | OUTPATIENT
Start: 2024-07-01 | End: 2024-07-02 | Stop reason: HOSPADM

## 2024-07-01 RX ADMIN — METHADONE HYDROCHLORIDE 100 MG: 10 TABLET ORAL at 08:12

## 2024-07-01 RX ADMIN — NICOTINE POLACRILEX 4 MG: 4 LOZENGE ORAL at 20:21

## 2024-07-01 RX ADMIN — CALCIUM CARBONATE 500 MG: 500 TABLET, CHEWABLE ORAL at 22:12

## 2024-07-01 RX ADMIN — PANTOPRAZOLE SODIUM 40 MG: 40 TABLET, DELAYED RELEASE ORAL at 08:12

## 2024-07-01 RX ADMIN — VANCOMYCIN HYDROCHLORIDE 1250 MG: 10 INJECTION, POWDER, LYOPHILIZED, FOR SOLUTION INTRAVENOUS at 04:07

## 2024-07-01 RX ADMIN — GUAIFENESIN 600 MG: 600 TABLET ORAL at 08:12

## 2024-07-01 RX ADMIN — ARFORMOTEROL TARTRATE 15 MCG: 15 SOLUTION RESPIRATORY (INHALATION) at 20:06

## 2024-07-01 RX ADMIN — NICOTINE POLACRILEX 4 MG: 4 LOZENGE ORAL at 18:43

## 2024-07-01 RX ADMIN — OXYCODONE HYDROCHLORIDE AND ACETAMINOPHEN 1 TABLET: 5; 325 TABLET ORAL at 10:43

## 2024-07-01 RX ADMIN — SODIUM CHLORIDE: 9 INJECTION, SOLUTION INTRAVENOUS at 15:28

## 2024-07-01 RX ADMIN — MEROPENEM 1000 MG: 1 INJECTION INTRAVENOUS at 22:10

## 2024-07-01 RX ADMIN — ENOXAPARIN SODIUM 30 MG: 100 INJECTION SUBCUTANEOUS at 08:13

## 2024-07-01 RX ADMIN — BUDESONIDE 500 MCG: 0.5 SUSPENSION RESPIRATORY (INHALATION) at 08:35

## 2024-07-01 RX ADMIN — DOCUSATE SODIUM 100 MG: 100 CAPSULE, LIQUID FILLED ORAL at 19:59

## 2024-07-01 RX ADMIN — DOCUSATE SODIUM 100 MG: 100 CAPSULE, LIQUID FILLED ORAL at 08:13

## 2024-07-01 RX ADMIN — FUROSEMIDE 40 MG: 40 TABLET ORAL at 08:12

## 2024-07-01 RX ADMIN — GUAIFENESIN 600 MG: 600 TABLET ORAL at 19:59

## 2024-07-01 RX ADMIN — VANCOMYCIN HYDROCHLORIDE 1250 MG: 10 INJECTION, POWDER, LYOPHILIZED, FOR SOLUTION INTRAVENOUS at 15:29

## 2024-07-01 RX ADMIN — OXYCODONE HYDROCHLORIDE AND ACETAMINOPHEN 1 TABLET: 5; 325 TABLET ORAL at 15:01

## 2024-07-01 RX ADMIN — ACETAMINOPHEN 650 MG: 325 TABLET ORAL at 14:14

## 2024-07-01 RX ADMIN — MEROPENEM 1000 MG: 1 INJECTION INTRAVENOUS at 11:34

## 2024-07-01 RX ADMIN — NICOTINE POLACRILEX 4 MG: 4 LOZENGE ORAL at 11:38

## 2024-07-01 RX ADMIN — DIVALPROEX SODIUM 750 MG: 250 TABLET, EXTENDED RELEASE ORAL at 08:13

## 2024-07-01 RX ADMIN — NICOTINE POLACRILEX 4 MG: 4 LOZENGE ORAL at 15:29

## 2024-07-01 RX ADMIN — OXYCODONE HYDROCHLORIDE AND ACETAMINOPHEN 1 TABLET: 5; 325 TABLET ORAL at 04:18

## 2024-07-01 RX ADMIN — ALPRAZOLAM 1 MG: 1 TABLET ORAL at 11:37

## 2024-07-01 RX ADMIN — PREGABALIN 150 MG: 75 CAPSULE ORAL at 08:12

## 2024-07-01 RX ADMIN — PREGABALIN 150 MG: 75 CAPSULE ORAL at 19:58

## 2024-07-01 RX ADMIN — ONDANSETRON 4 MG: 4 TABLET, ORALLY DISINTEGRATING ORAL at 15:13

## 2024-07-01 RX ADMIN — ZOLPIDEM TARTRATE 10 MG: 5 TABLET ORAL at 19:59

## 2024-07-01 RX ADMIN — ARFORMOTEROL TARTRATE 15 MCG: 15 SOLUTION RESPIRATORY (INHALATION) at 08:35

## 2024-07-01 RX ADMIN — ALPRAZOLAM 1 MG: 1 TABLET ORAL at 04:18

## 2024-07-01 RX ADMIN — POLYETHYLENE GLYCOL 3350 17 G: 17 POWDER, FOR SOLUTION ORAL at 08:13

## 2024-07-01 RX ADMIN — HYDROXYZINE HYDROCHLORIDE 50 MG: 25 TABLET ORAL at 08:13

## 2024-07-01 RX ADMIN — MIDODRINE HYDROCHLORIDE 10 MG: 5 TABLET ORAL at 11:41

## 2024-07-01 RX ADMIN — BUDESONIDE 500 MCG: 0.5 SUSPENSION RESPIRATORY (INHALATION) at 20:11

## 2024-07-01 RX ADMIN — QUETIAPINE FUMARATE 200 MG: 200 TABLET, EXTENDED RELEASE ORAL at 19:59

## 2024-07-01 RX ADMIN — NICOTINE POLACRILEX 4 MG: 4 LOZENGE ORAL at 10:44

## 2024-07-01 RX ADMIN — MIDODRINE HYDROCHLORIDE 10 MG: 5 TABLET ORAL at 17:18

## 2024-07-01 RX ADMIN — LEVOTHYROXINE SODIUM 75 MCG: 0.03 TABLET ORAL at 08:12

## 2024-07-01 RX ADMIN — OXYCODONE HYDROCHLORIDE AND ACETAMINOPHEN 1 TABLET: 5; 325 TABLET ORAL at 20:07

## 2024-07-01 RX ADMIN — ALPRAZOLAM 1 MG: 1 TABLET ORAL at 18:43

## 2024-07-01 RX ADMIN — SODIUM CHLORIDE: 9 INJECTION, SOLUTION INTRAVENOUS at 11:32

## 2024-07-01 RX ADMIN — DIVALPROEX SODIUM 750 MG: 250 TABLET, EXTENDED RELEASE ORAL at 19:59

## 2024-07-01 RX ADMIN — POLYETHYLENE GLYCOL 3350 17 G: 17 POWDER, FOR SOLUTION ORAL at 20:07

## 2024-07-01 RX ADMIN — QUETIAPINE FUMARATE 100 MG: 200 TABLET, EXTENDED RELEASE ORAL at 19:58

## 2024-07-01 RX ADMIN — SODIUM CHLORIDE, PRESERVATIVE FREE 10 ML: 5 INJECTION INTRAVENOUS at 20:07

## 2024-07-01 RX ADMIN — MEROPENEM 1000 MG: 1 INJECTION INTRAVENOUS at 03:59

## 2024-07-01 RX ADMIN — ENOXAPARIN SODIUM 30 MG: 100 INJECTION SUBCUTANEOUS at 19:59

## 2024-07-01 ASSESSMENT — PAIN DESCRIPTION - LOCATION
LOCATION: BACK

## 2024-07-01 ASSESSMENT — PAIN SCALES - GENERAL
PAINLEVEL_OUTOF10: 0
PAINLEVEL_OUTOF10: 0
PAINLEVEL_OUTOF10: 6
PAINLEVEL_OUTOF10: 9
PAINLEVEL_OUTOF10: 10
PAINLEVEL_OUTOF10: 9

## 2024-07-01 ASSESSMENT — PAIN DESCRIPTION - ORIENTATION
ORIENTATION: RIGHT;UPPER

## 2024-07-01 ASSESSMENT — PAIN DESCRIPTION - DESCRIPTORS
DESCRIPTORS: ACHING

## 2024-07-01 ASSESSMENT — PAIN DESCRIPTION - PAIN TYPE: TYPE: CHRONIC PAIN

## 2024-07-01 ASSESSMENT — PAIN - FUNCTIONAL ASSESSMENT: PAIN_FUNCTIONAL_ASSESSMENT: ACTIVITIES ARE NOT PREVENTED

## 2024-07-01 NOTE — TELEPHONE ENCOUNTER
----- Message from Manisha Medina MD sent at 6/30/2024  4:17 PM EDT -----  · Will see the patient in the pulmonary clinic in 2 weeks with a chest x-ray.

## 2024-07-02 ENCOUNTER — APPOINTMENT (OUTPATIENT)
Dept: GENERAL RADIOLOGY | Age: 44
DRG: 720 | End: 2024-07-02
Payer: COMMERCIAL

## 2024-07-02 VITALS
OXYGEN SATURATION: 88 % | DIASTOLIC BLOOD PRESSURE: 61 MMHG | TEMPERATURE: 99.1 F | BODY MASS INDEX: 39.93 KG/M2 | HEART RATE: 104 BPM | SYSTOLIC BLOOD PRESSURE: 97 MMHG | WEIGHT: 233.9 LBS | RESPIRATION RATE: 18 BRPM | HEIGHT: 64 IN

## 2024-07-02 LAB
ALBUMIN SERPL-MCNC: 3.1 G/DL (ref 3.4–5)
ANION GAP SERPL CALCULATED.3IONS-SCNC: 12 MMOL/L (ref 3–16)
BUN SERPL-MCNC: 14 MG/DL (ref 7–20)
CALCIUM SERPL-MCNC: 9 MG/DL (ref 8.3–10.6)
CHLORIDE SERPL-SCNC: 98 MMOL/L (ref 99–110)
CO2 SERPL-SCNC: 25 MMOL/L (ref 21–32)
CREAT SERPL-MCNC: 0.9 MG/DL (ref 0.6–1.1)
DEPRECATED RDW RBC AUTO: 17.1 % (ref 12.4–15.4)
GFR SERPLBLD CREATININE-BSD FMLA CKD-EPI: 80 ML/MIN/{1.73_M2}
GLUCOSE SERPL-MCNC: 114 MG/DL (ref 70–99)
HCT VFR BLD AUTO: 32.7 % (ref 36–48)
HGB BLD-MCNC: 10.5 G/DL (ref 12–16)
MCH RBC QN AUTO: 28.5 PG (ref 26–34)
MCHC RBC AUTO-ENTMCNC: 32.3 G/DL (ref 31–36)
MCV RBC AUTO: 88.4 FL (ref 80–100)
PHOSPHATE SERPL-MCNC: 6.4 MG/DL (ref 2.5–4.9)
PLATELET # BLD AUTO: 257 K/UL (ref 135–450)
PMV BLD AUTO: 7.2 FL (ref 5–10.5)
POTASSIUM SERPL-SCNC: 4.9 MMOL/L (ref 3.5–5.1)
RBC # BLD AUTO: 3.69 M/UL (ref 4–5.2)
SODIUM SERPL-SCNC: 135 MMOL/L (ref 136–145)
WBC # BLD AUTO: 10.8 K/UL (ref 4–11)

## 2024-07-02 PROCEDURE — 6360000002 HC RX W HCPCS: Performed by: INTERNAL MEDICINE

## 2024-07-02 PROCEDURE — 94761 N-INVAS EAR/PLS OXIMETRY MLT: CPT

## 2024-07-02 PROCEDURE — 71045 X-RAY EXAM CHEST 1 VIEW: CPT

## 2024-07-02 PROCEDURE — 6370000000 HC RX 637 (ALT 250 FOR IP): Performed by: INTERNAL MEDICINE

## 2024-07-02 PROCEDURE — 6360000002 HC RX W HCPCS: Performed by: HOSPITALIST

## 2024-07-02 PROCEDURE — 80069 RENAL FUNCTION PANEL: CPT

## 2024-07-02 PROCEDURE — 85027 COMPLETE CBC AUTOMATED: CPT

## 2024-07-02 PROCEDURE — 6370000000 HC RX 637 (ALT 250 FOR IP): Performed by: NURSE PRACTITIONER

## 2024-07-02 PROCEDURE — 36415 COLL VENOUS BLD VENIPUNCTURE: CPT

## 2024-07-02 PROCEDURE — 94640 AIRWAY INHALATION TREATMENT: CPT

## 2024-07-02 PROCEDURE — 2580000003 HC RX 258: Performed by: INTERNAL MEDICINE

## 2024-07-02 PROCEDURE — 2700000000 HC OXYGEN THERAPY PER DAY

## 2024-07-02 PROCEDURE — 2580000003 HC RX 258: Performed by: HOSPITALIST

## 2024-07-02 PROCEDURE — 6370000000 HC RX 637 (ALT 250 FOR IP): Performed by: HOSPITALIST

## 2024-07-02 RX ADMIN — MEROPENEM 1000 MG: 1 INJECTION INTRAVENOUS at 14:44

## 2024-07-02 RX ADMIN — ARFORMOTEROL TARTRATE 15 MCG: 15 SOLUTION RESPIRATORY (INHALATION) at 08:28

## 2024-07-02 RX ADMIN — CALCIUM CARBONATE 500 MG: 500 TABLET, CHEWABLE ORAL at 09:05

## 2024-07-02 RX ADMIN — NICOTINE POLACRILEX 4 MG: 4 LOZENGE ORAL at 17:00

## 2024-07-02 RX ADMIN — MIDODRINE HYDROCHLORIDE 10 MG: 5 TABLET ORAL at 09:05

## 2024-07-02 RX ADMIN — PREGABALIN 150 MG: 75 CAPSULE ORAL at 09:05

## 2024-07-02 RX ADMIN — GUAIFENESIN 600 MG: 600 TABLET ORAL at 09:05

## 2024-07-02 RX ADMIN — IPRATROPIUM BROMIDE AND ALBUTEROL SULFATE 1 DOSE: 2.5; .5 SOLUTION RESPIRATORY (INHALATION) at 01:57

## 2024-07-02 RX ADMIN — BUDESONIDE 500 MCG: 0.5 SUSPENSION RESPIRATORY (INHALATION) at 08:28

## 2024-07-02 RX ADMIN — VANCOMYCIN HYDROCHLORIDE 1250 MG: 10 INJECTION, POWDER, LYOPHILIZED, FOR SOLUTION INTRAVENOUS at 04:48

## 2024-07-02 RX ADMIN — ENOXAPARIN SODIUM 30 MG: 100 INJECTION SUBCUTANEOUS at 09:04

## 2024-07-02 RX ADMIN — LEVOTHYROXINE SODIUM 75 MCG: 0.03 TABLET ORAL at 06:24

## 2024-07-02 RX ADMIN — POLYETHYLENE GLYCOL 3350 17 G: 17 POWDER, FOR SOLUTION ORAL at 09:09

## 2024-07-02 RX ADMIN — ALPRAZOLAM 1 MG: 1 TABLET ORAL at 09:05

## 2024-07-02 RX ADMIN — MIDODRINE HYDROCHLORIDE 10 MG: 5 TABLET ORAL at 16:08

## 2024-07-02 RX ADMIN — FUROSEMIDE 40 MG: 40 TABLET ORAL at 09:05

## 2024-07-02 RX ADMIN — VANCOMYCIN HYDROCHLORIDE 1250 MG: 10 INJECTION, POWDER, LYOPHILIZED, FOR SOLUTION INTRAVENOUS at 16:07

## 2024-07-02 RX ADMIN — OXYCODONE HYDROCHLORIDE AND ACETAMINOPHEN 1 TABLET: 5; 325 TABLET ORAL at 12:39

## 2024-07-02 RX ADMIN — NICOTINE POLACRILEX 4 MG: 4 LOZENGE ORAL at 12:39

## 2024-07-02 RX ADMIN — DOCUSATE SODIUM 100 MG: 100 CAPSULE, LIQUID FILLED ORAL at 09:05

## 2024-07-02 RX ADMIN — PANTOPRAZOLE SODIUM 40 MG: 40 TABLET, DELAYED RELEASE ORAL at 06:24

## 2024-07-02 RX ADMIN — MIDODRINE HYDROCHLORIDE 10 MG: 5 TABLET ORAL at 12:39

## 2024-07-02 RX ADMIN — OXYCODONE HYDROCHLORIDE AND ACETAMINOPHEN 1 TABLET: 5; 325 TABLET ORAL at 17:00

## 2024-07-02 RX ADMIN — METHADONE HYDROCHLORIDE 100 MG: 10 TABLET ORAL at 09:04

## 2024-07-02 RX ADMIN — DIVALPROEX SODIUM 750 MG: 250 TABLET, EXTENDED RELEASE ORAL at 09:05

## 2024-07-02 RX ADMIN — MEROPENEM 1000 MG: 1 INJECTION INTRAVENOUS at 06:23

## 2024-07-02 ASSESSMENT — PAIN SCALES - GENERAL
PAINLEVEL_OUTOF10: 9
PAINLEVEL_OUTOF10: 9
PAINLEVEL_OUTOF10: 6
PAINLEVEL_OUTOF10: 9
PAINLEVEL_OUTOF10: 5

## 2024-07-02 ASSESSMENT — PAIN DESCRIPTION - LOCATION
LOCATION: BACK

## 2024-07-02 ASSESSMENT — PAIN DESCRIPTION - DESCRIPTORS
DESCRIPTORS: ACHING;DISCOMFORT
DESCRIPTORS: ACHING;DISCOMFORT

## 2024-07-02 ASSESSMENT — PAIN DESCRIPTION - FREQUENCY: FREQUENCY: CONTINUOUS

## 2024-07-02 ASSESSMENT — PAIN - FUNCTIONAL ASSESSMENT: PAIN_FUNCTIONAL_ASSESSMENT: PREVENTS OR INTERFERES SOME ACTIVE ACTIVITIES AND ADLS

## 2024-07-02 ASSESSMENT — PAIN DESCRIPTION - ORIENTATION
ORIENTATION: RIGHT;UPPER
ORIENTATION: RIGHT;UPPER
ORIENTATION: RIGHT;LOWER

## 2024-07-02 NOTE — PLAN OF CARE
Problem: Discharge Planning  Goal: Discharge to home or other facility with appropriate resources  6/30/2024 0956 by Martha Eubanks RN  Outcome: Progressing  6/30/2024 0344 by Hernesto Clemons RN  Outcome: Progressing     Problem: Pain  Goal: Verbalizes/displays adequate comfort level or baseline comfort level  6/30/2024 0956 by Martha Eubanks RN  Outcome: Progressing  6/30/2024 0344 by Hernesto Clemons RN  Outcome: Progressing     Problem: Safety - Adult  Goal: Free from fall injury  6/30/2024 0956 by Martha Eubanks RN  Outcome: Progressing  6/30/2024 0344 by Hernesto Clemons RN  Outcome: Progressing     Problem: Respiratory - Adult  Goal: Achieves optimal ventilation and oxygenation  6/30/2024 0956 by Martha Eubanks RN  Outcome: Progressing  6/30/2024 0344 by Hernesto Clemons RN  Outcome: Progressing     Problem: Cardiovascular - Adult  Goal: Maintains optimal cardiac output and hemodynamic stability  6/30/2024 0956 by Martha Eubanks RN  Outcome: Progressing  6/30/2024 0344 by Hernesto Clemons RN  Outcome: Progressing  Goal: Absence of cardiac dysrhythmias or at baseline  6/30/2024 0956 by Martha Eubanks RN  Outcome: Progressing  6/30/2024 0344 by Hernesto Clemons RN  Outcome: Progressing     Problem: Skin/Tissue Integrity - Adult  Goal: Skin integrity remains intact  6/30/2024 0956 by Martha Eubanks RN  Outcome: Progressing  6/30/2024 0344 by Hernesto Clemons RN  Outcome: Progressing  Goal: Oral mucous membranes remain intact  6/30/2024 0956 by Martha Eubanks RN  Outcome: Progressing  6/30/2024 0344 by Hernesto Clemons RN  Outcome: Progressing     Problem: Musculoskeletal - Adult  Goal: Return mobility to safest level of function  6/30/2024 0956 by Martha Eubanks RN  Outcome: Progressing  6/30/2024 0344 by Hernesto Clemons RN  Outcome: Progressing  Goal: Maintain proper alignment of affected body part  6/30/2024 0956 by Martha Eubanks RN  Outcome: Progressing  6/30/2024 0344 
  Problem: Discharge Planning  Goal: Discharge to home or other facility with appropriate resources  7/1/2024 0855 by Martha Eubanks RN  Outcome: Progressing  6/30/2024 2308 by Rodolfo Villa RN  Outcome: Progressing     Problem: Pain  Goal: Verbalizes/displays adequate comfort level or baseline comfort level  7/1/2024 0855 by Martha Eubanks RN  Outcome: Progressing  6/30/2024 2308 by Rodolfo Villa RN  Outcome: Progressing     Problem: Safety - Adult  Goal: Free from fall injury  7/1/2024 0855 by Martha Eubanks RN  Outcome: Progressing  6/30/2024 2308 by Rodolfo Villa RN  Outcome: Progressing     Problem: Respiratory - Adult  Goal: Achieves optimal ventilation and oxygenation  7/1/2024 0855 by Martha Eubanks RN  Outcome: Progressing  6/30/2024 2308 by Rodolfo Villa RN  Outcome: Progressing     Problem: Cardiovascular - Adult  Goal: Maintains optimal cardiac output and hemodynamic stability  7/1/2024 0855 by Martha Eubanks RN  Outcome: Progressing  6/30/2024 2308 by Rodolfo Villa RN  Outcome: Progressing  Goal: Absence of cardiac dysrhythmias or at baseline  Outcome: Progressing     Problem: Skin/Tissue Integrity - Adult  Goal: Skin integrity remains intact  7/1/2024 0855 by Martha Eubanks RN  Outcome: Progressing  6/30/2024 2308 by Rodolfo Villa RN  Outcome: Progressing  Goal: Oral mucous membranes remain intact  Outcome: Progressing     Problem: Musculoskeletal - Adult  Goal: Return mobility to safest level of function  7/1/2024 0855 by Martha Eubanks RN  Outcome: Progressing  6/30/2024 2308 by Rodolfo Villa RN  Outcome: Progressing  Goal: Maintain proper alignment of affected body part  Outcome: Progressing  Goal: Return ADL status to a safe level of function  Outcome: Progressing     Problem: Genitourinary - Adult  Goal: Absence of urinary retention  Outcome: Progressing  Goal: Urinary catheter remains patent  Outcome: Progressing     Problem: Infection - Adult  Goal: Absence of 
  Problem: Discharge Planning  Goal: Discharge to home or other facility with appropriate resources  7/2/2024 0928 by Dayna Duncan RN  Outcome: Progressing  7/2/2024 0037 by Rodolfo Villa RN  Outcome: Progressing     Problem: Pain  Goal: Verbalizes/displays adequate comfort level or baseline comfort level  7/2/2024 0928 by Dayna Duncan RN  Outcome: Progressing  7/2/2024 0037 by Rodolfo Villa RN  Outcome: Progressing     Problem: Safety - Adult  Goal: Free from fall injury  Outcome: Progressing     Problem: Respiratory - Adult  Goal: Achieves optimal ventilation and oxygenation  Outcome: Progressing     Problem: Cardiovascular - Adult  Goal: Maintains optimal cardiac output and hemodynamic stability  Outcome: Progressing  Goal: Absence of cardiac dysrhythmias or at baseline  Outcome: Progressing     Problem: Skin/Tissue Integrity - Adult  Goal: Skin integrity remains intact  Outcome: Progressing  Goal: Oral mucous membranes remain intact  Outcome: Progressing     Problem: Musculoskeletal - Adult  Goal: Return mobility to safest level of function  Outcome: Progressing  Goal: Maintain proper alignment of affected body part  Outcome: Progressing  Goal: Return ADL status to a safe level of function  Outcome: Progressing     Problem: Genitourinary - Adult  Goal: Absence of urinary retention  Outcome: Progressing  Goal: Urinary catheter remains patent  Outcome: Progressing     Problem: Infection - Adult  Goal: Absence of infection at discharge  Outcome: Progressing  Goal: Absence of infection during hospitalization  Outcome: Progressing  Goal: Absence of fever/infection during anticipated neutropenic period  Outcome: Progressing     Problem: Metabolic/Fluid and Electrolytes - Adult  Goal: Electrolytes maintained within normal limits  Outcome: Progressing  Goal: Hemodynamic stability and optimal renal function maintained  Outcome: Progressing  Goal: Glucose maintained within prescribed range  Outcome: 
  Problem: Discharge Planning  Goal: Discharge to home or other facility with appropriate resources  Outcome: Progressing     Problem: Pain  Goal: Verbalizes/displays adequate comfort level or baseline comfort level  Outcome: Progressing     
  Problem: Discharge Planning  Goal: Discharge to home or other facility with appropriate resources  Outcome: Progressing     Problem: Pain  Goal: Verbalizes/displays adequate comfort level or baseline comfort level  Outcome: Progressing     Problem: Safety - Adult  Goal: Free from fall injury  Outcome: Progressing     Problem: Respiratory - Adult  Goal: Achieves optimal ventilation and oxygenation  Outcome: Progressing     Problem: Cardiovascular - Adult  Goal: Maintains optimal cardiac output and hemodynamic stability  Outcome: Progressing  Goal: Absence of cardiac dysrhythmias or at baseline  Outcome: Progressing     Problem: Skin/Tissue Integrity - Adult  Goal: Skin integrity remains intact  Outcome: Progressing  Goal: Oral mucous membranes remain intact  Outcome: Progressing     Problem: Musculoskeletal - Adult  Goal: Return mobility to safest level of function  Outcome: Progressing  Goal: Maintain proper alignment of affected body part  Outcome: Progressing  Goal: Return ADL status to a safe level of function  Outcome: Progressing     Problem: Genitourinary - Adult  Goal: Absence of urinary retention  Outcome: Progressing  Goal: Urinary catheter remains patent  Outcome: Progressing     Problem: Infection - Adult  Goal: Absence of infection at discharge  Outcome: Progressing  Goal: Absence of infection during hospitalization  Outcome: Progressing  Goal: Absence of fever/infection during anticipated neutropenic period  Outcome: Progressing     Problem: Metabolic/Fluid and Electrolytes - Adult  Goal: Electrolytes maintained within normal limits  Outcome: Progressing  Goal: Hemodynamic stability and optimal renal function maintained  Outcome: Progressing  Goal: Glucose maintained within prescribed range  Outcome: Progressing     Problem: Hematologic - Adult  Goal: Maintains hematologic stability  Outcome: Progressing     Problem: Chronic Conditions and Co-morbidities  Goal: Patient's chronic conditions and 
  Problem: Discharge Planning  Goal: Discharge to home or other facility with appropriate resources  Outcome: Progressing     Problem: Pain  Goal: Verbalizes/displays adequate comfort level or baseline comfort level  Outcome: Progressing     Problem: Safety - Adult  Goal: Free from fall injury  Outcome: Progressing     Problem: Respiratory - Adult  Goal: Achieves optimal ventilation and oxygenation  Outcome: Progressing     Problem: Cardiovascular - Adult  Goal: Maintains optimal cardiac output and hemodynamic stability  Outcome: Progressing  Goal: Absence of cardiac dysrhythmias or at baseline  Outcome: Progressing     Problem: Skin/Tissue Integrity - Adult  Goal: Skin integrity remains intact  Outcome: Progressing  Goal: Oral mucous membranes remain intact  Outcome: Progressing     Problem: Musculoskeletal - Adult  Goal: Return mobility to safest level of function  Outcome: Progressing  Goal: Maintain proper alignment of affected body part  Outcome: Progressing  Goal: Return ADL status to a safe level of function  Outcome: Progressing     Problem: Genitourinary - Adult  Goal: Absence of urinary retention  Outcome: Progressing  Goal: Urinary catheter remains patent  Outcome: Progressing     Problem: Infection - Adult  Goal: Absence of infection at discharge  Outcome: Progressing  Goal: Absence of infection during hospitalization  Outcome: Progressing  Goal: Absence of fever/infection during anticipated neutropenic period  Outcome: Progressing     Problem: Metabolic/Fluid and Electrolytes - Adult  Goal: Electrolytes maintained within normal limits  Outcome: Progressing  Goal: Hemodynamic stability and optimal renal function maintained  Outcome: Progressing  Goal: Glucose maintained within prescribed range  Outcome: Progressing     Problem: Hematologic - Adult  Goal: Maintains hematologic stability  Outcome: Progressing     Problem: Chronic Conditions and Co-morbidities  Goal: Patient's chronic conditions and 
Duncan, Dayna E, RN  Outcome: Progressing  Goal: Oral mucous membranes remain intact  7/2/2024 1508 by Dayna Duncan RN  Outcome: Adequate for Discharge  7/2/2024 1508 by Dayna Duncan RN  Outcome: Adequate for Discharge  7/2/2024 0928 by Dayna Duncan RN  Outcome: Progressing     Problem: Musculoskeletal - Adult  Goal: Return mobility to safest level of function  7/2/2024 1508 by Dayna Duncan RN  Outcome: Adequate for Discharge  7/2/2024 1508 by Dayna Duncan RN  Outcome: Adequate for Discharge  7/2/2024 0928 by Dayna Duncan RN  Outcome: Progressing  Goal: Maintain proper alignment of affected body part  7/2/2024 1508 by Dayna Duncan RN  Outcome: Adequate for Discharge  7/2/2024 1508 by Dayna Duncan RN  Outcome: Adequate for Discharge  7/2/2024 0928 by Dayna Duncan RN  Outcome: Progressing  Goal: Return ADL status to a safe level of function  7/2/2024 1508 by Dayna Duncan RN  Outcome: Adequate for Discharge  7/2/2024 1508 by Dayna Duncan RN  Outcome: Adequate for Discharge  7/2/2024 0928 by Dayna Duncan RN  Outcome: Progressing     Problem: Genitourinary - Adult  Goal: Absence of urinary retention  7/2/2024 1508 by Dayna Duncan RN  Outcome: Adequate for Discharge  7/2/2024 1508 by Dayna Duncan RN  Outcome: Adequate for Discharge  7/2/2024 0928 by Dayna Duncan RN  Outcome: Progressing  Goal: Urinary catheter remains patent  7/2/2024 1508 by Dayna Duncan RN  Outcome: Adequate for Discharge  7/2/2024 1508 by aDyna Duncan RN  Outcome: Adequate for Discharge  7/2/2024 0928 by Dayna Duncan RN  Outcome: Progressing     Problem: Infection - Adult  Goal: Absence of infection at discharge  7/2/2024 1508 by Dayna Duncan RN  Outcome: Adequate for Discharge  7/2/2024 1508 by Duncan, Dayna E, RN  Outcome: Adequate for Discharge  7/2/2024 0928 by Dayna Duncan, RN  Outcome: Progressing  Goal: Absence of infection during hospitalization  7/2/2024 1508 by 
of urinary retention  6/30/2024 0956 by Martha Eubanks RN  Outcome: Progressing  Goal: Urinary catheter remains patent  6/30/2024 0956 by Martha Eubanks RN  Outcome: Progressing     Problem: Infection - Adult  Goal: Absence of infection at discharge  6/30/2024 0956 by Martha Eubanks RN  Outcome: Progressing  Goal: Absence of infection during hospitalization  6/30/2024 0956 by Martha Eubanks RN  Outcome: Progressing  Goal: Absence of fever/infection during anticipated neutropenic period  6/30/2024 0956 by Martha Eubanks RN  Outcome: Progressing     Problem: Metabolic/Fluid and Electrolytes - Adult  Goal: Electrolytes maintained within normal limits  6/30/2024 0956 by Martha Eubanks RN  Outcome: Progressing  Goal: Hemodynamic stability and optimal renal function maintained  6/30/2024 0956 by Martha Eubanks RN  Outcome: Progressing  Goal: Glucose maintained within prescribed range  6/30/2024 0956 by Martha Eubanks RN  Outcome: Progressing     Problem: Hematologic - Adult  Goal: Maintains hematologic stability  6/30/2024 0956 by Martha Eubanks RN  Outcome: Progressing     Problem: Chronic Conditions and Co-morbidities  Goal: Patient's chronic conditions and co-morbidity symptoms are monitored and maintained or improved  6/30/2024 0956 by Martha Eubanks RN  Outcome: Progressing     Problem: ABCDS Injury Assessment  Goal: Absence of physical injury  6/30/2024 0956 by Martha Eubanks RN  Outcome: Progressing     Problem: Skin/Tissue Integrity  Goal: Absence of new skin breakdown  Description: 1.  Monitor for areas of redness and/or skin breakdown  2.  Assess vascular access sites hourly  3.  Every 4-6 hours minimum:  Change oxygen saturation probe site  4.  Every 4-6 hours:  If on nasal continuous positive airway pressure, respiratory therapy assess nares and determine need for appliance change or resting period.  6/30/2024 0956 by Martha Eubanks RN  Outcome: Progressing     
Implement oral medicated treatments as ordered  6/27/2024 2235 by Luther Tilley RN  Outcome: Progressing     Problem: Musculoskeletal - Adult  Goal: Return mobility to safest level of function  6/27/2024 2235 by Luther Tilley RN  Outcome: Progressing  Goal: Maintain proper alignment of affected body part  6/27/2024 2235 by Luther Tilley RN  Outcome: Progressing  Goal: Return ADL status to a safe level of function  6/27/2024 2235 by Luther Tilley RN  Outcome: Progressing     Problem: Genitourinary - Adult  Goal: Absence of urinary retention  6/27/2024 2235 by Luther Tilley RN  Outcome: Progressing  Goal: Urinary catheter remains patent  6/27/2024 2235 by Luther Tilley RN  Outcome: Progressing  Flowsheets (Taken 6/27/2024 2000)  Urinary catheter remains patent: Assess patency of urinary catheter     Problem: Infection - Adult  Goal: Absence of infection at discharge  Recent Flowsheet Documentation  Taken 6/28/2024 0800 by Julia Champion RN  Absence of infection at discharge:   Assess and monitor for signs and symptoms of infection   Monitor lab/diagnostic results   Monitor all insertion sites i.e., indwelling lines, tubes and drains  6/27/2024 2235 by Luther Tilley RN  Outcome: Progressing  Goal: Absence of infection during hospitalization  Recent Flowsheet Documentation  Taken 6/28/2024 0800 by Julia Champion RN  Absence of infection during hospitalization:   Assess and monitor for signs and symptoms of infection   Monitor lab/diagnostic results   Monitor all insertion sites i.e., indwelling lines, tubes and drains  6/27/2024 2235 by Luther Tilley RN  Outcome: Progressing  Goal: Absence of fever/infection during anticipated neutropenic period  Recent Flowsheet Documentation  Taken 6/28/2024 0800 by Julia Champion RN  Absence of fever/infection during anticipated neutropenic period: Monitor white blood cell count  6/27/2024 2235 by Luther Tilley RN  Outcome: Progressing

## 2024-07-02 NOTE — PROGRESS NOTES
Hospital Medicine Progress Note      Date of Admission: 6/26/2024  Hospital Day: 5    Chief Admission Complaint:  \"Shortness of Breath\"     Subjective:  no new c/o.     Presenting Admission History:       \"The patient is a morbidly obese 44 year-old female with multiple medical problems including polypharmacy, drug abuse in remission, chronic diastolic heart failure, recurrent pneumonia x 1 year, COPD, former smoker, hepatitis C, and several psychiatric illness who was discharged for pneumonia 6/18 presenting to night with generalized illness and fever as well as respiratory distress. Noted to be in septic shock with fever of 103, hypotension and profound leukocytosis and elevated lactic acid of 3.1. She has received Vancomycin and Zosyn, on levophed and has since received 100 mg hydrocortisone (due to history of adrenal insufficiency).   The patient said had taken all her night medications to help her sleep when she became ill and had to come in. On chart review, she was discharged 6/18 after she was admitted from the psychiatric floor following a rapid response with severe respiratory distress. It appears she had aspirated.      This patient is on 140 mg Methadone daily, Xanax 1 mg, 300 mg Seroquel, 150 mgh Lyrical bid, 10 mg Zolpidem.      The patient reports recurrent pneumonia over the past year and she has been asked to see an immunologist for immunodeficiency evaluation.      She wants to be full code and her sister to be her POA. She has a 25 year and 23 year old daughters and a 16 year old son\"     Assessment/Plan:      Current Principal Problem:  Septic shock (HCC)      PNA - likely gram positive CAP w/ Strep Pneumonia possible aspiration PNA given recurrences.  Started on empiric ABX - plan for outpt PO ABX at discharge.  Infectious Disease consulted and appreciated. GI consulted for report of dysphagia w/ plan for outpt f/u.     Acute Respiratory Failure - w/ hypoxia, POArrival.  Presence of clinical 
  Hospital Medicine Progress Note      Date of Admission: 6/26/2024  Hospital Day: 7    Chief Admission Complaint:  \"Shortness of Breath\"     Subjective:  Leukocytosis w/ fever o/night    Presenting Admission History:       \"The patient is a morbidly obese 44 year-old female with multiple medical problems including polypharmacy, drug abuse in remission, chronic diastolic heart failure, recurrent pneumonia x 1 year, COPD, former smoker, hepatitis C, and several psychiatric illness who was discharged for pneumonia 6/18 presenting to night with generalized illness and fever as well as respiratory distress. Noted to be in septic shock with fever of 103, hypotension and profound leukocytosis and elevated lactic acid of 3.1. She has received Vancomycin and Zosyn, on levophed and has since received 100 mg hydrocortisone (due to history of adrenal insufficiency).   The patient said had taken all her night medications to help her sleep when she became ill and had to come in. On chart review, she was discharged 6/18 after she was admitted from the psychiatric floor following a rapid response with severe respiratory distress. It appears she had aspirated.      This patient is on 140 mg Methadone daily, Xanax 1 mg, 300 mg Seroquel, 150 mgh Lyrical bid, 10 mg Zolpidem.      The patient reports recurrent pneumonia over the past year and she has been asked to see an immunologist for immunodeficiency evaluation.      She wants to be full code and her sister to be her POA. She has a 25 year and 23 year old daughters and a 16 year old son\"     Assessment/Plan:      Current Principal Problem:  Septic shock (HCC)      PNA - likely gram positive CAP w/ Strep Pneumonia possible aspiration PNA given recurrences.  Started on empiric ABX - plan for outpt PO ABX at discharge.  Infectious Disease consulted and appreciated. GI consulted for report of dysphagia w/ plan for outpt f/u.     Acute Respiratory Failure - w/ hypoxia, POArrival.  
  Speech Language Pathology    Name: Anjelica Blanchard  : 1980  Medical Diagnosis: COLLEEN (acute kidney injury) (HCC) [N17.9]  Septic shock (HCC) [A41.9, R65.21]  Acute respiratory failure with hypoxia and hypercapnia (HCC) [J96.01, J96.02]  Sepsis due to pneumonia (HCC) [J18.9, A41.9]      MBSS order placed per MD d/t pts hx of dysphagia and increased risk of aspiration. MBSS tentatively scheduled for  this date.     Thank you,    Berna Bradshaw M.A. CCC-SLP   Speech-Language Pathologist    
 While patient was in the bathroom I heard her coughing. I went into the room and helped her back to bed. While walking back to bed she continued to cough and look very grey.   When she got back to bed her O2 was 62%. I applied 5LO2 and she has gotten back to 89% her pulse is 117. FABIOLA Belcher was notified. Pt is placed on telemetry with continuous pulse ox. Respiratory notified and came up to give her a breathing treatment. Will continue to monitor.   
ID Follow-up NOTE    CC:   SOB, Cough  Antibiotics: Vanc, mariah    Admit Date: 6/26/2024  Hospital Day: 3    Subjective:     Patient to be transferred out of ICU, improved oxygenation, no fevers.       Objective:     Patient Vitals for the past 8 hrs:   BP Temp Temp src Pulse Resp SpO2   06/28/24 2256 132/75 97.9 °F (36.6 °C) Oral 70 18 94 %   06/28/24 2046 121/64 98.1 °F (36.7 °C) Oral 73 22 93 %   06/28/24 2008 -- -- -- -- -- 94 %   06/28/24 1556 110/71 98 °F (36.7 °C) Oral 73 18 94 %     I/O last 3 completed shifts:  In: 2430.9 [P.O.:2120; I.V.:17.7; IV Piggyback:293.2]  Out: 1800 [Urine:1800]  No intake/output data recorded.    EXAM:  GENERAL: No apparent distress.    HEENT: Membranes moist, no oral lesion  NECK:  Supple, no lymphadenopathy  LUNGS: Diminished air entry in bilateral bases, no rales, no dullness  CARDIAC: RRR, no murmur appreciated  ABD:  + BS, soft / NT  EXT:  No rash, no edema, no lesions, +2 bilateral pedal edema  NEURO: No focal neurologic findings  PSYCH: Orientation, sensorium, mood normal  LINES:  Peripheral iv       Data Review:  Lab Results   Component Value Date    WBC 14.8 (H) 06/28/2024    HGB 10.4 (L) 06/28/2024    HCT 32.6 (L) 06/28/2024    MCV 89.0 06/28/2024     06/28/2024     Lab Results   Component Value Date    CREATININE 1.0 06/28/2024    BUN 16 06/28/2024     06/28/2024    K 3.2 (L) 06/28/2024     06/28/2024    CO2 28 06/28/2024       Hepatic Function Panel:   Lab Results   Component Value Date/Time    ALKPHOS 79 06/27/2024 04:15 AM    ALT 29 06/27/2024 04:15 AM    AST 47 06/27/2024 04:15 AM    PROT 8.7 10/14/2012 08:50 PM    BILITOT 0.3 06/27/2024 04:15 AM    BILIDIR <0.2 12/19/2022 09:48 AM    IBILI see below 12/19/2022 09:48 AM       MICRO:  Blood cultures x 2 6/27: neg  Urine culture 6/27: Pending  MRSA nares 4/28: Negative  BAL washing 4/26: Normal respiratory amanda    IMAGING: I have independently reviewed the images and reports.     Portable chest 
Oxygen documentation:     O2 saturation at REST on ROOM AIR = ___84___%     92% on 1 L O2     If saturation is 89% or above please proceed with steps 2 and 3……….     O2 saturation with AMBULATION of _____ feet on ROOM AIR = _____%  O2 saturation with AMBULATION on _______ liter/min = ______%     DCP notified: ___Yes___      
PULMONARY AND CRITICAL CARE INPATIENT NOTE        Anjelica Blanchard   : 1980  MRN: [id number]     Admitting Physician: Lorna Mitchell MD  Attending Physician: Ravi Davenport MD  PCP: Smith Neff MD    Admission: 2024   Date of Service: 2024    Chief Complaint   Patient presents with    Shortness of Breath     Patient called EMS for SOB, worsening 2-3 days, chronic SOB for months \"in and out of hospital with pneumonia\". Patient reports when she coughs she has a headache with chest pain. Supposed to follow up with cardiology and has not.           ASSESSMENT & PLAN       44 y.o. pleasant  female patient with:    Assessment:  Frequent admissions to the hospital  Recurrent hypoxic and hypercapnic respiratory failure.  Recurrent multifocal pneumonia.  Recent admission for aspiration pneumonitis, seizures at [facility] with decompensation  Septic shock  Fever, tachycardia, hypotension  Suspected immunocompromise state versus recurrent aspiration related pneumonia.  Medications and seizures and body habitus contributing factors  Neuropsych Multi pharmacy dependence: 140 mg Methadone daily, Xanax 1 mg, 300 mg Seroquel, 150 mgh Lyrical bid, 10 mg Zolpidem.  Asthma/COPD on Symbicort/Airsupra  Reactive mediastinal and hilar adenopathy  Migraine headaches, bipolar disorder  GERD  Hypothyroidism, hep C  Suicidal ideation requring admission in 2024  Multisubstance abuse, opiates in the past, more than 30-pack-year smoking history, vaping  Obesity class III  Likely undiagnosed sleep apnea awaiting the sleep study,  Difficult to sedate. Requires GA for procedures.      Plan:              Continue antibiotics per ID  Brokina Pulmicort and DuoNebs while in house.  Resume home medications at discharge  Continue home neuropsych medications including high-dose methadone, Xanax and others  Bronchial hygiene measures  Aspiration precautions  Patient will need GI evaluation/esophagogram to 
Patient given discharge instructions. All questions and concerns were addressed.  Patient IV removed and dressing placed. Patient wheeled to car with all patient belongings, AVS, home O2, and printed prescriptions.   
Patient having severe right sided back pain. She states she has a connective tissue disorder and that her \"ribs pop out of place at times\". Breath sounds heard bilat, diministed but unchanged from this mornings assessment, O2 at 97% on RA. HR tachy in 120s. Patient visibly and verbally anxious, xanax administered per MAR, tylenol per request and nicotine lozenge. Unable to obtain BP as patient continuously moving arms and patient ripped of BP cuff.     Pt still complaining of SOB and right sided back pain. Let cross cover know via secure message 1830 pm. Upon assessment patient visibly more comfortable, less anxious, O2 at 97% and . Unable to assess BP again.     Chest xray ordered 1834 pm.     
Patient was admitted after midnight.  Refer to H&P for details.  During my evaluation, patient is awake and alert but still on Levophed for septic shock.  Suspect septic shock due to pneumonia.  Suspect underlying immunodeficiency.  Continue current antibiotics.  Pulmonary/critical care following.  
Shift: [id number]      Admitting diagnosis: Sepsis     Presentation to hospital: Recently discharged on 6/18/24. Hasn't been able to get out of bed since discharge. Patient self activated EMS secondary to SOB worsening x2-3 days. Past history of recurrent pneumonia x1 year. ED uinnnx-Gvkuulxfprpxs-68.7, elevated lactic acid-3.1, febrile-103.2, Hypotensive, Hypoxic-6L/NC.     Surgery: NO      Nursing assessment at handoff  stable    Emergency Contact/POA:Mally Blanchard   Family updated: No family present    Most recent vitals: /62   Pulse 80   Temp 98.1 °F (36.7 °C) (Oral)   Resp 11   Ht 1.626 m (5' 4\")   Wt 109 kg (240 lb 4.8 oz)   SpO2 98%   BMI 41.25 kg/m²      Rhythm: Sinus Rhythm     NC/HFNC- 4 lpm  Respiratory support: - No ventilator support    Vent days: Day 0    Increased O2 requirements: NO  Admission weight Weight - Scale: 109 kg (240 lb 4.8 oz)  Today's weight 107.7KG   Wt Readings from Last 1 Encounters:   06/26/24 109 kg (240 lb 4.8 oz)         UOP >30ml/hr: Yes    Ogden need assessed each shift: Yes    Restraints: NO Order current and documentation up to date? na    Lines/Drains  LDA Insertion Date Discontinued Date Dressing Changes   PIV  06/26/24     TLC  06/27/24     Arterial       Ogden  06/27/24     Vas Cath      ETT       Surgical drains        Night Shift Hospitalist Interventions    Problem(Brief) Date Time Intervention Physician contacted   Admitted for Sepsis 06/26/24  Fluid resuscitation, Levophed    Urinary Retention 06/27/24  Ogden Placed Yes                                 Drip rates at handoff:    sodium chloride      VASOpressin      norepinephrine 7 mcg/min (06/27/24 0605)       Hospital Course Daily Updates:  Admit Day#       Lab Data:   CBC:   Recent Labs     06/26/24 2143 06/27/24  0415   WBC 25.7* 22.2*   HGB 11.2* 10.6*   HCT 34.4* 33.3*   MCV 87.7 88.2    263     BMP:    Recent Labs     06/26/24 2143 06/27/24  0415    138   K 4.6 4.3   CO2 25 28   BUN 
While patient was in the bathroom I could hear her coughing. I went in to check on her and help her back to bed. I checked her O2 level and it was at 62%. I applied 5LO2 nasal canula and her 02 came back to 89% and her pulse 117. FABIOLA Belcher was notified. Pt has been placed on telemetry with continuous pulse ox. Respiratory notified and came to give a breathing treatment. Chest xray was ordered. Will continue to monitor.  
(36.7 °C) (Oral)   Resp 16   Ht 1.626 m (5' 4.02\")   Wt 107.9 kg (237 lb 14 oz)   SpO2 99%   BMI 40.81 kg/m²     Diet: ADULT DIET; Regular    DVT Prophylaxis: [x]PPx LMWH  []SQ Heparin  [x]IPC/SCDs  []Eliquis  []Xarelto  []Coumadin  []Other -      Code status: Full Code    PT/OT Eval Status:   [x]NOT yet ordered  []Ordered and Pending   []Seen with Recommendations for:  []Home independently  []Home w/ assist  []HHC  []SNF  []Acute Rehab    Anticipated Discharge Day/Date:  Remains in ICU.  Patient is likely to remain in-house at least for the foreseeable future pending clinical course, subspecialty recs and PT/OT eval/recs if/when medically appropriate.       Anticipated Discharge Location: []Home  []HHC  []SNF  []Acute Rehab  []LTAC  []Hospice  []Other -      Consults:      IP CONSULT TO HOSPITALIST  IP CONSULT TO CRITICAL CARE  IP CONSULT TO INFECTIOUS DISEASES  IP CONSULT TO PHARMACY  PHARMACY TO DOSE VANCOMYCIN  IP CONSULT TO GI      This patient has a high likelihood of being discharged tomorrow and is appropriate for A1 Discharge Unit in AM pending clinical course overnight: []Yes  [x]No    ------------------------------------------------------------------------------------------------------------------------------------------------------------------------    [facility]  (this section is simply meant as an aid to accurate billing and does not necessarily reflect ongoing patient care which is documented elsewhere in the medical record)    [x] High (any 2)    A. Problems (any 1)  [] Acute/Chronic Illness/injury posing threat to life or bodily function:  Remains in ICU  [] Severe exacerbation of chronic illness:    ---------------------------------------------------------------------  B. Risk of Treatment (any 1)   [x] Drugs/treatments that require intensive monitoring for toxicity include:    [x] IV ABX requiring serial renal monitoring for nephrotoxicity:     [] Post-Cath/Contrast study requiring serial renal 
Abnormal laboratory test result    Acute lateral meniscal tear, right, initial encounter    Adrenal insufficiency ([facility])    Allergic rhinitis    Anxiety    Asymptomatic bacteriuria    Attention deficit hyperactivity disorder    Blurring of visual image    Borderline personality disorder ([facility])    Cervical radiculopathy    Chronic diarrhea    Chronic back pain    Chronic pain of both knees    Cocaine abuse in remission ([facility])    Cystitis    DDD (degenerative disc disease), lumbar    Diabetes mellitus ([facility])    Drug overdose    Fibromyalgia    Generalized abdominal pain    Glenoid labral tear, left, initial encounter    History of ulcerative colitis    Hypermobility syndrome    Hyponatremia    Hyposmolality and/or hyponatremia    Hyposomnia, insomnia, or sleeplessness associated with conditioned arousal    Insomnia due to medical condition    Migraine    Left hip pain    Left upper arm pain    Low serum cortisol level    Lumbar spondylosis    Bipolar disorder ([facility])    Metabolic syndrome    Mood and affect disturbance    Muscle spasm    Myalgia and myositis    Neuropathic pain    Opioid overdose ([facility])    Pain management contract signed    Pain of both hip joints    Pityriasis versicolor    Polysubstance dependence including opioid type drug, episodic abuse ([facility])    Postnasal drip    Pregnancy examination or test, positive result    Suicidal ideations    Suicide and self-inflicted poisoning by barbiturates ([facility])    TCA (tricyclic antidepressant) overdose of undetermined intent    Tobacco abuse    Tooth disorder    Urinary tract infectious disease    Vertigo    Viral hepatitis C    Low T4    Opioid use disorder, severe, on maintenance therapy ([facility])    Chronic fatigue    High serum thyroid stimulating hormone (TSH)    Hyperprolactinemia ([facility])    Constipation    Arthrodesis status    Overweight    Postoperative care for cataract    Spondylolisthesis    Traumatic arthritis of right knee    Irritant contact dermatitis    Acute 
  ----------------------------------------------------------------------  C. Data (any 2)  [] Discussed management of the case with consultants as follows:    [x] Discussed the discharge plan in detail with case mgt including timing/barriers to discharge, need for support services and placement decision   [] Imaging personally reviewed and interpreted, includes:   [] Telemetry monitoring as noted above  [x] Data Review (any 3)  [] Collateral history obtained from:    [x] All available Consultant notes from yesterday/today were reviewed  [x] All current labs were reviewed and interpreted for clinical significance   [x] Appropriate follow-up labs were ordered    Medications:  Personally reviewed in detail in conjunction w/ labs as documented for evidence of drug toxicity.     Infusion Medications    sodium chloride 25 mL/hr at 06/30/24 1533     Scheduled Medications    vancomycin  1,250 mg IntraVENous Q12H    polyethylene glycol  17 g Oral Daily    methadone  100 mg Oral Daily    divalproex  750 mg Oral BID    docusate sodium  100 mg Oral BID    furosemide  40 mg Oral Daily    guaiFENesin  600 mg Oral BID    [Held by provider] lactobacillus  1 capsule Oral BID    levothyroxine  75 mcg Oral Daily    midodrine  10 mg Oral TID WC    pantoprazole  40 mg Oral QAM AC    sodium chloride flush  5-40 mL IntraVENous 2 times per day    enoxaparin  30 mg SubCUTAneous BID    meropenem  1,000 mg IntraVENous Q8H    pregabalin  150 mg Oral BID    QUEtiapine  200 mg Oral Nightly    And    QUEtiapine  100 mg Oral Nightly    budesonide  0.5 mg Nebulization BID RT    arformoterol tartrate  15 mcg Nebulization BID RT     PRN Meds: oxyCODONE-acetaminophen, hydrOXYzine HCl, ondansetron, ondansetron, sodium chloride flush, sodium chloride, acetaminophen **OR** acetaminophen, nicotine polacrilex, ALPRAZolam, zolpidem, ipratropium 0.5 mg-albuterol 2.5 mg     Labs:  Personally reviewed and interpreted for clinical significance.     Recent Labs 
Decision to escalate care:    [] Major surgery/procedure with associated risk factors:    ----------------------------------------------------------------------  C. Data (any 2)  [] Discussed management of the case with consultants as follows:    [x] Discussed the discharge plan in detail with case mgt including timing/barriers to discharge, need for support services and placement decision   [] Imaging personally reviewed and interpreted, includes:   [x] Telemetry monitoring as noted above  [x] Data Review (any 3)  [] Collateral history obtained from:    [x] All available Consultant notes from yesterday/today were reviewed  [x] All current labs were reviewed and interpreted for clinical significance   [x] Appropriate follow-up labs were ordered    Medications:  Personally reviewed in detail in conjunction w/ labs as documented for evidence of drug toxicity.     Infusion Medications    sodium chloride       Scheduled Medications    vancomycin  1,250 mg IntraVENous Q12H    methadone  100 mg Oral Daily    divalproex  750 mg Oral BID    docusate sodium  100 mg Oral BID    furosemide  40 mg Oral Daily    guaiFENesin  600 mg Oral BID    [Held by provider] lactobacillus  1 capsule Oral BID    levothyroxine  75 mcg Oral Daily    midodrine  10 mg Oral TID WC    pantoprazole  40 mg Oral QAM AC    sodium chloride flush  5-40 mL IntraVENous 2 times per day    enoxaparin  30 mg SubCUTAneous BID    meropenem  1,000 mg IntraVENous Q8H    pregabalin  150 mg Oral BID    QUEtiapine  200 mg Oral Nightly    And    QUEtiapine  100 mg Oral Nightly    mupirocin   Topical BID    budesonide  0.5 mg Nebulization BID RT    arformoterol tartrate  15 mcg Nebulization BID RT     PRN Meds: sodium chloride flush, sodium chloride, acetaminophen **OR** acetaminophen, nicotine polacrilex, ALPRAZolam, zolpidem, ipratropium 0.5 mg-albuterol 2.5 mg     Labs:  Personally reviewed and interpreted for clinical significance.     Recent Labs     
consistent with normal right atrial pressures (3 mmHg).   No evidence of any pericardial effusion    Sleep:  NA      Physical Exam:  General appearance: In no apparent distress.  HEENT: Moist mucus membranes.  Cardiac: Normal S1 and S2.  Lungs: Decreased air entry over lung bases  Abdomen: Soft.  Back & Extremities: Symmetric pulses with good perfusion.  Neurological: No focal deficit..       ________________________________________________________  Electronically signed by:  Manisha Medina MD,FACP    6/30/2024    3:15 PM.     [facility]  [address], [place] [address]   Phone (office): [phone]   
greater than 50% with   respirations consistent with normal right atrial pressures (3 mmHg).   No evidence of any pericardial effusion    Sleep:  NA      Physical Exam:  General appearance: In no apparent distress.  HEENT: Moist mucus membranes.  Cardiac: Normal S1 and S2.  Lungs: Decreased air entry over lung bases  Abdomen: Soft.  Back & Extremities: Symmetric pulses with good perfusion.  Neurological: No focal deficit..       ________________________________________________________  Electronically signed by:  Manisha Medina MD,FACP    6/28/2024    7:31 AM.     [facility]  [address], [place] [address]   Phone (office): [phone]

## 2024-07-02 NOTE — CARE COORDINATION
CASE MANAGEMENT DISCHARGE SUMMARY      Discharge to: Home     Precertification completed: N/A  Hospital Exemption Notification (HENS) completed: N/A    IMM given: (date) N/A    New Durable Medical Equipment ordered/agency: Home O2 [employer]    Transportation:    Family/car: Personal      Confirmed discharge plan with:     Patient: yes     Family:  yes           RN, name: Dionisio    Note: Discharging nurse to complete DANETTE, reconcile AVS, and place final copy with patient's discharge packet. RN to ensure that written prescriptions for  Level II medications are sent with patient to the facility as per protocol.      Lorna Clark RN    
[facility]. Patient was put on O2 over night, and recently weaned off. Plan is still home with likely NN, DCP will continue to follow.     Lorna Clark RN    
Writer reviewed chart, and spoke with RN, MD patient WBC is elevated today and running a fever. MD wants to watch over night and hopes to DC 7/2 home likely NN.    Lorna Clark, RN    
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 Patient transferred from C2 to C5. Waiting GI consult for recs. Patient is receiving IV ABX currently.     Lorna Clark RN    
/24  OT AM-PAC:   /24    Family can provide assistance at DC: Yes  Would you like Case Management to discuss the discharge plan with any other family members/significant others, and if so, who? No  Plans to Return to Present Housing: Yes  Other Identified Issues/Barriers to RETURNING to current housing: no  Potential Assistance needed at discharge: Home Care, Durable Medical Equipment            Potential DME: Oxygen Therapy (Comment)  Patient expects to discharge to: Apartment  Plan for transportation at discharge:      Financial    Payor: [facility] / Plan: [facility] MEDICAID / Product Type: *No Product type* /     Does insurance require precert for SNF: Yes    Potential assistance Purchasing Medications: No  Meds-to-Beds request:        [id number] [place], [place] - [address] - P [phone] - F [phone]  [address] [facility] [address]  Phone: [phone] Fax: [phone]    [id number] [place], [place] - [address] [facility] - P [phone] - F [phone]  [address] [facility] [address]  Phone: [phone] Fax: [phone]    [id number] [place], [place] - [address] - P [phone] - F [phone]  [address] [facility] [address]  Phone: [phone] Fax: [phone]      Notes:    Factors facilitating achievement of predicted outcomes: Family support and Cooperative    Barriers to discharge: Limited family support, Decreased motivation, Unrealistic expectations, and Medication management. Pt has BPD, Bipolar, non compliant w/ freq admits. She says she is sick. [facility] will no longer see the pt due to Drug abuse and inappropriate behavior.    Additional Case Management Notes: Spoke with the pt alone at beside. Pt would not look me in the eye. She has been admitted a lot recently. She says she is IPTA and only uses HHC and inhalers. She has hx of asthma and COPD. She has not required home O2  as of yet. She gets Methadone

## 2024-07-03 ENCOUNTER — TELEPHONE (OUTPATIENT)
Dept: PULMONOLOGY | Age: 44
End: 2024-07-03

## 2024-07-03 DIAGNOSIS — J44.9 CHRONIC OBSTRUCTIVE PULMONARY DISEASE, UNSPECIFIED COPD TYPE (HCC): Primary | ICD-10-CM

## 2024-07-03 DIAGNOSIS — J96.21 ACUTE ON CHRONIC HYPOXIC RESPIRATORY FAILURE (HCC): Primary | ICD-10-CM

## 2024-07-03 NOTE — TELEPHONE ENCOUNTER
Pt called back and said she forgot to mention that she actually peed the bed twice and she has never done that before.  She really feels like she was not ready to be released from the hospital.  Please advise.

## 2024-07-03 NOTE — TELEPHONE ENCOUNTER
Pt called and stated she is having a difficult time leaving her home using her current oxygen, she stated that she lives on the 3rd floor of her apartment complex and her dog is blind so its hard to walk up and down the stairs carrying both her dog and her oxygen. She would like an order to be sent to [facility] for a small portable oxygen concentrator, she would like the smallest one possible. Please advise.     [facility]# [phone]    [employer] fax# [phone]

## 2024-07-03 NOTE — DISCHARGE SUMMARY
the orbits demonstrate no acute abnormality. SINUSES: The visualized paranasal sinuses and mastoid air cells demonstrate no acute abnormality. SOFT TISSUES/SKULL:  No acute abnormality of the visualized skull or soft tissues.     No acute intracranial abnormality.     IR PICC WO SQ PORT/PUMP > 5 YEARS    Result Date: 6/13/2024  Radiology exam is complete. No Radiologist dictation. Please follow up with ordering provider.     XR CHEST PORTABLE    Result Date: 6/13/2024  EXAMINATION: ONE XRAY VIEW OF THE CHEST 6/13/2024 7:32 am COMPARISON: 06/12/2024 HISTORY: ORDERING SYSTEM PROVIDED HISTORY: AMS TECHNOLOGIST PROVIDED HISTORY: Reason for exam:->AMS Reason for Exam: AMS FINDINGS: Heart size is stable.  Mediastinal contours are stable. Hazy opacity is seen throughout the lungs, greatest at the lung bases, right greater than left, increased compared to prior     Increased bilateral airspace disease, either atelectasis or pneumonia     XR CHEST PORTABLE    Result Date: 6/12/2024  EXAMINATION: ONE XRAY VIEW OF THE CHEST 6/12/2024 12:58 pm COMPARISON: None. HISTORY: ORDERING SYSTEM PROVIDED HISTORY: cough TECHNOLOGIST PROVIDED HISTORY: Reason for exam:->cough Reason for Exam: cough FINDINGS: The lungs appear clear.  The heart and mediastinal structures unremarkable. There is no evidence for any pulmonary infiltrates, CHF or pneumothorax. Bony structures appear normal.     No evidence for acute cardiopulmonary process.       Consults:     IP CONSULT TO HOSPITALIST  IP CONSULT TO CRITICAL CARE  IP CONSULT TO INFECTIOUS DISEASES  IP CONSULT TO PHARMACY  PHARMACY TO DOSE VANCOMYCIN  IP CONSULT TO GI    Labs:     Recent Labs     07/01/24  0651 07/02/24  0551   WBC 12.5* 10.8   HGB 10.9* 10.5*   HCT 32.9* 32.7*    257     Recent Labs     07/01/24  0651 07/02/24  0551    135*   K 4.1 4.9   CL 96* 98*   CO2 29 25   BUN 11 14   CREATININE 0.9 0.9   CALCIUM 9.3 9.0   PHOS 4.7 6.4*     No results for input(s): \"PROBNP\",

## 2024-07-05 NOTE — TELEPHONE ENCOUNTER
[facility].

## 2024-07-24 ENCOUNTER — TELEPHONE (OUTPATIENT)
Dept: PULMONOLOGY | Age: 44
End: 2024-07-24

## 2024-07-24 NOTE — TELEPHONE ENCOUNTER
Patient called to RS her 7.25.24 appointment.  She is in the middle of remodeling her apartment.  No appointments available until 9.4.24.  Patient wanted a message sent to the staff to see if Dr. Medina would want to work her in sooner.  She is available 8.20, 8.21, and 8.23.  If he has availability, please call patient.

## 2024-08-02 ENCOUNTER — TELEPHONE (OUTPATIENT)
Dept: PULMONOLOGY | Age: 44
End: 2024-08-02

## 2024-08-02 NOTE — TELEPHONE ENCOUNTER
Patient called and stated she was denied for her POC. Jondeep was contacted to see what would qualify her and they did not know, but gave a number to give appeal. [facility]:[phone]  Patient has started O2 therapy on 7/12/24 on 1L and was given 4 tanks at this time.    Denial was also uploaded into patient's media. Was appeal started?     Fax for Provider Appeal:[phone]  Please call patient with updates.

## 2024-08-05 NOTE — TELEPHONE ENCOUNTER
Spoke with the patient and informed that we can discuss the need for POC at next visit.  Patient confirmed that she does have tanks that she can use that this time.  She is having difficulty taking her dog out with the tanks.

## 2024-08-05 NOTE — TELEPHONE ENCOUNTER
POC was denied by the insurance for the following per letter received- Patient is 44 years old.  She has lung disease.  She is already approved for current devise.  No explanation why she needs 2 devises.   This was to be discussed at the patient's follow up with Dr. Medina but the patient rescheduled.  Her next visit is 08/22/2024.      Proceed with appeal?

## 2024-08-05 NOTE — TELEPHONE ENCOUNTER
Will need to be discussed at upcoming appointment this month with Dr. Medina.  She has cancelled and NO showed to past appointments.

## 2024-08-19 RX ORDER — ALBUTEROL SULFATE AND BUDESONIDE 90; 80 UG/1; UG/1
AEROSOL, METERED RESPIRATORY (INHALATION)
Qty: 10.7 G | Refills: 0 | Status: SHIPPED | OUTPATIENT
Start: 2024-08-19

## 2024-08-19 NOTE — TELEPHONE ENCOUNTER
Pharmacy requesting airsupra. No show last OV. Next OV 9/12/24. Last ordered 3/12/24 with 3 refills. Order pended if appropriate.

## 2024-09-12 ENCOUNTER — OFFICE VISIT (OUTPATIENT)
Dept: PULMONOLOGY | Age: 44
End: 2024-09-12

## 2024-09-12 VITALS
HEART RATE: 78 BPM | OXYGEN SATURATION: 94 % | WEIGHT: 219.4 LBS | TEMPERATURE: 97.6 F | DIASTOLIC BLOOD PRESSURE: 59 MMHG | SYSTOLIC BLOOD PRESSURE: 89 MMHG | RESPIRATION RATE: 16 BRPM | BODY MASS INDEX: 37.46 KG/M2 | HEIGHT: 64 IN

## 2024-09-12 DIAGNOSIS — J43.9 PULMONARY EMPHYSEMA, UNSPECIFIED EMPHYSEMA TYPE (HCC): ICD-10-CM

## 2024-09-12 DIAGNOSIS — J45.40 MODERATE PERSISTENT ASTHMA, UNSPECIFIED WHETHER COMPLICATED: ICD-10-CM

## 2024-09-12 DIAGNOSIS — Z09 HOSPITAL DISCHARGE FOLLOW-UP: Primary | ICD-10-CM

## 2024-09-12 RX ORDER — FLUTICASONE PROPIONATE AND SALMETEROL 50; 250 UG/1; UG/1
POWDER RESPIRATORY (INHALATION)
COMMUNITY
Start: 2024-08-14

## 2024-09-12 RX ORDER — FLUTICASONE PROPIONATE 50 MCG
1 SPRAY, SUSPENSION (ML) NASAL DAILY
Qty: 32 G | Refills: 1 | Status: SHIPPED | OUTPATIENT
Start: 2024-09-12

## 2024-09-20 ENCOUNTER — OFFICE VISIT (OUTPATIENT)
Dept: CARDIOLOGY CLINIC | Age: 44
End: 2024-09-20

## 2024-09-20 VITALS
WEIGHT: 220.5 LBS | DIASTOLIC BLOOD PRESSURE: 58 MMHG | OXYGEN SATURATION: 94 % | SYSTOLIC BLOOD PRESSURE: 88 MMHG | BODY MASS INDEX: 37.65 KG/M2 | HEART RATE: 80 BPM | HEIGHT: 64 IN

## 2024-09-20 DIAGNOSIS — R06.02 SHORTNESS OF BREATH: ICD-10-CM

## 2024-09-20 DIAGNOSIS — Z72.0 TOBACCO ABUSE: ICD-10-CM

## 2024-09-20 DIAGNOSIS — R60.0 BILATERAL LOWER EXTREMITY EDEMA: Primary | ICD-10-CM

## 2024-09-20 NOTE — PROGRESS NOTES
[facility] - INITIAL CONSULTATION        CHIEF COMPLAINT  Dyspnea on exertion, edema      HISTORY OF PRESENTING ILLNESS  Anjelica Blanchard is a 44 y.o. patient who is referred with complaints of chest pain.  Cardiology is asked to provide consultation regarding further management and testing. She has a history of congestive heart failure, polysubstance abuse, COPD, hepatitis C.     She was admitted 4/26/2024 for unresponsiveness. There was concern for methadone overdose with aspiration. Echo 3/22/24 EF 55-60%.     Today, 9/20/24, she reports she is doing well overall. She report she feels fatigued today and can tell her blood pressure is low. She is taking lasix daily. She reports if she stops lasix she notices increased swelling. She reports shortness of breath with minimal exertion. She gets short of breath at night when laying down to sleep. She is smoking 1 pack per week. She is trying to use nicotine gum.  Patient denies chest pain/heaviness/pressure, palpitations, syncope.      PAST MEDICAL HISTORY   has a past medical history of Adrenal insufficiency, Asthma, Bipolar 1 disorder ([facility]), Borderline personality disorder ([facility]), Chronic prescription benzodiazepine use, Chronic prescription opiate use, COPD (chronic obstructive pulmonary disease) ([facility]), Degenerative spinal arthritis, Howard-Danlos syndrome, Fibromyalgia, Hepatitis C, Herniated disc, Intervertebral disc disease, Opiate overdose (HCC), Osteoarthritis, Polysubstance abuse ([facility]), Sedative dependence ([facility]), Seizure ([facility]), TCA (tricyclic antidepressant) overdose, and Vertebral compression fracture ([facility]).    PAST SURGICAL HISTORY   has a past surgical history that includes knee surgery; lumbar fusion (N/A, 06/03/2019); shoulder surgery (Left); sigmoidoscopy (N/A, 08/21/2020); bronchoscopy (N/A, 11/2/2023); bronchoscopy (11/2/2023); and bronchoscopy (11/2/2023).     SOCIAL HISTORY   reports that she quit smoking about 6 years ago. Her smoking use

## 2024-09-20 NOTE — PATIENT INSTRUCTIONS
PLAN:  Continue current cardiac medications midodrine 5, lasix 40  No further cardiac testing at this time  Extensively discussed the importance of smoking cessation 1800 QUIT NOW  Follow up in 6 months

## 2024-09-25 ENCOUNTER — HOSPITAL ENCOUNTER (OUTPATIENT)
Dept: SLEEP CENTER | Age: 44
Discharge: HOME | End: 2024-09-27
Payer: COMMERCIAL

## 2024-09-25 DIAGNOSIS — R06.83 SNORING: ICD-10-CM

## 2024-09-25 DIAGNOSIS — G47.19 EXCESSIVE DAYTIME SLEEPINESS: ICD-10-CM

## 2024-09-25 PROCEDURE — 95811 POLYSOM 6/>YRS CPAP 4/> PARM: CPT

## 2024-09-27 ENCOUNTER — TELEPHONE (OUTPATIENT)
Dept: PULMONOLOGY | Age: 44
End: 2024-09-27

## 2024-09-27 DIAGNOSIS — R06.83 SNORING: Primary | ICD-10-CM

## 2024-09-27 DIAGNOSIS — G47.19 EXCESSIVE DAYTIME SLEEPINESS: ICD-10-CM

## 2024-09-27 PROBLEM — G47.33 OSA (OBSTRUCTIVE SLEEP APNEA): Status: ACTIVE | Noted: 2024-04-29

## 2024-10-03 ENCOUNTER — TELEPHONE (OUTPATIENT)
Dept: PULMONOLOGY | Age: 44
End: 2024-10-03

## 2024-10-03 DIAGNOSIS — G47.33 OSA (OBSTRUCTIVE SLEEP APNEA): Primary | ICD-10-CM

## 2024-10-03 NOTE — TELEPHONE ENCOUNTER
Order for in lab titration placed.  Please review and sign if appropriate.        I agree with moving forward with in lab titration starting with CPAP of 13.  If we can add this to the note for scheduling at the sleep lab.   Okay to keep appointment on October 14

## 2024-10-15 ENCOUNTER — TELEPHONE (OUTPATIENT)
Dept: SLEEP CENTER | Age: 44
End: 2024-10-15

## 2024-10-22 NOTE — TELEPHONE ENCOUNTER
Called patient at [phone] to schedule sleep study and she said she would call me back because she had company at the moment.

## 2024-11-04 ENCOUNTER — OFFICE VISIT (OUTPATIENT)
Age: 44
End: 2024-11-04

## 2024-11-04 VITALS
HEART RATE: 78 BPM | SYSTOLIC BLOOD PRESSURE: 114 MMHG | TEMPERATURE: 97 F | OXYGEN SATURATION: 93 % | DIASTOLIC BLOOD PRESSURE: 70 MMHG

## 2024-11-04 DIAGNOSIS — Z87.01 HISTORY OF RECURRENT PNEUMONIA: ICD-10-CM

## 2024-11-04 DIAGNOSIS — J40 BRONCHITIS: ICD-10-CM

## 2024-11-04 DIAGNOSIS — R05.1 ACUTE COUGH: ICD-10-CM

## 2024-11-04 DIAGNOSIS — J18.9 PNEUMONIA OF RIGHT MIDDLE LOBE DUE TO INFECTIOUS ORGANISM: Primary | ICD-10-CM

## 2024-11-04 RX ORDER — DOXYCYCLINE HYCLATE 100 MG
100 TABLET ORAL 2 TIMES DAILY
Qty: 20 TABLET | Refills: 0 | Status: SHIPPED | OUTPATIENT
Start: 2024-11-04 | End: 2024-11-14

## 2024-11-04 RX ORDER — GUAIFENESIN AND DEXTROMETHORPHAN HYDROBROMIDE 600; 30 MG/1; MG/1
2 TABLET, EXTENDED RELEASE ORAL EVERY 12 HOURS PRN
Qty: 28 TABLET | Refills: 0 | Status: SHIPPED | OUTPATIENT
Start: 2024-11-04

## 2024-11-04 RX ORDER — PREDNISONE 20 MG/1
20 TABLET ORAL DAILY
Qty: 5 TABLET | Refills: 0 | Status: SHIPPED | OUTPATIENT
Start: 2024-11-04 | End: 2024-11-09

## 2024-11-04 ASSESSMENT — ENCOUNTER SYMPTOMS: COUGH: 1

## 2024-11-04 NOTE — PATIENT INSTRUCTIONS
Keep hydrated, tylenol or ibuprofen (if no contraindications) as needed if pain or fever.. Take medications as prescribed.. follow up with PCP in 7- days if not better   Go to ER  if symptoms worse/feeling worse or has new symptoms or concerns,  if fever/chills, increase shortness of breath, increase congestion or wheezing despite medications, if associated with chest pain, nausea/vomiting, dizzy/ light-headed sensation.

## 2024-11-04 NOTE — PROGRESS NOTES
Anjelica Blanchard (:  1980) is a 44 y.o. female,New patient, here for evaluation of the following chief complaint(s):  Cough (Pt states she thinks she has pneumonia/Pt states yellow production, chest congestion/Pt states cough started over 1 week ago)      ASSESSMENT/PLAN:    ICD-10-CM    1. Pneumonia of right middle lobe due to infectious organism  J18.9 doxycycline hyclate (VIBRA-TABS) 100 MG tablet     predniSONE (DELTASONE) 20 MG tablet     Dextromethorphan-guaiFENesin (MUCINEX DM)  MG TB12      2. Acute cough  R05.1 XR CHEST STANDARD (2 VW)     Dextromethorphan-guaiFENesin (MUCINEX DM)  MG TB12      3. History of recurrent pneumonia  Z87.01       4. Bronchitis  J40 predniSONE (DELTASONE) 20 MG tablet        Declined covid test    With patient's hx and chest x-ray report (could be chronic appearance?)  despite lungs sounding clear, no fever   Will start antibiotic, steroid, patient wants to continue taking  Mucinex DM    =====================================================================================   FINDINGS:  The cardiomediastinal and hilar silhouettes appear unremarkable.  Dense  opacity obscures the right heart margin, confirmed at the medial segment  right middle lobe on lateral radiograph.  The left lung appears clear. No  pleural effusion evident. No pneumothorax is seen. No acute osseous  abnormality is identified.   IMPRESSION:  Lobar pneumonia medial segment left middle lobe.  RECOMMENDATION:  Chest radiograph surveillance at 6-8 weeks should be considered to ensure  clearing.    Patient notified of x-ray report prior to discharge- finding noted on right side but impression said left sided pneumonia  ========================================================================================    Keep hydrated, tylenol or ibuprofen (if no contraindications) as needed if pain or fever.. Take medications as prescribed.. follow up with PCP in 7- days if not better   Go to ER  if

## 2024-11-05 ENCOUNTER — TELEPHONE (OUTPATIENT)
Dept: PULMONOLOGY | Age: 44
End: 2024-11-05

## 2024-11-05 ASSESSMENT — ENCOUNTER SYMPTOMS
SORE THROAT: 0
VOMITING: 0
DIARRHEA: 0
SHORTNESS OF BREATH: 1
NAUSEA: 0
WHEEZING: 0

## 2024-12-05 RX ORDER — ALBUTEROL SULFATE AND BUDESONIDE 90; 80 UG/1; UG/1
AEROSOL, METERED RESPIRATORY (INHALATION)
Qty: 10.7 G | Refills: 0 | Status: SHIPPED | OUTPATIENT
Start: 2024-12-05

## 2024-12-05 NOTE — TELEPHONE ENCOUNTER
Pharmacy request airsupra.   Last seen: 9/12/24   Last ordered: 8/19/24 w/ 0 refills  Order pended if appropriate.

## 2024-12-10 ENCOUNTER — APPOINTMENT (OUTPATIENT)
Dept: CT IMAGING | Age: 44
DRG: 139 | End: 2024-12-10
Payer: COMMERCIAL

## 2024-12-10 ENCOUNTER — TELEPHONE (OUTPATIENT)
Dept: PULMONOLOGY | Age: 44
End: 2024-12-10

## 2024-12-10 ENCOUNTER — HOSPITAL ENCOUNTER (INPATIENT)
Age: 44
LOS: 3 days | Discharge: HOME | DRG: 139 | End: 2024-12-13
Attending: EMERGENCY MEDICINE | Admitting: INTERNAL MEDICINE
Payer: COMMERCIAL

## 2024-12-10 DIAGNOSIS — J96.01 ACUTE HYPOXIC RESPIRATORY FAILURE: ICD-10-CM

## 2024-12-10 DIAGNOSIS — J18.9 MULTIFOCAL PNEUMONIA: Primary | ICD-10-CM

## 2024-12-10 LAB
ALBUMIN SERPL-MCNC: 3.9 G/DL (ref 3.4–5)
ALBUMIN/GLOB SERPL: 1.4 {RATIO} (ref 1.1–2.2)
ALP SERPL-CCNC: 86 U/L (ref 40–129)
ALT SERPL-CCNC: 11 U/L (ref 10–40)
ANION GAP SERPL CALCULATED.3IONS-SCNC: 14 MMOL/L (ref 3–16)
AST SERPL-CCNC: 37 U/L (ref 15–37)
BACTERIA URNS QL MICRO: ABNORMAL /HPF
BASE EXCESS BLDV CALC-SCNC: 2 MMOL/L (ref -3–3)
BASOPHILS # BLD: 0 K/UL (ref 0–0.2)
BASOPHILS NFR BLD: 0.4 %
BILIRUB SERPL-MCNC: <0.2 MG/DL (ref 0–1)
BILIRUB UR QL STRIP.AUTO: NEGATIVE
BUN SERPL-MCNC: 15 MG/DL (ref 7–20)
CALCIUM SERPL-MCNC: 9.3 MG/DL (ref 8.3–10.6)
CHLORIDE SERPL-SCNC: 100 MMOL/L (ref 99–110)
CLARITY UR: CLEAR
CO2 BLDV-SCNC: 28 MMOL/L
CO2 SERPL-SCNC: 26 MMOL/L (ref 21–32)
COHGB MFR BLDV: 2.8 % (ref 0–1.5)
COLOR UR: YELLOW
CREAT SERPL-MCNC: 0.9 MG/DL (ref 0.6–1.1)
DEPRECATED RDW RBC AUTO: 16.4 % (ref 12.4–15.4)
EKG ATRIAL RATE: 89 BPM
EKG DIAGNOSIS: NORMAL
EKG P AXIS: 60 DEGREES
EKG P-R INTERVAL: 138 MS
EKG Q-T INTERVAL: 390 MS
EKG QRS DURATION: 78 MS
EKG QTC CALCULATION (BAZETT): 474 MS
EKG R AXIS: 1 DEGREES
EKG T AXIS: 33 DEGREES
EKG VENTRICULAR RATE: 89 BPM
EOSINOPHIL # BLD: 0.1 K/UL (ref 0–0.6)
EOSINOPHIL NFR BLD: 1 %
EPI CELLS #/AREA URNS HPF: ABNORMAL /HPF (ref 0–5)
GFR SERPLBLD CREATININE-BSD FMLA CKD-EPI: 80 ML/MIN/{1.73_M2}
GLUCOSE SERPL-MCNC: 103 MG/DL (ref 70–99)
GLUCOSE UR STRIP.AUTO-MCNC: NEGATIVE MG/DL
HCG SERPL QL: NEGATIVE
HCO3 BLDV-SCNC: 26.7 MMOL/L (ref 23–29)
HCT VFR BLD AUTO: 35.8 % (ref 36–48)
HGB BLD-MCNC: 11.5 G/DL (ref 12–16)
HGB UR QL STRIP.AUTO: NEGATIVE
KETONES UR STRIP.AUTO-MCNC: NEGATIVE MG/DL
LACTATE BLDV-SCNC: 0.7 MMOL/L (ref 0.4–2)
LEUKOCYTE ESTERASE UR QL STRIP.AUTO: ABNORMAL
LYMPHOCYTES # BLD: 1.4 K/UL (ref 1–5.1)
LYMPHOCYTES NFR BLD: 13.6 %
MCH RBC QN AUTO: 27.9 PG (ref 26–34)
MCHC RBC AUTO-ENTMCNC: 32 G/DL (ref 31–36)
MCV RBC AUTO: 87 FL (ref 80–100)
METHGB MFR BLDV: 0 %
MONOCYTES # BLD: 0.6 K/UL (ref 0–1.3)
MONOCYTES NFR BLD: 5.6 %
NEUTROPHILS # BLD: 8.2 K/UL (ref 1.7–7.7)
NEUTROPHILS NFR BLD: 79.4 %
NITRITE UR QL STRIP.AUTO: NEGATIVE
O2 THERAPY: ABNORMAL
PCO2 BLDV: 42 MMHG (ref 40–50)
PH BLDV: 7.42 [PH] (ref 7.35–7.45)
PH UR STRIP.AUTO: 7.5 [PH] (ref 5–8)
PLATELET # BLD AUTO: 292 K/UL (ref 135–450)
PMV BLD AUTO: 8.6 FL (ref 5–10.5)
PO2 BLDV: 62.5 MMHG (ref 25–40)
POTASSIUM SERPL-SCNC: 5.2 MMOL/L (ref 3.5–5.1)
PROT SERPL-MCNC: 6.7 G/DL (ref 6.4–8.2)
PROT UR STRIP.AUTO-MCNC: NEGATIVE MG/DL
RBC # BLD AUTO: 4.12 M/UL (ref 4–5.2)
RBC #/AREA URNS HPF: ABNORMAL /HPF (ref 0–4)
SAO2 % BLDV: 93 %
SODIUM SERPL-SCNC: 140 MMOL/L (ref 136–145)
SP GR UR STRIP.AUTO: 1.02 (ref 1–1.03)
TROPONIN, HIGH SENSITIVITY: 12 NG/L (ref 0–14)
UA DIPSTICK W REFLEX MICRO PNL UR: YES
URN SPEC COLLECT METH UR: ABNORMAL
UROBILINOGEN UR STRIP-ACNC: 0.2 E.U./DL
WBC # BLD AUTO: 10.3 K/UL (ref 4–11)
WBC #/AREA URNS HPF: ABNORMAL /HPF (ref 0–5)

## 2024-12-10 PROCEDURE — 99285 EMERGENCY DEPT VISIT HI MDM: CPT

## 2024-12-10 PROCEDURE — 83550 IRON BINDING TEST: CPT

## 2024-12-10 PROCEDURE — 82803 BLOOD GASES ANY COMBINATION: CPT

## 2024-12-10 PROCEDURE — 6360000002 HC RX W HCPCS: Performed by: NURSE PRACTITIONER

## 2024-12-10 PROCEDURE — 85025 COMPLETE CBC W/AUTO DIFF WBC: CPT

## 2024-12-10 PROCEDURE — 6370000000 HC RX 637 (ALT 250 FOR IP): Performed by: NURSE PRACTITIONER

## 2024-12-10 PROCEDURE — 6360000004 HC RX CONTRAST MEDICATION: Performed by: NURSE PRACTITIONER

## 2024-12-10 PROCEDURE — 71260 CT THORAX DX C+: CPT

## 2024-12-10 PROCEDURE — 80053 COMPREHEN METABOLIC PANEL: CPT

## 2024-12-10 PROCEDURE — 82746 ASSAY OF FOLIC ACID SERUM: CPT

## 2024-12-10 PROCEDURE — 1200000000 HC SEMI PRIVATE

## 2024-12-10 PROCEDURE — 94761 N-INVAS EAR/PLS OXIMETRY MLT: CPT

## 2024-12-10 PROCEDURE — 2580000003 HC RX 258: Performed by: NURSE PRACTITIONER

## 2024-12-10 PROCEDURE — 84484 ASSAY OF TROPONIN QUANT: CPT

## 2024-12-10 PROCEDURE — 84703 CHORIONIC GONADOTROPIN ASSAY: CPT

## 2024-12-10 PROCEDURE — 83540 ASSAY OF IRON: CPT

## 2024-12-10 PROCEDURE — 93010 ELECTROCARDIOGRAM REPORT: CPT | Performed by: INTERNAL MEDICINE

## 2024-12-10 PROCEDURE — 99222 1ST HOSP IP/OBS MODERATE 55: CPT | Performed by: STUDENT IN AN ORGANIZED HEALTH CARE EDUCATION/TRAINING PROGRAM

## 2024-12-10 PROCEDURE — 93005 ELECTROCARDIOGRAM TRACING: CPT | Performed by: NURSE PRACTITIONER

## 2024-12-10 PROCEDURE — 36415 COLL VENOUS BLD VENIPUNCTURE: CPT

## 2024-12-10 PROCEDURE — 81001 URINALYSIS AUTO W/SCOPE: CPT

## 2024-12-10 PROCEDURE — 94640 AIRWAY INHALATION TREATMENT: CPT

## 2024-12-10 PROCEDURE — 82728 ASSAY OF FERRITIN: CPT

## 2024-12-10 PROCEDURE — 87641 MR-STAPH DNA AMP PROBE: CPT

## 2024-12-10 PROCEDURE — 83605 ASSAY OF LACTIC ACID: CPT

## 2024-12-10 PROCEDURE — 2700000000 HC OXYGEN THERAPY PER DAY

## 2024-12-10 PROCEDURE — 87449 NOS EACH ORGANISM AG IA: CPT

## 2024-12-10 PROCEDURE — 82607 VITAMIN B-12: CPT

## 2024-12-10 RX ORDER — ZOLPIDEM TARTRATE 5 MG/1
10 TABLET ORAL
Status: DISCONTINUED | OUTPATIENT
Start: 2024-12-10 | End: 2024-12-13 | Stop reason: HOSPADM

## 2024-12-10 RX ORDER — ACETAMINOPHEN 650 MG/1
650 SUPPOSITORY RECTAL EVERY 6 HOURS PRN
Status: DISCONTINUED | OUTPATIENT
Start: 2024-12-10 | End: 2024-12-13 | Stop reason: HOSPADM

## 2024-12-10 RX ORDER — SODIUM CHLORIDE 0.9 % (FLUSH) 0.9 %
5-40 SYRINGE (ML) INJECTION EVERY 12 HOURS SCHEDULED
Status: DISCONTINUED | OUTPATIENT
Start: 2024-12-10 | End: 2024-12-13 | Stop reason: HOSPADM

## 2024-12-10 RX ORDER — SODIUM CHLORIDE 9 MG/ML
INJECTION, SOLUTION INTRAVENOUS PRN
Status: DISCONTINUED | OUTPATIENT
Start: 2024-12-10 | End: 2024-12-13 | Stop reason: HOSPADM

## 2024-12-10 RX ORDER — DULOXETIN HYDROCHLORIDE 20 MG/1
20 CAPSULE, DELAYED RELEASE ORAL DAILY
Status: DISCONTINUED | OUTPATIENT
Start: 2024-12-11 | End: 2024-12-13 | Stop reason: HOSPADM

## 2024-12-10 RX ORDER — METHADONE HYDROCHLORIDE 10 MG/1
140 TABLET ORAL DAILY
Status: DISCONTINUED | OUTPATIENT
Start: 2024-12-11 | End: 2024-12-13 | Stop reason: HOSPADM

## 2024-12-10 RX ORDER — POLYETHYLENE GLYCOL 3350 17 G
2 POWDER IN PACKET (EA) ORAL
Status: DISCONTINUED | OUTPATIENT
Start: 2024-12-10 | End: 2024-12-13 | Stop reason: HOSPADM

## 2024-12-10 RX ORDER — ALBUTEROL SULFATE 0.83 MG/ML
2.5 SOLUTION RESPIRATORY (INHALATION) EVERY 4 HOURS PRN
Status: DISCONTINUED | OUTPATIENT
Start: 2024-12-10 | End: 2024-12-13 | Stop reason: HOSPADM

## 2024-12-10 RX ORDER — ALBUTEROL SULFATE 0.83 MG/ML
2.5 SOLUTION RESPIRATORY (INHALATION)
Status: DISCONTINUED | OUTPATIENT
Start: 2024-12-10 | End: 2024-12-10

## 2024-12-10 RX ORDER — FUROSEMIDE 40 MG/1
40 TABLET ORAL DAILY
Status: DISCONTINUED | OUTPATIENT
Start: 2024-12-11 | End: 2024-12-13 | Stop reason: HOSPADM

## 2024-12-10 RX ORDER — PANTOPRAZOLE SODIUM 40 MG/1
40 TABLET, DELAYED RELEASE ORAL
Status: DISCONTINUED | OUTPATIENT
Start: 2024-12-11 | End: 2024-12-13 | Stop reason: HOSPADM

## 2024-12-10 RX ORDER — ALPRAZOLAM 0.25 MG/1
0.5 TABLET ORAL 3 TIMES DAILY PRN
Status: DISCONTINUED | OUTPATIENT
Start: 2024-12-10 | End: 2024-12-13 | Stop reason: HOSPADM

## 2024-12-10 RX ORDER — GUAIFENESIN 600 MG/1
600 TABLET, EXTENDED RELEASE ORAL 2 TIMES DAILY
Status: DISCONTINUED | OUTPATIENT
Start: 2024-12-10 | End: 2024-12-13 | Stop reason: HOSPADM

## 2024-12-10 RX ORDER — ACETAMINOPHEN 500 MG
1000 TABLET ORAL ONCE
Status: COMPLETED | OUTPATIENT
Start: 2024-12-10 | End: 2024-12-10

## 2024-12-10 RX ORDER — ACETAMINOPHEN 325 MG/1
650 TABLET ORAL EVERY 6 HOURS PRN
Status: DISCONTINUED | OUTPATIENT
Start: 2024-12-10 | End: 2024-12-13 | Stop reason: HOSPADM

## 2024-12-10 RX ORDER — MIDODRINE HYDROCHLORIDE 5 MG/1
5 TABLET ORAL 2 TIMES DAILY WITH MEALS
Status: DISCONTINUED | OUTPATIENT
Start: 2024-12-10 | End: 2024-12-10

## 2024-12-10 RX ORDER — IOPAMIDOL 755 MG/ML
75 INJECTION, SOLUTION INTRAVASCULAR
Status: COMPLETED | OUTPATIENT
Start: 2024-12-10 | End: 2024-12-10

## 2024-12-10 RX ORDER — PREGABALIN 75 MG/1
150 CAPSULE ORAL 2 TIMES DAILY
Status: DISCONTINUED | OUTPATIENT
Start: 2024-12-10 | End: 2024-12-13 | Stop reason: HOSPADM

## 2024-12-10 RX ORDER — DIVALPROEX SODIUM 250 MG/1
750 TABLET, FILM COATED, EXTENDED RELEASE ORAL 2 TIMES DAILY
Status: DISCONTINUED | OUTPATIENT
Start: 2024-12-10 | End: 2024-12-13 | Stop reason: HOSPADM

## 2024-12-10 RX ORDER — MIDODRINE HYDROCHLORIDE 5 MG/1
5 TABLET ORAL
Status: DISCONTINUED | OUTPATIENT
Start: 2024-12-11 | End: 2024-12-13 | Stop reason: HOSPADM

## 2024-12-10 RX ORDER — FUROSEMIDE 20 MG/1
20 TABLET ORAL DAILY
Status: ON HOLD | COMMUNITY
Start: 2024-12-03 | End: 2024-12-13 | Stop reason: HOSPADM

## 2024-12-10 RX ORDER — GUAIFENESIN 600 MG/1
600 TABLET, EXTENDED RELEASE ORAL 2 TIMES DAILY
Status: DISCONTINUED | OUTPATIENT
Start: 2024-12-10 | End: 2024-12-10

## 2024-12-10 RX ORDER — FLUTICASONE PROPIONATE 50 MCG
1 SPRAY, SUSPENSION (ML) NASAL DAILY PRN
Status: DISCONTINUED | OUTPATIENT
Start: 2024-12-11 | End: 2024-12-13 | Stop reason: HOSPADM

## 2024-12-10 RX ORDER — ONDANSETRON 2 MG/ML
4 INJECTION INTRAMUSCULAR; INTRAVENOUS EVERY 6 HOURS PRN
Status: DISCONTINUED | OUTPATIENT
Start: 2024-12-10 | End: 2024-12-13 | Stop reason: HOSPADM

## 2024-12-10 RX ORDER — QUETIAPINE FUMARATE 50 MG/1
300 TABLET, EXTENDED RELEASE ORAL NIGHTLY
Status: DISCONTINUED | OUTPATIENT
Start: 2024-12-10 | End: 2024-12-13 | Stop reason: HOSPADM

## 2024-12-10 RX ORDER — ONDANSETRON 4 MG/1
4 TABLET, ORALLY DISINTEGRATING ORAL EVERY 8 HOURS PRN
Status: DISCONTINUED | OUTPATIENT
Start: 2024-12-10 | End: 2024-12-13 | Stop reason: HOSPADM

## 2024-12-10 RX ORDER — SODIUM CHLORIDE 0.9 % (FLUSH) 0.9 %
5-40 SYRINGE (ML) INJECTION PRN
Status: DISCONTINUED | OUTPATIENT
Start: 2024-12-10 | End: 2024-12-13 | Stop reason: HOSPADM

## 2024-12-10 RX ORDER — BUDESONIDE AND FORMOTEROL FUMARATE DIHYDRATE 160; 4.5 UG/1; UG/1
2 AEROSOL RESPIRATORY (INHALATION)
Status: DISCONTINUED | OUTPATIENT
Start: 2024-12-10 | End: 2024-12-13 | Stop reason: HOSPADM

## 2024-12-10 RX ORDER — OXYCODONE AND ACETAMINOPHEN 5; 325 MG/1; MG/1
1 TABLET ORAL 3 TIMES DAILY
Status: DISCONTINUED | OUTPATIENT
Start: 2024-12-10 | End: 2024-12-11

## 2024-12-10 RX ORDER — AZITHROMYCIN 250 MG/1
500 TABLET, FILM COATED ORAL EVERY 24 HOURS
Status: DISCONTINUED | OUTPATIENT
Start: 2024-12-11 | End: 2024-12-13 | Stop reason: HOSPADM

## 2024-12-10 RX ORDER — POLYETHYLENE GLYCOL 3350 17 G/17G
17 POWDER, FOR SOLUTION ORAL DAILY PRN
Status: DISCONTINUED | OUTPATIENT
Start: 2024-12-10 | End: 2024-12-13 | Stop reason: HOSPADM

## 2024-12-10 RX ORDER — ENOXAPARIN SODIUM 100 MG/ML
30 INJECTION SUBCUTANEOUS 2 TIMES DAILY
Status: DISCONTINUED | OUTPATIENT
Start: 2024-12-10 | End: 2024-12-13 | Stop reason: HOSPADM

## 2024-12-10 RX ORDER — DOCUSATE SODIUM 100 MG/1
100 CAPSULE, LIQUID FILLED ORAL 2 TIMES DAILY
Status: DISCONTINUED | OUTPATIENT
Start: 2024-12-10 | End: 2024-12-13 | Stop reason: HOSPADM

## 2024-12-10 RX ORDER — SUMATRIPTAN SUCCINATE 25 MG/1
50 TABLET ORAL EVERY 8 HOURS PRN
Status: DISCONTINUED | OUTPATIENT
Start: 2024-12-10 | End: 2024-12-13 | Stop reason: HOSPADM

## 2024-12-10 RX ADMIN — ALPRAZOLAM 0.5 MG: 0.25 TABLET ORAL at 17:30

## 2024-12-10 RX ADMIN — ONDANSETRON 4 MG: 2 INJECTION, SOLUTION INTRAMUSCULAR; INTRAVENOUS at 21:34

## 2024-12-10 RX ADMIN — CEFEPIME 2000 MG: 2 INJECTION, POWDER, FOR SOLUTION INTRAVENOUS at 14:02

## 2024-12-10 RX ADMIN — QUETIAPINE FUMARATE 300 MG: 50 TABLET, EXTENDED RELEASE ORAL at 21:30

## 2024-12-10 RX ADMIN — IOPAMIDOL 75 ML: 755 INJECTION, SOLUTION INTRAVENOUS at 12:02

## 2024-12-10 RX ADMIN — AZITHROMYCIN MONOHYDRATE 500 MG: 500 INJECTION, POWDER, LYOPHILIZED, FOR SOLUTION INTRAVENOUS at 18:06

## 2024-12-10 RX ADMIN — DOCUSATE SODIUM 100 MG: 100 CAPSULE, LIQUID FILLED ORAL at 21:30

## 2024-12-10 RX ADMIN — PREGABALIN 150 MG: 75 CAPSULE ORAL at 21:30

## 2024-12-10 RX ADMIN — OXYCODONE AND ACETAMINOPHEN 1 TABLET: 5; 325 TABLET ORAL at 21:31

## 2024-12-10 RX ADMIN — ACETAMINOPHEN 1000 MG: 500 TABLET ORAL at 17:19

## 2024-12-10 RX ADMIN — CEFTRIAXONE SODIUM 1000 MG: 1 INJECTION, POWDER, FOR SOLUTION INTRAMUSCULAR; INTRAVENOUS at 17:26

## 2024-12-10 RX ADMIN — Medication 2 PUFF: at 19:29

## 2024-12-10 RX ADMIN — OXYCODONE AND ACETAMINOPHEN 1 TABLET: 5; 325 TABLET ORAL at 17:19

## 2024-12-10 RX ADMIN — ENOXAPARIN SODIUM 30 MG: 100 INJECTION SUBCUTANEOUS at 21:31

## 2024-12-10 RX ADMIN — ZOLPIDEM TARTRATE 10 MG: 5 TABLET, COATED ORAL at 21:30

## 2024-12-10 RX ADMIN — GUAIFENESIN 600 MG: 600 TABLET ORAL at 21:30

## 2024-12-10 RX ADMIN — DIVALPROEX SODIUM 750 MG: 250 TABLET, EXTENDED RELEASE ORAL at 21:30

## 2024-12-10 RX ADMIN — MIDODRINE HYDROCHLORIDE 5 MG: 5 TABLET ORAL at 17:19

## 2024-12-10 ASSESSMENT — ENCOUNTER SYMPTOMS
NAUSEA: 0
EYE REDNESS: 0
EYE PAIN: 0
DIARRHEA: 0
COUGH: 1
BACK PAIN: 0
EYE ITCHING: 0
STRIDOR: 0
SHORTNESS OF BREATH: 1
COLOR CHANGE: 0
WHEEZING: 0
SORE THROAT: 0
ABDOMINAL PAIN: 0
ABDOMINAL DISTENTION: 0
TROUBLE SWALLOWING: 0
EYE DISCHARGE: 0
CONSTIPATION: 0
VOMITING: 0

## 2024-12-10 ASSESSMENT — PAIN DESCRIPTION - ORIENTATION
ORIENTATION: LOWER;RIGHT
ORIENTATION: LOWER;RIGHT

## 2024-12-10 ASSESSMENT — PAIN DESCRIPTION - ONSET: ONSET: ON-GOING

## 2024-12-10 ASSESSMENT — PAIN DESCRIPTION - PAIN TYPE
TYPE: ACUTE PAIN
TYPE: CHRONIC PAIN;ACUTE PAIN

## 2024-12-10 ASSESSMENT — PAIN DESCRIPTION - FREQUENCY: FREQUENCY: CONTINUOUS

## 2024-12-10 ASSESSMENT — PAIN - FUNCTIONAL ASSESSMENT
PAIN_FUNCTIONAL_ASSESSMENT: ACTIVITIES ARE NOT PREVENTED
PAIN_FUNCTIONAL_ASSESSMENT: PREVENTS OR INTERFERES SOME ACTIVE ACTIVITIES AND ADLS
PAIN_FUNCTIONAL_ASSESSMENT: 0-10
PAIN_FUNCTIONAL_ASSESSMENT: ACTIVITIES ARE NOT PREVENTED

## 2024-12-10 ASSESSMENT — PAIN DESCRIPTION - LOCATION
LOCATION: BACK;KNEE
LOCATION: CHEST;KNEE;BACK

## 2024-12-10 ASSESSMENT — PAIN DESCRIPTION - DESCRIPTORS
DESCRIPTORS: ACHING
DESCRIPTORS: ACHING
DESCRIPTORS: ACHING;SORE

## 2024-12-10 ASSESSMENT — PAIN SCALES - GENERAL
PAINLEVEL_OUTOF10: 8
PAINLEVEL_OUTOF10: 8
PAINLEVEL_OUTOF10: 7
PAINLEVEL_OUTOF10: 7

## 2024-12-10 NOTE — H&P
Hospital Medicine History & Physical      Date of Admission: 12/10/2024    Date of Service:  Pt seen/examined on 12/10/2024     [x]Admitted to Inpatient with expected LOS greater than two midnights due to medical therapy.  []Placed in Observation status.    Chief Admission Complaint:      Shortness of Breath (Cough, dyspnea and hemoptysis for \"a long time\". Increased fatigue over the last few days with headache. Patient states during her fall last night, she fell on right knee. /Reports frequent admissions for pneumonia. )        Presenting Admission History:      This is a 44-year-old female who presented to [facility] emergency department with complaints of shortness of breath, dyspnea on exertion, fevers, chills, sore throat, and congestion.  She also states she feels like she has chest congestion with some chest pain.  I did order an EKG while I was in the emergency department which was negative and her troponin was 12.   She states that she had recently been diagnosed with pneumonia in November of this year and completed antibiotic therapy however she cannot remember what antibiotic she took.  She began to feel poorly again about a week ago and now she is having productive cough with copious amounts of green-yellow sputum and some hemoptysis on and off.  She also tells me she feels very weak and had a fall overnight while trying to go to the bathroom.  She was able to get up and get herself back to bed but she is still feeling very weak and fatigued.  She tells me that her fever was as high as 102.5 yesterday.  She tells me that she is on oxygen at home for her COPD that she uses nightly, however she is unable to tell me the amount of oxygen that she uses.  She also tells me that she discontinued her oxygen at night because she is supposed to have a sleep study test.  She believes it is scheduled for 3 weeks.  I did discuss with her that she does need to continue her oxygen supplementation at night until

## 2024-12-10 NOTE — CONSULTS
Pulmonary New Consultation       Patient Name:  Anjelica Blanchard    REASONFOR ADMISSION/CC: SOB    PCP: Smith Neff MD    HISTORY OF PRESENT ILLNESS:   44 y.o. year old female with significant past medical history of polysubstance abuse, bipolar disorder, asthma, and GERD that presents to [facility] for shortness of breath.  Patient reports for the past couple months she has been having shortness of breath.  She reports having over 10 infections and multiple pneumonias in the past.  She is known to the pulmonary service because of multiple admissions for similar issue.  She has been seen outpatient and had workup for immune deficiency which was negative.  She reports having follow-ups with immunologist for further workup.  She is not on oxygen therapy at home.  She takes Airsupra as needed for shortness of breath.     Patient quit smoking, she was smoking 1 pack/day for 20 years.  She denies any recent use of illicit drugs.  She is currently not working, she says she is on disability.  She has a dog at home, no other pets/birds.  She denies any household exposures.    Past Medical History:   Diagnosis Date    Adrenal insufficiency     Asthma     Bipolar 1 disorder (HCC)     Borderline personality disorder (HCC)     Chronic prescription benzodiazepine use     Alprazolam    Chronic prescription opiate use     Methadone    COPD (chronic obstructive pulmonary disease) (HCC)     Degenerative spinal arthritis     Howard-Danlos syndrome     Fibromyalgia     Hepatitis C     Herniated disc     Intervertebral disc disease     Opiate overdose (HCC)     Osteoarthritis     Polysubstance abuse (HCC)     6/13/24-  Methadone from [facility]    Sedative dependence (HCC)     Pregabalin and Zolpidem    Seizure (HCC)     with benzo withdrawal    TCA (tricyclic antidepressant) overdose     Vertebral compression fracture (HCC)        Past Surgical History:   Procedure Laterality Date    BRONCHOSCOPY N/A 11/2/2023    BRONCHOSCOPY

## 2024-12-10 NOTE — ED NOTES
Per inpatient NP, antibiotics discontinued and oxygen stopped.   After taking patient off O2, patient SpO2 dropped to 83%. Patient placed back on 2L NC.

## 2024-12-10 NOTE — TELEPHONE ENCOUNTER
Patient was a No Show for her appt on 12/9/24.     This is the patient's 2nd No Show.     Patient was not able to be reached.    was left to offer reschedule.

## 2024-12-10 NOTE — ED NOTES
Upon entering room, patient SpO2 at 83% on RA.   Patient placed on 2L NC. SpO2 increased to 91% on RA.   CNP aware.

## 2024-12-10 NOTE — ED NOTES
Anjelica Blanchard is a 44 y.o. female admitted for  Principal Problem:    Pneumonia due to organism  Resolved Problems:    * No resolved hospital problems. *  .   Patient Home via EMS transportation with   Chief Complaint   Patient presents with    Shortness of Breath     Cough, dyspnea and hemoptysis for \"a long time\". Increased fatigue over the last few days with headache. Patient states during her fall last night, she fell on right knee.   Reports frequent admissions for pneumonia.    .  Patient is alert and Person, Place, Time, and Situation  Patient's baseline mobility: Baseline Mobility: Independent   Code Status: Prior   Cardiac Rhythm:    O2 Flow Rate (L/min): 2 L/min  Is patient on baseline Oxygen: yes,  how many Liters2:   Abnormal Assessment Findings:   - dyspnea  - cough  - hypoxia  - chronic pain  -headache    Isolation: None      NIH Score:    C-SSRS: Risk of Suicide: No Risk  Bedside swallow:        Active LDA's:   Peripheral IV 12/10/24 Right Antecubital (Active)     Patient admitted with a carpenter: no If the carpenter is chronic was it exchanged:No        Family/Caregiver Present no Any Concerns: no   Restraints no  Sitter no         Vitals: MEWS Score: 1    Vitals:    12/10/24 1350 12/10/24 1402 12/10/24 1415 12/10/24 1418   BP:  105/68     Pulse: 82 78     Resp: 20 15     Temp:       TempSrc:       SpO2: 100% 96% (!) 83% 91%   Weight:       Height:           Last documented pain score (0-10 scale) Pain Level: 7  Pain medication administered Yes- see MAR.    Pertinent or High Risk Medications/Drips:     Pending Blood Product Administration: no    Abnormal labs:   Abnormal Labs Reviewed   CBC WITH AUTO DIFFERENTIAL - Abnormal; Notable for the following components:       Result Value    Hemoglobin 11.5 (*)     Hematocrit 35.8 (*)     RDW 16.4 (*)     Neutrophils Absolute 8.2 (*)     All other components within normal limits   COMPREHENSIVE METABOLIC PANEL - Abnormal; Notable for the following components:

## 2024-12-10 NOTE — ED PROVIDER NOTES
[facility]  ED  EMERGENCY DEPARTMENT ENCOUNTER        Pt Name: Anjelica Blanchard  MRN: [id number]  Birthdate 1980  Date of evaluation: 12/10/2024  Provider: ANNA Gasca - CNP  PCP: Smith Neff MD  Note Started: 1:44 PM EST 12/10/24       I have seen and evaluated this patient with my supervising physician Dr. Navarrete      CHIEF COMPLAINT       Chief Complaint   Patient presents with    Shortness of Breath     Cough, dyspnea and hemoptysis for \"a long time\". Increased fatigue over the last few days with headache. Patient states during her fall last night, she fell on right knee.   Reports frequent admissions for pneumonia.        HISTORY OF PRESENT ILLNESS: 1 or more Elements     History From: the patient  Limitations to history : None    Anjelica Blanchard is a 44 y.o. female who presents to the ER today with complaints of shortness of breath.  Patient complaining of cough, dyspnea, hemoptysis which she says have been going on for \"a long time\".  Patient states that she has a history of multifocal pneumonia that is only better after getting admitted.  Patient states that she was diagnosed with pneumonia about a month ago and finished oral antibiotics but does not feel better.  She is here for further evaluation.    Nursing Notes were all reviewed and agreed with or any disagreements were addressed in the HPI.    REVIEW OF SYSTEMS :      Review of Systems    Positives and Pertinent negatives as per HPI.     SURGICAL HISTORY     Past Surgical History:   Procedure Laterality Date    BRONCHOSCOPY N/A 11/2/2023    BRONCHOSCOPY DIAGNOSTIC OR CELL WASH ONLY performed by Manisha Medina MD at [facility] ENDOSCOPY    BRONCHOSCOPY  11/2/2023    BRONCHOSCOPY ALVEOLAR LAVAGE performed by Manisha Medina MD at [facility] ENDOSCOPY    BRONCHOSCOPY  11/2/2023    BRONCHOSCOPY THERAPUTIC ASPIRATION INITIAL performed by Manisha Medina MD at [facility] ENDOSCOPY    KNEE SURGERY      LUMBAR FUSION N/A 
MD  12/10/24 3401

## 2024-12-11 ENCOUNTER — TELEPHONE (OUTPATIENT)
Dept: PULMONOLOGY | Age: 44
End: 2024-12-11

## 2024-12-11 LAB
AMMONIA PLAS-SCNC: 35 UMOL/L (ref 11–51)
ANION GAP SERPL CALCULATED.3IONS-SCNC: 10 MMOL/L (ref 3–16)
BASOPHILS # BLD: 0.1 K/UL (ref 0–0.2)
BASOPHILS NFR BLD: 1.2 %
BUN SERPL-MCNC: 14 MG/DL (ref 7–20)
CALCIUM SERPL-MCNC: 9.1 MG/DL (ref 8.3–10.6)
CHLORIDE SERPL-SCNC: 100 MMOL/L (ref 99–110)
CO2 SERPL-SCNC: 29 MMOL/L (ref 21–32)
CREAT SERPL-MCNC: 0.8 MG/DL (ref 0.6–1.1)
DEPRECATED RDW RBC AUTO: 16.4 % (ref 12.4–15.4)
EOSINOPHIL # BLD: 0.3 K/UL (ref 0–0.6)
EOSINOPHIL NFR BLD: 4.5 %
FERRITIN SERPL IA-MCNC: 52.5 NG/ML (ref 15–150)
FOLATE SERPL-MCNC: 12 NG/ML (ref 4.78–24.2)
GFR SERPLBLD CREATININE-BSD FMLA CKD-EPI: >90 ML/MIN/{1.73_M2}
GLUCOSE SERPL-MCNC: 82 MG/DL (ref 70–99)
HCT VFR BLD AUTO: 35.2 % (ref 36–48)
HGB BLD-MCNC: 11.6 G/DL (ref 12–16)
IRON SATN MFR SERPL: 10 % (ref 15–50)
IRON SERPL-MCNC: 37 UG/DL (ref 37–145)
LACTATE BLDV-SCNC: 1.3 MMOL/L (ref 0.4–2)
LYMPHOCYTES # BLD: 2.3 K/UL (ref 1–5.1)
LYMPHOCYTES NFR BLD: 32.7 %
MCH RBC QN AUTO: 28.5 PG (ref 26–34)
MCHC RBC AUTO-ENTMCNC: 32.9 G/DL (ref 31–36)
MCV RBC AUTO: 86.7 FL (ref 80–100)
MONOCYTES # BLD: 0.5 K/UL (ref 0–1.3)
MONOCYTES NFR BLD: 7.6 %
MRSA DNA SPEC QL NAA+PROBE: NORMAL
NEUTROPHILS # BLD: 3.8 K/UL (ref 1.7–7.7)
NEUTROPHILS NFR BLD: 54 %
PHOSPHATE SERPL-MCNC: 4.3 MG/DL (ref 2.5–4.9)
PLATELET # BLD AUTO: 268 K/UL (ref 135–450)
PMV BLD AUTO: 8.1 FL (ref 5–10.5)
POTASSIUM SERPL-SCNC: 5 MMOL/L (ref 3.5–5.1)
PROCALCITONIN SERPL IA-MCNC: 0.51 NG/ML (ref 0–0.15)
RBC # BLD AUTO: 4.06 M/UL (ref 4–5.2)
SODIUM SERPL-SCNC: 139 MMOL/L (ref 136–145)
TIBC SERPL-MCNC: 377 UG/DL (ref 260–445)
VALPROATE SERPL-MCNC: 57 UG/ML (ref 50–100)
VIT B12 SERPL-MCNC: 1128 PG/ML (ref 211–911)
WBC # BLD AUTO: 7.1 K/UL (ref 4–11)

## 2024-12-11 PROCEDURE — 97162 PT EVAL MOD COMPLEX 30 MIN: CPT

## 2024-12-11 PROCEDURE — 94640 AIRWAY INHALATION TREATMENT: CPT

## 2024-12-11 PROCEDURE — 2580000003 HC RX 258: Performed by: NURSE PRACTITIONER

## 2024-12-11 PROCEDURE — 97535 SELF CARE MNGMENT TRAINING: CPT

## 2024-12-11 PROCEDURE — 84145 PROCALCITONIN (PCT): CPT

## 2024-12-11 PROCEDURE — 97530 THERAPEUTIC ACTIVITIES: CPT

## 2024-12-11 PROCEDURE — 36415 COLL VENOUS BLD VENIPUNCTURE: CPT

## 2024-12-11 PROCEDURE — 82140 ASSAY OF AMMONIA: CPT

## 2024-12-11 PROCEDURE — APPSS45 APP SPLIT SHARED TIME 31-45 MINUTES

## 2024-12-11 PROCEDURE — 99223 1ST HOSP IP/OBS HIGH 75: CPT | Performed by: PSYCHIATRY & NEUROLOGY

## 2024-12-11 PROCEDURE — 80164 ASSAY DIPROPYLACETIC ACD TOT: CPT

## 2024-12-11 PROCEDURE — 6360000002 HC RX W HCPCS: Performed by: NURSE PRACTITIONER

## 2024-12-11 PROCEDURE — 97116 GAIT TRAINING THERAPY: CPT

## 2024-12-11 PROCEDURE — 1200000000 HC SEMI PRIVATE

## 2024-12-11 PROCEDURE — 85025 COMPLETE CBC W/AUTO DIFF WBC: CPT

## 2024-12-11 PROCEDURE — 6370000000 HC RX 637 (ALT 250 FOR IP): Performed by: NURSE PRACTITIONER

## 2024-12-11 PROCEDURE — 94762 N-INVAS EAR/PLS OXIMTRY CONT: CPT

## 2024-12-11 PROCEDURE — 80048 BASIC METABOLIC PNL TOTAL CA: CPT

## 2024-12-11 PROCEDURE — 83605 ASSAY OF LACTIC ACID: CPT

## 2024-12-11 PROCEDURE — 97166 OT EVAL MOD COMPLEX 45 MIN: CPT

## 2024-12-11 PROCEDURE — 84100 ASSAY OF PHOSPHORUS: CPT

## 2024-12-11 RX ORDER — OXYCODONE AND ACETAMINOPHEN 5; 325 MG/1; MG/1
1 TABLET ORAL EVERY 6 HOURS PRN
Status: DISCONTINUED | OUTPATIENT
Start: 2024-12-11 | End: 2024-12-13 | Stop reason: HOSPADM

## 2024-12-11 RX ADMIN — ALPRAZOLAM 0.5 MG: 0.25 TABLET ORAL at 16:38

## 2024-12-11 RX ADMIN — PREGABALIN 150 MG: 75 CAPSULE ORAL at 08:02

## 2024-12-11 RX ADMIN — FUROSEMIDE 40 MG: 40 TABLET ORAL at 08:02

## 2024-12-11 RX ADMIN — GUAIFENESIN 600 MG: 600 TABLET ORAL at 20:25

## 2024-12-11 RX ADMIN — MIDODRINE HYDROCHLORIDE 5 MG: 5 TABLET ORAL at 08:02

## 2024-12-11 RX ADMIN — MIDODRINE HYDROCHLORIDE 5 MG: 5 TABLET ORAL at 11:38

## 2024-12-11 RX ADMIN — GUAIFENESIN 600 MG: 600 TABLET ORAL at 08:02

## 2024-12-11 RX ADMIN — DULOXETINE HYDROCHLORIDE 20 MG: 20 CAPSULE, DELAYED RELEASE ORAL at 08:02

## 2024-12-11 RX ADMIN — AZITHROMYCIN DIHYDRATE 500 MG: 250 TABLET, FILM COATED ORAL at 16:29

## 2024-12-11 RX ADMIN — Medication 2 PUFF: at 19:03

## 2024-12-11 RX ADMIN — CEFTRIAXONE SODIUM 1000 MG: 1 INJECTION, POWDER, FOR SOLUTION INTRAMUSCULAR; INTRAVENOUS at 16:34

## 2024-12-11 RX ADMIN — ZOLPIDEM TARTRATE 10 MG: 5 TABLET, COATED ORAL at 20:25

## 2024-12-11 RX ADMIN — DIVALPROEX SODIUM 750 MG: 250 TABLET, EXTENDED RELEASE ORAL at 20:25

## 2024-12-11 RX ADMIN — MIDODRINE HYDROCHLORIDE 5 MG: 5 TABLET ORAL at 16:29

## 2024-12-11 RX ADMIN — LEVOTHYROXINE SODIUM 75 MCG: 0.03 TABLET ORAL at 08:02

## 2024-12-11 RX ADMIN — PREGABALIN 150 MG: 75 CAPSULE ORAL at 20:25

## 2024-12-11 RX ADMIN — DOCUSATE SODIUM 100 MG: 100 CAPSULE, LIQUID FILLED ORAL at 08:00

## 2024-12-11 RX ADMIN — OXYCODONE AND ACETAMINOPHEN 1 TABLET: 5; 325 TABLET ORAL at 16:37

## 2024-12-11 RX ADMIN — PANTOPRAZOLE SODIUM 40 MG: 40 TABLET, DELAYED RELEASE ORAL at 08:02

## 2024-12-11 RX ADMIN — Medication 2 PUFF: at 08:46

## 2024-12-11 RX ADMIN — ENOXAPARIN SODIUM 30 MG: 100 INJECTION SUBCUTANEOUS at 20:25

## 2024-12-11 RX ADMIN — ALPRAZOLAM 0.5 MG: 0.25 TABLET ORAL at 08:12

## 2024-12-11 RX ADMIN — SODIUM CHLORIDE, PRESERVATIVE FREE 10 ML: 5 INJECTION INTRAVENOUS at 08:07

## 2024-12-11 RX ADMIN — NICOTINE POLACRILEX 2 MG: 2 LOZENGE ORAL at 16:57

## 2024-12-11 RX ADMIN — NICOTINE POLACRILEX 2 MG: 2 LOZENGE ORAL at 20:30

## 2024-12-11 RX ADMIN — METHADONE HYDROCHLORIDE 140 MG: 10 TABLET ORAL at 08:02

## 2024-12-11 RX ADMIN — QUETIAPINE FUMARATE 300 MG: 50 TABLET, EXTENDED RELEASE ORAL at 20:24

## 2024-12-11 RX ADMIN — OXYCODONE AND ACETAMINOPHEN 1 TABLET: 5; 325 TABLET ORAL at 08:02

## 2024-12-11 RX ADMIN — DOCUSATE SODIUM 100 MG: 100 CAPSULE, LIQUID FILLED ORAL at 20:25

## 2024-12-11 RX ADMIN — DIVALPROEX SODIUM 750 MG: 250 TABLET, EXTENDED RELEASE ORAL at 08:02

## 2024-12-11 RX ADMIN — ENOXAPARIN SODIUM 30 MG: 100 INJECTION SUBCUTANEOUS at 08:06

## 2024-12-11 RX ADMIN — NICOTINE POLACRILEX 2 MG: 2 LOZENGE ORAL at 11:35

## 2024-12-11 ASSESSMENT — PAIN SCALES - GENERAL
PAINLEVEL_OUTOF10: 3
PAINLEVEL_OUTOF10: 5
PAINLEVEL_OUTOF10: 8
PAINLEVEL_OUTOF10: 6

## 2024-12-11 ASSESSMENT — PAIN DESCRIPTION - LOCATION
LOCATION: BACK

## 2024-12-11 ASSESSMENT — PAIN DESCRIPTION - PAIN TYPE: TYPE: CHRONIC PAIN;ACUTE PAIN

## 2024-12-11 ASSESSMENT — PAIN - FUNCTIONAL ASSESSMENT: PAIN_FUNCTIONAL_ASSESSMENT: ACTIVITIES ARE NOT PREVENTED

## 2024-12-11 ASSESSMENT — PAIN DESCRIPTION - ONSET: ONSET: ON-GOING

## 2024-12-11 ASSESSMENT — PAIN DESCRIPTION - DESCRIPTORS: DESCRIPTORS: ACHING;SORE

## 2024-12-11 ASSESSMENT — PAIN DESCRIPTION - ORIENTATION: ORIENTATION: LOWER;RIGHT

## 2024-12-11 NOTE — TELEPHONE ENCOUNTER
----- Message from Dr. Ismael Arechiga MD sent at 12/10/2024  7:08 PM EST -----  Hi,    Can we make an appt for patient for 6-8 weeks with Arabella. She should be discharged in the next 1-2 days. Thank you.    KD

## 2024-12-11 NOTE — CARE COORDINATION
Case Management Assessment  Initial Evaluation    Date/Time of Evaluation: 12/11/2024 11:46 AM  Assessment Completed by: PRAMOD STEIN RN    If patient is discharged prior to next notation, then this note serves as note for discharge by case management.    Patient Name: Anjelica Blanchard                   YOB: 1980  Diagnosis: Pneumonia due to organism [J18.9]  Multifocal pneumonia [J18.9]  Acute hypoxic respiratory failure [J96.01]                   Date / Time: 12/10/2024 10:10 AM    Patient Admission Status: Inpatient   Readmission Risk (Low < 19, Mod (19-27), High > 27): Readmission Risk Score: 19.7    Current PCP: Smith Neff MD  PCP verified by CM? Yes    Chart Reviewed: Yes      History Provided by: Patient  Patient Orientation: Alert and Oriented    Patient Cognition: Alert    Hospitalization in the last 30 days (Readmission):  No    If yes, Readmission Assessment in  Navigator will be completed.    Advance Directives:      Code Status: Full Code   Patient's Primary Decision Maker is: Legal Next of Kin    Primary Decision Maker: Talia Blanchard - Child    Secondary Decision Maker: Modesto Blanchard - Parent - [phone]    Secondary Decision Maker: Mally Blanchard - Brother/Sister - [phone]    Discharge Planning:    Patient lives with: Alone Type of Home: Apartment  Primary Care Giver: Self  Patient Support Systems include: Family Members   Current Financial resources: Medicaid  Current community resources: None  Current services prior to admission: None            Current DME:              Type of Home Care services:  None    ADLS  Prior functional level: Assistance with the following:, Shopping  Current functional level: Assistance with the following:, Shopping    PT AM-PAC:   /24  OT AM-PAC:   /24    Family can provide assistance at DC: No  Would you like Case Management to discuss the discharge plan with any other family members/significant others, and if so, who? No  Plans to

## 2024-12-11 NOTE — CONSULTS
In patient Neurology consult        [facility] Neurology            Anjelica Blanchard  1980    Date of Service: 12/11/2024    Referring Physician: Ravi Davenport MD      Reason for the consult and CC: Bilateral upper & lower extremity paresthesia    HPI:   The patient is a 44 y.o.  years old female with a prior medical history of bipolar 1, borderline personality disorder, COPD, seizures, hep C, polysubstance abuse who presented to the hospital 12/10 with shortness of breath and is being treated for pneumonia.  Patient asked for a neurology consult due to extremity paresthesias.  Patient states that on 12/9 in the evening she developed \"twitching\" of her bilateral arms and legs.  She states that this causes her to drop things and fall.  She says that this went on all night, lasting 8 to 10 hours.  She states that during this episode she developed numbness and tingling of bilateral hands and feet, especially her right leg, that is ongoing.  Patient states that she has had spells like this for about a year and a half and estimates that she has had about 15 spells total.  Patient does have a history of seizures, possibly related to benzodiazepine withdrawal. She states that her first episode was about a year ago and estimates that she has had about 4 total since then.  She says that at least 2 of these episodes were while she was in the hospital. Per chart review, pt appears to have has a seizure described as generalized jerking during an inpatient psych admission in June. Pt was evaluated by neurology and her depakote was increased to 750mg bid. She does not see a neurologist outpatient. Of note, pt has also previously seen Geoff for chronic back pain and has had previous lumbar surgery. She mentions that she needs another referral to be able to go back.         Constitutional:   Vitals:    12/11/24 0832 12/11/24 0846 12/11/24 1000 12/11/24 1121   BP:   (!) 142/90 125/86   Pulse:  74 71 79   Resp: 16 16  16

## 2024-12-12 LAB
ANION GAP SERPL CALCULATED.3IONS-SCNC: 10 MMOL/L (ref 3–16)
BASOPHILS # BLD: 0.1 K/UL (ref 0–0.2)
BASOPHILS NFR BLD: 0.9 %
BUN SERPL-MCNC: 15 MG/DL (ref 7–20)
CALCIUM SERPL-MCNC: 9.6 MG/DL (ref 8.3–10.6)
CHLORIDE SERPL-SCNC: 99 MMOL/L (ref 99–110)
CO2 SERPL-SCNC: 30 MMOL/L (ref 21–32)
CREAT SERPL-MCNC: 0.8 MG/DL (ref 0.6–1.1)
DEPRECATED RDW RBC AUTO: 16.4 % (ref 12.4–15.4)
EOSINOPHIL # BLD: 0.4 K/UL (ref 0–0.6)
EOSINOPHIL NFR BLD: 6.3 %
GFR SERPLBLD CREATININE-BSD FMLA CKD-EPI: >90 ML/MIN/{1.73_M2}
GLUCOSE SERPL-MCNC: 90 MG/DL (ref 70–99)
HCT VFR BLD AUTO: 36.2 % (ref 36–48)
HGB BLD-MCNC: 11.7 G/DL (ref 12–16)
LEGIONELLA AG UR QL: NORMAL
LYMPHOCYTES # BLD: 2.1 K/UL (ref 1–5.1)
LYMPHOCYTES NFR BLD: 34.8 %
MCH RBC QN AUTO: 27.9 PG (ref 26–34)
MCHC RBC AUTO-ENTMCNC: 32.3 G/DL (ref 31–36)
MCV RBC AUTO: 86.4 FL (ref 80–100)
MONOCYTES # BLD: 0.5 K/UL (ref 0–1.3)
MONOCYTES NFR BLD: 9.2 %
NEUTROPHILS # BLD: 2.9 K/UL (ref 1.7–7.7)
NEUTROPHILS NFR BLD: 48.8 %
PLATELET # BLD AUTO: 296 K/UL (ref 135–450)
PMV BLD AUTO: 7.6 FL (ref 5–10.5)
POTASSIUM SERPL-SCNC: 4.2 MMOL/L (ref 3.5–5.1)
RBC # BLD AUTO: 4.19 M/UL (ref 4–5.2)
S PNEUM AG UR QL: NORMAL
SODIUM SERPL-SCNC: 139 MMOL/L (ref 136–145)
WBC # BLD AUTO: 5.9 K/UL (ref 4–11)

## 2024-12-12 PROCEDURE — 94669 MECHANICAL CHEST WALL OSCILL: CPT

## 2024-12-12 PROCEDURE — APPSS30 APP SPLIT SHARED TIME 16-30 MINUTES

## 2024-12-12 PROCEDURE — 6370000000 HC RX 637 (ALT 250 FOR IP): Performed by: NURSE PRACTITIONER

## 2024-12-12 PROCEDURE — 85025 COMPLETE CBC W/AUTO DIFF WBC: CPT

## 2024-12-12 PROCEDURE — 80048 BASIC METABOLIC PNL TOTAL CA: CPT

## 2024-12-12 PROCEDURE — 1200000000 HC SEMI PRIVATE

## 2024-12-12 PROCEDURE — 94640 AIRWAY INHALATION TREATMENT: CPT

## 2024-12-12 PROCEDURE — 6360000002 HC RX W HCPCS: Performed by: NURSE PRACTITIONER

## 2024-12-12 PROCEDURE — 95816 EEG AWAKE AND DROWSY: CPT

## 2024-12-12 PROCEDURE — 2580000003 HC RX 258: Performed by: NURSE PRACTITIONER

## 2024-12-12 PROCEDURE — 36415 COLL VENOUS BLD VENIPUNCTURE: CPT

## 2024-12-12 PROCEDURE — 99233 SBSQ HOSP IP/OBS HIGH 50: CPT | Performed by: PSYCHIATRY & NEUROLOGY

## 2024-12-12 PROCEDURE — 95816 EEG AWAKE AND DROWSY: CPT | Performed by: PSYCHIATRY & NEUROLOGY

## 2024-12-12 RX ORDER — HYDROCORTISONE 10 MG/1
5 TABLET ORAL 3 TIMES DAILY
Status: DISCONTINUED | OUTPATIENT
Start: 2024-12-12 | End: 2024-12-13 | Stop reason: HOSPADM

## 2024-12-12 RX ADMIN — CEFTRIAXONE SODIUM 1000 MG: 1 INJECTION, POWDER, FOR SOLUTION INTRAMUSCULAR; INTRAVENOUS at 16:30

## 2024-12-12 RX ADMIN — Medication 2 PUFF: at 19:12

## 2024-12-12 RX ADMIN — ENOXAPARIN SODIUM 30 MG: 100 INJECTION SUBCUTANEOUS at 20:11

## 2024-12-12 RX ADMIN — METHADONE HYDROCHLORIDE 140 MG: 10 TABLET ORAL at 09:37

## 2024-12-12 RX ADMIN — FUROSEMIDE 40 MG: 40 TABLET ORAL at 09:35

## 2024-12-12 RX ADMIN — LEVOTHYROXINE SODIUM 75 MCG: 0.03 TABLET ORAL at 06:40

## 2024-12-12 RX ADMIN — PREGABALIN 150 MG: 75 CAPSULE ORAL at 20:09

## 2024-12-12 RX ADMIN — GUAIFENESIN 600 MG: 600 TABLET ORAL at 20:09

## 2024-12-12 RX ADMIN — DIVALPROEX SODIUM 750 MG: 250 TABLET, EXTENDED RELEASE ORAL at 09:35

## 2024-12-12 RX ADMIN — QUETIAPINE FUMARATE 300 MG: 50 TABLET, EXTENDED RELEASE ORAL at 20:10

## 2024-12-12 RX ADMIN — ENOXAPARIN SODIUM 30 MG: 100 INJECTION SUBCUTANEOUS at 09:40

## 2024-12-12 RX ADMIN — SODIUM CHLORIDE, PRESERVATIVE FREE 10 ML: 5 INJECTION INTRAVENOUS at 20:16

## 2024-12-12 RX ADMIN — ZOLPIDEM TARTRATE 10 MG: 5 TABLET, COATED ORAL at 20:09

## 2024-12-12 RX ADMIN — DULOXETINE HYDROCHLORIDE 20 MG: 20 CAPSULE, DELAYED RELEASE ORAL at 09:35

## 2024-12-12 RX ADMIN — PREGABALIN 150 MG: 75 CAPSULE ORAL at 09:35

## 2024-12-12 RX ADMIN — DIVALPROEX SODIUM 750 MG: 250 TABLET, EXTENDED RELEASE ORAL at 20:10

## 2024-12-12 RX ADMIN — NICOTINE POLACRILEX 2 MG: 2 LOZENGE ORAL at 12:58

## 2024-12-12 RX ADMIN — HYDROCORTISONE 5 MG: 10 TABLET ORAL at 20:11

## 2024-12-12 RX ADMIN — DOCUSATE SODIUM 100 MG: 100 CAPSULE, LIQUID FILLED ORAL at 09:35

## 2024-12-12 RX ADMIN — HYDROCORTISONE 5 MG: 10 TABLET ORAL at 14:13

## 2024-12-12 RX ADMIN — NICOTINE POLACRILEX 2 MG: 2 LOZENGE ORAL at 16:53

## 2024-12-12 RX ADMIN — GUAIFENESIN 600 MG: 600 TABLET ORAL at 09:40

## 2024-12-12 RX ADMIN — PANTOPRAZOLE SODIUM 40 MG: 40 TABLET, DELAYED RELEASE ORAL at 06:40

## 2024-12-12 RX ADMIN — OXYCODONE AND ACETAMINOPHEN 1 TABLET: 5; 325 TABLET ORAL at 16:50

## 2024-12-12 RX ADMIN — NICOTINE POLACRILEX 2 MG: 2 LOZENGE ORAL at 20:12

## 2024-12-12 RX ADMIN — SODIUM CHLORIDE, PRESERVATIVE FREE 10 ML: 5 INJECTION INTRAVENOUS at 09:41

## 2024-12-12 RX ADMIN — MIDODRINE HYDROCHLORIDE 5 MG: 5 TABLET ORAL at 16:22

## 2024-12-12 RX ADMIN — NICOTINE POLACRILEX 2 MG: 2 LOZENGE ORAL at 10:27

## 2024-12-12 RX ADMIN — ALPRAZOLAM 0.5 MG: 0.25 TABLET ORAL at 10:29

## 2024-12-12 RX ADMIN — Medication 2 PUFF: at 08:40

## 2024-12-12 RX ADMIN — ALPRAZOLAM 0.5 MG: 0.25 TABLET ORAL at 18:32

## 2024-12-12 RX ADMIN — AZITHROMYCIN DIHYDRATE 500 MG: 250 TABLET, FILM COATED ORAL at 16:22

## 2024-12-12 RX ADMIN — DOCUSATE SODIUM 100 MG: 100 CAPSULE, LIQUID FILLED ORAL at 20:11

## 2024-12-12 RX ADMIN — SODIUM CHLORIDE: 9 INJECTION, SOLUTION INTRAVENOUS at 16:29

## 2024-12-12 RX ADMIN — OXYCODONE AND ACETAMINOPHEN 1 TABLET: 5; 325 TABLET ORAL at 10:28

## 2024-12-12 ASSESSMENT — PAIN SCALES - WONG BAKER
WONGBAKER_NUMERICALRESPONSE: NO HURT
WONGBAKER_NUMERICALRESPONSE: NO HURT

## 2024-12-12 ASSESSMENT — PAIN DESCRIPTION - LOCATION
LOCATION: BACK;KNEE
LOCATION: KNEE;BACK

## 2024-12-12 ASSESSMENT — PAIN SCALES - GENERAL
PAINLEVEL_OUTOF10: 5
PAINLEVEL_OUTOF10: 7
PAINLEVEL_OUTOF10: 3
PAINLEVEL_OUTOF10: 0

## 2024-12-12 ASSESSMENT — PAIN DESCRIPTION - DESCRIPTORS
DESCRIPTORS: SHOOTING
DESCRIPTORS: ACHING;SHARP

## 2024-12-12 ASSESSMENT — PAIN DESCRIPTION - ORIENTATION
ORIENTATION: RIGHT;LOWER
ORIENTATION: RIGHT;LOWER

## 2024-12-12 NOTE — PLAN OF CARE
Problem: Chronic Conditions and Co-morbidities  Goal: Patient's chronic conditions and co-morbidity symptoms are monitored and maintained or improved  Outcome: Progressing     Problem: Discharge Planning  Goal: Discharge to home or other facility with appropriate resources  Outcome: Progressing     Problem: Pain  Goal: Verbalizes/displays adequate comfort level or baseline comfort level  12/11/2024 2251 by Chula Trinidad RN  Outcome: Progressing  12/11/2024 1103 by Cindy Reddy RN  Outcome: Progressing     Problem: Safety - Adult  Goal: Free from fall injury  12/11/2024 2251 by Chula Trinidad RN  Outcome: Progressing  12/11/2024 1103 by Cindy Reddy, RN  Outcome: Progressing     Problem: ABCDS Injury Assessment  Goal: Absence of physical injury  Outcome: Progressing     Problem: Risk for Elopement  Goal: Patient will not exit the unit/facility without proper excort  Outcome: Progressing  Flowsheets (Taken 12/11/2024 2015)  Nursing Interventions for Elopement Risk:   Collaborate with treatment team for nicotine replacement   Communicate/escalate to charge nurse the risk of elopement   Make sure patient has all necessary personal care items   Place patient in room far away from exits and stairways   Reduce environmental triggers

## 2024-12-12 NOTE — CARE COORDINATION
Chart reviewed day 2. Pt is followed by IM and Neuro. Pending EEG for further recs. Pt from home alone. Will need RW and Shower Chair. [facility] following for needs. May need lyft home. Will follow for needs as they arise. Electronically signed by PRAMOD STEIN RN on 12/12/2024 at 3:11 PM

## 2024-12-13 VITALS
DIASTOLIC BLOOD PRESSURE: 69 MMHG | BODY MASS INDEX: 38.76 KG/M2 | HEART RATE: 81 BPM | HEIGHT: 64 IN | SYSTOLIC BLOOD PRESSURE: 124 MMHG | RESPIRATION RATE: 16 BRPM | OXYGEN SATURATION: 92 % | TEMPERATURE: 97.9 F | WEIGHT: 227 LBS

## 2024-12-13 LAB
ANION GAP SERPL CALCULATED.3IONS-SCNC: 11 MMOL/L (ref 3–16)
BASOPHILS # BLD: 0.1 K/UL (ref 0–0.2)
BASOPHILS NFR BLD: 0.9 %
BUN SERPL-MCNC: 17 MG/DL (ref 7–20)
CALCIUM SERPL-MCNC: 9.8 MG/DL (ref 8.3–10.6)
CHLORIDE SERPL-SCNC: 97 MMOL/L (ref 99–110)
CO2 SERPL-SCNC: 31 MMOL/L (ref 21–32)
CREAT SERPL-MCNC: 0.9 MG/DL (ref 0.6–1.1)
DEPRECATED RDW RBC AUTO: 16.5 % (ref 12.4–15.4)
EOSINOPHIL # BLD: 0.4 K/UL (ref 0–0.6)
EOSINOPHIL NFR BLD: 6.3 %
GFR SERPLBLD CREATININE-BSD FMLA CKD-EPI: 80 ML/MIN/{1.73_M2}
GLUCOSE SERPL-MCNC: 100 MG/DL (ref 70–99)
HCT VFR BLD AUTO: 38.1 % (ref 36–48)
HGB BLD-MCNC: 12.2 G/DL (ref 12–16)
LYMPHOCYTES # BLD: 2.1 K/UL (ref 1–5.1)
LYMPHOCYTES NFR BLD: 29 %
MCH RBC QN AUTO: 28 PG (ref 26–34)
MCHC RBC AUTO-ENTMCNC: 32.1 G/DL (ref 31–36)
MCV RBC AUTO: 87.3 FL (ref 80–100)
MONOCYTES # BLD: 0.7 K/UL (ref 0–1.3)
MONOCYTES NFR BLD: 9.6 %
NEUTROPHILS # BLD: 3.9 K/UL (ref 1.7–7.7)
NEUTROPHILS NFR BLD: 54.2 %
PLATELET # BLD AUTO: 318 K/UL (ref 135–450)
PMV BLD AUTO: 7.7 FL (ref 5–10.5)
POTASSIUM SERPL-SCNC: 4.4 MMOL/L (ref 3.5–5.1)
RBC # BLD AUTO: 4.36 M/UL (ref 4–5.2)
SODIUM SERPL-SCNC: 139 MMOL/L (ref 136–145)
WBC # BLD AUTO: 7.1 K/UL (ref 4–11)

## 2024-12-13 PROCEDURE — 6370000000 HC RX 637 (ALT 250 FOR IP): Performed by: NURSE PRACTITIONER

## 2024-12-13 PROCEDURE — 6360000002 HC RX W HCPCS: Performed by: NURSE PRACTITIONER

## 2024-12-13 PROCEDURE — 2580000003 HC RX 258: Performed by: NURSE PRACTITIONER

## 2024-12-13 PROCEDURE — 36415 COLL VENOUS BLD VENIPUNCTURE: CPT

## 2024-12-13 PROCEDURE — 85025 COMPLETE CBC W/AUTO DIFF WBC: CPT

## 2024-12-13 PROCEDURE — 94640 AIRWAY INHALATION TREATMENT: CPT

## 2024-12-13 PROCEDURE — 80048 BASIC METABOLIC PNL TOTAL CA: CPT

## 2024-12-13 RX ORDER — CEFDINIR 300 MG/1
300 CAPSULE ORAL 2 TIMES DAILY
Qty: 10 CAPSULE | Refills: 0 | Status: SHIPPED | OUTPATIENT
Start: 2024-12-13 | End: 2024-12-18

## 2024-12-13 RX ORDER — GUAIFENESIN 600 MG/1
600 TABLET, EXTENDED RELEASE ORAL 2 TIMES DAILY
Qty: 60 TABLET | Refills: 0 | Status: SHIPPED | OUTPATIENT
Start: 2024-12-13

## 2024-12-13 RX ORDER — AZITHROMYCIN 500 MG/1
500 TABLET, FILM COATED ORAL EVERY 24 HOURS
Qty: 2 TABLET | Refills: 0 | Status: SHIPPED | OUTPATIENT
Start: 2024-12-13 | End: 2024-12-15

## 2024-12-13 RX ORDER — HYDROCORTISONE 5 MG/1
5 TABLET ORAL 3 TIMES DAILY
COMMUNITY

## 2024-12-13 RX ADMIN — FUROSEMIDE 40 MG: 40 TABLET ORAL at 08:57

## 2024-12-13 RX ADMIN — DOCUSATE SODIUM 100 MG: 100 CAPSULE, LIQUID FILLED ORAL at 08:56

## 2024-12-13 RX ADMIN — NICOTINE POLACRILEX 2 MG: 2 LOZENGE ORAL at 08:59

## 2024-12-13 RX ADMIN — MIDODRINE HYDROCHLORIDE 5 MG: 5 TABLET ORAL at 08:57

## 2024-12-13 RX ADMIN — LEVOTHYROXINE SODIUM 75 MCG: 0.03 TABLET ORAL at 06:11

## 2024-12-13 RX ADMIN — PREGABALIN 150 MG: 75 CAPSULE ORAL at 08:56

## 2024-12-13 RX ADMIN — SODIUM CHLORIDE, PRESERVATIVE FREE 10 ML: 5 INJECTION INTRAVENOUS at 09:01

## 2024-12-13 RX ADMIN — ENOXAPARIN SODIUM 30 MG: 100 INJECTION SUBCUTANEOUS at 09:00

## 2024-12-13 RX ADMIN — GUAIFENESIN 600 MG: 600 TABLET ORAL at 08:57

## 2024-12-13 RX ADMIN — Medication 2 PUFF: at 07:29

## 2024-12-13 RX ADMIN — HYDROCORTISONE 5 MG: 10 TABLET ORAL at 08:56

## 2024-12-13 RX ADMIN — DULOXETINE HYDROCHLORIDE 20 MG: 20 CAPSULE, DELAYED RELEASE ORAL at 08:57

## 2024-12-13 RX ADMIN — METHADONE HYDROCHLORIDE 140 MG: 10 TABLET ORAL at 08:59

## 2024-12-13 RX ADMIN — OXYCODONE AND ACETAMINOPHEN 1 TABLET: 5; 325 TABLET ORAL at 10:03

## 2024-12-13 RX ADMIN — DIVALPROEX SODIUM 750 MG: 250 TABLET, EXTENDED RELEASE ORAL at 08:57

## 2024-12-13 RX ADMIN — PANTOPRAZOLE SODIUM 40 MG: 40 TABLET, DELAYED RELEASE ORAL at 06:11

## 2024-12-13 RX ADMIN — ALPRAZOLAM 0.5 MG: 0.25 TABLET ORAL at 10:04

## 2024-12-13 ASSESSMENT — PAIN DESCRIPTION - ORIENTATION
ORIENTATION: RIGHT;LOWER
ORIENTATION: LOWER;RIGHT;LEFT

## 2024-12-13 ASSESSMENT — PAIN DESCRIPTION - DESCRIPTORS
DESCRIPTORS: SHARP
DESCRIPTORS: SHARP

## 2024-12-13 ASSESSMENT — PAIN SCALES - GENERAL
PAINLEVEL_OUTOF10: 5
PAINLEVEL_OUTOF10: 1
PAINLEVEL_OUTOF10: 7

## 2024-12-13 ASSESSMENT — PAIN DESCRIPTION - LOCATION
LOCATION: BACK;KNEE
LOCATION: BACK;KNEE

## 2024-12-13 ASSESSMENT — PAIN SCALES - WONG BAKER: WONGBAKER_NUMERICALRESPONSE: NO HURT

## 2024-12-13 ASSESSMENT — PAIN DESCRIPTION - PAIN TYPE: TYPE: CHRONIC PAIN;ACUTE PAIN

## 2024-12-13 NOTE — CONSULTS
Please see full consult note 12/11.  Electronically signed by ANNA Garcia CNP on 12/13/24 at 8:41 AM EST

## 2024-12-13 NOTE — PROGRESS NOTES
[facility] Medicine Progress Note  V 10.25      Date of Admission: 12/10/2024    Hospital Day: 3      Chief Admission Complaint:      Shortness of Breath (Cough, dyspnea and hemoptysis for \"a long time\". Increased fatigue over the last few days with headache. Patient states during her fall last night, she fell on right knee. /Reports frequent admissions for pneumonia. )    Subjective:      EEG pending. Pt reports feeling better overall, just very tired and worn out.     She does tell me she has adrenal insufficiency and is supposed to be on hydrocortisone TID. Ordered. Hold off on stress dosing for now and re-evaluate need for this tomorrow.    Presenting Admission History:       This is a 44-year-old female who presented to [facility] emergency department with complaints of shortness of breath, dyspnea on exertion, fevers, chills, sore throat, and congestion.  She also states she feels like she has chest congestion with some chest pain.  I did order an EKG while I was in the emergency department which was negative and her troponin was 12.   She states that she had recently been diagnosed with pneumonia in November of this year and completed antibiotic therapy however she cannot remember what antibiotic she took.  She began to feel poorly again about a week ago and now she is having productive cough with copious amounts of green-yellow sputum and some hemoptysis on and off.  She also tells me she feels very weak and had a fall overnight while trying to go to the bathroom.  She was able to get up and get herself back to bed but she is still feeling very weak and fatigued.  She tells me that her fever was as high as 102.5 yesterday.  She tells me that she is on oxygen at home for her COPD that she uses nightly, however she is unable to tell me the amount of oxygen that she uses.  She also tells me that she discontinued her oxygen at night because she is supposed to have a sleep study test.  She believes it is 
  Physician Progress Note      PATIENT:               FERNANDO RINCON  CSN #:                  [id number]  :                       1980  ADMIT DATE:       12/10/2024 10:10 AM  DISCH DATE:  RESPONDING  PROVIDER #:        JANET CONTRERAS          QUERY TEXT:    Pt admitted with pneumonia.  Noted documentation of \"adrenal insufficiency\" in   H/P Note past medical history.  If possible, please document in progress   notes and discharge summary the clinical indicators to support this diagnosis   on current admission or clarify current status of adrenal insufficiency.    The medical record reflects the following:    Risk Factors:  44-year-old female with PMHx Adrenal insufficiency, Asthma,   Chronic prescription benzodiazepine use, Chronic prescription opiate use,   COPD...Fibromyalgia, Hepatitis C, Herniated disc, Intervertebral disc disease,   Opiate overdose, Osteoarthritis, Polysubstance abuse, Sedative dependence    Clinical Indicators: H/P \"past medical history includes...adrenal   insufficiency...Increased fatigue\"  12/11 MAR documentation includes midodrine 5mg po administration @0802 and   1138      Treatment: supportive care, Midodrine 5mg po t.i.d  Options provided:  -- Adrenal insufficiency currently being treated/evaluated as evidenced by,   Please document current treatment/evaluation  -- Adrenal insufficiency is PMH only  -- Other - I will add my own diagnosis  -- Disagree - Not applicable / Not valid  -- Disagree - Clinically unable to determine / Unknown  -- Refer to Clinical Documentation Reviewer    PROVIDER RESPONSE TEXT:    Provider disagreed with this query.    Query created by: Lorna Sanchez on 2024 12:45 PM      Electronically signed by:  JANET CONTRERAS 2024 12:50 PM          
Anjelica Blanchard  Neurology Follow-up  [facility] Neurology    Date of Service: 12/12/2024    Subjective:   CC: Follow up today regarding: Bilateral upper & lower extremity paresthesia     Events noted. Chart and lab reviewed. Awaiting EEG.       ROS : A 10-12 system review obtained and updated today and is unremarkable except as mentioned  in my interval history.     Medication, past medical history, social history, and family history reviewed.    Objective:  Exam:   Constitutional:   Vitals:    12/12/24 1028 12/12/24 1030 12/12/24 1058 12/12/24 1108   BP:  128/87  (!) 147/91   Pulse:       Resp: 16  16 16   Temp:    97.7 °F (36.5 °C)   TempSrc:    Oral   SpO2:       Weight:       Height:         General appearance:  Normal development and appear in no acute distress.   Mental Status:   Oriented to person, place, problem, and time.    Memory: Good immediate recall.  Intact remote memory  Normal attention span and concentration.  Language: intact naming, repeating and fluency   Good fund of Knowledge.   Cranial Nerves:   II: Visual fields: Full. Pupils: equal, round, reactive to light, bilaterally  III,IV,VI: Extra Ocular Movements are intact. No nystagmus  V: Facial sensation is intact  VII: Facial strength and movements: intact and symmetric  IX: Normal palatal elevation and shoulder shrug  XII: Tongue movements are normal  Musculoskeletal: 5/5 in all 4 extremities. Possibly slight decreased strength RLE hip flexion.   Tone: Normal tone.   Reflexes: Not tested  Planters: Strong dorsi/plantarflexion bilaterally  Coordination: no pronator drift, no dysmetria with FNF.  Sensation: Endorses numbness/tingling of bilateral lower extremities. L leg extending up mid thigh, R leg up hip into back.            Data:  LABS:   Lab Results   Component Value Date/Time     12/12/2024 05:05 AM    K 4.2 12/12/2024 05:05 AM    CL 99 12/12/2024 05:05 AM    CO2 30 12/12/2024 05:05 AM    BUN 15 12/12/2024 05:05 AM    CREATININE 
IV removed. D/C instructions given. Pt verbalizes understanding and all questions answered. Medications delivered to beside. Pt wheeled to main entrance and assisted into vehicle by staff.     
Patient placed on overnight pulse ox on room air. SpO2 94%.  
Pt admitted from ED. Aox4, VSS, RA. C/o pain to lower back and right knee, awaiting med verification by pharmacy, ice applied to right knee. All orders reviewed with pt, pt agreeable to POC. Education on fall prevention and use of call light for all needs. Pt agreeable. Provided with snack and water, denies further needs at this time. Call light in reach, bed alarm on for safety.   
RW. Pt demonstrates impaired gait, appears antalgic and increased trunk sway. Pt would benefit from continued skilled PT to address current deficits. Recommend home with PRN assist upon d/c. Pt would also benefit from RW upon d/c to reduce risk of future falls and maximize independence.     Treatment Diagnosis: impaired functional mobility  Specific Instructions for Next Treatment: progress functional mobility as indicated  Therapy Prognosis: Good  Decision Making: Medium Complexity  Requires PT Follow-Up: Yes  Activity Tolerance  Activity Tolerance: Patient tolerated evaluation without incident    Plan  Physical Therapy Plan  General Plan: 2-3 times per week  Specific Instructions for Next Treatment: progress functional mobility as indicated  Current Treatment Recommendations: Strengthening, Balance training, Functional mobility training, Endurance training, Pain management, Transfer training, Neuromuscular re-education, Home exercise program, Safety education & training, Patient/Caregiver education & training, Therapeutic activities, Gait training, Stair training  Safety Devices  Type of Devices: All fall risk precautions in place, Gait belt, Nurse notified, Call light within reach, Left in chair    Restrictions  Restrictions/Precautions  Restrictions/Precautions: Fall Risk  Activity Level: Up as Tolerated     Subjective  General  Chart Reviewed: Yes  Patient assessed for rehabilitation services?: Yes  Response To Previous Treatment: Not applicable  Family/Caregiver Present: No  Referring Practitioner: Ramona Gutierrez APRN - CNP  Referral Date : 12/10/24  Diagnosis: Pneumonia  Follows Commands: Within Functional Limits  General Comment  Comments: RN cleared pt for PT eval  Subjective  Subjective: Pt up in bathroom upon arrival, agreeable to PT eval         Social/Functional History  Social/Functional History  Lives With: Alone  Type of Home: Apartment  Home Layout: One level  Home Access: Stairs to 
None  How much help is needed for putting on and taking off regular upper body clothing?: None  How much help is needed for taking care of personal grooming?: None  How much help for eating meals?: None  AM-PAC Inpatient Daily Activity Raw Score: 24  AM-PAC Inpatient ADL T-Scale Score : 57.54  ADL Inpatient CMS 0-100% Score: 0  ADL Inpatient CMS G-Code Modifier : CH    Goals  Short Term Goals  Time Frame for Short Term Goals: 1x  Short Term Goal 1: Perform functional transfers at Mod I/Ind level--goal met  Short Term Goal 2: Perform toileting at Ind level--goal met  Short Term Goal 3: Perform LE dressing at Ind level--goal met  Patient Goals   Patient goals : \"Go home\"    Therapy Time   Individual Concurrent Group Co-treatment   Time In 1005         Time Out 1038         Minutes 33         Timed Code Treatment Minutes: 23 Minutes (10 min eval)     Nicole Bryson OTR/L      
Medications (IV Amiodarone/Diltiazem, Tikosyn, etc)    [] Hemodialysis    [] Other -    [] Change in code status    [] Decision to escalate care    [] Major surgery/procedure with associated risk factors    --------------------------------------------------  C. Data (any 2)    [x] Data Review (any 3)    [x] Consultant notes from yesterday/today    [x] All available current labs reviewed interpreted for clinical significance    [x] Appropriate follow-up labs were ordered  [] Collateral history obtained     [x] Independent Interpretation of tests (any 1)    [x] Telemetry (Rhythm Strip) personally reviewed and interpreted        [] Imaging personally reviewed and interpreted     [x] Discussion (any 1)  [x] Multi-Disciplinary Rounds with Case Management  [] Discussed management of the case with           Labs:  Personally reviewed on 12/11/2024 and interpreted for clinical significance as documented above.     Recent Labs     12/10/24  1040 12/11/24  0610   WBC 10.3 7.1   HGB 11.5* 11.6*   HCT 35.8* 35.2*    268     Recent Labs     12/10/24  1040 12/11/24  0610    139   K 5.2* 5.0    100   CO2 26 29   BUN 15 14   CREATININE 0.9 0.8   CALCIUM 9.3 9.1   PHOS  --  4.3     Recent Labs     12/10/24  1040   TROPHS 12     No results for input(s): \"LABA1C\" in the last 72 hours.  Recent Labs     12/10/24  1040   AST 37   ALT 11   BILITOT <0.2   ALKPHOS 86     Recent Labs     12/10/24  1403 12/11/24  0610   LACTA 0.7 1.3       Urine Cultures:   Lab Results   Component Value Date/Time    LABURIN No growth at 18 to 36 hours 03/23/2024 02:30 PM     Blood Cultures:   Lab Results   Component Value Date/Time    BC No Growth after 4 days of incubation. 06/26/2024 09:43 PM     Lab Results   Component Value Date/Time    BLOODCULT2 No Growth after 4 days of incubation. 06/27/2024 02:36 AM     Organism:   Lab Results   Component Value Date/Time    ORG Annelise dubliniensis 11/02/2023 09:24 AM         Ramona Amaro

## 2024-12-13 NOTE — PLAN OF CARE
Problem: Pain  Goal: Verbalizes/displays adequate comfort level or baseline comfort level  12/13/2024 1053 by Jorge Joya, RN  Outcome: Progressing     Problem: Safety - Adult  Goal: Free from fall injury  12/13/2024 1053 by Jorge Joya, RN  Outcome: Progressing     Problem: Risk for Elopement  Goal: Patient will not exit the unit/facility without proper excort  12/13/2024 1053 by Jorge Joya, RN  Outcome: Progressing

## 2024-12-13 NOTE — CARE COORDINATION
CASE MANAGEMENT DISCHARGE SUMMARY      Discharge to: Home alone. [facility]    New Durable Medical Equipment ordered/agency: RW & Shower Chair through [employer] delivered to bedside.     Transportation:    Family/car: private;ride at bedside    Confirmed discharge plan with:     Patient: yes     Family:  family at bedside      Facility/Agency, name:  Confirmed w/[facility]      RN, name: Anand     Note: Discharging nurse to complete DANETTE, reconcile AVS, and place final copy with patient's discharge packet.

## 2024-12-13 NOTE — DISCHARGE INSTR - COC
Continuity of Care Form    Patient Name: Anjelica Blanchard   :  1980  MRN:  [id number]    Admit date:  12/10/2024  Discharge date:  24    Code Status Order: Full Code   Advance Directives:   Advance Care Flowsheet Documentation             Admitting Physician:  Ravi Davenport MD  PCP: Smith Neff MD    Discharging Nurse: ***  Discharging Hospital Unit/Room#: 0525/0525-01  Discharging Unit Phone Number: [phone]    Emergency Contact:   Extended Emergency Contact Information  Primary Emergency Contact: Modesto Blanchard   [facility]  Home Phone: [phone]  Work Phone: [phone]  Mobile Phone: [phone]  Relation: Parent   needed? No  Secondary Emergency Contact: Mally Blanchard  Home Phone: [phone]  Mobile Phone: [phone]  Relation: Brother/Sister    Past Surgical History:  Past Surgical History:   Procedure Laterality Date    BRONCHOSCOPY N/A 2023    BRONCHOSCOPY DIAGNOSTIC OR CELL WASH ONLY performed by Manisha Medina MD at [facility] ENDOSCOPY    BRONCHOSCOPY  2023    BRONCHOSCOPY ALVEOLAR LAVAGE performed by Manisha Medina MD at [facility] ENDOSCOPY    BRONCHOSCOPY  2023    BRONCHOSCOPY THERAPUTIC ASPIRATION INITIAL performed by Manisha Medina MD at [facility] ENDOSCOPY    KNEE SURGERY      LUMBAR FUSION N/A 2019    L5, S1 TRANSFORAMINAL LUMBAR INTERBODY FUSION performed by Saad Rojo MD at [facility] OR    SHOULDER SURGERY Left     SIGMOIDOSCOPY N/A 2020    FLEXIBLE SIGMOIDOSCOPY POSSIBLE COLONOSCOPY performed by Shon Nixon MD at [facility] ENDOSCOPY       Immunization History:   Immunization History   Administered Date(s) Administered    Influenza Virus Vaccine 10/16/2013, 2015, 2015, 2015    Influenza, FLUCELVAX, (age 6 mo+) IM, Trivalent PF, 0.5mL 10/08/2015    MMR, PRIORIX, M-M-R II, (age 12m+), SC, 0.5mL 1992    Pneumococcal, PPSV23, PNEUMOVAX 23, (age 2y+), SC/IM, 0.5mL 10/16/2013,

## 2024-12-13 NOTE — DISCHARGE SUMMARY
clots Additional signs and symptoms: no prev surgery, quit smoking 1.5 yrs ago FINDINGS: Pulmonary Arteries: Motion artifact degrades image quality.  There is no acute pulmonary thromboembolus. Mediastinum: The heart is unremarkable.  A mildly enlarged right hilar lymph node measures 1.5 x 1.7 cm. Lungs/pleura: The airway is patent.  There is no pneumothorax or pleural effusion.  There is bibasilar atelectasis. Patchy airspace disease is present throughout the right upper inner and lower lobes due to pneumonia. Upper Abdomen: Images of the upper abdomen are unremarkable. Soft Tissues/Bones: Degenerative changes involve the thoracic spine.     1. Right multifocal pneumonia.  Recommend CT of the chest in 3 months to confirm resolution. 2. Right hilar adenopathy, likely reactive.       Consults:     IP CONSULT TO PULMONOLOGY  IP CONSULT TO NEUROLOGY  IP CONSULT TO HOME CARE NEEDS    Labs:     Recent Labs     12/11/24  0610 12/12/24  0505 12/13/24  0510   WBC 7.1 5.9 7.1   HGB 11.6* 11.7* 12.2   HCT 35.2* 36.2 38.1    296 318     Recent Labs     12/11/24  0610 12/12/24  0505 12/13/24  0509    139 139   K 5.0 4.2 4.4    99 97*   CO2 29 30 31   BUN 14 15 17   CREATININE 0.8 0.8 0.9   CALCIUM 9.1 9.6 9.8   PHOS 4.3  --   --      No results for input(s): \"PROBNP\", \"TROPHS\" in the last 72 hours.  No results for input(s): \"LABA1C\" in the last 72 hours.  No results for input(s): \"AST\", \"ALT\", \"BILIDIR\", \"BILITOT\", \"ALKPHOS\" in the last 72 hours.  Recent Labs     12/11/24  0610   LACTA 1.3       Urine Cultures:   Lab Results   Component Value Date/Time    LABURIN No growth at 18 to 36 hours 03/23/2024 02:30 PM     Blood Cultures:   Lab Results   Component Value Date/Time    BC No Growth after 4 days of incubation. 06/26/2024 09:43 PM     Lab Results   Component Value Date/Time    BLOODCULT2 No Growth after 4 days of incubation. 06/27/2024 02:36 AM     Organism:   Lab Results   Component Value Date/Time    ORG

## 2024-12-13 NOTE — PLAN OF CARE
Problem: Chronic Conditions and Co-morbidities  Goal: Patient's chronic conditions and co-morbidity symptoms are monitored and maintained or improved  12/12/2024 2140 by Marlon Colby RN  Outcome: Progressing  12/12/2024 1340 by Jorge Joya RN  Outcome: Progressing     Problem: Discharge Planning  Goal: Discharge to home or other facility with appropriate resources  Outcome: Progressing     Problem: Pain  Goal: Verbalizes/displays adequate comfort level or baseline comfort level  12/12/2024 2140 by Marlon Colby RN  Outcome: Progressing  12/12/2024 1340 by Jorge Joya RN  Outcome: Progressing     Problem: Safety - Adult  Goal: Free from fall injury  12/12/2024 2140 by Marlon Colby RN  Outcome: Progressing  12/12/2024 1340 by Jorge Joya RN  Outcome: Progressing     Problem: ABCDS Injury Assessment  Goal: Absence of physical injury  Outcome: Progressing     Problem: Risk for Elopement  Goal: Patient will not exit the unit/facility without proper excort  Outcome: Progressing

## 2025-01-02 ENCOUNTER — TELEPHONE (OUTPATIENT)
Dept: PULMONOLOGY | Age: 45
End: 2025-01-02

## 2025-01-02 NOTE — TELEPHONE ENCOUNTER
I received a call today from Dr. Mishra (pts immunologist).  She would like for you to give her a call on her cell phone at [phone].  I informed her you were out of the office this week and she said next week would be fine.  She just wants to discuss a few things with you regarding Anjelica.

## 2025-01-07 RX ORDER — FLUTICASONE PROPIONATE 50 MCG
SPRAY, SUSPENSION (ML) NASAL
Qty: 1 EACH | Refills: 3 | Status: SHIPPED | OUTPATIENT
Start: 2025-01-07

## 2025-01-07 NOTE — TELEPHONE ENCOUNTER
Flonase refill requested.   Last seen: 9/12/24. NO show for last visit. Many cancellations.  Last ordered: 9/12/24 w/ 1 refill.   Order pended.

## 2025-01-19 ENCOUNTER — HOSPITAL ENCOUNTER (INPATIENT)
Age: 45
LOS: 3 days | Discharge: HOME | DRG: 145 | End: 2025-01-22
Attending: STUDENT IN AN ORGANIZED HEALTH CARE EDUCATION/TRAINING PROGRAM | Admitting: INTERNAL MEDICINE
Payer: COMMERCIAL

## 2025-01-19 ENCOUNTER — APPOINTMENT (OUTPATIENT)
Dept: CT IMAGING | Age: 45
DRG: 145 | End: 2025-01-19
Payer: COMMERCIAL

## 2025-01-19 ENCOUNTER — APPOINTMENT (OUTPATIENT)
Dept: GENERAL RADIOLOGY | Age: 45
DRG: 145 | End: 2025-01-19
Payer: COMMERCIAL

## 2025-01-19 DIAGNOSIS — R09.02 HYPOXEMIA: Primary | ICD-10-CM

## 2025-01-19 PROBLEM — J18.9 PNEUMONIA DUE TO INFECTIOUS AGENT: Status: ACTIVE | Noted: 2025-01-19

## 2025-01-19 LAB
ALBUMIN SERPL-MCNC: 3.8 G/DL (ref 3.4–5)
ALBUMIN/GLOB SERPL: 1.2 {RATIO} (ref 1.1–2.2)
ALP SERPL-CCNC: 79 U/L (ref 40–129)
ALT SERPL-CCNC: 15 U/L (ref 10–40)
ANION GAP SERPL CALCULATED.3IONS-SCNC: 16 MMOL/L (ref 3–16)
AST SERPL-CCNC: 34 U/L (ref 15–37)
BASE EXCESS BLDV CALC-SCNC: 0.5 MMOL/L (ref -3–3)
BASOPHILS # BLD: 0 K/UL (ref 0–0.2)
BASOPHILS NFR BLD: 0.5 %
BILIRUB SERPL-MCNC: <0.2 MG/DL (ref 0–1)
BUN SERPL-MCNC: 21 MG/DL (ref 7–20)
CALCIUM SERPL-MCNC: 8.9 MG/DL (ref 8.3–10.6)
CHLORIDE SERPL-SCNC: 98 MMOL/L (ref 99–110)
CO2 BLDV-SCNC: 26 MMOL/L
CO2 SERPL-SCNC: 27 MMOL/L (ref 21–32)
COHGB MFR BLDV: 2.4 % (ref 0–1.5)
CREAT SERPL-MCNC: 1.3 MG/DL (ref 0.6–1.1)
D-DIMER QUANTITATIVE: 0.63 UG/ML FEU (ref 0–0.6)
DEPRECATED RDW RBC AUTO: 16.4 % (ref 12.4–15.4)
EOSINOPHIL # BLD: 0.1 K/UL (ref 0–0.6)
EOSINOPHIL NFR BLD: 1.3 %
FLUAV RNA RESP QL NAA+PROBE: NOT DETECTED
FLUBV RNA RESP QL NAA+PROBE: NOT DETECTED
GFR SERPLBLD CREATININE-BSD FMLA CKD-EPI: 51 ML/MIN/{1.73_M2}
GLUCOSE SERPL-MCNC: 96 MG/DL (ref 70–99)
HCO3 BLDV-SCNC: 24.4 MMOL/L (ref 23–29)
HCT VFR BLD AUTO: 40.5 % (ref 36–48)
HGB BLD-MCNC: 13.4 G/DL (ref 12–16)
LACTATE BLDV-SCNC: 1.1 MMOL/L (ref 0.4–1.9)
LACTATE BLDV-SCNC: 3.3 MMOL/L (ref 0.4–1.9)
LYMPHOCYTES # BLD: 1.3 K/UL (ref 1–5.1)
LYMPHOCYTES NFR BLD: 17.5 %
MCH RBC QN AUTO: 28.6 PG (ref 26–34)
MCHC RBC AUTO-ENTMCNC: 33.1 G/DL (ref 31–36)
MCV RBC AUTO: 86.4 FL (ref 80–100)
METHGB MFR BLDV: 0.3 %
MONOCYTES # BLD: 0.6 K/UL (ref 0–1.3)
MONOCYTES NFR BLD: 8.2 %
NEUTROPHILS # BLD: 5.4 K/UL (ref 1.7–7.7)
NEUTROPHILS NFR BLD: 72.5 %
NT-PROBNP SERPL-MCNC: 71 PG/ML (ref 0–124)
O2 CT VFR BLDV CALC: 18 VOL %
O2 THERAPY: ABNORMAL
PCO2 BLDV: 37.1 MMHG (ref 40–50)
PH BLDV: 7.44 [PH] (ref 7.35–7.45)
PLATELET # BLD AUTO: 248 K/UL (ref 135–450)
PMV BLD AUTO: 8.1 FL (ref 5–10.5)
PO2 BLDV: 64.9 MMHG (ref 25–40)
POTASSIUM SERPL-SCNC: 4.3 MMOL/L (ref 3.5–5.1)
PROCALCITONIN SERPL IA-MCNC: <0.02 NG/ML (ref 0–0.15)
PROT SERPL-MCNC: 6.9 G/DL (ref 6.4–8.2)
RBC # BLD AUTO: 4.69 M/UL (ref 4–5.2)
SAO2 % BLDV: 94 %
SARS-COV-2 RNA RESP QL NAA+PROBE: NOT DETECTED
SODIUM SERPL-SCNC: 141 MMOL/L (ref 136–145)
TROPONIN, HIGH SENSITIVITY: 9 NG/L (ref 0–14)
TROPONIN, HIGH SENSITIVITY: 9 NG/L (ref 0–14)
WBC # BLD AUTO: 7.4 K/UL (ref 4–11)

## 2025-01-19 PROCEDURE — 2580000003 HC RX 258

## 2025-01-19 PROCEDURE — 96375 TX/PRO/DX INJ NEW DRUG ADDON: CPT

## 2025-01-19 PROCEDURE — 6370000000 HC RX 637 (ALT 250 FOR IP)

## 2025-01-19 PROCEDURE — 94640 AIRWAY INHALATION TREATMENT: CPT

## 2025-01-19 PROCEDURE — 36415 COLL VENOUS BLD VENIPUNCTURE: CPT

## 2025-01-19 PROCEDURE — 87636 SARSCOV2 & INF A&B AMP PRB: CPT

## 2025-01-19 PROCEDURE — 84145 PROCALCITONIN (PCT): CPT

## 2025-01-19 PROCEDURE — 84484 ASSAY OF TROPONIN QUANT: CPT

## 2025-01-19 PROCEDURE — 83605 ASSAY OF LACTIC ACID: CPT

## 2025-01-19 PROCEDURE — 93005 ELECTROCARDIOGRAM TRACING: CPT | Performed by: REGISTERED NURSE

## 2025-01-19 PROCEDURE — 2580000003 HC RX 258: Performed by: REGISTERED NURSE

## 2025-01-19 PROCEDURE — 6360000002 HC RX W HCPCS

## 2025-01-19 PROCEDURE — 85025 COMPLETE CBC W/AUTO DIFF WBC: CPT

## 2025-01-19 PROCEDURE — 6370000000 HC RX 637 (ALT 250 FOR IP): Performed by: STUDENT IN AN ORGANIZED HEALTH CARE EDUCATION/TRAINING PROGRAM

## 2025-01-19 PROCEDURE — 2500000003 HC RX 250 WO HCPCS

## 2025-01-19 PROCEDURE — 2700000000 HC OXYGEN THERAPY PER DAY

## 2025-01-19 PROCEDURE — 99285 EMERGENCY DEPT VISIT HI MDM: CPT

## 2025-01-19 PROCEDURE — 94761 N-INVAS EAR/PLS OXIMETRY MLT: CPT

## 2025-01-19 PROCEDURE — 82803 BLOOD GASES ANY COMBINATION: CPT

## 2025-01-19 PROCEDURE — 71046 X-RAY EXAM CHEST 2 VIEWS: CPT

## 2025-01-19 PROCEDURE — 6360000002 HC RX W HCPCS: Performed by: REGISTERED NURSE

## 2025-01-19 PROCEDURE — 87040 BLOOD CULTURE FOR BACTERIA: CPT

## 2025-01-19 PROCEDURE — 85379 FIBRIN DEGRADATION QUANT: CPT

## 2025-01-19 PROCEDURE — 80053 COMPREHEN METABOLIC PANEL: CPT

## 2025-01-19 PROCEDURE — 1200000000 HC SEMI PRIVATE

## 2025-01-19 PROCEDURE — 83880 ASSAY OF NATRIURETIC PEPTIDE: CPT

## 2025-01-19 PROCEDURE — 96365 THER/PROPH/DIAG IV INF INIT: CPT

## 2025-01-19 PROCEDURE — 71250 CT THORAX DX C-: CPT

## 2025-01-19 RX ORDER — IPRATROPIUM BROMIDE AND ALBUTEROL SULFATE 2.5; .5 MG/3ML; MG/3ML
1 SOLUTION RESPIRATORY (INHALATION)
Status: DISCONTINUED | OUTPATIENT
Start: 2025-01-19 | End: 2025-01-19

## 2025-01-19 RX ORDER — OXYCODONE AND ACETAMINOPHEN 5; 325 MG/1; MG/1
1 TABLET ORAL EVERY 8 HOURS PRN
Status: DISCONTINUED | OUTPATIENT
Start: 2025-01-19 | End: 2025-01-22 | Stop reason: HOSPADM

## 2025-01-19 RX ORDER — SODIUM CHLORIDE 9 MG/ML
INJECTION, SOLUTION INTRAVENOUS CONTINUOUS
Status: DISCONTINUED | OUTPATIENT
Start: 2025-01-19 | End: 2025-01-19

## 2025-01-19 RX ORDER — HYDROCORTISONE 10 MG/1
5 TABLET ORAL 3 TIMES DAILY
Status: DISCONTINUED | OUTPATIENT
Start: 2025-01-19 | End: 2025-01-20 | Stop reason: SDUPTHER

## 2025-01-19 RX ORDER — IPRATROPIUM BROMIDE AND ALBUTEROL SULFATE 2.5; .5 MG/3ML; MG/3ML
1 SOLUTION RESPIRATORY (INHALATION)
Status: DISCONTINUED | OUTPATIENT
Start: 2025-01-20 | End: 2025-01-20

## 2025-01-19 RX ORDER — ENOXAPARIN SODIUM 100 MG/ML
30 INJECTION SUBCUTANEOUS 2 TIMES DAILY
Status: DISCONTINUED | OUTPATIENT
Start: 2025-01-19 | End: 2025-01-22 | Stop reason: HOSPADM

## 2025-01-19 RX ORDER — METHADONE HYDROCHLORIDE 10 MG/1
140 TABLET ORAL DAILY
Status: DISCONTINUED | OUTPATIENT
Start: 2025-01-20 | End: 2025-01-22 | Stop reason: HOSPADM

## 2025-01-19 RX ORDER — FUROSEMIDE 40 MG/1
40 TABLET ORAL DAILY
Status: DISCONTINUED | OUTPATIENT
Start: 2025-01-19 | End: 2025-01-21

## 2025-01-19 RX ORDER — ONDANSETRON 2 MG/ML
4 INJECTION INTRAMUSCULAR; INTRAVENOUS EVERY 6 HOURS PRN
Status: DISCONTINUED | OUTPATIENT
Start: 2025-01-19 | End: 2025-01-22 | Stop reason: HOSPADM

## 2025-01-19 RX ORDER — ACETAMINOPHEN 650 MG/1
650 SUPPOSITORY RECTAL EVERY 6 HOURS PRN
Status: DISCONTINUED | OUTPATIENT
Start: 2025-01-19 | End: 2025-01-22 | Stop reason: HOSPADM

## 2025-01-19 RX ORDER — POLYETHYLENE GLYCOL 3350 17 G/17G
17 POWDER, FOR SOLUTION ORAL DAILY PRN
Status: DISCONTINUED | OUTPATIENT
Start: 2025-01-19 | End: 2025-01-22 | Stop reason: HOSPADM

## 2025-01-19 RX ORDER — MIDODRINE HYDROCHLORIDE 5 MG/1
5 TABLET ORAL 3 TIMES DAILY
Status: DISCONTINUED | OUTPATIENT
Start: 2025-01-19 | End: 2025-01-22 | Stop reason: HOSPADM

## 2025-01-19 RX ORDER — DOCUSATE SODIUM 100 MG/1
100 CAPSULE, LIQUID FILLED ORAL 2 TIMES DAILY
Status: DISCONTINUED | OUTPATIENT
Start: 2025-01-19 | End: 2025-01-22 | Stop reason: HOSPADM

## 2025-01-19 RX ORDER — DIVALPROEX SODIUM 500 MG/1
1000 TABLET, FILM COATED, EXTENDED RELEASE ORAL ONCE
Status: COMPLETED | OUTPATIENT
Start: 2025-01-20 | End: 2025-01-20

## 2025-01-19 RX ORDER — BENZONATATE 100 MG/1
100 CAPSULE ORAL 3 TIMES DAILY PRN
Status: DISCONTINUED | OUTPATIENT
Start: 2025-01-19 | End: 2025-01-22 | Stop reason: HOSPADM

## 2025-01-19 RX ORDER — VITAMIN B COMPLEX
1000 TABLET ORAL DAILY
Status: DISCONTINUED | OUTPATIENT
Start: 2025-01-19 | End: 2025-01-22 | Stop reason: HOSPADM

## 2025-01-19 RX ORDER — POTASSIUM CHLORIDE 750 MG/1
10 TABLET, EXTENDED RELEASE ORAL DAILY
Status: DISCONTINUED | OUTPATIENT
Start: 2025-01-20 | End: 2025-01-22 | Stop reason: HOSPADM

## 2025-01-19 RX ORDER — SODIUM CHLORIDE 9 MG/ML
INJECTION, SOLUTION INTRAVENOUS PRN
Status: DISCONTINUED | OUTPATIENT
Start: 2025-01-19 | End: 2025-01-22 | Stop reason: HOSPADM

## 2025-01-19 RX ORDER — GUAIFENESIN 600 MG/1
600 TABLET, EXTENDED RELEASE ORAL 2 TIMES DAILY
Status: DISCONTINUED | OUTPATIENT
Start: 2025-01-19 | End: 2025-01-22 | Stop reason: HOSPADM

## 2025-01-19 RX ORDER — ASCORBIC ACID 500 MG
500 TABLET ORAL DAILY
Status: DISCONTINUED | OUTPATIENT
Start: 2025-01-19 | End: 2025-01-22 | Stop reason: HOSPADM

## 2025-01-19 RX ORDER — SODIUM CHLORIDE 9 MG/ML
INJECTION, SOLUTION INTRAVENOUS CONTINUOUS
Status: ACTIVE | OUTPATIENT
Start: 2025-01-19 | End: 2025-01-20

## 2025-01-19 RX ORDER — IPRATROPIUM BROMIDE AND ALBUTEROL SULFATE 2.5; .5 MG/3ML; MG/3ML
1 SOLUTION RESPIRATORY (INHALATION) EVERY 4 HOURS PRN
Status: DISCONTINUED | OUTPATIENT
Start: 2025-01-19 | End: 2025-01-22 | Stop reason: HOSPADM

## 2025-01-19 RX ORDER — VANCOMYCIN 2 G/400ML
2000 INJECTION, SOLUTION INTRAVENOUS ONCE
Status: COMPLETED | OUTPATIENT
Start: 2025-01-19 | End: 2025-01-19

## 2025-01-19 RX ORDER — SODIUM CHLORIDE 0.9 % (FLUSH) 0.9 %
5-40 SYRINGE (ML) INJECTION EVERY 12 HOURS SCHEDULED
Status: DISCONTINUED | OUTPATIENT
Start: 2025-01-19 | End: 2025-01-22 | Stop reason: HOSPADM

## 2025-01-19 RX ORDER — ACETAMINOPHEN 325 MG/1
650 TABLET ORAL EVERY 6 HOURS PRN
Status: DISCONTINUED | OUTPATIENT
Start: 2025-01-19 | End: 2025-01-22 | Stop reason: HOSPADM

## 2025-01-19 RX ORDER — FLUTICASONE PROPIONATE 50 MCG
1 SPRAY, SUSPENSION (ML) NASAL DAILY
Status: DISCONTINUED | OUTPATIENT
Start: 2025-01-20 | End: 2025-01-22 | Stop reason: HOSPADM

## 2025-01-19 RX ORDER — ALPRAZOLAM 1 MG/1
1 TABLET ORAL 3 TIMES DAILY PRN
Status: DISCONTINUED | OUTPATIENT
Start: 2025-01-19 | End: 2025-01-22 | Stop reason: HOSPADM

## 2025-01-19 RX ORDER — QUETIAPINE 150 MG/1
300 TABLET, FILM COATED, EXTENDED RELEASE ORAL NIGHTLY
Status: DISCONTINUED | OUTPATIENT
Start: 2025-01-19 | End: 2025-01-20

## 2025-01-19 RX ORDER — BUDESONIDE AND FORMOTEROL FUMARATE DIHYDRATE 160; 4.5 UG/1; UG/1
2 AEROSOL RESPIRATORY (INHALATION)
Status: DISCONTINUED | OUTPATIENT
Start: 2025-01-19 | End: 2025-01-22 | Stop reason: HOSPADM

## 2025-01-19 RX ORDER — ONDANSETRON 4 MG/1
4 TABLET, ORALLY DISINTEGRATING ORAL EVERY 8 HOURS PRN
Status: DISCONTINUED | OUTPATIENT
Start: 2025-01-19 | End: 2025-01-22 | Stop reason: HOSPADM

## 2025-01-19 RX ORDER — PANTOPRAZOLE SODIUM 40 MG/1
40 TABLET, DELAYED RELEASE ORAL
Status: DISCONTINUED | OUTPATIENT
Start: 2025-01-20 | End: 2025-01-22 | Stop reason: HOSPADM

## 2025-01-19 RX ORDER — ZOLPIDEM TARTRATE 5 MG/1
10 TABLET ORAL
Status: DISCONTINUED | OUTPATIENT
Start: 2025-01-19 | End: 2025-01-22 | Stop reason: HOSPADM

## 2025-01-19 RX ORDER — SODIUM CHLORIDE 0.9 % (FLUSH) 0.9 %
5-40 SYRINGE (ML) INJECTION PRN
Status: DISCONTINUED | OUTPATIENT
Start: 2025-01-19 | End: 2025-01-22 | Stop reason: HOSPADM

## 2025-01-19 RX ORDER — VANCOMYCIN 1.75 G/350ML
1250 INJECTION, SOLUTION INTRAVENOUS EVERY 24 HOURS
Status: DISCONTINUED | OUTPATIENT
Start: 2025-01-20 | End: 2025-01-19

## 2025-01-19 RX ADMIN — IPRATROPIUM BROMIDE AND ALBUTEROL SULFATE 1 DOSE: 2.5; .5 SOLUTION RESPIRATORY (INHALATION) at 20:46

## 2025-01-19 RX ADMIN — VANCOMYCIN 2000 MG: 2 INJECTION, SOLUTION INTRAVENOUS at 18:22

## 2025-01-19 RX ADMIN — IPRATROPIUM BROMIDE AND ALBUTEROL SULFATE 1 DOSE: 2.5; .5 SOLUTION RESPIRATORY (INHALATION) at 23:18

## 2025-01-19 RX ADMIN — METHYLPREDNISOLONE SODIUM SUCCINATE 40 MG: 40 INJECTION INTRAMUSCULAR; INTRAVENOUS at 20:46

## 2025-01-19 RX ADMIN — SODIUM CHLORIDE: 9 INJECTION, SOLUTION INTRAVENOUS at 20:49

## 2025-01-19 RX ADMIN — BUDESONIDE AND FORMOTEROL FUMARATE DIHYDRATE 2 PUFF: 160; 4.5 AEROSOL RESPIRATORY (INHALATION) at 23:19

## 2025-01-19 RX ADMIN — CEFEPIME 2000 MG: 2 INJECTION, POWDER, FOR SOLUTION INTRAVENOUS at 15:56

## 2025-01-19 RX ADMIN — AZITHROMYCIN MONOHYDRATE 500 MG: 500 INJECTION, POWDER, LYOPHILIZED, FOR SOLUTION INTRAVENOUS at 16:45

## 2025-01-19 ASSESSMENT — PAIN SCALES - GENERAL: PAINLEVEL_OUTOF10: 7

## 2025-01-19 ASSESSMENT — PAIN - FUNCTIONAL ASSESSMENT: PAIN_FUNCTIONAL_ASSESSMENT: 0-10

## 2025-01-19 NOTE — PLAN OF CARE
2W admit    Possible HAP  COPD   On 2L here, not sure baseline  CXR--> Nonspecific pulmonary infiltrates are commonly seen with atypical/viral  Pneumonia  Duonebs and solumedrol   Sputum cultures, strep and legionella pending     COLLEEN with Cr at 1.3 BL 0.9   Elevated lactic OA  IVF     [place] PA-C 1/19/2025 7:01 PM

## 2025-01-19 NOTE — ED NOTES
Writer took pt for a walk around the ED from [facility] through [facility] and back. Pt was walking at a brisk pace at the start of the walk with an oxygen saturation of 86%. The highest her oxygen saturation got was 89% and lowest at 82%. Two-thirds of the way back to her room, pt slowed her pace tremendously and became noticeably dizzy, pale in color, and grabbing the caputo rails for stability. Pt was returned to her room and resting with an oxygen saturation of 90%.

## 2025-01-20 ENCOUNTER — TELEPHONE (OUTPATIENT)
Dept: PULMONOLOGY | Age: 45
End: 2025-01-20

## 2025-01-20 PROBLEM — R06.02 SHORTNESS OF BREATH: Status: ACTIVE | Noted: 2025-01-20

## 2025-01-20 PROBLEM — E66.01 OBESITY, MORBID: Status: ACTIVE | Noted: 2024-01-04

## 2025-01-20 PROBLEM — F31.9 BIPOLAR DISORDER, UNSPECIFIED (HCC): Status: ACTIVE | Noted: 2024-05-15

## 2025-01-20 LAB
ANION GAP SERPL CALCULATED.3IONS-SCNC: 14 MMOL/L (ref 3–16)
BASOPHILS # BLD: 0 K/UL (ref 0–0.2)
BASOPHILS NFR BLD: 0.1 %
BUN SERPL-MCNC: 18 MG/DL (ref 7–20)
CALCIUM SERPL-MCNC: 7.9 MG/DL (ref 8.3–10.6)
CHLORIDE SERPL-SCNC: 101 MMOL/L (ref 99–110)
CO2 SERPL-SCNC: 23 MMOL/L (ref 21–32)
CREAT SERPL-MCNC: 0.9 MG/DL (ref 0.6–1.1)
DEPRECATED RDW RBC AUTO: 16.5 % (ref 12.4–15.4)
EKG ATRIAL RATE: 87 BPM
EKG DIAGNOSIS: NORMAL
EKG P AXIS: 46 DEGREES
EKG P-R INTERVAL: 136 MS
EKG Q-T INTERVAL: 366 MS
EKG QRS DURATION: 84 MS
EKG QTC CALCULATION (BAZETT): 440 MS
EKG R AXIS: 1 DEGREES
EKG T AXIS: 42 DEGREES
EKG VENTRICULAR RATE: 87 BPM
EOSINOPHIL # BLD: 0 K/UL (ref 0–0.6)
EOSINOPHIL NFR BLD: 0 %
GFR SERPLBLD CREATININE-BSD FMLA CKD-EPI: 79 ML/MIN/{1.73_M2}
GLUCOSE SERPL-MCNC: 154 MG/DL (ref 70–99)
HCT VFR BLD AUTO: 34.8 % (ref 36–48)
HGB BLD-MCNC: 11.5 G/DL (ref 12–16)
LYMPHOCYTES # BLD: 0.3 K/UL (ref 1–5.1)
LYMPHOCYTES NFR BLD: 5 %
MCH RBC QN AUTO: 28.8 PG (ref 26–34)
MCHC RBC AUTO-ENTMCNC: 33 G/DL (ref 31–36)
MCV RBC AUTO: 87.4 FL (ref 80–100)
MONOCYTES # BLD: 0.2 K/UL (ref 0–1.3)
MONOCYTES NFR BLD: 3.4 %
NEUTROPHILS # BLD: 6.1 K/UL (ref 1.7–7.7)
NEUTROPHILS NFR BLD: 91.5 %
PLATELET # BLD AUTO: 208 K/UL (ref 135–450)
PMV BLD AUTO: 8.6 FL (ref 5–10.5)
POTASSIUM SERPL-SCNC: 4.2 MMOL/L (ref 3.5–5.1)
RBC # BLD AUTO: 3.98 M/UL (ref 4–5.2)
SODIUM SERPL-SCNC: 138 MMOL/L (ref 136–145)
TSH SERPL DL<=0.005 MIU/L-ACNC: 0.34 UIU/ML (ref 0.27–4.2)
WBC # BLD AUTO: 6.6 K/UL (ref 4–11)

## 2025-01-20 PROCEDURE — 6370000000 HC RX 637 (ALT 250 FOR IP): Performed by: STUDENT IN AN ORGANIZED HEALTH CARE EDUCATION/TRAINING PROGRAM

## 2025-01-20 PROCEDURE — 6370000000 HC RX 637 (ALT 250 FOR IP): Performed by: INTERNAL MEDICINE

## 2025-01-20 PROCEDURE — 6370000000 HC RX 637 (ALT 250 FOR IP)

## 2025-01-20 PROCEDURE — 6360000002 HC RX W HCPCS: Performed by: STUDENT IN AN ORGANIZED HEALTH CARE EDUCATION/TRAINING PROGRAM

## 2025-01-20 PROCEDURE — 94761 N-INVAS EAR/PLS OXIMETRY MLT: CPT

## 2025-01-20 PROCEDURE — 6360000002 HC RX W HCPCS

## 2025-01-20 PROCEDURE — 2700000000 HC OXYGEN THERAPY PER DAY

## 2025-01-20 PROCEDURE — 84443 ASSAY THYROID STIM HORMONE: CPT

## 2025-01-20 PROCEDURE — 99232 SBSQ HOSP IP/OBS MODERATE 35: CPT | Performed by: NURSE PRACTITIONER

## 2025-01-20 PROCEDURE — 94640 AIRWAY INHALATION TREATMENT: CPT

## 2025-01-20 PROCEDURE — 6360000002 HC RX W HCPCS: Performed by: INTERNAL MEDICINE

## 2025-01-20 PROCEDURE — 2500000003 HC RX 250 WO HCPCS

## 2025-01-20 PROCEDURE — 93010 ELECTROCARDIOGRAM REPORT: CPT | Performed by: INTERNAL MEDICINE

## 2025-01-20 PROCEDURE — 6370000000 HC RX 637 (ALT 250 FOR IP): Performed by: NURSE PRACTITIONER

## 2025-01-20 PROCEDURE — 99223 1ST HOSP IP/OBS HIGH 75: CPT | Performed by: INTERNAL MEDICINE

## 2025-01-20 PROCEDURE — 0202U NFCT DS 22 TRGT SARS-COV-2: CPT

## 2025-01-20 PROCEDURE — 1200000000 HC SEMI PRIVATE

## 2025-01-20 PROCEDURE — 80048 BASIC METABOLIC PNL TOTAL CA: CPT

## 2025-01-20 PROCEDURE — 36415 COLL VENOUS BLD VENIPUNCTURE: CPT

## 2025-01-20 PROCEDURE — 2580000003 HC RX 258: Performed by: STUDENT IN AN ORGANIZED HEALTH CARE EDUCATION/TRAINING PROGRAM

## 2025-01-20 PROCEDURE — 85025 COMPLETE CBC W/AUTO DIFF WBC: CPT

## 2025-01-20 RX ORDER — BUDESONIDE 0.5 MG/2ML
0.5 INHALANT ORAL
Status: DISCONTINUED | OUTPATIENT
Start: 2025-01-20 | End: 2025-01-22 | Stop reason: HOSPADM

## 2025-01-20 RX ORDER — HYDROCORTISONE 10 MG/1
10 TABLET ORAL EVERY MORNING
Status: DISCONTINUED | OUTPATIENT
Start: 2025-01-20 | End: 2025-01-22 | Stop reason: HOSPADM

## 2025-01-20 RX ORDER — IPRATROPIUM BROMIDE AND ALBUTEROL SULFATE 2.5; .5 MG/3ML; MG/3ML
1 SOLUTION RESPIRATORY (INHALATION)
Status: DISCONTINUED | OUTPATIENT
Start: 2025-01-20 | End: 2025-01-20

## 2025-01-20 RX ORDER — DIVALPROEX SODIUM 500 MG/1
1000 TABLET, FILM COATED, EXTENDED RELEASE ORAL NIGHTLY
Status: DISCONTINUED | OUTPATIENT
Start: 2025-01-20 | End: 2025-01-22 | Stop reason: HOSPADM

## 2025-01-20 RX ORDER — FUROSEMIDE 20 MG/1
20 TABLET ORAL DAILY
COMMUNITY

## 2025-01-20 RX ORDER — ALBUTEROL SULFATE AND BUDESONIDE 90; 80 UG/1; UG/1
2 AEROSOL, METERED RESPIRATORY (INHALATION) EVERY 6 HOURS PRN
COMMUNITY

## 2025-01-20 RX ORDER — DOXYCYCLINE HYCLATE 100 MG
100 TABLET ORAL EVERY 12 HOURS SCHEDULED
Status: DISCONTINUED | OUTPATIENT
Start: 2025-01-20 | End: 2025-01-22 | Stop reason: HOSPADM

## 2025-01-20 RX ORDER — HYDROCORTISONE 10 MG/1
5 TABLET ORAL DAILY
Status: DISCONTINUED | OUTPATIENT
Start: 2025-01-20 | End: 2025-01-22 | Stop reason: HOSPADM

## 2025-01-20 RX ORDER — DIVALPROEX SODIUM 500 MG/1
500 TABLET, FILM COATED, EXTENDED RELEASE ORAL EVERY MORNING
Status: DISCONTINUED | OUTPATIENT
Start: 2025-01-20 | End: 2025-01-22 | Stop reason: HOSPADM

## 2025-01-20 RX ORDER — LEVOTHYROXINE SODIUM 75 UG/1
75 TABLET ORAL
COMMUNITY

## 2025-01-20 RX ORDER — QUETIAPINE FUMARATE 50 MG/1
150 TABLET, FILM COATED ORAL NIGHTLY
COMMUNITY

## 2025-01-20 RX ORDER — BUDESONIDE 0.5 MG/2ML
0.5 INHALANT ORAL
Status: DISCONTINUED | OUTPATIENT
Start: 2025-01-20 | End: 2025-01-20

## 2025-01-20 RX ORDER — DIVALPROEX SODIUM 500 MG/1
500-1000 TABLET, FILM COATED, EXTENDED RELEASE ORAL SEE ADMIN INSTRUCTIONS
COMMUNITY

## 2025-01-20 RX ORDER — POLYETHYLENE GLYCOL 3350 17 G
2 POWDER IN PACKET (EA) ORAL
Status: DISCONTINUED | OUTPATIENT
Start: 2025-01-20 | End: 2025-01-22 | Stop reason: HOSPADM

## 2025-01-20 RX ADMIN — BUDESONIDE 500 MCG: 0.5 INHALANT RESPIRATORY (INHALATION) at 19:16

## 2025-01-20 RX ADMIN — GUAIFENESIN 600 MG: 600 TABLET, EXTENDED RELEASE ORAL at 19:39

## 2025-01-20 RX ADMIN — Medication 1000 UNITS: at 09:18

## 2025-01-20 RX ADMIN — HYDROCORTISONE 5 MG: 10 TABLET ORAL at 15:30

## 2025-01-20 RX ADMIN — LEVOTHYROXINE SODIUM 75 MCG: 0.05 TABLET ORAL at 06:33

## 2025-01-20 RX ADMIN — ZOLPIDEM TARTRATE 10 MG: 5 TABLET, FILM COATED ORAL at 19:40

## 2025-01-20 RX ADMIN — QUETIAPINE FUMARATE 150 MG: 25 TABLET ORAL at 19:40

## 2025-01-20 RX ADMIN — OXYCODONE HYDROCHLORIDE AND ACETAMINOPHEN 1 TABLET: 5; 325 TABLET ORAL at 09:18

## 2025-01-20 RX ADMIN — DOCUSATE SODIUM 100 MG: 100 CAPSULE, LIQUID FILLED ORAL at 09:19

## 2025-01-20 RX ADMIN — ENOXAPARIN SODIUM 30 MG: 100 INJECTION SUBCUTANEOUS at 00:06

## 2025-01-20 RX ADMIN — MIDODRINE HYDROCHLORIDE 5 MG: 5 TABLET ORAL at 00:07

## 2025-01-20 RX ADMIN — ENOXAPARIN SODIUM 30 MG: 100 INJECTION SUBCUTANEOUS at 19:39

## 2025-01-20 RX ADMIN — OXYCODONE HYDROCHLORIDE AND ACETAMINOPHEN 500 MG: 500 TABLET ORAL at 09:19

## 2025-01-20 RX ADMIN — MIDODRINE HYDROCHLORIDE 5 MG: 5 TABLET ORAL at 19:40

## 2025-01-20 RX ADMIN — HYDROCORTISONE 10 MG: 10 TABLET ORAL at 09:19

## 2025-01-20 RX ADMIN — ZOLPIDEM TARTRATE 10 MG: 5 TABLET, FILM COATED ORAL at 00:08

## 2025-01-20 RX ADMIN — OXYCODONE HYDROCHLORIDE AND ACETAMINOPHEN 1 TABLET: 5; 325 TABLET ORAL at 18:05

## 2025-01-20 RX ADMIN — MIDODRINE HYDROCHLORIDE 5 MG: 5 TABLET ORAL at 09:20

## 2025-01-20 RX ADMIN — DOXYCYCLINE HYCLATE 100 MG: 100 TABLET, COATED ORAL at 19:40

## 2025-01-20 RX ADMIN — CEFTRIAXONE SODIUM 1000 MG: 1 INJECTION, POWDER, FOR SOLUTION INTRAMUSCULAR; INTRAVENOUS at 06:30

## 2025-01-20 RX ADMIN — DIVALPROEX SODIUM 1000 MG: 500 TABLET, FILM COATED, EXTENDED RELEASE ORAL at 19:40

## 2025-01-20 RX ADMIN — DIVALPROEX SODIUM 500 MG: 500 TABLET, FILM COATED, EXTENDED RELEASE ORAL at 09:19

## 2025-01-20 RX ADMIN — BUDESONIDE AND FORMOTEROL FUMARATE DIHYDRATE 2 PUFF: 160; 4.5 AEROSOL RESPIRATORY (INHALATION) at 08:14

## 2025-01-20 RX ADMIN — PANTOPRAZOLE SODIUM 40 MG: 40 TABLET, DELAYED RELEASE ORAL at 06:34

## 2025-01-20 RX ADMIN — DIVALPROEX SODIUM 1000 MG: 500 TABLET, EXTENDED RELEASE ORAL at 00:15

## 2025-01-20 RX ADMIN — IPRATROPIUM BROMIDE AND ALBUTEROL SULFATE 1 DOSE: 2.5; .5 SOLUTION RESPIRATORY (INHALATION) at 08:14

## 2025-01-20 RX ADMIN — METHADONE HYDROCHLORIDE 140 MG: 10 TABLET ORAL at 09:17

## 2025-01-20 RX ADMIN — DOCUSATE SODIUM 100 MG: 100 CAPSULE, LIQUID FILLED ORAL at 19:39

## 2025-01-20 RX ADMIN — ALPRAZOLAM 1 MG: 1 TABLET ORAL at 09:19

## 2025-01-20 RX ADMIN — SODIUM CHLORIDE, PRESERVATIVE FREE 10 ML: 5 INJECTION INTRAVENOUS at 19:40

## 2025-01-20 RX ADMIN — LEVOMILNACIPRAN HYDROCHLORIDE 40 MG: 20 CAPSULE, EXTENDED RELEASE ORAL at 09:20

## 2025-01-20 RX ADMIN — GUAIFENESIN 600 MG: 600 TABLET, EXTENDED RELEASE ORAL at 09:19

## 2025-01-20 RX ADMIN — QUETIAPINE FUMARATE 150 MG: 25 TABLET ORAL at 00:15

## 2025-01-20 RX ADMIN — MIDODRINE HYDROCHLORIDE 5 MG: 5 TABLET ORAL at 15:30

## 2025-01-20 RX ADMIN — OXYCODONE HYDROCHLORIDE AND ACETAMINOPHEN 1 TABLET: 5; 325 TABLET ORAL at 00:08

## 2025-01-20 RX ADMIN — DOCUSATE SODIUM 100 MG: 100 CAPSULE, LIQUID FILLED ORAL at 00:05

## 2025-01-20 RX ADMIN — GUAIFENESIN 600 MG: 600 TABLET, EXTENDED RELEASE ORAL at 00:06

## 2025-01-20 RX ADMIN — ALPRAZOLAM 1 MG: 1 TABLET ORAL at 15:30

## 2025-01-20 RX ADMIN — ENOXAPARIN SODIUM 30 MG: 100 INJECTION SUBCUTANEOUS at 09:21

## 2025-01-20 RX ADMIN — POTASSIUM CHLORIDE 10 MEQ: 750 TABLET, EXTENDED RELEASE ORAL at 09:20

## 2025-01-20 RX ADMIN — PREGABALIN 150 MG: 100 CAPSULE ORAL at 00:07

## 2025-01-20 RX ADMIN — PREGABALIN 150 MG: 100 CAPSULE ORAL at 19:39

## 2025-01-20 RX ADMIN — NICOTINE POLACRILEX 2 MG: 2 LOZENGE ORAL at 15:30

## 2025-01-20 RX ADMIN — PREGABALIN 150 MG: 100 CAPSULE ORAL at 09:18

## 2025-01-20 ASSESSMENT — PAIN DESCRIPTION - ORIENTATION
ORIENTATION: RIGHT;LEFT
ORIENTATION: RIGHT;LEFT
ORIENTATION: RIGHT;LEFT;LOWER

## 2025-01-20 ASSESSMENT — PAIN SCALES - GENERAL
PAINLEVEL_OUTOF10: 3
PAINLEVEL_OUTOF10: 0
PAINLEVEL_OUTOF10: 6
PAINLEVEL_OUTOF10: 6
PAINLEVEL_OUTOF10: 8

## 2025-01-20 ASSESSMENT — PAIN - FUNCTIONAL ASSESSMENT
PAIN_FUNCTIONAL_ASSESSMENT: PREVENTS OR INTERFERES SOME ACTIVE ACTIVITIES AND ADLS
PAIN_FUNCTIONAL_ASSESSMENT: ACTIVITIES ARE NOT PREVENTED

## 2025-01-20 ASSESSMENT — PAIN DESCRIPTION - PAIN TYPE: TYPE: CHRONIC PAIN

## 2025-01-20 ASSESSMENT — PAIN DESCRIPTION - LOCATION
LOCATION: BACK;KNEE
LOCATION: BACK;KNEE
LOCATION: NECK;BACK;KNEE

## 2025-01-20 ASSESSMENT — PAIN DESCRIPTION - DESCRIPTORS
DESCRIPTORS: ACHING
DESCRIPTORS: SHARP;STABBING;THROBBING

## 2025-01-20 NOTE — ED PROVIDER NOTES
I independently examined and evaluated Anjelica Blanchard.  I personally saw the patient and performed a substantive portion of the visit including all aspects of the medical decision making.    In brief, this 44-year-old female is presenting with cough and shortness of breath for the last 5 days.  Reports green sputum production.  Endorses chills, no fevers.  Denies nausea, vomiting, abdominal pain.    Focused exam revealed   General: Alert, no acute distress, patient resting comfortably   Skin: warm, intact, no pallor noted   Head: Normocephalic, atraumatic   Eye: Normal conjunctiva   Cardiac: Normal peripheral perfusion  Respiratory: No acute distress   Musculoskeletal: No deformity, full ROM.   Neurological: alert and oriented, normal sensory and motor observed.   Psychiatric: Cooperative    ED course: Labwork obtained and is overall reassuring.  Chest x-ray concerning for infiltrates and considering her recent hospitalization with antibiotics, will give broad-spectrum antibiotics.  CT chest was obtained and did not show any acute process, casting and a question diagnosis pneumonia.  Attempted to ambulate the patient in the ED and she does become hypoxic with this.  Due to this we will admit for continued treatment of her hypoxia.    All diagnostic, treatment, and disposition decisions were made by myself in conjunction with the advanced practice provider/resident.    I personally saw the patient and made/approved the management plan and take responsibility for the patient management.     For all further details of the patient's emergency department visit, please see the advanced practice provider's/resident's documentation.      Abner Bartlett,   01/20/25 0015    
 Temp 97.4 °F (36.3 °C) (Tympanic)   Resp 18   Ht 1.626 m (5' 4\")   Wt 103.3 kg (227 lb 12.8 oz)   SpO2 92%   BMI 39.10 kg/m²       DIAGNOSTIC RESULTS   LABS:    Labs Reviewed   CBC WITH AUTO DIFFERENTIAL - Abnormal; Notable for the following components:       Result Value    RDW 16.4 (*)     All other components within normal limits   COMPREHENSIVE METABOLIC PANEL W/ REFLEX TO MG FOR LOW K - Abnormal; Notable for the following components:    Chloride 98 (*)     BUN 21 (*)     Creatinine 1.3 (*)     Est, Glom Filt Rate 51 (*)     All other components within normal limits   LACTATE, SEPSIS - Abnormal; Notable for the following components:    Lactic Acid, Sepsis 3.3 (*)     All other components within normal limits   BLOOD GAS, VENOUS - Abnormal; Notable for the following components:    pCO2, Tae 37.1 (*)     PO2, Tae 64.9 (*)     Carboxyhemoglobin 2.4 (*)     All other components within normal limits   COVID-19 & INFLUENZA COMBO   CULTURE, BLOOD 1   CULTURE, BLOOD 2   LACTATE, SEPSIS   TROPONIN   TROPONIN   BRAIN NATRIURETIC PEPTIDE       All other labs were within normal range or not returned as of this dictation.    EKG: All EKG's are interpreted by the Emergency Department Physician who either signs orCo-signs this chart in the absence of a cardiologist.  Please see their note for interpretation of EKG.      RADIOLOGY:   Non-plain film images such as CT, Ultrasound and MRI are read by the radiologist. Plain radiographic images are visualized andpreliminarily interpreted by the  ED Provider with the below findings:    Interpretation perthe Radiologist below, if available at the time of this note:    CT CHEST WO CONTRAST   Final Result   No acute cardiopulmonary process.         XR CHEST (2 VW)   Final Result   Nonspecific pulmonary infiltrates are commonly seen with atypical/viral   pneumonia.           No results found.       PROCEDURES   Unless otherwise noted below, none     Procedures    CRITICAL CARE

## 2025-01-20 NOTE — ED NOTES
Report received from Brennan Louise on bed alert / oriented with ongoing Vancomycin infusion, with IV access Left forearm

## 2025-01-20 NOTE — CONSULTS
Pharmacy Note  Vancomycin Consult    Anjelica Blanchard is a 44 y.o. female started on Vancomycin for pneumonia; consult received from MARTÍN Ivey to manage therapy. Also receiving the following antibiotics: zithromax and cefepime.    Allergies:  Asenapine, Buprenorphine-naloxone, Metoclopramide, Morphine and codeine, Risperidone, Trazodone, Morphine, Asenapine maleate, Buprenorphine hcl-naloxone hcl, Codeine, Reglan [metoclopramide hcl], Risperidone and related, Trazodone and nefazodone, Buprenorphine hcl-naloxone hcl, Guanfacine hcl, and Lurasidone       Recent Labs     01/19/25  1412   CREATININE 1.3*       Recent Labs     01/19/25  1412   WBC 7.4       Estimated Creatinine Clearance: 63 mL/min (A) (based on SCr of 1.3 mg/dL (H)).      Intake/Output Summary (Last 24 hours) at 1/19/2025 1907  Last data filed at 1/19/2025 1748  Gross per 24 hour   Intake 250 ml   Output --   Net 250 ml       Wt Readings from Last 1 Encounters:   01/19/25 103.3 kg (227 lb 12.8 oz)         Body mass index is 39.1 kg/m².    Loading dose (critically ill or in ICU, require dialysis or renal replacement therapy): Vancomycin 25 mg/kg IVPB x 1 (maximum 2500 mg).  Maintenance dose: 10-20 mg/kg (maximum: 2000 mg/dose and 4500 mg/day) starting at the next dosing interval determined by renal function  Pulse dose: fluctuating renal function, COLLEEN, ESRD  CRRT: 7.5-10 mg/kg q12h   Goal Vancomycin trough: 15-20 mcg/mL   Goal Vancomycin AUC: 400-600     Assessment/Plan:  Will initiate Vancomycin with a one time loading dose of 2000 mg x1, followed by 1250 mg IV every 24 hours. Calculated Vancomycin AUC = 493 mg/L*h with an estimated steady-state vancomycin trough = 13.8 mcg/mL. Vancomycin level ordered for 1/21 @ 1700. Timing of trough level will be determined based on culture results, renal function, and clinical response.     Thank you for the consult.

## 2025-01-20 NOTE — CARE COORDINATION
Case Management Assessment  Initial Evaluation    Date/Time of Evaluation: 1/20/2025 8:25 AM  Assessment Completed by: Seda Cintron    If patient is discharged prior to next notation, then this note serves as note for discharge by case management.    Patient Name: Anjelica Blanchard                   YOB: 1980  Diagnosis: Hypoxemia [R09.02]  Pneumonia due to infectious agent [J18.9]                   Date / Time: 1/19/2025 12:57 PM    Patient Admission Status: Inpatient   Readmission Risk (Low < 19, Mod (19-27), High > 27): Readmission Risk Score: 25.4    Current PCP: Smith Neff MD  PCP verified by CM? Yes (Tawanda)    Chart Reviewed: Yes      History Provided by: Patient  Patient Orientation: Alert and Oriented    Patient Cognition: Alert    Hospitalization in the last 30 days (Readmission):  No    If yes, Readmission Assessment in CM Navigator will be completed.    Advance Directives:      Code Status: Full Code   Patient's Primary Decision Maker is: Patient Declined (Legal Next of Kin Remains as Decision Maker)    Primary Decision Maker: Talia Blanchard - Child    Secondary Decision Maker: Modesto Blanchard - Parent - [phone]    Secondary Decision Maker: Mally Blanchard - Brother/Sister - [phone]    Discharge Planning:    Patient lives with: Alone Type of Home: Apartment  Primary Care Giver: Self  Patient Support Systems include: Parent, Children, Family Members   Current Financial resources: Medicaid  Current community resources: None  Current services prior to admission: Home Care            Current DME:              Type of Home Care services:  PT, Nursing Services    ADLS  Prior functional level: Independent in ADLs/IADLs  Current functional level: Independent in ADLs/IADLs    PT AM-PAC:   /24  OT AM-PAC:   /24    Family can provide assistance at DC: No  Would you like Case Management to discuss the discharge plan with any other family members/significant others, and if so, who?

## 2025-01-20 NOTE — CONSULTS
[facility]                                 Pulmonary/Critical care  Consult /Progress Note :                                                                  CC :worsening SOB and cough     Patient is being seen at the request of La Ivey PA-C  for a consultation for pneumonia    HISTORY OF PRESENT ILLNESS:    Patient is a 44 y.o. female  presented to the ED with worsening SOB with productive cough for past 5 days. No known contacts. Not on oxygen at baseline. Has hx of recurrent pneumonias. Denied any CP, emesis, abdominal pain, C/D or urinary changes. Denied any current tobacco, alcohol or illicit drug use since 5/2023.     She states she was diagnosed with immunodeficency and she get multiple pneumonia  Not on on IVIG     She smoke 20 and has 20 PPY   Quit 2 years     PAST MEDICAL HISTORY:  Past Medical History:   Diagnosis Date    Adrenal insufficiency     Asthma     Bipolar 1 disorder (HCC)     Borderline personality disorder (HCC)     Chronic prescription benzodiazepine use     Alprazolam    Chronic prescription opiate use     Methadone    COPD (chronic obstructive pulmonary disease) (HCC)     Degenerative spinal arthritis     Howard-Danlos syndrome     Fibromyalgia     Hepatitis C     Herniated disc     Intervertebral disc disease     Opiate overdose (HCC)     Osteoarthritis     Polysubstance abuse (HCC)     6/13/24-  Methadone from [facility]    Sedative dependence (HCC)     Pregabalin and Zolpidem    Seizure (HCC)     with benzo withdrawal    TCA (tricyclic antidepressant) overdose     Vertebral compression fracture (HCC)      PAST SURGICAL HISTORY:  Past Surgical History:   Procedure Laterality Date    BRONCHOSCOPY N/A 11/2/2023    BRONCHOSCOPY DIAGNOSTIC OR CELL WASH ONLY performed by Manisha Medina MD at [facility]    BRONCHOSCOPY  11/2/2023    BRONCHOSCOPY ALVEOLAR LAVAGE performed by Carol

## 2025-01-20 NOTE — H&P
History and Physical      Name:  Anjelica Blanchard /Age/Sex: 1980  (44 y.o. female)   MRN & CSN:  [id number] & [id number] Encounter Date/Time: 2025 7:02 PM   Location:   PCP: Smith Neff MD       Hospital Day: 1    Assessment and Plan:     Patient is a 44 y.o. female who presented with SOB.     # Community-acquired pneumonia of probable right lower lung  # Acute hypoxemic respiratory failure   - Reported worsening SOB with productive cough for past 5 days. No known contacts. Not on oxygen at baseline. Has hx of recurrent pneumonias.  - Requiring 2L NC in ED, VBG 7.44/37, afebrile, no leukocytosis ( SIRS criteria), LA 3.3 with normal repeat, CXR suspicious viral infection/atypical pneumonia, CT with mild RML changes. Flu and COVID negative.  - Continue IV antibiotics. Follow-up cultures and inflammatory markers.     # COLLEEN, mild  - No recent NSAIDs.   - Clinically hypovolemic. Cr 1.3, baseline ~ 0.8. UA ordered.  - Will challenge with IVF.      # Hx of adrenal insufficiency  - Continue Cortef and midodrine.    # Acquired hypothyroidism  - Continue Synthroid.  - Follow-up repeat TSH.    # GERD  - Continue PPI.    # OUD  - On methadone.    # BD1, BPD, anxiety and depression  - Continue home psychotropic meds.    # Class II obesity  - BMI 39.1.    Checklist:  Advanced care planning: full  Diet: regular    VTE ppx: Lovenox    Disposition: admit to inpatient.  Estimated discharge: 2-3 day(s).  Current living situation: home.  Expected disposition: home.    Spoke with ED provider who recommended admission for the patient and I agree with that plan.  Personally reviewed lab studies and imaging.  EKG interpreted personally and results as stated above.  Imaging that was interpreted personally and results as stated above.    History of Present Illness:     Chief Complaint: Shortness of breath    Patient is a 44 y.o. female with a PMHx as above who presented to the ED with worsening SOB with productive

## 2025-01-21 LAB
ANION GAP SERPL CALCULATED.3IONS-SCNC: 7 MMOL/L (ref 3–16)
BASOPHILS # BLD: 0.1 K/UL (ref 0–0.2)
BASOPHILS NFR BLD: 0.8 %
BUN SERPL-MCNC: 15 MG/DL (ref 7–20)
CALCIUM SERPL-MCNC: 8.5 MG/DL (ref 8.3–10.6)
CHLORIDE SERPL-SCNC: 99 MMOL/L (ref 99–110)
CO2 SERPL-SCNC: 31 MMOL/L (ref 21–32)
CREAT SERPL-MCNC: 0.8 MG/DL (ref 0.6–1.1)
DEPRECATED RDW RBC AUTO: 16.4 % (ref 12.4–15.4)
EOSINOPHIL # BLD: 0.2 K/UL (ref 0–0.6)
EOSINOPHIL NFR BLD: 2.3 %
GFR SERPLBLD CREATININE-BSD FMLA CKD-EPI: >90 ML/MIN/{1.73_M2}
GLUCOSE SERPL-MCNC: 94 MG/DL (ref 70–99)
HCT VFR BLD AUTO: 34.1 % (ref 36–48)
HGB BLD-MCNC: 11.4 G/DL (ref 12–16)
LYMPHOCYTES # BLD: 2.4 K/UL (ref 1–5.1)
LYMPHOCYTES NFR BLD: 34.2 %
MCH RBC QN AUTO: 29.1 PG (ref 26–34)
MCHC RBC AUTO-ENTMCNC: 33.4 G/DL (ref 31–36)
MCV RBC AUTO: 87.2 FL (ref 80–100)
MONOCYTES # BLD: 0.6 K/UL (ref 0–1.3)
MONOCYTES NFR BLD: 8.7 %
NEUTROPHILS # BLD: 3.8 K/UL (ref 1.7–7.7)
NEUTROPHILS NFR BLD: 54 %
PLATELET # BLD AUTO: 216 K/UL (ref 135–450)
PMV BLD AUTO: 8.1 FL (ref 5–10.5)
POTASSIUM SERPL-SCNC: 4.1 MMOL/L (ref 3.5–5.1)
POTASSIUM SERPL-SCNC: 5.2 MMOL/L (ref 3.5–5.1)
RBC # BLD AUTO: 3.91 M/UL (ref 4–5.2)
REPORT: NORMAL
RESP PATH DNA+RNA PNL NPH NAA+NON-PROBE: NORMAL
SODIUM SERPL-SCNC: 137 MMOL/L (ref 136–145)
WBC # BLD AUTO: 7 K/UL (ref 4–11)

## 2025-01-21 PROCEDURE — 80048 BASIC METABOLIC PNL TOTAL CA: CPT

## 2025-01-21 PROCEDURE — 84132 ASSAY OF SERUM POTASSIUM: CPT

## 2025-01-21 PROCEDURE — 1200000000 HC SEMI PRIVATE

## 2025-01-21 PROCEDURE — 6370000000 HC RX 637 (ALT 250 FOR IP): Performed by: STUDENT IN AN ORGANIZED HEALTH CARE EDUCATION/TRAINING PROGRAM

## 2025-01-21 PROCEDURE — 36415 COLL VENOUS BLD VENIPUNCTURE: CPT

## 2025-01-21 PROCEDURE — 99233 SBSQ HOSP IP/OBS HIGH 50: CPT | Performed by: INTERNAL MEDICINE

## 2025-01-21 PROCEDURE — 99232 SBSQ HOSP IP/OBS MODERATE 35: CPT | Performed by: NURSE PRACTITIONER

## 2025-01-21 PROCEDURE — 6360000002 HC RX W HCPCS

## 2025-01-21 PROCEDURE — 94761 N-INVAS EAR/PLS OXIMETRY MLT: CPT

## 2025-01-21 PROCEDURE — 6370000000 HC RX 637 (ALT 250 FOR IP): Performed by: NURSE PRACTITIONER

## 2025-01-21 PROCEDURE — 6360000002 HC RX W HCPCS: Performed by: INTERNAL MEDICINE

## 2025-01-21 PROCEDURE — 85025 COMPLETE CBC W/AUTO DIFF WBC: CPT

## 2025-01-21 PROCEDURE — 6370000000 HC RX 637 (ALT 250 FOR IP)

## 2025-01-21 PROCEDURE — 94640 AIRWAY INHALATION TREATMENT: CPT

## 2025-01-21 PROCEDURE — 2500000003 HC RX 250 WO HCPCS

## 2025-01-21 RX ORDER — FUROSEMIDE 20 MG/1
20 TABLET ORAL DAILY
Status: DISCONTINUED | OUTPATIENT
Start: 2025-01-21 | End: 2025-01-22 | Stop reason: HOSPADM

## 2025-01-21 RX ADMIN — GUAIFENESIN 600 MG: 600 TABLET, EXTENDED RELEASE ORAL at 09:02

## 2025-01-21 RX ADMIN — ALPRAZOLAM 1 MG: 1 TABLET ORAL at 13:42

## 2025-01-21 RX ADMIN — OXYCODONE HYDROCHLORIDE AND ACETAMINOPHEN 1 TABLET: 5; 325 TABLET ORAL at 17:12

## 2025-01-21 RX ADMIN — BUDESONIDE 500 MCG: 0.5 INHALANT RESPIRATORY (INHALATION) at 20:36

## 2025-01-21 RX ADMIN — HYDROCORTISONE 10 MG: 10 TABLET ORAL at 09:02

## 2025-01-21 RX ADMIN — ENOXAPARIN SODIUM 30 MG: 100 INJECTION SUBCUTANEOUS at 21:44

## 2025-01-21 RX ADMIN — OXYCODONE HYDROCHLORIDE AND ACETAMINOPHEN 1 TABLET: 5; 325 TABLET ORAL at 09:01

## 2025-01-21 RX ADMIN — NICOTINE POLACRILEX 2 MG: 2 LOZENGE ORAL at 17:15

## 2025-01-21 RX ADMIN — DIVALPROEX SODIUM 1000 MG: 500 TABLET, FILM COATED, EXTENDED RELEASE ORAL at 21:44

## 2025-01-21 RX ADMIN — HYDROCORTISONE 5 MG: 10 TABLET ORAL at 13:38

## 2025-01-21 RX ADMIN — OXYCODONE HYDROCHLORIDE AND ACETAMINOPHEN 500 MG: 500 TABLET ORAL at 09:02

## 2025-01-21 RX ADMIN — DOXYCYCLINE HYCLATE 100 MG: 100 TABLET, COATED ORAL at 21:43

## 2025-01-21 RX ADMIN — LEVOTHYROXINE SODIUM 75 MCG: 0.05 TABLET ORAL at 05:21

## 2025-01-21 RX ADMIN — SODIUM CHLORIDE, PRESERVATIVE FREE 10 ML: 5 INJECTION INTRAVENOUS at 09:06

## 2025-01-21 RX ADMIN — POLYETHYLENE GLYCOL (3350) 17 G: 17 POWDER, FOR SOLUTION ORAL at 21:45

## 2025-01-21 RX ADMIN — ONDANSETRON 4 MG: 2 INJECTION INTRAMUSCULAR; INTRAVENOUS at 10:57

## 2025-01-21 RX ADMIN — QUETIAPINE FUMARATE 150 MG: 25 TABLET ORAL at 21:44

## 2025-01-21 RX ADMIN — METHADONE HYDROCHLORIDE 140 MG: 10 TABLET ORAL at 09:02

## 2025-01-21 RX ADMIN — NICOTINE POLACRILEX 2 MG: 2 LOZENGE ORAL at 10:56

## 2025-01-21 RX ADMIN — DOXYCYCLINE HYCLATE 100 MG: 100 TABLET, COATED ORAL at 09:02

## 2025-01-21 RX ADMIN — LEVOMILNACIPRAN HYDROCHLORIDE 40 MG: 20 CAPSULE, EXTENDED RELEASE ORAL at 09:08

## 2025-01-21 RX ADMIN — MIDODRINE HYDROCHLORIDE 5 MG: 5 TABLET ORAL at 21:43

## 2025-01-21 RX ADMIN — Medication 1000 UNITS: at 09:01

## 2025-01-21 RX ADMIN — NICOTINE POLACRILEX 2 MG: 2 LOZENGE ORAL at 21:44

## 2025-01-21 RX ADMIN — BUDESONIDE 500 MCG: 0.5 INHALANT RESPIRATORY (INHALATION) at 08:16

## 2025-01-21 RX ADMIN — GUAIFENESIN 600 MG: 600 TABLET, EXTENDED RELEASE ORAL at 21:43

## 2025-01-21 RX ADMIN — SODIUM CHLORIDE, PRESERVATIVE FREE 10 ML: 5 INJECTION INTRAVENOUS at 21:45

## 2025-01-21 RX ADMIN — PREGABALIN 150 MG: 100 CAPSULE ORAL at 21:43

## 2025-01-21 RX ADMIN — DOCUSATE SODIUM 100 MG: 100 CAPSULE, LIQUID FILLED ORAL at 09:06

## 2025-01-21 RX ADMIN — ENOXAPARIN SODIUM 30 MG: 100 INJECTION SUBCUTANEOUS at 09:03

## 2025-01-21 RX ADMIN — PREGABALIN 150 MG: 100 CAPSULE ORAL at 09:02

## 2025-01-21 RX ADMIN — PANTOPRAZOLE SODIUM 40 MG: 40 TABLET, DELAYED RELEASE ORAL at 05:21

## 2025-01-21 RX ADMIN — DIVALPROEX SODIUM 500 MG: 500 TABLET, FILM COATED, EXTENDED RELEASE ORAL at 09:02

## 2025-01-21 RX ADMIN — DOCUSATE SODIUM 100 MG: 100 CAPSULE, LIQUID FILLED ORAL at 21:44

## 2025-01-21 RX ADMIN — ALPRAZOLAM 1 MG: 1 TABLET ORAL at 09:01

## 2025-01-21 RX ADMIN — ZOLPIDEM TARTRATE 10 MG: 5 TABLET, FILM COATED ORAL at 21:44

## 2025-01-21 ASSESSMENT — PAIN DESCRIPTION - PAIN TYPE: TYPE: CHRONIC PAIN

## 2025-01-21 ASSESSMENT — PAIN DESCRIPTION - ORIENTATION: ORIENTATION: LOWER

## 2025-01-21 ASSESSMENT — PAIN SCALES - GENERAL
PAINLEVEL_OUTOF10: 7
PAINLEVEL_OUTOF10: 0
PAINLEVEL_OUTOF10: 8
PAINLEVEL_OUTOF10: 0
PAINLEVEL_OUTOF10: 8

## 2025-01-21 ASSESSMENT — PAIN - FUNCTIONAL ASSESSMENT: PAIN_FUNCTIONAL_ASSESSMENT: ACTIVITIES ARE NOT PREVENTED

## 2025-01-21 ASSESSMENT — PAIN DESCRIPTION - LOCATION
LOCATION: BACK

## 2025-01-21 ASSESSMENT — PAIN DESCRIPTION - DESCRIPTORS
DESCRIPTORS: SHARP;THROBBING
DESCRIPTORS: THROBBING
DESCRIPTORS: THROBBING;SHARP

## 2025-01-21 ASSESSMENT — PAIN DESCRIPTION - FREQUENCY: FREQUENCY: CONTINUOUS

## 2025-01-21 ASSESSMENT — PAIN DESCRIPTION - ONSET: ONSET: ON-GOING

## 2025-01-21 NOTE — PLAN OF CARE
Problem: Chronic Conditions and Co-morbidities  Goal: Patient's chronic conditions and co-morbidity symptoms are monitored and maintained or improved  1/20/2025 2119 by Digna Hamm RN  Outcome: Progressing  1/20/2025 1955 by Laz Ferguson RN  Outcome: Progressing     Problem: Discharge Planning  Goal: Discharge to home or other facility with appropriate resources  1/20/2025 2119 by Digna Hamm RN  Outcome: Progressing  1/20/2025 1955 by Laz Ferguson RN  Outcome: Progressing     Problem: Pain  Goal: Verbalizes/displays adequate comfort level or baseline comfort level  1/20/2025 2119 by Digna Hamm RN  Outcome: Progressing  1/20/2025 1955 by Laz Ferguson RN  Outcome: Progressing     Problem: Safety - Adult  Goal: Free from fall injury  1/20/2025 2119 by Digna Hamm RN  Outcome: Progressing  1/20/2025 1955 by Laz Ferguson, RN  Outcome: Progressing

## 2025-01-21 NOTE — PLAN OF CARE
Problem: Chronic Conditions and Co-morbidities  Goal: Patient's chronic conditions and co-morbidity symptoms are monitored and maintained or improved  1/21/2025 1052 by Carly Gastelum RN  Outcome: Progressing  1/20/2025 2119 by Digna Hamm RN  Outcome: Progressing     Problem: Discharge Planning  Goal: Discharge to home or other facility with appropriate resources  1/21/2025 1052 by Carly Gastelum RN  Outcome: Progressing  1/20/2025 2119 by Digna Hamm, RN  Outcome: Progressing     Problem: Pain  Goal: Verbalizes/displays adequate comfort level or baseline comfort level  1/21/2025 1052 by Carly Gastelum RN  Outcome: Progressing  1/20/2025 2119 by Digna Hamm, RN  Outcome: Progressing     Problem: Safety - Adult  Goal: Free from fall injury  1/21/2025 1052 by Carly Gastelum RN  Outcome: Progressing  1/20/2025 2119 by Digna Hamm, RN  Outcome: Progressing

## 2025-01-22 VITALS
WEIGHT: 232.59 LBS | OXYGEN SATURATION: 95 % | HEART RATE: 85 BPM | RESPIRATION RATE: 14 BRPM | DIASTOLIC BLOOD PRESSURE: 89 MMHG | HEIGHT: 64 IN | SYSTOLIC BLOOD PRESSURE: 150 MMHG | BODY MASS INDEX: 39.71 KG/M2 | TEMPERATURE: 97.9 F

## 2025-01-22 LAB
ANION GAP SERPL CALCULATED.3IONS-SCNC: 13 MMOL/L (ref 3–16)
BASOPHILS # BLD: 0.1 K/UL (ref 0–0.2)
BASOPHILS NFR BLD: 0.7 %
BUN SERPL-MCNC: 17 MG/DL (ref 7–20)
CALCIUM SERPL-MCNC: 8.7 MG/DL (ref 8.3–10.6)
CHLORIDE SERPL-SCNC: 97 MMOL/L (ref 99–110)
CO2 SERPL-SCNC: 29 MMOL/L (ref 21–32)
CREAT SERPL-MCNC: 0.8 MG/DL (ref 0.6–1.1)
DEPRECATED RDW RBC AUTO: 16.7 % (ref 12.4–15.4)
EOSINOPHIL # BLD: 0.2 K/UL (ref 0–0.6)
EOSINOPHIL NFR BLD: 3.4 %
GFR SERPLBLD CREATININE-BSD FMLA CKD-EPI: >90 ML/MIN/{1.73_M2}
GLUCOSE SERPL-MCNC: 85 MG/DL (ref 70–99)
HCT VFR BLD AUTO: 36.3 % (ref 36–48)
HGB BLD-MCNC: 12.4 G/DL (ref 12–16)
LYMPHOCYTES # BLD: 2.9 K/UL (ref 1–5.1)
LYMPHOCYTES NFR BLD: 42.6 %
MCH RBC QN AUTO: 29.8 PG (ref 26–34)
MCHC RBC AUTO-ENTMCNC: 34.1 G/DL (ref 31–36)
MCV RBC AUTO: 87.3 FL (ref 80–100)
MONOCYTES # BLD: 0.9 K/UL (ref 0–1.3)
MONOCYTES NFR BLD: 13.2 %
NEUTROPHILS # BLD: 2.8 K/UL (ref 1.7–7.7)
NEUTROPHILS NFR BLD: 40.1 %
PLATELET # BLD AUTO: 236 K/UL (ref 135–450)
PMV BLD AUTO: 8.2 FL (ref 5–10.5)
POTASSIUM SERPL-SCNC: 4.3 MMOL/L (ref 3.5–5.1)
RBC # BLD AUTO: 4.16 M/UL (ref 4–5.2)
REPORT: NORMAL
RESP PATH DNA+RNA PNL L RESP NAA+NON-PRB: NORMAL
SODIUM SERPL-SCNC: 139 MMOL/L (ref 136–145)
WBC # BLD AUTO: 6.9 K/UL (ref 4–11)

## 2025-01-22 PROCEDURE — 85025 COMPLETE CBC W/AUTO DIFF WBC: CPT

## 2025-01-22 PROCEDURE — 6370000000 HC RX 637 (ALT 250 FOR IP)

## 2025-01-22 PROCEDURE — 36415 COLL VENOUS BLD VENIPUNCTURE: CPT

## 2025-01-22 PROCEDURE — 80048 BASIC METABOLIC PNL TOTAL CA: CPT

## 2025-01-22 PROCEDURE — 6370000000 HC RX 637 (ALT 250 FOR IP): Performed by: NURSE PRACTITIONER

## 2025-01-22 PROCEDURE — 6370000000 HC RX 637 (ALT 250 FOR IP): Performed by: STUDENT IN AN ORGANIZED HEALTH CARE EDUCATION/TRAINING PROGRAM

## 2025-01-22 PROCEDURE — 6360000002 HC RX W HCPCS: Performed by: INTERNAL MEDICINE

## 2025-01-22 PROCEDURE — 6360000002 HC RX W HCPCS

## 2025-01-22 PROCEDURE — 94640 AIRWAY INHALATION TREATMENT: CPT

## 2025-01-22 PROCEDURE — 87633 RESP VIRUS 12-25 TARGETS: CPT

## 2025-01-22 PROCEDURE — 94761 N-INVAS EAR/PLS OXIMETRY MLT: CPT

## 2025-01-22 RX ORDER — DOXYCYCLINE HYCLATE 100 MG
100 TABLET ORAL EVERY 12 HOURS SCHEDULED
Qty: 6 TABLET | Refills: 0 | Status: SHIPPED | OUTPATIENT
Start: 2025-01-22 | End: 2025-01-25

## 2025-01-22 RX ORDER — BENZONATATE 100 MG/1
100 CAPSULE ORAL 3 TIMES DAILY PRN
Qty: 20 CAPSULE | Refills: 0 | Status: SHIPPED | OUTPATIENT
Start: 2025-01-22 | End: 2025-01-29

## 2025-01-22 RX ADMIN — NICOTINE POLACRILEX 2 MG: 2 LOZENGE ORAL at 10:02

## 2025-01-22 RX ADMIN — Medication 1000 UNITS: at 09:56

## 2025-01-22 RX ADMIN — DOCUSATE SODIUM 100 MG: 100 CAPSULE, LIQUID FILLED ORAL at 09:57

## 2025-01-22 RX ADMIN — OXYCODONE HYDROCHLORIDE AND ACETAMINOPHEN 500 MG: 500 TABLET ORAL at 09:55

## 2025-01-22 RX ADMIN — ALPRAZOLAM 1 MG: 1 TABLET ORAL at 10:02

## 2025-01-22 RX ADMIN — BUDESONIDE 500 MCG: 0.5 INHALANT RESPIRATORY (INHALATION) at 09:04

## 2025-01-22 RX ADMIN — PANTOPRAZOLE SODIUM 40 MG: 40 TABLET, DELAYED RELEASE ORAL at 05:58

## 2025-01-22 RX ADMIN — NICOTINE POLACRILEX 2 MG: 2 LOZENGE ORAL at 14:10

## 2025-01-22 RX ADMIN — HYDROCORTISONE 10 MG: 10 TABLET ORAL at 09:55

## 2025-01-22 RX ADMIN — OXYCODONE HYDROCHLORIDE AND ACETAMINOPHEN 1 TABLET: 5; 325 TABLET ORAL at 11:06

## 2025-01-22 RX ADMIN — PREGABALIN 150 MG: 100 CAPSULE ORAL at 09:56

## 2025-01-22 RX ADMIN — FUROSEMIDE 20 MG: 20 TABLET ORAL at 09:56

## 2025-01-22 RX ADMIN — GUAIFENESIN 600 MG: 600 TABLET, EXTENDED RELEASE ORAL at 09:57

## 2025-01-22 RX ADMIN — ENOXAPARIN SODIUM 30 MG: 100 INJECTION SUBCUTANEOUS at 10:28

## 2025-01-22 RX ADMIN — METHADONE HYDROCHLORIDE 140 MG: 10 TABLET ORAL at 09:55

## 2025-01-22 RX ADMIN — DIVALPROEX SODIUM 500 MG: 500 TABLET, FILM COATED, EXTENDED RELEASE ORAL at 09:56

## 2025-01-22 RX ADMIN — LEVOTHYROXINE SODIUM 75 MCG: 0.05 TABLET ORAL at 05:58

## 2025-01-22 RX ADMIN — NICOTINE POLACRILEX 2 MG: 2 LOZENGE ORAL at 11:08

## 2025-01-22 RX ADMIN — DOXYCYCLINE HYCLATE 100 MG: 100 TABLET, COATED ORAL at 09:56

## 2025-01-22 RX ADMIN — LEVOMILNACIPRAN HYDROCHLORIDE 40 MG: 20 CAPSULE, EXTENDED RELEASE ORAL at 10:11

## 2025-01-22 ASSESSMENT — PAIN DESCRIPTION - DESCRIPTORS
DESCRIPTORS: STABBING;THROBBING
DESCRIPTORS: SHOOTING;THROBBING

## 2025-01-22 ASSESSMENT — PAIN - FUNCTIONAL ASSESSMENT
PAIN_FUNCTIONAL_ASSESSMENT: ACTIVITIES ARE NOT PREVENTED
PAIN_FUNCTIONAL_ASSESSMENT: PREVENTS OR INTERFERES SOME ACTIVE ACTIVITIES AND ADLS

## 2025-01-22 ASSESSMENT — PAIN DESCRIPTION - LOCATION
LOCATION: BACK;KNEE
LOCATION: BACK;KNEE

## 2025-01-22 ASSESSMENT — PAIN SCALES - GENERAL
PAINLEVEL_OUTOF10: 8
PAINLEVEL_OUTOF10: 7
PAINLEVEL_OUTOF10: 8
PAINLEVEL_OUTOF10: 6

## 2025-01-22 ASSESSMENT — PAIN DESCRIPTION - ORIENTATION
ORIENTATION: RIGHT;LEFT
ORIENTATION: RIGHT;LEFT

## 2025-01-22 NOTE — DISCHARGE INSTRUCTIONS
Your information:  Name: Anjelica Blanchard  : 1980    Your instructions:    Follow-up with PCP in 1 week  Call get follow-up appointment with Dr. Christian    What to do after you leave the hospital:    Recommended diet: regular diet    Recommended activity: activity as tolerated        The following personal items were collected during your admission and were returned to you:    Belongings  Dental Appliances: None  Vision - Corrective Lenses: None  Hearing Aid: None  Clothing: Footwear, Jacket/Coat, Pajamas, Pants, Shirt, Slippers, Socks, Undergarments (purse and 2 bags)  Jewelry: None  Body Piercings Removed: N/A  Electronic Devices: Cell Phone,  (cell phones x 2)  Weapons (Notify Protective Services/Security): None  Other Valuables: Purse  Home Medications: None  Valuables Given To: Patient  Provide Name(s) of Who Valuable(s) Were Given To: patient  Responsible person(s) in the waiting room: leroy Tim  Patient approves for provider to speak to responsible person post operatively: Yes    Information obtained by:  By signing below, I understand that if any problems occur once I leave the hospital I am to contact PCP.  I understand and acknowledge receipt of the instructions indicated above.           Heart Failure Resources:  Heart Failure Interactive Workbook:  Go to https://[employer]/publication/?e=486983 for a Free Heart Failure Interactive Workbook provided by The American [employer] Association. This interactive workbook will provide information on Healthier Living with Heart Failure. Please copy and paste link into search bar. Use your mouse to scroll through the pages.    HF Hyrum jesse:   Heart Failure Free smart phone jesse available for iPhone and Android download. Use your phone to track sodium intake, fluid intake, symptoms, and weight.     Low Sodium Diet / Recipes:  Go to www.[employer].org website for “renal” diet which is Low Sodium! [employer] is a dialysis company, but this

## 2025-01-22 NOTE — DISCHARGE SUMMARY
Hospital Medicine Discharge Summary    Patient: Anjelica Blanchard     Gender: female  : 1980   Age: 44 y.o.  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 She is feeling much better, she has been stable on room air since shortly after admission. Productive cough improving. No leg swelling. Breath sounds clear.     Hospital Course:     Pneumonia - concern for HCAP - ruled out. No PNA on CT scan  Hypoxia, brief. On 2 LO2 in ER-- resolved  COPD   Hemoptysis - resolved  Bronchitis - improved  - Health care acquired: suspect a gram negative organism, also risk for MRSA  - was recently treated with Zithromax and rocephin while admitted in December and discharged home home Omnicef and zithromax --changed to doxycycline   - repeat CT of chest without contrast shows no acute cardiopulmonary process  - MRSA swab  negative   - check cultures, pneumonia panel, strep, legionella, MRSA  - pulmonary consulted, appreciate further recommendations   - procal <0.02  - follows with pulmonary outpatient   - HHN  - on room air, monitor   - H/H stable   - CT chest wo contrast negative  - Pt feeling much better, is on room air.      Elevated D-dimer  - d-dimer 0.63  - Low suspicion for VTE     COLLEEN

## 2025-01-22 NOTE — PLAN OF CARE
Problem: Chronic Conditions and Co-morbidities  Goal: Patient's chronic conditions and co-morbidity symptoms are monitored and maintained or improved  1/21/2025 2137 by Marian Ventura RN  Outcome: Progressing    Problem: Pain  Goal: Verbalizes/displays adequate comfort level or baseline comfort level  1/21/2025 2137 by Marian Ventura, RN  Outcome: Progressing

## 2025-01-22 NOTE — CARE COORDINATION
Met with pt at bedside. Pt to DC home. Family to transport pt home. No further DC or DME needs identified.     IMM- na     O2 -95% RA

## 2025-01-23 LAB
BACTERIA BLD CULT ORG #2: NORMAL
BACTERIA BLD CULT: NORMAL

## 2025-01-23 NOTE — PROGRESS NOTES
[facility]                                 Pulmonary/Critical care  Consult /Progress Note :                                                                  CC :worsening SOB and cough     Subjective   Doing better  No cp  No SOB  Questionable hemoptysis ibuprofen was told by staff no one saw any blood while she cough     PHYSICAL EXAM:  Vitals:    01/21/25 0817   BP:    Pulse:    Resp:    Temp:    SpO2: 99%     Gen: No distress.   Eyes: PERRL. No sclera icterus. No conjunctival injection.   ENT: No discharge. Pharynx clear.   Neck: Trachea midline. No obvious mass.    Resp:rhonchi   CV: Regular rate. Regular rhythm. No murmur or rub. No edema. Peripheral pulses are 2+.  Capillary refill is less than 3 seconds.  GI: Non-tender. Non-distended. No hernia.   Skin: Warm and dry. No nodule on exposed extremities.   Lymph: No cervical LAD. No supraclavicular LAD.   M/S: No cyanosis. No joint deformity. No clubbing.   Neuro: Awake. Alert. Moves all four extremities.   Psych: Oriented x 3. No anxiety.     LABS:  CBC:   Recent Labs     01/19/25  1412 01/20/25  0548 01/21/25  0605   WBC 7.4 6.6 7.0   HGB 13.4 11.5* 11.4*   HCT 40.5 34.8* 34.1*   MCV 86.4 87.4 87.2    208 216     BMP:   Recent Labs     01/19/25  1412 01/20/25  0548 01/21/25  0605    138 137   K 4.3 4.2 5.2*   CL 98* 101 99   CO2 27 23 31   BUN 21* 18 15   CREATININE 1.3* 0.9 0.8     LIVER PROFILE:   Recent Labs     01/19/25  1412   AST 34   ALT 15   BILITOT <0.2   ALKPHOS 79         Microbiology:  Sent     Imaging:  Chest imaging was reviewed by me and showed no infiltrate   CT no acute issues  ASSESSMENT:   Recurrent pneumonia \  Immunodeficency unknown    Possible MANUEL   Adrenal insuff       PLAN:    *- O2 goal 92-95   *-home dose steroids ,may increase if in distress   *-IV abx Rocephin and Azithromycin Day 1 ,change PO doxy ,negative CT ,normal WBC and 
                                                  [facility]                                 Pulmonary/Critical care  Consult /Progress Note :                                                                  CC :worsening SOB and cough     Subjective   Doing better  No cp  No hemoptysis    PHYSICAL EXAM:  Vitals:    01/22/25 1025   BP:    Pulse:    Resp: 14   Temp:    SpO2:      Gen: No distress.   Eyes: PERRL. No sclera icterus. No conjunctival injection.   ENT: No discharge. Pharynx clear.   Neck: Trachea midline. No obvious mass.    Resp:rhonchi   CV: Regular rate. Regular rhythm. No murmur or rub. No edema. Peripheral pulses are 2+.  Capillary refill is less than 3 seconds.  GI: Non-tender. Non-distended. No hernia.   Skin: Warm and dry. No nodule on exposed extremities.   Lymph: No cervical LAD. No supraclavicular LAD.   M/S: No cyanosis. No joint deformity. No clubbing.   Neuro: Awake. Alert. Moves all four extremities.   Psych: Oriented x 3. No anxiety.     LABS:  CBC:   Recent Labs     01/20/25  0548 01/21/25  0605 01/22/25  0602   WBC 6.6 7.0 6.9   HGB 11.5* 11.4* 12.4   HCT 34.8* 34.1* 36.3   MCV 87.4 87.2 87.3    216 236     BMP:   Recent Labs     01/20/25  0548 01/21/25  0605 01/21/25  1037 01/22/25  0602    137  --  139   K 4.2 5.2* 4.1 4.3    99  --  97*   CO2 23 31  --  29   BUN 18 15  --  17   CREATININE 0.9 0.8  --  0.8     LIVER PROFILE:   Recent Labs     01/19/25  1412   AST 34   ALT 15   BILITOT <0.2   ALKPHOS 79         Microbiology:  Sent     Imaging:  Chest imaging was reviewed by me and showed no infiltrate   CT no acute issues  ASSESSMENT:   Recurrent pneumonia \  Immunodeficency unknown    Possible MANUEL   Adrenal insuff       PLAN:    *- O2 goal 92-95   *-home dose steroids ,may increase if in distress   *-IV abx Rocephin and Azithromycin Day 1 ,change PO doxy ,negative CT ,normal WBC and procal 0.023   *-check viral panel /pneumonia 
  Physician Progress Note      PATIENT:               FERNANDO RINCON  CSN #:                  [id number]  :                       1980  ADMIT DATE:       2025 12:57 PM  DISCH DATE:        2025 3:29 PM  RESPONDING  PROVIDER #:        TOSIN Bryan CNP          QUERY TEXT:    Patient admitted with PNA. Noted documentation of acute respiratory failure in    HP and noted documentation of No tachypnea, bradypnea, accessory muscle   usage, prolonged expiration or respiratory distress in  ED notes. In order   to support the diagnosis of acute respiratory failure, please include   additional clinical indicators in your documentation.  Or please document if   the diagnosis of acute respiratory failure has been ruled out after further   study.    The medical record reflects the following:  Risk Factors: PNA, COPD    Clinical Indicators:  ED notes- \"No tachypnea, bradypnea, accessory muscle   usage, prolonged expiration or respiratory distress.\"  Per  ED nurse notes- \"Pt placed on 2L NC at this time. O2 was 89-90% while   at rest.\"   HP- \"Acute hypoxemic respiratory failure. Requiring 2L NC in ED, VBG   7.44/37. Pulm: Increased effort on 2L NC. CTAB.\"    Treatment: supplemental O2, VBG  Options provided:  -- Acute Respiratory Failure as evidenced by, Please document evidence.  -- Acute Respiratory Failure ruled out after study  -- Acute Respiratory Failure ruled out after study and Chronic Respiratory   Failure confirmed  -- Other - I will add my own diagnosis  -- Disagree - Not applicable / Not valid  -- Disagree - Clinically unable to determine / Unknown  -- Refer to Clinical Documentation Reviewer    PROVIDER RESPONSE TEXT:    Hypoxia    Query created by: Ana Leyva on 2025 2:26 PM      Electronically signed by:  TOSIN Bryan CNP 2025 9:40 AM          
4 Eyes Skin Assessment     NAME:  Anjelica Blanchard  YOB: 1980  MEDICAL RECORD NUMBER:  [id number]    The patient is being assessed for  Admission    I agree that at least one RN has performed a thorough Head to Toe Skin Assessment on the patient. ALL assessment sites listed below have been assessed.      Areas assessed by both nurses:    Head, Face, Ears, Shoulders, Back, Chest, Arms, Elbows, Hands, Sacrum. Buttock, Coccyx, Ischium, and Legs. Feet and Heels        Does the Patient have a Wound? No noted wound(s)       Serge Prevention initiated by RN: No  Wound Care Orders initiated by RN: No    Pressure Injury (Stage 3,4, Unstageable, DTI, NWPT, and Complex wounds) if present, place Wound referral order by RN under : No    New Ostomies, if present place, Ostomy referral order under : No     Nurse 1 eSignature: Electronically signed by Eduarda Pitt RN on 1/20/25 at 2:26 AM EST    **SHARE this note so that the co-signing nurse can place an eSignature**    Nurse 2 eSignature: Electronically signed by Laz Ferguson RN on 1/20/25 at 6:59 PM EST   
AM assessment completed, see flow sheet. Pt is alert and oriented. Vital signs are WNL. Respirations are even & easy on RA. Pt complaints voiced of chronic back and knee pain and anxiety, see MAR. Pt denies needs at this time. SR up x 2, and bed in low position. Call light is within reach.   
Admitted to [facility] at this time.  A/o; Spo2 88% on room air.  Placed on 2 L pnc.  See admission information.  Call light in reach  
Bedside Mobility Assessment Tool (BMAT):     Assessment Level 1- Sit and Shake    1. From a semi-reclined position, ask patient to sit up and rotate to a seated position at the side of the bed. Can use the bedrail.    2. Ask patient to reach out and grab your hand and shake making sure patient reaches across his/her midline.   Pass- Patient is able to come to a seated position, maintain core strength. Maintains seated balance while reaching across midline. Move on to Assessment Level 2.     Assessment Level 2- Stretch and Point   1. With patient in seated position at the side of the bed, have patient place both feet on the floor (or stool) with knees no higher than hips.    2. Ask patient to stretch one leg and straighten the knee, then bend the ankle/flex and point the toes. If appropriate, repeat with the other leg.   Pass- Patient is able to demonstrate appropriate quad strength on intended weight bearing limb(s). Move onto Assessment Level 3.     Assessment Level 3- Stand   1. Ask patient to elevate off the bed or chair (seated to standing) using an assistive device (cane, bedrail).    2. Patient should be able to raise buttocks off be and hold for a count of five. May repeat once.   Pass- Patient maintains standing stability for at least 5 seconds, proceed to assessment level 4.    Assessment Level 4- Walk   1. Ask patient to march in place at bedside.    2. Then ask patient to advance step and return each foot. Some medical conditions may render a patient from stepping backwards, use your best clinical judgement.   Pass- Patient demonstrates balance while shifting weight and ability to step, takes independent steps, does not use assistive device patient is MOBILITY LEVEL 4.      Mobility Level- 4   
NP went in to speak with patient about possible discharge, patient states that she does not believe that all test that should be ran have been, and she also noticed blood in sputum. Patient has not c/o blood in sputum thus far ion writers shift. Patient has had no pain and or discomfort noted and or reported. Patient was in room cleaning earlier in shift without difficulty. No shortness of breath noted and or reported. All safety precautions in place  
Patient feeling anxious. PRN xanax given.  
Progress Note    Admit Date:  1/19/2025    Admitted in December for PNA, treated for CAP      Subjective:  Ms. Blanchard reports hemoptysis, scant amount. Discussed pneumonia panel and patient stated she didn't think she gave sputum sample.     Objective:   Patient Vitals for the past 4 hrs:   BP Temp Temp src Pulse Resp SpO2   01/21/25 1315 111/74 98.2 °F (36.8 °C) Oral 83 17 97 %        No intake or output data in the 24 hours ending 01/21/25 1507      Physical Exam:    Gen: No distress. Alert. Appears chronically ill, obese, pleasant female  Eyes: PERRL. No sclera icterus. No conjunctival injection.   ENT: No discharge. Pharynx clear.   Neck: No JVD.  Trachea midline.  Resp: No accessory muscle use. Diminished throughout. No expiratory wheezes. No crackles. No rhonchi.   On Room air   CV: Regular rate. Regular rhythm. No murmur.  No rub. No edema.   Capillary Refill: Brisk,< 3 seconds   GI: Non-tender. Non-distended.  Normal bowel sounds.  Skin: Warm and dry. No nodule on exposed extremities. No rash on exposed extremities.   M/S: No cyanosis. No joint deformity. No clubbing.   Neuro: Awake. Grossly nonfocal    Psych: Oriented x 3. No anxiety or agitation.      Scheduled Meds:   furosemide  20 mg Oral Daily    hydrocortisone  10 mg Oral QAM    And    hydrocortisone  5 mg Oral Daily    divalproex  500 mg Oral QAM    And    divalproex  1,000 mg Oral Nightly    QUEtiapine  150 mg Oral Nightly    doxycycline hyclate  100 mg Oral 2 times per day    budesonide  0.5 mg Nebulization BID RT    sodium chloride flush  5-40 mL IntraVENous 2 times per day    enoxaparin  30 mg SubCUTAneous BID    guaiFENesin  600 mg Oral BID    [Held by provider] budesonide-formoterol  2 puff Inhalation BID RT    docusate sodium  100 mg Oral BID    fluticasone  1 spray Each Nostril Daily    levomilnacipran  40 mg Oral Daily    levothyroxine  75 mcg Oral QAM AC    methadone  140 mg Oral Daily    pantoprazole  40 mg Oral QAM AC    [Held by 
Progress Note    Admit Date:  1/19/2025    Admitted in December for PNA, treated for CAP      Subjective:  Ms. Blanchard still doesn't fell well. +chest congestion and productive cough with yellow sputum     Objective:   Patient Vitals for the past 4 hrs:   BP Temp Temp src Pulse Resp SpO2   01/20/25 0853 (!) 102/53 97.9 °F (36.6 °C) Oral 97 18 90 %   01/20/25 0816 -- -- -- 80 16 99 %   01/20/25 0730 -- -- -- -- -- 93 %          Intake/Output Summary (Last 24 hours) at 1/20/2025 1053  Last data filed at 1/20/2025 0637  Gross per 24 hour   Intake 986 ml   Output --   Net 986 ml       Physical Exam:    Gen: No distress. Alert. Appears chronically ill, obese, pleasant female  Eyes: PERRL. No sclera icterus. No conjunctival injection.   ENT: No discharge. Pharynx clear.   Neck: No JVD.  Trachea midline.  Resp: No accessory muscle use. Diminished throughout with minimal expiratory wheezes. No crackles. No rhonchi.   On Room air   CV: Regular rate. Regular rhythm. No murmur.  No rub. No edema.   Capillary Refill: Brisk,< 3 seconds   GI: Non-tender. Non-distended.  Normal bowel sounds.  Skin: Warm and dry. No nodule on exposed extremities. No rash on exposed extremities.   M/S: No cyanosis. No joint deformity. No clubbing.   Neuro: Awake. Grossly nonfocal    Psych: Oriented x 3. No anxiety or agitation.      Scheduled Meds:   hydrocortisone  10 mg Oral QAM    And    hydrocortisone  5 mg Oral Daily    divalproex  500 mg Oral QAM    And    divalproex  1,000 mg Oral Nightly    QUEtiapine  150 mg Oral Nightly    ipratropium 0.5 mg-albuterol 2.5 mg  1 Dose Inhalation BID RT    sodium chloride flush  5-40 mL IntraVENous 2 times per day    enoxaparin  30 mg SubCUTAneous BID    guaiFENesin  600 mg Oral BID    budesonide-formoterol  2 puff Inhalation BID RT    docusate sodium  100 mg Oral BID    fluticasone  1 spray Each Nostril Daily    [Held by provider] furosemide  40 mg Oral Daily    levomilnacipran  40 mg Oral Daily    
Pt A/Ox4, awake in bed on RA, pt requesting sleeping pill gave Ambien 10 mg PO. Pt requesting nicotine lozenge gave 2 mg. Pt requesting stool softer gave Glycolax 17 g powder mixed in apple juice. PM assessment completed-see flow sheet, PM medications given, vss, bed locked, call light in reach.   
Pt medicated for pain 6/10 in lower back and bilateral knees.  Medicated with PRN percocet.  See MAR and pain flow sheet.    
Pt medicated for pain 8/10 in lower back and bilateral knees.  Medicated with PRN percocet.  See MAR and pain flow sheet.    
RT Inhaler-Nebulizer Bronchodilator Protocol Note    There is a bronchodilator order in the chart from a provider indicating to follow the RT Bronchodilator Protocol and there is an “Initiate RT Inhaler-Nebulizer Bronchodilator Protocol” order as well (see protocol at bottom of note).    CXR Findings:  No results found.    The findings from the last RT Protocol Assessment were as follows:   History Pulmonary Disease: (P) Chronic pulmonary disease  Respiratory Pattern: (P) Dyspnea on exertion or RR 21-25 bpm  Breath Sounds: (P) Slightly diminished and/or crackles  Cough: (P) Strong, spontaneous, non-productive  Indication for Bronchodilator Therapy: (P) Decreased or absent breath sounds  Bronchodilator Assessment Score: (P) 6    Aerosolized bronchodilator medication orders have been revised according to the RT Inhaler-Nebulizer Bronchodilator Protocol below.    Respiratory Therapist to perform RT Therapy Protocol Assessment initially then follow the protocol.  Repeat RT Therapy Protocol Assessment PRN for score 0-3 or on second treatment, BID, and PRN for scores above 3.    No Indications - adjust the frequency to every 6 hours PRN wheezing or bronchospasm, if no treatments needed after 48 hours then discontinue using Per Protocol order mode.     If indication present, adjust the RT bronchodilator orders based on the Bronchodilator Assessment Score as indicated below.  Use Inhaler orders unless patient has one or more of the following: on home nebulizer, not able to hold breath for 10 seconds, is not alert and oriented, cannot activate and use MDI correctly, or respiratory rate 25 breaths per minute or more, then use the equivalent nebulizer order(s) with same Frequency and PRN reasons based on the score.  If a patient is on this medication at home then do not decrease Frequency below that used at home.    0-3 - enter or revise RT bronchodilator order(s) to equivalent RT Bronchodilator order with Frequency of every 4 
RT Inhaler-Nebulizer Bronchodilator Protocol Note    There is a bronchodilator order in the chart from a provider indicating to follow the RT Bronchodilator Protocol and there is an “Initiate RT Inhaler-Nebulizer Bronchodilator Protocol” order as well (see protocol at bottom of note).    CXR Findings:  No results found.    The findings from the last RT Protocol Assessment were as follows:   History Pulmonary Disease: (P) Chronic pulmonary disease  Respiratory Pattern: (P) Dyspnea on exertion or RR 21-25 bpm  Breath Sounds: (P) Slightly diminished and/or crackles  Cough: (P) Strong, spontaneous, non-productive  Indication for Bronchodilator Therapy: (P) Decreased or absent breath sounds  Bronchodilator Assessment Score: (P) 6    Aerosolized bronchodilator medication orders have been revised according to the RT Inhaler-Nebulizer Bronchodilator Protocol below.    Respiratory Therapist to perform RT Therapy Protocol Assessment initially then follow the protocol.  Repeat RT Therapy Protocol Assessment PRN for score 0-3 or on second treatment, BID, and PRN for scores above 3.    No Indications - adjust the frequency to every 6 hours PRN wheezing or bronchospasm, if no treatments needed after 48 hours then discontinue using Per Protocol order mode.     If indication present, adjust the RT bronchodilator orders based on the Bronchodilator Assessment Score as indicated below.  Use Inhaler orders unless patient has one or more of the following: on home nebulizer, not able to hold breath for 10 seconds, is not alert and oriented, cannot activate and use MDI correctly, or respiratory rate 25 breaths per minute or more, then use the equivalent nebulizer order(s) with same Frequency and PRN reasons based on the score.  If a patient is on this medication at home then do not decrease Frequency below that used at home.    0-3 - enter or revise RT bronchodilator order(s) to equivalent RT Bronchodilator order with Frequency of every 4 
Shift report given to CECI Nesbitt  
Verified meds with pharmacy.  Patient resting; call light in reach  
or increased work of breathing using Per Protocol order mode.        4-6 - enter or revise RT Bronchodilator order(s) to two equivalent RT bronchodilator orders with one order with BID Frequency and one order with Frequency of every 4 hours PRN wheezing or increased work of breathing using Per Protocol order mode.        7-10 - enter or revise RT Bronchodilator order(s) to two equivalent RT bronchodilator orders with one order with TID Frequency and one order with Frequency of every 4 hours PRN wheezing or increased work of breathing using Per Protocol order mode.       11-13 - enter or revise RT Bronchodilator order(s) to one equivalent RT bronchodilator order with QID Frequency and an Albuterol order with Frequency of every 4 hours PRN wheezing or increased work of breathing using Per Protocol order mode.      Greater than 13 - enter or revise RT Bronchodilator order(s) to one equivalent RT bronchodilator order with every 4 hours Frequency and an Albuterol order with Frequency of every 2 hours PRN wheezing or increased work of breathing using Per Protocol order mode.     QID  HOME REGIMENT.     Electronically signed by Chari Shaw RCP on 1/19/2025 at 11:26 PM

## 2025-02-02 ENCOUNTER — HOSPITAL ENCOUNTER (OUTPATIENT)
Age: 45
Setting detail: OBSERVATION
LOS: 1 days | Discharge: HOME | End: 2025-02-03
Attending: STUDENT IN AN ORGANIZED HEALTH CARE EDUCATION/TRAINING PROGRAM | Admitting: INTERNAL MEDICINE
Payer: COMMERCIAL

## 2025-02-02 ENCOUNTER — APPOINTMENT (OUTPATIENT)
Dept: GENERAL RADIOLOGY | Age: 45
End: 2025-02-02
Payer: COMMERCIAL

## 2025-02-02 DIAGNOSIS — R09.02 HYPOXEMIA: ICD-10-CM

## 2025-02-02 DIAGNOSIS — U07.1 COVID-19: Primary | ICD-10-CM

## 2025-02-02 DIAGNOSIS — T42.6X1A VALPROIC ACID TOXICITY, ACCIDENTAL OR UNINTENTIONAL, INITIAL ENCOUNTER: ICD-10-CM

## 2025-02-02 LAB
ALBUMIN SERPL-MCNC: 3.9 G/DL (ref 3.4–5)
ALBUMIN/GLOB SERPL: 1.4 {RATIO} (ref 1.1–2.2)
ALP SERPL-CCNC: 82 U/L (ref 40–129)
ALT SERPL-CCNC: 19 U/L (ref 10–40)
ANION GAP SERPL CALCULATED.3IONS-SCNC: 10 MMOL/L (ref 3–16)
AST SERPL-CCNC: 45 U/L (ref 15–37)
BASE EXCESS BLDV CALC-SCNC: 1.3 MMOL/L (ref -3–3)
BASOPHILS # BLD: 0 K/UL (ref 0–0.2)
BASOPHILS NFR BLD: 0.9 %
BILIRUB SERPL-MCNC: <0.2 MG/DL (ref 0–1)
BILIRUB UR QL STRIP.AUTO: NEGATIVE
BUN SERPL-MCNC: 13 MG/DL (ref 7–20)
CALCIUM SERPL-MCNC: 8.7 MG/DL (ref 8.3–10.6)
CHLORIDE SERPL-SCNC: 93 MMOL/L (ref 99–110)
CLARITY UR: CLEAR
CO2 BLDV-SCNC: 29 MMOL/L
CO2 SERPL-SCNC: 30 MMOL/L (ref 21–32)
COHGB MFR BLDV: 2.6 % (ref 0–1.5)
COLOR UR: YELLOW
CREAT SERPL-MCNC: 1.1 MG/DL (ref 0.6–1.1)
DEPRECATED RDW RBC AUTO: 16.7 % (ref 12.4–15.4)
EOSINOPHIL # BLD: 0.1 K/UL (ref 0–0.6)
EOSINOPHIL NFR BLD: 1.9 %
FLUAV RNA RESP QL NAA+PROBE: NOT DETECTED
FLUBV RNA RESP QL NAA+PROBE: NOT DETECTED
GFR SERPLBLD CREATININE-BSD FMLA CKD-EPI: 62 ML/MIN/{1.73_M2}
GLUCOSE SERPL-MCNC: 70 MG/DL (ref 70–99)
GLUCOSE UR STRIP.AUTO-MCNC: NEGATIVE MG/DL
HCO3 BLDV-SCNC: 27.5 MMOL/L (ref 23–29)
HCT VFR BLD AUTO: 38.7 % (ref 36–48)
HGB BLD-MCNC: 12.9 G/DL (ref 12–16)
HGB UR QL STRIP.AUTO: NEGATIVE
KETONES UR STRIP.AUTO-MCNC: ABNORMAL MG/DL
LACTATE BLDV-SCNC: 0.7 MMOL/L (ref 0.4–1.9)
LEUKOCYTE ESTERASE UR QL STRIP.AUTO: NEGATIVE
LYMPHOCYTES # BLD: 1.2 K/UL (ref 1–5.1)
LYMPHOCYTES NFR BLD: 28.8 %
MCH RBC QN AUTO: 28.9 PG (ref 26–34)
MCHC RBC AUTO-ENTMCNC: 33.4 G/DL (ref 31–36)
MCV RBC AUTO: 86.4 FL (ref 80–100)
METHGB MFR BLDV: 0.3 %
MONOCYTES # BLD: 0.7 K/UL (ref 0–1.3)
MONOCYTES NFR BLD: 17 %
NEUTROPHILS # BLD: 2.1 K/UL (ref 1.7–7.7)
NEUTROPHILS NFR BLD: 51.4 %
NITRITE UR QL STRIP.AUTO: NEGATIVE
NT-PROBNP SERPL-MCNC: 60 PG/ML (ref 0–124)
O2 CT VFR BLDV CALC: 12 VOL %
O2 THERAPY: ABNORMAL
PCO2 BLDV: 49.4 MMHG (ref 40–50)
PH BLDV: 7.36 [PH] (ref 7.35–7.45)
PH UR STRIP.AUTO: 6.5 [PH] (ref 5–8)
PLATELET # BLD AUTO: 260 K/UL (ref 135–450)
PMV BLD AUTO: 8 FL (ref 5–10.5)
PO2 BLDV: 34.9 MMHG (ref 25–40)
POTASSIUM SERPL-SCNC: 4.3 MMOL/L (ref 3.5–5.1)
PROT SERPL-MCNC: 6.6 G/DL (ref 6.4–8.2)
PROT UR STRIP.AUTO-MCNC: NEGATIVE MG/DL
RBC # BLD AUTO: 4.48 M/UL (ref 4–5.2)
SAO2 % BLDV: 64 %
SARS-COV-2 RNA RESP QL NAA+PROBE: DETECTED
SODIUM SERPL-SCNC: 133 MMOL/L (ref 136–145)
SP GR UR STRIP.AUTO: 1.01 (ref 1–1.03)
TROPONIN, HIGH SENSITIVITY: 11 NG/L (ref 0–14)
TROPONIN, HIGH SENSITIVITY: 7 NG/L (ref 0–14)
UA COMPLETE W REFLEX CULTURE PNL UR: ABNORMAL
UA DIPSTICK W REFLEX MICRO PNL UR: ABNORMAL
URN SPEC COLLECT METH UR: ABNORMAL
UROBILINOGEN UR STRIP-ACNC: 0.2 E.U./DL
VALPROATE SERPL-MCNC: 111 UG/ML (ref 50–100)
VALPROATE SERPL-MCNC: 96 UG/ML (ref 50–100)
WBC # BLD AUTO: 4.1 K/UL (ref 4–11)

## 2025-02-02 PROCEDURE — 94761 N-INVAS EAR/PLS OXIMETRY MLT: CPT

## 2025-02-02 PROCEDURE — 80307 DRUG TEST PRSMV CHEM ANLYZR: CPT

## 2025-02-02 PROCEDURE — 84484 ASSAY OF TROPONIN QUANT: CPT

## 2025-02-02 PROCEDURE — 82803 BLOOD GASES ANY COMBINATION: CPT

## 2025-02-02 PROCEDURE — 96374 THER/PROPH/DIAG INJ IV PUSH: CPT

## 2025-02-02 PROCEDURE — 83605 ASSAY OF LACTIC ACID: CPT

## 2025-02-02 PROCEDURE — 2700000000 HC OXYGEN THERAPY PER DAY

## 2025-02-02 PROCEDURE — 71045 X-RAY EXAM CHEST 1 VIEW: CPT

## 2025-02-02 PROCEDURE — 83880 ASSAY OF NATRIURETIC PEPTIDE: CPT

## 2025-02-02 PROCEDURE — 6370000000 HC RX 637 (ALT 250 FOR IP): Performed by: NURSE PRACTITIONER

## 2025-02-02 PROCEDURE — 93005 ELECTROCARDIOGRAM TRACING: CPT | Performed by: STUDENT IN AN ORGANIZED HEALTH CARE EDUCATION/TRAINING PROGRAM

## 2025-02-02 PROCEDURE — 81003 URINALYSIS AUTO W/O SCOPE: CPT

## 2025-02-02 PROCEDURE — 36415 COLL VENOUS BLD VENIPUNCTURE: CPT

## 2025-02-02 PROCEDURE — 80164 ASSAY DIPROPYLACETIC ACD TOT: CPT

## 2025-02-02 PROCEDURE — 87636 SARSCOV2 & INF A&B AMP PRB: CPT

## 2025-02-02 PROCEDURE — 99285 EMERGENCY DEPT VISIT HI MDM: CPT

## 2025-02-02 PROCEDURE — 85025 COMPLETE CBC W/AUTO DIFF WBC: CPT

## 2025-02-02 PROCEDURE — 6360000002 HC RX W HCPCS: Performed by: NURSE PRACTITIONER

## 2025-02-02 PROCEDURE — 1200000000 HC SEMI PRIVATE

## 2025-02-02 PROCEDURE — 80053 COMPREHEN METABOLIC PANEL: CPT

## 2025-02-02 RX ORDER — ACETAMINOPHEN 650 MG/1
650 SUPPOSITORY RECTAL EVERY 6 HOURS PRN
Status: DISCONTINUED | OUTPATIENT
Start: 2025-02-02 | End: 2025-02-03 | Stop reason: HOSPADM

## 2025-02-02 RX ORDER — METHADONE HYDROCHLORIDE 10 MG/1
140 TABLET ORAL DAILY
Status: DISCONTINUED | OUTPATIENT
Start: 2025-02-03 | End: 2025-02-03 | Stop reason: HOSPADM

## 2025-02-02 RX ORDER — ONDANSETRON 2 MG/ML
4 INJECTION INTRAMUSCULAR; INTRAVENOUS EVERY 6 HOURS PRN
Status: DISCONTINUED | OUTPATIENT
Start: 2025-02-02 | End: 2025-02-03 | Stop reason: HOSPADM

## 2025-02-02 RX ORDER — ONDANSETRON 4 MG/1
4 TABLET, ORALLY DISINTEGRATING ORAL EVERY 8 HOURS PRN
Status: DISCONTINUED | OUTPATIENT
Start: 2025-02-02 | End: 2025-02-02 | Stop reason: SDUPTHER

## 2025-02-02 RX ORDER — ZOLPIDEM TARTRATE 5 MG/1
10 TABLET ORAL
Status: DISCONTINUED | OUTPATIENT
Start: 2025-02-03 | End: 2025-02-03 | Stop reason: HOSPADM

## 2025-02-02 RX ORDER — FLUTICASONE PROPIONATE 50 MCG
1 SPRAY, SUSPENSION (ML) NASAL 2 TIMES DAILY
Status: DISCONTINUED | OUTPATIENT
Start: 2025-02-03 | End: 2025-02-03 | Stop reason: HOSPADM

## 2025-02-02 RX ORDER — IPRATROPIUM BROMIDE AND ALBUTEROL SULFATE 2.5; .5 MG/3ML; MG/3ML
1 SOLUTION RESPIRATORY (INHALATION) ONCE
Status: COMPLETED | OUTPATIENT
Start: 2025-02-02 | End: 2025-02-02

## 2025-02-02 RX ORDER — POTASSIUM CHLORIDE 7.45 MG/ML
10 INJECTION INTRAVENOUS PRN
Status: DISCONTINUED | OUTPATIENT
Start: 2025-02-02 | End: 2025-02-03 | Stop reason: HOSPADM

## 2025-02-02 RX ORDER — POTASSIUM CHLORIDE 1500 MG/1
40 TABLET, EXTENDED RELEASE ORAL PRN
Status: DISCONTINUED | OUTPATIENT
Start: 2025-02-02 | End: 2025-02-03 | Stop reason: HOSPADM

## 2025-02-02 RX ORDER — OXYCODONE AND ACETAMINOPHEN 5; 325 MG/1; MG/1
1 TABLET ORAL EVERY 8 HOURS PRN
Status: DISCONTINUED | OUTPATIENT
Start: 2025-02-02 | End: 2025-02-03 | Stop reason: HOSPADM

## 2025-02-02 RX ORDER — ACETAMINOPHEN 325 MG/1
650 TABLET ORAL EVERY 6 HOURS PRN
Status: DISCONTINUED | OUTPATIENT
Start: 2025-02-02 | End: 2025-02-02 | Stop reason: SDUPTHER

## 2025-02-02 RX ORDER — SODIUM CHLORIDE 9 MG/ML
INJECTION, SOLUTION INTRAVENOUS PRN
Status: DISCONTINUED | OUTPATIENT
Start: 2025-02-02 | End: 2025-02-03 | Stop reason: HOSPADM

## 2025-02-02 RX ORDER — OXYCODONE AND ACETAMINOPHEN 5; 325 MG/1; MG/1
1 TABLET ORAL ONCE
Status: COMPLETED | OUTPATIENT
Start: 2025-02-02 | End: 2025-02-02

## 2025-02-02 RX ORDER — ALPRAZOLAM 1 MG/1
1 TABLET ORAL 3 TIMES DAILY PRN
Status: DISCONTINUED | OUTPATIENT
Start: 2025-02-02 | End: 2025-02-03 | Stop reason: HOSPADM

## 2025-02-02 RX ORDER — PANTOPRAZOLE SODIUM 40 MG/1
40 TABLET, DELAYED RELEASE ORAL
Status: DISCONTINUED | OUTPATIENT
Start: 2025-02-03 | End: 2025-02-03

## 2025-02-02 RX ORDER — POLYETHYLENE GLYCOL 3350 17 G
4 POWDER IN PACKET (EA) ORAL
Status: DISCONTINUED | OUTPATIENT
Start: 2025-02-02 | End: 2025-02-03 | Stop reason: HOSPADM

## 2025-02-02 RX ORDER — DOCUSATE SODIUM 100 MG/1
100 CAPSULE, LIQUID FILLED ORAL 2 TIMES DAILY
Status: DISCONTINUED | OUTPATIENT
Start: 2025-02-03 | End: 2025-02-03 | Stop reason: HOSPADM

## 2025-02-02 RX ORDER — HYDROCORTISONE SODIUM SUCCINATE 100 MG/2ML
100 INJECTION INTRAMUSCULAR; INTRAVENOUS ONCE
Status: COMPLETED | OUTPATIENT
Start: 2025-02-02 | End: 2025-02-02

## 2025-02-02 RX ORDER — ENOXAPARIN SODIUM 100 MG/ML
30 INJECTION SUBCUTANEOUS 2 TIMES DAILY
Status: DISCONTINUED | OUTPATIENT
Start: 2025-02-02 | End: 2025-02-03 | Stop reason: HOSPADM

## 2025-02-02 RX ORDER — SODIUM CHLORIDE 0.9 % (FLUSH) 0.9 %
5-40 SYRINGE (ML) INJECTION PRN
Status: DISCONTINUED | OUTPATIENT
Start: 2025-02-02 | End: 2025-02-03 | Stop reason: HOSPADM

## 2025-02-02 RX ORDER — ACETAMINOPHEN 650 MG/1
650 SUPPOSITORY RECTAL EVERY 6 HOURS PRN
Status: DISCONTINUED | OUTPATIENT
Start: 2025-02-02 | End: 2025-02-02 | Stop reason: SDUPTHER

## 2025-02-02 RX ORDER — SODIUM CHLORIDE 0.9 % (FLUSH) 0.9 %
5-40 SYRINGE (ML) INJECTION EVERY 12 HOURS SCHEDULED
Status: DISCONTINUED | OUTPATIENT
Start: 2025-02-02 | End: 2025-02-03 | Stop reason: HOSPADM

## 2025-02-02 RX ORDER — FUROSEMIDE 20 MG/1
20 TABLET ORAL DAILY
Status: DISCONTINUED | OUTPATIENT
Start: 2025-02-03 | End: 2025-02-02

## 2025-02-02 RX ORDER — MAGNESIUM SULFATE IN WATER 40 MG/ML
2000 INJECTION, SOLUTION INTRAVENOUS PRN
Status: DISCONTINUED | OUTPATIENT
Start: 2025-02-02 | End: 2025-02-03 | Stop reason: HOSPADM

## 2025-02-02 RX ORDER — DIVALPROEX SODIUM 500 MG/1
500 TABLET, FILM COATED, EXTENDED RELEASE ORAL DAILY
Status: DISCONTINUED | OUTPATIENT
Start: 2025-02-03 | End: 2025-02-03 | Stop reason: HOSPADM

## 2025-02-02 RX ORDER — ACETAMINOPHEN 325 MG/1
650 TABLET ORAL EVERY 6 HOURS PRN
Status: DISCONTINUED | OUTPATIENT
Start: 2025-02-02 | End: 2025-02-03 | Stop reason: HOSPADM

## 2025-02-02 RX ORDER — POLYETHYLENE GLYCOL 3350 17 G/17G
17 POWDER, FOR SOLUTION ORAL DAILY PRN
Status: DISCONTINUED | OUTPATIENT
Start: 2025-02-02 | End: 2025-02-03 | Stop reason: HOSPADM

## 2025-02-02 RX ORDER — IPRATROPIUM BROMIDE AND ALBUTEROL SULFATE 2.5; .5 MG/3ML; MG/3ML
1 SOLUTION RESPIRATORY (INHALATION)
Status: DISCONTINUED | OUTPATIENT
Start: 2025-02-03 | End: 2025-02-03

## 2025-02-02 RX ORDER — DIVALPROEX SODIUM 500 MG/1
1000 TABLET, FILM COATED, EXTENDED RELEASE ORAL
Status: DISCONTINUED | OUTPATIENT
Start: 2025-02-03 | End: 2025-02-03 | Stop reason: HOSPADM

## 2025-02-02 RX ORDER — ONDANSETRON 4 MG/1
4 TABLET, ORALLY DISINTEGRATING ORAL EVERY 8 HOURS PRN
Status: DISCONTINUED | OUTPATIENT
Start: 2025-02-02 | End: 2025-02-03 | Stop reason: HOSPADM

## 2025-02-02 RX ADMIN — OXYCODONE HYDROCHLORIDE AND ACETAMINOPHEN 1 TABLET: 5; 325 TABLET ORAL at 18:49

## 2025-02-02 RX ADMIN — HYDROCORTISONE SODIUM SUCCINATE 100 MG: 100 INJECTION, POWDER, FOR SOLUTION INTRAMUSCULAR; INTRAVENOUS at 18:48

## 2025-02-02 RX ADMIN — IPRATROPIUM BROMIDE AND ALBUTEROL SULFATE 1 DOSE: 2.5; .5 SOLUTION RESPIRATORY (INHALATION) at 21:01

## 2025-02-02 ASSESSMENT — LIFESTYLE VARIABLES
HOW MANY STANDARD DRINKS CONTAINING ALCOHOL DO YOU HAVE ON A TYPICAL DAY: PATIENT DOES NOT DRINK
HOW OFTEN DO YOU HAVE A DRINK CONTAINING ALCOHOL: NEVER
HOW OFTEN DO YOU HAVE A DRINK CONTAINING ALCOHOL: NEVER

## 2025-02-02 ASSESSMENT — PAIN SCALES - GENERAL
PAINLEVEL_OUTOF10: 8
PAINLEVEL_OUTOF10: 8

## 2025-02-02 ASSESSMENT — PAIN - FUNCTIONAL ASSESSMENT: PAIN_FUNCTIONAL_ASSESSMENT: 0-10

## 2025-02-02 NOTE — ED PROVIDER NOTES
[address]-SURGICAL  EMERGENCY DEPARTMENT ENCOUNTER        Pt Name: Anjelica Blanchard  MRN: [id number]  Birthdate 1980  Date of evaluation: 2/2/2025  Provider: ANNA Crump - DIVYA  PCP: Smith Neff MD  Note Started: 3:59 PM EST 2/6/25       I have seen and evaluated this patient with my supervising physician No att. providers found.      CHIEF COMPLAINT       Chief Complaint   Patient presents with    Shortness of Breath     Shortness of breath; feels like she has pneumonia again; oxygen has been down to 85% on home; [facility] and        HISTORY OF PRESENT ILLNESS: 1 or more Elements     History From: Patient    Limitations to history : None    Social Determinants Significantly Affecting Health : None    Chief Complaint: Shortness of breath    Anejlica Blanchard is a 44 y.o. female who presents patient presents to the [facility] part today with symptoms of shortness of breath.  Started with symptoms approximately 5 days ago.  States she has symptoms of runny nose and congestion.  Was concern for influenza.  Patient states that she had been supplied supplemental oxygen at home in the past but had graduated from needing it.  States that she has noted with ambulation at home her oxygen saturation dips to 85%.  She denies any chest pain.  No abdominal discomfort or vomiting.  No other acute concerns.    Nursing Notes were all reviewed and agreed with or any disagreements were addressed in the HPI.    REVIEW OF SYSTEMS :      Review of Systems    Positives and Pertinent negatives as per HPI.     SURGICAL HISTORY     Past Surgical History:   Procedure Laterality Date    BRONCHOSCOPY N/A 11/2/2023    BRONCHOSCOPY DIAGNOSTIC OR CELL WASH ONLY performed by Manisha Medina MD at [facility] END[facility]    BRONCHOSCOPY  11/2/2023    BRONCHOSCOPY ALVEOLAR LAVAGE performed by Manisha Medina MD at [facility] ENDOSCOPY    BRONCHOSCOPY  11/2/2023    BRONCHOSCOPY  Universal Safety Interventions

## 2025-02-03 ENCOUNTER — TELEPHONE (OUTPATIENT)
Dept: PULMONOLOGY | Age: 45
End: 2025-02-03

## 2025-02-03 VITALS
HEART RATE: 75 BPM | WEIGHT: 224.8 LBS | RESPIRATION RATE: 16 BRPM | TEMPERATURE: 98.6 F | BODY MASS INDEX: 38.38 KG/M2 | DIASTOLIC BLOOD PRESSURE: 62 MMHG | HEIGHT: 64 IN | OXYGEN SATURATION: 99 % | SYSTOLIC BLOOD PRESSURE: 100 MMHG

## 2025-02-03 PROBLEM — E27.1 ADRENAL INSUFFICIENCY (ADDISON'S DISEASE) (HCC): Status: ACTIVE | Noted: 2025-02-03

## 2025-02-03 PROBLEM — U07.1 COVID-19: Status: ACTIVE | Noted: 2025-02-03

## 2025-02-03 LAB
AMPHETAMINES UR QL SCN>1000 NG/ML: ABNORMAL
ANION GAP SERPL CALCULATED.3IONS-SCNC: 10 MMOL/L (ref 3–16)
BARBITURATES UR QL SCN>200 NG/ML: ABNORMAL
BASOPHILS # BLD: 0 K/UL (ref 0–0.2)
BASOPHILS NFR BLD: 0.5 %
BENZODIAZ UR QL SCN>200 NG/ML: POSITIVE
BUN SERPL-MCNC: 13 MG/DL (ref 7–20)
CALCIUM SERPL-MCNC: 8.3 MG/DL (ref 8.3–10.6)
CANNABINOIDS UR QL SCN>50 NG/ML: ABNORMAL
CHLORIDE SERPL-SCNC: 94 MMOL/L (ref 99–110)
CO2 SERPL-SCNC: 27 MMOL/L (ref 21–32)
COCAINE UR QL SCN: ABNORMAL
CREAT SERPL-MCNC: 0.9 MG/DL (ref 0.6–1.1)
DEPRECATED RDW RBC AUTO: 16.9 % (ref 12.4–15.4)
DRUG SCREEN COMMENT UR-IMP: ABNORMAL
EOSINOPHIL # BLD: 0 K/UL (ref 0–0.6)
EOSINOPHIL NFR BLD: 0 %
FENTANYL SCREEN, URINE: ABNORMAL
GFR SERPLBLD CREATININE-BSD FMLA CKD-EPI: 79 ML/MIN/{1.73_M2}
GLUCOSE SERPL-MCNC: 140 MG/DL (ref 70–99)
HCT VFR BLD AUTO: 33.6 % (ref 36–48)
HGB BLD-MCNC: 11.2 G/DL (ref 12–16)
LYMPHOCYTES # BLD: 0.8 K/UL (ref 1–5.1)
LYMPHOCYTES NFR BLD: 24.8 %
MCH RBC QN AUTO: 28.9 PG (ref 26–34)
MCHC RBC AUTO-ENTMCNC: 33.3 G/DL (ref 31–36)
MCV RBC AUTO: 86.8 FL (ref 80–100)
METHADONE UR QL SCN>300 NG/ML: POSITIVE
MONOCYTES # BLD: 0.2 K/UL (ref 0–1.3)
MONOCYTES NFR BLD: 6.3 %
NEUTROPHILS # BLD: 2.2 K/UL (ref 1.7–7.7)
NEUTROPHILS NFR BLD: 68.4 %
OPIATES UR QL SCN>300 NG/ML: ABNORMAL
OXYCODONE UR QL SCN: POSITIVE
PCP UR QL SCN>25 NG/ML: ABNORMAL
PH UR STRIP: 6 [PH]
PLATELET # BLD AUTO: 228 K/UL (ref 135–450)
PMV BLD AUTO: 8.2 FL (ref 5–10.5)
POTASSIUM SERPL-SCNC: 4.1 MMOL/L (ref 3.5–5.1)
RBC # BLD AUTO: 3.88 M/UL (ref 4–5.2)
SODIUM SERPL-SCNC: 131 MMOL/L (ref 136–145)
WBC # BLD AUTO: 3.2 K/UL (ref 4–11)

## 2025-02-03 PROCEDURE — 85025 COMPLETE CBC W/AUTO DIFF WBC: CPT

## 2025-02-03 PROCEDURE — 6360000002 HC RX W HCPCS: Performed by: PEDIATRICS

## 2025-02-03 PROCEDURE — 94761 N-INVAS EAR/PLS OXIMETRY MLT: CPT

## 2025-02-03 PROCEDURE — 99238 HOSP IP/OBS DSCHRG MGMT 30/<: CPT | Performed by: INTERNAL MEDICINE

## 2025-02-03 PROCEDURE — 2500000003 HC RX 250 WO HCPCS: Performed by: PEDIATRICS

## 2025-02-03 PROCEDURE — 94640 AIRWAY INHALATION TREATMENT: CPT

## 2025-02-03 PROCEDURE — 36415 COLL VENOUS BLD VENIPUNCTURE: CPT

## 2025-02-03 PROCEDURE — 6370000000 HC RX 637 (ALT 250 FOR IP): Performed by: PEDIATRICS

## 2025-02-03 PROCEDURE — 80048 BASIC METABOLIC PNL TOTAL CA: CPT

## 2025-02-03 PROCEDURE — G0378 HOSPITAL OBSERVATION PER HR: HCPCS

## 2025-02-03 PROCEDURE — 6370000000 HC RX 637 (ALT 250 FOR IP): Performed by: INTERNAL MEDICINE

## 2025-02-03 RX ORDER — PANTOPRAZOLE SODIUM 40 MG/1
40 TABLET, DELAYED RELEASE ORAL
Status: DISCONTINUED | OUTPATIENT
Start: 2025-02-04 | End: 2025-02-03 | Stop reason: HOSPADM

## 2025-02-03 RX ORDER — IPRATROPIUM BROMIDE AND ALBUTEROL SULFATE 2.5; .5 MG/3ML; MG/3ML
1 SOLUTION RESPIRATORY (INHALATION)
Status: DISCONTINUED | OUTPATIENT
Start: 2025-02-03 | End: 2025-02-03 | Stop reason: HOSPADM

## 2025-02-03 RX ORDER — HYDROCORTISONE 10 MG/1
10 TABLET ORAL EVERY MORNING
Status: DISCONTINUED | OUTPATIENT
Start: 2025-02-03 | End: 2025-02-03 | Stop reason: HOSPADM

## 2025-02-03 RX ORDER — PANTOPRAZOLE SODIUM 40 MG/1
40 TABLET, DELAYED RELEASE ORAL ONCE
Status: COMPLETED | OUTPATIENT
Start: 2025-02-03 | End: 2025-02-03

## 2025-02-03 RX ORDER — CALCIUM CARBONATE 500 MG/1
1000 TABLET, CHEWABLE ORAL 3 TIMES DAILY PRN
Status: DISCONTINUED | OUTPATIENT
Start: 2025-02-03 | End: 2025-02-03 | Stop reason: HOSPADM

## 2025-02-03 RX ORDER — ALBUTEROL SULFATE 0.83 MG/ML
2.5 SOLUTION RESPIRATORY (INHALATION) EVERY 4 HOURS PRN
Status: DISCONTINUED | OUTPATIENT
Start: 2025-02-03 | End: 2025-02-03 | Stop reason: HOSPADM

## 2025-02-03 RX ORDER — HYDROCORTISONE 10 MG/1
5 TABLET ORAL DAILY
Status: DISCONTINUED | OUTPATIENT
Start: 2025-02-03 | End: 2025-02-03 | Stop reason: HOSPADM

## 2025-02-03 RX ORDER — IPRATROPIUM BROMIDE AND ALBUTEROL SULFATE 2.5; .5 MG/3ML; MG/3ML
1 SOLUTION RESPIRATORY (INHALATION)
Status: DISCONTINUED | OUTPATIENT
Start: 2025-02-03 | End: 2025-02-03

## 2025-02-03 RX ORDER — HYDROCORTISONE 5 MG/1
TABLET ORAL
Qty: 18 TABLET | Refills: 0 | Status: SHIPPED | OUTPATIENT
Start: 2025-02-03

## 2025-02-03 RX ORDER — PROCHLORPERAZINE EDISYLATE 5 MG/ML
10 INJECTION INTRAMUSCULAR; INTRAVENOUS EVERY 6 HOURS PRN
Status: DISCONTINUED | OUTPATIENT
Start: 2025-02-03 | End: 2025-02-03 | Stop reason: HOSPADM

## 2025-02-03 RX ORDER — PANTOPRAZOLE SODIUM 40 MG/1
40 TABLET, DELAYED RELEASE ORAL
Status: DISCONTINUED | OUTPATIENT
Start: 2025-02-03 | End: 2025-02-03

## 2025-02-03 RX ORDER — IPRATROPIUM BROMIDE AND ALBUTEROL SULFATE 2.5; .5 MG/3ML; MG/3ML
1 SOLUTION RESPIRATORY (INHALATION) EVERY 4 HOURS PRN
Status: DISCONTINUED | OUTPATIENT
Start: 2025-02-03 | End: 2025-02-03 | Stop reason: HOSPADM

## 2025-02-03 RX ADMIN — ONDANSETRON 4 MG: 4 TABLET, ORALLY DISINTEGRATING ORAL at 01:57

## 2025-02-03 RX ADMIN — Medication 10 ML: at 09:01

## 2025-02-03 RX ADMIN — LEVOTHYROXINE SODIUM 75 MCG: 0.05 TABLET ORAL at 06:25

## 2025-02-03 RX ADMIN — ALPRAZOLAM 1 MG: 1 TABLET ORAL at 09:12

## 2025-02-03 RX ADMIN — IPRATROPIUM BROMIDE AND ALBUTEROL SULFATE 1 DOSE: 2.5; .5 SOLUTION RESPIRATORY (INHALATION) at 07:47

## 2025-02-03 RX ADMIN — Medication 10 ML: at 01:58

## 2025-02-03 RX ADMIN — PANTOPRAZOLE SODIUM 40 MG: 40 TABLET, DELAYED RELEASE ORAL at 02:34

## 2025-02-03 RX ADMIN — POLYETHYLENE GLYCOL (3350) 17 G: 17 POWDER, FOR SOLUTION ORAL at 09:12

## 2025-02-03 RX ADMIN — OXYCODONE HYDROCHLORIDE AND ACETAMINOPHEN 1 TABLET: 5; 325 TABLET ORAL at 01:57

## 2025-02-03 RX ADMIN — LEVOMILNACIPRAN HYDROCHLORIDE 40 MG: 20 CAPSULE, EXTENDED RELEASE ORAL at 09:12

## 2025-02-03 RX ADMIN — DIVALPROEX SODIUM 500 MG: 500 TABLET, FILM COATED, EXTENDED RELEASE ORAL at 09:01

## 2025-02-03 RX ADMIN — METHADONE HYDROCHLORIDE 140 MG: 10 TABLET ORAL at 09:00

## 2025-02-03 RX ADMIN — ENOXAPARIN SODIUM 30 MG: 100 INJECTION SUBCUTANEOUS at 09:00

## 2025-02-03 RX ADMIN — DIVALPROEX SODIUM 1000 MG: 500 TABLET, FILM COATED, EXTENDED RELEASE ORAL at 00:43

## 2025-02-03 RX ADMIN — PREGABALIN 150 MG: 100 CAPSULE ORAL at 09:00

## 2025-02-03 RX ADMIN — ENOXAPARIN SODIUM 30 MG: 100 INJECTION SUBCUTANEOUS at 00:46

## 2025-02-03 RX ADMIN — DEXAMETHASONE 6 MG: 4 TABLET ORAL at 09:01

## 2025-02-03 RX ADMIN — ZOLPIDEM TARTRATE 10 MG: 5 TABLET ORAL at 00:43

## 2025-02-03 RX ADMIN — DOCUSATE SODIUM 100 MG: 100 CAPSULE, LIQUID FILLED ORAL at 09:01

## 2025-02-03 RX ADMIN — ALPRAZOLAM 1 MG: 1 TABLET ORAL at 01:57

## 2025-02-03 RX ADMIN — PREGABALIN 150 MG: 100 CAPSULE ORAL at 00:42

## 2025-02-03 RX ADMIN — SALINE NASAL SPRAY 2 SPRAY: 1.5 SOLUTION NASAL at 02:35

## 2025-02-03 RX ADMIN — QUETIAPINE FUMARATE 150 MG: 25 TABLET ORAL at 00:43

## 2025-02-03 RX ADMIN — DOCUSATE SODIUM 100 MG: 100 CAPSULE, LIQUID FILLED ORAL at 00:43

## 2025-02-03 RX ADMIN — NICOTINE POLACRILEX 4 MG: 2 LOZENGE ORAL at 09:15

## 2025-02-03 RX ADMIN — HYDROCORTISONE 10 MG: 10 TABLET ORAL at 09:01

## 2025-02-03 RX ADMIN — OXYCODONE HYDROCHLORIDE AND ACETAMINOPHEN 1 TABLET: 5; 325 TABLET ORAL at 09:12

## 2025-02-03 ASSESSMENT — PAIN SCALES - GENERAL
PAINLEVEL_OUTOF10: 8
PAINLEVEL_OUTOF10: 8

## 2025-02-03 ASSESSMENT — PAIN DESCRIPTION - DESCRIPTORS: DESCRIPTORS: SHARP;ACHING;DISCOMFORT;THROBBING

## 2025-02-03 ASSESSMENT — PAIN DESCRIPTION - PAIN TYPE: TYPE: CHRONIC PAIN

## 2025-02-03 ASSESSMENT — PAIN DESCRIPTION - ORIENTATION: ORIENTATION: RIGHT;LEFT;LOWER

## 2025-02-03 ASSESSMENT — PAIN - FUNCTIONAL ASSESSMENT: PAIN_FUNCTIONAL_ASSESSMENT: ACTIVITIES ARE NOT PREVENTED

## 2025-02-03 ASSESSMENT — PAIN DESCRIPTION - LOCATION: LOCATION: BACK;KNEE

## 2025-02-03 NOTE — PROGRESS NOTES
Assessment completed. Pt c/o pain 8/10, meds given, see MAR. Respirations are even & easy. No complaints voiced. Pt denies needs at this time. SR up x 2, and bed in low position. Call light is within reach.    Bedside Mobility Assessment Tool (BMAT):     Assessment Level 1- Sit and Shake    1. From a semi-reclined position, ask patient to sit up and rotate to a seated position at the side of the bed. Can use the bedrail.    2. Ask patient to reach out and grab your hand and shake making sure patient reaches across his/her midline.   Pass- Patient is able to come to a seated position, maintain core strength. Maintains seated balance while reaching across midline. Move on to Assessment Level 2.     Assessment Level 2- Stretch and Point   1. With patient in seated position at the side of the bed, have patient place both feet on the floor (or stool) with knees no higher than hips.    2. Ask patient to stretch one leg and straighten the knee, then bend the ankle/flex and point the toes. If appropriate, repeat with the other leg.   Pass- Patient is able to demonstrate appropriate quad strength on intended weight bearing limb(s). Move onto Assessment Level 3.     Assessment Level 3- Stand   1. Ask patient to elevate off the bed or chair (seated to standing) using an assistive device (cane, bedrail).    2. Patient should be able to raise buttocks off be and hold for a count of five. May repeat once.   Pass- Patient maintains standing stability for at least 5 seconds, proceed to assessment level 4.    Assessment Level 4- Walk   1. Ask patient to march in place at bedside.    2. Then ask patient to advance step and return each foot. Some medical conditions may render a patient from stepping backwards, use your best clinical judgement.   Pass- Patient demonstrates balance while shifting weight and ability to step, takes independent steps, does not use assistive device patient is MOBILITY LEVEL 4.      Mobility Level- 4

## 2025-02-03 NOTE — TELEPHONE ENCOUNTER
Patient called and has covid. She thought with her recent hospital visit yesterday they would give her O2 to take home but they did not and she gave her oxygen back to the [email or web address].   She is requesting an order for O2 again because when she gets sick her oxygen drops.   She has cancelled last 3 appts and has been a no-show to another. She has not gotten the testing done for the in-lab sleep test.   She said when Covid is done she will call to schedule her sleep study and then call us for a follow up.   Please advise.

## 2025-02-03 NOTE — ACP (ADVANCE CARE PLANNING)
Advance Care Planning     General Advance Care Planning (ACP) Conversation    Date of Conversation: 2/3/2025  Conducted with: Patient with Decision Making Capacity  Other persons present: None    Healthcare Decision Maker:   Primary Decision Maker: Talia Blanchard - Child    Secondary Decision Maker: Mally Blanchard - Brother/Sister - [phone]    Secondary Decision Maker: [place] - Parent - [phone]       Content/Action Overview:  DECLINED ACP Conversation - will revisit periodically  Reviewed DNR/DNI and patient elects Full Code (Attempt Resuscitation)        Length of Voluntary ACP Conversation in minutes:  <16 minutes (Non-Billable)    Katie Wallace RN

## 2025-02-03 NOTE — CARE COORDINATION
Case Management Assessment  Initial Evaluation    Date/Time of Evaluation: 2/3/2025 9:59 AM  Assessment Completed by: Katie Wallace RN    If patient is discharged prior to next notation, then this note serves as note for discharge by case management.    Patient Name: Anjelica Blanchard                   YOB: 1980  Diagnosis: Hypoxemia [R09.02]  Acute respiratory failure with hypoxia [J96.01]  Valproic acid toxicity, accidental or unintentional, initial encounter [T42.6X1A]  COVID-19 [U07.1]                   Date / Time: 2/2/2025  5:42 PM    Patient Admission Status: Observation   Readmission Risk (Low < 19, Mod (19-27), High > 27): Readmission Risk Score: 32.2    Current PCP: Smith Neff MD  PCP verified by CM? Yes    Chart Reviewed: Yes      History Provided by: Patient  Patient Orientation: Alert and Oriented    Patient Cognition: Alert    Hospitalization in the last 30 days (Readmission):  Yes    If yes, Readmission Assessment in  Navigator will be completed.    Advance Directives:      Code Status: Full Code   Patient's Primary Decision Maker is: Legal Next of Kin    Primary Decision Maker: Talia Blanchard - Child    Secondary Decision Maker: Mally Blanchard - Brother/Sister - [phone]    Secondary Decision Maker: [place] Blanchard - Parent - [phone]    Discharge Planning:    Patient lives with: Alone Type of Home: Apartment  Primary Care Giver: Self  Patient Support Systems include: Family Members   Current Financial resources: Medicaid  Current community resources: ECF/Home Care ([facility], [facility])  Current services prior to admission: Durable Medical Equipment, Home Care ([facility], [facility] 1x weekly)            Current DME: Walker            Type of Home Care services:  None    ADLS  Prior functional level: Independent in ADLs/IADLs  Current functional level: Independent in ADLs/IADLs    PT AM-PAC:   /24  OT AM-PAC:   /24    Family can provide assistance at DC: No  Would you

## 2025-02-03 NOTE — ED NOTES
Pt ambulatory to bathroom with steady gait on room air. Pt pulse ox decreased to 87 % on room air and HR in the low 100s. Pt states some SOB.

## 2025-02-03 NOTE — H&P
V2.0  History and Physical      Name:  Anjelica Blanchard /Age/Sex: 1980  (44 y.o. female)   MRN & CSN:  [id number] & [id number] Encounter Date/Time: 2025 9:07 PM EST   Location:   PCP: Smith Neff MD       Hospital Day: 1    Assessment and Plan:   Anjelica Blanchard is a 44 y.o. female with a pmh of     Past Medical History:   Diagnosis Date    Adrenal insufficiency     Asthma     Bipolar 1 disorder (HCC)     Borderline personality disorder (HCC)     Chronic prescription benzodiazepine use     Alprazolam    Chronic prescription opiate use     Methadone    COPD (chronic obstructive pulmonary disease) (HCC)     Degenerative spinal arthritis     Howard-Danlos syndrome     Fibromyalgia     Hepatitis C     Herniated disc     Intervertebral disc disease     Opiate overdose (HCC)     Osteoarthritis     Polysubstance abuse (HCC)     24-  Methadone from [facility]    Sedative dependence (HCC)     Pregabalin and Zolpidem    Seizure (HCC)     with benzo withdrawal    TCA (tricyclic antidepressant) overdose     Vertebral compression fracture (HCC)          who presents with Acute respiratory failure with hypoxia    Hospital Problems             Last Modified POA    * (Principal) Acute respiratory failure with hypoxia 2025 Yes       Bronchitis, covid 19, acute respiratory failure with hypoxia, emphysema, asthma exacerbation:   - duonebs q4 hrs while awake  - dexamethasone 6 mg po daily   - oxygen supplementation, wean as tolerated   - sees pulmonology - Dr. Medina     - unclear why so many recurrent lung infections - add urine drug screen to see if marijuana (? Inhalation / exposures)     Elevated valproic acid level, hx of seizures and bipolar disorder (per patient):   - divalproex 500 and 1000 mg po qhs      Hx of adrenal insufficiency:   - hydrocortisone 10 mg in AM and 5 mg in afternoon     - dexamethasone 6 mg po daily for covid     - s/p hydrocortisone 100 mg IV in the ED   - continued

## 2025-02-03 NOTE — FLOWSHEET NOTE
Admission assessment complete. See doc flow. A/O x4. From home. Covid positive. Hypoxic at home. No hypoxia at this time since being on the unit. MD at the bedside reviewing home meds with the patient. Patient ambulates independently.    02/02/25 2309   Vital Signs   Temp 97.5 °F (36.4 °C)   Temp Source Oral   Pulse 87   Heart Rate Source Monitor   Respirations 16   BP (!) 125/98   MAP (Calculated) 107   BP Location Left upper arm   BP Method Automatic   Patient Position Sitting   Oxygen Therapy   SpO2 97 %   O2 Device None (Room air)   Height and Weight   Height 1.626 m (5' 4\")   Weight - Scale 102 kg (224 lb 12.8 oz)   Weight Method Bed scale   BSA (Calculated - sq m) 2.15 sq meters   BMI (Calculated) 38.7     Call light and bedside table within easy reach.

## 2025-02-03 NOTE — PROGRESS NOTES

## 2025-02-03 NOTE — PLAN OF CARE
Problem: Chronic Conditions and Co-morbidities  Goal: Patient's chronic conditions and co-morbidity symptoms are monitored and maintained or improved  2/3/2025 1011 by Keri Bustos RN  Outcome: Adequate for Discharge  2/3/2025 0905 by Marlon Black RN  Outcome: Progressing     Problem: Discharge Planning  Goal: Discharge to home or other facility with appropriate resources  2/3/2025 1011 by Keri Bustos RN  Outcome: Adequate for Discharge  2/3/2025 0905 by Marlon Black RN  Outcome: Progressing     Problem: Pain  Goal: Verbalizes/displays adequate comfort level or baseline comfort level  2/3/2025 1011 by Keri Bustos RN  Outcome: Adequate for Discharge  2/3/2025 0905 by Marlon Black RN  Outcome: Progressing     Problem: Skin/Tissue Integrity  Goal: Skin integrity remains intact  Description: 1.  Monitor for areas of redness and/or skin breakdown  2.  Assess vascular access sites hourly  3.  Every 4-6 hours minimum:  Change oxygen saturation probe site  4.  Every 4-6 hours:  If on nasal continuous positive airway pressure, respiratory therapy assess nares and determine need for appliance change or resting period  2/3/2025 1011 by Keri Bustos RN  Outcome: Adequate for Discharge  2/3/2025 0905 by Marlon Black RN  Outcome: Progressing     Problem: Safety - Adult  Goal: Free from fall injury  2/3/2025 1011 by Keri Bustos RN  Outcome: Adequate for Discharge  2/3/2025 0905 by Marlon Black RN  Outcome: Progressing

## 2025-02-03 NOTE — DISCHARGE SUMMARY
Name:  Anjelica Blanchard  Room:  0221/0221-02  MRN:    [id number]    Discharge Summary      This discharge summary is in conjunction with a complete physical exam done on the day of discharge.      Discharging Physician: SHERRON VILLEGAS MD      Admit: 2/2/2025  Discharge:  2/3/2025     Diagnoses this Admission    Principal Problem:    Acute respiratory failure with hypoxia  Active Problems:    COVID-19  Resolved Problems:    * No resolved hospital problems. *          Procedures (Please Review Full Report for Details)      Consults    None      HPI:     who presents with report that she felt sick and was checking her O2 at home and found it down in the 80s when she was walking.      She reports feeling short of breath and chest pain. She was concerned that she had pneumonia against.      She had low grade fevers to 100.5.      She reports nausea. No diarrhea. Reports having IBS with constipation.      She reports she used to have oxygen at home but doesn't anymore. Her insurance denied her portable oxygen and she had to send it back several months ag      Physical Exam at Discharge:  /62   Pulse 75   Temp 98.6 °F (37 °C) (Oral)   Resp 16   Ht 1.626 m (5' 4\")   Wt 102 kg (224 lb 12.8 oz)   SpO2 99%   BMI 38.59 kg/m²       General: NAD  Eyes: EOMI  ENT: neck supple  Cardiovascular: Regular rate.  Respiratory: Clear to auscultation  Gastrointestinal: Soft, non tender  Genitourinary: no suprapubic tenderness  Musculoskeletal: No edema  Skin: warm, dry  Neuro: Alert.  Psych: Mood appropriate.     Hospital Course        Bronchitis, covid 19,mild transient hypoxia   - duonebs q4 hrs while awake  - dexamethasone 6 mg po daily   - oxygen supplementation, wean as tolerated   On RA since last night         Elevated valproic acid level, hx of seizures and bipolar disorder (per patient):   - divalproex 500 and 1000 mg po qhs      Hx of adrenal insufficiency:   - hydrocortisone 10 mg in AM and 5 mg in

## 2025-02-03 NOTE — PLAN OF CARE
Problem: Chronic Conditions and Co-morbidities  Goal: Patient's chronic conditions and co-morbidity symptoms are monitored and maintained or improved  Outcome: Progressing     Problem: Discharge Planning  Goal: Discharge to home or other facility with appropriate resources  Outcome: Progressing     Problem: Pain  Goal: Verbalizes/displays adequate comfort level or baseline comfort level  Outcome: Progressing     Problem: Skin/Tissue Integrity  Goal: Skin integrity remains intact  Description: 1.  Monitor for areas of redness and/or skin breakdown  2.  Assess vascular access sites hourly  3.  Every 4-6 hours minimum:  Change oxygen saturation probe site  4.  Every 4-6 hours:  If on nasal continuous positive airway pressure, respiratory therapy assess nares and determine need for appliance change or resting period  Outcome: Progressing     Problem: Safety - Adult  Goal: Free from fall injury  Outcome: Progressing

## 2025-02-03 NOTE — PROGRESS NOTES
Occupational Therapy/Physical Therapy    Orders received, chart reviewed; spoke with RN who reports pt is functioning independently in room.  Will discontinue orders at this time.  Please reorder if new concerns arise.    Gi Gregory, OTR/L #[id number]  Nila Wu, PT, DPT

## 2025-02-03 NOTE — PROGRESS NOTES
Pt given written and verbal discharge instructions. Pt indicated understanding of home medication and care instructions. Prescriptions provided to pt via preferred pharmacy. Pt's belongings packed. IV removed, pt tolerated. Pt declined offer for transport and elected to ambulate independently down to private vehicle at this time. No needs voiced.

## 2025-02-04 DIAGNOSIS — R06.02 SOB (SHORTNESS OF BREATH): Primary | ICD-10-CM

## 2025-02-04 LAB
EKG ATRIAL RATE: 95 BPM
EKG DIAGNOSIS: NORMAL
EKG P AXIS: 32 DEGREES
EKG P-R INTERVAL: 134 MS
EKG Q-T INTERVAL: 360 MS
EKG QRS DURATION: 82 MS
EKG QTC CALCULATION (BAZETT): 452 MS
EKG R AXIS: -12 DEGREES
EKG T AXIS: 37 DEGREES
EKG VENTRICULAR RATE: 95 BPM

## 2025-02-04 PROCEDURE — 93010 ELECTROCARDIOGRAM REPORT: CPT | Performed by: INTERNAL MEDICINE

## 2025-02-13 ENCOUNTER — HOSPITAL ENCOUNTER (INPATIENT)
Age: 45
LOS: 5 days | Discharge: HOME | DRG: 139 | End: 2025-02-18
Attending: STUDENT IN AN ORGANIZED HEALTH CARE EDUCATION/TRAINING PROGRAM | Admitting: HOSPITALIST
Payer: COMMERCIAL

## 2025-02-13 ENCOUNTER — APPOINTMENT (OUTPATIENT)
Dept: CT IMAGING | Age: 45
DRG: 139 | End: 2025-02-13
Payer: COMMERCIAL

## 2025-02-13 ENCOUNTER — APPOINTMENT (OUTPATIENT)
Dept: GENERAL RADIOLOGY | Age: 45
DRG: 139 | End: 2025-02-13
Payer: COMMERCIAL

## 2025-02-13 DIAGNOSIS — J18.9 MULTIFOCAL PNEUMONIA: ICD-10-CM

## 2025-02-13 DIAGNOSIS — A41.9 SEPSIS, DUE TO UNSPECIFIED ORGANISM, UNSPECIFIED WHETHER ACUTE ORGAN DYSFUNCTION PRESENT (HCC): Primary | ICD-10-CM

## 2025-02-13 LAB
ALBUMIN SERPL-MCNC: 3.8 G/DL (ref 3.4–5)
ALBUMIN/GLOB SERPL: 1.2 {RATIO} (ref 1.1–2.2)
ALP SERPL-CCNC: 69 U/L (ref 40–129)
ALT SERPL-CCNC: 13 U/L (ref 10–40)
AMPHETAMINES UR QL SCN>1000 NG/ML: ABNORMAL
ANION GAP SERPL CALCULATED.3IONS-SCNC: 13 MMOL/L (ref 3–16)
AST SERPL-CCNC: 26 U/L (ref 15–37)
BACTERIA URNS QL MICRO: ABNORMAL /HPF
BARBITURATES UR QL SCN>200 NG/ML: ABNORMAL
BASOPHILS # BLD: 0 K/UL (ref 0–0.2)
BASOPHILS NFR BLD: 0.1 %
BENZODIAZ UR QL SCN>200 NG/ML: POSITIVE
BILIRUB SERPL-MCNC: <0.2 MG/DL (ref 0–1)
BILIRUB UR QL STRIP.AUTO: NEGATIVE
BUN SERPL-MCNC: 19 MG/DL (ref 7–20)
CALCIUM SERPL-MCNC: 9.6 MG/DL (ref 8.3–10.6)
CANNABINOIDS UR QL SCN>50 NG/ML: ABNORMAL
CHLORIDE SERPL-SCNC: 99 MMOL/L (ref 99–110)
CLARITY UR: CLEAR
CO2 SERPL-SCNC: 26 MMOL/L (ref 21–32)
COCAINE UR QL SCN: ABNORMAL
COLOR UR: YELLOW
CREAT SERPL-MCNC: 1.6 MG/DL (ref 0.6–1.1)
DEPRECATED RDW RBC AUTO: 16.1 % (ref 12.4–15.4)
DRUG SCREEN COMMENT UR-IMP: ABNORMAL
EOSINOPHIL # BLD: 0.1 K/UL (ref 0–0.6)
EOSINOPHIL NFR BLD: 0.3 %
EPI CELLS #/AREA URNS HPF: ABNORMAL /HPF (ref 0–5)
FENTANYL SCREEN, URINE: ABNORMAL
FLUAV RNA RESP QL NAA+PROBE: NOT DETECTED
FLUBV RNA RESP QL NAA+PROBE: NOT DETECTED
GFR SERPLBLD CREATININE-BSD FMLA CKD-EPI: 40 ML/MIN/{1.73_M2}
GLUCOSE SERPL-MCNC: 91 MG/DL (ref 70–99)
GLUCOSE UR STRIP.AUTO-MCNC: NEGATIVE MG/DL
HCG UR QL: NEGATIVE
HCT VFR BLD AUTO: 37.5 % (ref 36–48)
HGB BLD-MCNC: 12.4 G/DL (ref 12–16)
HGB UR QL STRIP.AUTO: NEGATIVE
KETONES UR STRIP.AUTO-MCNC: NEGATIVE MG/DL
LACTATE BLDV-SCNC: 1.4 MMOL/L (ref 0.4–2)
LACTATE BLDV-SCNC: 1.6 MMOL/L (ref 0.4–1.9)
LEUKOCYTE ESTERASE UR QL STRIP.AUTO: ABNORMAL
LYMPHOCYTES # BLD: 1.2 K/UL (ref 1–5.1)
LYMPHOCYTES NFR BLD: 6.4 %
MCH RBC QN AUTO: 28.6 PG (ref 26–34)
MCHC RBC AUTO-ENTMCNC: 33.1 G/DL (ref 31–36)
MCV RBC AUTO: 86.5 FL (ref 80–100)
METHADONE UR QL SCN>300 NG/ML: POSITIVE
MONOCYTES # BLD: 1.6 K/UL (ref 0–1.3)
MONOCYTES NFR BLD: 8.7 %
NEUTROPHILS # BLD: 15.7 K/UL (ref 1.7–7.7)
NEUTROPHILS NFR BLD: 84.5 %
NITRITE UR QL STRIP.AUTO: NEGATIVE
OPIATES UR QL SCN>300 NG/ML: ABNORMAL
OXYCODONE UR QL SCN: ABNORMAL
PCP UR QL SCN>25 NG/ML: ABNORMAL
PH UR STRIP.AUTO: 7 [PH] (ref 5–8)
PH UR STRIP: 7 [PH]
PLATELET # BLD AUTO: 271 K/UL (ref 135–450)
PMV BLD AUTO: 8.6 FL (ref 5–10.5)
POTASSIUM SERPL-SCNC: 5 MMOL/L (ref 3.5–5.1)
PROT SERPL-MCNC: 6.9 G/DL (ref 6.4–8.2)
PROT UR STRIP.AUTO-MCNC: NEGATIVE MG/DL
RBC # BLD AUTO: 4.34 M/UL (ref 4–5.2)
RBC #/AREA URNS HPF: ABNORMAL /HPF (ref 0–4)
SARS-COV-2 RNA RESP QL NAA+PROBE: NOT DETECTED
SODIUM SERPL-SCNC: 138 MMOL/L (ref 136–145)
SP GR UR STRIP.AUTO: 1.01 (ref 1–1.03)
TROPONIN, HIGH SENSITIVITY: 11 NG/L (ref 0–14)
TROPONIN, HIGH SENSITIVITY: 16 NG/L (ref 0–14)
TROPONIN, HIGH SENSITIVITY: 20 NG/L (ref 0–14)
UA DIPSTICK W REFLEX MICRO PNL UR: YES
URN SPEC COLLECT METH UR: ABNORMAL
UROBILINOGEN UR STRIP-ACNC: 0.2 E.U./DL
WBC # BLD AUTO: 18.5 K/UL (ref 4–11)
WBC #/AREA URNS HPF: ABNORMAL /HPF (ref 0–5)

## 2025-02-13 PROCEDURE — 99285 EMERGENCY DEPT VISIT HI MDM: CPT

## 2025-02-13 PROCEDURE — 83605 ASSAY OF LACTIC ACID: CPT

## 2025-02-13 PROCEDURE — 71045 X-RAY EXAM CHEST 1 VIEW: CPT

## 2025-02-13 PROCEDURE — 71250 CT THORAX DX C-: CPT

## 2025-02-13 PROCEDURE — 6360000002 HC RX W HCPCS: Performed by: NURSE PRACTITIONER

## 2025-02-13 PROCEDURE — 81001 URINALYSIS AUTO W/SCOPE: CPT

## 2025-02-13 PROCEDURE — 1200000000 HC SEMI PRIVATE

## 2025-02-13 PROCEDURE — 85025 COMPLETE CBC W/AUTO DIFF WBC: CPT

## 2025-02-13 PROCEDURE — 87636 SARSCOV2 & INF A&B AMP PRB: CPT

## 2025-02-13 PROCEDURE — 84484 ASSAY OF TROPONIN QUANT: CPT

## 2025-02-13 PROCEDURE — 36415 COLL VENOUS BLD VENIPUNCTURE: CPT

## 2025-02-13 PROCEDURE — 2580000003 HC RX 258: Performed by: NURSE PRACTITIONER

## 2025-02-13 PROCEDURE — 6360000002 HC RX W HCPCS: Performed by: STUDENT IN AN ORGANIZED HEALTH CARE EDUCATION/TRAINING PROGRAM

## 2025-02-13 PROCEDURE — 87040 BLOOD CULTURE FOR BACTERIA: CPT

## 2025-02-13 PROCEDURE — 93005 ELECTROCARDIOGRAM TRACING: CPT | Performed by: STUDENT IN AN ORGANIZED HEALTH CARE EDUCATION/TRAINING PROGRAM

## 2025-02-13 PROCEDURE — 2580000003 HC RX 258: Performed by: STUDENT IN AN ORGANIZED HEALTH CARE EDUCATION/TRAINING PROGRAM

## 2025-02-13 PROCEDURE — 6370000000 HC RX 637 (ALT 250 FOR IP): Performed by: NURSE PRACTITIONER

## 2025-02-13 PROCEDURE — 80307 DRUG TEST PRSMV CHEM ANLYZR: CPT

## 2025-02-13 PROCEDURE — 96365 THER/PROPH/DIAG IV INF INIT: CPT

## 2025-02-13 PROCEDURE — 80053 COMPREHEN METABOLIC PANEL: CPT

## 2025-02-13 PROCEDURE — 84703 CHORIONIC GONADOTROPIN ASSAY: CPT

## 2025-02-13 RX ORDER — DIVALPROEX SODIUM 250 MG/1
500 TABLET, FILM COATED, EXTENDED RELEASE ORAL EVERY MORNING
Status: DISCONTINUED | OUTPATIENT
Start: 2025-02-14 | End: 2025-02-14

## 2025-02-13 RX ORDER — FLUTICASONE PROPIONATE 50 MCG
1 SPRAY, SUSPENSION (ML) NASAL DAILY PRN
Status: DISCONTINUED | OUTPATIENT
Start: 2025-02-13 | End: 2025-02-18 | Stop reason: HOSPADM

## 2025-02-13 RX ORDER — SODIUM CHLORIDE 9 MG/ML
INJECTION, SOLUTION INTRAVENOUS CONTINUOUS
Status: ACTIVE | OUTPATIENT
Start: 2025-02-13 | End: 2025-02-14

## 2025-02-13 RX ORDER — ONDANSETRON 4 MG/1
4 TABLET, ORALLY DISINTEGRATING ORAL EVERY 8 HOURS PRN
Status: DISCONTINUED | OUTPATIENT
Start: 2025-02-13 | End: 2025-02-18 | Stop reason: HOSPADM

## 2025-02-13 RX ORDER — MIDODRINE HYDROCHLORIDE 5 MG/1
5 TABLET ORAL 3 TIMES DAILY
Status: DISCONTINUED | OUTPATIENT
Start: 2025-02-13 | End: 2025-02-18 | Stop reason: HOSPADM

## 2025-02-13 RX ORDER — ACETAMINOPHEN 325 MG/1
650 TABLET ORAL EVERY 6 HOURS PRN
Status: DISCONTINUED | OUTPATIENT
Start: 2025-02-13 | End: 2025-02-18 | Stop reason: HOSPADM

## 2025-02-13 RX ORDER — ENOXAPARIN SODIUM 100 MG/ML
30 INJECTION SUBCUTANEOUS 2 TIMES DAILY
Status: DISCONTINUED | OUTPATIENT
Start: 2025-02-13 | End: 2025-02-18 | Stop reason: HOSPADM

## 2025-02-13 RX ORDER — ALPRAZOLAM 1 MG/1
1 TABLET ORAL 3 TIMES DAILY PRN
Status: DISCONTINUED | OUTPATIENT
Start: 2025-02-13 | End: 2025-02-18 | Stop reason: HOSPADM

## 2025-02-13 RX ORDER — ALBUTEROL SULFATE 0.83 MG/ML
2.5 SOLUTION RESPIRATORY (INHALATION) EVERY 6 HOURS PRN
Status: DISCONTINUED | OUTPATIENT
Start: 2025-02-13 | End: 2025-02-18 | Stop reason: HOSPADM

## 2025-02-13 RX ORDER — METHADONE HYDROCHLORIDE 10 MG/1
140 TABLET ORAL DAILY
Status: DISCONTINUED | OUTPATIENT
Start: 2025-02-14 | End: 2025-02-18 | Stop reason: HOSPADM

## 2025-02-13 RX ORDER — ACETAMINOPHEN 650 MG/1
650 SUPPOSITORY RECTAL EVERY 6 HOURS PRN
Status: DISCONTINUED | OUTPATIENT
Start: 2025-02-13 | End: 2025-02-18 | Stop reason: HOSPADM

## 2025-02-13 RX ORDER — ONDANSETRON 2 MG/ML
4 INJECTION INTRAMUSCULAR; INTRAVENOUS EVERY 6 HOURS PRN
Status: DISCONTINUED | OUTPATIENT
Start: 2025-02-13 | End: 2025-02-18 | Stop reason: HOSPADM

## 2025-02-13 RX ORDER — 0.9 % SODIUM CHLORIDE 0.9 %
1000 INTRAVENOUS SOLUTION INTRAVENOUS ONCE
Status: COMPLETED | OUTPATIENT
Start: 2025-02-13 | End: 2025-02-13

## 2025-02-13 RX ORDER — DIVALPROEX SODIUM 250 MG/1
750 TABLET, FILM COATED, EXTENDED RELEASE ORAL SEE ADMIN INSTRUCTIONS
Status: DISCONTINUED | OUTPATIENT
Start: 2025-02-13 | End: 2025-02-13 | Stop reason: SDUPTHER

## 2025-02-13 RX ORDER — PREGABALIN 75 MG/1
150 CAPSULE ORAL 2 TIMES DAILY
Status: DISCONTINUED | OUTPATIENT
Start: 2025-02-13 | End: 2025-02-18 | Stop reason: HOSPADM

## 2025-02-13 RX ORDER — DIVALPROEX SODIUM 250 MG/1
1000 TABLET, FILM COATED, EXTENDED RELEASE ORAL
Status: DISCONTINUED | OUTPATIENT
Start: 2025-02-13 | End: 2025-02-14

## 2025-02-13 RX ORDER — SODIUM CHLORIDE 0.9 % (FLUSH) 0.9 %
5-40 SYRINGE (ML) INJECTION EVERY 12 HOURS SCHEDULED
Status: DISCONTINUED | OUTPATIENT
Start: 2025-02-13 | End: 2025-02-18 | Stop reason: HOSPADM

## 2025-02-13 RX ORDER — ZOLPIDEM TARTRATE 5 MG/1
10 TABLET ORAL
Status: DISCONTINUED | OUTPATIENT
Start: 2025-02-13 | End: 2025-02-18 | Stop reason: HOSPADM

## 2025-02-13 RX ORDER — VANCOMYCIN 1.75 G/350ML
1250 INJECTION, SOLUTION INTRAVENOUS EVERY 12 HOURS
Status: DISCONTINUED | OUTPATIENT
Start: 2025-02-13 | End: 2025-02-13

## 2025-02-13 RX ORDER — DOCUSATE SODIUM 100 MG/1
100 CAPSULE, LIQUID FILLED ORAL 2 TIMES DAILY
Status: DISCONTINUED | OUTPATIENT
Start: 2025-02-13 | End: 2025-02-18 | Stop reason: HOSPADM

## 2025-02-13 RX ORDER — SODIUM CHLORIDE 9 MG/ML
INJECTION, SOLUTION INTRAVENOUS PRN
Status: DISCONTINUED | OUTPATIENT
Start: 2025-02-13 | End: 2025-02-18 | Stop reason: HOSPADM

## 2025-02-13 RX ORDER — SODIUM CHLORIDE 0.9 % (FLUSH) 0.9 %
5-40 SYRINGE (ML) INJECTION PRN
Status: DISCONTINUED | OUTPATIENT
Start: 2025-02-13 | End: 2025-02-18 | Stop reason: HOSPADM

## 2025-02-13 RX ADMIN — ALPRAZOLAM 1 MG: 1 TABLET ORAL at 23:21

## 2025-02-13 RX ADMIN — SODIUM CHLORIDE 1000 ML: 9 INJECTION, SOLUTION INTRAVENOUS at 21:06

## 2025-02-13 RX ADMIN — ZOLPIDEM TARTRATE 10 MG: 5 TABLET, FILM COATED ORAL at 23:14

## 2025-02-13 RX ADMIN — SODIUM CHLORIDE: 0.9 INJECTION, SOLUTION INTRAVENOUS at 23:28

## 2025-02-13 RX ADMIN — CEFEPIME 2000 MG: 2 INJECTION, POWDER, FOR SOLUTION INTRAVENOUS at 21:05

## 2025-02-13 RX ADMIN — DOCUSATE SODIUM 100 MG: 100 CAPSULE, LIQUID FILLED ORAL at 23:14

## 2025-02-13 RX ADMIN — PREGABALIN 150 MG: 75 CAPSULE ORAL at 23:14

## 2025-02-13 RX ADMIN — VANCOMYCIN HYDROCHLORIDE 2500 MG: 1 INJECTION, POWDER, LYOPHILIZED, FOR SOLUTION INTRAVENOUS at 21:44

## 2025-02-13 RX ADMIN — QUETIAPINE FUMARATE 150 MG: 100 TABLET ORAL at 23:21

## 2025-02-13 RX ADMIN — DIVALPROEX SODIUM 1000 MG: 250 TABLET, EXTENDED RELEASE ORAL at 23:14

## 2025-02-13 RX ADMIN — SODIUM CHLORIDE 1000 ML: 9 INJECTION, SOLUTION INTRAVENOUS at 16:54

## 2025-02-13 RX ADMIN — ENOXAPARIN SODIUM 30 MG: 100 INJECTION SUBCUTANEOUS at 23:15

## 2025-02-13 ASSESSMENT — PAIN DESCRIPTION - LOCATION: LOCATION: BACK

## 2025-02-13 ASSESSMENT — PAIN - FUNCTIONAL ASSESSMENT
PAIN_FUNCTIONAL_ASSESSMENT: 0-10
PAIN_FUNCTIONAL_ASSESSMENT: NONE - DENIES PAIN

## 2025-02-13 ASSESSMENT — PAIN SCALES - GENERAL: PAINLEVEL_OUTOF10: 10

## 2025-02-13 NOTE — ED PROVIDER NOTES
[facility] EMERGENCY DEPARTMENT     EMERGENCY DEPARTMENT ENCOUNTER            Pt Name: Anjelica Blanchard   MRN: [id number]   Birthdate 1980   Date of evaluation: 2/13/2025   Provider: Lydia Cm MD   PCP: Smith Neff MD   Note Started: 4:25 PM EST 2/13/25          CHIEF COMPLAINT     Chief Complaint   Patient presents with    Fatigue     The patient reports feeling unsteady on her feet and generally weak/tired. This began a few hours ago but says she had these episodes for years. The patient ambulates to the bed from the EMS stretcher; slightly unsteady. Patient falling asleep during triage process.        HISTORY OF PRESENT ILLNESS:   History from : Patient   Limitations to history : None     Anjelica Blanchard is a 44 y.o. female who presents with complaints for shortness of breath, unsteadiness, generalized weakness.  Patient states that she has had a cough.  Has not been feeling well for the past few days.  Was recently hospitalized for pneumonia/COVID.  She denies chest pain.  Denies other complaints or concerns.    Nursing Notes were all reviewed and agreed with, or any disagreements were addressed in the HPI.     REVIEW OF SYSTEMS :    Positives and Pertinent negatives as per HPI.      MEDICAL HISTORY   has a past medical history of Adrenal insufficiency, Asthma, Bipolar 1 disorder (HCC), Borderline personality disorder (HCC), Chronic prescription benzodiazepine use, Chronic prescription opiate use, COPD (chronic obstructive pulmonary disease) ([facility]), Degenerative spinal arthritis, Howard-Danlos syndrome, Fibromyalgia, Hepatitis C, Herniated disc, Intervertebral disc disease, Opiate overdose (HCC), Osteoarthritis, Polysubstance abuse (HCC), Sedative dependence (HCC), Seizure (HCC), TCA (tricyclic antidepressant) overdose, and Vertebral compression fracture ([facility]).    Past Surgical History:   Procedure Laterality Date    BRONCHOSCOPY N/A 11/2/2023    BRONCHOSCOPY DIAGNOSTIC OR CELL WASH  -- --   02/13/25 1846 (!) 83/53 -- 93 18 97 % -- -- -- --   02/13/25 1926 (!) 87/55 -- 79 14 100 % -- -- -- --   02/13/25 2002 90/66 -- 81 10 100 % -- -- -- --   02/13/25 2049 (!) 87/56 -- 76 (!) 6 100 % -- -- -- --      Recent Labs     02/13/25  1655   WBC 18.5*   CREATININE 1.6*   BILITOT <0.2            Time Severe Sepsis Identified: 2053    Fluid Resuscitation Rational: at least 30mL/kg based on ideal body weight due to obesity defined as BMI >30 (patient's BMI is Body mass index is 38.45 kg/m². and IBW is Ideal body weight: 54.7 kg (120 lb 9.5 oz)Adjusted ideal body weight: 73.5 kg (161 lb 15.3 oz))      Repeat lactate level: not indicated due to initial lactate < 2    Reassessment Exam:   Not applicable. Patient does not have septic shock.       CC/HPI Summary, DDx, ED Course, and Reassessment: Patient is a 44-year-old female, presenting with concerns for generalized malaise and fatigue, cough and shortness of breath.  Upon arrival in the ED was found to be tachycardic.  Was treated with IV fluids with improvement.  Also found to have a leukocytosis of 18.5.  This is increased from her baseline and recent hospitalizations.  COVID and flu are negative.  Chest x-ray negative for acute concerning findings.  CT of the chest was performed, still pending official read by radiology but appears to show multifocal pneumonia.  Patient will be started on broad-spectrum antibiotics and was treated with additional fluids to equate to 30 cc/kg based on ideal body weight.  Patient will be hospitalized for further workup and treatment of her condition.  Also found to have an acute kidney injury.  She is comfortable and in agreement with plan of care and will be hospitalized       Patient was given the following medications:   Medications   sodium chloride 0.9 % bolus 1,000 mL (has no administration in time range)   ceFEPIme (MAXIPIME) 2,000 mg in sodium chloride 0.9 % 100 mL IVPB (mini-bag) (has no administration in  time range)   vancomycin (VANCOCIN) 2,500 mg in sodium chloride 0.9 % 500 mL IVPB (has no administration in time range)   sodium chloride 0.9 % bolus 1,000 mL (0 mLs IntraVENous Stopped 2/13/25 0297)        CONSULTS:   PHARMACY TO DOSE VANCOMYCIN   Discussion with Other Professionals: None   Social Determinants: None   Chronic Conditions: Fibromyalgia, arthritis, asthma, COPD, bipolar disorder, methadone use, benzodiazepine use, polysubstance abuse  Records Reviewed: Hospitalizations from 12/10/2024, 1/19/2025, 2/2/2025      I am the Primary Clinician of Record.        FINAL IMPRESSION    1. Sepsis, due to unspecified organism, unspecified whether acute organ dysfunction present (HCC)    2. Multifocal pneumonia           DISPOSITION/PLAN:       Pt admitted to hospital for further workup.    Disposition Considerations: Admitted    PATIENT REFERRED TO:   No follow-up provider specified.     DISCHARGE MEDICATIONS:   New Prescriptions    No medications on file        DISCONTINUED MEDICATIONS:   Discontinued Medications    No medications on file              (Please note that portions of this note were completed with a voice recognition program.  Efforts were made to edit the dictations but occasionally words are mis-transcribed.)       Lydia Cm MD (electronically signed)             Lydia Cm MD  02/13/25 9792

## 2025-02-14 ENCOUNTER — APPOINTMENT (OUTPATIENT)
Dept: MRI IMAGING | Age: 45
DRG: 139 | End: 2025-02-14
Payer: COMMERCIAL

## 2025-02-14 LAB
ALBUMIN SERPL-MCNC: 3.2 G/DL (ref 3.4–5)
ALBUMIN/GLOB SERPL: 1.4 {RATIO} (ref 1.1–2.2)
ALP SERPL-CCNC: 53 U/L (ref 40–129)
ALT SERPL-CCNC: 12 U/L (ref 10–40)
ANION GAP SERPL CALCULATED.3IONS-SCNC: 8 MMOL/L (ref 3–16)
AST SERPL-CCNC: 27 U/L (ref 15–37)
BASOPHILS # BLD: 0 K/UL (ref 0–0.2)
BASOPHILS NFR BLD: 0 %
BILIRUB SERPL-MCNC: <0.2 MG/DL (ref 0–1)
BUN SERPL-MCNC: 18 MG/DL (ref 7–20)
CALCIUM SERPL-MCNC: 8.2 MG/DL (ref 8.3–10.6)
CHLORIDE SERPL-SCNC: 105 MMOL/L (ref 99–110)
CO2 SERPL-SCNC: 29 MMOL/L (ref 21–32)
CREAT SERPL-MCNC: 1.1 MG/DL (ref 0.6–1.1)
DEPRECATED RDW RBC AUTO: 16.4 % (ref 12.4–15.4)
EKG ATRIAL RATE: 104 BPM
EKG DIAGNOSIS: NORMAL
EKG P AXIS: 53 DEGREES
EKG P-R INTERVAL: 126 MS
EKG Q-T INTERVAL: 312 MS
EKG QRS DURATION: 82 MS
EKG QTC CALCULATION (BAZETT): 410 MS
EKG R AXIS: -3 DEGREES
EKG T AXIS: 30 DEGREES
EKG VENTRICULAR RATE: 104 BPM
EOSINOPHIL # BLD: 0.1 K/UL (ref 0–0.6)
EOSINOPHIL NFR BLD: 0.6 %
GFR SERPLBLD CREATININE-BSD FMLA CKD-EPI: 62 ML/MIN/{1.73_M2}
GLUCOSE BLD-MCNC: 97 MG/DL (ref 70–99)
GLUCOSE SERPL-MCNC: 85 MG/DL (ref 70–99)
HCT VFR BLD AUTO: 30.5 % (ref 36–48)
HGB BLD-MCNC: 10.1 G/DL (ref 12–16)
LACTATE BLDV-SCNC: 1 MMOL/L (ref 0.4–2)
LYMPHOCYTES # BLD: 2.4 K/UL (ref 1–5.1)
LYMPHOCYTES NFR BLD: 17.3 %
MCH RBC QN AUTO: 28.8 PG (ref 26–34)
MCHC RBC AUTO-ENTMCNC: 33.2 G/DL (ref 31–36)
MCV RBC AUTO: 87 FL (ref 80–100)
MONOCYTES # BLD: 1.2 K/UL (ref 0–1.3)
MONOCYTES NFR BLD: 8.3 %
NEUTROPHILS # BLD: 10.4 K/UL (ref 1.7–7.7)
NEUTROPHILS NFR BLD: 73.8 %
PERFORMED ON: NORMAL
PLATELET # BLD AUTO: 213 K/UL (ref 135–450)
PMV BLD AUTO: 8.2 FL (ref 5–10.5)
POTASSIUM SERPL-SCNC: 4.1 MMOL/L (ref 3.5–5.1)
PROT SERPL-MCNC: 5.5 G/DL (ref 6.4–8.2)
RBC # BLD AUTO: 3.5 M/UL (ref 4–5.2)
SODIUM SERPL-SCNC: 142 MMOL/L (ref 136–145)
TSH SERPL DL<=0.005 MIU/L-ACNC: 1.14 UIU/ML (ref 0.27–4.2)
VANCOMYCIN SERPL-MCNC: 14.4 UG/ML
WBC # BLD AUTO: 14.1 K/UL (ref 4–11)

## 2025-02-14 PROCEDURE — 2500000003 HC RX 250 WO HCPCS: Performed by: NURSE PRACTITIONER

## 2025-02-14 PROCEDURE — 93010 ELECTROCARDIOGRAM REPORT: CPT | Performed by: INTERNAL MEDICINE

## 2025-02-14 PROCEDURE — 97166 OT EVAL MOD COMPLEX 45 MIN: CPT

## 2025-02-14 PROCEDURE — 84443 ASSAY THYROID STIM HORMONE: CPT

## 2025-02-14 PROCEDURE — 97162 PT EVAL MOD COMPLEX 30 MIN: CPT

## 2025-02-14 PROCEDURE — 85025 COMPLETE CBC W/AUTO DIFF WBC: CPT

## 2025-02-14 PROCEDURE — 80053 COMPREHEN METABOLIC PANEL: CPT

## 2025-02-14 PROCEDURE — 99222 1ST HOSP IP/OBS MODERATE 55: CPT | Performed by: STUDENT IN AN ORGANIZED HEALTH CARE EDUCATION/TRAINING PROGRAM

## 2025-02-14 PROCEDURE — 6360000002 HC RX W HCPCS: Performed by: INTERNAL MEDICINE

## 2025-02-14 PROCEDURE — 80202 ASSAY OF VANCOMYCIN: CPT

## 2025-02-14 PROCEDURE — 70551 MRI BRAIN STEM W/O DYE: CPT

## 2025-02-14 PROCEDURE — 97530 THERAPEUTIC ACTIVITIES: CPT

## 2025-02-14 PROCEDURE — 36415 COLL VENOUS BLD VENIPUNCTURE: CPT

## 2025-02-14 PROCEDURE — 6370000000 HC RX 637 (ALT 250 FOR IP): Performed by: NURSE PRACTITIONER

## 2025-02-14 PROCEDURE — 1200000000 HC SEMI PRIVATE

## 2025-02-14 PROCEDURE — 97110 THERAPEUTIC EXERCISES: CPT

## 2025-02-14 PROCEDURE — 83605 ASSAY OF LACTIC ACID: CPT

## 2025-02-14 PROCEDURE — 6370000000 HC RX 637 (ALT 250 FOR IP): Performed by: STUDENT IN AN ORGANIZED HEALTH CARE EDUCATION/TRAINING PROGRAM

## 2025-02-14 PROCEDURE — 6360000002 HC RX W HCPCS: Performed by: NURSE PRACTITIONER

## 2025-02-14 RX ORDER — DIVALPROEX SODIUM 250 MG/1
500 TABLET, FILM COATED, EXTENDED RELEASE ORAL
Status: DISCONTINUED | OUTPATIENT
Start: 2025-02-14 | End: 2025-02-18 | Stop reason: HOSPADM

## 2025-02-14 RX ORDER — HYDROCORTISONE 5 MG/1
5 TABLET ORAL SEE ADMIN INSTRUCTIONS
COMMUNITY

## 2025-02-14 RX ORDER — DIVALPROEX SODIUM 250 MG/1
500 TABLET, FILM COATED, EXTENDED RELEASE ORAL EVERY MORNING
Status: DISCONTINUED | OUTPATIENT
Start: 2025-02-15 | End: 2025-02-18 | Stop reason: HOSPADM

## 2025-02-14 RX ORDER — VANCOMYCIN HYDROCHLORIDE 1.5 G/300ML
1500 INJECTION, SOLUTION INTRAVITREAL EVERY 24 HOURS
Status: DISCONTINUED | OUTPATIENT
Start: 2025-02-14 | End: 2025-02-16

## 2025-02-14 RX ADMIN — ZOLPIDEM TARTRATE 10 MG: 5 TABLET, FILM COATED ORAL at 21:19

## 2025-02-14 RX ADMIN — PREGABALIN 150 MG: 75 CAPSULE ORAL at 21:19

## 2025-02-14 RX ADMIN — VANCOMYCIN HYDROCHLORIDE 1500 MG: 1.5 INJECTION, SOLUTION INTRAVITREAL at 17:04

## 2025-02-14 RX ADMIN — SODIUM CHLORIDE, PRESERVATIVE FREE 10 ML: 5 INJECTION INTRAVENOUS at 08:29

## 2025-02-14 RX ADMIN — QUETIAPINE FUMARATE 150 MG: 100 TABLET ORAL at 21:19

## 2025-02-14 RX ADMIN — DOCUSATE SODIUM 100 MG: 100 CAPSULE, LIQUID FILLED ORAL at 21:19

## 2025-02-14 RX ADMIN — MIDODRINE HYDROCHLORIDE 5 MG: 5 TABLET ORAL at 21:19

## 2025-02-14 RX ADMIN — LEVOTHYROXINE SODIUM 75 MCG: 0.05 TABLET ORAL at 08:27

## 2025-02-14 RX ADMIN — ENOXAPARIN SODIUM 30 MG: 100 INJECTION SUBCUTANEOUS at 21:20

## 2025-02-14 RX ADMIN — METHADONE HYDROCHLORIDE 140 MG: 10 TABLET ORAL at 09:10

## 2025-02-14 RX ADMIN — MIDODRINE HYDROCHLORIDE 5 MG: 5 TABLET ORAL at 08:27

## 2025-02-14 RX ADMIN — DOCUSATE SODIUM 100 MG: 100 CAPSULE, LIQUID FILLED ORAL at 08:28

## 2025-02-14 RX ADMIN — DIVALPROEX SODIUM 500 MG: 250 TABLET, EXTENDED RELEASE ORAL at 08:27

## 2025-02-14 RX ADMIN — ALPRAZOLAM 1 MG: 1 TABLET ORAL at 09:52

## 2025-02-14 RX ADMIN — MIDODRINE HYDROCHLORIDE 5 MG: 5 TABLET ORAL at 12:55

## 2025-02-14 RX ADMIN — SODIUM CHLORIDE, PRESERVATIVE FREE 10 ML: 5 INJECTION INTRAVENOUS at 21:21

## 2025-02-14 RX ADMIN — PREGABALIN 150 MG: 75 CAPSULE ORAL at 08:28

## 2025-02-14 RX ADMIN — NICOTINE POLACRILEX 4 MG: 4 LOZENGE ORAL at 12:59

## 2025-02-14 RX ADMIN — ALPRAZOLAM 1 MG: 1 TABLET ORAL at 17:05

## 2025-02-14 RX ADMIN — ENOXAPARIN SODIUM 30 MG: 100 INJECTION SUBCUTANEOUS at 08:27

## 2025-02-14 RX ADMIN — DIVALPROEX SODIUM 500 MG: 250 TABLET, EXTENDED RELEASE ORAL at 21:19

## 2025-02-14 ASSESSMENT — PAIN DESCRIPTION - LOCATION
LOCATION: KNEE;BACK
LOCATION: BACK

## 2025-02-14 ASSESSMENT — PAIN DESCRIPTION - ORIENTATION: ORIENTATION: LOWER

## 2025-02-14 ASSESSMENT — PAIN SCALES - GENERAL
PAINLEVEL_OUTOF10: 9
PAINLEVEL_OUTOF10: 7
PAINLEVEL_OUTOF10: 8

## 2025-02-14 ASSESSMENT — PAIN DESCRIPTION - DESCRIPTORS: DESCRIPTORS: SHARP

## 2025-02-14 NOTE — PROGRESS NOTES
[facility] Medicine Progress Note  V 1.6      Date of Admission: 2/13/2025    Hospital Day: 2      Chief Admission Complaint:  \"Fatigue, generalized weakness and shortness of breath\"     Subjective:  called for 'Code Stroke' AM 14 Feb - MRI ordered and pending.     Presenting Admission History:         \"44 y.o. female, with PMH of obesity, bipolar, Hypothyroidism, COPD,  who presented to [facility] with fatigue, generalized weakness and shortness of breath. History obtained from the patient and review of EMR.  The patient stated she has been experiencing generalized weakness, fatigue and shortness of breath for the last couple of days.  However, the patient was recently admitted here at [facility] for COVID-pneumonia.  She states that she has not been feeling well since she was discharged.  Per EMR, EMS was called and the patient was slightly unsteady getting onto their stretcher.  She was taken to the emergency department for further evaluation. In the emergency department a chest x-ray was obtained that revealed no localized consolidation or pleural effusion in this underinflated chest.  Elevated right hemidiaphragm progressed from prior exam.  CT chest was obtained that revealed bronchovascular thickening with bilateral lower lobe and posterior right upper lobe airspace disease.  Superimposed diffuse scattered groundglass airspace opacities bilaterally.  Findings suggest underlying inflammatory or infectious etiology.  Usual interstitial pneumonia or atypical pneumonia including viral etiologies.  The patient was found to meet SIRS criteria with a WBC of 18.5, heart rate 103 and mildly hypotensive.  However, patient does take midodrine.  Patient's UA was negative for infection, but UDS was positive for benzos and methadone.  Patient is prescribed these medications.  Blood cultures were ordered in the emergency department and the patient was given 1 L of normal saline.  She was also given vancomycin and  chronic hypoxic respiratory failure on no baseline home O2.  Controlled on home medication regimen - continued.      Troponin elevation - c/w myocardial injury 2nd to      [] STEMI  [] NSTEMI  [] Acute/Chronic Myocardial Injury 2nd to CHF  [] Recent MI (with 4 weeks of admission) dated:   [] Type II MI w/ demand ischemia  [] Demand ischemia w/out MI   [] Other -     [x] Clinically insignificant Troponin Elevation of unclear etiology  [] Clinically insignificant Troponin Elevation 2nd to CKD/ESRD    of unclear clinical significance w/out signs and/or symptoms of active ischemia.  Documented and reviewed.      Bipolar disorder  -mood appears stable at this time  -continue home medications      Chronic prescription opiate use  -continue home medications     Tremors - repeatedly dropping items and ambulatory instability.  MRI ordered and pending.  Neurology consulted and appreciated.    Obesity - With Body mass index is 38.45 kg/m².       [] Class I - 30.0 to 34.9 kg/m2  [x] Class II - 35.0 to 39.9 kg/m2  [] Class III - >=40 kg/m2 (AKA severe/morbid/massive)   [] Super Obesity - > 50 kg/m2  [] Super Super Obesity - > 60 kg/m2    Complicating assessment and treatment. Placing patient at risk for multiple co-morbidities as well as early death and contributing to the patient's presentation.        Ongoing threat to life and/or bodily function without ongoing treatment due to:  tremors     Consults:      PHARMACY TO DOSE VANCOMYCIN  IP CONSULT TO PHARMACY  IP CONSULT TO NEUROLOGY      Neurology consulted and appreciated.  Available consultant notes from yesterday and today were reviewed on 2/14/2025, w/ plan for continued inpatient w/up and management - as above    --------------------------------------------------      Medications:        Infusion Medications    sodium chloride      sodium chloride 75 mL/hr at 02/13/25 7629     Scheduled Medications    docusate sodium  100 mg Oral BID    levomilnacipran  40 mg Oral Daily

## 2025-02-14 NOTE — H&P
Hospital Medicine History & Physical      Date of Admission: 2/13/2025    Date of Service:  Pt seen/examined on 2/13/2025     [x]Admitted to Inpatient with expected LOS greater than two midnights due to medical therapy.    []Placed in Observation status.    Chief Admission Complaint:  Fatigue, generalized weakness and shortness of breath    Presenting Admission History:      44 y.o. female, with PMH of obesity, bipolar, Hypothyroidism, COPD,  who presented to [facility] with fatigue, generalized weakness and shortness of breath. History obtained from the patient and review of EMR.  The patient stated she has been experiencing generalized weakness, fatigue and shortness of breath for the last couple of days.  However, the patient was recently admitted here at [facility] for COVID-pneumonia.  She states that she has not been feeling well since she was discharged.  Per EMR, EMS was called and the patient was slightly unsteady getting onto their stretcher.  She was taken to the emergency department for further evaluation. In the emergency department a chest x-ray was obtained that revealed no localized consolidation or pleural effusion in this underinflated chest.  Elevated right hemidiaphragm progressed from prior exam.  CT chest was obtained that revealed bronchovascular thickening with bilateral lower lobe and posterior right upper lobe airspace disease.  Superimposed diffuse scattered groundglass airspace opacities bilaterally.  Findings suggest underlying inflammatory or infectious etiology.  Usual interstitial pneumonia or atypical pneumonia including viral etiologies.  The patient was found to meet SIRS criteria with a WBC of 18.5, heart rate 103 and mildly hypotensive.  However, patient does take midodrine.  Patient's UA was negative for infection, but UDS was positive for benzos and methadone.  Patient is prescribed these medications.  Blood cultures were ordered in the emergency department and  MG capsule Take 1 capsule by mouth 2 times daily.   Yes ProviderTimmy MD   methadone (DOLOPHINE) 10 MG tablet Take 14 tablets by mouth daily. Gets through [facility] ([phone])   Yes ProviderTimmy MD   nicotine polacrilex (NICORETTE) 4 MG gum Take 1 each by mouth as needed for Smoking cessation   Yes Timmy Meza MD   ALPRAZolam (XANAX) 1 MG tablet Take 1 tablet by mouth 3 times daily as needed for Sleep or Anxiety.   Yes ProviderTimmy MD   levomilnacipran (FETZIMA) 40 MG CP24 extended release capsule Take 1 capsule by mouth daily    ProviderTimmy MD   fluticasone (FLONASE) 50 MCG/ACT nasal spray SPRAY 1 SPRAY INTO EACH NOSTRIL EVERY DAY  Patient not taking: Reported on 2/13/2025 1/7/25   Manisha Medina MD   guaiFENesin (MUCINEX) 600 MG extended release tablet Take 1 tablet by mouth 2 times daily  Patient not taking: Reported on 2/13/2025 12/13/24   Kirstie Kirkland, APRN     Labs: Personally reviewed and interpreted for clinical significance.   Recent Labs     02/13/25  1655   WBC 18.5*   HGB 12.4   HCT 37.5        Recent Labs     02/13/25  1655      K 5.0   CL 99   CO2 26   BUN 19   CREATININE 1.6*   CALCIUM 9.6     Recent Labs     02/13/25  1655 02/13/25  1801   TROPHS 20* 16*     Recent Labs     02/13/25  1655   AST 26   ALT 13   BILITOT <0.2   ALKPHOS 69     Thank you Smith Neff MD for the opportunity to be involved in this patient's care. If you have any questions or concerns please feel free to contact me at [phone].     Shani Haney, APRN - CNP

## 2025-02-14 NOTE — PROGRESS NOTES
Physical Therapy  Facility/Department: [facility] C3 TELE/MED SURG/ONC  Physical Therapy Initial Assessment/treatment    Name: Anjelica Blanchard  : 1980  MRN: [id number]  Date of Service: 2025    Discharge Recommendations:  Subacute/Skilled Nursing Facility, Continue to assess pending progress (strict 24 hr if pt declines SNF)   PT Equipment Recommendations  Equipment Needed: No  Other: owns RW    Therapy discharge recommendations take into account each patient's current medical complexities and are subject to input/oversight from the patient's healthcare team.   Barriers to Home Discharge:   [x] Steps to access home entry or bed/bath: 4 flights   [x] Unable to transfer, ambulate, or propel wheelchair household distances without assist   [x] Limited available assist at home upon discharge    [] Patient or family requests d/c to post-acute facility    [x] Poor cognition/safety awareness for d/c to home alone    []Unable to maintain ordered weight bearing status    [] Patient with salient signs of long-standing immobility   [x] Patient is at risk for falls    [] Other:    If pt is unable to be seen after this session, please let this note serve as discharge summary.  Please see case management note for discharge disposition.  Thank you.    Patient Diagnosis(es): The primary encounter diagnosis was Sepsis, due to unspecified organism, unspecified whether acute organ dysfunction present (HCC). A diagnosis of Multifocal pneumonia was also pertinent to this visit.  Past Medical History:  has a past medical history of Adrenal insufficiency, Asthma, Bipolar 1 disorder ([facility]), Borderline personality disorder ([facility]), Chronic prescription benzodiazepine use, Chronic prescription opiate use, COPD (chronic obstructive pulmonary disease) ([facility]), Degenerative spinal arthritis, Howard-Danlos syndrome, Fibromyalgia, Hepatitis C, Herniated disc, Intervertebral disc disease, Opiate overdose ([facility]), Osteoarthritis, Polysubstance abuse  : No  Patient's  Info: Neighbor drives  Occupation: Unemployed  Additional Comments: neighbor avail PRN  Vision/Hearing  Vision  Vision: Within Functional Limits  Hearing  Hearing: Within functional limits    Cognition   Orientation  Overall Orientation Status: Within Functional Limits  Orientation Level: Oriented X4  Cognition  Overall Cognitive Status: Exceptions  Arousal/Alertness: Delayed responses to stimuli  Following Commands: Impaired  Attention Span: Unable to maintain attention  Memory: Appears intact  Safety Judgement: Decreased awareness of need for assistance;Decreased awareness of need for safety;Impaired  Problem Solving: Decreased awareness of errors  Insights: Not aware of deficits  Initiation: Does not require cues  Sequencing: Requires cues for some  Cognition Comment: impulsive, restless    Objective  Temp: 97.5 °F (36.4 °C)  Pulse: 80  Heart Rate Source: Monitor  Respirations: 16  SpO2: 93 %  O2 Device: None (Room air)  BP: 100/67 (EOB)  MAP (Calculated): 78  BP Location: Left upper arm  BP Method: Automatic  Patient Position: Sitting  Comment: 105/67 post mobility        Gross Assessment  AROM: Generally decreased, functional (lack of terminal knee ext bilat, hip flexion ~70%)  Strength: Generally decreased, functional (hip flexion bilat (not full ROM against gravity, no resistance applied), knee ext 3+/5 bilat)  Coordination: Generally decreased, functional (uncoordinated with gait and fine motor, unclear if due to sleepiness/eyes closed)  Pain  Pre-Pain: 4  Pain Location: Right;Left;Knee                Bed Mobility Training  Bed Mobility Training: Yes  Interventions: Safety awareness training;Verbal cues  Supine to Sit: Contact guard assistance (impulsive, cues for safety)  Sit to Supine: Stand by assistance  Balance  Sitting: High guard (pt impulsively flops torso back onto elevated HOB \"I am just so tired\", educated on safety and purpose of mobility initiation)  Standing:

## 2025-02-14 NOTE — ACP (ADVANCE CARE PLANNING)
Advance Care Planning     General Advance Care Planning (ACP) Conversation    Date of Conversation: 2/14/2025  Conducted with: Patient with Decision Making Capacity  Other persons present: None    Healthcare Decision Maker:   Primary Decision Maker: Luis Miguel Blanchardie - Brother/Sister - [phone]    Primary Decision Maker: Talia Blanchard - Child    Secondary Decision Maker: [place] - Parent - [phone]     Today we discussed Healthcare Decision Makers. The patient is considering options.  Content/Action Overview:  Patient is ready to consider care preferences-refer to ACP Clinical Specialist  Reviewed DNR/DNI and patient elects Full Code (Attempt Resuscitation)    Length of Voluntary ACP Conversation in minutes:  <16 minutes (Non-Billable)    MARÍA Gates

## 2025-02-14 NOTE — CARE COORDINATION
Case Management Assessment  Initial Evaluation    Date/Time of Evaluation: 2/14/2025 2:42 PM  Assessment Completed by: MARÍA Gates    If patient is discharged prior to next notation, then this note serves as note for discharge by case management.    Patient Name: Anjelica Blanchard                   YOB: 1980  Diagnosis: COLLEEN (acute kidney injury) (HCC) [N17.9]  Multifocal pneumonia [J18.9]  Sepsis, due to unspecified organism, unspecified whether acute organ dysfunction present (HCC) [A41.9]                   Date / Time: 2/13/2025  4:24 PM    Patient Admission Status: Inpatient   Readmission Risk (Low < 19, Mod (19-27), High > 27): Readmission Risk Score: 32.2    Current PCP: Smith Neff MD  PCP verified by ? Yes    Chart Reviewed: Yes      History Provided by: Patient  Patient Orientation: Alert and Oriented, Person, Place, Situation, Self    Patient Cognition: Alert    Hospitalization in the last 30 days (Readmission):  Yes    If yes, Readmission Assessment in  Navigator will be completed.    Advance Directives:      Code Status: Full Code   Patient's Primary Decision Maker is: Patient Declined (Legal Next of Kin Remains as Decision Maker)    Primary Decision Maker: Mally Blanchard - Brother/Sister - [phone]    Primary Decision Maker: Talia Blanchard - Child    Secondary Decision Maker: [place] Blanchard - Parent - [phone]    Discharge Planning:    Patient lives with: Alone Type of Home: Apartment  Primary Care Giver: Self  Patient Support Systems include: Family Members, Home Care Staff   Current Financial resources: Medicaid  Current community resources: None  Current services prior to admission: Durable Medical Equipment            Current DME: Walker            Type of Home Care services:  Nursing Services, OT, PT    ADLS  Prior functional level: Independent in ADLs/IADLs  Current functional level: Independent in ADLs/IADLs    Family can provide assistance at DC: No  Would you

## 2025-02-14 NOTE — PLAN OF CARE
Problem: Chronic Conditions and Co-morbidities  Goal: Patient's chronic conditions and co-morbidity symptoms are monitored and maintained or improved  Outcome: Progressing  Flowsheets (Taken 2/13/2025 2300)  Care Plan - Patient's Chronic Conditions and Co-Morbidity Symptoms are Monitored and Maintained or Improved: Monitor and assess patient's chronic conditions and comorbid symptoms for stability, deterioration, or improvement

## 2025-02-14 NOTE — DISCHARGE INSTR - COC
Continuity of Care Form    Patient Name: Anjelica Blanchard   :  1980  MRN:  [id number]    Admit date:  2025  Discharge date:  ***    Code Status Order: Full Code   Advance Directives:   Advance Care Flowsheet Documentation             Admitting Physician:  Sami Pereira MD  PCP: Smith Neff MD    Discharging Nurse: ***  Discharging Hospital Unit/Room#: 0344/0344-01  Discharging Unit Phone Number: ***    Emergency Contact:   Extended Emergency Contact Information  Primary Emergency Contact: Mally Blanchard  Home Phone: [phone]  Mobile Phone: [phone]  Relation: Brother/Sister  Secondary Emergency Contact: [place] Blanchard   [facility]  Home Phone: [phone]  Work Phone: [phone]  Mobile Phone: [phone]  Relation: Parent   needed? No    Past Surgical History:  Past Surgical History:   Procedure Laterality Date    BRONCHOSCOPY N/A 2023    BRONCHOSCOPY DIAGNOSTIC OR CELL WASH ONLY performed by Manisha Medina MD at [facility] ENDOSCOPY    BRONCHOSCOPY  2023    BRONCHOSCOPY ALVEOLAR LAVAGE performed by Manisha Medina MD at [facility] ENDOSCOPY    BRONCHOSCOPY  2023    BRONCHOSCOPY THERAPUTIC ASPIRATION INITIAL performed by Manisha Medina MD at [facility] ENDOSCOPY    KNEE SURGERY      LUMBAR FUSION N/A 2019    L5, S1 TRANSFORAMINAL LUMBAR INTERBODY FUSION performed by Saad Rojo MD at [facility] OR    SHOULDER SURGERY Left     SIGMOIDOSCOPY N/A 2020    FLEXIBLE SIGMOIDOSCOPY POSSIBLE COLONOSCOPY performed by Shon Nixon MD at [facility] ENDOSCOPY       Immunization History:   Immunization History   Administered Date(s) Administered    Influenza Virus Vaccine 10/16/2013, 2015, 2015, 2015    Influenza, FLUCELVAX, (age 6 mo+) IM, Trivalent PF, 0.5mL 10/08/2015    MMR, PRIORIX, M-M-R II, (age 12m+), SC, 0.5mL 1992    Pneumococcal, PPSV23, PNEUMOVAX 23, (age 2y+), SC/IM, 0.5mL 10/16/2013, 2016     ADLs:766487307}  Toileting  {CHP DME ADLs:280111361}  Feeding  {CHP DME ADLs:635330293}  Med Admin  {P DME ADLs:709745278}  Med Delivery   { DANETTE MED Delivery:565529707}    Wound Care Documentation and Therapy:  Incision 19 Back (Active)   Number of days:         Elimination:  Continence:   Bowel: {YES / NO:}  Bladder: {YES / NO:}  Urinary Catheter: {Urinary Catheter:195740339}   Colostomy/Ileostomy/Ileal Conduit: {YES / NO:}       Date of Last BM: ***    Intake/Output Summary (Last 24 hours) at 2025 144  Last data filed at 2025  Gross per 24 hour   Intake 1100 ml   Output --   Net 1100 ml     I/O last 3 completed shifts:  In: 1100 [IV Piggyback:1100]  Out: -     Safety Concerns:     { DANETTE Safety Concerns:243628274}    Impairments/Disabilities:      {[facility] Impairments/Disabilities:152467076}    Nutrition Therapy:  Current Nutrition Therapy:   { DANETTE Diet List:545672780}    Routes of Feeding: {[facility] DME Other Feedings:749122626}  Liquids: {Slp liquid thickness:17620}  Daily Fluid Restriction: {P DME Yes amt example:363529802}  Last Modified Barium Swallow with Video (Video Swallowing Test): {Done Not Done Date:}    Treatments at the Time of Hospital Discharge:   Respiratory Treatments: ***  Oxygen Therapy:  {Therapy; copd oxygen:13752}  Ventilator:    { CC Vent List:511338213}    Rehab Therapies: {THERAPEUTIC INTERVENTION:7369253233}  Weight Bearing Status/Restrictions: {[facility] Weight Bearin}  Other Medical Equipment (for information only, NOT a DME order):  {EQUIPMENT:358412489}  Other Treatments: ***    Patient's personal belongings (please select all that are sent with patient):  {[facility] DME Belongings:876557647}    RN SIGNATURE:  {Esignature:281869764}    CASE MANAGEMENT/SOCIAL WORK SECTION    Inpatient Status Date: 2025    Readmission Risk Assessment Score:  [facility] RISK OF UNPLANNED READMISSION 2.0             32.2 Total Score         Discharging to Facility/ Agency   [facility]  Services: Home Nursing  [address] [address]  [phone]   / signature: Electronically signed by Thierry Zamudio RN on 2/18/25 at 8:52 AM EST    PHYSICIAN SECTION    Prognosis: Good      Condition at Discharge: Stable    Rehab Potential (if transferring to Rehab): Good    Recommended Labs or Other Treatments After Discharge: see below    Recommended Follow-up, Labs or Other Treatments After Discharge:    INFUSION ORDERS:  INVANZ 1g IV daily x 4 more days to start 2/19 (first dose given 2/18)    - dx multifocal pneumonia    PICC and Central Tunneled/Non-Tunneled:   NS 5 to 10mL pre-/post-use, 5 to 10mL pre-lab draw, and 10mL post-lab draw.   Heparin: (10 unit/mL) 5mL   For Maintenance, heparin (10 unit/mL) 5 mL or (100 unit/mL) 3 mL every 24 hr.    -Labs: CBC with diff, BMP on 2/21  FAX results to [phone]    - Call Dr. Kia Alexis with antibiotic / infusion issues, [phone]  - Call with any change in status, transfer in or out of a facility or to [facility] [phone]  - Orders only valid for current disposition, NOT transferable upon discharge from facility or new admission to another facility.  - F/U with regular PCP             Physician Certification: I certify the above information and transfer of Anjelica Blanchard  is necessary for the continuing treatment of the diagnosis listed and that she requires Home Care for less 30 days.     Update Admission H&P: Changes in H&P as follows - see discharge summary    PHYSICIAN SIGNATURE:  Electronically signed by Kia Alexis MD on 2/17/25 at 5:30 PM EST

## 2025-02-14 NOTE — ED NOTES
Anjelica Blanchard is a 44 y.o. female admitted for  Principal Problem:    COLLEEN (acute kidney injury) ([facility])  Resolved Problems:    * No resolved hospital problems. *  .   Patient Home via EMS transportation with   Chief Complaint   Patient presents with    Fatigue     The patient reports feeling unsteady on her feet and generally weak/tired. This began a few hours ago but says she had these episodes for years. The patient ambulates to the bed from the EMS stretcher; slightly unsteady. Patient falling asleep during triage process.   .  Patient is alert and Person, Place, Time, and Situation  Patient's baseline mobility: Baseline Mobility: Independent   Code Status: Prior   Cardiac Rhythm:       Is patient on baseline Oxygen: no how many Liters:   Abnormal Assessment Findings:     Isolation:       NIH Score:    C-SSRS: Risk of Suicide: No Risk  Bedside swallow:        Active LDA's:   Peripheral IV 02/13/25 Right Antecubital (Active)   Site Assessment Clean, dry & intact 02/13/25 1850   Line Status Blood return noted;Flushed 02/13/25 1850   Line Care Connections checked and tightened 02/13/25 1850   Phlebitis Assessment No symptoms 02/13/25 1850   Infiltration Assessment 0 02/13/25 1850       Peripheral IV 02/13/25 Left Forearm (Active)   Site Assessment Clean, dry & intact 02/13/25 2008   Line Status Blood return noted;Flushed;Normal saline locked 02/13/25 2008   Line Care Connections checked and tightened 02/13/25 2008   Phlebitis Assessment No symptoms 02/13/25 2008   Infiltration Assessment 0 02/13/25 2008     Patient admitted with a carpenter: no   Family/Caregiver Present yes Any Concerns: no   Restraints no  Sitter no         Vitals: MEWS Score: 3    Vitals:    02/13/25 1926 02/13/25 2002 02/13/25 2049 02/13/25 2127   BP: (!) 87/55 90/66 (!) 87/56 (!) 90/59   Pulse: 79 81 76 89   Resp: 14 10 16 24   Temp:       TempSrc:       SpO2: 100% 100% 100% 93%   Weight:       Height:           Last documented pain score (0-10  May send attached prescription to his pharmacy, patient to be seen if persistent or worsening skin condition

## 2025-02-14 NOTE — CONSULTS
Pharmacy Note  Vancomycin Consult    Anjelica Blanchard is a 44 y.o. female started on Vancomycin for Pneumonia x 7 days; consult received from Dr. Shani Haney to manage therapy. Also receiving the following antibiotics: Cefepime.    Allergies:  Asenapine, Buprenorphine-naloxone, Metoclopramide, Morphine and codeine, Risperidone, Trazodone, Morphine, Asenapine maleate, Buprenorphine hcl-naloxone hcl, Codeine, Reglan [metoclopramide hcl], Risperidone and related, Trazodone and nefazodone, Buprenorphine hcl-naloxone hcl, Guanfacine hcl, and Lurasidone     Tmax: 97.5      Recent Labs     02/13/25  1655   CREATININE 1.6*       Recent Labs     02/13/25  1655   WBC 18.5*       Estimated Creatinine Clearance: 51 mL/min (A) (based on SCr of 1.6 mg/dL (H)).      Intake/Output Summary (Last 24 hours) at 2/13/2025 2241  Last data filed at 2/13/2025 2137  Gross per 24 hour   Intake 1100 ml   Output --   Net 1100 ml       Wt Readings from Last 1 Encounters:   02/13/25 101.6 kg (224 lb)         Body mass index is 38.45 kg/m².    Loading dose (critically ill or in ICU, require dialysis or renal replacement therapy): Vancomycin 25 mg/kg IVPB x 1 (maximum 2500 mg).  Maintenance dose: 10-20 mg/kg (maximum: 2000 mg/dose and 4500 mg/day) starting at the next dosing interval determined by renal function  Pulse dose: fluctuating renal function, COLLEEN, ESRD  CRRT: 7.5-10 mg/kg q12h   Goal Vancomycin trough: 15-20 mcg/mL   Goal Vancomycin AUC: 400-600     Assessment/Plan:  Will initiate Vancomycin with a one time loading dose of 2500 mg x1, followed by 1250 mg IV every 24 hours. Calculated Vancomycin AUC = 597 mg/L*h with an estimated steady-state vancomycin trough = 17.8 mcg/mL. Vancomycin level ordered for 2/14 @ 1200. Timing of trough level will be determined based on culture results, renal function, and clinical response.     Thank you for the consult.  Herny Vidal PharmD 2/13/2025 10:46 PM    2/14  - Day # 2/7  - Dx: PNA  - SCr: 1.1  -      Estimated Creatinine Clearance: 91 mL/min (based on SCr of 0.9 mg/dL).  Recent Labs     02/15/25  0558 02/17/25  0611   WBC 7.1 6.9     Last Vancomycin dosing today  Cx ngtd    Americo Montgomery, [facility] 2/17/2025 8:52 AM

## 2025-02-14 NOTE — PROGRESS NOTES
Pt a/o. VSS except low BP. Shift assessment complete and charted. Pt stable on RA. Pt asking about methadone and pain medication but is unable to even hold a cup without spilling it. Pt states she has been having issues for about a year with \"twitching and dropping stuff\". She says \"they are trying to figure out what's wrong.\" She says she does not have a neurologist and is trying to find one. This RN was in the room for 20 minutes and pt dropped water cup 2x and bed had to be changed. Pt cannot hold arms out straight without them dropping or twitching for a second. Pt says she has fallen a few times because legs do the same thing. Legs and arms very weak R>L. Lungs with wheezes R side and L lung base. BLE pitting edema +1. Pt states she feels SOB when moving around. Pt stable and denied needs. MD made aware of assessment findings.

## 2025-02-14 NOTE — ED NOTES
Patient more awake than she has been. Sitting up in the bed holding a conversation on the phone. Able to take patient off of O2 at this time as her RR has increased and she is maintaining her O2 >92%

## 2025-02-14 NOTE — PROGRESS NOTES
Occupational Therapy  Facility/Department: [facility] C3 TELE/MED SURG/ONC  Occupational Therapy Initial Assessment    Name: Anjelica Blanchard  : 1980  MRN: [id number]  Date of Service: 2025    Discharge Recommendations:  Subacute/Skilled Nursing Facility  Therapy discharge recommendations take into account each patient's current medical complexities and are subject to input/oversight from the patient's healthcare team.   Barriers to Home Discharge:   [x] Steps to access home entry or bed/bath:   [x] Unable to transfer, ambulate, or propel wheelchair household distances without assist   [x] Limited available assist at home upon discharge       [x] Poor cognition/safety awareness for d/c to home alone        If pt is unable to be seen after this session, please let this note serve as discharge summary.  Please see case management note for discharge disposition.  Thank you.           Patient Diagnosis(es): The primary encounter diagnosis was Sepsis, due to unspecified organism, unspecified whether acute organ dysfunction present (HCC). A diagnosis of Multifocal pneumonia was also pertinent to this visit.  Past Medical History:  has a past medical history of Adrenal insufficiency, Asthma, Bipolar 1 disorder (HCC), Borderline personality disorder (HCC), Chronic prescription benzodiazepine use, Chronic prescription opiate use, COPD (chronic obstructive pulmonary disease) (HCC), Degenerative spinal arthritis, Howard-Danlos syndrome, Fibromyalgia, Hepatitis C, Herniated disc, Intervertebral disc disease, Opiate overdose (HCC), Osteoarthritis, Polysubstance abuse (HCC), Sedative dependence (HCC), Seizure (HCC), TCA (tricyclic antidepressant) overdose, and Vertebral compression fracture (HCC).  Past Surgical History:  has a past surgical history that includes knee surgery; lumbar fusion (N/A, 2019); shoulder surgery (Left); sigmoidoscopy (N/A, 2020); bronchoscopy (N/A, 2023); bronchoscopy (2023);  Shower/Tub: Tub/Shower unit, Shower chair with back  Bathroom Toilet: Standard  Bathroom Equipment: None  Home Equipment: Walker - Rolling  Has the patient had two or more falls in the past year or any fall with injury in the past year?: Yes (1)  Prior Level of Assist for ADLs: Independent  Prior Level of Assist for Homemaking: Independent  Prior Level of Assist for Ambulation: Independent household ambulator, with or without device  Prior Level of Assist for Transfers: Independent  Active : No  Patient's  Info: Neighbor drives  Occupation: Unemployed  Leisure & Hobbies: new Puppy  Additional Comments: neighbor avail PRN    Objective  Temp: 97.5 °F (36.4 °C)  Pulse: 80  Heart Rate Source: Monitor  Respirations: 16  SpO2: 93 %  O2 Device: None (Room air)  BP: 100/67 (EOB)  MAP (Calculated): 78  BP Location: Left upper arm  BP Method: Automatic  Patient Position: Sitting  Comment: 105/67 post mobility             Safety Devices  Type of Devices: Left in bed;Call light within reach;Patient at risk for falls;Nurse notified;Gait belt;Bed alarm in place  Restraints  Restraints Initially in Place: No       AROM: Within functional limits  Strength: Generally decreased, functional  Coordination: Grossly decreased, non-functional (can't text on my phone)  Tone: Abnormal (BUE volitional tremors > resting tremors)    ADL  Putting On/Taking Off Footwear: Maximum assistance  Functional Mobility: Unable to assess (Comment) (declined toileting)  Product Used : Bath wipes     Activity Tolerance  Activity Tolerance: Patient tolerated evaluation without incident;Other (comment);Treatment limited secondary to decreased cognition  Activity Tolerance Comments: pt restless and impulsive  Bed mobility  Supine to Sit: Contact guard assistance  Sit to Supine: Contact guard assistance  Bed Mobility Comments: SBA/CGA sitting EOB for vitals check d/t lethargy, unsupported static sitting balance EOB  Transfers  Sit to stand: Contact

## 2025-02-14 NOTE — PROGRESS NOTES
Pt is very restless and gets up and down and cannot sit still. Still having twitching movements. Will pass this assessment information on to MD.

## 2025-02-14 NOTE — CONSULTS
Pharmacy Note  Vancomycin Consult for ED       Consult received from Dr. Cm to dose Vancomycin x1 for treatment of pna.     Allergies:  Asenapine, Buprenorphine-naloxone, Metoclopramide, Morphine and codeine, Risperidone, Trazodone, Morphine, Asenapine maleate, Buprenorphine hcl-naloxone hcl, Codeine, Reglan [metoclopramide hcl], Risperidone and related, Trazodone and nefazodone, Buprenorphine hcl-naloxone hcl, Guanfacine hcl, and Lurasidone          Recent Labs     02/13/25  1655   CREATININE 1.6*     Estimated Creatinine Clearance: 51 mL/min (A) (based on SCr of 1.6 mg/dL (H)).    Recent Labs     02/13/25  1655   WBC 18.5*       Wt Readings from Last 1 Encounters:   02/13/25 101.6 kg (224 lb)         Vancomycin 2500 mg IV once x1 ordered.    Please re-consult if admitted & would like Vancomycin to continue.      Thank you for the consult.   Davion Solo, PharmD 2/13/2025 8:56 PM

## 2025-02-14 NOTE — CONSULTS
Inpatient Neurology Consult  [facility] Neurology  Wolf Myers MD    Anjelica Blanchard  1980    Date of Service: 2/14/2025    Referring Physician: Ravi Davenport MD    Reason for the consult and CC: generalized weakness, abnormal movements    HPI:   Anjelica Blanchard is a 44 y.o. female who  has a past medical history of Adrenal insufficiency, Asthma, Bipolar 1 disorder ([facility]), Borderline personality disorder (HCC), Chronic prescription benzodiazepine use, Chronic prescription opiate use, COPD (chronic obstructive pulmonary disease) ([facility]), Degenerative spinal arthritis, Howard-Danlos syndrome, Fibromyalgia, Hepatitis C, Herniated disc, Intervertebral disc disease, Opiate overdose ([facility]), Osteoarthritis, Polysubstance abuse ([facility]), Sedative dependence ([facility]), Seizure ([facility]), TCA (tricyclic antidepressant) overdose, and Vertebral compression fracture ([facility]). Admitted for COLLEEN, SIRS. Notable history of bipolar disorder, adrenal insufficiency, methadone dependence, OH, hepatitis C, and recent admission for covid. She developed restlessness and twitching movements on 2/14 prompting neurology consult. She reported generalized weakness at admission.  Pertinent medication changes during admission include started cefepime and vancomycin on 2/13, to Lyrica 150 mg twice daily home dose, no change Depakote there milligrams per day, no change to nightly, missed 1 dose of methadone 140 mg/day on 2/13, no change to Ambien 10 mg nightly, no change to Xanax 1 mg 3 times daily as needed sleep or anxiety, she has not received her home dose of hydrocortisone 15 mg/day.     She reports about 1 year duration abnormal jerking movements that come and go lasting 12-24 hours per episode. Sometimes this leads to falls. Occurs multiple times per month. Not clearly associated with medication changes. The episode on 2/14 was typical for her jerking spells. No RADHA during jerking spells. She was taking Depakote prior to 6/2024 then was admitted for  pneumonia or atypical pneumonia including viral etiologies would be considerations.\"    I reviewed the lab results.  Serum creatinine 1.6 (GFR 40) on admission now down to 1.1.  UDS today for benzodiazepine and methadone (prescribed Ambien, Percocet, methadone prior to admission). VPA levels were recently high normal 90s and high 110s.     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 This is most likely due to polypharmacy and may have been exacerbated by recent COLLEEN. Medications likely to be contributing to negative myoclonus include methadone, Lyrica, Percocet, Ambien. Seroquel and Depakote are possibly contributing to myoclonus.     Spells of unresponsiveness. Unlikely but possibly due to seizure. If this recurs as pain and mood medications are reduced, then will consider switching to alternative medication than Depakote.     Recommendations:      Reduce Depakote from 500mg in AM and 1000mg QHS to 500mg BID. If spells of unresponsiveness recur on lower dose, then switch to alternative anti seizure medication and consider EMU admission.     Defer management of pain and mood medications to primary team. Consider reducing the following medications gradually: methadone, Percocet, Lyrica, Ambien, and/or Seroquel. Most likely offending medications include Lyrica, Percocet, and methadone.     Follow up neurology clinic 3-6 months.     Neurology will sign off but please contact me if there are additional questions/issues.    Electronically signed by Froylan Myers MD on 2/14/2025 at 5:47 PM

## 2025-02-15 LAB
ALBUMIN SERPL-MCNC: 3 G/DL (ref 3.4–5)
ANION GAP SERPL CALCULATED.3IONS-SCNC: 10 MMOL/L (ref 3–16)
BUN SERPL-MCNC: 12 MG/DL (ref 7–20)
CALCIUM SERPL-MCNC: 8.7 MG/DL (ref 8.3–10.6)
CHLORIDE SERPL-SCNC: 105 MMOL/L (ref 99–110)
CO2 SERPL-SCNC: 28 MMOL/L (ref 21–32)
CREAT SERPL-MCNC: 0.9 MG/DL (ref 0.6–1.1)
DEPRECATED RDW RBC AUTO: 16.8 % (ref 12.4–15.4)
GFR SERPLBLD CREATININE-BSD FMLA CKD-EPI: 79 ML/MIN/{1.73_M2}
GLUCOSE SERPL-MCNC: 124 MG/DL (ref 70–99)
HCT VFR BLD AUTO: 33.5 % (ref 36–48)
HGB BLD-MCNC: 11.2 G/DL (ref 12–16)
MAGNESIUM SERPL-MCNC: 2.33 MG/DL (ref 1.8–2.4)
MCH RBC QN AUTO: 29.1 PG (ref 26–34)
MCHC RBC AUTO-ENTMCNC: 33.4 G/DL (ref 31–36)
MCV RBC AUTO: 87.1 FL (ref 80–100)
PHOSPHATE SERPL-MCNC: 4 MG/DL (ref 2.5–4.9)
PLATELET # BLD AUTO: 241 K/UL (ref 135–450)
PMV BLD AUTO: 8 FL (ref 5–10.5)
POTASSIUM SERPL-SCNC: 4.3 MMOL/L (ref 3.5–5.1)
RBC # BLD AUTO: 3.84 M/UL (ref 4–5.2)
SODIUM SERPL-SCNC: 143 MMOL/L (ref 136–145)
WBC # BLD AUTO: 7.1 K/UL (ref 4–11)

## 2025-02-15 PROCEDURE — 36415 COLL VENOUS BLD VENIPUNCTURE: CPT

## 2025-02-15 PROCEDURE — 6370000000 HC RX 637 (ALT 250 FOR IP): Performed by: STUDENT IN AN ORGANIZED HEALTH CARE EDUCATION/TRAINING PROGRAM

## 2025-02-15 PROCEDURE — 83735 ASSAY OF MAGNESIUM: CPT

## 2025-02-15 PROCEDURE — 6370000000 HC RX 637 (ALT 250 FOR IP): Performed by: NURSE PRACTITIONER

## 2025-02-15 PROCEDURE — 85027 COMPLETE CBC AUTOMATED: CPT

## 2025-02-15 PROCEDURE — 1200000000 HC SEMI PRIVATE

## 2025-02-15 PROCEDURE — 6360000002 HC RX W HCPCS: Performed by: NURSE PRACTITIONER

## 2025-02-15 PROCEDURE — 2500000003 HC RX 250 WO HCPCS: Performed by: NURSE PRACTITIONER

## 2025-02-15 PROCEDURE — 6370000000 HC RX 637 (ALT 250 FOR IP): Performed by: INTERNAL MEDICINE

## 2025-02-15 PROCEDURE — 80069 RENAL FUNCTION PANEL: CPT

## 2025-02-15 PROCEDURE — 94640 AIRWAY INHALATION TREATMENT: CPT

## 2025-02-15 PROCEDURE — 6360000002 HC RX W HCPCS: Performed by: INTERNAL MEDICINE

## 2025-02-15 RX ORDER — HYDROCORTISONE 10 MG/1
10 TABLET ORAL DAILY
Status: DISCONTINUED | OUTPATIENT
Start: 2025-02-15 | End: 2025-02-18 | Stop reason: HOSPADM

## 2025-02-15 RX ORDER — IPRATROPIUM BROMIDE AND ALBUTEROL SULFATE 2.5; .5 MG/3ML; MG/3ML
1 SOLUTION RESPIRATORY (INHALATION) EVERY 4 HOURS PRN
Status: DISCONTINUED | OUTPATIENT
Start: 2025-02-15 | End: 2025-02-18 | Stop reason: HOSPADM

## 2025-02-15 RX ORDER — FUROSEMIDE 20 MG/1
20 TABLET ORAL DAILY
Status: DISCONTINUED | OUTPATIENT
Start: 2025-02-15 | End: 2025-02-18 | Stop reason: HOSPADM

## 2025-02-15 RX ORDER — POLYETHYLENE GLYCOL 3350 17 G/17G
17 POWDER, FOR SOLUTION ORAL DAILY PRN
Status: DISCONTINUED | OUTPATIENT
Start: 2025-02-15 | End: 2025-02-17

## 2025-02-15 RX ORDER — HYDROCORTISONE 10 MG/1
5 TABLET ORAL NIGHTLY
Status: DISCONTINUED | OUTPATIENT
Start: 2025-02-15 | End: 2025-02-18 | Stop reason: HOSPADM

## 2025-02-15 RX ADMIN — ALPRAZOLAM 1 MG: 1 TABLET ORAL at 11:40

## 2025-02-15 RX ADMIN — IPRATROPIUM BROMIDE AND ALBUTEROL SULFATE 1 DOSE: 2.5; .5 SOLUTION RESPIRATORY (INHALATION) at 17:36

## 2025-02-15 RX ADMIN — ZOLPIDEM TARTRATE 10 MG: 5 TABLET, FILM COATED ORAL at 20:43

## 2025-02-15 RX ADMIN — ENOXAPARIN SODIUM 30 MG: 100 INJECTION SUBCUTANEOUS at 10:28

## 2025-02-15 RX ADMIN — SODIUM CHLORIDE, PRESERVATIVE FREE 10 ML: 5 INJECTION INTRAVENOUS at 10:29

## 2025-02-15 RX ADMIN — MIDODRINE HYDROCHLORIDE 5 MG: 5 TABLET ORAL at 10:24

## 2025-02-15 RX ADMIN — SODIUM CHLORIDE, PRESERVATIVE FREE 20 ML: 5 INJECTION INTRAVENOUS at 21:00

## 2025-02-15 RX ADMIN — MIDODRINE HYDROCHLORIDE 5 MG: 5 TABLET ORAL at 20:43

## 2025-02-15 RX ADMIN — FUROSEMIDE 20 MG: 20 TABLET ORAL at 17:48

## 2025-02-15 RX ADMIN — DIVALPROEX SODIUM 500 MG: 250 TABLET, EXTENDED RELEASE ORAL at 10:24

## 2025-02-15 RX ADMIN — LEVOTHYROXINE SODIUM 75 MCG: 0.05 TABLET ORAL at 05:39

## 2025-02-15 RX ADMIN — ALPRAZOLAM 1 MG: 1 TABLET ORAL at 20:55

## 2025-02-15 RX ADMIN — DOCUSATE SODIUM 100 MG: 100 CAPSULE, LIQUID FILLED ORAL at 20:43

## 2025-02-15 RX ADMIN — HYDROCORTISONE 10 MG: 10 TABLET ORAL at 17:48

## 2025-02-15 RX ADMIN — NICOTINE POLACRILEX 4 MG: 4 LOZENGE ORAL at 15:29

## 2025-02-15 RX ADMIN — DOCUSATE SODIUM 100 MG: 100 CAPSULE, LIQUID FILLED ORAL at 10:24

## 2025-02-15 RX ADMIN — METHADONE HYDROCHLORIDE 140 MG: 10 TABLET ORAL at 10:24

## 2025-02-15 RX ADMIN — NICOTINE POLACRILEX 4 MG: 4 LOZENGE ORAL at 21:59

## 2025-02-15 RX ADMIN — PREGABALIN 150 MG: 75 CAPSULE ORAL at 10:24

## 2025-02-15 RX ADMIN — NICOTINE POLACRILEX 4 MG: 4 LOZENGE ORAL at 11:41

## 2025-02-15 RX ADMIN — HYDROCORTISONE 5 MG: 10 TABLET ORAL at 20:56

## 2025-02-15 RX ADMIN — DIVALPROEX SODIUM 500 MG: 250 TABLET, EXTENDED RELEASE ORAL at 20:43

## 2025-02-15 RX ADMIN — QUETIAPINE FUMARATE 150 MG: 100 TABLET ORAL at 20:44

## 2025-02-15 RX ADMIN — ENOXAPARIN SODIUM 30 MG: 100 INJECTION SUBCUTANEOUS at 20:44

## 2025-02-15 RX ADMIN — POLYETHYLENE GLYCOL 3350 17 G: 17 POWDER, FOR SOLUTION ORAL at 20:55

## 2025-02-15 RX ADMIN — NICOTINE POLACRILEX 4 MG: 4 LOZENGE ORAL at 10:29

## 2025-02-15 RX ADMIN — PREGABALIN 150 MG: 75 CAPSULE ORAL at 20:44

## 2025-02-15 RX ADMIN — VANCOMYCIN HYDROCHLORIDE 1500 MG: 1.5 INJECTION, SOLUTION INTRAVITREAL at 15:34

## 2025-02-15 ASSESSMENT — PAIN DESCRIPTION - DESCRIPTORS: DESCRIPTORS: THROBBING;SHARP

## 2025-02-15 ASSESSMENT — PAIN SCALES - GENERAL
PAINLEVEL_OUTOF10: 8
PAINLEVEL_OUTOF10: 8

## 2025-02-15 ASSESSMENT — PAIN DESCRIPTION - LOCATION
LOCATION: BACK
LOCATION: BACK;KNEE

## 2025-02-15 ASSESSMENT — PAIN DESCRIPTION - ORIENTATION: ORIENTATION: RIGHT;LEFT

## 2025-02-15 NOTE — PROGRESS NOTES
Hospitalist Progress Note    CC: COLLEEN (acute kidney injury) ([facility])    Name:  Anjelica Blanchard /Age/Sex: 1980  (44 y.o. female)   MRN & CSN:  [id number] & [id number] Encounter Date/Time: 2/15/2025 4:12 PM EST   Location:  [facility]4- PCP: Smith Neff MD     Attending:Kia Alexis MD         Hospital course:  44 y.o. female, with PMH of obesity, bipolar, Hypothyroidism, COPD, who presented to [facility] with fatigue, generalized weakness and shortness of breath. History obtained from the patient and review of EMR. The patient stated she has been experiencing generalized weakness, fatigue and shortness of breath for the last couple of days. However, the patient was recently admitted here at [facility] for COVID-pneumonia. She states that she has not been feeling well since she was discharged. Per EMR, EMS was called and the patient was slightly unsteady getting onto their stretcher. She was taken to the emergency department for further evaluation. In the emergency department a chest x-ray was obtained that revealed no localized consolidation or pleural effusion in this underinflated chest. Elevated right hemidiaphragm progressed from prior exam. CT chest was obtained that revealed bronchovascular thickening with bilateral lower lobe and posterior right upper lobe airspace disease. Superimposed diffuse scattered groundglass airspace opacities bilaterally. Findings suggest underlying inflammatory or infectious etiology. Usual interstitial pneumonia or atypical pneumonia including viral etiologies. The patient was found to meet SIRS criteria with a WBC of 18.5, heart rate 103 and mildly hypotensive. However, patient does take midodrine. Patient's UA was negative for infection, but UDS was positive for benzos and methadone. Patient is prescribed these medications. Blood cultures were ordered in the emergency department and the patient was given  depakote reduced by neuro - stated to consider reducing doses of methadone, percocet, lyrica, ambien and/or seroquel    Prognosis:  Fair    Code status:  Full code    DVT prophylaxis: Lovenox  GI prophylaxis: none  Antibiotic prophylaxis indicated:  No     PT/OT Eval Status:   []NOT yet ordered []NOT indicated, patient fully functional []Ordered and Pending   [x]Seen with Recommendations for:  []Home independently  []Home w/ assist  []HHC  [x]SNF  []Acute Rehab    Disposition:  Home in 1-2 day(s).   Pt requires continued admission due to ongoing threat to life and/or bodily function without ongoing treatment due to weakness and polypharmacy.  Discussed with patient.          Radiology/ECHO:  MRI - normal    Diet:  ADULT DIET; Regular    Medications:  Personally reviewed in detail in conjunction w/ labs as documented for evidence of drug toxicity.     Infusion Medications    sodium chloride       Scheduled Medications    furosemide  20 mg Oral Daily    hydrocortisone  10 mg Oral Daily    hydrocortisone  5 mg Oral Nightly    vancomycin  1,500 mg IntraVENous Q24H    divalproex  500 mg Oral QAM    And    divalproex  500 mg Oral QHS    docusate sodium  100 mg Oral BID    levomilnacipran  40 mg Oral Daily    levothyroxine  75 mcg Oral QAM AC    methadone  140 mg Oral Daily    midodrine  5 mg Oral TID    pregabalin  150 mg Oral BID    QUEtiapine  150 mg Oral Nightly    zolpidem  10 mg Oral QHS    sodium chloride flush  5-40 mL IntraVENous 2 times per day    enoxaparin  30 mg SubCUTAneous BID     PRN Meds: albuterol, ALPRAZolam, fluticasone, nicotine polacrilex, sodium chloride flush, sodium chloride, ondansetron **OR** ondansetron, acetaminophen **OR** acetaminophen     Labs:  Personally reviewed and interpreted for clinical significance.     CBC:   Recent Labs     02/13/25  1655 02/14/25  0448 02/15/25  0558   WBC 18.5* 14.1* 7.1   HGB 12.4 10.1* 11.2*   HCT 37.5 30.5* 33.5*   MCV 86.5 87.0 87.1    213 241     BMP,

## 2025-02-15 NOTE — PROGRESS NOTES
Assumed care from RN Janessa at 11:50pm. Patient is alert and oriented x4. Vitals are stable. Back pain is increase to 8/10, refused heat packs. Call bell within reach. Independent and calls out appropriately. Maintained a calm and comfortable environment.

## 2025-02-15 NOTE — RT PROTOCOL NOTE
RT Inhaler-Nebulizer Bronchodilator Protocol Note    There is a bronchodilator order in the chart from a provider indicating to follow the RT Bronchodilator Protocol and there is an “Initiate RT Inhaler-Nebulizer Bronchodilator Protocol” order as well (see protocol at bottom of note).    CXR Findings:  XR CHEST PORTABLE    Result Date: 2/13/2025  1. No localized consolidation or pleural effusion in this underinflated chest. 2. Elevated right hemidiaphragm progressed from prior exam. Electronically signed by Kenrick Becerra MD      The findings from the last RT Protocol Assessment were as follows:   History Pulmonary Disease: Chronic pulmonary disease  Respiratory Pattern: Regular pattern and RR 12-20 bpm  Breath Sounds: Clear breath sounds  Cough: Strong, spontaneous, non-productive  Indication for Bronchodilator Therapy: On home bronchodilators  Bronchodilator Assessment Score: 2    Aerosolized bronchodilator medication orders have been revised according to the RT Inhaler-Nebulizer Bronchodilator Protocol below.    Respiratory Therapist to perform RT Therapy Protocol Assessment initially then follow the protocol.  Repeat RT Therapy Protocol Assessment PRN for score 0-3 or on second treatment, BID, and PRN for scores above 3.    No Indications - adjust the frequency to every 6 hours PRN wheezing or bronchospasm, if no treatments needed after 48 hours then discontinue using Per Protocol order mode.     If indication present, adjust the RT bronchodilator orders based on the Bronchodilator Assessment Score as indicated below.  Use Inhaler orders unless patient has one or more of the following: on home nebulizer, not able to hold breath for 10 seconds, is not alert and oriented, cannot activate and use MDI correctly, or respiratory rate 25 breaths per minute or more, then use the equivalent nebulizer order(s) with same Frequency and PRN reasons based on the score.  If a patient is on this medication at home then do not  decrease Frequency below that used at home.    0-3 - enter or revise RT bronchodilator order(s) to equivalent RT Bronchodilator order with Frequency of every 4 hours PRN for wheezing or increased work of breathing using Per Protocol order mode.        4-6 - enter or revise RT Bronchodilator order(s) to two equivalent RT bronchodilator orders with one order with BID Frequency and one order with Frequency of every 4 hours PRN wheezing or increased work of breathing using Per Protocol order mode.        7-10 - enter or revise RT Bronchodilator order(s) to two equivalent RT bronchodilator orders with one order with TID Frequency and one order with Frequency of every 4 hours PRN wheezing or increased work of breathing using Per Protocol order mode.       11-13 - enter or revise RT Bronchodilator order(s) to one equivalent RT bronchodilator order with QID Frequency and an Albuterol order with Frequency of every 4 hours PRN wheezing or increased work of breathing using Per Protocol order mode.      Greater than 13 - enter or revise RT Bronchodilator order(s) to one equivalent RT bronchodilator order with every 4 hours Frequency and an Albuterol order with Frequency of every 2 hours PRN wheezing or increased work of breathing using Per Protocol order mode.     RT to enter RT Home Evaluation for COPD & MDI Assessment order using Per Protocol order mode.    Electronically signed by Farzaneh Romero RCP on 2/14/2025 at 10:45 PM

## 2025-02-16 PROBLEM — J40 BRONCHITIS: Status: ACTIVE | Noted: 2025-02-16

## 2025-02-16 PROBLEM — R65.10 SIRS (SYSTEMIC INFLAMMATORY RESPONSE SYNDROME) (HCC): Status: ACTIVE | Noted: 2025-02-16

## 2025-02-16 LAB — VANCOMYCIN SERPL-MCNC: 8.8 UG/ML

## 2025-02-16 PROCEDURE — 36415 COLL VENOUS BLD VENIPUNCTURE: CPT

## 2025-02-16 PROCEDURE — 2500000003 HC RX 250 WO HCPCS: Performed by: NURSE PRACTITIONER

## 2025-02-16 PROCEDURE — 6370000000 HC RX 637 (ALT 250 FOR IP): Performed by: NURSE PRACTITIONER

## 2025-02-16 PROCEDURE — 6360000002 HC RX W HCPCS: Performed by: INTERNAL MEDICINE

## 2025-02-16 PROCEDURE — 2580000003 HC RX 258: Performed by: INTERNAL MEDICINE

## 2025-02-16 PROCEDURE — 6370000000 HC RX 637 (ALT 250 FOR IP): Performed by: INTERNAL MEDICINE

## 2025-02-16 PROCEDURE — 80202 ASSAY OF VANCOMYCIN: CPT

## 2025-02-16 PROCEDURE — 1200000000 HC SEMI PRIVATE

## 2025-02-16 PROCEDURE — 6360000002 HC RX W HCPCS: Performed by: NURSE PRACTITIONER

## 2025-02-16 PROCEDURE — 6370000000 HC RX 637 (ALT 250 FOR IP): Performed by: STUDENT IN AN ORGANIZED HEALTH CARE EDUCATION/TRAINING PROGRAM

## 2025-02-16 RX ORDER — QUETIAPINE FUMARATE 100 MG/1
100 TABLET, FILM COATED ORAL NIGHTLY
Status: DISCONTINUED | OUTPATIENT
Start: 2025-02-16 | End: 2025-02-17

## 2025-02-16 RX ADMIN — NICOTINE POLACRILEX 4 MG: 4 LOZENGE ORAL at 15:23

## 2025-02-16 RX ADMIN — ALPRAZOLAM 1 MG: 1 TABLET ORAL at 16:43

## 2025-02-16 RX ADMIN — DIVALPROEX SODIUM 500 MG: 250 TABLET, EXTENDED RELEASE ORAL at 20:03

## 2025-02-16 RX ADMIN — VANCOMYCIN HYDROCHLORIDE 1000 MG: 1 INJECTION, POWDER, LYOPHILIZED, FOR SOLUTION INTRAVENOUS at 15:22

## 2025-02-16 RX ADMIN — ENOXAPARIN SODIUM 30 MG: 100 INJECTION SUBCUTANEOUS at 20:04

## 2025-02-16 RX ADMIN — DIVALPROEX SODIUM 500 MG: 250 TABLET, EXTENDED RELEASE ORAL at 08:02

## 2025-02-16 RX ADMIN — SODIUM CHLORIDE, PRESERVATIVE FREE 10 ML: 5 INJECTION INTRAVENOUS at 09:44

## 2025-02-16 RX ADMIN — NICOTINE POLACRILEX 4 MG: 4 LOZENGE ORAL at 12:04

## 2025-02-16 RX ADMIN — SODIUM CHLORIDE, PRESERVATIVE FREE 10 ML: 5 INJECTION INTRAVENOUS at 21:56

## 2025-02-16 RX ADMIN — HYDROCORTISONE 10 MG: 10 TABLET ORAL at 08:02

## 2025-02-16 RX ADMIN — METHADONE HYDROCHLORIDE 140 MG: 10 TABLET ORAL at 08:02

## 2025-02-16 RX ADMIN — QUETIAPINE FUMARATE 100 MG: 100 TABLET ORAL at 20:02

## 2025-02-16 RX ADMIN — FUROSEMIDE 20 MG: 20 TABLET ORAL at 08:02

## 2025-02-16 RX ADMIN — NICOTINE POLACRILEX 4 MG: 4 LOZENGE ORAL at 09:41

## 2025-02-16 RX ADMIN — NICOTINE POLACRILEX 4 MG: 4 LOZENGE ORAL at 19:53

## 2025-02-16 RX ADMIN — LEVOTHYROXINE SODIUM 75 MCG: 0.05 TABLET ORAL at 06:38

## 2025-02-16 RX ADMIN — DOCUSATE SODIUM 100 MG: 100 CAPSULE, LIQUID FILLED ORAL at 08:02

## 2025-02-16 RX ADMIN — PREGABALIN 150 MG: 75 CAPSULE ORAL at 08:02

## 2025-02-16 RX ADMIN — DOCUSATE SODIUM 100 MG: 100 CAPSULE, LIQUID FILLED ORAL at 20:01

## 2025-02-16 RX ADMIN — MIDODRINE HYDROCHLORIDE 5 MG: 5 TABLET ORAL at 20:04

## 2025-02-16 RX ADMIN — ENOXAPARIN SODIUM 30 MG: 100 INJECTION SUBCUTANEOUS at 08:02

## 2025-02-16 RX ADMIN — POLYETHYLENE GLYCOL 3350 17 G: 17 POWDER, FOR SOLUTION ORAL at 20:01

## 2025-02-16 RX ADMIN — POLYETHYLENE GLYCOL 3350 17 G: 17 POWDER, FOR SOLUTION ORAL at 15:23

## 2025-02-16 RX ADMIN — PREGABALIN 150 MG: 75 CAPSULE ORAL at 20:02

## 2025-02-16 RX ADMIN — HYDROCORTISONE 5 MG: 10 TABLET ORAL at 20:02

## 2025-02-16 RX ADMIN — ZOLPIDEM TARTRATE 10 MG: 5 TABLET, FILM COATED ORAL at 20:03

## 2025-02-16 RX ADMIN — NICOTINE POLACRILEX 4 MG: 4 LOZENGE ORAL at 16:43

## 2025-02-16 RX ADMIN — ALPRAZOLAM 1 MG: 1 TABLET ORAL at 09:11

## 2025-02-16 NOTE — PROGRESS NOTES
Pt assessment completed and charted. VSS. Pt a/ox4. Ambulates independently to bathroom. Medications given per MAR. Non-pitting edema noted on BLL and CHANDLER extremities. Bed in lowest position and wheels locked. Call light within reach. Bedside table within reach. Non-skid socks in place. Pt denies any other needs at this time.  Pt calls out appropriately.

## 2025-02-16 NOTE — CARE COORDINATION
CM spoke with patient regarding recommendations for SNF at discharge. Patient is declining SNF. Patient would like to continue with plan for discharge to home with [facility]. Per MD note to finish IV antibiotic 5 day course on Monday.

## 2025-02-16 NOTE — PROGRESS NOTES
02/16/25 1130   Encounter Summary   Encounter Overview/Reason Spiritual/Emotional Needs;Advance Care Planning   Service Provided For Patient   Referral/Consult From Nurse   Support System Family members   Last Encounter  02/16/25  (CH offered active empathic listening while Pt reflected on her health decline, losses, CH concluded with prayer. ACP conversation . Pt will call when ready to complete)   Begin Time 1104   End Time  1133   Total Time Calculated 29 min   Spiritual/Emotional needs   Type Spiritual Support;Emotional Distress   Advance Care Planning   Type ACP conversation   Assessment/Intervention/Outcome   Assessment Concerns with suffering;Compromised coping   Intervention Active listening;Discussed meaning/purpose;Discussed relationship with God;Explored/Affirmed feelings, thoughts, concerns;Explored Coping Skills/Resources;Life review/Legacy;Nurtured Hope;Prayer (assurance of)/[place]   Outcome Encouraged;Venting emotion

## 2025-02-16 NOTE — PROGRESS NOTES
Hospitalist Progress Note    CC: COLLEEN (acute kidney injury) ([facility])    Name:  Anjelica Blanchard /Age/Sex: 1980  (44 y.o. female)   MRN & CSN:  [id number] & [id number] Encounter Date/Time: 2025 4:12 PM EST   Location:  [facility]4- PCP: Smith Neff MD     Attending:Kia Alexis MD         Hospital course:  44 y.o. female, with PMH of obesity, bipolar, Hypothyroidism, COPD, who presented to [facility] with fatigue, generalized weakness and shortness of breath. History obtained from the patient and review of EMR. The patient stated she has been experiencing generalized weakness, fatigue and shortness of breath for the last couple of days. However, the patient was recently admitted here at [facility] for COVID-pneumonia. She states that she has not been feeling well since she was discharged. Per EMR, EMS was called and the patient was slightly unsteady getting onto their stretcher. She was taken to the emergency department for further evaluation. In the emergency department a chest x-ray was obtained that revealed no localized consolidation or pleural effusion in this underinflated chest. Elevated right hemidiaphragm progressed from prior exam. CT chest was obtained that revealed bronchovascular thickening with bilateral lower lobe and posterior right upper lobe airspace disease. Superimposed diffuse scattered groundglass airspace opacities bilaterally. Findings suggest underlying inflammatory or infectious etiology. Usual interstitial pneumonia or atypical pneumonia including viral etiologies. The patient was found to meet SIRS criteria with a WBC of 18.5, heart rate 103 and mildly hypotensive. However, patient does take midodrine. Patient's UA was negative for infection, but UDS was positive for benzos and methadone. Patient is prescribed these medications. Blood cultures were ordered in the emergency department and the patient was given  enoxaparin  30 mg SubCUTAneous BID     PRN Meds: ipratropium 0.5 mg-albuterol 2.5 mg, polyethylene glycol, albuterol, ALPRAZolam, fluticasone, nicotine polacrilex, sodium chloride flush, sodium chloride, ondansetron **OR** ondansetron, acetaminophen **OR** acetaminophen     Labs:  Personally reviewed and interpreted for clinical significance.     CBC:   Recent Labs     02/13/25  1655 02/14/25  0448 02/15/25  0558   WBC 18.5* 14.1* 7.1   HGB 12.4 10.1* 11.2*   HCT 37.5 30.5* 33.5*   MCV 86.5 87.0 87.1    213 241     BMP, Mag, Phos:    Recent Labs     02/13/25  1655 02/14/25  0448 02/15/25  0558    142 143   K 5.0 4.1 4.3   CL 99 105 105   CO2 26 29 28   BUN 19 18 12   CREATININE 1.6* 1.1 0.9   CALCIUM 9.6 8.2* 8.7   MG  --   --  2.33   PHOS  --   --  4.0     Pro-BNP, Trop:    Recent Labs     02/13/25 1655 02/13/25  1801 02/13/25  2258   TROPHS 20* 16* 11     LFTs:    Recent Labs     02/13/25 1655 02/14/25  0448   AST 26 27   ALT 13 12   BILITOT <0.2 <0.2   ALKPHOS 69 53     PT/INR, Lactic acid, TSH:    Recent Labs     02/13/25 2258 02/14/25  0207 02/14/25  1204   LACTA 1.4 1.0  --    TSH  --   --  1.14     HgbA1c:  No results for input(s): \"LABA1C\" in the last 72 hours.  Glucose trend:  No results found for: \"GLU\"    UA:  Recent Labs     02/13/25 1955   COLORU Yellow   PHUR 7.0   WBCUA 3-5   RBCUA 0-2   BACTERIA Rare*   CLARITYU Clear   LEUKOCYTESUR TRACE*   UROBILINOGEN 0.2   BILIRUBINUR Negative   BLOODU Negative   GLUCOSEU Negative     Urine Cultures:   Lab Results   Component Value Date/Time    LABURIN No growth at 18 to 36 hours 03/23/2024 02:30 PM     Blood Cultures:   Lab Results   Component Value Date/Time    BC  02/13/2025 06:01 PM     No Growth to date.  Any change in status will be called.     Lab Results   Component Value Date/Time    BLOODCULT2  02/13/2025 06:01 PM     No Growth to date.  Any change in status will be called.     Organism:   Lab Results   Component Value Date/Time    ORG

## 2025-02-17 LAB
BACTERIA BLD CULT ORG #2: NORMAL
BACTERIA BLD CULT: NORMAL
BASOPHILS # BLD: 0 K/UL (ref 0–0.2)
BASOPHILS NFR BLD: 0.5 %
DEPRECATED RDW RBC AUTO: 16.4 % (ref 12.4–15.4)
EOSINOPHIL # BLD: 0.1 K/UL (ref 0–0.6)
EOSINOPHIL NFR BLD: 2 %
HCT VFR BLD AUTO: 35.4 % (ref 36–48)
HGB BLD-MCNC: 12 G/DL (ref 12–16)
LYMPHOCYTES # BLD: 2.5 K/UL (ref 1–5.1)
LYMPHOCYTES NFR BLD: 36.8 %
MCH RBC QN AUTO: 29.3 PG (ref 26–34)
MCHC RBC AUTO-ENTMCNC: 34 G/DL (ref 31–36)
MCV RBC AUTO: 86.1 FL (ref 80–100)
MONOCYTES # BLD: 0.8 K/UL (ref 0–1.3)
MONOCYTES NFR BLD: 12.1 %
NEUTROPHILS # BLD: 3.4 K/UL (ref 1.7–7.7)
NEUTROPHILS NFR BLD: 48.6 %
PLATELET # BLD AUTO: 290 K/UL (ref 135–450)
PMV BLD AUTO: 7.8 FL (ref 5–10.5)
RBC # BLD AUTO: 4.11 M/UL (ref 4–5.2)
WBC # BLD AUTO: 6.9 K/UL (ref 4–11)

## 2025-02-17 PROCEDURE — 05HY33Z INSERTION OF INFUSION DEVICE INTO UPPER VEIN, PERCUTANEOUS APPROACH: ICD-10-PCS | Performed by: INTERNAL MEDICINE

## 2025-02-17 PROCEDURE — 94640 AIRWAY INHALATION TREATMENT: CPT

## 2025-02-17 PROCEDURE — 6370000000 HC RX 637 (ALT 250 FOR IP): Performed by: INTERNAL MEDICINE

## 2025-02-17 PROCEDURE — 6360000002 HC RX W HCPCS: Performed by: NURSE PRACTITIONER

## 2025-02-17 PROCEDURE — 6360000002 HC RX W HCPCS: Performed by: INTERNAL MEDICINE

## 2025-02-17 PROCEDURE — C1751 CATH, INF, PER/CENT/MIDLINE: HCPCS

## 2025-02-17 PROCEDURE — 36415 COLL VENOUS BLD VENIPUNCTURE: CPT

## 2025-02-17 PROCEDURE — 05HA33Z INSERTION OF INFUSION DEVICE INTO LEFT BRACHIAL VEIN, PERCUTANEOUS APPROACH: ICD-10-PCS | Performed by: INTERNAL MEDICINE

## 2025-02-17 PROCEDURE — 76937 US GUIDE VASCULAR ACCESS: CPT

## 2025-02-17 PROCEDURE — 6370000000 HC RX 637 (ALT 250 FOR IP): Performed by: NURSE PRACTITIONER

## 2025-02-17 PROCEDURE — 36569 INSJ PICC 5 YR+ W/O IMAGING: CPT

## 2025-02-17 PROCEDURE — 2500000003 HC RX 250 WO HCPCS: Performed by: NURSE PRACTITIONER

## 2025-02-17 PROCEDURE — 85025 COMPLETE CBC W/AUTO DIFF WBC: CPT

## 2025-02-17 PROCEDURE — 2500000003 HC RX 250 WO HCPCS: Performed by: INTERNAL MEDICINE

## 2025-02-17 PROCEDURE — 1200000000 HC SEMI PRIVATE

## 2025-02-17 PROCEDURE — 2580000003 HC RX 258: Performed by: INTERNAL MEDICINE

## 2025-02-17 PROCEDURE — 6370000000 HC RX 637 (ALT 250 FOR IP): Performed by: STUDENT IN AN ORGANIZED HEALTH CARE EDUCATION/TRAINING PROGRAM

## 2025-02-17 PROCEDURE — 97530 THERAPEUTIC ACTIVITIES: CPT

## 2025-02-17 RX ORDER — SODIUM CHLORIDE 9 MG/ML
INJECTION, SOLUTION INTRAVENOUS PRN
Status: DISCONTINUED | OUTPATIENT
Start: 2025-02-17 | End: 2025-02-18 | Stop reason: HOSPADM

## 2025-02-17 RX ORDER — POLYETHYLENE GLYCOL 3350 17 G/17G
17 POWDER, FOR SOLUTION ORAL 2 TIMES DAILY
Qty: 527 G | Refills: 1 | Status: SHIPPED | OUTPATIENT
Start: 2025-02-17 | End: 2025-03-19

## 2025-02-17 RX ORDER — SODIUM CHLORIDE 0.9 % (FLUSH) 0.9 %
5-40 SYRINGE (ML) INJECTION EVERY 12 HOURS SCHEDULED
Status: DISCONTINUED | OUTPATIENT
Start: 2025-02-17 | End: 2025-02-18 | Stop reason: HOSPADM

## 2025-02-17 RX ORDER — QUETIAPINE FUMARATE 25 MG/1
125 TABLET, FILM COATED ORAL NIGHTLY
Qty: 60 TABLET | Refills: 3 | Status: SHIPPED | OUTPATIENT
Start: 2025-02-17 | End: 2025-02-25

## 2025-02-17 RX ORDER — POLYETHYLENE GLYCOL 3350 17 G/17G
17 POWDER, FOR SOLUTION ORAL 2 TIMES DAILY
Status: DISCONTINUED | OUTPATIENT
Start: 2025-02-17 | End: 2025-02-18 | Stop reason: HOSPADM

## 2025-02-17 RX ORDER — GUAIFENESIN 600 MG/1
600 TABLET, EXTENDED RELEASE ORAL 2 TIMES DAILY
Status: DISCONTINUED | OUTPATIENT
Start: 2025-02-17 | End: 2025-02-18 | Stop reason: HOSPADM

## 2025-02-17 RX ORDER — SODIUM CHLORIDE 0.9 % (FLUSH) 0.9 %
5-40 SYRINGE (ML) INJECTION PRN
Status: DISCONTINUED | OUTPATIENT
Start: 2025-02-17 | End: 2025-02-18 | Stop reason: HOSPADM

## 2025-02-17 RX ORDER — LIDOCAINE HYDROCHLORIDE 10 MG/ML
50 INJECTION, SOLUTION INFILTRATION; PERINEURAL ONCE
Status: DISCONTINUED | OUTPATIENT
Start: 2025-02-17 | End: 2025-02-18 | Stop reason: HOSPADM

## 2025-02-17 RX ORDER — LACTOBACILLUS RHAMNOSUS GG 10B CELL
1 CAPSULE ORAL 2 TIMES DAILY WITH MEALS
Status: DISCONTINUED | OUTPATIENT
Start: 2025-02-17 | End: 2025-02-18 | Stop reason: HOSPADM

## 2025-02-17 RX ORDER — DIVALPROEX SODIUM 500 MG/1
500-1000 TABLET, FILM COATED, EXTENDED RELEASE ORAL 2 TIMES DAILY
Qty: 30 TABLET | Refills: 3 | Status: SHIPPED | OUTPATIENT
Start: 2025-02-17

## 2025-02-17 RX ORDER — GUAIFENESIN 600 MG/1
600 TABLET, EXTENDED RELEASE ORAL 2 TIMES DAILY
Qty: 28 TABLET | Refills: 0 | Status: SHIPPED | OUTPATIENT
Start: 2025-02-17 | End: 2025-03-03

## 2025-02-17 RX ORDER — LACTOBACILLUS RHAMNOSUS GG 10B CELL
1 CAPSULE ORAL 2 TIMES DAILY WITH MEALS
Qty: 10 CAPSULE | Refills: 0 | Status: SHIPPED | OUTPATIENT
Start: 2025-02-18 | End: 2025-02-23

## 2025-02-17 RX ADMIN — DOCUSATE SODIUM 100 MG: 100 CAPSULE, LIQUID FILLED ORAL at 21:22

## 2025-02-17 RX ADMIN — METHADONE HYDROCHLORIDE 140 MG: 10 TABLET ORAL at 08:54

## 2025-02-17 RX ADMIN — HYDROCORTISONE 5 MG: 10 TABLET ORAL at 21:23

## 2025-02-17 RX ADMIN — HYDROCORTISONE 10 MG: 10 TABLET ORAL at 08:43

## 2025-02-17 RX ADMIN — SODIUM CHLORIDE, PRESERVATIVE FREE 10 ML: 5 INJECTION INTRAVENOUS at 08:58

## 2025-02-17 RX ADMIN — VANCOMYCIN HYDROCHLORIDE 1000 MG: 1 INJECTION, POWDER, LYOPHILIZED, FOR SOLUTION INTRAVENOUS at 14:55

## 2025-02-17 RX ADMIN — PREGABALIN 150 MG: 75 CAPSULE ORAL at 21:22

## 2025-02-17 RX ADMIN — NICOTINE POLACRILEX 4 MG: 4 LOZENGE ORAL at 08:54

## 2025-02-17 RX ADMIN — NICOTINE POLACRILEX 4 MG: 4 LOZENGE ORAL at 09:51

## 2025-02-17 RX ADMIN — LEVOTHYROXINE SODIUM 75 MCG: 0.05 TABLET ORAL at 06:34

## 2025-02-17 RX ADMIN — IPRATROPIUM BROMIDE AND ALBUTEROL SULFATE 1 DOSE: 2.5; .5 SOLUTION RESPIRATORY (INHALATION) at 13:10

## 2025-02-17 RX ADMIN — ALPRAZOLAM 1 MG: 1 TABLET ORAL at 16:48

## 2025-02-17 RX ADMIN — GUAIFENESIN 600 MG: 600 TABLET ORAL at 21:23

## 2025-02-17 RX ADMIN — MEROPENEM 1000 MG: 1 INJECTION INTRAVENOUS at 16:58

## 2025-02-17 RX ADMIN — DOCUSATE SODIUM 100 MG: 100 CAPSULE, LIQUID FILLED ORAL at 08:43

## 2025-02-17 RX ADMIN — POLYETHYLENE GLYCOL 3350 17 G: 17 POWDER, FOR SOLUTION ORAL at 08:44

## 2025-02-17 RX ADMIN — POLYETHYLENE GLYCOL 3350 17 G: 17 POWDER, FOR SOLUTION ORAL at 21:21

## 2025-02-17 RX ADMIN — QUETIAPINE FUMARATE 125 MG: 100 TABLET ORAL at 21:21

## 2025-02-17 RX ADMIN — NICOTINE POLACRILEX 4 MG: 4 LOZENGE ORAL at 10:51

## 2025-02-17 RX ADMIN — MIDODRINE HYDROCHLORIDE 5 MG: 5 TABLET ORAL at 08:44

## 2025-02-17 RX ADMIN — ACETAMINOPHEN 650 MG: 325 TABLET ORAL at 16:50

## 2025-02-17 RX ADMIN — ALPRAZOLAM 1 MG: 1 TABLET ORAL at 08:54

## 2025-02-17 RX ADMIN — FUROSEMIDE 20 MG: 20 TABLET ORAL at 08:44

## 2025-02-17 RX ADMIN — PREGABALIN 150 MG: 75 CAPSULE ORAL at 08:43

## 2025-02-17 RX ADMIN — ENOXAPARIN SODIUM 30 MG: 100 INJECTION SUBCUTANEOUS at 08:44

## 2025-02-17 RX ADMIN — MEROPENEM 1000 MG: 1 INJECTION INTRAVENOUS at 22:53

## 2025-02-17 RX ADMIN — MIDODRINE HYDROCHLORIDE 5 MG: 5 TABLET ORAL at 21:22

## 2025-02-17 RX ADMIN — ALPRAZOLAM 1 MG: 1 TABLET ORAL at 01:20

## 2025-02-17 RX ADMIN — Medication 10 ML: at 21:27

## 2025-02-17 RX ADMIN — DIVALPROEX SODIUM 500 MG: 250 TABLET, EXTENDED RELEASE ORAL at 08:43

## 2025-02-17 RX ADMIN — ZOLPIDEM TARTRATE 10 MG: 5 TABLET, FILM COATED ORAL at 21:22

## 2025-02-17 RX ADMIN — DIVALPROEX SODIUM 500 MG: 250 TABLET, EXTENDED RELEASE ORAL at 21:23

## 2025-02-17 RX ADMIN — VANCOMYCIN HYDROCHLORIDE 1000 MG: 1 INJECTION, POWDER, LYOPHILIZED, FOR SOLUTION INTRAVENOUS at 01:31

## 2025-02-17 RX ADMIN — NICOTINE POLACRILEX 4 MG: 4 LOZENGE ORAL at 11:51

## 2025-02-17 RX ADMIN — ACETAMINOPHEN 650 MG: 325 TABLET ORAL at 02:50

## 2025-02-17 RX ADMIN — Medication 1 CAPSULE: at 16:48

## 2025-02-17 ASSESSMENT — PAIN DESCRIPTION - DESCRIPTORS: DESCRIPTORS: ACHING

## 2025-02-17 ASSESSMENT — PAIN DESCRIPTION - LOCATION: LOCATION: HEAD

## 2025-02-17 ASSESSMENT — PAIN SCALES - GENERAL: PAINLEVEL_OUTOF10: 7

## 2025-02-17 NOTE — PROGRESS NOTES
Hospitalist Progress Note    CC: COLLEEN (acute kidney injury) ([facility])    Name:  Anjelica Blanchard /Age/Sex: 1980  (44 y.o. female)   MRN & CSN:  [id number] & [id number] Encounter Date/Time: 2025 4:12 PM EST   Location:  [facility]4-01 PCP: Smith Neff MD     Attending:Kia Alexis MD         Hospital course:  44 y.o. female, with PMH of obesity, bipolar, Hypothyroidism, COPD, who presented to [facility] with fatigue, generalized weakness and shortness of breath. History obtained from the patient and review of EMR. The patient stated she has been experiencing generalized weakness, fatigue and shortness of breath for the last couple of days. However, the patient was recently admitted here at [facility] for COVID-pneumonia. She states that she has not been feeling well since she was discharged. Per EMR, EMS was called and the patient was slightly unsteady getting onto their stretcher. She was taken to the emergency department for further evaluation. In the emergency department a chest x-ray was obtained that revealed no localized consolidation or pleural effusion in this underinflated chest. Elevated right hemidiaphragm progressed from prior exam. CT chest was obtained that revealed bronchovascular thickening with bilateral lower lobe and posterior right upper lobe airspace disease. Superimposed diffuse scattered groundglass airspace opacities bilaterally. Findings suggest underlying inflammatory or infectious etiology. Usual interstitial pneumonia or atypical pneumonia including viral etiologies. The patient was found to meet SIRS criteria with a WBC of 18.5, heart rate 103 and mildly hypotensive. However, patient does take midodrine. Patient's UA was negative for infection, but UDS was positive for benzos and methadone. Patient is prescribed these medications. Blood cultures were ordered in the emergency department and the patient was given  insufficiency (HCC)    Hyposomnia, insomnia, or sleeplessness associated with conditioned arousal    Polypharmacy    SIRS (systemic inflammatory response syndrome) (HCC)    Bronchitis  Resolved Problems:    * No resolved hospital problems. *      Plan:   SIRS/bronchitis - recent COVID - fatigue, weakness, SOB, recent - feb 2 - may have superimposed bacterial infection - responded to abx - will send home with 5 more days total starting tomorrow  Acute renal failure - resolved  COPD - controlled  Tremors - per neurology - likely due to polypharmacy - pt needs to decrease medications - depakote reduced by neuro - stated to consider reducing doses of methadone, percocet, lyrica, ambien and/or seroquel    Depakote decreased to 500mg bid and seroquel increased back to 125mg nightly due to insomnia which is severe  Polypharmacy - significant and likely causing a lot of issues including tremor - will try to decrease them    Prognosis:  Fair    Code status:  Full code    DVT prophylaxis: Lovenox  GI prophylaxis: none  Antibiotic prophylaxis indicated:  No     PT/OT Eval Status:   []NOT yet ordered [x]NOT indicated any longer 2/16, patient fully functional []Ordered and Pending   [x] Initially Seen with Recommendations for:  []Home independently  []Home w/ assist  []HHC  [x]SNF  []Acute Rehab but pt walking in hallway independently so likely home no needs    Disposition:  Home in 1 day(s).   Pt requires continued admission due to ongoing threat to life and/or bodily function without ongoing treatment due to needing PICC line and IV abx prior to discharge.  Discussed with patient.          Radiology/ECHO:  MRI brain - normal    Diet:  ADULT DIET; Regular    Medications:  Personally reviewed in detail in conjunction w/ labs as documented for evidence of drug toxicity.     Infusion Medications    sodium chloride      sodium chloride       Scheduled Medications    polyethylene glycol  17 g Oral BID    QUEtiapine  125 mg Oral

## 2025-02-17 NOTE — PLAN OF CARE
Problem: Chronic Conditions and Co-morbidities  Goal: Patient's chronic conditions and co-morbidity symptoms are monitored and maintained or improved  Outcome: Progressing  Flowsheets (Taken 2/13/2025 2300 by Estephania Colin RN)  Care Plan - Patient's Chronic Conditions and Co-Morbidity Symptoms are Monitored and Maintained or Improved: Monitor and assess patient's chronic conditions and comorbid symptoms for stability, deterioration, or improvement     Problem: Infection - Adult  Goal: Absence of infection at discharge  Outcome: Progressing  Flowsheets (Taken 2/17/2025 0243)  Absence of infection at discharge:   Assess and monitor for signs and symptoms of infection   Monitor lab/diagnostic results

## 2025-02-17 NOTE — PROGRESS NOTES
Physician Progress Note      PATIENT:               FERNANDO RINCON  CSN #:                  [id number]  :                       1980  ADMIT DATE:       2025 4:24 PM  DISCH DATE:  RESPONDING  PROVIDER #:        Kia Alexis MD          QUERY TEXT:    Patient admitted with Pneumonia.  Noted documentation of Acute Kidney Injury   in  H&P on 2025, Creatinine(-)-1.6,1.1,0.9.  In order to support   the diagnosis of COLLEEN, please include additional clinical indicators in your   documentation.? Or please document if the diagnosis of COLLEEN has been ruled out   after further study.  The medical record reflects the following:  Risk Factors:  polypharmacy,Hypotension  Clinical Indicators: H&P on 2025 states COLLEEN (acute kidney injury)   baseline CR appears to be 0.9-1.  Internal medicine progress note on 2/15 states  COLLEEN (acute kidney injury)   ([facility])  Creatinine(-)-1.6,1.1,0.9  GFR(-)-40,62,79  BUN(-)-19,18,12  Treatment: IV Fluids, Serial lab monitoring, Reduce Depakote from 500mg    Thank you  Cheryle Lora S, CDS  Options provided:  -- Acute kidney injury evidenced by, Please document evidence as well as a   numerical baseline creatinine, if known.  -- Acute kidney injury ruled out after study  -- Other - I will add my own diagnosis  -- Disagree - Not applicable / Not valid  -- Disagree - Clinically unable to determine / Unknown  -- Refer to Clinical Documentation Reviewer    PROVIDER RESPONSE TEXT:    This patient has acute kidney injury as evidenced by creatinine 1.6    Query created by: Cheryle Lora on 2025 6:14 AM      Electronically signed by:  Kia Alexis MD 2025 12:57 PM

## 2025-02-17 NOTE — CONSULTS
Pharmacy Consulted    Assisted in ordering ertapenem (Invanz) prior to discharge  Ordered Invanz x5 days per Consult instructions  Tomorrow dose will be given prior to discharge    Americo Montgomery, PharmD  2/17/2025 at 3:04 PM

## 2025-02-17 NOTE — PROGRESS NOTES
Pt assessment completed and charted. VSS. Pt a/ox4. Receiving IV abx. Bed in lowest position and wheels locked. Call light within reach. Bedside table within reach. Non-skid socks in place. Pt denies any other needs at this time.  Pt calls out appropriately.

## 2025-02-17 NOTE — PROGRESS NOTES
Occupational Therapy  Facility/Department: [facility] C3 TELE/MED SURG/ONC  Daily Treatment Note  NAME: Anjelica Blanchard  : 1980  MRN: [id number]    Date of Service: 2025    Discharge Recommendations:  Home with assist PRN, Home with Home health OT  OT Equipment Recommendations  Equipment Needed: No (pt reports she has a TTB)    Patient Diagnosis(es): The primary encounter diagnosis was Sepsis, due to unspecified organism, unspecified whether acute organ dysfunction present (HCC). A diagnosis of Multifocal pneumonia was also pertinent to this visit.     Assessment   Assessment: pt presenting with improved mobility this date grossly SPV for transfers and SBA for mobility up to 100' with no AD. Pt demo'd SPV on 10 stairs with no LOB. Pt declined all ADLs. reporting SOB with O2 92% after mobility. Pt continues to benefit from skilled OT to address deficits, recommending Home A PRN and HHOT.  Activity Tolerance: Patient tolerated evaluation without incident;Other (comment);Treatment limited secondary to decreased cognition  Discharge Recommendations: Home with assist PRN;Home with Home health OT  Equipment Needed: No (pt reports she has a TTB)     Plan  Occupational Therapy Plan  Times Per Week: 2-3x  Current Treatment Recommendations: Coordination training;Balance training;Strengthening;Functional mobility training;Positioning;Neuromuscular re-education;Safety education & training;Self-Care / ADL;Home management training  Additional Comments: requency updated to reflect current deficits and treatment recommendations    Restrictions  Restrictions/Precautions  Restrictions/Precautions: General Precautions;Fall Risk  Activity Level: Up as Tolerated  Position Activity Restriction  Other Position/Activity Restrictions: telemetry, impulsive    Subjective  Subjective  Subjective: pt pleasant and agreeable to OT treatment, wanting to take a walk in hallway  Pain: 0/10          Objective  Vitals  Vitals  Pulse: 85  SpO2:  90 %  BP: 124/71  MAP (Calculated): 89  BP Location: Right upper arm  BP Method: Automatic  Patient Position: Sitting  Bed Mobility Training  Bed Mobility Training: Yes  Interventions: Safety awareness training;Verbal cues  Supine to Sit: Modified independent  Sit to Supine: Modified independent  Balance  Sitting: Intact  Standing: Impaired  Standing - Static: Good  Standing - Dynamic: Occasional;Good  Transfer Training  Transfer Training: Yes  Overall Level of Assistance: Stand by assistance;Supervision  Interventions: Verbal cues;Safety awareness training  Sit to Stand: Supervision  Stand to Sit: Supervision  Stand Pivot Transfers: Supervision  Gait  Gait Training: Yes  Overall Level of Assistance: Stand by assistance  Distance (ft): 100 Feet  Assistive Device: Gait belt (no AD)  Interventions: Verbal cues;Safety awareness training  Base of Support: Widened  Gait Abnormalities: Path deviations;Trunk sway increased  Rail Use: Both  Stairs - Level of Assistance: Stand by assistance  Number of Stairs Trained: 8              Safety Devices  Type of Devices: Left in bed;Call light within reach;Patient at risk for falls;Nurse notified;Gait belt;Bed alarm in place    Patient Education  Education Given To: Patient  Education Provided: Role of Therapy;Plan of Care;Precautions  Education Provided Comments: disease specific: seated rest breaks, PLB  Education Method: Verbal;Demonstration  Education Outcome: Verbalized understanding;Continued education needed;Demonstrated understanding    Goals  Short Term Goals  Time Frame for Short Term Goals: 1 week(2-21-25)  Short Term Goal 1: supervision with bathroom mobility with RW by 2-21-25 MET 2/17/25  Short Term Goal 2: supervision standing ADL's for 5 minutes  Short Term Goal 3: modified independent with LE dressing with AE PRN by 2-19-25  Short Term Goal 4: tolerate 2 sets of 10 reps BUE strengthening exercises to increase independence with ADL's & transfers;  Patient Goals

## 2025-02-18 VITALS
BODY MASS INDEX: 38.24 KG/M2 | TEMPERATURE: 98 F | HEIGHT: 64 IN | OXYGEN SATURATION: 97 % | DIASTOLIC BLOOD PRESSURE: 80 MMHG | WEIGHT: 224 LBS | RESPIRATION RATE: 18 BRPM | HEART RATE: 70 BPM | SYSTOLIC BLOOD PRESSURE: 125 MMHG

## 2025-02-18 PROCEDURE — 2580000003 HC RX 258: Performed by: INTERNAL MEDICINE

## 2025-02-18 PROCEDURE — 6370000000 HC RX 637 (ALT 250 FOR IP): Performed by: INTERNAL MEDICINE

## 2025-02-18 PROCEDURE — 2500000003 HC RX 250 WO HCPCS: Performed by: INTERNAL MEDICINE

## 2025-02-18 PROCEDURE — 6370000000 HC RX 637 (ALT 250 FOR IP): Performed by: STUDENT IN AN ORGANIZED HEALTH CARE EDUCATION/TRAINING PROGRAM

## 2025-02-18 PROCEDURE — 6360000002 HC RX W HCPCS: Performed by: INTERNAL MEDICINE

## 2025-02-18 PROCEDURE — 6370000000 HC RX 637 (ALT 250 FOR IP): Performed by: NURSE PRACTITIONER

## 2025-02-18 PROCEDURE — 97116 GAIT TRAINING THERAPY: CPT

## 2025-02-18 PROCEDURE — 2500000003 HC RX 250 WO HCPCS: Performed by: NURSE PRACTITIONER

## 2025-02-18 RX ADMIN — HYDROCORTISONE 10 MG: 10 TABLET ORAL at 08:13

## 2025-02-18 RX ADMIN — Medication 1 CAPSULE: at 08:12

## 2025-02-18 RX ADMIN — NICOTINE POLACRILEX 4 MG: 4 LOZENGE ORAL at 08:24

## 2025-02-18 RX ADMIN — MIDODRINE HYDROCHLORIDE 5 MG: 5 TABLET ORAL at 08:12

## 2025-02-18 RX ADMIN — METHADONE HYDROCHLORIDE 140 MG: 10 TABLET ORAL at 08:12

## 2025-02-18 RX ADMIN — PREGABALIN 150 MG: 75 CAPSULE ORAL at 08:12

## 2025-02-18 RX ADMIN — POLYETHYLENE GLYCOL 3350 17 G: 17 POWDER, FOR SOLUTION ORAL at 08:14

## 2025-02-18 RX ADMIN — GUAIFENESIN 600 MG: 600 TABLET ORAL at 08:13

## 2025-02-18 RX ADMIN — ALPRAZOLAM 1 MG: 1 TABLET ORAL at 08:23

## 2025-02-18 RX ADMIN — ERTAPENEM SODIUM 1000 MG: 1 INJECTION INTRAMUSCULAR; INTRAVENOUS at 09:43

## 2025-02-18 RX ADMIN — FUROSEMIDE 20 MG: 20 TABLET ORAL at 08:13

## 2025-02-18 RX ADMIN — LEVOTHYROXINE SODIUM 75 MCG: 0.05 TABLET ORAL at 06:14

## 2025-02-18 RX ADMIN — DIVALPROEX SODIUM 500 MG: 250 TABLET, EXTENDED RELEASE ORAL at 08:12

## 2025-02-18 RX ADMIN — DOCUSATE SODIUM 100 MG: 100 CAPSULE, LIQUID FILLED ORAL at 08:13

## 2025-02-18 RX ADMIN — Medication 10 ML: at 08:14

## 2025-02-18 RX ADMIN — SODIUM CHLORIDE, PRESERVATIVE FREE 10 ML: 5 INJECTION INTRAVENOUS at 08:14

## 2025-02-18 NOTE — PROGRESS NOTES
Picc line inserted by picc team Nurse, patient tolerated well. Patient is keeping arm flat and still as instructed, will continue to assess and monitor.

## 2025-02-18 NOTE — PROGRESS NOTES
Discharge instructions reviewed with patient. Reviewed all meds and meds to  from [facility]. All follow up appts reviewed. Homecare coming tomorrow for next dose of antibiotics. Educated pt on how to care for the picc, covering or for shower and keeping clean and dry. Pt verbalized understanding.

## 2025-02-18 NOTE — PLAN OF CARE
Problem: Chronic Conditions and Co-morbidities  Goal: Patient's chronic conditions and co-morbidity symptoms are monitored and maintained or improved  2/18/2025 1102 by Lakisha Ku RN  Outcome: Progressing  2/18/2025 0101 by Constance Norwood RN  Outcome: Progressing  Flowsheets (Taken 2/17/2025 2015)  Care Plan - Patient's Chronic Conditions and Co-Morbidity Symptoms are Monitored and Maintained or Improved: Monitor and assess patient's chronic conditions and comorbid symptoms for stability, deterioration, or improvement     Problem: Safety - Adult  Goal: Free from fall injury  2/18/2025 0124 by Constance Norwood RN  Outcome: Progressing     Problem: Infection - Adult  Goal: Absence of infection at discharge  2/18/2025 1102 by Lakisha Ku RN  Outcome: Progressing  2/18/2025 0101 by Constance Norwood RN  Outcome: Progressing  Flowsheets (Taken 2/17/2025 2015)  Absence of infection at discharge: Assess and monitor for signs and symptoms of infection  Goal: Absence of infection during hospitalization  2/18/2025 0124 by Constance Norwood RN  Outcome: Progressing     Problem: Metabolic/Fluid and Electrolytes - Adult  Goal: Electrolytes maintained within normal limits  2/18/2025 1102 by Lakisha Ku RN  Outcome: Progressing  2/18/2025 0101 by Constance Norwood RN  Outcome: Progressing

## 2025-02-18 NOTE — PROGRESS NOTES
[facility] Medicine Progress Note  V 10.25      Date of Admission: 2/13/2025  Hospital Day: 6    Chief Admission Complaint:  ***    Subjective:  ***        Presenting Admission History:       44 y.o. female, with PMH of obesity, bipolar, Hypothyroidism, COPD, who presented to [facility] with fatigue, generalized weakness and shortness of breath. History obtained from the patient and review of EMR. The patient stated she has been experiencing generalized weakness, fatigue and shortness of breath for the last couple of days. However, the patient was recently admitted here at [facility] for COVID-pneumonia. She states that she has not been feeling well since she was discharged. Per EMR, EMS was called and the patient was slightly unsteady getting onto their stretcher. She was taken to the emergency department for further evaluation. In the emergency department a chest x-ray was obtained that revealed no localized consolidation or pleural effusion in this underinflated chest. Elevated right hemidiaphragm progressed from prior exam. CT chest was obtained that revealed bronchovascular thickening with bilateral lower lobe and posterior right upper lobe airspace disease. Superimposed diffuse scattered groundglass airspace opacities bilaterally. Findings suggest underlying inflammatory or infectious etiology. Usual interstitial pneumonia or atypical pneumonia including viral etiologies. The patient was found to meet SIRS criteria with a WBC of 18.5, heart rate 103 and mildly hypotensive. However, patient does take midodrine. Patient's UA was negative for infection, but UDS was positive for benzos and methadone. Patient is prescribed these medications. Blood cultures were ordered in the emergency department and the patient was given 1 L of normal saline. She was also given vancomycin and cefepime, but these were not initially continued due to this likely being a viral etiology-her previous COVID infection. Upon

## 2025-02-18 NOTE — PLAN OF CARE
Problem: Chronic Conditions and Co-morbidities  Goal: Patient's chronic conditions and co-morbidity symptoms are monitored and maintained or improved  2/18/2025 1303 by Lakisha Ku RN  Outcome: Adequate for Discharge  2/18/2025 1102 by Lakisha Ku RN  Outcome: Progressing  2/18/2025 0101 by Constance Norwood RN  Outcome: Progressing  Flowsheets (Taken 2/17/2025 2015)  Care Plan - Patient's Chronic Conditions and Co-Morbidity Symptoms are Monitored and Maintained or Improved: Monitor and assess patient's chronic conditions and comorbid symptoms for stability, deterioration, or improvement     Problem: Safety - Adult  Goal: Free from fall injury  2/18/2025 1303 by Lakisha Ku RN  Outcome: Adequate for Discharge  2/18/2025 0124 by Constance Norwood RN  Outcome: Progressing     Problem: Infection - Adult  Goal: Absence of infection at discharge  2/18/2025 1303 by Lakisha Ku RN  Outcome: Adequate for Discharge  2/18/2025 1102 by Lakisha Ku RN  Outcome: Progressing  2/18/2025 0101 by Constance Norwood RN  Outcome: Progressing  Flowsheets (Taken 2/17/2025 2015)  Absence of infection at discharge: Assess and monitor for signs and symptoms of infection  Goal: Absence of infection during hospitalization  2/18/2025 1303 by Lakisha Ku RN  Outcome: Adequate for Discharge  2/18/2025 0124 by Constance Norwood RN  Outcome: Progressing  Goal: Absence of fever/infection during anticipated neutropenic period  Outcome: Adequate for Discharge     Problem: Metabolic/Fluid and Electrolytes - Adult  Goal: Electrolytes maintained within normal limits  2/18/2025 1303 by Lakisha Ku RN  Outcome: Adequate for Discharge  2/18/2025 1102 by Lakisha Ku RN  Outcome: Progressing  2/18/2025 0101 by Constance Norwood RN  Outcome: Progressing  Goal: Hemodynamic stability and optimal renal function maintained  Outcome: Adequate for Discharge  Goal: Glucose maintained within prescribed range  Outcome:

## 2025-02-18 NOTE — CARE COORDINATION
Chart reviewed day 5. Spoke with Lucíaaurora with [facility], medication coverage is at 100%. Spoke with patient will resume HHC with [facility]. She will ask her neighbor to be here at 1130 for teach with Jason. Notified [facility] of SOC tomorrow will get first dose here. Thierry Zamudio RN  Orders called to [facility]. Thierry Zamudio RN        CASE MANAGEMENT DISCHARGE SUMMARY      Discharge to: home with [facility]. And [facility]        New Durable Medical Equipment ordered/agency: none    Transportation: PVT     Confirmed discharge plan with:     Patient: yes     Family:  yes friend at bedside.      Facility/Agency, name:  [facility] faxed orders called to [facility], [facility] pulling       RN, name: Lakisha Zamudio RN

## 2025-02-18 NOTE — PLAN OF CARE
Problem: Chronic Conditions and Co-morbidities  Goal: Patient's chronic conditions and co-morbidity symptoms are monitored and maintained or improved  Outcome: Progressing  Flowsheets (Taken 2/17/2025 2015)  Care Plan - Patient's Chronic Conditions and Co-Morbidity Symptoms are Monitored and Maintained or Improved: Monitor and assess patient's chronic conditions and comorbid symptoms for stability, deterioration, or improvement     Problem: Infection - Adult  Goal: Absence of infection at discharge  Outcome: Progressing  Flowsheets (Taken 2/17/2025 2015)  Absence of infection at discharge: Assess and monitor for signs and symptoms of infection     Problem: Metabolic/Fluid and Electrolytes - Adult  Goal: Electrolytes maintained within normal limits  Outcome: Progressing

## 2025-02-18 NOTE — PROGRESS NOTES
Physical Therapy  Facility/Department: [facility] C3 TELE/MED SURG/ONC  Daily Treatment Note  NAME: Anjelica Blanchard  : 1980  MRN: [id number]    Date of Service: 2025    Discharge Recommendations:  Home with assist PRN   PT Equipment Recommendations  Equipment Needed: No    Patient Diagnosis(es): The primary encounter diagnosis was Sepsis, due to unspecified organism, unspecified whether acute organ dysfunction present (HCC). A diagnosis of Multifocal pneumonia was also pertinent to this visit.    Assessment  Assessment: Pt tolerated treatment session well. Pt able to ambulate 100' with SBA and no AD and demonstrated no overt LOB. Performed stair training with pt utilizing ARLINE handrails and requiring CGA progressing to SBA with increased trials. Pt required VC for safety during session. Recommend home with PRN at [place]. Pt would benefit from continued skilled PT services to address remaining functional deficits.  Activity Tolerance: Patient tolerated treatment well  Equipment Needed: No    Plan  Physical Therapy Plan  General Plan: 3-5 times per week  Current Treatment Recommendations: Strengthening;ROM;Balance training;Functional mobility training;Transfer training;Gait training;Stair training;Neuromuscular re-education;Equipment evaluation, education, & procurement;Therapeutic activities;Endurance training;Home exercise program;Safety education & training;Patient/Caregiver education & training    Restrictions  Restrictions/Precautions  Restrictions/Precautions: General Precautions, Fall Risk  Activity Level: Up as Tolerated  Position Activity Restriction  Other Position/Activity Restrictions: telemetry, impulsive     Subjective   Subjective  Subjective: Pt found in bed upon entry. Pt agreeable to therapy session.  Pain: Pt reports 7/10 low back and ARLINE knee pain.    Objective  Vitals     Vitals:    25 0810   BP: 124/81   Pulse: 71   Resp: 18   Temp: 97.7 °F (36.5 °C)   SpO2: 97%      Bed Mobility

## 2025-02-18 NOTE — PROGRESS NOTES
Order for PICC line per Dr. Alexis. Pre procedure and timeout done with pt's RN.    Successful insertion of a SL PICC line into pt's left basilic vein. No issues gaining access or advancing guidewire/introducer/PICC line. PICC tip terminates in the SVC according to [employer] 3CG tip confirmation system. PICC was seen dropping into SVC with tip tracking technology and discernable peaked p waves were noted without negative deflection. A printout will be in pt's chart.     PICC is cut at 42cm and out externally 0cm. SL lumens flushes without resistance and draw back brisk blood return.    PICC site CDI with hemostasis maintained and a biopatch applied to site. Pt instructed to stay in bed and keep arm flat and still for 30 minutes  to promote hemostasis.      Constance ORNELAS given handoff report

## 2025-02-20 ENCOUNTER — TELEPHONE (OUTPATIENT)
Dept: PULMONOLOGY | Age: 45
End: 2025-02-20

## 2025-02-20 NOTE — TELEPHONE ENCOUNTER
Spoke with the patient and informed of the response from Dr. Medina.  Patient voiced an understanding

## 2025-02-20 NOTE — TELEPHONE ENCOUNTER
Pt. Wants to know if you will order a chest x-ray. She has a port and \"they\" she does not know who are going to take it out on Monday and she wants to make sure he Pnu is cleared up before they take it out.  Please advise.

## 2025-02-21 ENCOUNTER — HOSPITAL ENCOUNTER (OUTPATIENT)
Age: 45
Discharge: HOME | End: 2025-02-21
Payer: COMMERCIAL

## 2025-02-21 LAB
ANION GAP SERPL CALCULATED.3IONS-SCNC: 14 MMOL/L (ref 3–16)
BASOPHILS # BLD: 0.1 K/UL (ref 0–0.2)
BASOPHILS NFR BLD: 0.8 %
BUN SERPL-MCNC: 21 MG/DL (ref 7–20)
CALCIUM SERPL-MCNC: 9.2 MG/DL (ref 8.3–10.6)
CHLORIDE SERPL-SCNC: 99 MMOL/L (ref 99–110)
CO2 SERPL-SCNC: 27 MMOL/L (ref 21–32)
CREAT SERPL-MCNC: 1.2 MG/DL (ref 0.6–1.1)
DEPRECATED RDW RBC AUTO: 16.6 % (ref 12.4–15.4)
EOSINOPHIL # BLD: 0.1 K/UL (ref 0–0.6)
EOSINOPHIL NFR BLD: 1.5 %
GFR SERPLBLD CREATININE-BSD FMLA CKD-EPI: 56 ML/MIN/{1.73_M2}
GLUCOSE SERPL-MCNC: 81 MG/DL (ref 70–99)
HCT VFR BLD AUTO: 40.8 % (ref 36–48)
HGB BLD-MCNC: 13.5 G/DL (ref 12–16)
LYMPHOCYTES # BLD: 2 K/UL (ref 1–5.1)
LYMPHOCYTES NFR BLD: 22.8 %
MCH RBC QN AUTO: 28.4 PG (ref 26–34)
MCHC RBC AUTO-ENTMCNC: 33 G/DL (ref 31–36)
MCV RBC AUTO: 86.2 FL (ref 80–100)
MONOCYTES # BLD: 1.1 K/UL (ref 0–1.3)
MONOCYTES NFR BLD: 12.3 %
NEUTROPHILS # BLD: 5.4 K/UL (ref 1.7–7.7)
NEUTROPHILS NFR BLD: 62.6 %
PLATELET # BLD AUTO: 345 K/UL (ref 135–450)
PMV BLD AUTO: 8.3 FL (ref 5–10.5)
POTASSIUM SERPL-SCNC: 4.2 MMOL/L (ref 3.5–5.1)
RBC # BLD AUTO: 4.74 M/UL (ref 4–5.2)
SODIUM SERPL-SCNC: 140 MMOL/L (ref 136–145)
WBC # BLD AUTO: 8.7 K/UL (ref 4–11)

## 2025-02-21 PROCEDURE — 80048 BASIC METABOLIC PNL TOTAL CA: CPT

## 2025-02-21 PROCEDURE — 85025 COMPLETE CBC W/AUTO DIFF WBC: CPT

## 2025-02-25 ENCOUNTER — APPOINTMENT (OUTPATIENT)
Dept: CT IMAGING | Age: 45
End: 2025-02-25
Payer: COMMERCIAL

## 2025-02-25 ENCOUNTER — APPOINTMENT (OUTPATIENT)
Dept: GENERAL RADIOLOGY | Age: 45
End: 2025-02-25
Payer: COMMERCIAL

## 2025-02-25 ENCOUNTER — HOSPITAL ENCOUNTER (EMERGENCY)
Age: 45
Discharge: HOME | End: 2025-02-25
Payer: COMMERCIAL

## 2025-02-25 VITALS
DIASTOLIC BLOOD PRESSURE: 78 MMHG | RESPIRATION RATE: 18 BRPM | TEMPERATURE: 98.9 F | OXYGEN SATURATION: 98 % | HEIGHT: 64 IN | HEART RATE: 95 BPM | WEIGHT: 220 LBS | SYSTOLIC BLOOD PRESSURE: 110 MMHG | BODY MASS INDEX: 37.56 KG/M2

## 2025-02-25 DIAGNOSIS — R07.9 CHEST PAIN, UNSPECIFIED TYPE: Primary | ICD-10-CM

## 2025-02-25 LAB
ALBUMIN SERPL-MCNC: 4.2 G/DL (ref 3.4–5)
ALBUMIN/GLOB SERPL: 1.3 {RATIO} (ref 1.1–2.2)
ALP SERPL-CCNC: 91 U/L (ref 40–129)
ALT SERPL-CCNC: 20 U/L (ref 10–40)
ANION GAP SERPL CALCULATED.3IONS-SCNC: 12 MMOL/L (ref 3–16)
AST SERPL-CCNC: 35 U/L (ref 15–37)
BASOPHILS # BLD: 0.1 K/UL (ref 0–0.2)
BASOPHILS NFR BLD: 0.7 %
BILIRUB SERPL-MCNC: <0.2 MG/DL (ref 0–1)
BUN SERPL-MCNC: 17 MG/DL (ref 7–20)
CALCIUM SERPL-MCNC: 9.4 MG/DL (ref 8.3–10.6)
CHLORIDE SERPL-SCNC: 101 MMOL/L (ref 99–110)
CO2 SERPL-SCNC: 26 MMOL/L (ref 21–32)
CREAT SERPL-MCNC: 1.2 MG/DL (ref 0.6–1.1)
D-DIMER QUANTITATIVE: 0.72 UG/ML FEU (ref 0–0.6)
DEPRECATED RDW RBC AUTO: 16.2 % (ref 12.4–15.4)
EKG ATRIAL RATE: 101 BPM
EKG DIAGNOSIS: NORMAL
EKG P AXIS: 54 DEGREES
EKG P-R INTERVAL: 136 MS
EKG Q-T INTERVAL: 360 MS
EKG QRS DURATION: 82 MS
EKG QTC CALCULATION (BAZETT): 466 MS
EKG R AXIS: 51 DEGREES
EKG T AXIS: -12 DEGREES
EKG VENTRICULAR RATE: 101 BPM
EOSINOPHIL # BLD: 0.1 K/UL (ref 0–0.6)
EOSINOPHIL NFR BLD: 1.3 %
GFR SERPLBLD CREATININE-BSD FMLA CKD-EPI: 56 ML/MIN/{1.73_M2}
GLUCOSE SERPL-MCNC: 86 MG/DL (ref 70–99)
HCG SERPL QL: NEGATIVE
HCT VFR BLD AUTO: 38.4 % (ref 36–48)
HGB BLD-MCNC: 12.9 G/DL (ref 12–16)
LACTATE BLDV-SCNC: 0.7 MMOL/L (ref 0.4–2)
LYMPHOCYTES # BLD: 1.5 K/UL (ref 1–5.1)
LYMPHOCYTES NFR BLD: 21.7 %
MCH RBC QN AUTO: 29 PG (ref 26–34)
MCHC RBC AUTO-ENTMCNC: 33.7 G/DL (ref 31–36)
MCV RBC AUTO: 86.1 FL (ref 80–100)
MONOCYTES # BLD: 0.6 K/UL (ref 0–1.3)
MONOCYTES NFR BLD: 9.1 %
NEUTROPHILS # BLD: 4.8 K/UL (ref 1.7–7.7)
NEUTROPHILS NFR BLD: 67.2 %
PLATELET # BLD AUTO: 318 K/UL (ref 135–450)
PMV BLD AUTO: 7.8 FL (ref 5–10.5)
POTASSIUM SERPL-SCNC: 4.3 MMOL/L (ref 3.5–5.1)
PROT SERPL-MCNC: 7.5 G/DL (ref 6.4–8.2)
RBC # BLD AUTO: 4.46 M/UL (ref 4–5.2)
SODIUM SERPL-SCNC: 139 MMOL/L (ref 136–145)
TROPONIN, HIGH SENSITIVITY: 12 NG/L (ref 0–14)
TROPONIN, HIGH SENSITIVITY: 14 NG/L (ref 0–14)
WBC # BLD AUTO: 7.1 K/UL (ref 4–11)

## 2025-02-25 PROCEDURE — 80053 COMPREHEN METABOLIC PANEL: CPT

## 2025-02-25 PROCEDURE — 36415 COLL VENOUS BLD VENIPUNCTURE: CPT

## 2025-02-25 PROCEDURE — 99285 EMERGENCY DEPT VISIT HI MDM: CPT

## 2025-02-25 PROCEDURE — 93005 ELECTROCARDIOGRAM TRACING: CPT | Performed by: EMERGENCY MEDICINE

## 2025-02-25 PROCEDURE — 93010 ELECTROCARDIOGRAM REPORT: CPT | Performed by: INTERNAL MEDICINE

## 2025-02-25 PROCEDURE — 85025 COMPLETE CBC W/AUTO DIFF WBC: CPT

## 2025-02-25 PROCEDURE — 83605 ASSAY OF LACTIC ACID: CPT

## 2025-02-25 PROCEDURE — 85379 FIBRIN DEGRADATION QUANT: CPT

## 2025-02-25 PROCEDURE — 84484 ASSAY OF TROPONIN QUANT: CPT

## 2025-02-25 PROCEDURE — 71045 X-RAY EXAM CHEST 1 VIEW: CPT

## 2025-02-25 PROCEDURE — 84703 CHORIONIC GONADOTROPIN ASSAY: CPT

## 2025-02-25 PROCEDURE — 6360000004 HC RX CONTRAST MEDICATION: Performed by: PHYSICIAN ASSISTANT

## 2025-02-25 PROCEDURE — 71260 CT THORAX DX C+: CPT

## 2025-02-25 RX ORDER — QUETIAPINE FUMARATE 25 MG/1
25 TABLET, FILM COATED ORAL NIGHTLY
Qty: 30 TABLET | Refills: 3 | Status: SHIPPED | OUTPATIENT
Start: 2025-02-25

## 2025-02-25 RX ORDER — IOPAMIDOL 755 MG/ML
75 INJECTION, SOLUTION INTRAVASCULAR
Status: COMPLETED | OUTPATIENT
Start: 2025-02-25 | End: 2025-02-25

## 2025-02-25 RX ORDER — QUETIAPINE FUMARATE 100 MG/1
100 TABLET, FILM COATED ORAL NIGHTLY
Qty: 30 TABLET | Refills: 3 | Status: SHIPPED | OUTPATIENT
Start: 2025-02-25

## 2025-02-25 RX ADMIN — IOPAMIDOL 75 ML: 755 INJECTION, SOLUTION INTRAVENOUS at 20:59

## 2025-02-25 ASSESSMENT — HEART SCORE: ECG: NORMAL

## 2025-02-25 NOTE — ED PROVIDER NOTES
Use [id number] or GENEDNOTE      [facility] EMERGENCY DEPARTMENT  EMERGENCY DEPARTMENT ENCOUNTER        Pt Name: Anjelica Blanchard  MRN: [id number]  Birthdate 1980  Date of evaluation: 2/25/2025  Provider: MARTÍN Estrada  PCP: Smith Neff MD  Note Started: 6:37 PM EST 2/25/25      MERARY. I have evaluated this patient.        CHIEF COMPLAINT       Chief Complaint   Patient presents with    Chest Pain     Pt states she was having chest pain earlier this morning.        HISTORY OF PRESENT ILLNESS: 1 or more Elements     History from : Patient    Limitations to history : None    Anjelica Blanchard is a 44 y.o. female who presents to the emergency department complaining of chest pain.  She is also reporting elevated heart rate and elevated blood pressure.  She states she was recently hospitalized for pneumonia.  She was discharged home with a port and received IV antibiotics at home.  She has completed her antibiotics.  She had the port removed yesterday.  She states since being discharged home she has noticed that her heart rate has been slightly elevated and her blood pressure has also been elevated.  She is complaining of some mild chest pain and states it feels similar to when she had previous pneumonia.  She has no history of heart disease or blood clots.  No recent travel or leg swelling.    Nursing Notes were all reviewed and agreed with or any disagreements were addressed in the HPI.    REVIEW OF SYSTEMS :      Review of Systems    Positives and Pertinent negatives as per HPI.     SURGICAL HISTORY     Past Surgical History:   Procedure Laterality Date    BRONCHOSCOPY N/A 11/2/2023    BRONCHOSCOPY DIAGNOSTIC OR CELL WASH ONLY performed by Manisha Medina MD at [facility] ENDOSCOPY    BRONCHOSCOPY  11/2/2023    BRONCHOSCOPY ALVEOLAR LAVAGE performed by Manisha Medina MD at [facility] ENDOSCOPY    BRONCHOSCOPY  11/2/2023    BRONCHOSCOPY THERAPUTIC ASPIRATION INITIAL performed by Manisha Medina MD at

## 2025-02-25 NOTE — PROGRESS NOTES
Adjusted script for pharmacy to equal seroquel.  Also pt supposed to have PICC line removed.  Labs appear OK and pt does not require more antibiotics.    Electronically signed by Kia Alexis MD on 2/25/2025 at 12:21 PM

## 2025-02-25 NOTE — ED PROVIDER NOTES
The Ekg interpreted by me shows  sinus tachycardia, mqms=475  Axis is   Normal  QTc is   4666 ms  Intervals and Durations are unremarkable.      ST Segments: nonspecific changes  No significant change from prior EKG dated 2/13/2025          Popeye Villalobos MD  02/25/25 2318

## 2025-02-25 NOTE — PROGRESS NOTES
Physician Progress Note      PATIENT:               FERNANDO RINCON  CSN #:                  [id number]  :                       1980  ADMIT DATE:       2025 4:24 PM  DISCH DATE:        2025 1:18 PM  RESPONDING  PROVIDER #:        Kia Alexis MD          QUERY TEXT:    Pt admitted with Pneumonia. Pt noted to have    WBC-14.1,18.5,HR-103,92,93,BP-90/56,82/51,83/53,87/55. If possible, please   document in the progress notes and discharge summary if you are evaluating and   /or treating any of the following:  The medical record reflects the following:  Risk Factors: Pneumonia,COPD  Clinical Indicators: H&P on 2025 states Findings suggest underlying   inflammatory or infectious etiology.  Usual interstitial pneumonia or atypical   pneumonia including viral etiologies  Internal medicine progress note on  states  Usual interstitial pneumonia   or atypical pneumonia including viral etiologies. The patient was found to   meet SIRS criteria with a WBC of 18.5, heart rate 103 and mildly hypotensive  CT chest on  states Bronchovascular thickening with bilateral lower lobe   and posterior right upper lobe airspace disease.Superimposed diffuse scattered   groundglass airspace opacities bilaterally.  WBC(-)-14.1,18.5  HR(-)-103,92,93  BP(-)-90/56,82/51,83/53,87/55  Treatment: cefepime (MAXIPIME) 2,000 mg, vancomycin (VANCOCIN) 2,500 mg, Chest    xary, CT chest ,Serial lab,Respiratory culture    Thank you  BRAYDEN Renee,CDS  Options provided:  -- Sepsis due to pneumonia, present on admission  -- Pneumonia without Sepsis  -- Other - I will add my own diagnosis  -- Disagree - Not applicable / Not valid  -- Disagree - Clinically unable to determine / Unknown  -- Refer to Clinical Documentation Reviewer    PROVIDER RESPONSE TEXT:    This patient has sepsis due to pneumonia which was present on admission.    Query created by: Cheryle Lora on 2025 7:36

## 2025-03-31 ENCOUNTER — TRANSCRIBE ORDERS (OUTPATIENT)
Dept: ADMINISTRATIVE | Age: 45
End: 2025-03-31

## 2025-03-31 DIAGNOSIS — M25.562 LEFT KNEE PAIN, UNSPECIFIED CHRONICITY: ICD-10-CM

## 2025-03-31 DIAGNOSIS — M25.561 RIGHT KNEE PAIN, UNSPECIFIED CHRONICITY: Primary | ICD-10-CM

## 2025-04-02 ENCOUNTER — HOSPITAL ENCOUNTER (OUTPATIENT)
Dept: SLEEP CENTER | Age: 45
Discharge: HOME | End: 2025-04-04
Payer: COMMERCIAL

## 2025-04-02 DIAGNOSIS — G47.33 OSA (OBSTRUCTIVE SLEEP APNEA): ICD-10-CM

## 2025-04-02 PROCEDURE — 95811 POLYSOM 6/>YRS CPAP 4/> PARM: CPT

## 2025-04-03 ENCOUNTER — RESULTS FOLLOW-UP (OUTPATIENT)
Dept: PULMONOLOGY | Age: 45
End: 2025-04-03

## 2025-04-03 DIAGNOSIS — G47.33 OSA (OBSTRUCTIVE SLEEP APNEA): Primary | ICD-10-CM

## 2025-04-04 NOTE — RESULT ENCOUNTER NOTE
Anjelica requires CPAP therapy. Order placed, send to DME.  Please let her know and she is to keep her follow up appointment.

## 2025-04-21 ENCOUNTER — HOSPITAL ENCOUNTER (EMERGENCY)
Age: 45
Discharge: HOME | End: 2025-04-21
Payer: COMMERCIAL

## 2025-04-21 ENCOUNTER — APPOINTMENT (OUTPATIENT)
Dept: MRI IMAGING | Age: 45
End: 2025-04-21
Payer: COMMERCIAL

## 2025-04-21 VITALS
DIASTOLIC BLOOD PRESSURE: 77 MMHG | TEMPERATURE: 98.4 F | SYSTOLIC BLOOD PRESSURE: 100 MMHG | RESPIRATION RATE: 16 BRPM | HEART RATE: 92 BPM | OXYGEN SATURATION: 97 %

## 2025-04-21 DIAGNOSIS — M54.50 ACUTE EXACERBATION OF CHRONIC LOW BACK PAIN: Primary | ICD-10-CM

## 2025-04-21 DIAGNOSIS — S39.012A STRAIN OF LUMBAR REGION, INITIAL ENCOUNTER: ICD-10-CM

## 2025-04-21 DIAGNOSIS — G89.29 ACUTE EXACERBATION OF CHRONIC LOW BACK PAIN: Primary | ICD-10-CM

## 2025-04-21 LAB
ANION GAP SERPL CALCULATED.3IONS-SCNC: 12 MMOL/L (ref 3–16)
BASOPHILS # BLD: 0.1 K/UL (ref 0–0.2)
BASOPHILS NFR BLD: 1 %
BUN SERPL-MCNC: 24 MG/DL (ref 7–20)
CALCIUM SERPL-MCNC: 9.8 MG/DL (ref 8.3–10.6)
CHLORIDE SERPL-SCNC: 98 MMOL/L (ref 99–110)
CO2 SERPL-SCNC: 29 MMOL/L (ref 21–32)
CREAT SERPL-MCNC: 1.2 MG/DL (ref 0.6–1.1)
DEPRECATED RDW RBC AUTO: 15.5 % (ref 12.4–15.4)
EOSINOPHIL # BLD: 0.1 K/UL (ref 0–0.6)
EOSINOPHIL NFR BLD: 1.2 %
GFR SERPLBLD CREATININE-BSD FMLA CKD-EPI: 56 ML/MIN/{1.73_M2}
GLUCOSE SERPL-MCNC: 82 MG/DL (ref 70–99)
HCT VFR BLD AUTO: 35.3 % (ref 36–48)
HGB BLD-MCNC: 11.8 G/DL (ref 12–16)
LYMPHOCYTES # BLD: 1.9 K/UL (ref 1–5.1)
LYMPHOCYTES NFR BLD: 26.4 %
MCH RBC QN AUTO: 28.7 PG (ref 26–34)
MCHC RBC AUTO-ENTMCNC: 33.5 G/DL (ref 31–36)
MCV RBC AUTO: 85.6 FL (ref 80–100)
MONOCYTES # BLD: 0.8 K/UL (ref 0–1.3)
MONOCYTES NFR BLD: 11.8 %
NEUTROPHILS # BLD: 4.3 K/UL (ref 1.7–7.7)
NEUTROPHILS NFR BLD: 59.6 %
PLATELET # BLD AUTO: 269 K/UL (ref 135–450)
PMV BLD AUTO: 8 FL (ref 5–10.5)
POTASSIUM SERPL-SCNC: 4.4 MMOL/L (ref 3.5–5.1)
RBC # BLD AUTO: 4.13 M/UL (ref 4–5.2)
SODIUM SERPL-SCNC: 139 MMOL/L (ref 136–145)
WBC # BLD AUTO: 7.2 K/UL (ref 4–11)

## 2025-04-21 PROCEDURE — 96374 THER/PROPH/DIAG INJ IV PUSH: CPT

## 2025-04-21 PROCEDURE — 99285 EMERGENCY DEPT VISIT HI MDM: CPT

## 2025-04-21 PROCEDURE — 6370000000 HC RX 637 (ALT 250 FOR IP)

## 2025-04-21 PROCEDURE — 85025 COMPLETE CBC W/AUTO DIFF WBC: CPT

## 2025-04-21 PROCEDURE — 80048 BASIC METABOLIC PNL TOTAL CA: CPT

## 2025-04-21 PROCEDURE — 6360000002 HC RX W HCPCS

## 2025-04-21 PROCEDURE — 72158 MRI LUMBAR SPINE W/O & W/DYE: CPT

## 2025-04-21 PROCEDURE — 96375 TX/PRO/DX INJ NEW DRUG ADDON: CPT

## 2025-04-21 PROCEDURE — 6360000004 HC RX CONTRAST MEDICATION

## 2025-04-21 PROCEDURE — A9579 GAD-BASE MR CONTRAST NOS,1ML: HCPCS

## 2025-04-21 RX ORDER — LIDOCAINE 50 MG/G
1 PATCH TOPICAL DAILY
Qty: 10 PATCH | Refills: 0 | Status: SHIPPED | OUTPATIENT
Start: 2025-04-21 | End: 2025-05-01

## 2025-04-21 RX ORDER — LORAZEPAM 1 MG/1
1 TABLET ORAL ONCE
Status: COMPLETED | OUTPATIENT
Start: 2025-04-21 | End: 2025-04-21

## 2025-04-21 RX ORDER — KETOROLAC TROMETHAMINE 15 MG/ML
30 INJECTION, SOLUTION INTRAMUSCULAR; INTRAVENOUS ONCE
Status: DISCONTINUED | OUTPATIENT
Start: 2025-04-21 | End: 2025-04-21

## 2025-04-21 RX ORDER — PREDNISONE 20 MG/1
20 TABLET ORAL 2 TIMES DAILY
Qty: 10 TABLET | Refills: 0 | Status: SHIPPED | OUTPATIENT
Start: 2025-04-21 | End: 2025-04-26

## 2025-04-21 RX ORDER — ORPHENADRINE CITRATE 30 MG/ML
60 INJECTION INTRAMUSCULAR; INTRAVENOUS ONCE
Status: DISCONTINUED | OUTPATIENT
Start: 2025-04-21 | End: 2025-04-21

## 2025-04-21 RX ORDER — PREDNISONE 20 MG/1
20 TABLET ORAL ONCE
Status: COMPLETED | OUTPATIENT
Start: 2025-04-21 | End: 2025-04-21

## 2025-04-21 RX ORDER — KETOROLAC TROMETHAMINE 15 MG/ML
30 INJECTION, SOLUTION INTRAMUSCULAR; INTRAVENOUS ONCE
Status: COMPLETED | OUTPATIENT
Start: 2025-04-21 | End: 2025-04-21

## 2025-04-21 RX ORDER — ORPHENADRINE CITRATE 30 MG/ML
60 INJECTION INTRAMUSCULAR; INTRAVENOUS ONCE
Status: COMPLETED | OUTPATIENT
Start: 2025-04-21 | End: 2025-04-21

## 2025-04-21 RX ADMIN — LORAZEPAM 1 MG: 1 TABLET ORAL at 15:26

## 2025-04-21 RX ADMIN — GADOTERIDOL 20 ML: 279.3 INJECTION, SOLUTION INTRAVENOUS at 17:08

## 2025-04-21 RX ADMIN — ORPHENADRINE CITRATE 60 MG: 60 INJECTION INTRAMUSCULAR; INTRAVENOUS at 14:53

## 2025-04-21 RX ADMIN — PREDNISONE 20 MG: 20 TABLET ORAL at 14:53

## 2025-04-21 RX ADMIN — KETOROLAC TROMETHAMINE 30 MG: 15 INJECTION, SOLUTION INTRAMUSCULAR; INTRAVENOUS at 14:52

## 2025-04-21 ASSESSMENT — PAIN - FUNCTIONAL ASSESSMENT: PAIN_FUNCTIONAL_ASSESSMENT: 0-10

## 2025-04-21 ASSESSMENT — PAIN SCALES - GENERAL
PAINLEVEL_OUTOF10: 9
PAINLEVEL_OUTOF10: 8

## 2025-04-21 NOTE — ED NOTES
Pt states she has had previous back surgeries, reports overexertion while cleaning in past several days.

## 2025-04-21 NOTE — ED PROVIDER NOTES
(DELTASONE) 20 MG tablet Take 1 tablet by mouth 2 times daily for 5 days, Disp-10 tablet, R-0Normal      lidocaine (LIDODERM) 5 % Place 1 patch onto the skin daily for 10 days 12 hours on, 12 hours off., Disp-10 patch, R-0Normal             DISCONTINUED MEDICATIONS:  Discharge Medication List as of 4/21/2025  7:34 PM                 (Please note that portions of this note were completed with a voice recognition program.  Efforts were made to edit the dictations but occasionally words are mis-transcribed.)    Talia Lizarraga (electronically signed)       Talia Koo PA  04/22/25 [id number]

## 2025-04-21 NOTE — ED NOTES
Patient is in MRI appropriate gown    Pt seen in [facility] - Both wounds to BLE close to healed - debride per Dr. Maggie Orlando - cont gent cream and collagen - compri 2 for compression - pt has long term compression garments - will bring to next appt  for instructions on use - reviewed AVS - f/u in 1 week

## 2025-04-21 NOTE — DISCHARGE INSTRUCTIONS
Your MRI did not show any new findings in your back.  Continue taking your home pain medications as prescribed.  Please follow-up with your orthopedic doctor for reevaluation.  Return to this point for any new or worsening symptoms including numbness of your legs, changes in how you poop or pee, high-grade fevers    Your creatinine has remained mildly elevated over the last month.  Please follow-up with your primary care provider to have this rechecked.  Please increase your fluid intake.

## 2025-05-15 ENCOUNTER — TELEPHONE (OUTPATIENT)
Dept: PULMONOLOGY | Age: 45
End: 2025-05-15

## 2025-05-15 NOTE — TELEPHONE ENCOUNTER
Pt called with concerns with CPAP machine. Pt stated that they were having technical difficulties. [facility]. Pt informed writer that they have and that they have been unsuccessful in reaching them. Email sent to [facility] to follow up.

## 2025-05-29 ENCOUNTER — TELEPHONE (OUTPATIENT)
Dept: PULMONOLOGY | Age: 45
End: 2025-05-29

## 2025-05-29 NOTE — TELEPHONE ENCOUNTER
Spoke with Berna with [employer] and stated that set up was completed via virtual and home set up on 4/23/25. Pt needs 31-90 day prior to 7/22/25

## 2025-05-29 NOTE — TELEPHONE ENCOUNTER
Patient called stating someone was suppose to call her back about her 31-90 day appointment.  Please call and advise.

## 2025-06-04 NOTE — PROGRESS NOTES
Chief Complaint   Patient presents with    Follow-up    Sleep Apnea    COPD     Follow up       Anjelica Blanchard comes in today for follow-up of sleep apnea and her breathing.  She has significant medical history of Asthma/COPD, HF, recurrent pna, and multi-substance abuse.  She is a former smoker, quit 2022.  No longer Vaping.  She continues on methadone therapy.   She was seen in the hospital in Feb 2025  for bronchitis with SIRS from COVID.  She is using Air Supra 3 times a day, sometimes less.  She continues on  Advair 250 . States her breathing has been worse over the last month or so with increased cough productive cough.     Diagnosed with severe complex  sleep apnea on the split night study done 9/25/24. (AHI 40, weight 213 lbs)   PAP titration study done  4/2/25 showed pressure of  CPAP 13 cm best controlled apnea.  Machine received 4/23/25    Patient had symptoms of EDS, snoring and poor sleep quality at time of diagnosis, resolved with pap therapy. Denies HA, ear popping or belching with PAP use.       Reports sleepiness is better.   Is not having extended sleeping.  Is using equipment for 9 hours a night.  Up at night to use the bathroom about: 2   Is not snoring with machine.    Does not have dry mouth   Is not complaining of mask issues  Is tolerating the pressure.            6/5/2025     1:10 PM   Sleep Medicine   Sitting and reading 0   Watching TV 0   Sitting, inactive in a public place (e.g. a theatre or a meeting) 0   As a passenger in a car for an hour without a break 0   Lying down to rest in the afternoon when circumstances permit 0   Sitting and talking to someone 0   Sitting quietly after a lunch without alcohol 0   In a car, while stopped for a few minutes in traffic 0   [place] Sleepiness Score 0     0 = no chance of dozing  1 = slight chance of dozing  2 = moderate chance of dozing  3 = high chance of dozing    Interpretation:   0-7:     It is unlikely that you are abnormally sleepy.   8-9:

## 2025-06-05 ENCOUNTER — OFFICE VISIT (OUTPATIENT)
Dept: PULMONOLOGY | Age: 45
End: 2025-06-05
Payer: MEDICARE

## 2025-06-05 VITALS
HEART RATE: 88 BPM | OXYGEN SATURATION: 91 % | BODY MASS INDEX: 39.27 KG/M2 | WEIGHT: 230 LBS | DIASTOLIC BLOOD PRESSURE: 75 MMHG | TEMPERATURE: 97.7 F | RESPIRATION RATE: 16 BRPM | HEIGHT: 64 IN | SYSTOLIC BLOOD PRESSURE: 107 MMHG

## 2025-06-05 DIAGNOSIS — F11.20 POLYSUBSTANCE DEPENDENCE INCLUDING OPIOID TYPE DRUG, EPISODIC ABUSE (HCC): ICD-10-CM

## 2025-06-05 DIAGNOSIS — J44.9 COPD, MILD (HCC): ICD-10-CM

## 2025-06-05 DIAGNOSIS — E66.01 CLASS 2 SEVERE OBESITY DUE TO EXCESS CALORIES WITH SERIOUS COMORBIDITY AND BODY MASS INDEX (BMI) OF 39.0 TO 39.9 IN ADULT (HCC): ICD-10-CM

## 2025-06-05 DIAGNOSIS — E66.812 CLASS 2 SEVERE OBESITY DUE TO EXCESS CALORIES WITH SERIOUS COMORBIDITY AND BODY MASS INDEX (BMI) OF 39.0 TO 39.9 IN ADULT (HCC): ICD-10-CM

## 2025-06-05 DIAGNOSIS — J20.9 ACUTE BRONCHITIS, UNSPECIFIED ORGANISM: Primary | ICD-10-CM

## 2025-06-05 DIAGNOSIS — F19.20 POLYSUBSTANCE DEPENDENCE INCLUDING OPIOID TYPE DRUG, EPISODIC ABUSE (HCC): ICD-10-CM

## 2025-06-05 DIAGNOSIS — G47.39 COMPLEX SLEEP APNEA SYNDROME: ICD-10-CM

## 2025-06-05 DIAGNOSIS — Z87.891 FORMER SMOKER: ICD-10-CM

## 2025-06-05 PROCEDURE — 1036F TOBACCO NON-USER: CPT | Performed by: NURSE PRACTITIONER

## 2025-06-05 PROCEDURE — 3023F SPIROM DOC REV: CPT | Performed by: NURSE PRACTITIONER

## 2025-06-05 PROCEDURE — G8417 CALC BMI ABV UP PARAM F/U: HCPCS | Performed by: NURSE PRACTITIONER

## 2025-06-05 PROCEDURE — 99214 OFFICE O/P EST MOD 30 MIN: CPT | Performed by: NURSE PRACTITIONER

## 2025-06-05 PROCEDURE — G8427 DOCREV CUR MEDS BY ELIG CLIN: HCPCS | Performed by: NURSE PRACTITIONER

## 2025-06-05 RX ORDER — FLUTICASONE PROPIONATE AND SALMETEROL 250; 50 UG/1; UG/1
1 POWDER RESPIRATORY (INHALATION) EVERY 12 HOURS
Qty: 60 EACH | Refills: 3 | Status: CANCELLED | OUTPATIENT
Start: 2025-06-05

## 2025-06-05 RX ORDER — FLUTICASONE PROPIONATE AND SALMETEROL 250; 50 UG/1; UG/1
1 POWDER RESPIRATORY (INHALATION) EVERY 12 HOURS
COMMUNITY

## 2025-06-05 RX ORDER — DOXYCYCLINE HYCLATE 100 MG
100 TABLET ORAL 2 TIMES DAILY
Qty: 14 TABLET | Refills: 0 | Status: SHIPPED | OUTPATIENT
Start: 2025-06-05 | End: 2025-06-12

## 2025-06-05 RX ORDER — PHENTERMINE HYDROCHLORIDE 37.5 MG/1
37.5 TABLET ORAL DAILY
COMMUNITY
Start: 2025-04-19

## 2025-06-05 ASSESSMENT — SLEEP AND FATIGUE QUESTIONNAIRES
HOW LIKELY ARE YOU TO NOD OFF OR FALL ASLEEP IN A CAR, WHILE STOPPED FOR A FEW MINUTES IN TRAFFIC: WOULD NEVER DOZE
HOW LIKELY ARE YOU TO NOD OFF OR FALL ASLEEP WHILE WATCHING TV: WOULD NEVER DOZE
HOW LIKELY ARE YOU TO NOD OFF OR FALL ASLEEP WHILE SITTING AND TALKING TO SOMEONE: WOULD NEVER DOZE
HOW LIKELY ARE YOU TO NOD OFF OR FALL ASLEEP WHILE SITTING INACTIVE IN A PUBLIC PLACE: WOULD NEVER DOZE
HOW LIKELY ARE YOU TO NOD OFF OR FALL ASLEEP WHILE LYING DOWN TO REST IN THE AFTERNOON WHEN CIRCUMSTANCES PERMIT: WOULD NEVER DOZE
HOW LIKELY ARE YOU TO NOD OFF OR FALL ASLEEP WHILE SITTING AND READING: WOULD NEVER DOZE
HOW LIKELY ARE YOU TO NOD OFF OR FALL ASLEEP WHEN YOU ARE A PASSENGER IN A CAR FOR AN HOUR WITHOUT A BREAK: WOULD NEVER DOZE
ESS TOTAL SCORE: 0
HOW LIKELY ARE YOU TO NOD OFF OR FALL ASLEEP WHILE SITTING QUIETLY AFTER LUNCH WITHOUT ALCOHOL: WOULD NEVER DOZE

## 2025-06-05 ASSESSMENT — ENCOUNTER SYMPTOMS
COUGH: 1
ABDOMINAL PAIN: 0
WHEEZING: 0
CHEST TIGHTNESS: 0
BACK PAIN: 1
SHORTNESS OF BREATH: 1
SORE THROAT: 0
EYE PAIN: 0
RHINORRHEA: 0

## 2025-06-05 NOTE — PATIENT INSTRUCTIONS
Call with worsening symptoms such as increased shortness of breath, productive cough, wheezing or symptoms not responding to treatment plan.     Doxycycline 100 mg twice a day x 7 days    Anjelica Blanchard has good benefit and adherence on PAP therapy.  Compliance report information was analyzed to assess complexity and medical decision making in regards to further testing and management. Apnea not controlled, will increase to 14 cm H20.   Will prescribe home medical equipment company to check pressures, download usage and will replace mask, tubing, disposables and filters as needed.       Instructed to wash or wipe face of excess oil before using CPAP to prevent the mask / nasal pillow from sliding and ensure proper fit.  Empty the water daily and allow the chamber and tubings to air dry. Instructed how to properly clean the device to prevent bacteria and mold growth in the water chamber.       Advised to avoid driving if too sleepy to function safely and given a discussion of the risks of untreated apneas such as accidents, cognitive impairment, mood impairment, worsening high blood pressure, various cardiac disease and stroke.      Regarding weight, recommend trying a formal program and/or increase physical activity by adding a 30 minute walk to daily routine. Weight loss was encouraged as a long term approach to treatment of MANUEL. Explained the correlation between obesity and apnea and the causative role it can play.    Follow up 2- 3 months or sooner for any issues.

## 2025-06-05 NOTE — PROGRESS NOTES
MA Communication:  The following orders are received by verbal communication from Arabella Vaughn CNP    Orders include:    3 month endy f/u with [facility]  Pressure change

## 2025-06-16 ENCOUNTER — TELEPHONE (OUTPATIENT)
Dept: PULMONOLOGY | Age: 45
End: 2025-06-16

## 2025-06-16 DIAGNOSIS — J41.0 SIMPLE CHRONIC BRONCHITIS (HCC): Primary | ICD-10-CM

## 2025-06-16 NOTE — TELEPHONE ENCOUNTER
Patient called stating that she needs more antibiotics. She states that she finished them a few days ago but is still coughing up infection. Please advise    [facility]

## 2025-06-17 NOTE — TELEPHONE ENCOUNTER
Pt returned call and stated that they were on a round of antibiotics ordered by provider and have a day or two left. They stated that they have a Cough that is persistent and are coughing - greenish phlegm. No fevers, chills, chest pain or any other symptoms. Pt states that they \"really dont have a way to the doctors right now, only way to get XRAY is by calling 911\" --- Please advise.     Last office visit 6/5/2025     Next office visit scheduled 9/10/2025

## 2025-06-17 NOTE — TELEPHONE ENCOUNTER
If her symptoms are worsening since office visit despite antibiotics and use of her Airsupra twice a day in addition to her Advair, she is to go to the ED for further evaluation with her history of recurrent pneumonia.

## 2025-06-17 NOTE — TELEPHONE ENCOUNTER
Patient returned call again about needing antibiotics and would like someone to give her a call. Thanks

## 2025-06-18 NOTE — TELEPHONE ENCOUNTER
Hold on further antibiotics for now.  She is to continue to monitor her symptoms.  Use Airsupra 2 puffs twice a day in addition to her Advair.  Call office or go to ED with worsening symptoms or fever.  Chest Xray is ordered and suggest getting if no improvement.

## 2025-06-18 NOTE — TELEPHONE ENCOUNTER
Pt advised of providers message. Stated that they \"don't feel worse just feel like that bronchitis is not cleared\" pt was offered office visit and patient declined due to transportation.

## 2025-06-20 ENCOUNTER — HOSPITAL ENCOUNTER (OUTPATIENT)
Dept: GENERAL RADIOLOGY | Age: 45
Discharge: HOME | End: 2025-06-20
Payer: MEDICARE

## 2025-06-20 DIAGNOSIS — J41.0 SIMPLE CHRONIC BRONCHITIS (HCC): ICD-10-CM

## 2025-06-20 PROCEDURE — 71046 X-RAY EXAM CHEST 2 VIEWS: CPT

## 2025-06-20 NOTE — TELEPHONE ENCOUNTER
Pt returned call and requested XRAY order. Order is in [facility] and patient was informed. Pt will be going today to get XRAY.

## 2025-06-23 ENCOUNTER — RESULTS FOLLOW-UP (OUTPATIENT)
Dept: PULMONOLOGY | Age: 45
End: 2025-06-23

## 2025-06-23 DIAGNOSIS — J40 BRONCHITIS: Primary | ICD-10-CM

## 2025-06-23 RX ORDER — PREDNISONE 10 MG/1
TABLET ORAL
Qty: 20 TABLET | Refills: 0 | Status: SHIPPED | OUTPATIENT
Start: 2025-06-23

## 2025-06-23 RX ORDER — AZITHROMYCIN 250 MG/1
TABLET, FILM COATED ORAL
Qty: 6 TABLET | Refills: 0 | Status: SHIPPED | OUTPATIENT
Start: 2025-06-23 | End: 2025-07-03

## 2025-06-23 NOTE — RESULT ENCOUNTER NOTE
Please let Anjelica know her CXR still shows bronchitis.  Antibiotics and steroids have been sent to the pharmacy. If she is not improving, she is to go to the ED for failed outpatient therapy.

## 2025-06-23 NOTE — TELEPHONE ENCOUNTER
Left detailed message with Arabella's response.  Told patient to call office back if she has any further concerns or questions.

## 2025-08-07 ENCOUNTER — HOSPITAL ENCOUNTER (OUTPATIENT)
Dept: MRI IMAGING | Age: 45
Discharge: HOME | End: 2025-08-07
Attending: ORTHOPAEDIC SURGERY
Payer: MEDICARE

## 2025-08-07 DIAGNOSIS — M25.562 LEFT KNEE PAIN, UNSPECIFIED CHRONICITY: ICD-10-CM

## 2025-08-07 DIAGNOSIS — M25.561 RIGHT KNEE PAIN, UNSPECIFIED CHRONICITY: ICD-10-CM

## 2025-08-07 PROCEDURE — 73721 MRI JNT OF LWR EXTRE W/O DYE: CPT

## 2025-08-27 ENCOUNTER — TRANSCRIBE ORDERS (OUTPATIENT)
Dept: ADMINISTRATIVE | Age: 45
End: 2025-08-27

## 2025-08-27 DIAGNOSIS — R27.0 ATAXIA: Primary | ICD-10-CM

## 2025-08-28 ENCOUNTER — TRANSCRIBE ORDERS (OUTPATIENT)
Dept: ADMINISTRATIVE | Age: 45
End: 2025-08-28

## 2025-08-28 DIAGNOSIS — Z79.52 LONG TERM CURRENT USE OF SYSTEMIC STEROIDS: ICD-10-CM

## 2025-08-28 DIAGNOSIS — M81.0 OSTEOPOROSIS, UNSPECIFIED OSTEOPOROSIS TYPE, UNSPECIFIED PATHOLOGICAL FRACTURE PRESENCE: Primary | ICD-10-CM

## 2025-09-04 ENCOUNTER — HOSPITAL ENCOUNTER (OUTPATIENT)
Dept: LAB | Age: 45
Discharge: HOME | End: 2025-09-04
Payer: MEDICARE

## 2025-09-04 LAB
ALBUMIN SERPL-MCNC: 3.6 G/DL (ref 3.4–5)
ALBUMIN/GLOB SERPL: 1.1 {RATIO} (ref 1.1–2.2)
ALP SERPL-CCNC: 82 U/L (ref 40–129)
ANION GAP SERPL CALCULATED.3IONS-SCNC: 16 MMOL/L (ref 3–16)
AST SERPL-CCNC: 62 U/L (ref 15–37)
BASOPHILS # BLD: 0 K/UL (ref 0–0.2)
BASOPHILS NFR BLD: 0.6 %
BILIRUB SERPL-MCNC: <0.2 MG/DL (ref 0–1)
BUN SERPL-MCNC: 21 MG/DL (ref 7–20)
CALCIUM SERPL-MCNC: 9.6 MG/DL (ref 8.3–10.6)
CHLORIDE SERPL-SCNC: 100 MMOL/L (ref 99–110)
CHOLEST SERPL-MCNC: 184 MG/DL (ref 0–199)
CO2 SERPL-SCNC: 20 MMOL/L (ref 21–32)
CREAT SERPL-MCNC: 0.9 MG/DL (ref 0.6–1.1)
DEPRECATED RDW RBC AUTO: 16.8 % (ref 12.4–15.4)
EOSINOPHIL # BLD: 0.2 K/UL (ref 0–0.6)
EOSINOPHIL NFR BLD: 2.8 %
GFR SERPLBLD CREATININE-BSD FMLA CKD-EPI: 79 ML/MIN/{1.73_M2}
GLUCOSE SERPL-MCNC: 59 MG/DL (ref 70–99)
HCT VFR BLD AUTO: 33.6 % (ref 36–48)
HDLC SERPL-MCNC: 39 MG/DL (ref 40–60)
HGB BLD-MCNC: 11.2 G/DL (ref 12–16)
LDLC SERPL CALC-MCNC: 95 MG/DL
LYMPHOCYTES # BLD: 2.2 K/UL (ref 1–5.1)
LYMPHOCYTES NFR BLD: 38.7 %
MCH RBC QN AUTO: 27.3 PG (ref 26–34)
MCHC RBC AUTO-ENTMCNC: 33.3 G/DL (ref 31–36)
MCV RBC AUTO: 82.1 FL (ref 80–100)
MONOCYTES # BLD: 0.7 K/UL (ref 0–1.3)
MONOCYTES NFR BLD: 11.7 %
NEUTROPHILS # BLD: 2.6 K/UL (ref 1.7–7.7)
NEUTROPHILS NFR BLD: 46.2 %
PLATELET # BLD AUTO: 236 K/UL (ref 135–450)
PMV BLD AUTO: 9 FL (ref 5–10.5)
POTASSIUM SERPL-SCNC: ABNORMAL MMOL/L (ref 3.5–5.1)
PROT SERPL-MCNC: 7 G/DL (ref 6.4–8.2)
RBC # BLD AUTO: 4.09 M/UL (ref 4–5.2)
REASON FOR REJECTION: NORMAL
REJECTED TEST: NORMAL
SODIUM SERPL-SCNC: 136 MMOL/L (ref 136–145)
T4 SERPL-MCNC: 4.7 UG/DL (ref 4.5–10.9)
TRIGL SERPL-MCNC: 250 MG/DL (ref 0–150)
TSH SERPL DL<=0.005 MIU/L-ACNC: 1.16 UIU/ML (ref 0.27–4.2)
VLDLC SERPL CALC-MCNC: 50 MG/DL
WBC # BLD AUTO: 5.7 K/UL (ref 4–11)

## 2025-09-04 PROCEDURE — 36415 COLL VENOUS BLD VENIPUNCTURE: CPT

## 2025-09-04 PROCEDURE — 85025 COMPLETE CBC W/AUTO DIFF WBC: CPT

## 2025-09-04 PROCEDURE — 84436 ASSAY OF TOTAL THYROXINE: CPT

## 2025-09-04 PROCEDURE — 80061 LIPID PANEL: CPT

## 2025-09-04 PROCEDURE — 80053 COMPREHEN METABOLIC PANEL: CPT

## 2025-09-04 PROCEDURE — 84443 ASSAY THYROID STIM HORMONE: CPT

## 2025-09-04 PROCEDURE — 82306 VITAMIN D 25 HYDROXY: CPT

## 2025-09-07 LAB
25(OH)D2 SERPL-MCNC: <1 NG/ML
25(OH)D3 SERPL-MCNC: 41.4 NG/ML
DEPRECATED CALCIDIOL+CALCIFEROL SERPL-MC: 41.4 NG/ML (ref 30–80)

## (undated) DEVICE — CABLE BPLR L12FT FLYING LD DISPOSABLE

## (undated) DEVICE — SYRINGE MED 10ML SLIP TIP BLNT FILL AND LUERLOCK DISP

## (undated) DEVICE — GARMENT,MEDLINE,DVT,INT,CALF,MED, GEN2: Brand: MEDLINE

## (undated) DEVICE — CATHETER IV 14GA L5.25IN PERIPH ORNG FEP POLYMER 3 BVL

## (undated) DEVICE — SYSTEM SKIN CLSR 22CM DERMBND PRINEO

## (undated) DEVICE — ELECTRODE NERVE STIM FOR SPNL CRD MONITORING

## (undated) DEVICE — 3M™ WARMING BLANKET, UPPER BODY, 10 PER CASE, 42268: Brand: BAIR HUGGER™

## (undated) DEVICE — SURGICAL SET UP - SURE SET: Brand: MEDLINE INDUSTRIES, INC.

## (undated) DEVICE — UNDERGLOVE SURG SZ 8 BLU LTX FREE SYN POLYISOPRENE POLYMER

## (undated) DEVICE — SUTURE MCRYL SZ 4-0 L27IN ABSRB UD L19MM PS-2 1/2 CIR PRIM Y426H

## (undated) DEVICE — ELECTRODE,RADIOTRANSLUCENT,FOAM,3PK: Brand: MEDLINE

## (undated) DEVICE — SURE SET-DOUBLE BASIN-LF: Brand: MEDLINE INDUSTRIES, INC.

## (undated) DEVICE — CODMAN® SURGICAL PATTIES 1/2" X 3" (1.27CM X 7.62CM): Brand: CODMAN®

## (undated) DEVICE — CRADLE ARM INDIV PT CARE KT COMP FOR FOR RADLUC WILSON FRME

## (undated) DEVICE — TOOL 14MH30 LEGEND 14CM 3MM: Brand: MIDAS REX ™

## (undated) DEVICE — SINGLE USE BIOPSY VALVE MAJ-210: Brand: SINGLE USE BIOPSY VALVE (STERILE)

## (undated) DEVICE — GLOVE SURG SZ 6 L12IN FNGR THK75MIL WHT LTX POLYMER BEAD

## (undated) DEVICE — Device: Brand: DISPOSABLE TROCAR INSERT PEDICLE ACCESS NEEDLE (1 PCS.)

## (undated) DEVICE — GLOVE SURG SZ 65 L12IN FNGR THK87MIL WHT LTX FREE

## (undated) DEVICE — TURNOVER KIT RM INF CTRL TECH

## (undated) DEVICE — TUBING, SUCTION, 3/16" X 12', STRAIGHT: Brand: MEDLINE

## (undated) DEVICE — SUTURE VCRL SZ 0 L18IN ABSRB UD L36MM CT-1 1/2 CIR J840D

## (undated) DEVICE — GLOVE ORTHO 8   MSG9480

## (undated) DEVICE — SUTURE VCRL SZ 3-0 L18IN ABSRB UD L26MM SH 1/2 CIR J864D

## (undated) DEVICE — BLADE ES L4IN INSUL EDGE

## (undated) DEVICE — COVER LT HNDL CAM BLU DISP W/ SURG CTRL

## (undated) DEVICE — ENDOSCOPY KIT: Brand: MEDLINE INDUSTRIES, INC.

## (undated) DEVICE — TUBING, SUCTION, 3/16" X 6', STRAIGHT: Brand: MEDLINE

## (undated) DEVICE — PLATE ES AD W 9FT CRD 2

## (undated) DEVICE — GLOVE SURG SZ 75 L12IN FNGR THK94MIL TRNSLUC YEL LTX

## (undated) DEVICE — CHLORAPREP 26ML ORANGE

## (undated) DEVICE — BOWL MED M 16OZ PLAS CAP GRAD

## (undated) DEVICE — CANNULA,OXY,ADULT,SUPERSOFT,W/7'TUB,SC: Brand: MEDLINE INDUSTRIES, INC.

## (undated) DEVICE — PROBE 8225101 5PK STD PRASS FL TIP ROHS

## (undated) DEVICE — ORTHO PRE OP PACK: Brand: MEDLINE INDUSTRIES, INC.

## (undated) DEVICE — LINER,SOFT,SUCTION CANISTER,1500CC: Brand: MEDLINE

## (undated) DEVICE — YANKAUER,BULB TIP,W/O VENT,RIGID,STERILE: Brand: MEDLINE

## (undated) DEVICE — CONMED SCOPE SAVER BITE BLOCK, 20X27 MM: Brand: SCOPE SAVER

## (undated) DEVICE — LAMINECTOMY PK

## (undated) DEVICE — SYRINGE MED 50ML LUERSLIP TIP

## (undated) DEVICE — SOLUTION IV 1000ML 0.9% SOD CHL

## (undated) DEVICE — TRAP,MUCUS SPECIMEN, 80CC: Brand: MEDLINE

## (undated) DEVICE — DRAPE MICSCP W132XL406CM LENS DIA68MM W VARI LENS2 FOR LEICA

## (undated) DEVICE — Z DISCOTINUED USE 2306229 KIT PT CARE POS PD SL BAR PD CRADL PLLW FOR 5321 WILSON +

## (undated) DEVICE — SINGLE USE SUCTION VALVE MAJ-209: Brand: SINGLE USE SUCTION VALVE (STERILE)